# Patient Record
Sex: MALE | Race: WHITE | NOT HISPANIC OR LATINO | Employment: OTHER | URBAN - METROPOLITAN AREA
[De-identification: names, ages, dates, MRNs, and addresses within clinical notes are randomized per-mention and may not be internally consistent; named-entity substitution may affect disease eponyms.]

---

## 2017-01-26 ENCOUNTER — HOSPITAL ENCOUNTER (OUTPATIENT)
Dept: RADIOLOGY | Facility: HOSPITAL | Age: 52
Discharge: HOME | End: 2017-01-26
Attending: INTERNAL MEDICINE
Payer: MEDICARE

## 2017-01-26 ENCOUNTER — TELEPHONE (OUTPATIENT)
Dept: HEPATOLOGY | Facility: CLINIC | Age: 52
End: 2017-01-26

## 2017-01-26 DIAGNOSIS — K74.60 CIRRHOSIS OF LIVER WITHOUT ASCITES, UNSPECIFIED HEPATIC CIRRHOSIS TYPE: ICD-10-CM

## 2017-01-26 DIAGNOSIS — K76.0 HEPATIC STEATOSIS: ICD-10-CM

## 2017-01-26 DIAGNOSIS — B18.2 CHRONIC HEPATITIS C WITHOUT HEPATIC COMA: Primary | ICD-10-CM

## 2017-01-26 PROCEDURE — 76700 US EXAM ABDOM COMPLETE: CPT | Mod: TC

## 2017-01-26 PROCEDURE — 76700 US EXAM ABDOM COMPLETE: CPT | Mod: 26,,, | Performed by: RADIOLOGY

## 2017-01-26 NOTE — TELEPHONE ENCOUNTER
MA called pt to relay provider message dated 1/26/17. Call successful. Spoke with pt. Provider message relayed. Pt verbalized understanding. Lab and U/S appt scheduled for 7/25/17. Recall entered for f/u visit with Kendra Wilson.[email or web address]

## 2017-01-26 NOTE — TELEPHONE ENCOUNTER
hcc screening     US= no focal hepatic mass    Labs  hcv --pending    Lab Results   Component Value Date    AST 16 01/26/2017    ALT 12 01/26/2017    CREATININE 1.1 01/26/2017    BILITOT 0.5 01/26/2017    ALBUMIN 4.0 01/26/2017    INR 1.1 01/26/2017    AFP 5.5 07/26/2016     MELD-Na score: 8 at 1/26/2017  9:34 AM  MELD score: 8 at 1/26/2017  9:34 AM  Calculated from:  Serum Creatinine: 1.1 mg/dL at 1/26/2017  9:34 AM  Serum Sodium: 142 mmol/L (Rounded to 137) at 1/26/2017  9:34 AM  Total Bilirubin: 0.5 mg/dL (Rounded to 1) at 1/26/2017  9:34 AM  INR(ratio): 1.1 at 1/26/2017  9:34 AM  Age: 52 years    Please call pt.   All labs and imaging look great.   Hep C test is pending, will be back in touch  i'll need to see him in 6 months w/ cbc,cmp,inr, afp & abd US done here prior to visit.  ( all orders are in)

## 2017-06-27 ENCOUNTER — TELEPHONE (OUTPATIENT)
Dept: HEPATOLOGY | Facility: CLINIC | Age: 52
End: 2017-06-27

## 2017-06-27 NOTE — TELEPHONE ENCOUNTER
----- Message from Shakeel Stephen sent at 6/27/2017 10:26 AM CDT -----  Contact: Patient  Patient called in today June 27, 2017 wanting to speak with Dr. Wilson because he was confused about when to set an appointment with her. Please have Dr. Wilson or Dr. Wilson's Nurse to give Patient a call. Please contact patient at [phone].

## 2017-07-20 ENCOUNTER — CLINICAL SUPPORT (OUTPATIENT)
Dept: SMOKING CESSATION | Facility: CLINIC | Age: 52
End: 2017-07-20
Payer: COMMERCIAL

## 2017-07-20 DIAGNOSIS — F17.200 TOBACCO USE DISORDER: Primary | ICD-10-CM

## 2017-07-20 PROCEDURE — 99999 PR PBB SHADOW E&M-EST. PATIENT-LVL I: CPT | Mod: PBBFAC,,,

## 2017-07-20 PROCEDURE — 99404 PREV MED CNSL INDIV APPRX 60: CPT | Mod: S$GLB,,,

## 2017-07-20 RX ORDER — IBUPROFEN 200 MG
1 TABLET ORAL DAILY
Qty: 14 PATCH | Refills: 0 | Status: SHIPPED | OUTPATIENT
Start: 2017-07-20 | End: 2017-07-27

## 2017-07-25 ENCOUNTER — HOSPITAL ENCOUNTER (OUTPATIENT)
Dept: RADIOLOGY | Facility: HOSPITAL | Age: 52
Discharge: HOME | End: 2017-07-25
Attending: INTERNAL MEDICINE
Payer: MEDICARE

## 2017-07-25 DIAGNOSIS — K74.60 CIRRHOSIS OF LIVER WITHOUT ASCITES, UNSPECIFIED HEPATIC CIRRHOSIS TYPE: ICD-10-CM

## 2017-07-25 DIAGNOSIS — K76.0 HEPATIC STEATOSIS: ICD-10-CM

## 2017-07-25 PROCEDURE — 76700 US EXAM ABDOM COMPLETE: CPT | Mod: TC

## 2017-07-25 PROCEDURE — 76700 US EXAM ABDOM COMPLETE: CPT | Mod: 26,,, | Performed by: RADIOLOGY

## 2017-07-27 ENCOUNTER — TELEPHONE (OUTPATIENT)
Dept: HEPATOLOGY | Facility: CLINIC | Age: 52
End: 2017-07-27

## 2017-07-27 ENCOUNTER — OFFICE VISIT (OUTPATIENT)
Dept: HEPATOLOGY | Facility: CLINIC | Age: 52
End: 2017-07-27
Payer: MEDICARE

## 2017-07-27 ENCOUNTER — CLINICAL SUPPORT (OUTPATIENT)
Dept: SMOKING CESSATION | Facility: CLINIC | Age: 52
End: 2017-07-27
Payer: COMMERCIAL

## 2017-07-27 VITALS
HEIGHT: 64 IN | TEMPERATURE: 98 F | RESPIRATION RATE: 18 BRPM | BODY MASS INDEX: 27.33 KG/M2 | HEART RATE: 79 BPM | OXYGEN SATURATION: 97 % | WEIGHT: 160.06 LBS | SYSTOLIC BLOOD PRESSURE: 156 MMHG | DIASTOLIC BLOOD PRESSURE: 87 MMHG

## 2017-07-27 DIAGNOSIS — K74.69 OTHER CIRRHOSIS OF LIVER: Primary | ICD-10-CM

## 2017-07-27 DIAGNOSIS — F17.200 TOBACCO USE DISORDER: Primary | ICD-10-CM

## 2017-07-27 DIAGNOSIS — Z86.19 HISTORY OF HEPATITIS C: ICD-10-CM

## 2017-07-27 PROCEDURE — 99999 PR PBB SHADOW E&M-EST. PATIENT-LVL V: CPT | Mod: PBBFAC,,, | Performed by: PHYSICIAN ASSISTANT

## 2017-07-27 PROCEDURE — 99407 BEHAV CHNG SMOKING > 10 MIN: CPT | Mod: S$GLB,,,

## 2017-07-27 PROCEDURE — 99214 OFFICE O/P EST MOD 30 MIN: CPT | Mod: S$GLB,,, | Performed by: PHYSICIAN ASSISTANT

## 2017-07-27 NOTE — PATIENT INSTRUCTIONS
Try to stay below 2grams/ 2000mg of sodium per day.   Discharge Instructions: Eating a Low-Salt Diet  Your health care provider has prescribed a low-salt diet for you. Most people with heart problems need to eat less salt, which is full of sodium. Too much sodium is linked to high blood pressure, which is linked to a greater risk of heart disease, stroke, blindness, and kidney problems.  Home care    Learn ways to cut back on salt (sodium):  · Eat less frozen, canned, dried, packaged, and fast foods. These often contain high amounts of sodium.  · Season foods with herbs instead of salt when you cook.  · Season with flavorings such as pepper, lemon, garlic, and onion.  · Dont add salt to your food at the table.  · Sprinkle salt-free herbal blends on meats and vegetables.  Learn to read food labels carefully:  · Look for the total amount of sodium per serving.  · Look for foods labeled low sodium, reduced sodium, or no added salt.  · Beware. Salt goes by many names. Cut down on foods with these words (all forms of salt) listed as ingredients:  ¨ Salt  ¨ Sodium  ¨ Soy sauce  ¨ Baking soda  ¨ Baking powder  ¨ MSG  ¨ Monosodium  ¨ Na (the chemical symbol for sodium)  Other ideas:  · Use more fresh food. Buy more fruits and vegetables.  · Select lean meats, fish, and poultry.  · Find a cookbook with low-salt recipes. Youll find ideas for tasty meals that are healthy for your heart.  ·   When eating out, ask questions about the menu. Tell the  you're on a low-salt diet.  ¨ If you order fish, chicken, beef, or pork, ask the  to have it broiled, baked, poached, or grilled without salt, butter, or breading.  ¨ Choose plain steamed rice, boiled noodles, and baked or boiled potatoes. Top potatoes with chives and a little sour cream instead of butter.  · Avoid antacids that are high in salt. Check the label before you buy.  Follow-up  Make a follow-up appointment with a nutritionist as directed by our  staff.  Date Last Reviewed: 6/20/2015 © 2000-2016 "[employer]. [address] [place] [address], [place] [address]. All rights reserved. This information is not intended as a substitute for professional medical care. Always follow your healthcare professional's instructions.        Low-Salt Diet  This diet removes foods that are high in salt. It also limits the amount of salt you use when cooking. It is most often used for people with high blood pressure, edema (fluid retention), and kidney, liver, or heart disease.  Table salt contains the mineral sodium. Your body needs sodium to work normally. But too much sodium can make your health problems worse. Your healthcare provider is recommending a low-salt (also called low-sodium) diet for you. Your total daily allowance of salt is 1,500 to 2,300 milligrams (mg). It is less than 1 teaspoon of table salt. This means you can have only about 500 to 700 mg of sodium at each meal. People with certain health problems should limit salt intake to the lower end of the recommended range.    When you cook, dont add much salt. If you can cook without using salt, even better. Dont add salt to your food at the table.  When shopping, read food labels. Salt is often called sodium on the label. Choose foods that are salt-free, low salt, or very low salt. Note that foods with reduced salt may not lower your salt intake enough.    Beans, potatoes, and pasta  Ok: Dry beans, split peas, lentils, potatoes, rice, macaroni, pasta, spaghetti without added salt  Avoid: Potato chips, tortilla chips, and similar products  Breads and cereals  Ok: Low-sodium breads, rolls, cereals, and cakes; low-salt crackers, matzo crackers  Avoid: Salted crackers, pretzels, popcorn, Serbian toast, pancakes, muffins  Dairy  Ok: Milk, chocolate milk, hot chocolate mix, low-salt cheeses, and yogurt  Avoid: Processed cheese and cheese spreads; Roquefort, Camembert, and cottage cheese; buttermilk, instant  breakfast drink  Desserts  Ok: Ice cream, frozen yogurt, juice bars, gelatin, cookies and pies, sugar, honey, jelly, hard candy  Avoid: Most pies, cakes and cookies prepared or processed with salt; instant pudding  Drinks  Ok: Tea, coffee, fizzy (carbonated) drinks, juices  Avoid: Flavored coffees, electrolyte replacement drinks, sports drinks  Meats  Ok: All fresh meat, fish, poultry, low-salt tuna, eggs, egg substitute  Avoid: Smoked, pickled, brine-cured, or salted meats and fish. This includes barros, chipped beef, corned beef, hot dogs, deli meats, ham, kosher meats, salt pork, sausage, canned tuna, salted codfish, smoked salmon, herring, sardines, or anchovies.  Seasonings and spices  Ok: Most seasonings are okay. Good substitutes for salt include: fresh herb blends, hot sauce, lemon, garlic, zarate, vinegar, dry mustard, parsley, cilantro, horseradish, tomato paste, regular margarine, mayonnaise, unsalted butter, cream cheese, vegetable oil, cream, low-salt salad dressing and gravy.  Avoid: Regular ketchup, relishes, pickles, soy sauce, teriyaki sauce, Worcestershire sauce, BBQ sauce, tartar sauce, meat tenderizer, chili sauce, regular gravy, regular salad dressing, salted butter  Soups  Ok: Low-salt soups and broths made with allowed foods  Avoid: Bouillon cubes, soups with smoked or salted meats, regular soup and broth  Vegetables  Ok: Most vegetables are okay; also low-salt tomato and vegetable juices  Avoid: Sauerkraut and other brine-soaked vegetables; pickles and other pickled vegetables; tomato juice, olives  Date Last Reviewed: 8/1/2016  © 1627-0932 [employer]. [address] [place] [address], [place] [address]. All rights reserved. This information is not intended as a substitute for professional medical care. Always follow your healthcare professional's instructions.

## 2017-07-27 NOTE — TELEPHONE ENCOUNTER
----- Message from Kolton Heredia MD sent at 7/27/2017  2:40 PM CDT -----  6 months US is enough    ----- Message -----  From: Kendra Wilson PA-C  Sent: 7/27/2017   9:27 AM  To: Kolton Heredia MD    He is under hcc surveillance w/ normal liver enzymes.   Regarding CBD anything further otherwise will be repeating  US in 6 months?

## 2017-07-27 NOTE — TELEPHONE ENCOUNTER
Attempted to return patient's call regarding his medication, not specified in message. Unsuccessful, voice message left with call back number.

## 2017-07-27 NOTE — PROGRESS NOTES
"HEPATOLOGY CLINIC NOTE     CHIEF COMPLAINT: HEP C followup    HPI: Pt is a 53yo  male here for followup accompanied by his sister. He has well compensated cirrhosis, h/o chronic hepatitis C GT 1a.  & steatohepatitis.   His Liver biopsy 4/2014 revealed stage 3-4 Fibrosis, "emerging cirrhosis" indicating progression of his fibrosis level. His prior liver biopsy in 2009 revealed grade II inflammation with stage II fibrosis. His AFP was elevated in the past at  23 and tpCT scan was done 11/2014 to r/o hepatic mass.   As he started Harvoni his AFP began to trend down, and has remained normal.  hcc screening is up to date as o 7- w/  US suggesting  improvement in steatosis & hepatomegaly. No hepatic mass. labs show normal transaminases, nl albumin, Tbili 0.3, Creatinine 1.3, & INR of 1.1.  Cbc shows normal platelets. EGD in 2015 revealed no varices.    HCV treatment history:   He completed 24 weeks of Harvoni  Re- treatment on 5/18/15 (retreatment in setting of cirrhosis).   Tolerated well, no notable side effects.    SVR(12) as of 8-2015 and SVR(24) as of .   Risk factors for hepatitis C date back to the 1980s and 1990s.   --Treatment with pegasys and ribavirin was attempted in 1990 with Dr. Thang Grider, but was discontinued due to side effects. Details and virologic response unknown.     Pt doing well, no changes in medical history since last here, reports some LE edema , R > L, mostly occurs in the evening after long days of standing.  Otherwise he's doing well, and denies dark urine, abdominal fluid accumulation, yellowing/jaundice of skin or eyes, vomiting of blood, black stool,  or  tremors.     Past Medical History:   Diagnosis Date    Anxiety     Asthma     Depression     Diabetes mellitus     Head injury     Hepatitis C, chronic     genotype 1a; 2014 liver biopsy: stage 3-4    History of hepatitis C, s/p successful hcv treatment w/ SVR 8-2015  10/8/2012    SVR(24) as of 11/2015.  " SVR(12) as of 8/2015.  completed harvoni 24 week regimen for genotype 1a 5/18/15     Hypertension     Shoulder pain     left, s/p 4 surgeries.     Stroke      Past Surgical History:   Procedure Laterality Date    COLONOSCOPY N/A 10/20/2015    Procedure: COLONOSCOPY;  Surgeon: Jagdish Patricia MD;  Location: [facility];  Service: Endoscopy;  Laterality: N/A;    rotator cuff      left side     Allergies and medications: reviewed in epic.     PE:  WDWN, NAD  Alert, oriented and pleasant.   Vitals:    07/27/17 0825   BP: (!) 156/87   Pulse: 79   Resp: 18   Temp: 98 °F (36.7 °C)   HEENT: ncat, eomi, no scleral icterus; normal rom of neck,  Lungs:  chest symmetric with respirations. CTA w/ EBBS  CV: r/r/r  Neuro/psych: no asterixis, oriented x3, mood and affect appropriate.   Skin: slight LE edema bilat, otherwise warm and dry, no c/c/e.     LABS:   Lab Results   Component Value Date    WBC 7.41 07/25/2017     Lab Results   Component Value Date    HGB 14.4 07/25/2017     07/25/2017    AST 20 07/25/2017    ALT 22 07/25/2017    ALBUMIN 3.9 07/25/2017    BILITOT 0.3 07/25/2017    INR 1.1 07/25/2017    AFP 5.9 07/25/2017     MELD-Na score: 10 at 7/25/2017 11:39 AM  MELD score: 10 at 7/25/2017 11:39 AM  Calculated from:  Serum Creatinine: 1.3 mg/dL at 7/25/2017 11:39 AM  Serum Sodium: 145 mmol/L (Rounded to 137) at 7/25/2017 11:39 AM  Total Bilirubin: 0.3 mg/dL (Rounded to 1) at 7/25/2017 11:39 AM  INR(ratio): 1.1 at 7/25/2017 11:39 AM  Age: 52 years    Impression       No liver mass.  Slightly prominent common bile duct without definite obstructive process seen, to correlate with liver enzyme as a result.  If clinically indicated an MRCP could be done.      Electronically signed by: CATRINA MCCONNELL MD  Date: 07/25/17  Time: 11:29     Encounter     View Encounter              EGD:   Impression:            - Normal esophagus.  - Erythematous mucosa in the antrum. Biopsied.  - Normal examined  "duodenum.  Recommendation:        - Await pathology results.                       - No aspirin, ibuprofen, naproxen, or other                        non-steroidal anti-inflammatory drugs.                       - Use Prilosec OTC 20 mg PO daily.                       - Proceed with surveillance colonoscopy.  Jagdish Patricia MD  10/20/2015 10:45:44 AM    COLONOSCOPY   Impression:          - Non-bleeding internal hemorrhoids.                       - Mild diverticulosis in the sigmoid colon. There                        was no evidence of diverticular bleeding. An                        inverted diverticulum was seen in the sigmoid colon.                       - One 3 mm polyp in the transverse colon. Resected                        and retrieved.                       - One 2 mm polyp in the sigmoid colon. Resected and                        retrieved.  Recommendation:      - Await pathology results.                       - Repeat colonoscopy in 5 years for surveillance.                       - Return to referring physician as previously                        scheduled.  Jagdish Patricia MD  10/20/2015 11:09:47 AM    ASSESSMENT: 52yo white male with:   1. Advanced Fibrosis/ Early Cirrhotic changes. Well compensated.   4/2014 Liver biopsy: "emerging cirrhosis", stage 3-4 Fibrosis, 2009 liver biopsy stage 2 fibrosis  hcc screen up to date as o f7/24/2017  w/  Nl AFP & US: no hepatic lesions.     EGD: no varices.   Previously elevated AFP normalized w/ HCV treatment   MELD = 10, based on recent labs.      2. H/o CHRONIC HEPATITIS C, GENOTYPE 1A   S/p successful retreatment w/ SVR(24) as of 11/2015  completed  Harvoni 24 week regimen on 5/18/15  s/p attempted pegasys and ribavirin in past, therapy d/c early secondary to side effects.   completed vaccines for A and B.    3. Mildly prominent common bile duct.   7mm on imaging today,   Normal enzymes     4. Steatohepatitis, on 2014 Liver biopsy  --also noted " on past imaging (tpCT) & US.   Improvement in steatosis seen on imaging since 7-2016  Nl transaminases     5. LE edema, mild      DISCUSSION    Reiterated the point that although Hep C is no longer present,   he still has cirrhosis.    We discussed that now that Hep C is gone, it is thought that this will decrease the incidence of complications from his liver disease.   HOWEVER  long term followup for cirrhosis should be continued lifelong    Signs and symptoms of hepatic decompensation were reviewed, including jaundice, ascites, and slowed mentation due to hepatic encephalopathy. The patient should seek medical attention if any of these things occur. We discussed the potential for bleeding from esophageal varices with symptoms of hematemesis and melena. The patient should report to the Emergency Department for these symptoms.     Continued screening every six months with ultrasound and AFP is recommended.   Discussed recommendation for varices screening w/ EGD.   Discussed the general recommendations for liver health-- continue to avoid raw seafood, limit tylenol to 2000mg daily and avoid alcohol     PLAN:   1. EGD due: /2018  2. COLONOSCOPY due:   3. Discussed Low salt diet, given printed material. Elevate LE few times during the day.  Hydrate well.   4. Will review imaging regarding CBD w/ hepatology for any further recommendations.   5. Next hcc screen due: in 6 months w/ US, cbc,cmp,inr , hcvrna quant & AFP will arrange to be done on  campus, will review by phone.    6.  F/u appt: 1 yr.  w/ hcc screening (US, cbc,cmp,inr & AFP) on day of visit

## 2017-07-27 NOTE — TELEPHONE ENCOUNTER
----- Message from Lizeth Anthony sent at 7/27/2017 10:39 AM CDT -----  Contact: patient   Calling to speak with Kendra about his medication. Please call

## 2017-08-07 ENCOUNTER — HOSPITAL ENCOUNTER (EMERGENCY)
Facility: HOSPITAL | Age: 52
Discharge: HOME | End: 2017-08-07
Attending: EMERGENCY MEDICINE
Payer: MEDICARE

## 2017-08-07 VITALS
DIASTOLIC BLOOD PRESSURE: 70 MMHG | WEIGHT: 163 LBS | HEART RATE: 98 BPM | TEMPERATURE: 99 F | HEIGHT: 64 IN | BODY MASS INDEX: 27.83 KG/M2 | SYSTOLIC BLOOD PRESSURE: 128 MMHG | RESPIRATION RATE: 18 BRPM | OXYGEN SATURATION: 96 %

## 2017-08-07 DIAGNOSIS — M54.2 NECK PAIN ON LEFT SIDE: Primary | ICD-10-CM

## 2017-08-07 DIAGNOSIS — M54.5 CHRONIC BILATERAL LOW BACK PAIN, WITH SCIATICA PRESENCE UNSPECIFIED: ICD-10-CM

## 2017-08-07 DIAGNOSIS — G89.29 CHRONIC BILATERAL LOW BACK PAIN, WITH SCIATICA PRESENCE UNSPECIFIED: ICD-10-CM
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
## 2017-08-07 PROCEDURE — 99284 EMERGENCY DEPT VISIT MOD MDM: CPT

## 2017-08-07 PROCEDURE — 25000003 PHARM REV CODE 250: Performed by: PHYSICIAN ASSISTANT

## 2017-08-07 RX ORDER — METHOCARBAMOL 500 MG/1
500 TABLET, FILM COATED ORAL
Status: COMPLETED | OUTPATIENT
Start: 2017-08-07 | End: 2017-08-07

## 2017-08-07 RX ORDER — METHOCARBAMOL 500 MG/1
500 TABLET, FILM COATED ORAL 3 TIMES DAILY
Qty: 6 TABLET | Refills: 0 | Status: SHIPPED | OUTPATIENT
Start: 2017-08-07 | End: 2017-08-09

## 2017-08-07 RX ADMIN — METHOCARBAMOL 500 MG: 500 TABLET ORAL at 10:08
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
## 2017-09-19 ENCOUNTER — CLINICAL SUPPORT (OUTPATIENT)
Dept: SMOKING CESSATION | Facility: CLINIC | Age: 52
End: 2017-09-19
Payer: COMMERCIAL

## 2017-09-19 DIAGNOSIS — F17.200 TOBACCO USE DISORDER: Primary | ICD-10-CM

## 2017-09-19 PROCEDURE — 99407 BEHAV CHNG SMOKING > 10 MIN: CPT | Mod: S$GLB,,,

## 2017-09-19 RX ORDER — IBUPROFEN 200 MG
1 TABLET ORAL DAILY
Qty: 14 PATCH | Refills: 0 | Status: SHIPPED | OUTPATIENT
Start: 2017-09-19 | End: 2021-09-28

## 2018-01-22 ENCOUNTER — TELEPHONE (OUTPATIENT)
Dept: HEPATOLOGY | Facility: CLINIC | Age: 53
End: 2018-01-22

## 2018-01-22 NOTE — TELEPHONE ENCOUNTER
I spoke with patient and informed him that RHEA Wilson was no longer here.  I also stressed that he's only due to have HCC testing on 1/26 and no f/u is needed at this time.

## 2018-01-22 NOTE — TELEPHONE ENCOUNTER
----- Message from Pratik Nixon sent at 1/22/2018  1:15 PM CST -----  Contact: Pt  Pt would like to know when he has to schedule follow up appt with Katie?     Call

## 2018-01-26 ENCOUNTER — HOSPITAL ENCOUNTER (OUTPATIENT)
Dept: RADIOLOGY | Facility: HOSPITAL | Age: 53
Discharge: HOME | End: 2018-01-26
Attending: INTERNAL MEDICINE
Payer: MEDICARE

## 2018-01-26 DIAGNOSIS — K74.69 OTHER CIRRHOSIS OF LIVER: ICD-10-CM

## 2018-01-26 DIAGNOSIS — Z86.19 HISTORY OF HEPATITIS C: ICD-10-CM

## 2018-01-26 PROCEDURE — 76700 US EXAM ABDOM COMPLETE: CPT | Mod: 26,,, | Performed by: RADIOLOGY

## 2018-01-26 PROCEDURE — 76700 US EXAM ABDOM COMPLETE: CPT | Mod: TC

## 2018-01-29 ENCOUNTER — TELEPHONE (OUTPATIENT)
Dept: HEPATOLOGY | Facility: CLINIC | Age: 53
End: 2018-01-29

## 2018-01-29 NOTE — TELEPHONE ENCOUNTER
----- Message from Kolton Heredia MD sent at 1/29/2018  8:12 AM CST -----  Please let him know that him US is stable.  Can you also make sure he has a clinic appointment in next 4-8 weeks?

## 2018-01-29 NOTE — TELEPHONE ENCOUNTER
MA attempted to call patient he is unable to reached left him VM to please give us a callback. ANTONIO

## 2018-01-30 ENCOUNTER — TELEPHONE (OUTPATIENT)
Dept: HEPATOLOGY | Facility: CLINIC | Age: 53
End: 2018-01-30

## 2018-01-30 NOTE — TELEPHONE ENCOUNTER
MA called patient inform him of his US results. Patient understood he accepted the appt to see JAMESON 2/1/18. ANTONIO

## 2018-02-15 ENCOUNTER — LAB VISIT (OUTPATIENT)
Dept: LAB | Facility: HOSPITAL | Age: 53
End: 2018-02-15
Payer: MEDICARE

## 2018-02-15 ENCOUNTER — OFFICE VISIT (OUTPATIENT)
Dept: HEPATOLOGY | Facility: CLINIC | Age: 53
End: 2018-02-15
Payer: MEDICARE

## 2018-02-15 VITALS
RESPIRATION RATE: 18 BRPM | SYSTOLIC BLOOD PRESSURE: 162 MMHG | OXYGEN SATURATION: 99 % | HEIGHT: 64 IN | HEART RATE: 98 BPM | BODY MASS INDEX: 27.1 KG/M2 | WEIGHT: 158.75 LBS | DIASTOLIC BLOOD PRESSURE: 91 MMHG

## 2018-02-15 DIAGNOSIS — K74.60 CIRRHOSIS OF LIVER WITHOUT ASCITES, UNSPECIFIED HEPATIC CIRRHOSIS TYPE: ICD-10-CM

## 2018-02-15 DIAGNOSIS — K74.60 CIRRHOSIS OF LIVER WITHOUT ASCITES, UNSPECIFIED HEPATIC CIRRHOSIS TYPE: Primary | ICD-10-CM

## 2018-02-15 LAB
AFP SERPL-MCNC: 5.4 NG/ML
ALBUMIN SERPL BCP-MCNC: 4 G/DL
ALP SERPL-CCNC: 71 U/L
ALT SERPL W/O P-5'-P-CCNC: 28 U/L
ANION GAP SERPL CALC-SCNC: 9 MMOL/L
AST SERPL-CCNC: 18 U/L
BASOPHILS # BLD AUTO: 0.09 K/UL
BASOPHILS NFR BLD: 1.2 %
BILIRUB SERPL-MCNC: 0.4 MG/DL
BUN SERPL-MCNC: 8 MG/DL
CALCIUM SERPL-MCNC: 9.8 MG/DL
CHLORIDE SERPL-SCNC: 106 MMOL/L
CO2 SERPL-SCNC: 25 MMOL/L
CREAT SERPL-MCNC: 1.1 MG/DL
DIFFERENTIAL METHOD: ABNORMAL
EOSINOPHIL # BLD AUTO: 0.4 K/UL
EOSINOPHIL NFR BLD: 4.8 %
ERYTHROCYTE [DISTWIDTH] IN BLOOD BY AUTOMATED COUNT: 12.9 %
EST. GFR  (AFRICAN AMERICAN): >60 ML/MIN/1.73 M^2
EST. GFR  (NON AFRICAN AMERICAN): >60 ML/MIN/1.73 M^2
GLUCOSE SERPL-MCNC: 141 MG/DL
HCT VFR BLD AUTO: 41.7 %
HGB BLD-MCNC: 13.9 G/DL
IMM GRANULOCYTES # BLD AUTO: 0.02 K/UL
IMM GRANULOCYTES NFR BLD AUTO: 0.3 %
INR PPP: 1.1
LYMPHOCYTES # BLD AUTO: 3.1 K/UL
LYMPHOCYTES NFR BLD: 40.2 %
MCH RBC QN AUTO: 33.3 PG
MCHC RBC AUTO-ENTMCNC: 33.3 G/DL
MCV RBC AUTO: 100 FL
MONOCYTES # BLD AUTO: 0.7 K/UL
MONOCYTES NFR BLD: 8.4 %
NEUTROPHILS # BLD AUTO: 3.5 K/UL
NEUTROPHILS NFR BLD: 45.1 %
NRBC BLD-RTO: 0 /100 WBC
PLATELET # BLD AUTO: 221 K/UL
PMV BLD AUTO: 10.2 FL
POTASSIUM SERPL-SCNC: 4.2 MMOL/L
PROT SERPL-MCNC: 7.9 G/DL
PROTHROMBIN TIME: 11.3 SEC
RBC # BLD AUTO: 4.18 M/UL
SODIUM SERPL-SCNC: 140 MMOL/L
WBC # BLD AUTO: 7.71 K/UL

## 2018-02-15 PROCEDURE — 80053 COMPREHEN METABOLIC PANEL: CPT

## 2018-02-15 PROCEDURE — 85025 COMPLETE CBC W/AUTO DIFF WBC: CPT

## 2018-02-15 PROCEDURE — 85610 PROTHROMBIN TIME: CPT

## 2018-02-15 PROCEDURE — 99999 PR PBB SHADOW E&M-EST. PATIENT-LVL V: CPT | Mod: PBBFAC,,, | Performed by: PHYSICIAN ASSISTANT

## 2018-02-15 PROCEDURE — 82105 ALPHA-FETOPROTEIN SERUM: CPT

## 2018-02-15 PROCEDURE — 36415 COLL VENOUS BLD VENIPUNCTURE: CPT

## 2018-02-15 PROCEDURE — 99214 OFFICE O/P EST MOD 30 MIN: CPT | Mod: S$GLB,,, | Performed by: PHYSICIAN ASSISTANT

## 2018-02-15 PROCEDURE — 3008F BODY MASS INDEX DOCD: CPT | Mod: S$GLB,,, | Performed by: PHYSICIAN ASSISTANT

## 2018-02-15 RX ORDER — AMITRIPTYLINE HYDROCHLORIDE 50 MG/1
50 TABLET, FILM COATED ORAL NIGHTLY
COMMUNITY
Start: 2018-02-09 | End: 2023-05-18

## 2018-02-15 RX ORDER — PROPRANOLOL HYDROCHLORIDE 10 MG/1
10 TABLET ORAL 2 TIMES DAILY
Status: ON HOLD | COMMUNITY
Start: 2018-02-09 | End: 2018-09-21 | Stop reason: HOSPADM

## 2018-02-15 RX ORDER — DOXEPIN HYDROCHLORIDE 100 MG/1
10 CAPSULE ORAL NIGHTLY
COMMUNITY
Start: 2018-02-09 | End: 2023-08-25

## 2018-02-15 RX ORDER — GABAPENTIN 300 MG/1
300 CAPSULE ORAL 3 TIMES DAILY
Status: ON HOLD | COMMUNITY
Start: 2018-01-12 | End: 2018-09-21

## 2018-02-15 NOTE — Clinical Note
Please schedule CBC, CMP, PT/INR, AFP and U/S in 6 months. He does not need office visit at that time Thanks

## 2018-02-15 NOTE — PROGRESS NOTES
"HEPATOLOGY CLINIC VISIT NOTE     REASON FOR VISIT: HCC screening     HISTORY: This is a 53 y.o. White male here for cirrhosis follow up. Pt has cirrhosis due to HCV. He underwent HCC screening U/S 01/26/18 which revealed no concerning masses or lesions. He is overdue for labs. His last EGD was 10/2015 and there were no EV. His liver staging was done by liver biopsy.  His Liver biopsy 4/2014 revealed stage 3-4 Fibrosis, "emerging cirrhosis" indicating progression of his fibrosis level. His prior liver biopsy in 2009 revealed grade II inflammation with stage II fibrosis. He has a h/o elevated AFP, trended down with HCV treatment.     His major complaint today is anxiety, "his nerves." Pt reports rumination over health and the fact that he had to turn in license following stroke. He feels he should be able to drive. He has not discussed this with his PCP. He also has severe back and shoulder pain. He is undergoing PT for shoulder pain.     Cirrhosis history:  - Well compensated  - MELD score     MELD-Na score: 10 at 7/25/2017 11:39 AM  MELD score: 10 at 7/25/2017 11:39 AM  Calculated from:  Serum Creatinine: 1.3 mg/dL at 7/25/2017 11:39 AM  Serum Sodium: 145 mmol/L (Rounded to 137) at 7/25/2017 11:39 AM  Total Bilirubin: 0.3 mg/dL (Rounded to 1) at 7/25/2017 11:39 AM  INR(ratio): 1.1 at 7/25/2017 11:39 AM  Age: 52 years    - HCC screening:   - U/S: due 07/2018   - AFP: ordered today     (-)Portal HTN:   - EGD: last done 10/2015     Denies jaundice, dark urine, abdominal distention, hematemesis, melena, slowed mentation.   No abnormal skin rashes. No generalized joint pain.                   Past Medical History:   Diagnosis Date    Anxiety     Asthma     Depression     Diabetes mellitus     Head injury     Hepatitis C, chronic     genotype 1a; 2014 liver biopsy: stage 3-4    History of hepatitis C, s/p successful hcv treatment w/ SVR 8-2015  10/8/2012    SVR(24) as of 11/2015.  SVR(12) as of 8/2015.  completed " harvoni 24 week regimen for genotype 1a 5/18/15     Hypertension     Shoulder pain     left, s/p 4 surgeries.     Stroke        Past Surgical History:   Procedure Laterality Date    COLONOSCOPY N/A 10/20/2015    Procedure: COLONOSCOPY;  Surgeon: Jagdish Patricia MD;  Location: [facility];  Service: Endoscopy;  Laterality: N/A;    rotator cuff      left side     FAMILY HISTORY: Negative for liver disease    SOCIAL HISTORY:   History   Smoking Status    Current Every Day Smoker    Packs/day: 1.00    Years: 35.00    Types: Cigarettes   Smokeless Tobacco    Never Used       History   Alcohol Use No       History   Drug Use    Types: Marijuana     Comment: daily     ROS:   No fever, chills, (+)fatigue  No chest pain, dyspnea, cough  No abdominal pain, nausea, vomiting  No headaches, visual changes  No lower extremity edema  No depression, (+) anxiety      PHYSICAL EXAM:  Friendly White male, in no acute distress; alert and oriented to person, place and time  VITALS: reviewed  HEENT: Sclerae anicteric.   NECK: Supple  CVS: Regular rate and rhythm. No murmurs  LUNGS: Normal respiratory effort. Clear bilaterally  ABDOMEN: Flat, soft, nontender. No organomegaly or masses. No ascites or hernias.     SKIN: Warm and dry. No jaundice, No obvious rashes.   EXTREMITIES: No lower extremity edema  NEURO/PSYCH: Normal gate. Memory intact. Thought and speech pattern appropriate. Behavior normal. No depression or anxiety noted.    RECENT LABS:  Lab Results   Component Value Date    WBC 7.41 07/25/2017    HGB 14.4 07/25/2017     07/25/2017     Lab Results   Component Value Date    INR 1.1 07/25/2017     Lab Results   Component Value Date    AST 20 07/25/2017    ALT 22 07/25/2017    BILITOT 0.3 07/25/2017    ALBUMIN 3.9 07/25/2017    ALKPHOS 61 07/25/2017    CREATININE 1.3 07/25/2017    BUN 12 07/25/2017     07/25/2017    K 4.9 07/25/2017    AFP 5.9 07/25/2017     RECENT IMAGING:  U/S abdomen 01/2018  Narrative      Abdominal ultrasound    History: Chronic hepatitis C    Comparison: July 25, 2017    Technique: Sonographic evaluation of abdomen was performed.    Findings:       The hepatic parenchyma is a diffusely increased echotexture..  There is no evidence of intrahepatic biliary ductal dilatation.    The gallbladder has a normal sonographic appearance.  There is no evidence cholelithiasis or pericholecystic fluid.  The sonographer reports a negative Self sign.  The gallbladder wall is within normal limits at 2.6 mm.  The common duct is within normal limits at 6 mm.    The right kidney measures 10.5 cm in length and the left kidney measures 10.6 cm in length.  There is no hydronephrosis.    The spleen measures 10.3 cm in length with a normal echotexture.   Impression       Again there is minimal prominence of the patient's common bile duct similar to prior exam. There is no evidence focal hepatic lesion.   ASSESSMENT  53 y.o. White male with:  1. WELL COMPENSATED CIRRHOSIS  -- due to HCV, post treatment, SVR 24 achieved   -- asymptomatic   - HCC screening:   - U/S: due 07/2018   - AFP: ordered today     (-)Portal HTN:   - EGD: last done 10/2015     PLAN:  1. Labs today  2. EGD  3. HCC screening in 6 months, will review by phone      Thank you for allowing me to participate in the care of Nino Adair PA-C

## 2018-02-16 ENCOUNTER — TELEPHONE (OUTPATIENT)
Dept: HEPATOLOGY | Facility: CLINIC | Age: 53
End: 2018-02-16

## 2018-02-16 NOTE — TELEPHONE ENCOUNTER
----- Message from Gama Adair PA-C sent at 2/16/2018  9:44 AM CST -----  Please let him know that these labs are stable.

## 2018-02-21 ENCOUNTER — TELEPHONE (OUTPATIENT)
Dept: GASTROENTEROLOGY | Facility: CLINIC | Age: 53
End: 2018-02-21

## 2018-02-21 DIAGNOSIS — K74.60 CIRRHOSIS OF LIVER WITHOUT ASCITES, UNSPECIFIED HEPATIC CIRRHOSIS TYPE: Primary | ICD-10-CM

## 2018-02-21 NOTE — TELEPHONE ENCOUNTER
Referral was sent from Dr. Adair to schedule patient for an EGD, patient declines at [facility]. Patient states that he will contact the [facility] [place] to get scheduled.

## 2018-02-27 ENCOUNTER — ANESTHESIA EVENT (OUTPATIENT)
Dept: ENDOSCOPY | Facility: HOSPITAL | Age: 53
End: 2018-02-27
Payer: MEDICARE

## 2018-02-27 ENCOUNTER — ANESTHESIA (OUTPATIENT)
Dept: ENDOSCOPY | Facility: HOSPITAL | Age: 53
End: 2018-02-27
Payer: MEDICARE

## 2018-02-27 ENCOUNTER — SURGERY (OUTPATIENT)
Age: 53
End: 2018-02-27

## 2018-02-27 ENCOUNTER — HOSPITAL ENCOUNTER (OUTPATIENT)
Facility: HOSPITAL | Age: 53
Discharge: HOME | End: 2018-02-27
Attending: INTERNAL MEDICINE | Admitting: INTERNAL MEDICINE
Payer: MEDICARE

## 2018-02-27 VITALS
RESPIRATION RATE: 18 BRPM | TEMPERATURE: 98 F | BODY MASS INDEX: 25.61 KG/M2 | HEART RATE: 78 BPM | WEIGHT: 150 LBS | OXYGEN SATURATION: 98 % | SYSTOLIC BLOOD PRESSURE: 144 MMHG | DIASTOLIC BLOOD PRESSURE: 90 MMHG | HEIGHT: 64 IN

## 2018-02-27 DIAGNOSIS — K74.60 CIRRHOSIS OF LIVER WITHOUT ASCITES, UNSPECIFIED HEPATIC CIRRHOSIS TYPE: Primary | Chronic | ICD-10-CM

## 2018-02-27 DIAGNOSIS — Z86.19 HISTORY OF HEPATITIS C: ICD-10-CM

## 2018-02-27 DIAGNOSIS — K74.60 CIRRHOSIS: ICD-10-CM

## 2018-02-27 LAB — POCT GLUCOSE: 106 MG/DL (ref 70–110)

## 2018-02-27 PROCEDURE — 25000003 PHARM REV CODE 250: Performed by: INTERNAL MEDICINE

## 2018-02-27 PROCEDURE — 43235 EGD DIAGNOSTIC BRUSH WASH: CPT | Performed by: INTERNAL MEDICINE

## 2018-02-27 PROCEDURE — 82962 GLUCOSE BLOOD TEST: CPT | Performed by: INTERNAL MEDICINE

## 2018-02-27 PROCEDURE — 43235 EGD DIAGNOSTIC BRUSH WASH: CPT | Mod: ,,, | Performed by: INTERNAL MEDICINE

## 2018-02-27 PROCEDURE — D9220A PRA ANESTHESIA: Mod: ANES,,, | Performed by: ANESTHESIOLOGY

## 2018-02-27 PROCEDURE — 37000009 HC ANESTHESIA EA ADD 15 MINS: Performed by: INTERNAL MEDICINE

## 2018-02-27 PROCEDURE — 63600175 PHARM REV CODE 636 W HCPCS: Performed by: NURSE ANESTHETIST, CERTIFIED REGISTERED

## 2018-02-27 PROCEDURE — D9220A PRA ANESTHESIA: Mod: CRNA,,, | Performed by: NURSE ANESTHETIST, CERTIFIED REGISTERED

## 2018-02-27 PROCEDURE — 37000008 HC ANESTHESIA 1ST 15 MINUTES: Performed by: INTERNAL MEDICINE

## 2018-02-27 RX ORDER — SODIUM CHLORIDE 9 MG/ML
INJECTION, SOLUTION INTRAVENOUS CONTINUOUS
Status: DISCONTINUED | OUTPATIENT
Start: 2018-02-27 | End: 2018-02-27 | Stop reason: HOSPADM

## 2018-02-27 RX ORDER — LIDOCAINE HCL/PF 100 MG/5ML
SYRINGE (ML) INTRAVENOUS
Status: DISCONTINUED | OUTPATIENT
Start: 2018-02-27 | End: 2018-02-27

## 2018-02-27 RX ORDER — PROPOFOL 10 MG/ML
VIAL (ML) INTRAVENOUS
Status: DISCONTINUED | OUTPATIENT
Start: 2018-02-27 | End: 2018-02-27

## 2018-02-27 RX ADMIN — PROPOFOL 60 MG: 10 INJECTION, EMULSION INTRAVENOUS at 11:02

## 2018-02-27 RX ADMIN — PROPOFOL 40 MG: 10 INJECTION, EMULSION INTRAVENOUS at 11:02

## 2018-02-27 RX ADMIN — SODIUM CHLORIDE: 9 INJECTION, SOLUTION INTRAVENOUS at 10:02

## 2018-02-27 RX ADMIN — LIDOCAINE HYDROCHLORIDE 100 MG: 20 INJECTION, SOLUTION INTRAVENOUS at 11:02

## 2018-02-27 NOTE — TRANSFER OF CARE
"Anesthesia Transfer of Care Note    Patient: Nino Price    Procedure(s) Performed: Procedure(s) (LRB):  ESOPHAGOGASTRODUODENOSCOPY (EGD) (N/A)    Patient location: PACU    Anesthesia Type: general    Transport from OR: Transported from OR on room air with adequate spontaneous ventilation    Post pain: adequate analgesia    Post assessment: no apparent anesthetic complications    Post vital signs: stable    Level of consciousness: awake and alert    Nausea/Vomiting: no nausea/vomiting    Complications: none    Transfer of care protocol was followed      Last vitals:   Visit Vitals  BP (!) 163/95 (BP Location: Left arm, Patient Position: Lying)   Pulse 97   Temp 37.1 °C (98.8 °F) (Temporal)   Resp 16   Ht 5' 4" (1.626 m)   Wt 68 kg (150 lb)   SpO2 98%   BMI 25.75 kg/m²     "
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
## 2018-02-27 NOTE — H&P (VIEW-ONLY)
"HEPATOLOGY CLINIC VISIT NOTE     REASON FOR VISIT: HCC screening     HISTORY: This is a 53 y.o. White male here for cirrhosis follow up. Pt has cirrhosis due to HCV. He underwent HCC screening U/S 01/26/18 which revealed no concerning masses or lesions. He is overdue for labs. His last EGD was 10/2015 and there were no EV. His liver staging was done by liver biopsy.  His Liver biopsy 4/2014 revealed stage 3-4 Fibrosis, "emerging cirrhosis" indicating progression of his fibrosis level. His prior liver biopsy in 2009 revealed grade II inflammation with stage II fibrosis. He has a h/o elevated AFP, trended down with HCV treatment.     His major complaint today is anxiety, "his nerves." Pt reports rumination over health and the fact that he had to turn in license following stroke. He feels he should be able to drive. He has not discussed this with his PCP. He also has severe back and shoulder pain. He is undergoing PT for shoulder pain.     Cirrhosis history:  - Well compensated  - MELD score     MELD-Na score: 10 at 7/25/2017 11:39 AM  MELD score: 10 at 7/25/2017 11:39 AM  Calculated from:  Serum Creatinine: 1.3 mg/dL at 7/25/2017 11:39 AM  Serum Sodium: 145 mmol/L (Rounded to 137) at 7/25/2017 11:39 AM  Total Bilirubin: 0.3 mg/dL (Rounded to 1) at 7/25/2017 11:39 AM  INR(ratio): 1.1 at 7/25/2017 11:39 AM  Age: 52 years    - HCC screening:   - U/S: due 07/2018   - AFP: ordered today     (-)Portal HTN:   - EGD: last done 10/2015     Denies jaundice, dark urine, abdominal distention, hematemesis, melena, slowed mentation.   No abnormal skin rashes. No generalized joint pain.                   Past Medical History:   Diagnosis Date    Anxiety     Asthma     Depression     Diabetes mellitus     Head injury     Hepatitis C, chronic     genotype 1a; 2014 liver biopsy: stage 3-4    History of hepatitis C, s/p successful hcv treatment w/ SVR 8-2015  10/8/2012    SVR(24) as of 11/2015.  SVR(12) as of 8/2015.  completed " harvoni 24 week regimen for genotype 1a 5/18/15     Hypertension     Shoulder pain     left, s/p 4 surgeries.     Stroke        Past Surgical History:   Procedure Laterality Date    COLONOSCOPY N/A 10/20/2015    Procedure: COLONOSCOPY;  Surgeon: Jagdish Patricia MD;  Location: [facility];  Service: Endoscopy;  Laterality: N/A;    rotator cuff      left side     FAMILY HISTORY: Negative for liver disease    SOCIAL HISTORY:   History   Smoking Status    Current Every Day Smoker    Packs/day: 1.00    Years: 35.00    Types: Cigarettes   Smokeless Tobacco    Never Used       History   Alcohol Use No       History   Drug Use    Types: Marijuana     Comment: daily     ROS:   No fever, chills, (+)fatigue  No chest pain, dyspnea, cough  No abdominal pain, nausea, vomiting  No headaches, visual changes  No lower extremity edema  No depression, (+) anxiety      PHYSICAL EXAM:  Friendly White male, in no acute distress; alert and oriented to person, place and time  VITALS: reviewed  HEENT: Sclerae anicteric.   NECK: Supple  CVS: Regular rate and rhythm. No murmurs  LUNGS: Normal respiratory effort. Clear bilaterally  ABDOMEN: Flat, soft, nontender. No organomegaly or masses. No ascites or hernias.     SKIN: Warm and dry. No jaundice, No obvious rashes.   EXTREMITIES: No lower extremity edema  NEURO/PSYCH: Normal gate. Memory intact. Thought and speech pattern appropriate. Behavior normal. No depression or anxiety noted.    RECENT LABS:  Lab Results   Component Value Date    WBC 7.41 07/25/2017    HGB 14.4 07/25/2017     07/25/2017     Lab Results   Component Value Date    INR 1.1 07/25/2017     Lab Results   Component Value Date    AST 20 07/25/2017    ALT 22 07/25/2017    BILITOT 0.3 07/25/2017    ALBUMIN 3.9 07/25/2017    ALKPHOS 61 07/25/2017    CREATININE 1.3 07/25/2017    BUN 12 07/25/2017     07/25/2017    K 4.9 07/25/2017    AFP 5.9 07/25/2017     RECENT IMAGING:  U/S abdomen 01/2018  Narrative      Abdominal ultrasound    History: Chronic hepatitis C    Comparison: July 25, 2017    Technique: Sonographic evaluation of abdomen was performed.    Findings:       The hepatic parenchyma is a diffusely increased echotexture..  There is no evidence of intrahepatic biliary ductal dilatation.    The gallbladder has a normal sonographic appearance.  There is no evidence cholelithiasis or pericholecystic fluid.  The sonographer reports a negative Self sign.  The gallbladder wall is within normal limits at 2.6 mm.  The common duct is within normal limits at 6 mm.    The right kidney measures 10.5 cm in length and the left kidney measures 10.6 cm in length.  There is no hydronephrosis.    The spleen measures 10.3 cm in length with a normal echotexture.   Impression       Again there is minimal prominence of the patient's common bile duct similar to prior exam. There is no evidence focal hepatic lesion.   ASSESSMENT  53 y.o. White male with:  1. WELL COMPENSATED CIRRHOSIS  -- due to HCV, post treatment, SVR 24 achieved   -- asymptomatic   - HCC screening:   - U/S: due 07/2018   - AFP: ordered today     (-)Portal HTN:   - EGD: last done 10/2015     PLAN:  1. Labs today  2. EGD  3. HCC screening in 6 months, will review by phone      Thank you for allowing me to participate in the care of Nino Adair PA-C

## 2018-02-27 NOTE — ANESTHESIA POSTPROCEDURE EVALUATION
"Anesthesia Post Evaluation    Patient: Nino Price    Procedure(s) Performed: Procedure(s) (LRB):  ESOPHAGOGASTRODUODENOSCOPY (EGD) (N/A)    Final Anesthesia Type: general  Patient location during evaluation: PACU  Patient participation: Yes- Able to Participate  Level of consciousness: awake and alert  Post-procedure vital signs: reviewed and stable  Pain management: adequate  Airway patency: patent  PONV status at discharge: No PONV  Anesthetic complications: no      Cardiovascular status: hemodynamically stable  Respiratory status: room air, spontaneous ventilation and unassisted  Hydration status: euvolemic  Follow-up not needed.        Visit Vitals  /72 (BP Location: Left arm, Patient Position: Lying)   Pulse 98   Temp 36.7 °C (98.1 °F) (Temporal)   Resp 20   Ht 5' 4" (1.626 m)   Wt 68 kg (150 lb)   SpO2 96%   BMI 25.75 kg/m²       Pain/Gilmer Score: Pain Assessment Performed: Yes (2/27/2018 10:23 AM)  Presence of Pain: complains of pain/discomfort (2/27/2018 10:23 AM)  Gilmer Score: 7 (2/27/2018 11:25 AM)      "

## 2018-02-27 NOTE — PROVATION PATIENT INSTRUCTIONS
Discharge Summary/Instructions after an Endoscopic Procedure  Patient Name: Nino Price  Patient MRN: [id number]  Patient YOB: 1965 Tuesday, February 27, 2018  Deja Burnham MD  RESTRICTIONS:  During your procedure today, you received medications for sedation.  These   medications may affect your judgment, balance and coordination.  Therefore,   for 24 hours, you have the following restrictions:   - DO NOT drive a car, operate machinery, make legal/financial decisions,   sign important papers or drink alcohol.    ACTIVITY:  The following day: return to full activity including work, except no heavy   lifting, straining or running for 3 days if polyps were removed.  DIET:  Eat and drink normally unless instructed otherwise.     TREATMENT FOR COMMON SIDE EFFECTS:  - Mild abdominal pain, nausea, belching, bloating or excessive gas:  rest,   eat lightly and use a heating pad.  - Sore Throat: treat with throat lozenges and/or gargle with warm salt   water.  - Because air was used during the procedure, expelling large amounts of air   from your rectum or belching is normal.  - If a bowel prep was taken, you may not have a bowel movement for 1-3 days.    This is normal.  SYMPTOMS TO WATCH FOR AND REPORT TO YOUR PHYSICIAN:  1. Abdominal pain or bloating, other than gas cramps.  2. Chest pain.  3. Back pain.  4. Signs of infection such as: chills or fever occurring within 24 hours   after the procedure.  5. Rectal bleeding, which would show as bright red, maroon, or black stools.   (A tablespoon of blood from the rectum is not serious, especially if   hemorrhoids are present.)  6. Vomiting.  7. Weakness or dizziness.  GO DIRECTLY TO THE NEAREST EMERGENCY ROOM IF YOU HAVE ANY OF THE FOLLOWING:      Difficulty breathing  Chills and/or fever over 101 F   Persistent vomiting and/or vomiting blood   Severe abdominal pain   Severe chest pain   Black, tarry stools   Bleeding- more than one tablespoon   Any other  symptom or condition that you feel may need urgent attention  Your doctor recommends these additional instructions:  If any biopsies were taken, your doctors clinic will contact you in 1 to 2   weeks with any results.  You are being discharged to home.   You have a contact number available for emergencies.  The signs and symptoms   of potential delayed complications were discussed with you.  You may return   to normal activities tomorrow.  Written discharge instructions were   provided to you.   Resume your previous diet.   Continue your present medications.   Return to your liver clinic.   Your physician has recommended a repeat upper endoscopy in three years for   surveillance.  For questions, problems or results please call your physician - Deja Burhnam MD at Work:  [phone].  [facility], EMERGENCY ROOM PHONE NUMBER: [phone]  IF A COMPLICATION OR EMERGENCY SITUATION ARISES AND YOU ARE UNABLE TO REACH   YOUR PHYSICIAN - GO DIRECTLY TO THE EMERGENCY ROOM.  Deja Burnham MD  2/27/2018 11:24:11 AM  This report has been verified and signed electronically.

## 2018-02-27 NOTE — INTERVAL H&P NOTE
The patient has been examined and the H&P has been reviewed:    I concur with the findings and no changes have occurred since H&P was written.    Anesthesia/Surgery risks, benefits and alternative options discussed and understood by patient/family.          Active Hospital Problems    Diagnosis  POA    Cirrhosis [K74.60]  Yes      Resolved Hospital Problems    Diagnosis Date Resolved POA   No resolved problems to display.

## 2018-03-06 ENCOUNTER — TELEPHONE (OUTPATIENT)
Dept: ENDOSCOPY | Facility: HOSPITAL | Age: 53
End: 2018-03-06

## 2018-05-21 ENCOUNTER — CLINICAL SUPPORT (OUTPATIENT)
Dept: SMOKING CESSATION | Facility: CLINIC | Age: 53
End: 2018-05-21
Payer: COMMERCIAL

## 2018-05-21 DIAGNOSIS — F17.200 NICOTINE DEPENDENCE: Primary | ICD-10-CM

## 2018-05-21 PROCEDURE — 99407 BEHAV CHNG SMOKING > 10 MIN: CPT | Mod: S$GLB,,,

## 2018-05-21 NOTE — PROGRESS NOTES
Spoke to patient regarding 3-6 month phone follow up on his quit 1 episode. Patient not tobacco free at this time but did reschedule an appointment to rejoin smoking cessation program for quit 2 attempt.

## 2018-07-17 ENCOUNTER — CLINICAL SUPPORT (OUTPATIENT)
Dept: SMOKING CESSATION | Facility: CLINIC | Age: 53
End: 2018-07-17
Payer: COMMERCIAL

## 2018-07-17 DIAGNOSIS — F17.200 NICOTINE DEPENDENCE: Primary | ICD-10-CM

## 2018-07-17 PROCEDURE — 99407 BEHAV CHNG SMOKING > 10 MIN: CPT | Mod: S$GLB,,,

## 2018-07-17 NOTE — PROGRESS NOTES
Spoke with patient today in regards to smoking cessation progress for 12 month follow up,  states not tobacco free at this time. Patient not ready to rejoin program at this time. Informed patient of benefit period, future follow ups, and contact information. Will resolve episode and complete smart form for Quit attempt #1.

## 2018-08-10 ENCOUNTER — HOSPITAL ENCOUNTER (OUTPATIENT)
Dept: RADIOLOGY | Facility: HOSPITAL | Age: 53
Discharge: HOME | End: 2018-08-10
Attending: PHYSICIAN ASSISTANT
Payer: MEDICARE

## 2018-08-10 DIAGNOSIS — K74.60 CIRRHOSIS OF LIVER WITHOUT ASCITES, UNSPECIFIED HEPATIC CIRRHOSIS TYPE: ICD-10-CM

## 2018-08-10 PROCEDURE — 76705 ECHO EXAM OF ABDOMEN: CPT | Mod: TC

## 2018-08-10 PROCEDURE — 76705 ECHO EXAM OF ABDOMEN: CPT | Mod: 26,,, | Performed by: RADIOLOGY

## 2018-08-14 ENCOUNTER — TELEPHONE (OUTPATIENT)
Dept: HEPATOLOGY | Facility: CLINIC | Age: 53
End: 2018-08-14

## 2018-08-14 DIAGNOSIS — K74.60 CIRRHOSIS OF LIVER WITHOUT ASCITES, UNSPECIFIED HEPATIC CIRRHOSIS TYPE: ICD-10-CM

## 2018-08-14 DIAGNOSIS — Z86.19 HISTORY OF HEPATITIS C: Primary | ICD-10-CM

## 2018-08-14 NOTE — TELEPHONE ENCOUNTER
----- Message from Gama Adair PA-C sent at 8/14/2018 12:31 PM CDT -----  Please let him know that labs and U/S are stable. He will be due for repeat in 6 months.     They saw some very mild dilation of the tubes leading off of the liver, but it was stable from his last U/S so we will check it again in 6 months

## 2018-09-20 ENCOUNTER — HOSPITAL ENCOUNTER (OUTPATIENT)
Facility: HOSPITAL | Age: 53
Discharge: HOME | End: 2018-09-21
Attending: EMERGENCY MEDICINE | Admitting: EMERGENCY MEDICINE
Payer: MEDICARE

## 2018-09-20 DIAGNOSIS — K55.9: ICD-10-CM

## 2018-09-20 DIAGNOSIS — K52.9 COLITIS: ICD-10-CM

## 2018-09-20 DIAGNOSIS — R11.2 INTRACTABLE VOMITING WITH NAUSEA, UNSPECIFIED VOMITING TYPE: Primary | ICD-10-CM

## 2018-09-20 LAB
ALBUMIN SERPL BCP-MCNC: 4.7 G/DL
ALLENS TEST: ABNORMAL
ALP SERPL-CCNC: 61 U/L
ALT SERPL W/O P-5'-P-CCNC: 17 U/L
ANION GAP SERPL CALC-SCNC: 20 MMOL/L
AST SERPL-CCNC: 15 U/L
B-OH-BUTYR BLD STRIP-SCNC: 1.7 MMOL/L
BACTERIA #/AREA URNS HPF: NORMAL /HPF
BASOPHILS # BLD AUTO: 0.03 K/UL
BASOPHILS NFR BLD: 0.4 %
BILIRUB SERPL-MCNC: 0.6 MG/DL
BILIRUB UR QL STRIP: NEGATIVE
BUN SERPL-MCNC: 8 MG/DL
CALCIUM SERPL-MCNC: 10.6 MG/DL
CHLORIDE SERPL-SCNC: 104 MMOL/L
CLARITY UR: CLEAR
CO2 SERPL-SCNC: 17 MMOL/L
COLOR UR: YELLOW
CREAT SERPL-MCNC: 0.9 MG/DL
DELSYS: ABNORMAL
DIFFERENTIAL METHOD: ABNORMAL
EOSINOPHIL # BLD AUTO: 0 K/UL
EOSINOPHIL NFR BLD: 0 %
ERYTHROCYTE [DISTWIDTH] IN BLOOD BY AUTOMATED COUNT: 13.1 %
EST. GFR  (AFRICAN AMERICAN): >60 ML/MIN/1.73 M^2
EST. GFR  (NON AFRICAN AMERICAN): >60 ML/MIN/1.73 M^2
GLUCOSE SERPL-MCNC: 147 MG/DL
GLUCOSE UR QL STRIP: NEGATIVE
HCO3 UR-SCNC: 25 MMOL/L (ref 24–28)
HCT VFR BLD AUTO: 45.4 %
HGB BLD-MCNC: 15.9 G/DL
HGB UR QL STRIP: ABNORMAL
HYALINE CASTS #/AREA URNS LPF: 0 /LPF
KETONES UR QL STRIP: ABNORMAL
LACTATE SERPL-SCNC: 1.2 MMOL/L
LEUKOCYTE ESTERASE UR QL STRIP: NEGATIVE
LIPASE SERPL-CCNC: 8 U/L
LYMPHOCYTES # BLD AUTO: 1.4 K/UL
LYMPHOCYTES NFR BLD: 15.8 %
MCH RBC QN AUTO: 32.7 PG
MCHC RBC AUTO-ENTMCNC: 35 G/DL
MCV RBC AUTO: 93 FL
MICROSCOPIC COMMENT: NORMAL
MODE: ABNORMAL
MONOCYTES # BLD AUTO: 0.5 K/UL
MONOCYTES NFR BLD: 5.7 %
NEUTROPHILS # BLD AUTO: 6.7 K/UL
NEUTROPHILS NFR BLD: 78.1 %
NITRITE UR QL STRIP: NEGATIVE
PCO2 BLDA: 39.5 MMHG (ref 35–45)
PH SMN: 7.41 [PH] (ref 7.35–7.45)
PH UR STRIP: 5 [PH] (ref 5–8)
PLATELET # BLD AUTO: 198 K/UL
PMV BLD AUTO: 10.2 FL
PO2 BLDA: 30 MMHG (ref 40–60)
POC BE: 0 MMOL/L
POC SATURATED O2: 58 % (ref 95–100)
POC TCO2: 26 MMOL/L (ref 24–29)
POTASSIUM SERPL-SCNC: 4.1 MMOL/L
PROT SERPL-MCNC: 8.8 G/DL
PROT UR QL STRIP: ABNORMAL
RBC # BLD AUTO: 4.86 M/UL
RBC #/AREA URNS HPF: 3 /HPF (ref 0–4)
SAMPLE: ABNORMAL
SITE: ABNORMAL
SODIUM SERPL-SCNC: 141 MMOL/L
SP GR UR STRIP: >1.03 (ref 1–1.03)
URN SPEC COLLECT METH UR: ABNORMAL
UROBILINOGEN UR STRIP-ACNC: NEGATIVE EU/DL
WBC # BLD AUTO: 8.55 K/UL
WBC #/AREA URNS HPF: 4 /HPF (ref 0–5)

## 2018-09-20 PROCEDURE — 96368 THER/DIAG CONCURRENT INF: CPT

## 2018-09-20 PROCEDURE — 96376 TX/PRO/DX INJ SAME DRUG ADON: CPT

## 2018-09-20 PROCEDURE — 96374 THER/PROPH/DIAG INJ IV PUSH: CPT

## 2018-09-20 PROCEDURE — 83605 ASSAY OF LACTIC ACID: CPT

## 2018-09-20 PROCEDURE — 96375 TX/PRO/DX INJ NEW DRUG ADDON: CPT

## 2018-09-20 PROCEDURE — 96365 THER/PROPH/DIAG IV INF INIT: CPT

## 2018-09-20 PROCEDURE — 81000 URINALYSIS NONAUTO W/SCOPE: CPT

## 2018-09-20 PROCEDURE — 63600175 PHARM REV CODE 636 W HCPCS: Performed by: EMERGENCY MEDICINE

## 2018-09-20 PROCEDURE — 25000003 PHARM REV CODE 250: Performed by: EMERGENCY MEDICINE

## 2018-09-20 PROCEDURE — 80053 COMPREHEN METABOLIC PANEL: CPT

## 2018-09-20 PROCEDURE — 82010 KETONE BODYS QUAN: CPT

## 2018-09-20 PROCEDURE — 25500020 PHARM REV CODE 255: Performed by: EMERGENCY MEDICINE

## 2018-09-20 PROCEDURE — 96366 THER/PROPH/DIAG IV INF ADDON: CPT

## 2018-09-20 PROCEDURE — 83690 ASSAY OF LIPASE: CPT

## 2018-09-20 PROCEDURE — 85025 COMPLETE CBC W/AUTO DIFF WBC: CPT

## 2018-09-20 PROCEDURE — 99900035 HC TECH TIME PER 15 MIN (STAT)

## 2018-09-20 PROCEDURE — 96372 THER/PROPH/DIAG INJ SC/IM: CPT

## 2018-09-20 PROCEDURE — 82803 BLOOD GASES ANY COMBINATION: CPT

## 2018-09-20 PROCEDURE — G0378 HOSPITAL OBSERVATION PER HR: HCPCS

## 2018-09-20 PROCEDURE — 96361 HYDRATE IV INFUSION ADD-ON: CPT

## 2018-09-20 PROCEDURE — 99285 EMERGENCY DEPT VISIT HI MDM: CPT | Mod: 25

## 2018-09-20 RX ORDER — MORPHINE SULFATE 10 MG/ML
4 INJECTION INTRAMUSCULAR; INTRAVENOUS; SUBCUTANEOUS
Status: COMPLETED | OUTPATIENT
Start: 2018-09-20 | End: 2018-09-20

## 2018-09-20 RX ORDER — ONDANSETRON 2 MG/ML
4 INJECTION INTRAMUSCULAR; INTRAVENOUS
Status: COMPLETED | OUTPATIENT
Start: 2018-09-20 | End: 2018-09-20

## 2018-09-20 RX ADMIN — IOHEXOL 80 ML: 350 INJECTION, SOLUTION INTRAVENOUS at 09:09

## 2018-09-20 RX ADMIN — SODIUM CHLORIDE 1000 ML: 0.9 INJECTION, SOLUTION INTRAVENOUS at 08:09

## 2018-09-20 RX ADMIN — ONDANSETRON 4 MG: 2 INJECTION INTRAMUSCULAR; INTRAVENOUS at 08:09

## 2018-09-20 RX ADMIN — MORPHINE SULFATE 4 MG: 10 INJECTION, SOLUTION INTRAMUSCULAR; INTRAVENOUS at 10:09

## 2018-09-20 RX ADMIN — MORPHINE SULFATE 4 MG: 10 INJECTION, SOLUTION INTRAMUSCULAR; INTRAVENOUS at 08:09

## 2018-09-21 VITALS
TEMPERATURE: 98 F | RESPIRATION RATE: 18 BRPM | HEIGHT: 64 IN | WEIGHT: 151.88 LBS | BODY MASS INDEX: 25.93 KG/M2 | HEART RATE: 79 BPM | OXYGEN SATURATION: 95 % | DIASTOLIC BLOOD PRESSURE: 82 MMHG | SYSTOLIC BLOOD PRESSURE: 142 MMHG

## 2018-09-21 PROBLEM — F12.10 CANNABIS ABUSE: Chronic | Status: ACTIVE | Noted: 2018-09-21

## 2018-09-21 PROBLEM — K55.9: Status: ACTIVE | Noted: 2018-09-20

## 2018-09-21 LAB
ALBUMIN SERPL BCP-MCNC: 4 G/DL
ALP SERPL-CCNC: 49 U/L
ALT SERPL W/O P-5'-P-CCNC: 12 U/L
ANION GAP SERPL CALC-SCNC: 11 MMOL/L
AST SERPL-CCNC: 14 U/L
BASOPHILS # BLD AUTO: 0.03 K/UL
BASOPHILS NFR BLD: 0.3 %
BILIRUB SERPL-MCNC: 0.6 MG/DL
BUN SERPL-MCNC: 10 MG/DL
CALCIUM SERPL-MCNC: 9.4 MG/DL
CHLORIDE SERPL-SCNC: 105 MMOL/L
CO2 SERPL-SCNC: 22 MMOL/L
CREAT SERPL-MCNC: 1.1 MG/DL
DIFFERENTIAL METHOD: ABNORMAL
EOSINOPHIL # BLD AUTO: 0.1 K/UL
EOSINOPHIL NFR BLD: 0.6 %
ERYTHROCYTE [DISTWIDTH] IN BLOOD BY AUTOMATED COUNT: 13.1 %
EST. GFR  (AFRICAN AMERICAN): >60 ML/MIN/1.73 M^2
EST. GFR  (NON AFRICAN AMERICAN): >60 ML/MIN/1.73 M^2
ESTIMATED AVG GLUCOSE: 117 MG/DL
GLUCOSE SERPL-MCNC: 107 MG/DL
HBA1C MFR BLD HPLC: 5.7 %
HCT VFR BLD AUTO: 42.4 %
HGB BLD-MCNC: 14.4 G/DL
LYMPHOCYTES # BLD AUTO: 2.8 K/UL
LYMPHOCYTES NFR BLD: 29.9 %
MAGNESIUM SERPL-MCNC: 1.8 MG/DL
MCH RBC QN AUTO: 32.1 PG
MCHC RBC AUTO-ENTMCNC: 34 G/DL
MCV RBC AUTO: 95 FL
MONOCYTES # BLD AUTO: 1 K/UL
MONOCYTES NFR BLD: 10.6 %
NEUTROPHILS # BLD AUTO: 5.6 K/UL
NEUTROPHILS NFR BLD: 58.6 %
PHOSPHATE SERPL-MCNC: 3.1 MG/DL
PLATELET # BLD AUTO: 193 K/UL
PMV BLD AUTO: 10.4 FL
POCT GLUCOSE: 104 MG/DL (ref 70–110)
POCT GLUCOSE: 147 MG/DL (ref 70–110)
POCT GLUCOSE: 152 MG/DL (ref 70–110)
POTASSIUM SERPL-SCNC: 4.2 MMOL/L
PROT SERPL-MCNC: 7.6 G/DL
RBC # BLD AUTO: 4.48 M/UL
SODIUM SERPL-SCNC: 138 MMOL/L
WBC # BLD AUTO: 9.49 K/UL

## 2018-09-21 PROCEDURE — G0378 HOSPITAL OBSERVATION PER HR: HCPCS

## 2018-09-21 PROCEDURE — 25000003 PHARM REV CODE 250: Performed by: INTERNAL MEDICINE

## 2018-09-21 PROCEDURE — 96366 THER/PROPH/DIAG IV INF ADDON: CPT

## 2018-09-21 PROCEDURE — 85025 COMPLETE CBC W/AUTO DIFF WBC: CPT

## 2018-09-21 PROCEDURE — 80053 COMPREHEN METABOLIC PANEL: CPT

## 2018-09-21 PROCEDURE — 82962 GLUCOSE BLOOD TEST: CPT

## 2018-09-21 PROCEDURE — 63600175 PHARM REV CODE 636 W HCPCS: Performed by: EMERGENCY MEDICINE

## 2018-09-21 PROCEDURE — 83735 ASSAY OF MAGNESIUM: CPT

## 2018-09-21 PROCEDURE — 83036 HEMOGLOBIN GLYCOSYLATED A1C: CPT

## 2018-09-21 PROCEDURE — 36415 COLL VENOUS BLD VENIPUNCTURE: CPT

## 2018-09-21 PROCEDURE — 63600175 PHARM REV CODE 636 W HCPCS: Performed by: NURSE PRACTITIONER

## 2018-09-21 PROCEDURE — 84100 ASSAY OF PHOSPHORUS: CPT

## 2018-09-21 PROCEDURE — 96372 THER/PROPH/DIAG INJ SC/IM: CPT

## 2018-09-21 PROCEDURE — 25000003 PHARM REV CODE 250: Performed by: EMERGENCY MEDICINE

## 2018-09-21 PROCEDURE — 94761 N-INVAS EAR/PLS OXIMETRY MLT: CPT

## 2018-09-21 PROCEDURE — 96365 THER/PROPH/DIAG IV INF INIT: CPT

## 2018-09-21 PROCEDURE — 63600175 PHARM REV CODE 636 W HCPCS: Performed by: INTERNAL MEDICINE

## 2018-09-21 PROCEDURE — S4991 NICOTINE PATCH NONLEGEND: HCPCS | Performed by: INTERNAL MEDICINE

## 2018-09-21 PROCEDURE — 25000003 PHARM REV CODE 250: Performed by: HOSPITALIST

## 2018-09-21 PROCEDURE — 96376 TX/PRO/DX INJ SAME DRUG ADON: CPT

## 2018-09-21 RX ORDER — IBUPROFEN 200 MG
1 TABLET ORAL DAILY
Status: DISCONTINUED | OUTPATIENT
Start: 2018-09-21 | End: 2018-09-21 | Stop reason: HOSPADM

## 2018-09-21 RX ORDER — PANTOPRAZOLE SODIUM 40 MG/1
40 TABLET, DELAYED RELEASE ORAL 2 TIMES DAILY
Status: DISCONTINUED | OUTPATIENT
Start: 2018-09-21 | End: 2018-09-21 | Stop reason: HOSPADM

## 2018-09-21 RX ORDER — MORPHINE SULFATE 10 MG/ML
4 INJECTION INTRAMUSCULAR; INTRAVENOUS; SUBCUTANEOUS EVERY 4 HOURS PRN
Status: DISCONTINUED | OUTPATIENT
Start: 2018-09-21 | End: 2018-09-21

## 2018-09-21 RX ORDER — ACETAMINOPHEN 500 MG
500 TABLET ORAL EVERY 6 HOURS PRN
Status: DISCONTINUED | OUTPATIENT
Start: 2018-09-21 | End: 2018-09-21 | Stop reason: HOSPADM

## 2018-09-21 RX ORDER — GABAPENTIN 300 MG/1
300 CAPSULE ORAL 3 TIMES DAILY
COMMUNITY

## 2018-09-21 RX ORDER — HYDROCHLOROTHIAZIDE 12.5 MG/1
12.5 TABLET ORAL DAILY
Status: DISCONTINUED | OUTPATIENT
Start: 2018-09-21 | End: 2018-09-21

## 2018-09-21 RX ORDER — AMOXICILLIN 250 MG
1 CAPSULE ORAL 2 TIMES DAILY PRN
Status: DISCONTINUED | OUTPATIENT
Start: 2018-09-21 | End: 2018-09-21 | Stop reason: HOSPADM

## 2018-09-21 RX ORDER — DEXTROSE MONOHYDRATE AND SODIUM CHLORIDE 5; .9 G/100ML; G/100ML
INJECTION, SOLUTION INTRAVENOUS CONTINUOUS
Status: DISCONTINUED | OUTPATIENT
Start: 2018-09-21 | End: 2018-09-21 | Stop reason: HOSPADM

## 2018-09-21 RX ORDER — ONDANSETRON 2 MG/ML
4 INJECTION INTRAMUSCULAR; INTRAVENOUS EVERY 8 HOURS PRN
Status: DISCONTINUED | OUTPATIENT
Start: 2018-09-21 | End: 2018-09-21

## 2018-09-21 RX ORDER — PROPRANOLOL HYDROCHLORIDE 10 MG/1
10 TABLET ORAL 2 TIMES DAILY
Status: DISCONTINUED | OUTPATIENT
Start: 2018-09-21 | End: 2018-09-21 | Stop reason: HOSPADM

## 2018-09-21 RX ORDER — LISINOPRIL AND HYDROCHLOROTHIAZIDE 10; 12.5 MG/1; MG/1
1 TABLET ORAL DAILY
Status: DISCONTINUED | OUTPATIENT
Start: 2018-09-21 | End: 2018-09-21 | Stop reason: CLARIF

## 2018-09-21 RX ORDER — TRAMADOL HYDROCHLORIDE 50 MG/1
50 TABLET ORAL EVERY 6 HOURS PRN
Status: DISCONTINUED | OUTPATIENT
Start: 2018-09-21 | End: 2018-09-21 | Stop reason: HOSPADM

## 2018-09-21 RX ORDER — FAMOTIDINE 10 MG/ML
20 INJECTION INTRAVENOUS EVERY 12 HOURS
Status: DISCONTINUED | OUTPATIENT
Start: 2018-09-21 | End: 2018-09-21

## 2018-09-21 RX ORDER — IBUPROFEN 200 MG
16 TABLET ORAL
Status: DISCONTINUED | OUTPATIENT
Start: 2018-09-21 | End: 2018-09-21 | Stop reason: HOSPADM

## 2018-09-21 RX ORDER — LISINOPRIL 5 MG/1
10 TABLET ORAL DAILY
Status: DISCONTINUED | OUTPATIENT
Start: 2018-09-21 | End: 2018-09-21 | Stop reason: HOSPADM

## 2018-09-21 RX ORDER — CIPROFLOXACIN 500 MG/1
500 TABLET ORAL EVERY 12 HOURS
Qty: 14 TABLET | Refills: 0 | Status: SHIPPED | OUTPATIENT
Start: 2018-09-21 | End: 2018-09-28

## 2018-09-21 RX ORDER — GABAPENTIN 300 MG/1
300 CAPSULE ORAL 3 TIMES DAILY
Status: DISCONTINUED | OUTPATIENT
Start: 2018-09-21 | End: 2018-09-21 | Stop reason: HOSPADM

## 2018-09-21 RX ORDER — CLONIDINE HYDROCHLORIDE 0.1 MG/1
0.1 TABLET ORAL 3 TIMES DAILY PRN
Status: DISCONTINUED | OUTPATIENT
Start: 2018-09-21 | End: 2018-09-21 | Stop reason: HOSPADM

## 2018-09-21 RX ORDER — INSULIN ASPART 100 [IU]/ML
3 INJECTION, SOLUTION INTRAVENOUS; SUBCUTANEOUS
Status: DISCONTINUED | OUTPATIENT
Start: 2018-09-21 | End: 2018-09-21 | Stop reason: HOSPADM

## 2018-09-21 RX ORDER — GLUCAGON 1 MG
1 KIT INJECTION
Status: DISCONTINUED | OUTPATIENT
Start: 2018-09-21 | End: 2018-09-21 | Stop reason: HOSPADM

## 2018-09-21 RX ORDER — IBUPROFEN 600 MG/1
600 TABLET ORAL EVERY 6 HOURS PRN
Status: DISCONTINUED | OUTPATIENT
Start: 2018-09-21 | End: 2018-09-21

## 2018-09-21 RX ORDER — DEXTROSE MONOHYDRATE AND SODIUM CHLORIDE 5; .9 G/100ML; G/100ML
INJECTION, SOLUTION INTRAVENOUS CONTINUOUS
Status: DISCONTINUED | OUTPATIENT
Start: 2018-09-21 | End: 2018-09-21

## 2018-09-21 RX ORDER — DICYCLOMINE HYDROCHLORIDE 10 MG/ML
20 INJECTION INTRAMUSCULAR 4 TIMES DAILY
Status: DISCONTINUED | OUTPATIENT
Start: 2018-09-21 | End: 2018-09-21 | Stop reason: HOSPADM

## 2018-09-21 RX ORDER — MORPHINE SULFATE 10 MG/ML
2 INJECTION INTRAMUSCULAR; INTRAVENOUS; SUBCUTANEOUS EVERY 4 HOURS PRN
Status: DISCONTINUED | OUTPATIENT
Start: 2018-09-21 | End: 2018-09-21

## 2018-09-21 RX ORDER — INSULIN ASPART 100 [IU]/ML
0-5 INJECTION, SOLUTION INTRAVENOUS; SUBCUTANEOUS
Status: DISCONTINUED | OUTPATIENT
Start: 2018-09-21 | End: 2018-09-21 | Stop reason: HOSPADM

## 2018-09-21 RX ORDER — MEMANTINE HYDROCHLORIDE 10 MG/1
10 TABLET ORAL 2 TIMES DAILY
Status: DISCONTINUED | OUTPATIENT
Start: 2018-09-21 | End: 2018-09-21 | Stop reason: HOSPADM

## 2018-09-21 RX ORDER — ZOLPIDEM TARTRATE 5 MG/1
5 TABLET ORAL NIGHTLY PRN
Status: DISCONTINUED | OUTPATIENT
Start: 2018-09-21 | End: 2018-09-21

## 2018-09-21 RX ORDER — ATORVASTATIN CALCIUM 40 MG/1
40 TABLET, FILM COATED ORAL DAILY
Status: DISCONTINUED | OUTPATIENT
Start: 2018-09-21 | End: 2018-09-21 | Stop reason: HOSPADM

## 2018-09-21 RX ORDER — ONDANSETRON 2 MG/ML
8 INJECTION INTRAMUSCULAR; INTRAVENOUS EVERY 8 HOURS PRN
Status: DISCONTINUED | OUTPATIENT
Start: 2018-09-21 | End: 2018-09-21 | Stop reason: HOSPADM

## 2018-09-21 RX ORDER — RAMELTEON 8 MG/1
8 TABLET ORAL NIGHTLY PRN
Status: DISCONTINUED | OUTPATIENT
Start: 2018-09-21 | End: 2018-09-21 | Stop reason: HOSPADM

## 2018-09-21 RX ORDER — AMITRIPTYLINE HYDROCHLORIDE 25 MG/1
50 TABLET, FILM COATED ORAL NIGHTLY
Status: DISCONTINUED | OUTPATIENT
Start: 2018-09-21 | End: 2018-09-21 | Stop reason: HOSPADM

## 2018-09-21 RX ORDER — METRONIDAZOLE 500 MG/1
500 TABLET ORAL EVERY 8 HOURS
Qty: 21 TABLET | Refills: 0 | Status: SHIPPED | OUTPATIENT
Start: 2018-09-21 | End: 2018-09-28

## 2018-09-21 RX ORDER — BUSPIRONE HYDROCHLORIDE 5 MG/1
15 TABLET ORAL DAILY
Status: DISCONTINUED | OUTPATIENT
Start: 2018-09-21 | End: 2018-09-21 | Stop reason: HOSPADM

## 2018-09-21 RX ORDER — IBUPROFEN 200 MG
24 TABLET ORAL
Status: DISCONTINUED | OUTPATIENT
Start: 2018-09-21 | End: 2018-09-21 | Stop reason: HOSPADM

## 2018-09-21 RX ADMIN — MORPHINE SULFATE 4 MG: 10 INJECTION INTRAVENOUS at 02:09

## 2018-09-21 RX ADMIN — PROPRANOLOL HYDROCHLORIDE 10 MG: 10 TABLET ORAL at 08:09

## 2018-09-21 RX ADMIN — LISINOPRIL 10 MG: 5 TABLET ORAL at 08:09

## 2018-09-21 RX ADMIN — ATORVASTATIN CALCIUM 40 MG: 40 TABLET, FILM COATED ORAL at 08:09

## 2018-09-21 RX ADMIN — DICYCLOMINE HYDROCHLORIDE 20 MG: 10 INJECTION INTRAMUSCULAR at 12:09

## 2018-09-21 RX ADMIN — TRAMADOL HYDROCHLORIDE 50 MG: 50 TABLET, FILM COATED ORAL at 12:09

## 2018-09-21 RX ADMIN — BUSPIRONE HYDROCHLORIDE 15 MG: 5 TABLET ORAL at 08:09

## 2018-09-21 RX ADMIN — ZOLPIDEM TARTRATE 5 MG: 5 TABLET ORAL at 02:09

## 2018-09-21 RX ADMIN — PIPERACILLIN SODIUM, TAZOBACTAM SODIUM 4.5 G: 4; .5 INJECTION, POWDER, LYOPHILIZED, FOR SOLUTION INTRAVENOUS at 02:09

## 2018-09-21 RX ADMIN — DEXTROSE AND SODIUM CHLORIDE: 5; .9 INJECTION, SOLUTION INTRAVENOUS at 01:09

## 2018-09-21 RX ADMIN — DICYCLOMINE HYDROCHLORIDE 20 MG: 10 INJECTION INTRAMUSCULAR at 08:09

## 2018-09-21 RX ADMIN — NICOTINE 1 PATCH: 21 PATCH, EXTENDED RELEASE TRANSDERMAL at 08:09

## 2018-09-21 RX ADMIN — TRAMADOL HYDROCHLORIDE 50 MG: 50 TABLET, FILM COATED ORAL at 04:09

## 2018-09-21 RX ADMIN — PIPERACILLIN SODIUM, TAZOBACTAM SODIUM 4.5 G: 4; .5 INJECTION, POWDER, LYOPHILIZED, FOR SOLUTION INTRAVENOUS at 08:09

## 2018-09-21 RX ADMIN — ONDANSETRON 8 MG: 2 INJECTION INTRAMUSCULAR; INTRAVENOUS at 06:09

## 2018-09-21 RX ADMIN — DEXTROSE AND SODIUM CHLORIDE: 5; .9 INJECTION, SOLUTION INTRAVENOUS at 04:09

## 2018-09-21 RX ADMIN — GABAPENTIN 300 MG: 300 CAPSULE ORAL at 08:09

## 2018-09-21 RX ADMIN — PANTOPRAZOLE SODIUM 40 MG: 40 TABLET, DELAYED RELEASE ORAL at 08:09

## 2018-09-21 RX ADMIN — GABAPENTIN 300 MG: 300 CAPSULE ORAL at 03:09

## 2018-09-21 RX ADMIN — MEMANTINE 10 MG: 10 TABLET ORAL at 08:09

## 2018-09-21 NOTE — SUBJECTIVE & OBJECTIVE
"Past Medical History:   Diagnosis Date    Anxiety     Asthma     Depression     Diabetes mellitus     Head injury     History of hepatitis C, s/p successful hcv treatment w/ SVR 8-2015  10/8/2012    SVR(24) as of 11/2015.  SVR(12) as of 8/2015.  completed harvoni 24 week regimen for genotype 1a 5/18/15     Hypertension     Shoulder pain     left, s/p 4 surgeries.     Stroke        Past Surgical History:   Procedure Laterality Date    BIOPSY, LIVER, WITH US GUIDANCE N/A 4/30/2014    Performed by [facility] at [facility] OR 2ND FLR    COLONOSCOPY N/A 10/20/2015    Procedure: COLONOSCOPY;  Surgeon: Jagdish Patricia MD;  Location: [facility] ENDO;  Service: Endoscopy;  Laterality: N/A;    COLONOSCOPY N/A 10/20/2015    Performed by Jagdish Patricia MD at [facility] ENDO    ESOPHAGOGASTRODUODENOSCOPY (EGD) N/A 2/27/2018    Performed by Deja Burnham MD at [facility] ENDO (4TH FLR)    ESOPHAGOGASTRODUODENOSCOPY (EGD) N/A 10/20/2015    Performed by Jagdish Patricia MD at [facility] ENDO    rotator cuff      left side       Review of patient's allergies indicates:   Allergen Reactions    Codeine Nausea Only       No current facility-administered medications on file prior to encounter.      Current Outpatient Medications on File Prior to Encounter   Medication Sig    amitriptyline (ELAVIL) 50 MG tablet Take 50 mg by mouth every evening.    atorvastatin (LIPITOR) 40 MG tablet Take 40 mg by mouth once daily.    BD INSULIN PEN NEEDLE UF MINI 31 x 3/16 " Ndle     BD ULTRA-FINE KETAN PEN NEEDLES 32 gauge x 5/32" Ndle     busPIRone (BUSPAR) 5 MG Tab Take 15 mg by mouth once daily.     fluticasone (FLONASE) 50 mcg/actuation nasal spray 2 sprays by Each Nare route once daily.    gabapentin (NEURONTIN) 300 MG capsule Take 300 mg by mouth 3 (three) times daily.    LANTUS SOLOSTAR 100 unit/mL (3 mL) InPn pen as needed.     lisinopril-hydrochlorothiazide (PRINZIDE,ZESTORETIC) 10-12.5 mg per tablet Take 1 tablet by mouth once " daily.    metformin (GLUCOPHAGE) 1000 MG tablet Take 0.5 tablets (500 mg total) by mouth every evening. (Patient taking differently: Take 1,000 mg by mouth 2 (two) times daily with meals. )    NAMENDA 10 mg Tab Take 10 mg by mouth 2 (two) times daily.     nicotine (NICODERM CQ) 21 mg/24 hr Place 1 patch onto the skin once daily.    propranolol (INDERAL) 10 MG tablet Take 10 mg by mouth 2 (two) times daily.    zolpidem (AMBIEN) 10 mg Tab Take 5 mg by mouth nightly as needed.    [DISCONTINUED] gabapentin (NEURONTIN) 300 MG capsule Take 300 mg by mouth 3 (three) times daily.    albuterol-ipratropium 2.5mg-0.5mg/3mL (DUO-NEB) 0.5 mg-3 mg(2.5 mg base)/3 mL nebulizer solution Take 3 mLs by nebulization every 6 (six) hours as needed for Wheezing.    cetirizine (ZYRTEC) 5 MG tablet Take 1 tablet (5 mg total) by mouth once daily.    doxepin (SINEQUAN) 100 MG capsule Take 10 mg by mouth every evening.    pantoprazole (PROTONIX) 40 MG tablet Take 1 tablet (40 mg total) by mouth 2 (two) times daily.     Family History     Problem Relation (Age of Onset)    Cirrhosis Brother    Heart disease Father    Hypertension Mother        Tobacco Use    Smoking status: Current Every Day Smoker     Packs/day: 1.00     Years: 35.00     Pack years: 35.00     Types: Cigarettes    Smokeless tobacco: Never Used   Substance and Sexual Activity    Alcohol use: No    Drug use: Yes     Types: Marijuana     Comment: daily    Sexual activity: Yes     Partners: Female     Review of Systems   Constitutional: Positive for appetite change. Negative for activity change, chills, diaphoresis, fatigue, fever and unexpected weight change.   HENT: Negative.    Eyes: Negative.    Respiratory: Negative for cough, chest tightness, shortness of breath and wheezing.    Cardiovascular: Negative for chest pain, palpitations and leg swelling.   Gastrointestinal: Positive for abdominal pain, nausea and vomiting. Negative for abdominal distention, blood in  stool, constipation and diarrhea.   Genitourinary: Negative for dysuria and hematuria.   Musculoskeletal: Negative.    Skin: Negative.    Neurological: Negative for dizziness, seizures, syncope, weakness and light-headedness.   Psychiatric/Behavioral: Negative.      Objective:     Vital Signs (Most Recent):  Temp: 97.8 °F (36.6 °C) (09/21/18 0349)  Pulse: 71 (09/21/18 0349)  Resp: 20 (09/21/18 0349)  BP: 126/75 (09/21/18 0349)  SpO2: 97 % (09/21/18 0136) Vital Signs (24h Range):  Temp:  [97.4 °F (36.3 °C)-98.6 °F (37 °C)] 97.8 °F (36.6 °C)  Pulse:  [71-94] 71  Resp:  [18-20] 20  SpO2:  [97 %-100 %] 97 %  BP: (126-213)/(74-97) 126/75     Weight: 68.9 kg (151 lb 14.4 oz)  Body mass index is 26.07 kg/m².    Physical Exam   Constitutional: He is oriented to person, place, and time. He appears well-developed and well-nourished. No distress.   HENT:   Head: Normocephalic and atraumatic.   Right Ear: External ear normal.   Left Ear: External ear normal.   Nose: Nose normal.   Eyes: Right eye exhibits no discharge. Left eye exhibits no discharge.   Neck: Normal range of motion.   Cardiovascular: Normal rate, regular rhythm, normal heart sounds and intact distal pulses. Exam reveals no friction rub.   No murmur heard.  Pulmonary/Chest: Effort normal and breath sounds normal. No stridor. No respiratory distress. He has no wheezes. He has no rales. He exhibits no tenderness.   Abdominal:   Soft, non-distended, exquisitely tender to palpation to the lower abdomen, decreased bowel sounds   Musculoskeletal: Normal range of motion. He exhibits no edema.   Neurological: He is alert and oriented to person, place, and time.   Skin: Skin is warm and dry. He is not diaphoretic. No erythema.   Psychiatric: He has a normal mood and affect. His behavior is normal. Judgment and thought content normal.   Nursing note and vitals reviewed.          Significant Labs: All pertinent labs within the past 24 hours have been  reviewed.    Significant Imaging: I have reviewed and interpreted all pertinent imaging results/findings within the past 24 hours.

## 2018-09-21 NOTE — PLAN OF CARE
09/21/18 1632   Final Note   Assessment Type Final Discharge Note   Discharge Disposition Home   Hospital Follow Up  Appt(s) scheduled? Yes   Discharge plans and expectations educations in teach back method with documentation complete? Yes   Right Care Referral Info   Post Acute Recommendation No Care

## 2018-09-21 NOTE — ASSESSMENT & PLAN NOTE
Patient's blood pressure is better-controlled; will continue home regimen of hydrochlorothiazide, lisinopril, and propranolol, and provide as-needed clonidine.

## 2018-09-21 NOTE — HPI
Mr. Nino Price is a 53 y.o. male known to me with essential hypertension, hyperlipidemia (.4 May 2015), type 2 diabetes mellitus (HbA1c 6.5% May 2015), cirrhosis of the liver, chronic hepatitis C, GERD, cannabis abuse, tobacco abuse, depression, and history of CVA who presents to [facility] ED with complaints of abdominal pain today.  He had extraction of six teeth this morning and on the way home he started to feel some pain in his lower abdomen.  He describes the pain as a sharp, twisting pain that is 10/10 in severity at its worst.  The pain does not radiate and is worst on palpation and better with the pain medications.  He had what he estimates as 40 episodes of nausea and vomiting of non-bilious, non-bloody fluid.  He denies any diarrhea, hematochezia, melena, dysuria, nor any hematuria.  He has not had any fevers nor chills and has never had similar episodes in the past.  He has not had any sick contacts or recent travel.

## 2018-09-21 NOTE — H&P
[facility] Medicine  History & Physical    Patient Name: Nino Price  MRN: [id number]  Admission Date: 9/20/2018  Attending Physician: Norma Dunn MD   Primary Care Provider: Pritesh Marinelli MD         Patient information was obtained from patient.     Subjective:     Principal Problem:Acute enterocolitis    Chief Complaint: Abdominal pain for one day.    HPI: Mr. Nino Price is a 53 y.o. male known to me with essential hypertension, hyperlipidemia (.4 May 2015), type 2 diabetes mellitus (HbA1c 6.5% May 2015), cirrhosis of the liver, chronic hepatitis C, GERD, cannabis abuse, tobacco abuse, depression, and history of CVA who presents to [facility] ED with complaints of abdominal pain today.  He had extraction of six teeth this morning and on the way home he started to feel some pain in his lower abdomen.  He describes the pain as a sharp, twisting pain that is 10/10 in severity at its worst.  The pain does not radiate and is worst on palpation and better with the pain medications.  He had what he estimates as 40 episodes of nausea and vomiting of non-bilious, non-bloody fluid.  He denies any diarrhea, hematochezia, melena, dysuria, nor any hematuria.  He has not had any fevers nor chills and has never had similar episodes in the past.  He has not had any sick contacts or recent travel.    Chart Review:  Previous Hospitalizations  Date Hospital Diagnosis   [place] [facility]    [place] [facility]   [place] [facility] Altered mental status, hypoglycemia   May 2015 [facility] Right-sided weakness, ruled out for CVA   Jun 2011 [facility]     Outpatient Follow-Up  Date of Visit Physician Service   Feb 2018 Kayli Marrufo MD Hepatology      Past Medical History:   Diagnosis Date    Anxiety     Asthma     Depression     Diabetes mellitus     Head injury     History of hepatitis C, s/p successful hcv treatment w/ SVR 8-2015  10/8/2012  "   SVR(24) as of 11/2015.  SVR(12) as of 8/2015.  completed harvoni 24 week regimen for genotype 1a 5/18/15     Hypertension     Shoulder pain     left, s/p 4 surgeries.     Stroke        Past Surgical History:   Procedure Laterality Date    BIOPSY, LIVER, WITH US GUIDANCE N/A 4/30/2014    Performed by [facility] at [facility] OR 2ND FLR    COLONOSCOPY N/A 10/20/2015    Procedure: COLONOSCOPY;  Surgeon: Jagdish Patricia MD;  Location: [facility] ENDO;  Service: Endoscopy;  Laterality: N/A;    COLONOSCOPY N/A 10/20/2015    Performed by Jagdish Patricia MD at [facility] END[facility]    ESOPHAGOGASTRODUODENOSCOPY (EGD) N/A 2/27/2018    Performed by Deja Burnham MD at [facility] ENDO (4TH FLR)    ESOPHAGOGASTRODUODENOSCOPY (EGD) N/A 10/20/2015    Performed by Jagdish Patricia MD at [facility] ENDO    rotator cuff      left side       Review of patient's allergies indicates:   Allergen Reactions    Codeine Nausea Only       No current facility-administered medications on file prior to encounter.      Current Outpatient Medications on File Prior to Encounter   Medication Sig    amitriptyline (ELAVIL) 50 MG tablet Take 50 mg by mouth every evening.    atorvastatin (LIPITOR) 40 MG tablet Take 40 mg by mouth once daily.    BD INSULIN PEN NEEDLE UF MINI 31 x 3/16 " Ndle     BD ULTRA-FINE KETAN PEN NEEDLES 32 gauge x 5/32" Ndle     busPIRone (BUSPAR) 5 MG Tab Take 15 mg by mouth once daily.     fluticasone (FLONASE) 50 mcg/actuation nasal spray 2 sprays by Each Nare route once daily.    gabapentin (NEURONTIN) 300 MG capsule Take 300 mg by mouth 3 (three) times daily.    LANTUS SOLOSTAR 100 unit/mL (3 mL) InPn pen as needed.     lisinopril-hydrochlorothiazide (PRINZIDE,ZESTORETIC) 10-12.5 mg per tablet Take 1 tablet by mouth once daily.    metformin (GLUCOPHAGE) 1000 MG tablet Take 0.5 tablets (500 mg total) by mouth every evening. (Patient taking differently: Take 1,000 mg by mouth 2 (two) times daily with meals. )    NAMENDA " 10 mg Tab Take 10 mg by mouth 2 (two) times daily.     nicotine (NICODERM CQ) 21 mg/24 hr Place 1 patch onto the skin once daily.    propranolol (INDERAL) 10 MG tablet Take 10 mg by mouth 2 (two) times daily.    zolpidem (AMBIEN) 10 mg Tab Take 5 mg by mouth nightly as needed.    [DISCONTINUED] gabapentin (NEURONTIN) 300 MG capsule Take 300 mg by mouth 3 (three) times daily.    albuterol-ipratropium 2.5mg-0.5mg/3mL (DUO-NEB) 0.5 mg-3 mg(2.5 mg base)/3 mL nebulizer solution Take 3 mLs by nebulization every 6 (six) hours as needed for Wheezing.    cetirizine (ZYRTEC) 5 MG tablet Take 1 tablet (5 mg total) by mouth once daily.    doxepin (SINEQUAN) 100 MG capsule Take 10 mg by mouth every evening.    pantoprazole (PROTONIX) 40 MG tablet Take 1 tablet (40 mg total) by mouth 2 (two) times daily.     Family History     Problem Relation (Age of Onset)    Cirrhosis Brother    Heart disease Father    Hypertension Mother        Tobacco Use    Smoking status: Current Every Day Smoker     Packs/day: 1.00     Years: 35.00     Pack years: 35.00     Types: Cigarettes    Smokeless tobacco: Never Used   Substance and Sexual Activity    Alcohol use: No    Drug use: Yes     Types: Marijuana     Comment: daily    Sexual activity: Yes     Partners: Female     Review of Systems   Constitutional: Positive for appetite change. Negative for activity change, chills, diaphoresis, fatigue, fever and unexpected weight change.   HENT: Negative.    Eyes: Negative.    Respiratory: Negative for cough, chest tightness, shortness of breath and wheezing.    Cardiovascular: Negative for chest pain, palpitations and leg swelling.   Gastrointestinal: Positive for abdominal pain, nausea and vomiting. Negative for abdominal distention, blood in stool, constipation and diarrhea.   Genitourinary: Negative for dysuria and hematuria.   Musculoskeletal: Negative.    Skin: Negative.    Neurological: Negative for dizziness, seizures, syncope,  weakness and light-headedness.   Psychiatric/Behavioral: Negative.      Objective:     Vital Signs (Most Recent):  Temp: 97.8 °F (36.6 °C) (09/21/18 0349)  Pulse: 71 (09/21/18 0349)  Resp: 20 (09/21/18 0349)  BP: 126/75 (09/21/18 0349)  SpO2: 97 % (09/21/18 0136) Vital Signs (24h Range):  Temp:  [97.4 °F (36.3 °C)-98.6 °F (37 °C)] 97.8 °F (36.6 °C)  Pulse:  [71-94] 71  Resp:  [18-20] 20  SpO2:  [97 %-100 %] 97 %  BP: (126-213)/(74-97) 126/75     Weight: 68.9 kg (151 lb 14.4 oz)  Body mass index is 26.07 kg/m².    Physical Exam   Constitutional: He is oriented to person, place, and time. He appears well-developed and well-nourished. No distress.   HENT:   Head: Normocephalic and atraumatic.   Right Ear: External ear normal.   Left Ear: External ear normal.   Nose: Nose normal.   Eyes: Right eye exhibits no discharge. Left eye exhibits no discharge.   Neck: Normal range of motion.   Cardiovascular: Normal rate, regular rhythm, normal heart sounds and intact distal pulses. Exam reveals no friction rub.   No murmur heard.  Pulmonary/Chest: Effort normal and breath sounds normal. No stridor. No respiratory distress. He has no wheezes. He has no rales. He exhibits no tenderness.   Abdominal:   Soft, non-distended, exquisitely tender to palpation to the lower abdomen, decreased bowel sounds   Musculoskeletal: Normal range of motion. He exhibits no edema.   Neurological: He is alert and oriented to person, place, and time.   Skin: Skin is warm and dry. He is not diaphoretic. No erythema.   Psychiatric: He has a normal mood and affect. His behavior is normal. Judgment and thought content normal.   Nursing note and vitals reviewed.          Significant Labs: All pertinent labs within the past 24 hours have been reviewed.    Significant Imaging: I have reviewed and interpreted all pertinent imaging results/findings within the past 24 hours.    Assessment/Plan:     * Acute enterocolitis    Patient underwent CT-abd/pelvis which  "was significant for "[f]luid distention and wall thickening involving the small and large bowel loops.  The findings are most suggestive of enterocolitis."  He has been started on empiric antibiotics.  He does not meet criteria for sepsis.  He will also be given IV fluids and analgesic support.        Essential hypertension    Patient's blood pressure is better-controlled; will continue home regimen of hydrochlorothiazide, lisinopril, and propranolol, and provide as-needed clonidine.        Hyperlipidemia    Poorly-controlled; will continue patient's home regimen of atorvastatin.        Type 2 diabetes mellitus, controlled    Well-controlled on a home regimen of metformin; will provide basal-prandial insulin along with insulin sliding scale.        Cirrhosis of liver without ascites    Stable; there are no acute issues.        Chronic hepatitis C    Stable s/p treatment; there are no acute issues.        GERD (gastroesophageal reflux disease)    Will continue his home regimen of pantoprazole.        Cannabis abuse    He has been counseled on cessation.        Tobacco abuse    Patient was counseled on smoking cessation and he will be provided a nicotine transdermal patch applied while inpatient.  Will provide additional smoking cessation counseling prior to discharge.        Depression    Stable; will continue his home regimen of amitriptyline and buspirone.        History of CVA (cerebrovascular accident)    Stable; will continue his home regimen of atorvastatin.          VTE Risk Mitigation (From admission, onward)        Ordered     IP VTE LOW RISK PATIENT  Once      09/21/18 0126           Eduin Beauchamp M.D.  Staff Nocturnist  Department of Hospital Medicine  [facility]  Pager: [phone]    "

## 2018-09-21 NOTE — PROGRESS NOTES
Discharge instructions given to patient and family at bedside. Patient and family verbalized understanding and states willingness to comply. Saline lock removed by patient prior to me going in room. Site is clean with no bleeding.  Educated patient that in the future to allow nurse to remove IV.

## 2018-09-21 NOTE — PLAN OF CARE
Problem: Infection, Risk/Actual (Adult)  Intervention: Manage Suspected/Actual Infection   09/21/18 0534   Prevent/Manage Colorectal Surgical Infection   Fever Reduction/Comfort Measures fluid intake increased;medication administered   Safety Interventions   Isolation Precautions environmental surveillance     Intervention: Prevent Infection/Maximize Resistance   09/21/18 0534   Respiratory Interventions   Airway/Ventilation Management airway patency maintained;calming measures promoted   Nutrition Interventions   Glycemic Management blood glucose monitoring   Oral Nutrition Promotion rest periods promoted   Pain/Comfort/Sleep Interventions   Sleep/Rest Enhancement awakenings minimized;regular sleep/rest pattern promoted;relaxation techniques promoted       Goal: Identify Related Risk Factors and Signs and Symptoms  Related risk factors and signs and symptoms are identified upon initiation of Human Response Clinical Practice Guideline (CPG)  Outcome: Ongoing (interventions implemented as appropriate)   09/21/18 0534   Infection, Risk/Actual   Related Risk Factors (Infection, Risk/Actual) treatment plan   Signs and Symptoms (Infection, Risk/Actual) blood glucose changes;vomiting     Goal: Infection Prevention/Resolution  Patient will demonstrate the desired outcomes by discharge/transition of care.  Outcome: Ongoing (interventions implemented as appropriate)   09/21/18 0534   Infection, Risk/Actual (Adult)   Infection Prevention/Resolution making progress toward outcome

## 2018-09-21 NOTE — ASSESSMENT & PLAN NOTE
"Patient underwent CT-abd/pelvis which was significant for "[f]luid distention and wall thickening involving the small and large bowel loops.  The findings are most suggestive of enterocolitis."  He has been started on empiric antibiotics.  He does not meet criteria for sepsis.  He will also be given IV fluids and analgesic support.  "

## 2018-09-21 NOTE — CONSULTS
Reason for Consult:  Abd. Pain, Abnormal CT    HPI:  Pt is a 53 y.o. male who presents to hospital with complaints of abd. Pain x 1 day.  Sx's began acutely, became severe, prompting ER evaluation.  Located across his lower abdomen, sharp, non-radiating.  + associated N/V, non-bloody.  No F/C, wt. Loss.  Now being alleviated with conservative therapy + Abx.  No dysphagia, GERD sx's, yellowing of eyes/skin.  No diarrhea/constipation.  No blood in his stool nor black/tarry stools.      Pt. Had EGD - 02/2018  C-scope - 10/2015    Past Medical History:   Diagnosis Date    Anxiety     Asthma     Depression     Diabetes mellitus     Head injury     History of hepatitis C, s/p successful hcv treatment w/ SVR 8-2015  10/8/2012    SVR(24) as of 11/2015.  SVR(12) as of 8/2015.  completed harvoni 24 week regimen for genotype 1a 5/18/15     Hypertension     Shoulder pain     left, s/p 4 surgeries.     Stroke        Past Surgical History:   Procedure Laterality Date    BIOPSY, LIVER, WITH US GUIDANCE N/A 4/30/2014    Performed by [facility] Diagnostic Provider at [facility] OR 2ND FLR    COLONOSCOPY N/A 10/20/2015    Procedure: COLONOSCOPY;  Surgeon: Jagdish Patricia MD;  Location: [facility];  Service: Endoscopy;  Laterality: N/A;    COLONOSCOPY N/A 10/20/2015    Performed by Jagdish Patricia MD at [facility] ENDO    ESOPHAGOGASTRODUODENOSCOPY (EGD) N/A 2/27/2018    Performed by Deja Burnham MD at [facility] ENDO (4TH FLR)    ESOPHAGOGASTRODUODENOSCOPY (EGD) N/A 10/20/2015    Performed by Jagdish Patricia MD at [facility] ENDO    rotator cuff      left side       Family History   Problem Relation Age of Onset    Hypertension Mother     Heart disease Father     Cirrhosis Brother        Social History     Socioeconomic History    Marital status:      Spouse name: Not on file    Number of children: Not on file    Years of education: Not on file    Highest education level: Not on file   Social Needs    Financial resource strain:  "Not on file    Food insecurity - worry: Not on file    Food insecurity - inability: Not on file    Transportation needs - medical: Not on file    Transportation needs - non-medical: Not on file   Occupational History    Not on file   Tobacco Use    Smoking status: Current Every Day Smoker     Packs/day: 1.00     Years: 35.00     Pack years: 35.00     Types: Cigarettes    Smokeless tobacco: Never Used   Substance and Sexual Activity    Alcohol use: No    Drug use: Yes     Types: Marijuana     Comment: daily    Sexual activity: Yes     Partners: Female   Other Topics Concern    Not on file   Social History Narrative    [address] on [address]     Lives with wife and young daughter (22yrs)    Not working, on disability since accident    Prior job-[employer]    Hobbies: carving, fishing    Not driving.        Endoscopic History:  C-scope - 10/2015 - Sigmoid diverticulosis, + polyps, due back in 10/2020  EGD - 02/2018 - Normal    Review of patient's allergies indicates:   Allergen Reactions    Codeine Nausea Only       No current facility-administered medications on file prior to encounter.      Current Outpatient Medications on File Prior to Encounter   Medication Sig Dispense Refill    amitriptyline (ELAVIL) 50 MG tablet Take 50 mg by mouth every evening.      atorvastatin (LIPITOR) 40 MG tablet Take 40 mg by mouth once daily.      BD INSULIN PEN NEEDLE UF MINI 31 x 3/16 " Ndle       BD ULTRA-FINE KETAN PEN NEEDLES 32 gauge x 5/32" Ndle       busPIRone (BUSPAR) 5 MG Tab Take 15 mg by mouth once daily.       fluticasone (FLONASE) 50 mcg/actuation nasal spray 2 sprays by Each Nare route once daily. 1 Bottle 0    gabapentin (NEURONTIN) 300 MG capsule Take 300 mg by mouth 3 (three) times daily.      LANTUS SOLOSTAR 100 unit/mL (3 mL) InPn pen as needed.       lisinopril-hydrochlorothiazide (PRINZIDE,ZESTORETIC) 10-12.5 mg per tablet Take 1 tablet by mouth once daily.      metformin (GLUCOPHAGE) 1000 MG tablet " Take 0.5 tablets (500 mg total) by mouth every evening. (Patient taking differently: Take 1,000 mg by mouth 2 (two) times daily with meals. ) 30 tablet 3    NAMENDA 10 mg Tab Take 10 mg by mouth 2 (two) times daily.       nicotine (NICODERM CQ) 21 mg/24 hr Place 1 patch onto the skin once daily. 14 patch 0    propranolol (INDERAL) 10 MG tablet Take 10 mg by mouth 2 (two) times daily.      zolpidem (AMBIEN) 10 mg Tab Take 5 mg by mouth nightly as needed.      [DISCONTINUED] gabapentin (NEURONTIN) 300 MG capsule Take 300 mg by mouth 3 (three) times daily.      albuterol-ipratropium 2.5mg-0.5mg/3mL (DUO-NEB) 0.5 mg-3 mg(2.5 mg base)/3 mL nebulizer solution Take 3 mLs by nebulization every 6 (six) hours as needed for Wheezing. 100 vial 0    cetirizine (ZYRTEC) 5 MG tablet Take 1 tablet (5 mg total) by mouth once daily.  0    doxepin (SINEQUAN) 100 MG capsule Take 10 mg by mouth every evening.      pantoprazole (PROTONIX) 40 MG tablet Take 1 tablet (40 mg total) by mouth 2 (two) times daily. 60 tablet 6     Scheduled Meds:   amitriptyline  50 mg Oral QHS    atorvastatin  40 mg Oral Daily    busPIRone  15 mg Oral Daily    dicyclomine  20 mg Intramuscular QID    gabapentin  300 mg Oral TID    insulin aspart U-100  3 Units Subcutaneous TIDWM    insulin detemir U-100  10 Units Subcutaneous QHS    lisinopril  10 mg Oral Daily    memantine  10 mg Oral BID    nicotine  1 patch Transdermal Daily    pantoprazole  40 mg Oral BID    piperacillin-tazobactam (ZOSYN) IVPB  4.5 g Intravenous Q8H    propranolol  10 mg Oral BID     Continuous Infusions:   dextrose 5 % and 0.9 % NaCl Stopped (09/21/18 1532)     PRN Meds:.acetaminophen, cloNIDine, dextrose 50%, dextrose 50%, glucagon (human recombinant), glucose, glucose, insulin aspart U-100, ondansetron, pneumoc 13-suleman conj-dip cr(PF), promethazine (PHENERGAN) IVPB, ramelteon, senna-docusate 8.6-50 mg, traMADol    Review of Systems:  CONSTITUTIONAL: Negative for  "weakness, fever, weight loss, weight gain.  HEENT: Eyes: Negative for double/blurred vision. Ears: Negative pain or loss of hearing. Nose:Negative for nasal congestion. Negative for rhinorrhea Mouth: Negative for dry mouth/pain Throat: Negative for masses or hoarseness.  CARDIOVASCULAR: Negative for chest pain or palpitations.  RESPIRATORY: Negative for SOB, wheezes  GASTROINTESTINAL: See HPI  GENITOURINARY: Negative for dysuria/hematuria.  MUSCULOSKELETAL: Negative for osteoarthritis, muscle pain.  SKIN: Negative for rashes/lesions.  NEUROLOGIC: Negative for headaches, numbness/tingling, + Hx of CVA  ENDOCRINE: + for diabetes, no thyroid abnormalities.  HEMATOLOGIC: Negative for anemia or blood dyscrasias.    Patient Vitals for the past 24 hrs:   BP Temp Temp src Pulse Resp SpO2 Height Weight   09/21/18 1117 (!) 147/83 97.2 °F (36.2 °C) Oral 82 18 97 % -- --   09/21/18 1006 -- -- -- 85 18 97 % -- --   09/21/18 0742 133/79 98.1 °F (36.7 °C) Oral 89 18 98 % -- --   09/21/18 0349 126/75 97.8 °F (36.6 °C) -- 71 20 -- -- --   09/21/18 0214 -- -- -- -- -- -- 5' 4" (1.626 m) --   09/21/18 0136 126/74 97.4 °F (36.3 °C) Oral 94 18 97 % -- 68.9 kg (151 lb 14.4 oz)   09/21/18 0001 (!) 144/74 98.2 °F (36.8 °C) -- 74 18 100 % -- --   09/20/18 2100 (!) 213/97 -- -- 74 18 97 % -- --   09/20/18 1926 132/78 98.6 °F (37 °C) Oral 80 18 98 % 5' 4" (1.626 m) 59.9 kg (132 lb)       Gen: Well developed, well nourished, no apparent distress, cooperative  HEENT: Anicteric, PERRLA, normocephalic, neck symmetrical  CV: S1, S2, no murmers/rubs, non-displaced PMI  Lungs: CTA-B, normal excursion  Abd: Soft, mild TTP across lower abdomen, no rebound/guarding, ND, normal BS's, no HSM  Ext: No c/c/e, 1+ DP pulses to BLE's  Neuro: CN II-XII grossly intact, no asterixis.  Skin: No rashes/lesions, normal texture  Psych: AA&O x 4, normal affect    Labs:  Recent Labs   Lab  09/21/18   0602   CALCIUM  9.4   PROT  7.6   NA  138   K  4.2   CO2  22*   CL  " 105   BUN  10   CREATININE  1.1   ALKPHOS  49*   ALT  12   AST  14   BILITOT  0.6     Recent Results (from the past 336 hour(s))   CBC with Automated Differential    Collection Time: 09/21/18  6:02 AM   Result Value Ref Range    WBC 9.49 3.90 - 12.70 K/uL    Hemoglobin 14.4 14.0 - 18.0 g/dL    Hematocrit 42.4 40.0 - 54.0 %    Platelets 193 150 - 350 K/uL   CBC W/ AUTO DIFFERENTIAL    Collection Time: 09/20/18  7:58 PM   Result Value Ref Range    WBC 8.55 3.90 - 12.70 K/uL    Hemoglobin 15.9 14.0 - 18.0 g/dL    Hematocrit 45.4 40.0 - 54.0 %    Platelets 198 150 - 350 K/uL       Imaging:  CT:  Fluid distention and wall thickening involving the small and large bowel loops.  The findings are most suggestive of enterocolitis.    Diverticular disease with question of focal area narrowing involving the mid sigmoid colon.  No CT findings suggest acute diverticulitis.  Endoscopic correlation is recommended after the patient's acute symptoms resolve.    Small hiatal hernia with nonspecific thickening of the distal esophagus.  The findings represent refluxed gastroesophageal thus.  Suggest clinical correlation and follow-up.    Status post appendectomy.  No inflammatory changes in the pericecal region.    Advanced atherosclerotic changes involving the infrarenal abdominal aorta.    Assessment:  Pt. Is a 53 y.o. male with:  1. Abnormal Imaging, CT suggestive of Enterocolitis - Possibly of infectious origin.  Clinically improving, with Abx.  Last C-scope in 2015 (due back in 2020).  2. Abd. Pain, N/V - clinically improving, secondary to #1.  3. Hep of Hep C - s/p treatment, subjective Hx of Cirrhosis.    4. HTN, DM, Hyperlipidemia, DM, Depression, CVA.....    Recommendations:  1. Monitor abd. Exam.  2. 7-10 day course of Abx.  3. Mellette diet, as tolerated.  4. Further evaluation, per GI, as outpatient, consider repeat C-scope with TI evaluation.    5. OK for D/C to home, per GI.        I would like to take this opportunity to  thank you for this consult.  If you have any questions or concerns, please do not hesitate to contact me.

## 2018-09-21 NOTE — PLAN OF CARE
09/21/18 1632   Discharge Assessment   Assessment Type Discharge Planning Assessment   Patient with active discharge order prior to completion of Dc needs assessment

## 2018-09-21 NOTE — SUBJECTIVE & OBJECTIVE
Interval History: No further nausea or vomiting.Persistent lower abdominal cramping pain,     Review of Systems   Constitutional: Positive for appetite change. Negative for activity change, chills, diaphoresis, fatigue, fever and unexpected weight change.   HENT: Negative.    Eyes: Negative.    Respiratory: Negative for cough, chest tightness, shortness of breath and wheezing.    Cardiovascular: Negative for chest pain, palpitations and leg swelling.   Gastrointestinal: Positive for abdominal pain, nausea and vomiting. Negative for abdominal distention, blood in stool, constipation and diarrhea.   Genitourinary: Negative for dysuria and hematuria.   Musculoskeletal: Negative.    Skin: Negative.    Neurological: Negative for dizziness, seizures, syncope, weakness and light-headedness.   Psychiatric/Behavioral: Negative.      Objective:     Vital Signs (Most Recent):  Temp: 98.1 °F (36.7 °C) (09/21/18 0742)  Pulse: 89 (09/21/18 0742)  Resp: 18 (09/21/18 0742)  BP: 133/79 (09/21/18 0742)  SpO2: 98 % (09/21/18 0742) Vital Signs (24h Range):  Temp:  [97.4 °F (36.3 °C)-98.6 °F (37 °C)] 98.1 °F (36.7 °C)  Pulse:  [71-94] 89  Resp:  [18-20] 18  SpO2:  [97 %-100 %] 98 %  BP: (126-213)/(74-97) 133/79     Weight: 68.9 kg (151 lb 14.4 oz)  Body mass index is 26.07 kg/m².    Intake/Output Summary (Last 24 hours) at 9/21/2018 0920  Last data filed at 9/21/2018 0710  Gross per 24 hour   Intake 0 ml   Output 225 ml   Net -225 ml      Physical Exam   Constitutional: He is oriented to person, place, and time. He appears well-developed and well-nourished. No distress.   HENT:   Head: Normocephalic and atraumatic.   Right Ear: External ear normal.   Left Ear: External ear normal.   Nose: Nose normal.   Eyes: Right eye exhibits no discharge. Left eye exhibits no discharge.   Neck: Normal range of motion.   Cardiovascular: Normal rate, regular rhythm, normal heart sounds and intact distal pulses. Exam reveals no friction rub.   No murmur  heard.  Pulmonary/Chest: Effort normal and breath sounds normal. No stridor. No respiratory distress. He has no wheezes. He has no rales. He exhibits no tenderness.   Abdominal: Bowel sounds are normal.   Soft, non-distended, exquisitely tender to palpation to the lower abdomen   Musculoskeletal: Normal range of motion. He exhibits no edema.   Neurological: He is alert and oriented to person, place, and time.   Skin: Skin is warm and dry. He is not diaphoretic. No erythema.   Psychiatric: He has a normal mood and affect. His behavior is normal. Judgment and thought content normal.   Nursing note and vitals reviewed.      Significant Labs: All pertinent labs within the past 24 hours have been reviewed.    Significant Imaging: I have reviewed all pertinent imaging results/findings within the past 24 hours.

## 2018-09-21 NOTE — HOSPITAL COURSE
"Patient admitted for lower abdominal pain with nausea and vomiting felt due to entercolitis. Normal lactate and afebrile without leukocytosis. CT AP with contrast showed "Fluid distention and wall thickening involving the small and large bowel loops.  The findings are most suggestive of enterocolitis. Diverticular disease with question of focal area narrowing involving the mid sigmoid colon.  No CT findings suggest acute diverticulitis.  Endoscopic correlation is recommended after the patient's acute symptoms resolve."  GI consulted. Symptoms improved with IVF, analgesics, antibiotics and bowel rest. Patient tolerated regular diet. Patient stable for discharge with cipro/flagy x10 days. Follow up with GI Dr. Oates 9/25 and possible outpatient colonoscopy.   "

## 2018-09-21 NOTE — PLAN OF CARE
Problem: Fall Risk (Adult)  Goal: Absence of Falls  Patient will demonstrate the desired outcomes by discharge/transition of care.  Outcome: Outcome(s) achieved Date Met: 09/21/18 09/21/18 6594   Fall Risk (Adult)   Absence of Falls achieves outcome

## 2018-09-21 NOTE — ED TRIAGE NOTES
Pt c/o abdominal pain after taking medication for 6 teeth that were pulled yesterday at the dentist. Pt reports nv. Pt denies fever or chills. Pt denies difficulty urinating. Will continue to monitor.

## 2018-09-21 NOTE — ED PROVIDER NOTES
Encounter Date: 9/20/2018    SCRIBE #1 NOTE: I, Ryley Godfrey, am scribing for, and in the presence of,  Jose Rojas MD. I have scribed the following portions of the note - Other sections scribed: HPI, ROS, and PE.       History     Chief Complaint   Patient presents with    Abdominal Pain     lower abd pain that has been ongoing all day, states he had six teeth pulled yesterday and is taking abx and ibuprofen, pt has not been eating or drinking. States he has n/v      CC: Abdominal Pain    HPI: This is a 53 y.o. male PMHx stroke, DM, HTN, hepatitis C who presents with lower abdominal pain that began today. There is associated emesis and constipation. Reports vomiting blood initially. Patient had 6 teeth removed at the dentist yesterday. No testicular pain. No history hernias.       The history is provided by the patient. No  was used.     Review of patient's allergies indicates:   Allergen Reactions    Codeine Nausea Only     Past Medical History:   Diagnosis Date    Anxiety     Asthma     Depression     Diabetes mellitus     Head injury     History of hepatitis C, s/p successful hcv treatment w/ SVR 8-2015  10/8/2012    SVR(24) as of 11/2015.  SVR(12) as of 8/2015.  completed harvoni 24 week regimen for genotype 1a 5/18/15     Hypertension     Shoulder pain     left, s/p 4 surgeries.     Stroke      Past Surgical History:   Procedure Laterality Date    BIOPSY, LIVER, WITH US GUIDANCE N/A 4/30/2014    Performed by [facility] at [facility] OR 2ND FLR    COLONOSCOPY N/A 10/20/2015    Procedure: COLONOSCOPY;  Surgeon: Jagdish Patricia MD;  Location: [facility];  Service: Endoscopy;  Laterality: N/A;    COLONOSCOPY N/A 10/20/2015    Performed by [facility]ish [facility] at [facility] ENDO    ESOPHAGOGASTRODUODENOSCOPY (EGD) N/A 2/27/2018    Performed by Deja Burnham MD at [facility] ENDO (4TH FLR)    ESOPHAGOGASTRODUODENOSCOPY (EGD) N/A 10/20/2015    Per[facility]dish [facility]  at [facility]O    rotator cuff      left side     Family History   Problem Relation Age of Onset    Hypertension Mother     Heart disease Father     Cirrhosis Brother      Social History     Tobacco Use    Smoking status: Current Every Day Smoker     Packs/day: 1.00     Years: 35.00     Pack years: 35.00     Types: Cigarettes    Smokeless tobacco: Never Used   Substance Use Topics    Alcohol use: No    Drug use: Yes     Types: Marijuana     Comment: daily     Review of Systems   Constitutional: Negative for chills and fever.   HENT: Negative for rhinorrhea and sore throat.    Respiratory: Negative for cough and shortness of breath.    Cardiovascular: Negative for chest pain.   Gastrointestinal: Positive for abdominal pain (lower), constipation and vomiting. Negative for diarrhea and nausea.        (+) hematemesis   Genitourinary: Negative for dysuria and testicular pain.   All other systems reviewed and are negative.      Physical Exam     Initial Vitals [09/20/18 1926]   BP Pulse Resp Temp SpO2   132/78 80 18 98.6 °F (37 °C) 98 %      MAP       --         Physical Exam    Nursing note and vitals reviewed.  Constitutional: He is not diaphoretic. No distress.   Appears uncomfortable.   HENT:   Head: Normocephalic and atraumatic.   Mouth/Throat: Oropharynx is clear and moist.   Gums with recent extractions; clotted no active bleeding.   Eyes: Conjunctivae and EOM are normal. Pupils are equal, round, and reactive to light. No scleral icterus.   Neck: Normal range of motion. Neck supple. No JVD present.   Cardiovascular: Normal rate, regular rhythm and intact distal pulses.   Pulmonary/Chest: Breath sounds normal. No stridor. No respiratory distress.   Abdominal: Soft. He exhibits no distension. Bowel sounds are decreased.   Periumbilical/lower abdominal tenderness.   Musculoskeletal: Normal range of motion. He exhibits no edema or tenderness.   Neurological: He is alert and oriented to person, place, and time. He  has normal strength. No cranial nerve deficit.   Skin: Skin is warm and dry. No rash noted.   Psychiatric: He has a normal mood and affect.         ED Course   Procedures  Labs Reviewed   CBC W/ AUTO DIFFERENTIAL - Abnormal; Notable for the following components:       Result Value    MCH 32.7 (*)     Gran% 78.1 (*)     Lymph% 15.8 (*)     All other components within normal limits   COMPREHENSIVE METABOLIC PANEL - Abnormal; Notable for the following components:    CO2 17 (*)     Glucose 147 (*)     Calcium 10.6 (*)     Total Protein 8.8 (*)     Anion Gap 20 (*)     All other components within normal limits   URINALYSIS, REFLEX TO URINE CULTURE - Abnormal; Notable for the following components:    Specific Gravity, UA >1.030 (*)     Protein, UA 3+ (*)     Ketones, UA 2+ (*)     Occult Blood UA 1+ (*)     All other components within normal limits    Narrative:     Preferred Collection Type->Urine, Clean Catch   BETA - HYDROXYBUTYRATE, SERUM - Abnormal; Notable for the following components:    Beta-Hydroxybutyrate 1.7 (*)     All other components within normal limits   ISTAT PROCEDURE - Abnormal; Notable for the following components:    POC PO2 30 (*)     POC SATURATED O2 58 (*)     All other components within normal limits   LIPASE   LACTIC ACID, PLASMA   URINALYSIS MICROSCOPIC    Narrative:     Preferred Collection Type->Urine, Clean Catch          Imaging Results          CT Abdomen Pelvis With Contrast (Final result)  Result time 09/20/18 22:12:29    Final result by Jeramy Boswell MD (09/20/18 22:12:29)                 Impression:      Fluid distention and wall thickening involving the small and large bowel loops.  The findings are most suggestive of enterocolitis.    Diverticular disease with question of focal area narrowing involving the mid sigmoid colon.  No CT findings suggest acute diverticulitis.  Endoscopic correlation is recommended after the patient's acute symptoms resolve.    Small hiatal hernia with  nonspecific thickening of the distal esophagus.  The findings represent refluxed gastroesophageal thus.  Suggest clinical correlation and follow-up.    Status post appendectomy.  No inflammatory changes in the pericecal region.    Advanced atherosclerotic changes involving the infrarenal abdominal aorta.    Additional findings as above.      Electronically signed by: Jeramy Boswell MD  Date:    09/20/2018  Time:    22:12             Narrative:    EXAMINATION:  CT ABDOMEN PELVIS WITH CONTRAST    CLINICAL HISTORY:  periumbilical pain, nausea, vomiting;    TECHNIQUE:  Low dose axial images, sagittal and coronal reformations were obtained from the lung bases to the pubic symphysis following the IV administration of 80 mL of Omnipaque 350 .  Oral contrast was not given. Delayed images were obtained.    COMPARISON:  06/03/2016.    FINDINGS:  There are no pleural effusions.  There is no evidence of a pneumothorax.  There is paraseptal emphysema in the visualized lung bases.  No discrete pulmonary nodule is identified.    The heart is unremarkable.  There is normal tapering of the abdominal aorta.  There are extensive calcifications involving the infrarenal abdominal aorta and iliac vessels.  Calcifications are also identified at the ostia of the aortic branch vessels.  The portal vein is unremarkable.  The mesenteric vessels remain patent.  The IVC and the remainder of the venous structures are unremarkable.  There is no evidence of lymphadenopathy in the abdomen or pelvis.    There is a small hiatal hernia.  There is wall thickening involving the distal esophagus.  The stomach is distended.  The duodenum is within normal limits.  There is fluid distention of the small bowel loops.  There are postoperative changes in the expected location of the appendix.  No inflammatory changes are identified within the pericecal region.  There is diffuse wall thickening involving the large bowel loops.  There is focal area of narrowing  involving the mid aspect of the sigmoid colon.  Foci of air is noted within this region presumably representing diverticular disease.    The liver is unremarkable.  The gallbladder is within normal limits.  The biliary tree is within normal limits.  The spleen is unremarkable.  The pancreas is within normal limits.    The adrenal glands are unremarkable.  The kidneys are unremarkable.  The ureters and urinary bladder are within normal limits.  The prostate gland is enlarged.    There is no evidence of free fluid in the abdomen or pelvis.  There is no evidence of free air.  There is no evidence of pneumatosis.    The psoas margins are unremarkable.  There are bilateral fat containing inguinal hernias.  There remainder of the abdominal wall is unremarkable.  There are degenerative changes in the osseous structures.  Multiple Schmorl's nodes are present.  There are changes of avascular necrosis involving the bilateral femoral heads.  There is no evidence of subchondral collapse.                                 Medical Decision Making:   Initial Assessment:   53-year-old male with a history of hep C status post treatment presenting with abdominal pain.  Patient had tooth extraction yesterday.  Today he noted nausea, vomiting, with some blood in it.  Notes pain is in his lower abdomen below his umbilicus.  On exam, patient had moderate distress, actively nauseous.  Exam does not reveal a definite rebound or guarding though he is somewhat tender.  No definite peritoneal signs though uncomfortable.  Patient denies prior history of similar.  Differential includes appendicitis, diverticulitis, less likely DKA.  Patient has a slightly reduced bicarb and anion gap, normal lactic acid.  Glucose somewhat elevated.  Patient given Zofran and morphine x2.  Pain still not relieved.  We will get a CT of his abdomen pelvis and reassess.  ED Management:  Pain in abdomen persists, though improved.  Patient still with tenderness, nausea  and vomiting. CT scan shows likely enteritis.  2+ ketones and urine noted. Will admit for observation due to pain, and persistent nausea and vomiting in the setting of enteritis/colitis.  Patient started on zosyn.            Scribe Attestation:   Scribe #1: I performed the above scribed service and the documentation accurately describes the services I performed. I attest to the accuracy of the note.    Attending Attestation:           Physician Attestation for Scribe:  Physician Attestation Statement for Scribe #1: I, Jose Rojas MD, reviewed documentation, as scribed by Ryley Godfrey in my presence, and it is both accurate and complete.                    Clinical Impression:   The primary encounter diagnosis was Intractable vomiting with nausea, unspecified vomiting type. Diagnoses of Colitis and Acute ischemic enterocolitis were also pertinent to this visit.      Disposition:   Disposition: Placed in Observation  Condition: Stable                        Jose Rojas MD  09/21/18 6497

## 2018-09-22 NOTE — DISCHARGE SUMMARY
"[facility] Hospital Medicine  Discharge Summary      Patient Name: Nino Price  MRN: [id number]  Admission Date: 9/20/2018  Hospital Length of Stay: 0 days  Discharge Date and Time: 9/21/2018  Attending Physician: No att. providers found   Discharging Provider: Melita Clarke NP  Primary Care Provider: Pritesh Marinelli MD      HPI:   Mr. Nino Price is a 53 y.o. male known to me with essential hypertension, hyperlipidemia (.4 May 2015), type 2 diabetes mellitus (HbA1c 6.5% May 2015), cirrhosis of the liver, chronic hepatitis C, GERD, cannabis abuse, tobacco abuse, depression, and history of CVA who presents to [facility] ED with complaints of abdominal pain today.  He had extraction of six teeth this morning and on the way home he started to feel some pain in his lower abdomen.  He describes the pain as a sharp, twisting pain that is 10/10 in severity at its worst.  The pain does not radiate and is worst on palpation and better with the pain medications.  He had what he estimates as 40 episodes of nausea and vomiting of non-bilious, non-bloody fluid.  He denies any diarrhea, hematochezia, melena, dysuria, nor any hematuria.  He has not had any fevers nor chills and has never had similar episodes in the past.  He has not had any sick contacts or recent travel.    * No surgery found *      Hospital Course:   Patient admitted for lower abdominal pain with nausea and vomiting felt due to entercolitis. Normal lactate and afebrile without leukocytosis. CT AP with contrast showed "Fluid distention and wall thickening involving the small and large bowel loops.  The findings are most suggestive of enterocolitis. Diverticular disease with question of focal area narrowing involving the mid sigmoid colon.  No CT findings suggest acute diverticulitis.  Endoscopic correlation is recommended after the patient's acute symptoms resolve."  GI consulted. Symptoms improved with IVF, " analgesics, antibiotics and bowel rest. Patient tolerated regular diet. Patient stable for discharge with cipro/flagy x10 days. Follow up with GI Dr. Oates 9/25 and possible outpatient colonoscopy.      Consults:   Consults (From admission, onward)        Status Ordering Provider     Inpatient consult to Gastroenterology  Once     Provider:  Xavier Oates MD    Completed AJIT SHARIF          No new Assessment & Plan notes have been filed under this hospital service since the last note was generated.  Service: Hospital Medicine    Final Active Diagnoses:    Diagnosis Date Noted POA    PRINCIPAL PROBLEM:  Acute enterocolitis [K55.9] 09/20/2018 Yes    Cannabis abuse [F12.10] 09/21/2018 Yes     Chronic    Hyperlipidemia [E78.5] 10/22/2016 Yes     Chronic    GERD (gastroesophageal reflux disease) [K21.9] 10/22/2016 Yes     Chronic    Depression [F32.9] 06/03/2016 Yes     Chronic    History of CVA (cerebrovascular accident) [Z86.73] 06/03/2016 Not Applicable     Chronic    Cirrhosis of liver without ascites [K74.60] 08/20/2015 Yes     Chronic    Essential hypertension [I10] 05/29/2015 Yes     Chronic    Type 2 diabetes mellitus, controlled [E11.9] 05/29/2015 Yes     Chronic    Tobacco abuse [Z72.0] 05/29/2015 Yes     Chronic    Chronic hepatitis C [B18.2] 04/30/2014 Yes     Chronic      Problems Resolved During this Admission:       Discharged Condition: good    Disposition: Home or Self Care    Follow Up:  Follow-up Information     Xavier Oates MD On 9/25/2018.    Specialty:  Gastroenterology  Why:  @9:15am, for Gastorenterology follow up  Contact information:  [address] [place] [address]  [id number]  [facility]  [place] [id number] [phone]                 Patient Instructions:      Diet Cardiac     Activity as tolerated     Activity as tolerated         Pending Diagnostic Studies:     None         Medications:  Reconciled Home Medications:      Medication List  "     START taking these medications    ciprofloxacin HCl 500 MG tablet  Commonly known as:  CIPRO  Take 1 tablet (500 mg total) by mouth every 12 (twelve) hours. for 7 days     metroNIDAZOLE 500 MG tablet  Commonly known as:  FLAGYL  Take 1 tablet (500 mg total) by mouth every 8 (eight) hours. for 7 days        CONTINUE taking these medications    albuterol-ipratropium 2.5 mg-0.5 mg/3 mL nebulizer solution  Commonly known as:  DUO-NEB  Take 3 mLs by nebulization every 6 (six) hours as needed for Wheezing.     amitriptyline 50 MG tablet  Commonly known as:  ELAVIL  Take 50 mg by mouth every evening.     atorvastatin 40 MG tablet  Commonly known as:  LIPITOR  Take 40 mg by mouth once daily.     BD ULTRA-FINE MINI PEN NEEDLE 31 gauge x 3/16" Ndle  Generic drug:  pen needle, diabetic     BD ULTRA-FINE KETAN PEN NEEDLE 32 gauge x 5/32" Ndle  Generic drug:  pen needle, diabetic     busPIRone 5 MG Tab  Commonly known as:  BUSPAR  Take 15 mg by mouth once daily.     cetirizine 5 MG tablet  Commonly known as:  ZYRTEC  Take 1 tablet (5 mg total) by mouth once daily.     doxepin 100 MG capsule  Commonly known as:  SINEQUAN  Take 10 mg by mouth every evening.     fluticasone 50 mcg/actuation nasal spray  Commonly known as:  FLONASE  2 sprays by Each Nare route once daily.     gabapentin 300 MG capsule  Commonly known as:  NEURONTIN  Take 300 mg by mouth 3 (three) times daily.     lisinopril-hydrochlorothiazide 10-12.5 mg per tablet  Commonly known as:  PRINZIDE,ZESTORETIC  Take 1 tablet by mouth once daily.     NAMENDA 10 MG Tab  Generic drug:  memantine  Take 10 mg by mouth 2 (two) times daily.     nicotine 21 mg/24 hr  Commonly known as:  NICODERM CQ  Place 1 patch onto the skin once daily.     pantoprazole 40 MG tablet  Commonly known as:  PROTONIX  Take 1 tablet (40 mg total) by mouth 2 (two) times daily.     zolpidem 10 mg Tab  Commonly known as:  AMBIEN  Take 5 mg by mouth nightly as needed.        STOP taking these " medications    LANTUS SOLOSTAR U-100 INSULIN 100 unit/mL (3 mL) Inpn pen  Generic drug:  insulin glargine     metFORMIN 1000 MG tablet  Commonly known as:  GLUCOPHAGE     propranolol 10 MG tablet  Commonly known as:  INDERAL            Indwelling Lines/Drains at time of discharge:   Lines/Drains/Airways          None          Time spent on the discharge of patient: 35 minutes  Patient was seen and examined on the date of discharge and determined to be suitable for discharge.         Melita Clarke NP  Department of Hospital Medicine  [facility]

## 2018-11-11 ENCOUNTER — HOSPITAL ENCOUNTER (EMERGENCY)
Facility: HOSPITAL | Age: 53
Discharge: HOME | End: 2018-11-11
Attending: EMERGENCY MEDICINE
Payer: MEDICARE

## 2018-11-11 VITALS
BODY MASS INDEX: 28.17 KG/M2 | DIASTOLIC BLOOD PRESSURE: 100 MMHG | RESPIRATION RATE: 20 BRPM | TEMPERATURE: 98 F | HEART RATE: 76 BPM | WEIGHT: 165 LBS | SYSTOLIC BLOOD PRESSURE: 198 MMHG | OXYGEN SATURATION: 100 % | HEIGHT: 64 IN

## 2018-11-11 DIAGNOSIS — R10.9 ABDOMINAL PAIN: ICD-10-CM

## 2018-11-11 LAB
ALBUMIN SERPL BCP-MCNC: 4.7 G/DL
ALLENS TEST: ABNORMAL
ALP SERPL-CCNC: 53 U/L
ALT SERPL W/O P-5'-P-CCNC: 45 U/L
ANION GAP SERPL CALC-SCNC: 19 MMOL/L
AST SERPL-CCNC: 38 U/L
BASOPHILS # BLD AUTO: 0.03 K/UL
BASOPHILS NFR BLD: 0.3 %
BILIRUB SERPL-MCNC: 0.5 MG/DL
BUN SERPL-MCNC: 6 MG/DL
CALCIUM SERPL-MCNC: 10.4 MG/DL
CHLORIDE SERPL-SCNC: 102 MMOL/L
CO2 SERPL-SCNC: 18 MMOL/L
CREAT SERPL-MCNC: 0.9 MG/DL
DELSYS: ABNORMAL
DIFFERENTIAL METHOD: ABNORMAL
EOSINOPHIL # BLD AUTO: 0 K/UL
EOSINOPHIL NFR BLD: 0.1 %
ERYTHROCYTE [DISTWIDTH] IN BLOOD BY AUTOMATED COUNT: 13.2 %
EST. GFR  (AFRICAN AMERICAN): >60 ML/MIN/1.73 M^2
EST. GFR  (NON AFRICAN AMERICAN): >60 ML/MIN/1.73 M^2
ETHANOL SERPL-MCNC: <10 MG/DL
GLUCOSE SERPL-MCNC: 137 MG/DL
HCO3 UR-SCNC: 17.7 MMOL/L (ref 24–28)
HCT VFR BLD AUTO: 43.5 %
HGB BLD-MCNC: 15.5 G/DL
LACTATE SERPL-SCNC: 1.9 MMOL/L
LIPASE SERPL-CCNC: 15 U/L
LYMPHOCYTES # BLD AUTO: 1.9 K/UL
LYMPHOCYTES NFR BLD: 18.1 %
MCH RBC QN AUTO: 33.4 PG
MCHC RBC AUTO-ENTMCNC: 35.6 G/DL
MCV RBC AUTO: 94 FL
MODE: ABNORMAL
MONOCYTES # BLD AUTO: 0.6 K/UL
MONOCYTES NFR BLD: 5.6 %
NEUTROPHILS # BLD AUTO: 7.8 K/UL
NEUTROPHILS NFR BLD: 75.9 %
PCO2 BLDA: 21.7 MMHG (ref 35–45)
PH SMN: 7.52 [PH] (ref 7.35–7.45)
PLATELET # BLD AUTO: 215 K/UL
PMV BLD AUTO: 9.9 FL
PO2 BLDA: 93 MMHG (ref 80–100)
POC BE: -3 MMOL/L
POC SATURATED O2: 98 % (ref 95–100)
POC TCO2: 18 MMOL/L (ref 23–27)
POTASSIUM SERPL-SCNC: 4 MMOL/L
PROT SERPL-MCNC: 8.5 G/DL
RBC # BLD AUTO: 4.64 M/UL
SAMPLE: ABNORMAL
SITE: ABNORMAL
SODIUM SERPL-SCNC: 139 MMOL/L
SP02: 100
TROPONIN I SERPL DL<=0.01 NG/ML-MCNC: 0.02 NG/ML
WBC # BLD AUTO: 10.21 K/UL

## 2018-11-11 PROCEDURE — 63600175 PHARM REV CODE 636 W HCPCS: Performed by: EMERGENCY MEDICINE

## 2018-11-11 PROCEDURE — 83690 ASSAY OF LIPASE: CPT

## 2018-11-11 PROCEDURE — 84484 ASSAY OF TROPONIN QUANT: CPT

## 2018-11-11 PROCEDURE — 96376 TX/PRO/DX INJ SAME DRUG ADON: CPT

## 2018-11-11 PROCEDURE — 80053 COMPREHEN METABOLIC PANEL: CPT

## 2018-11-11 PROCEDURE — 25000003 PHARM REV CODE 250: Performed by: EMERGENCY MEDICINE

## 2018-11-11 PROCEDURE — 83605 ASSAY OF LACTIC ACID: CPT

## 2018-11-11 PROCEDURE — 99900035 HC TECH TIME PER 15 MIN (STAT)

## 2018-11-11 PROCEDURE — 96374 THER/PROPH/DIAG INJ IV PUSH: CPT | Mod: 59

## 2018-11-11 PROCEDURE — 96372 THER/PROPH/DIAG INJ SC/IM: CPT | Mod: 59

## 2018-11-11 PROCEDURE — 96361 HYDRATE IV INFUSION ADD-ON: CPT

## 2018-11-11 PROCEDURE — 25500020 PHARM REV CODE 255: Performed by: EMERGENCY MEDICINE

## 2018-11-11 PROCEDURE — 85025 COMPLETE CBC W/AUTO DIFF WBC: CPT

## 2018-11-11 PROCEDURE — 80320 DRUG SCREEN QUANTALCOHOLS: CPT

## 2018-11-11 PROCEDURE — 36600 WITHDRAWAL OF ARTERIAL BLOOD: CPT

## 2018-11-11 PROCEDURE — 82803 BLOOD GASES ANY COMBINATION: CPT

## 2018-11-11 PROCEDURE — 99285 EMERGENCY DEPT VISIT HI MDM: CPT | Mod: 25

## 2018-11-11 RX ORDER — DICYCLOMINE HYDROCHLORIDE 10 MG/ML
20 INJECTION INTRAMUSCULAR
Status: COMPLETED | OUTPATIENT
Start: 2018-11-11 | End: 2018-11-11

## 2018-11-11 RX ORDER — FENTANYL CITRATE 50 UG/ML
50 INJECTION, SOLUTION INTRAMUSCULAR; INTRAVENOUS
Status: COMPLETED | OUTPATIENT
Start: 2018-11-11 | End: 2018-11-11

## 2018-11-11 RX ORDER — ONDANSETRON 4 MG/1
4 TABLET, ORALLY DISINTEGRATING ORAL
Status: COMPLETED | OUTPATIENT
Start: 2018-11-11 | End: 2018-11-11

## 2018-11-11 RX ORDER — DICYCLOMINE HYDROCHLORIDE 20 MG/1
20 TABLET ORAL 2 TIMES DAILY
Qty: 30 TABLET | Refills: 0 | Status: SHIPPED | OUTPATIENT
Start: 2018-11-11 | End: 2018-12-11

## 2018-11-11 RX ORDER — ONDANSETRON 4 MG/1
4 TABLET, FILM COATED ORAL EVERY 6 HOURS
Qty: 12 TABLET | Refills: 0 | Status: SHIPPED | OUTPATIENT
Start: 2018-11-11 | End: 2021-09-28

## 2018-11-11 RX ORDER — SIMETHICONE 125 MG
125 CAPSULE ORAL 4 TIMES DAILY PRN
Qty: 30 CAPSULE | Refills: 0 | Status: SHIPPED | OUTPATIENT
Start: 2018-11-11 | End: 2021-03-29 | Stop reason: CLARIF

## 2018-11-11 RX ORDER — FAMOTIDINE 20 MG/1
20 TABLET, FILM COATED ORAL 2 TIMES DAILY
Qty: 30 TABLET | Refills: 0 | Status: SHIPPED | OUTPATIENT
Start: 2018-11-11 | End: 2019-03-04 | Stop reason: ALTCHOICE

## 2018-11-11 RX ORDER — SUCRALFATE 1 G/1
1 TABLET ORAL 4 TIMES DAILY
Qty: 60 TABLET | Refills: 0 | OUTPATIENT
Start: 2018-11-11 | End: 2019-02-01

## 2018-11-11 RX ORDER — OXYCODONE AND ACETAMINOPHEN 10; 325 MG/1; MG/1
1 TABLET ORAL EVERY 4 HOURS PRN
COMMUNITY
End: 2019-05-28

## 2018-11-11 RX ORDER — DOCUSATE SODIUM 100 MG/1
100 CAPSULE, LIQUID FILLED ORAL 2 TIMES DAILY
Qty: 60 CAPSULE | Refills: 0 | Status: SHIPPED | OUTPATIENT
Start: 2018-11-11 | End: 2021-03-29 | Stop reason: CLARIF

## 2018-11-11 RX ADMIN — FENTANYL CITRATE 50 MCG: 50 INJECTION INTRAMUSCULAR; INTRAVENOUS at 08:11

## 2018-11-11 RX ADMIN — FENTANYL CITRATE 50 MCG: 50 INJECTION INTRAMUSCULAR; INTRAVENOUS at 09:11

## 2018-11-11 RX ADMIN — SODIUM CHLORIDE 2000 ML: 0.9 INJECTION, SOLUTION INTRAVENOUS at 08:11

## 2018-11-11 RX ADMIN — ONDANSETRON 4 MG: 4 TABLET, ORALLY DISINTEGRATING ORAL at 08:11

## 2018-11-11 RX ADMIN — DICYCLOMINE HYDROCHLORIDE 20 MG: 20 INJECTION, SOLUTION INTRAMUSCULAR at 08:11

## 2018-11-11 RX ADMIN — IOHEXOL 75 ML: 350 INJECTION, SOLUTION INTRAVENOUS at 08:11

## 2018-11-11 RX ADMIN — LIDOCAINE HYDROCHLORIDE: 20 SOLUTION ORAL; TOPICAL at 09:11

## 2018-11-12 NOTE — ED TRIAGE NOTES
Pt arrived to the ED due to upper-mid sharp abdominal pain that started around 2:30 pm this afternoon.  Pt reports that he takes oxycodone everyday for chronic back and shoulder pain.

## 2018-11-12 NOTE — ED PROVIDER NOTES
Encounter Date: 11/11/2018       History     Chief Complaint   Patient presents with    Abdominal Pain     lower abd pain that started around 3p, pt is groaning on EMS stretcher. Denies n/v     53 y.o. male pmh  hypertension, hyperlipidemia (.4 May 2015), type 2 diabetes mellitus (HbA1c 6.5% May 2015), cirrhosis of the liver, chronic hepatitis C, GERD, cannabis abuse, tobacco abuse, depression, and history of CVA who presents c/o abdominal pain today. Describes pain as sharp epigastric has had nausea but no vomiting. Notes that he has not had a bm in approx 3-4 days. Has hx colitis. Pt states he had 1 beer. Pt denies other ingestions          Review of patient's allergies indicates:   Allergen Reactions    Codeine Nausea Only     Past Medical History:   Diagnosis Date    Anxiety     Asthma     Depression     Diabetes mellitus     Head injury     History of hepatitis C, s/p successful hcv treatment w/ SVR 8-2015  10/8/2012    SVR(24) as of 11/2015.  SVR(12) as of 8/2015.  completed harvoni 24 week regimen for genotype 1a 5/18/15     Hypertension     Shoulder pain     left, s/p 4 surgeries.     Stroke      Past Surgical History:   Procedure Laterality Date    BIOPSY, LIVER, WITH US GUIDANCE N/A 4/30/2014    Performed by [facility] Diagnostic Provider at [facility] OR 2ND FLR    COLONOSCOPY N/A 10/20/2015    Procedure: COLONOSCOPY;  Surgeon: Jagdish Patricia MD;  Location: [facility];  Service: Endoscopy;  Laterality: N/A;    COLONOSCOPY N/A 10/20/2015    Performed by Jagdish Patricia MD at [facility] ENDO    ESOPHAGOGASTRODUODENOSCOPY (EGD) N/A 2/27/2018    Performed by Deja Burnham MD at [facility] ENDO (4TH FLR)    ESOPHAGOGASTRODUODENOSCOPY (EGD) N/A 10/20/2015    Performed by Jagdish Patricia MD at [facility] ENDO    rotator cuff      left side     Family History   Problem Relation Age of Onset    Hypertension Mother     Heart disease Father     Cirrhosis Brother      Social History     Tobacco Use    Smoking  status: Current Every Day Smoker     Packs/day: 1.00     Years: 35.00     Pack years: 35.00     Types: Cigarettes    Smokeless tobacco: Never Used   Substance Use Topics    Alcohol use: No    Drug use: Yes     Types: Marijuana     Comment: daily     Review of Systems   Constitutional: Negative for fever.   HENT: Negative for sore throat.    Respiratory: Negative for shortness of breath.    Cardiovascular: Negative for chest pain.   Gastrointestinal: Positive for abdominal pain and nausea. Negative for diarrhea and vomiting.   Genitourinary: Negative for dysuria.   Musculoskeletal: Negative for back pain.   Skin: Negative for rash.   Neurological: Negative for weakness.   Hematological: Does not bruise/bleed easily.   All other systems reviewed and are negative.      Physical Exam     Initial Vitals   BP Pulse Resp Temp SpO2   -- -- -- -- --      MAP       --         Physical Exam    Nursing note and vitals reviewed.  Constitutional: He appears well-developed and well-nourished.   HENT:   Head: Normocephalic and atraumatic.   Eyes: EOM are normal. Pupils are equal, round, and reactive to light.   Cardiovascular: Normal rate and regular rhythm.   Pulmonary/Chest: Effort normal.   Abdominal: He exhibits no distension.   Musculoskeletal: He exhibits no edema or tenderness.   Neurological: He is alert and oriented to person, place, and time. No cranial nerve deficit.   Skin: Skin is warm and dry.   Psychiatric: He has a normal mood and affect.       +midepigastric ttp  ED Course   Procedures  Labs Reviewed - No data to display  EKG Readings: (Independently Interpreted)   Hr 70, sinus, no taj/twi.non acute       Imaging Results    None          Medical Decision Making:   Initial Assessment:   53 y.o. male here with abd pain  Differential Diagnosis:   Pancreatitis, colitis, gastritis, obstruction and other etiologies.  ED Management:  Workup has been unrevealing, suspect gastritis.                    Labs Reviewed   CBC  W/ AUTO DIFFERENTIAL - Abnormal; Notable for the following components:       Result Value    MCH 33.4 (*)     Gran # (ANC) 7.8 (*)     Gran% 75.9 (*)     All other components within normal limits   COMPREHENSIVE METABOLIC PANEL - Abnormal; Notable for the following components:    CO2 18 (*)     Glucose 137 (*)     Total Protein 8.5 (*)     Alkaline Phosphatase 53 (*)     ALT 45 (*)     Anion Gap 19 (*)     All other components within normal limits   LIPASE   LACTIC ACID, PLASMA   TROPONIN I   ALCOHOL,MEDICAL (ETHANOL)        no cause for pts abd pain has been found. Will discharge on general meds for abd pain. Anion gap noted. Pt deneis ingestions, has normal etoh/lactate, may be due to ketoacidosis from decreased consumption. Clinically pt has gastritis.      Metabolic alkalosis caused gap due to vomiting. Will discharge pt as planned.  Clinical Impression:   The encounter diagnosis was Abdominal pain.                             Prabhakar Mccord MD  11/11/18 0305

## 2018-11-12 NOTE — ED NOTES
MD reports that he is okay with pt's BP being elevated and that pt can be discharged. Pt reports that he will take his BP medication when he gets home

## 2019-02-01 ENCOUNTER — HOSPITAL ENCOUNTER (EMERGENCY)
Facility: HOSPITAL | Age: 54
Discharge: HOME | End: 2019-02-01
Attending: EMERGENCY MEDICINE
Payer: MEDICARE

## 2019-02-01 VITALS
TEMPERATURE: 98 F | WEIGHT: 154 LBS | SYSTOLIC BLOOD PRESSURE: 136 MMHG | RESPIRATION RATE: 20 BRPM | DIASTOLIC BLOOD PRESSURE: 75 MMHG | HEIGHT: 64 IN | BODY MASS INDEX: 26.29 KG/M2 | OXYGEN SATURATION: 97 % | HEART RATE: 107 BPM

## 2019-02-01 DIAGNOSIS — R10.13 EPIGASTRIC PAIN: ICD-10-CM

## 2019-02-01 DIAGNOSIS — K29.00 ACUTE GASTRITIS, PRESENCE OF BLEEDING UNSPECIFIED, UNSPECIFIED GASTRITIS TYPE: Primary | ICD-10-CM

## 2019-02-01 LAB
ALBUMIN SERPL BCP-MCNC: 4.8 G/DL
ALP SERPL-CCNC: 57 U/L
ALT SERPL W/O P-5'-P-CCNC: 22 U/L
ANION GAP SERPL CALC-SCNC: 13 MMOL/L
APTT BLDCRRT: 26 SEC
AST SERPL-CCNC: 18 U/L
BACTERIA #/AREA URNS HPF: NORMAL /HPF
BASOPHILS # BLD AUTO: 0.04 K/UL
BASOPHILS NFR BLD: 0.4 %
BILIRUB SERPL-MCNC: 0.7 MG/DL
BILIRUB UR QL STRIP: NEGATIVE
BNP SERPL-MCNC: 183 PG/ML
BUN SERPL-MCNC: 10 MG/DL
CALCIUM SERPL-MCNC: 11 MG/DL
CHLORIDE SERPL-SCNC: 100 MMOL/L
CLARITY UR: CLEAR
CO2 SERPL-SCNC: 26 MMOL/L
COLOR UR: YELLOW
CREAT SERPL-MCNC: 1.1 MG/DL
DIFFERENTIAL METHOD: ABNORMAL
EOSINOPHIL # BLD AUTO: 0 K/UL
EOSINOPHIL NFR BLD: 0 %
ERYTHROCYTE [DISTWIDTH] IN BLOOD BY AUTOMATED COUNT: 13.4 %
EST. GFR  (AFRICAN AMERICAN): >60 ML/MIN/1.73 M^2
EST. GFR  (NON AFRICAN AMERICAN): >60 ML/MIN/1.73 M^2
GLUCOSE SERPL-MCNC: 155 MG/DL
GLUCOSE UR QL STRIP: NEGATIVE
HCT VFR BLD AUTO: 43.6 %
HGB BLD-MCNC: 15.3 G/DL
HGB UR QL STRIP: NEGATIVE
HYALINE CASTS #/AREA URNS LPF: 0 /LPF
INR PPP: 1.1
KETONES UR QL STRIP: ABNORMAL
LACTATE SERPL-SCNC: 1.9 MMOL/L
LEUKOCYTE ESTERASE UR QL STRIP: NEGATIVE
LIPASE SERPL-CCNC: 15 U/L
LYMPHOCYTES # BLD AUTO: 1.7 K/UL
LYMPHOCYTES NFR BLD: 17.2 %
MCH RBC QN AUTO: 33.6 PG
MCHC RBC AUTO-ENTMCNC: 35.1 G/DL
MCV RBC AUTO: 96 FL
MICROSCOPIC COMMENT: NORMAL
MONOCYTES # BLD AUTO: 0.6 K/UL
MONOCYTES NFR BLD: 6.4 %
NEUTROPHILS # BLD AUTO: 7.5 K/UL
NEUTROPHILS NFR BLD: 76 %
NITRITE UR QL STRIP: NEGATIVE
PH UR STRIP: 8 [PH] (ref 5–8)
PLATELET # BLD AUTO: 233 K/UL
PMV BLD AUTO: 9.7 FL
POTASSIUM SERPL-SCNC: 3.7 MMOL/L
PROT SERPL-MCNC: 8.5 G/DL
PROT UR QL STRIP: ABNORMAL
PROTHROMBIN TIME: 11.4 SEC
RBC # BLD AUTO: 4.55 M/UL
RBC #/AREA URNS HPF: 2 /HPF (ref 0–4)
SODIUM SERPL-SCNC: 139 MMOL/L
SP GR UR STRIP: >1.03 (ref 1–1.03)
TROPONIN I SERPL DL<=0.01 NG/ML-MCNC: 0.01 NG/ML
URN SPEC COLLECT METH UR: ABNORMAL
UROBILINOGEN UR STRIP-ACNC: NEGATIVE EU/DL
WBC # BLD AUTO: 9.89 K/UL
WBC #/AREA URNS HPF: 2 /HPF (ref 0–5)

## 2019-02-01 PROCEDURE — 93010 EKG 12-LEAD: ICD-10-PCS | Mod: ,,, | Performed by: INTERNAL MEDICINE

## 2019-02-01 PROCEDURE — 96374 THER/PROPH/DIAG INJ IV PUSH: CPT | Mod: 59

## 2019-02-01 PROCEDURE — 93005 ELECTROCARDIOGRAM TRACING: CPT

## 2019-02-01 PROCEDURE — 93010 ELECTROCARDIOGRAM REPORT: CPT | Mod: ,,, | Performed by: INTERNAL MEDICINE

## 2019-02-01 PROCEDURE — 85025 COMPLETE CBC W/AUTO DIFF WBC: CPT

## 2019-02-01 PROCEDURE — 85730 THROMBOPLASTIN TIME PARTIAL: CPT

## 2019-02-01 PROCEDURE — 80053 COMPREHEN METABOLIC PANEL: CPT

## 2019-02-01 PROCEDURE — 83690 ASSAY OF LIPASE: CPT

## 2019-02-01 PROCEDURE — 83605 ASSAY OF LACTIC ACID: CPT

## 2019-02-01 PROCEDURE — 84484 ASSAY OF TROPONIN QUANT: CPT

## 2019-02-01 PROCEDURE — 85610 PROTHROMBIN TIME: CPT

## 2019-02-01 PROCEDURE — 63600175 PHARM REV CODE 636 W HCPCS: Performed by: EMERGENCY MEDICINE

## 2019-02-01 PROCEDURE — 96376 TX/PRO/DX INJ SAME DRUG ADON: CPT

## 2019-02-01 PROCEDURE — 81000 URINALYSIS NONAUTO W/SCOPE: CPT

## 2019-02-01 PROCEDURE — 99285 EMERGENCY DEPT VISIT HI MDM: CPT | Mod: 25

## 2019-02-01 PROCEDURE — 25000003 PHARM REV CODE 250: Performed by: EMERGENCY MEDICINE

## 2019-02-01 PROCEDURE — 25500020 PHARM REV CODE 255: Performed by: EMERGENCY MEDICINE

## 2019-02-01 PROCEDURE — 96375 TX/PRO/DX INJ NEW DRUG ADDON: CPT

## 2019-02-01 PROCEDURE — 83880 ASSAY OF NATRIURETIC PEPTIDE: CPT

## 2019-02-01 RX ORDER — ONDANSETRON 4 MG/1
4 TABLET, ORALLY DISINTEGRATING ORAL EVERY 12 HOURS PRN
Qty: 12 TABLET | Refills: 0 | Status: SHIPPED | OUTPATIENT
Start: 2019-02-01 | End: 2019-02-04

## 2019-02-01 RX ORDER — MORPHINE SULFATE 10 MG/ML
10 INJECTION INTRAMUSCULAR; INTRAVENOUS; SUBCUTANEOUS
Status: COMPLETED | OUTPATIENT
Start: 2019-02-01 | End: 2019-02-01

## 2019-02-01 RX ORDER — MORPHINE SULFATE 10 MG/ML
5 INJECTION INTRAMUSCULAR; INTRAVENOUS; SUBCUTANEOUS ONCE
Status: DISCONTINUED | OUTPATIENT
Start: 2019-02-01 | End: 2019-02-01

## 2019-02-01 RX ORDER — MORPHINE SULFATE 10 MG/ML
2 INJECTION INTRAMUSCULAR; INTRAVENOUS; SUBCUTANEOUS ONCE
Status: COMPLETED | OUTPATIENT
Start: 2019-02-01 | End: 2019-02-01

## 2019-02-01 RX ORDER — ONDANSETRON 2 MG/ML
8 INJECTION INTRAMUSCULAR; INTRAVENOUS
Status: COMPLETED | OUTPATIENT
Start: 2019-02-01 | End: 2019-02-01

## 2019-02-01 RX ORDER — SUCRALFATE 1 G/1
1 TABLET ORAL
Qty: 60 TABLET | Refills: 1 | Status: SHIPPED | OUTPATIENT
Start: 2019-02-01 | End: 2019-03-04 | Stop reason: SDUPTHER

## 2019-02-01 RX ADMIN — MORPHINE SULFATE 10 MG: 10 INJECTION INTRAVENOUS at 10:02

## 2019-02-01 RX ADMIN — LIDOCAINE HYDROCHLORIDE: 20 SOLUTION ORAL; TOPICAL at 09:02

## 2019-02-01 RX ADMIN — MORPHINE SULFATE 2 MG: 10 INJECTION INTRAVENOUS at 06:02

## 2019-02-01 RX ADMIN — IOHEXOL 75 ML: 350 INJECTION, SOLUTION INTRAVENOUS at 08:02

## 2019-02-01 RX ADMIN — ONDANSETRON 8 MG: 2 INJECTION INTRAMUSCULAR; INTRAVENOUS at 06:02

## 2019-02-01 NOTE — DISCHARGE INSTRUCTIONS
"Return to the Emergency Department of any acute worsening of your symptoms or for any other concern.     You should return to the ED for fever/chills, shortness of breath, chest pain, weakness or "passing out".     Pt should take all medications as prescribed.    Pt should follow up with PCP as soon as possible.    The risks associated with not taking your medications as prescribed and not following up with your Primary Care doctor or sub specialist includes worsening of your condition, pain, disability, loss of function or livelihood, and death      NATALIO Roldan M.D. 10:48 AM 2/1/2019      Our goal in the emergency department is to always give you outstanding care and exceptional service. You may receive a survey by mail or e-mail in the next week regarding your experience in our ED. We would greatly appreciate your completing and returning the survey. Your feedback provides us with a way to recognize our staff who give very good care and it helps us learn how to improve when your experience was below our aspiration of excellence.     "

## 2019-02-01 NOTE — ED TRIAGE NOTES
Pt presents to ER via personal transportation from home with c/o constant sharp epigastric pain that started around 1500 yesterday accompanied by N/V. Hx colitis.

## 2019-02-01 NOTE — ED PROVIDER NOTES
Encounter Date: 2/1/2019  This is a SORT/MSE of a 54 y.o. male presenting to the ED with c/o severe epigastric pain and nausea. Care will be transferred to an alternate provider when patient is roomed for a full evaluation and final disposition. Patient is aware that he/she is awaiting a room in the emergency department, where another provider will review results, evaluate and treat as needed. SEBASTIAN Santoro DNP  SCRIBE #1 NOTE: IKieran, felix scribing for, and in the presence of,  NATALIO Roldan MD. I have scribed the following portions of the note - Other sections scribed: HPI and ROS.       History     Chief Complaint   Patient presents with    Abdominal Pain     pt complains of upper abd pain with nausea and vomiting. states he became ill yesterday about 1500 hrs and continues to have pain and vomiting     CC: Abdominal Pain     HPI: This 54 y.o M kar (1 ppd) with Depression, DM type 2, HTN, cirrhosis of the liver and PMHx of Colitis, Stroke, hepatitis C and CVA presents to the ED c/o acute onset of upper abdominal pain with associated fever, chills, nausea and emesis since 1500h yesterday. His fever and chills have resolved. Currently, he reports nausea and abdominal pain. He was last seen in this ED 11/11/18 for similar symptoms. He denies back pain, dysuria, difficulty urinating, hematuria, cough, rhinorrhea, chest pain, SOB and rash. No prior tx.  Was drinking beer earlier.       The history is provided by the patient. No  was used.     Review of patient's allergies indicates:   Allergen Reactions    Codeine Nausea Only     Past Medical History:   Diagnosis Date    Anxiety     Asthma     Colitis     Depression     Diabetes mellitus     Head injury     History of hepatitis C, s/p successful hcv treatment w/ SVR 8-2015  10/8/2012    SVR(24) as of 11/2015.  SVR(12) as of 8/2015.  completed harvoni 24 week regimen for genotype 1a 5/18/15     Hypertension     Shoulder  pain     left, s/p 4 surgeries.     Stroke      Past Surgical History:   Procedure Laterality Date    BIOPSY, LIVER, WITH US GUIDANCE N/A 4/30/2014    Performed by [facility] Diagnostic Provider at [facility] OR 2ND FLR    COLONOSCOPY N/A 10/20/2015    Performed by Jagdish Patricia MD at [facility] ENDO    ESOPHAGOGASTRODUODENOSCOPY (EGD) N/A 2/27/2018    Performed by Deja Burnham MD at [facility] ENDO (4TH FLR)    ESOPHAGOGASTRODUODENOSCOPY (EGD) N/A 10/20/2015    Performed by Jagdish Patricia MD at [facility] ENDO    rotator cuff      left side     Family History   Problem Relation Age of Onset    Hypertension Mother     Heart disease Father     Cirrhosis Brother      Social History     Tobacco Use    Smoking status: Current Every Day Smoker     Packs/day: 1.00     Years: 35.00     Pack years: 35.00     Types: Cigarettes    Smokeless tobacco: Never Used   Substance Use Topics    Alcohol use: No    Drug use: Yes     Types: Marijuana     Comment: daily     Review of Systems   Constitutional: Positive for chills (resolved) and fever (resolved).   Gastrointestinal: Positive for abdominal pain and vomiting (resolved).       Physical Exam     Initial Vitals [02/01/19 0437]   BP Pulse Resp Temp SpO2   (!) 206/97 64 (!) 22 97.9 °F (36.6 °C) 100 %      MAP       --         Physical Exam    Vitals reviewed.  Constitutional: He appears well-developed and well-nourished.   HENT:   Head: Normocephalic and atraumatic.   Eyes: EOM are normal. Pupils are equal, round, and reactive to light.   Neck: Normal range of motion. Neck supple.   Cardiovascular: Normal rate, regular rhythm, normal heart sounds and intact distal pulses.   Pulmonary/Chest: Breath sounds normal. No respiratory distress. He has no wheezes. He has no rhonchi. He has no rales.   Abdominal: Soft. Bowel sounds are normal. He exhibits no distension, no fluid wave and no mass. There is no hepatosplenomegaly. There is tenderness in the epigastric area. There is no rigidity, no  rebound, no guarding, no CVA tenderness, no tenderness at McBurney's point and negative Self's sign.   Musculoskeletal: Normal range of motion.   Neurological: He is alert and oriented to person, place, and time.   Skin: Skin is warm and dry.   Psychiatric: He has a normal mood and affect.         ED Course   Procedures  Labs Reviewed   COMPREHENSIVE METABOLIC PANEL - Abnormal; Notable for the following components:       Result Value    Glucose 155 (*)     Calcium 11.0 (*)     Total Protein 8.5 (*)     All other components within normal limits   CBC W/ AUTO DIFFERENTIAL - Abnormal; Notable for the following components:    RBC 4.55 (*)     MCH 33.6 (*)     Gran% 76.0 (*)     Lymph% 17.2 (*)     All other components within normal limits   URINALYSIS, REFLEX TO URINE CULTURE - Abnormal; Notable for the following components:    Specific Gravity, UA >1.030 (*)     Protein, UA 1+ (*)     Ketones, UA 1+ (*)     All other components within normal limits    Narrative:     Preferred Collection Type->Urine, Clean Catch   B-TYPE NATRIURETIC PEPTIDE - Abnormal; Notable for the following components:     (*)     All other components within normal limits   APTT   LACTIC ACID, PLASMA   LIPASE   PROTIME-INR   TROPONIN I   URINALYSIS MICROSCOPIC    Narrative:     Preferred Collection Type->Urine, Clean Catch        ECG Results          EKG 12-lead (In process)  Result time 02/01/19 06:43:16    In process by Interface, Lab In [facility] (02/01/19 06:43:16)                 Narrative:    Test Reason : EPIGASTRIC PAIN    Vent. Rate : 062 BPM     Atrial Rate : 062 BPM     P-R Int : 122 ms          QRS Dur : 080 ms      QT Int : 414 ms       P-R-T Axes : 054 014 046 degrees     QTc Int : 420 ms    Normal sinus rhythm with sinus arrhythmia  Nonspecific ST abnormality  Abnormal ECG  When compared with ECG of 03-JUN-2016 11:24,  Significant changes have occurred    Referred By: [email or web address]   SELF           Confirmed By:                    In process by Interface, Lab In [facility] (02/01/19 06:36:57)                 Narrative:    Test Reason : EPIGASTRIC PAIN    Vent. Rate : 062 BPM     Atrial Rate : 062 BPM     P-R Int : 122 ms          QRS Dur : 080 ms      QT Int : 414 ms       P-R-T Axes : 054 014 046 degrees     QTc Int : 420 ms    Normal sinus rhythm with sinus arrhythmia  Nonspecific ST abnormality  Abnormal ECG  When compared with ECG of 03-JUN-2016 11:24,  Significant changes have occurred    Referred By: [email or web address]   SELF           Confirmed By:                             Imaging Results          CT Abdomen Pelvis With Contrast (Final result)  Result time 02/01/19 08:50:37    Final result by Cole Concepcion MD (02/01/19 08:50:37)                 Impression:      Possible gastritis, noting hypervascular gastric mucosa.  The distal esophagus appears thickened, as well.    Prominent fatty change in the liver.    Moderate amount of scattered calcified atherosclerotic disease with stenosis at the origin of the left common iliac artery.    Bilateral AVN of the femoral heads.    Prior appendectomy.      Electronically signed by: Cole Concepcion MD  Date:    02/01/2019  Time:    08:50             Narrative:    EXAMINATION:  CT ABDOMEN PELVIS WITH CONTRAST    CLINICAL HISTORY:  epigastric pain;    TECHNIQUE:  Low dose axial images, sagittal and coronal reformations were obtained from the lung bases to the pubic symphysis following the IV administration of 75 mL of Omnipaque 350 and the oral administration of 30 mL of Omnipaque 350.    COMPARISON:  11/11/2018    FINDINGS:  Diffuse low-attenuation in the liver suggesting fatty change.  There is mild prominence of the common bile duct, measuring 6 mm.  The mild prominence of the main pancreatic duct.  The gallbladder is unremarkable.  Main portal veins are patent.  The gastric mucosa is hypervascular.  Probable thickening of the distal esophagus noted.    There is a moderate amount of scattered calcified  atherosclerotic disease with moderate narrowing the origin the left common iliac artery.    Bladder is grossly unremarkable.  Mild prostate enlargement.  No fluid collections.  There is mild scattered sigmoid colon diverticulosis.  No pericolonic fat stranding/inflammatory change.  The terminal ileum is unremarkable.  Prior appendectomy noted.  No dilated loops of bowel.  No mesenteric, periaortic, or periportal lymphadenopathy.    Splenic size is within normal limits.  No pancreatic masses.  Adrenal glands are unremarkable.  No renal masses or hydronephrosis.    Trace amount interstitial thickening in the lung bases.  The abdominal aorta tapers normally.  Prominent fat in the right spermatic cord.  L5-S1 degenerative disc disease.  Bilateral AVN of the femoral heads.                                 Medical Decision Making:   History:   Old Medical Records: I decided to obtain old medical records.  Initial Assessment:   Medical decision-making:    The patient received a medical screening exam. If performed, the EKG was independently evaluated by me and is pending final cardiology evaluation.  If performed, all radiographic studies were independently evaluated by me and are pending final radiology evaluation. If labs were ordered, they were reviewed. Vital signs are independently assessed by me.  If performed, the pulse oximetry was independently evaluated by me.  I decided to obtain the patient's past medical record.  If available, I reviewed the patient's past medical record, including most recent labs and radiology reports.    ED Management:  This is an emergent evaluation of the patient complaining of abdominal pain.  My differential diagnosis includes mesenteric ischemia, obstruction, appendicitis, pancreatitis, cholecystitis, peptic ulcer disease,diverticulitis, colitis, volvulus, hernia, UTI, gastritis and gastroenteritis.    I decided to obtain and review the old medical record.    The patient does clinically  have gastritis and has CT evidence of same. .  A CT scan was performed and was negative for mesenteric ischemia, obstruction, appendicitis, cholecystitis, diverticulitis, colitis, and hernia.  The patient's urine is not infected.  The patient's labs are otherwise normal.    The patient reports that symptomatically, symptoms have improved.    The patient will be discharged home with the following medications:  Patient will follow up with primary care physician.    The results and physical exam findings were reviewed with the patient. Pt agrees with assessment, disposition and treatment plan and has no further questions or complaints at this time.      NATALIO Roldan M.D. 11:32 AM 2/1/2019                Scribe Attestation:   Scribe #1: I performed the above scribed service and the documentation accurately describes the services I performed. I attest to the accuracy of the note.    Attending Attestation:           Physician Attestation for Scribe:  Physician Attestation Statement for Scribe #1: I, NATALIO Roldan MD, reviewed documentation, as scribed by Kieran Lao in my presence, and it is both accurate and complete.                    Clinical Impression:   The primary encounter diagnosis was Acute gastritis, presence of bleeding unspecified, unspecified gastritis type. A diagnosis of Epigastric pain was also pertinent to this visit.                             Levon Roldan MD  02/01/19 0152

## 2019-03-04 ENCOUNTER — HOSPITAL ENCOUNTER (EMERGENCY)
Facility: HOSPITAL | Age: 54
Discharge: HOME | End: 2019-03-04
Attending: EMERGENCY MEDICINE
Payer: MEDICARE

## 2019-03-04 VITALS
WEIGHT: 145 LBS | DIASTOLIC BLOOD PRESSURE: 90 MMHG | OXYGEN SATURATION: 98 % | SYSTOLIC BLOOD PRESSURE: 139 MMHG | BODY MASS INDEX: 24.75 KG/M2 | HEART RATE: 105 BPM | RESPIRATION RATE: 20 BRPM | TEMPERATURE: 99 F | HEIGHT: 64 IN

## 2019-03-04 DIAGNOSIS — R10.13 RECURRENT EPIGASTRIC ABDOMINAL PAIN: Primary | ICD-10-CM

## 2019-03-04 DIAGNOSIS — R10.13 ABDOMINAL PAIN, ACUTE, EPIGASTRIC: ICD-10-CM

## 2019-03-04 LAB
ALBUMIN SERPL BCP-MCNC: 5 G/DL
ALP SERPL-CCNC: 56 U/L
ALT SERPL W/O P-5'-P-CCNC: 42 U/L
ANION GAP SERPL CALC-SCNC: 16 MMOL/L
AST SERPL-CCNC: 27 U/L
BACTERIA #/AREA URNS HPF: NORMAL /HPF
BASOPHILS # BLD AUTO: 0.02 K/UL
BASOPHILS NFR BLD: 0.2 %
BILIRUB SERPL-MCNC: 0.6 MG/DL
BILIRUB UR QL STRIP: NEGATIVE
BUN SERPL-MCNC: 18 MG/DL
CALCIUM SERPL-MCNC: 11.4 MG/DL
CHLORIDE SERPL-SCNC: 99 MMOL/L
CLARITY UR: CLEAR
CO2 SERPL-SCNC: 25 MMOL/L
COLOR UR: YELLOW
CREAT SERPL-MCNC: 1.5 MG/DL
DIFFERENTIAL METHOD: ABNORMAL
EOSINOPHIL # BLD AUTO: 0 K/UL
EOSINOPHIL NFR BLD: 0.1 %
ERYTHROCYTE [DISTWIDTH] IN BLOOD BY AUTOMATED COUNT: 13.2 %
EST. GFR  (AFRICAN AMERICAN): 60 ML/MIN/1.73 M^2
EST. GFR  (NON AFRICAN AMERICAN): 52 ML/MIN/1.73 M^2
GLUCOSE SERPL-MCNC: 133 MG/DL
GLUCOSE UR QL STRIP: NEGATIVE
HCT VFR BLD AUTO: 46.1 %
HGB BLD-MCNC: 16.1 G/DL
HGB UR QL STRIP: NEGATIVE
HYALINE CASTS #/AREA URNS LPF: 0 /LPF
KETONES UR QL STRIP: ABNORMAL
LEUKOCYTE ESTERASE UR QL STRIP: NEGATIVE
LIPASE SERPL-CCNC: 24 U/L
LYMPHOCYTES # BLD AUTO: 1.6 K/UL
LYMPHOCYTES NFR BLD: 18.9 %
MCH RBC QN AUTO: 34 PG
MCHC RBC AUTO-ENTMCNC: 34.9 G/DL
MCV RBC AUTO: 98 FL
MICROSCOPIC COMMENT: NORMAL
MONOCYTES # BLD AUTO: 0.5 K/UL
MONOCYTES NFR BLD: 6 %
NEUTROPHILS # BLD AUTO: 6.5 K/UL
NEUTROPHILS NFR BLD: 74.8 %
NITRITE UR QL STRIP: NEGATIVE
PH UR STRIP: 8 [PH] (ref 5–8)
PLATELET # BLD AUTO: 259 K/UL
PMV BLD AUTO: 10.1 FL
POTASSIUM SERPL-SCNC: 3.7 MMOL/L
PROT SERPL-MCNC: 8.9 G/DL
PROT UR QL STRIP: ABNORMAL
RBC # BLD AUTO: 4.73 M/UL
RBC #/AREA URNS HPF: 1 /HPF (ref 0–4)
SODIUM SERPL-SCNC: 140 MMOL/L
SP GR UR STRIP: 1.02 (ref 1–1.03)
SQUAMOUS #/AREA URNS HPF: 3 /HPF
URN SPEC COLLECT METH UR: ABNORMAL
UROBILINOGEN UR STRIP-ACNC: NEGATIVE EU/DL
WBC # BLD AUTO: 8.64 K/UL
WBC #/AREA URNS HPF: 1 /HPF (ref 0–5)

## 2019-03-04 PROCEDURE — 83690 ASSAY OF LIPASE: CPT

## 2019-03-04 PROCEDURE — 99284 EMERGENCY DEPT VISIT MOD MDM: CPT | Mod: 25

## 2019-03-04 PROCEDURE — 81000 URINALYSIS NONAUTO W/SCOPE: CPT

## 2019-03-04 PROCEDURE — 25000003 PHARM REV CODE 250: Performed by: EMERGENCY MEDICINE

## 2019-03-04 PROCEDURE — 96361 HYDRATE IV INFUSION ADD-ON: CPT

## 2019-03-04 PROCEDURE — 96375 TX/PRO/DX INJ NEW DRUG ADDON: CPT

## 2019-03-04 PROCEDURE — 96365 THER/PROPH/DIAG IV INF INIT: CPT

## 2019-03-04 PROCEDURE — 96372 THER/PROPH/DIAG INJ SC/IM: CPT | Mod: 59

## 2019-03-04 PROCEDURE — 63600175 PHARM REV CODE 636 W HCPCS: Performed by: EMERGENCY MEDICINE

## 2019-03-04 PROCEDURE — 85025 COMPLETE CBC W/AUTO DIFF WBC: CPT

## 2019-03-04 PROCEDURE — 80053 COMPREHEN METABOLIC PANEL: CPT

## 2019-03-04 RX ORDER — LORAZEPAM 2 MG/ML
1 INJECTION INTRAMUSCULAR
Status: COMPLETED | OUTPATIENT
Start: 2019-03-04 | End: 2019-03-04

## 2019-03-04 RX ORDER — KETOROLAC TROMETHAMINE 30 MG/ML
15 INJECTION, SOLUTION INTRAMUSCULAR; INTRAVENOUS
Status: COMPLETED | OUTPATIENT
Start: 2019-03-04 | End: 2019-03-04

## 2019-03-04 RX ORDER — PANTOPRAZOLE SODIUM 40 MG/1
80 TABLET, DELAYED RELEASE ORAL
Status: COMPLETED | OUTPATIENT
Start: 2019-03-04 | End: 2019-03-04

## 2019-03-04 RX ORDER — SUCRALFATE 1 G/1
1 TABLET ORAL
Qty: 60 TABLET | Refills: 1 | Status: SHIPPED | OUTPATIENT
Start: 2019-03-04 | End: 2021-09-28

## 2019-03-04 RX ORDER — DIPHENHYDRAMINE HYDROCHLORIDE 50 MG/ML
50 INJECTION INTRAMUSCULAR; INTRAVENOUS
Status: COMPLETED | OUTPATIENT
Start: 2019-03-04 | End: 2019-03-04

## 2019-03-04 RX ORDER — PROPRANOLOL HYDROCHLORIDE 20 MG/1
10 TABLET ORAL 3 TIMES DAILY
COMMUNITY
End: 2023-08-25

## 2019-03-04 RX ORDER — DICYCLOMINE HYDROCHLORIDE 20 MG/1
20 TABLET ORAL EVERY 6 HOURS PRN
Qty: 20 TABLET | Refills: 0 | Status: SHIPPED | OUTPATIENT
Start: 2019-03-04 | End: 2021-09-28

## 2019-03-04 RX ORDER — MAG HYDROX/ALUMINUM HYD/SIMETH 200-200-20
60 SUSPENSION, ORAL (FINAL DOSE FORM) ORAL
Status: COMPLETED | OUTPATIENT
Start: 2019-03-04 | End: 2019-03-04

## 2019-03-04 RX ORDER — METFORMIN HYDROCHLORIDE 1000 MG/1
1000 TABLET ORAL 2 TIMES DAILY WITH MEALS
COMMUNITY

## 2019-03-04 RX ORDER — DICYCLOMINE HYDROCHLORIDE 10 MG/ML
20 INJECTION INTRAMUSCULAR
Status: COMPLETED | OUTPATIENT
Start: 2019-03-04 | End: 2019-03-04

## 2019-03-04 RX ORDER — VALSARTAN AND HYDROCHLOROTHIAZIDE 160; 25 MG/1; MG/1
1 TABLET ORAL DAILY
COMMUNITY
End: 2023-08-25 | Stop reason: SDUPTHER

## 2019-03-04 RX ORDER — PROMETHAZINE HYDROCHLORIDE 25 MG/1
25 SUPPOSITORY RECTAL EVERY 6 HOURS PRN
Qty: 12 SUPPOSITORY | Refills: 0 | OUTPATIENT
Start: 2019-03-04 | End: 2019-05-28

## 2019-03-04 RX ORDER — DULOXETIN HYDROCHLORIDE 60 MG/1
60 CAPSULE, DELAYED RELEASE ORAL DAILY
COMMUNITY

## 2019-03-04 RX ORDER — PANTOPRAZOLE SODIUM 40 MG/1
40 TABLET, DELAYED RELEASE ORAL 2 TIMES DAILY
Qty: 60 TABLET | Refills: 6 | Status: SHIPPED | OUTPATIENT
Start: 2019-03-04 | End: 2023-05-19

## 2019-03-04 RX ADMIN — LORAZEPAM 1 MG: 2 INJECTION INTRAMUSCULAR; INTRAVENOUS at 05:03

## 2019-03-04 RX ADMIN — PROMETHAZINE HYDROCHLORIDE 12.5 MG: 25 INJECTION INTRAMUSCULAR; INTRAVENOUS at 06:03

## 2019-03-04 RX ADMIN — KETOROLAC TROMETHAMINE 15 MG: 30 INJECTION, SOLUTION INTRAMUSCULAR at 07:03

## 2019-03-04 RX ADMIN — ALUMINUM HYDROXIDE, MAGNESIUM HYDROXIDE, AND SIMETHICONE 60 ML: 200; 200; 20 SUSPENSION ORAL at 05:03

## 2019-03-04 RX ADMIN — PANTOPRAZOLE SODIUM 80 MG: 40 TABLET, DELAYED RELEASE ORAL at 05:03

## 2019-03-04 RX ADMIN — SODIUM CHLORIDE 1000 ML: 0.9 INJECTION, SOLUTION INTRAVENOUS at 06:03

## 2019-03-04 RX ADMIN — SODIUM CHLORIDE 1000 ML: 0.9 INJECTION, SOLUTION INTRAVENOUS at 07:03

## 2019-03-04 RX ADMIN — DICYCLOMINE HYDROCHLORIDE 20 MG: 20 INJECTION, SOLUTION INTRAMUSCULAR at 06:03

## 2019-03-04 RX ADMIN — DIPHENHYDRAMINE HYDROCHLORIDE 50 MG: 50 INJECTION INTRAMUSCULAR; INTRAVENOUS at 05:03

## 2019-03-04 NOTE — ED PROVIDER NOTES
Encounter Date: 3/4/2019  54 y.o. male with abdominal pain since last night.   Patient will be seen by another provider for further evaluation when an exam room is available. René WILKERSON, 2:27 PM    SCRIBE #1 NOTE: I, Kiarra Diaz, am scribing for, and in the presence of,  Beka Gale MD. I have scribed the following portions of the note - Other sections scribed: HPI, ROS.       History     Chief Complaint   Patient presents with    Abdominal Pain     Pt c/o pain in his stomach like someone trying to get out. Pt states pain started at 3:00am. + nausea, vomiting and diarrhea today.      CC: Abdominal Pain    HPI: This is a 54 y.o. M who has Depression, Anxiety, Stroke, DM, HTN, Asthma, and Hx of Hepatitis C with successful treatment who presents to the ED for emergent evaluation of acute and constant epigastric abdominal pain that began at 3:00am this morning. He describes the abdominal pain as a sharp pain. Pt has associated nausea and vomiting. He reports 5 episodes of vomiting since onset at 3:00am today. Pt's abdominal pain occurs every 2 months. He states that he was seen by a Gastroenterologist in the past, in which he had an EGD. He was seen by a Gastroenterologist today, and was referred to the ED. He states that he did not have an EGD during today's GI visit. He reports that he was diagnosed with Gastritis in the past. Pt states that he quit smoking and drinking. Pt denies fever, chills, ear pain, sore throat, eye pain, SOB, cough, CP, diarrhea, dysuria, back pain, arm or leg problems, rash, headaches, or surgical history.      The history is provided by the patient. No  was used.     Review of patient's allergies indicates:   Allergen Reactions    Codeine Nausea Only     Past Medical History:   Diagnosis Date    Anxiety     Asthma     Colitis     Depression     Diabetes mellitus     Head injury     History of hepatitis C, s/p successful hcv treatment w/ SVR 8-2015   10/8/2012    SVR(24) as of 11/2015.  SVR(12) as of 8/2015.  completed harvoni 24 week regimen for genotype 1a 5/18/15     Hypertension     Shoulder pain     left, s/p 4 surgeries.     Stroke      Past Surgical History:   Procedure Laterality Date    BIOPSY, LIVER, WITH US GUIDANCE N/A 4/30/2014    Performed by [facility] OR 2ND FLR    COLONOSCOPY N/A 10/20/2015    Performed by Jagdish Patricia MD at [facility] ENDO    ESOPHAGOGASTRODUODENOSCOPY (EGD) N/A 2/27/2018    Performed by Deja Burnham MD at [facility] ENDO (4TH FLR)    ESOPHAGOGASTRODUODENOSCOPY (EGD) N/A 10/20/2015    Performed by Jagdish Patricia [facility] at [facility] ENDO    rotator cuff      left side     Family History   Problem Relation Age of Onset    Hypertension Mother     Heart disease Father     Cirrhosis Brother      Social History     Tobacco Use    Smoking status: Current Every Day Smoker     Packs/day: 0.50     Years: 35.00     Pack years: 17.50     Types: Cigarettes    Smokeless tobacco: Never Used   Substance Use Topics    Alcohol use: No    Drug use: Yes     Types: Marijuana     Review of Systems   Constitutional: Negative for chills and fever.   HENT: Negative for ear pain and sore throat.    Eyes: Negative for pain.   Respiratory: Negative for cough and shortness of breath.    Cardiovascular: Negative for chest pain.   Gastrointestinal: Positive for abdominal pain, nausea and vomiting. Negative for diarrhea.   Genitourinary: Negative for dysuria.   Musculoskeletal: Negative for back pain.        (-) arm or leg problems   Skin: Negative for rash.   Neurological: Negative for headaches.       Physical Exam     Initial Vitals [03/04/19 1429]   BP Pulse Resp Temp SpO2   (!) 184/98 86 (!) 22 98.3 °F (36.8 °C) 100 %      MAP       --         Physical Exam  The patient was examined specifically for the following:   General:No significant distress, Good color, Warm and dry. Head and neck:Scalp atraumatic, Neck supple.  Neurological:Appropriate conversation, Gross motor deficits. Eyes:Conjugate gaze, Clear corneas. ENT: No epistaxis. Cardiac: Regular rate and rhythm, Grossly normal heart tones. Pulmonary: Wheezing, Rales. Gastrointestinal: Abdominal tenderness, Abdominal distention. Musculoskeletal: Extremity deformity, Apparent pain with range of motion of the joints. Skin: Rash.   The findings on examination were normal except for the following:  The patient has very mild epigastric abdominal tenderness.  There is no guarding rebound mass or distention. The lungs are clear.  The heart tones are normal.  The abdomen is otherwise nontender.  ED Course   Procedures  Labs Reviewed   CBC W/ AUTO DIFFERENTIAL - Abnormal; Notable for the following components:       Result Value    MCH 34.0 (*)     Gran% 74.8 (*)     All other components within normal limits   COMPREHENSIVE METABOLIC PANEL - Abnormal; Notable for the following components:    Glucose 133 (*)     Creatinine 1.5 (*)     Calcium 11.4 (*)     Total Protein 8.9 (*)     eGFR if non  52 (*)     All other components within normal limits   URINALYSIS, REFLEX TO URINE CULTURE - Abnormal; Notable for the following components:    Protein, UA 1+ (*)     Ketones, UA Trace (*)     All other components within normal limits    Narrative:     Preferred Collection Type->Urine, Clean Catch   LIPASE   URINALYSIS MICROSCOPIC    Narrative:     Preferred Collection Type->Urine, Clean Catch          Imaging Results          X-Ray Chest 1 View (Final result)  Result time 03/04/19 19:58:44    Final result by Dalton Lyle MD (03/04/19 19:58:44)                 Impression:      No acute cardiopulmonary process identified.      Electronically signed by: Dalton Lyle MD  Date:    03/04/2019  Time:    19:58             Narrative:    EXAMINATION:  XR CHEST 1 VIEW    CLINICAL HISTORY:  Epigastric pain    TECHNIQUE:  Single frontal view of the chest was  performed.    COMPARISON:  Previous imaging unavailable for review on PACS due to technical issues.    FINDINGS:  Cardiac silhouette is normal in size.  Lungs are symmetrically expanded.  Small-bowel like opacity is seen in the right midlung zone consistent with mild scarring or plate atelectasis.  There is mild nonspecific interstitial prominence.  No evidence of focal consolidative process, pneumothorax, or significant effusion.  No acute osseous abnormality identified.    Two nonspecific small lucent lesions are visualized within the left proximal humerus.  Prior imaging is unavailable for review on PACS to assess for stability.                              Medical decision making:  Given the above, this patient presents to the emergency room with recurrent epigastric abdominal pain.  The patient has had 3 CT scans the trailing 6 months.  He has had negative ultrasounds.  He has presented to the emergency room multiple times.  He has been studied and has been negative. Patient was treated with dicyclomine Ativan and Toradol.  His pain is almost completely relieved at this time.  I will discharge him to outpatient evaluation and treatment.  I will refill his pantoprazole.  I will treat with dicyclomine.  I will have him return if he gets worse or if new problems develop.  I will encourage follow up with Gastroenterology.                Scribe Attestation:   Scribe #1: I performed the above scribed service and the documentation accurately describes the services I performed. I attest to the accuracy of the note.    Attending Attestation:           Physician Attestation for Scribe:  Physician Attestation Statement for Scribe #1: I, Beka Gale MD, reviewed documentation, as scribed by Kiarra Diaz in my presence, and it is both accurate and complete.                    Clinical Impression:       ICD-10-CM ICD-9-CM   1. Recurrent epigastric abdominal pain R10.13 789.06   2. Abdominal pain, acute, epigastric  R10.13 789.06     338.19                                Beka Gale MD  03/04/19 4289

## 2019-03-04 NOTE — ED NOTES
"Patient needed assistance in the room and I went in to help him and he manny up out of his bed to yell at me and tell me that he has been sitting back here for a fucking hour and half and he is in pain; I had to step back from the patient because he got too close to me; I reassured to the patient that the MD would be in shortly to talk with him and he said "psh, I could just fucking go home and sit for an hour if that is the case!"; charge nurse Ivy and Dr. Gale made aware  "

## 2019-03-04 NOTE — ED TRIAGE NOTES
Pt presents to ED c/o upper abd / epigastric pain since 0300 this AM and woke him from his sleep; pt states he had 1 episode of vomiting after trying to sip water; + nausea also; pt states he was just seen here last month for the same symptoms and told him that he was dx with gastritis; pt states he was at his follow up appt with the gastroenterologist today and they told him to come back to the ED; pt is AAOx4; will continue to monitor

## 2019-03-05 NOTE — DISCHARGE INSTRUCTIONS
Please return immediately if you get worse or if new problems develop.  Please use the medicines as above.  Please do not use caffeine carbonation alcohol cigarettes citrus tomato ibuprofen Naprosyn aspirin.  Please follow-up with the gastroenterologist above as directed.  Rest..

## 2019-05-28 ENCOUNTER — HOSPITAL ENCOUNTER (EMERGENCY)
Facility: HOSPITAL | Age: 54
Discharge: HOME | End: 2019-05-28
Attending: EMERGENCY MEDICINE
Payer: MEDICARE

## 2019-05-28 VITALS
SYSTOLIC BLOOD PRESSURE: 170 MMHG | WEIGHT: 145 LBS | BODY MASS INDEX: 24.75 KG/M2 | TEMPERATURE: 98 F | RESPIRATION RATE: 18 BRPM | HEART RATE: 70 BPM | OXYGEN SATURATION: 96 % | HEIGHT: 64 IN | DIASTOLIC BLOOD PRESSURE: 88 MMHG

## 2019-05-28 DIAGNOSIS — R10.9 ABDOMINAL PAIN, UNSPECIFIED ABDOMINAL LOCATION: Primary | ICD-10-CM

## 2019-05-28 DIAGNOSIS — R11.10 VOMITING, INTRACTABILITY OF VOMITING NOT SPECIFIED, PRESENCE OF NAUSEA NOT SPECIFIED, UNSPECIFIED VOMITING TYPE: ICD-10-CM

## 2019-05-28 LAB
ALBUMIN SERPL BCP-MCNC: 4.7 G/DL (ref 3.5–5.2)
ALP SERPL-CCNC: 51 U/L (ref 55–135)
ALT SERPL W/O P-5'-P-CCNC: 33 U/L (ref 10–44)
ANION GAP SERPL CALC-SCNC: 14 MMOL/L (ref 8–16)
AST SERPL-CCNC: 23 U/L (ref 10–40)
BACTERIA #/AREA URNS HPF: NORMAL /HPF
BASOPHILS # BLD AUTO: 0.02 K/UL (ref 0–0.2)
BASOPHILS NFR BLD: 0.2 % (ref 0–1.9)
BILIRUB SERPL-MCNC: 0.5 MG/DL (ref 0.1–1)
BILIRUB UR QL STRIP: NEGATIVE
BUN SERPL-MCNC: 10 MG/DL (ref 6–20)
CALCIUM SERPL-MCNC: 10.6 MG/DL (ref 8.7–10.5)
CHLORIDE SERPL-SCNC: 101 MMOL/L (ref 95–110)
CLARITY UR: CLEAR
CO2 SERPL-SCNC: 27 MMOL/L (ref 23–29)
COLOR UR: YELLOW
CREAT SERPL-MCNC: 1.3 MG/DL (ref 0.5–1.4)
DIFFERENTIAL METHOD: ABNORMAL
EOSINOPHIL # BLD AUTO: 0 K/UL (ref 0–0.5)
EOSINOPHIL NFR BLD: 0.1 % (ref 0–8)
ERYTHROCYTE [DISTWIDTH] IN BLOOD BY AUTOMATED COUNT: 13.2 % (ref 11.5–14.5)
EST. GFR  (AFRICAN AMERICAN): >60 ML/MIN/1.73 M^2
EST. GFR  (NON AFRICAN AMERICAN): >60 ML/MIN/1.73 M^2
GLUCOSE SERPL-MCNC: 150 MG/DL (ref 70–110)
GLUCOSE UR QL STRIP: ABNORMAL
HCT VFR BLD AUTO: 42.1 % (ref 40–54)
HGB BLD-MCNC: 14.4 G/DL (ref 14–18)
HGB UR QL STRIP: NEGATIVE
HYALINE CASTS #/AREA URNS LPF: 0 /LPF
KETONES UR QL STRIP: ABNORMAL
LEUKOCYTE ESTERASE UR QL STRIP: NEGATIVE
LIPASE SERPL-CCNC: 22 U/L (ref 4–60)
LYMPHOCYTES # BLD AUTO: 1.5 K/UL (ref 1–4.8)
LYMPHOCYTES NFR BLD: 18.7 % (ref 18–48)
MCH RBC QN AUTO: 34.4 PG (ref 27–31)
MCHC RBC AUTO-ENTMCNC: 34.2 G/DL (ref 32–36)
MCV RBC AUTO: 101 FL (ref 82–98)
MICROSCOPIC COMMENT: NORMAL
MONOCYTES # BLD AUTO: 0.5 K/UL (ref 0.3–1)
MONOCYTES NFR BLD: 5.9 % (ref 4–15)
NEUTROPHILS # BLD AUTO: 6.2 K/UL (ref 1.8–7.7)
NEUTROPHILS NFR BLD: 75.1 % (ref 38–73)
NITRITE UR QL STRIP: NEGATIVE
PH UR STRIP: 8 [PH] (ref 5–8)
PLATELET # BLD AUTO: 214 K/UL (ref 150–350)
PMV BLD AUTO: 9.5 FL (ref 9.2–12.9)
POCT GLUCOSE: 162 MG/DL (ref 70–110)
POTASSIUM SERPL-SCNC: 3.9 MMOL/L (ref 3.5–5.1)
PROT SERPL-MCNC: 8.5 G/DL (ref 6–8.4)
PROT UR QL STRIP: ABNORMAL
RBC # BLD AUTO: 4.19 M/UL (ref 4.6–6.2)
RBC #/AREA URNS HPF: 4 /HPF (ref 0–4)
SODIUM SERPL-SCNC: 142 MMOL/L (ref 136–145)
SP GR UR STRIP: >1.03 (ref 1–1.03)
URN SPEC COLLECT METH UR: ABNORMAL
UROBILINOGEN UR STRIP-ACNC: NEGATIVE EU/DL
WBC # BLD AUTO: 8.2 K/UL (ref 3.9–12.7)
WBC #/AREA URNS HPF: 2 /HPF (ref 0–5)

## 2019-05-28 PROCEDURE — 99285 EMERGENCY DEPT VISIT HI MDM: CPT | Mod: 25

## 2019-05-28 PROCEDURE — 96361 HYDRATE IV INFUSION ADD-ON: CPT

## 2019-05-28 PROCEDURE — 96376 TX/PRO/DX INJ SAME DRUG ADON: CPT

## 2019-05-28 PROCEDURE — 25000003 PHARM REV CODE 250: Performed by: PHYSICIAN ASSISTANT

## 2019-05-28 PROCEDURE — 96372 THER/PROPH/DIAG INJ SC/IM: CPT | Mod: 59

## 2019-05-28 PROCEDURE — 82962 GLUCOSE BLOOD TEST: CPT

## 2019-05-28 PROCEDURE — 81000 URINALYSIS NONAUTO W/SCOPE: CPT

## 2019-05-28 PROCEDURE — 83690 ASSAY OF LIPASE: CPT

## 2019-05-28 PROCEDURE — 63600175 PHARM REV CODE 636 W HCPCS: Performed by: PHYSICIAN ASSISTANT

## 2019-05-28 PROCEDURE — 80053 COMPREHEN METABOLIC PANEL: CPT

## 2019-05-28 PROCEDURE — 96365 THER/PROPH/DIAG IV INF INIT: CPT | Mod: 59

## 2019-05-28 PROCEDURE — 25500020 PHARM REV CODE 255: Performed by: EMERGENCY MEDICINE

## 2019-05-28 PROCEDURE — 96375 TX/PRO/DX INJ NEW DRUG ADDON: CPT

## 2019-05-28 PROCEDURE — 85025 COMPLETE CBC W/AUTO DIFF WBC: CPT

## 2019-05-28 PROCEDURE — C9113 INJ PANTOPRAZOLE SODIUM, VIA: HCPCS | Performed by: PHYSICIAN ASSISTANT

## 2019-05-28 RX ORDER — HYDROMORPHONE HYDROCHLORIDE 2 MG/ML
1 INJECTION, SOLUTION INTRAMUSCULAR; INTRAVENOUS; SUBCUTANEOUS
Status: COMPLETED | OUTPATIENT
Start: 2019-05-28 | End: 2019-05-28

## 2019-05-28 RX ORDER — ONDANSETRON 2 MG/ML
4 INJECTION INTRAMUSCULAR; INTRAVENOUS
Status: COMPLETED | OUTPATIENT
Start: 2019-05-28 | End: 2019-05-28

## 2019-05-28 RX ORDER — PANTOPRAZOLE SODIUM 40 MG/10ML
40 INJECTION, POWDER, LYOPHILIZED, FOR SOLUTION INTRAVENOUS
Status: COMPLETED | OUTPATIENT
Start: 2019-05-28 | End: 2019-05-28

## 2019-05-28 RX ORDER — HYDROMORPHONE HYDROCHLORIDE 2 MG/ML
0.5 INJECTION, SOLUTION INTRAMUSCULAR; INTRAVENOUS; SUBCUTANEOUS
Status: COMPLETED | OUTPATIENT
Start: 2019-05-28 | End: 2019-05-28

## 2019-05-28 RX ORDER — HYDROCODONE BITARTRATE AND ACETAMINOPHEN 5; 325 MG/1; MG/1
1 TABLET ORAL EVERY 4 HOURS PRN
Qty: 18 TABLET | Refills: 0 | Status: SHIPPED | OUTPATIENT
Start: 2019-05-28 | End: 2021-09-28

## 2019-05-28 RX ORDER — DICYCLOMINE HYDROCHLORIDE 10 MG/ML
20 INJECTION INTRAMUSCULAR
Status: COMPLETED | OUTPATIENT
Start: 2019-05-28 | End: 2019-05-28

## 2019-05-28 RX ORDER — PROMETHAZINE HYDROCHLORIDE 25 MG/1
25 TABLET ORAL EVERY 6 HOURS PRN
Qty: 15 TABLET | Refills: 0 | Status: SHIPPED | OUTPATIENT
Start: 2019-05-28 | End: 2021-09-28

## 2019-05-28 RX ADMIN — LIDOCAINE HYDROCHLORIDE: 20 SOLUTION ORAL; TOPICAL at 09:05

## 2019-05-28 RX ADMIN — ONDANSETRON 4 MG: 2 INJECTION INTRAMUSCULAR; INTRAVENOUS at 08:05

## 2019-05-28 RX ADMIN — HYDROMORPHONE HYDROCHLORIDE 0.5 MG: 2 INJECTION, SOLUTION INTRAMUSCULAR; INTRAVENOUS; SUBCUTANEOUS at 10:05

## 2019-05-28 RX ADMIN — PROMETHAZINE HYDROCHLORIDE 12.5 MG: 25 INJECTION INTRAMUSCULAR; INTRAVENOUS at 10:05

## 2019-05-28 RX ADMIN — DICYCLOMINE HYDROCHLORIDE 20 MG: 20 INJECTION, SOLUTION INTRAMUSCULAR at 08:05

## 2019-05-28 RX ADMIN — SODIUM CHLORIDE 1000 ML: 0.9 INJECTION, SOLUTION INTRAVENOUS at 08:05

## 2019-05-28 RX ADMIN — HYDROMORPHONE HYDROCHLORIDE 1 MG: 2 INJECTION, SOLUTION INTRAMUSCULAR; INTRAVENOUS; SUBCUTANEOUS at 11:05

## 2019-05-28 RX ADMIN — IOHEXOL 75 ML: 350 INJECTION, SOLUTION INTRAVENOUS at 10:05

## 2019-05-28 RX ADMIN — PANTOPRAZOLE SODIUM 40 MG: 40 INJECTION, POWDER, FOR SOLUTION INTRAVENOUS at 09:05

## 2019-05-28 NOTE — ED TRIAGE NOTES
Pt complains of abdominal pain, nausea and vomiting for 2 days.  Pt denies diarrhea.  Pt states he took zofran, bentyl, and pantprazole without relief.

## 2019-05-28 NOTE — DISCHARGE INSTRUCTIONS
You must follow up with your doctor.  Your CT scan of your abdomen was abnormal and you need a follow-up endoscopy.  Take medication as prescribed.  Please use caution when taking this medication as it could make a sleepy.  He may not drive or operate heavy machinery while taking this medicine.  Please return to the emergency department if you have any worsening abdominal pain or inability to urinate or hold down fluids.  Please start a clear liquid diet and advance as you can tolerate.

## 2019-05-28 NOTE — ED PROVIDER NOTES
Encounter Date: 5/28/2019    SCRIBE #1 NOTE: I, So Chacko, am scribing for, and in the presence of,  Floresita Griffiths PA-C. I have scribed the following portions of the note - Other sections scribed: HPI,ROS.       History     Chief Complaint   Patient presents with    Abdominal Pain     x 2 days, + N/V, no diarrhea.      CC: Abdominal pain    HPI:  This is a 54 y.o. male with a PMHx of HTN, DM, and stroke who presents to the Emergency Department with a cc of abdominal pain x 2 days. Patient pain severity is 10/10. Pt reports associated nausea and watery emesis.  Pt denies diarrhea and fever. Patient attempted treatment with Zofran and Bentyl with no relief. Patient reports  prior history of similar symptoms.         The history is provided by the patient. No  was used.     Review of patient's allergies indicates:   Allergen Reactions    Codeine Nausea Only     Past Medical History:   Diagnosis Date    Anxiety     Asthma     Colitis     Depression     Diabetes mellitus     Head injury     History of hepatitis C, s/p successful hcv treatment w/ SVR 8-2015  10/8/2012    SVR(24) as of 11/2015.  SVR(12) as of 8/2015.  completed harvoni 24 week regimen for genotype 1a 5/18/15     Hypertension     Shoulder pain     left, s/p 4 surgeries.     Stroke      Past Surgical History:   Procedure Laterality Date    BIOPSY, LIVER, WITH US GUIDANCE N/A 4/30/2014    Performed by [facility] OR 2ND FLR    COLONOSCOPY N/A 10/20/2015    Performed by [facility] at [facility] ENDO    ESOPHAGOGASTRODUODENOSCOPY (EGD) N/A 2/27/2018    Performed by Deja Burnham MD at [facility] ENDO (4TH FLR)    ESOPHAGOGASTRODUODENOSCOPY (EGD) N/A 10/20/2015    Performed by Jagdish Patricia MD at [facility] ENDO    rotator cuff      left side     Family History   Problem Relation Age of Onset    Hypertension Mother     Heart disease Father     Cirrhosis Brother      Social History     Tobacco Use     Smoking status: Current Every Day Smoker     Packs/day: 0.50     Years: 35.00     Pack years: 17.50     Types: Cigarettes    Smokeless tobacco: Never Used   Substance Use Topics    Alcohol use: No    Drug use: Yes     Types: Marijuana     Review of Systems   Constitutional: Negative for chills and fever.   HENT: Negative for congestion, rhinorrhea and sore throat.    Eyes: Negative for visual disturbance.   Respiratory: Negative for cough and shortness of breath.    Cardiovascular: Negative for chest pain.   Gastrointestinal: Positive for abdominal pain, nausea and vomiting. Negative for diarrhea.   Genitourinary: Negative for dysuria, frequency and hematuria.   Musculoskeletal: Negative for back pain.   Skin: Negative for rash.   Neurological: Negative for dizziness, weakness and headaches.       Physical Exam     Initial Vitals [05/28/19 0748]   BP Pulse Resp Temp SpO2   (!) 183/88 (!) 57 20 98.6 °F (37 °C) 100 %      MAP       --         Physical Exam    ED Course   Procedures  Labs Reviewed - No data to display       Imaging Results    None                     Scribe Attestation:   Scribe #1: I performed the above scribed service and the documentation accurately describes the services I performed. I attest to the accuracy of the note.               Clinical Impression:   No diagnosis found.

## 2019-05-28 NOTE — ED PROVIDER NOTES
Encounter Date: 5/28/2019       History     Chief Complaint   Patient presents with    Abdominal Pain     x 2 days, + N/V, no diarrhea.      CC: Abdominal pain    HPI:  This is a 54 y.o. male with a PMHx of HTN, DM, and stroke who presents to the Emergency Department with a cc of abdominal pain x 2 days. Patient pain severity is 10/10. Pt reports associated nausea and watery emesis.  Pt denies diarrhea and fever. Patient attempted treatment with Zofran and Bentyl with no relief. Patient reports  prior history of similar symptoms.         The history is provided by the patient. No  was used.     Review of patient's allergies indicates:   Allergen Reactions    Codeine Nausea Only     Past Medical History:   Diagnosis Date    Anxiety     Asthma     Colitis     Depression     Diabetes mellitus     Head injury     History of hepatitis C, s/p successful hcv treatment w/ SVR 8-2015  10/8/2012    SVR(24) as of 11/2015.  SVR(12) as of 8/2015.  completed harvoni 24 week regimen for genotype 1a 5/18/15     Hypertension     Shoulder pain     left, s/p 4 surgeries.     Stroke      Past Surgical History:   Procedure Laterality Date    BIOPSY, LIVER, WITH US GUIDANCE N/A 4/30/2014    [facility]    COLONOSCOPY N/A 10/20/2015    Performed by Jagdish Patricia [facility] at [facility] ENDO    ESOPHAGOGASTRODUODENOSCOPY (EGD) N/A 2/27/2018    Performed by Deja Burnham MD at [facility] ENDO (4TH FLR)    ESOPHAGOGASTRODUODENOSCOPY (EGD) N/A 10/20/2015    Performed by Jagdish Patricia [facility] at [facility] END[facility]    rotator cuff      left side     Family History   Problem Relation Age of Onset    Hypertension Mother     Heart disease Father     Cirrhosis Brother      Social History     Tobacco Use    Smoking status: Current Every Day Smoker     Packs/day: 1.00     Years: 35.00     Pack years: 35.00     Types: Cigarettes    Smokeless tobacco: Never Used   Substance Use Topics     Alcohol use: No    Drug use: Yes     Frequency: 7.0 times per week     Types: Marijuana     Review of Systems   Constitutional: Negative for chills and fever.   HENT: Negative for congestion, rhinorrhea and sore throat.    Eyes: Negative for visual disturbance.   Respiratory: Negative for cough and shortness of breath.    Cardiovascular: Negative for chest pain.   Gastrointestinal: Positive for abdominal pain, nausea and vomiting. Negative for diarrhea.   Genitourinary: Negative for dysuria, frequency and hematuria.   Musculoskeletal: Negative for back pain.   Skin: Negative for rash.   Neurological: Negative for dizziness, weakness and headaches.       Physical Exam     Initial Vitals [05/28/19 0748]   BP Pulse Resp Temp SpO2   (!) 183/88 (!) 57 20 98.6 °F (37 °C) 100 %      MAP       --         Physical Exam    Nursing note and vitals reviewed.  Constitutional: He appears well-developed and well-nourished. He is not diaphoretic. No distress.   HENT:   Head: Normocephalic and atraumatic.   Eyes: EOM are normal. Pupils are equal, round, and reactive to light.   Neck: Neck supple.   Cardiovascular: Normal rate and regular rhythm.   Pulmonary/Chest: Breath sounds normal. No respiratory distress.   Abdominal: Soft. Bowel sounds are normal. He exhibits distension. There is tenderness (There is diffuse tenderness to palpation.). There is guarding. There is no rebound.   Musculoskeletal: Normal range of motion. He exhibits no edema.   Neurological: He is alert.   Skin: Skin is warm. Capillary refill takes less than 2 seconds. No rash noted. No erythema.         ED Course   Procedures  Labs Reviewed   CBC W/ AUTO DIFFERENTIAL - Abnormal; Notable for the following components:       Result Value    RBC 4.19 (*)     Mean Corpuscular Volume 101 (*)     Mean Corpuscular Hemoglobin 34.4 (*)     Gran% 75.1 (*)     All other components within normal limits   COMPREHENSIVE METABOLIC PANEL - Abnormal; Notable for the following  components:    Glucose 150 (*)     Calcium 10.6 (*)     Total Protein 8.5 (*)     Alkaline Phosphatase 51 (*)     All other components within normal limits   URINALYSIS, REFLEX TO URINE CULTURE - Abnormal; Notable for the following components:    Specific Gravity, UA >1.030 (*)     Protein, UA 1+ (*)     Glucose, UA 1+ (*)     Ketones, UA Trace (*)     All other components within normal limits    Narrative:     Preferred Collection Type->Urine, Clean Catch   POCT GLUCOSE - Abnormal; Notable for the following components:    POCT Glucose 162 (*)     All other components within normal limits   LIPASE   URINALYSIS MICROSCOPIC    Narrative:     Preferred Collection Type->Urine, Clean Catch          Imaging Results          CT Abdomen Pelvis With Contrast (Final result)  Result time 05/28/19 10:55:51    Final result by Jeff Aguilera MD (05/28/19 10:55:51)                 Impression:      1. Findings suggestive of concentric narrowing of the antrum and the pyloric region of the stomach associated with mucosal thickening.  Inflammatory processes of the antrum and the pylorus or a infiltrative lesion of the distal stomach not totally excluded.  Endoscopy suggested for further evaluations.  2. Postop changes from prior appendectomy.  3. Hepatic steatosis.  4. Findings suspicious for AVN involving the femoral heads bilaterally.  5. Rest of the findings as above.      Electronically signed by: Jeff Aguilera MD  Date:    05/28/2019  Time:    10:55             Narrative:    EXAMINATION:  CT ABDOMEN PELVIS WITH CONTRAST    CLINICAL HISTORY:  Abdominal pain, unspecified;    TECHNIQUE:  Low dose axial images, sagittal and coronal reformations were obtained from the lung bases to the pubic symphysis following the IV administration of 75 mL of Omnipaque 350 and the oral administration of 30 mL of Omnipaque 350.    COMPARISON:  CT scan of the abdomen and pelvis 02/01/2019    FINDINGS:  Lung bases are clear.  Heart size is within normal  limits.  No pleural effusion.  There is a small sliding hiatal hernia.    Diffuse low attenuation to the liver suggesting fibrofatty infiltration.  No focal enhancing hepatic masses.  There are no filling defects in the portal vein or the splenic vein and the SMV.  The spleen, the pancreas, the adrenal glands demonstrate no significant abnormalities.  Normal appearance of the gallbladder without signs for acute cholecystitis.  No significant dilatation of the extrahepatic common bile duct.    There are symmetric nephrograms without signs for hydronephrosis or renal calculi.  Urinary bladder is distended.  No significant thickening of the wall of the urinary bladder.  There is prostatic calcifications.    Suggestion of postsurgical changes from prior appendectomy.  There is moderate amount of retained colonic stool in the right colon.  No small bowel obstruction or perforation or ileus.  Within the mesentery there are no soft tissue masses or adenopathy.    Evaluation of the stomach demonstrates concentric thickening of the antrum and the pyloric region.  This finding was also vaguely visualized on the previous study.  Further evaluation of the stomach with an endoscopy study is suggested especially to evaluate the antrum and the pylorus.    Diffuse atherosclerotic changes of the abdominal aorta without aneurysmal dilatation.  There is no intra-abdominal lymphadenopathy.  Mild spondylotic changes in the lumbar spine.  There is sclerotic changes of the head of the femurs bilaterally, possibly from AVN.  No definite signs for abdominal wall hernia.                               X-Ray Abdomen Flat And Erect (Final result)  Result time 05/28/19 09:08:17    Final result by Jeff Aguilera MD (05/28/19 09:08:17)                 Impression:      No significant radiographic abnormalities within the abdomen.      Electronically signed by: Jeff Aguilera MD  Date:    05/28/2019  Time:    09:08             Narrative:     EXAMINATION:  XR ABDOMEN FLAT AND ERECT    CLINICAL HISTORY:  Abdominal Pain;    TECHNIQUE:  Flat and erect AP views of the abdomen were performed.    COMPARISON:  None    FINDINGS:  Nonspecific bowel gas pattern without signs for bowel obstruction or perforation or ileus.  The tip of the liver extends to the iliac crest with findings questionable for hepatomegaly.  Clinical correlation suggested.  Soft tissues are otherwise unremarkable.  There are no abnormal intra-abdominal calcifications.  Visualized lumbar spine and the pelvis demonstrate no osteoblastic or lytic lesions.    There are phleboliths in the pelvis.  Vascular calcifications from atherosclerotic changes involving the distal abdominal aorta and the pelvic vessels.                              X-Rays:   Independently Interpreted Readings:   Other Readings:  CT of the abdomen and pelvis reveals findings suggestive of an inflammatory process at the antrum and plight pylorus of the distal stomach. However an infiltrative lesion cannot be excluded.          APC / Resident Notes:   This is an emergent evaluation of a 54-year-old male who presents to the emergency department complaining of abdominal pain.  Previous medical records were obtained and reviewed.  He has a history of anxiety, asthma, colitis, diabetes, hep C that has been treated successfully, hypertension and stroke.    The patient is currently afebrile and nontoxic in appearance.  His blood pressure was elevated at 183/88.  This could also be secondary to pain.  He has a history of hypertension.  On physical exam, the patient is clutching abdomen he appears in pain. On palpation of the abdomen there is diffuse tenderness with distention noted.  There is no focal tenderness.  There is no CVA tenderness noted.  There is no rebound appreciated.    An IV was started, fluids were given.  Blood and urine was obtained.  A CBC is without leukocytosis or anemia.  CMP is remarkable for calcium of 10.6, a  glucose of 150 and a total protein of 8.5.  A urinalysis is reveals trace leukocytes and 1+ sugar.  A CT of the abdomen and pelvis reveals findings concerning for inflammatory changes at the antrum of the stomach.  This could be indicative of a cancerous lesion.  Endoscopy is encouraged.    While in the emergency department, the patient received this Zofran, Phenergan and Dilaudid.  With these medications, he reported improvement.  The patient was also given a GI cocktail and Protonix.  I carefully considered but doubt acute appendicitis, acute cholecystitis, pyelonephritis, diverticulitis.  I believe this patient has a gastritis and is in need of an urgent GI appointment to evaluate the CT findings with endoscopy.  This was thoroughly discussed with the patient and his wife.  Return precautions were also reviewed.  He states he will follow up with his primary care physician after discharge. I believe he is currently safe and stable for discharge at this time.  This case was discussed with Dr. Lay and he is in agreement with the assessment and treatment plan.       Scribe Attestation:   Scribe #1: I performed the above scribed service and the documentation accurately describes the services I performed. I attest to the accuracy of the note.               Clinical Impression:     1. Abdominal pain, unspecified abdominal location    2. Vomiting, intractability of vomiting not specified, presence of nausea not specified, unspecified vomiting type          Disposition:   Disposition: Discharged  Condition: Stable                        Floresita Griffiths PA-C  05/28/19 4401

## 2020-12-19 ENCOUNTER — TELEPHONE (OUTPATIENT)
Dept: ENDOSCOPY | Facility: HOSPITAL | Age: 55
End: 2020-12-19

## 2020-12-23 ENCOUNTER — TELEPHONE (OUTPATIENT)
Dept: GASTROENTEROLOGY | Facility: CLINIC | Age: 55
End: 2020-12-23

## 2021-02-19 ENCOUNTER — OFFICE VISIT (OUTPATIENT)
Dept: GASTROENTEROLOGY | Facility: CLINIC | Age: 56
End: 2021-02-19
Payer: MEDICARE

## 2021-02-19 ENCOUNTER — LAB VISIT (OUTPATIENT)
Dept: LAB | Facility: HOSPITAL | Age: 56
End: 2021-02-19
Attending: STUDENT IN AN ORGANIZED HEALTH CARE EDUCATION/TRAINING PROGRAM
Payer: MEDICARE

## 2021-02-19 VITALS
DIASTOLIC BLOOD PRESSURE: 79 MMHG | WEIGHT: 145.5 LBS | BODY MASS INDEX: 24.84 KG/M2 | HEIGHT: 64 IN | HEART RATE: 110 BPM | SYSTOLIC BLOOD PRESSURE: 146 MMHG

## 2021-02-19 DIAGNOSIS — R10.9 ABDOMINAL PAIN, UNSPECIFIED ABDOMINAL LOCATION: Primary | ICD-10-CM

## 2021-02-19 DIAGNOSIS — R10.9 ABDOMINAL PAIN, UNSPECIFIED ABDOMINAL LOCATION: ICD-10-CM

## 2021-02-19 DIAGNOSIS — R11.2 NAUSEA AND VOMITING, INTRACTABILITY OF VOMITING NOT SPECIFIED, UNSPECIFIED VOMITING TYPE: ICD-10-CM

## 2021-02-19 PROCEDURE — 3078F DIAST BP <80 MM HG: CPT | Mod: CPTII,S$GLB,, | Performed by: STUDENT IN AN ORGANIZED HEALTH CARE EDUCATION/TRAINING PROGRAM

## 2021-02-19 PROCEDURE — 3077F SYST BP >= 140 MM HG: CPT | Mod: CPTII,S$GLB,, | Performed by: STUDENT IN AN ORGANIZED HEALTH CARE EDUCATION/TRAINING PROGRAM

## 2021-02-19 PROCEDURE — 36415 COLL VENOUS BLD VENIPUNCTURE: CPT

## 2021-02-19 PROCEDURE — 3078F PR MOST RECENT DIASTOLIC BLOOD PRESSURE < 80 MM HG: ICD-10-PCS | Mod: CPTII,S$GLB,, | Performed by: STUDENT IN AN ORGANIZED HEALTH CARE EDUCATION/TRAINING PROGRAM

## 2021-02-19 PROCEDURE — 99205 OFFICE O/P NEW HI 60 MIN: CPT | Mod: S$GLB,,, | Performed by: STUDENT IN AN ORGANIZED HEALTH CARE EDUCATION/TRAINING PROGRAM

## 2021-02-19 PROCEDURE — 99205 PR OFFICE/OUTPT VISIT, NEW, LEVL V, 60-74 MIN: ICD-10-PCS | Mod: S$GLB,,, | Performed by: STUDENT IN AN ORGANIZED HEALTH CARE EDUCATION/TRAINING PROGRAM

## 2021-02-19 PROCEDURE — 99999 PR PBB SHADOW E&M-EST. PATIENT-LVL IV: ICD-10-PCS | Mod: PBBFAC,,, | Performed by: STUDENT IN AN ORGANIZED HEALTH CARE EDUCATION/TRAINING PROGRAM

## 2021-02-19 PROCEDURE — 1125F AMNT PAIN NOTED PAIN PRSNT: CPT | Mod: S$GLB,,, | Performed by: STUDENT IN AN ORGANIZED HEALTH CARE EDUCATION/TRAINING PROGRAM

## 2021-02-19 PROCEDURE — 3077F PR MOST RECENT SYSTOLIC BLOOD PRESSURE >= 140 MM HG: ICD-10-PCS | Mod: CPTII,S$GLB,, | Performed by: STUDENT IN AN ORGANIZED HEALTH CARE EDUCATION/TRAINING PROGRAM

## 2021-02-19 PROCEDURE — 83516 IMMUNOASSAY NONANTIBODY: CPT | Mod: 59

## 2021-02-19 PROCEDURE — 3008F PR BODY MASS INDEX (BMI) DOCUMENTED: ICD-10-PCS | Mod: CPTII,S$GLB,, | Performed by: STUDENT IN AN ORGANIZED HEALTH CARE EDUCATION/TRAINING PROGRAM

## 2021-02-19 PROCEDURE — 1125F PR PAIN SEVERITY QUANTIFIED, PAIN PRESENT: ICD-10-PCS | Mod: S$GLB,,, | Performed by: STUDENT IN AN ORGANIZED HEALTH CARE EDUCATION/TRAINING PROGRAM

## 2021-02-19 PROCEDURE — 99999 PR PBB SHADOW E&M-EST. PATIENT-LVL IV: CPT | Mod: PBBFAC,,, | Performed by: STUDENT IN AN ORGANIZED HEALTH CARE EDUCATION/TRAINING PROGRAM

## 2021-02-19 PROCEDURE — 3008F BODY MASS INDEX DOCD: CPT | Mod: CPTII,S$GLB,, | Performed by: STUDENT IN AN ORGANIZED HEALTH CARE EDUCATION/TRAINING PROGRAM

## 2021-02-24 LAB
GLIADIN PEPTIDE IGA SER-ACNC: 5 UNITS
GLIADIN PEPTIDE IGG SER-ACNC: 1 UNITS
IGA SERPL-MCNC: 391 MG/DL (ref 70–400)
TTG IGA SER-ACNC: 6 UNITS
TTG IGG SER-ACNC: 2 UNITS

## 2021-02-26 ENCOUNTER — TELEPHONE (OUTPATIENT)
Dept: ENDOSCOPY | Facility: HOSPITAL | Age: 56
End: 2021-02-26

## 2021-03-25 ENCOUNTER — ANESTHESIA EVENT (OUTPATIENT)
Dept: SURGERY | Facility: HOSPITAL | Age: 56
End: 2021-03-25
Payer: MEDICARE

## 2021-03-29 ENCOUNTER — HOSPITAL ENCOUNTER (OUTPATIENT)
Dept: PREADMISSION TESTING | Facility: HOSPITAL | Age: 56
Discharge: HOME | End: 2021-03-29
Attending: SURGERY
Payer: MEDICARE

## 2021-03-29 ENCOUNTER — HOSPITAL ENCOUNTER (OUTPATIENT)
Dept: RADIOLOGY | Facility: HOSPITAL | Age: 56
Discharge: HOME | End: 2021-03-29
Attending: SURGERY
Payer: MEDICARE

## 2021-03-29 VITALS
TEMPERATURE: 98 F | DIASTOLIC BLOOD PRESSURE: 85 MMHG | WEIGHT: 145.5 LBS | OXYGEN SATURATION: 99 % | BODY MASS INDEX: 24.84 KG/M2 | HEIGHT: 64 IN | RESPIRATION RATE: 18 BRPM | SYSTOLIC BLOOD PRESSURE: 131 MMHG | HEART RATE: 91 BPM

## 2021-03-29 DIAGNOSIS — Z01.818 PREOP TESTING: Primary | ICD-10-CM

## 2021-03-29 DIAGNOSIS — Z01.812 ENCOUNTER FOR PREOPERATIVE SCREENING LABORATORY TESTING FOR COVID-19 VIRUS: ICD-10-CM

## 2021-03-29 DIAGNOSIS — Z11.52 ENCOUNTER FOR PREOPERATIVE SCREENING LABORATORY TESTING FOR COVID-19 VIRUS: ICD-10-CM

## 2021-03-29 DIAGNOSIS — K90.9 INTESTINAL MALABSORPTION, UNSPECIFIED TYPE: ICD-10-CM

## 2021-03-29 LAB
ALBUMIN SERPL BCP-MCNC: 4.2 G/DL (ref 3.5–5.2)
ALBUMIN SERPL BCP-MCNC: 4.2 G/DL (ref 3.5–5.2)
ALP SERPL-CCNC: 59 U/L (ref 55–135)
ALP SERPL-CCNC: 59 U/L (ref 55–135)
ALT SERPL W/O P-5'-P-CCNC: 18 U/L (ref 10–44)
ALT SERPL W/O P-5'-P-CCNC: 18 U/L (ref 10–44)
ANION GAP SERPL CALC-SCNC: 9 MMOL/L (ref 8–16)
AST SERPL-CCNC: 17 U/L (ref 10–40)
AST SERPL-CCNC: 17 U/L (ref 10–40)
BASOPHILS # BLD AUTO: 0.07 K/UL (ref 0–0.2)
BASOPHILS NFR BLD: 1 % (ref 0–1.9)
BILIRUB DIRECT SERPL-MCNC: 0.2 MG/DL (ref 0.1–0.3)
BILIRUB SERPL-MCNC: 0.4 MG/DL (ref 0.1–1)
BILIRUB SERPL-MCNC: 0.4 MG/DL (ref 0.1–1)
BUN SERPL-MCNC: 14 MG/DL (ref 6–20)
CALCIUM SERPL-MCNC: 9.9 MG/DL (ref 8.7–10.5)
CHLORIDE SERPL-SCNC: 109 MMOL/L (ref 95–110)
CO2 SERPL-SCNC: 23 MMOL/L (ref 23–29)
CREAT SERPL-MCNC: 1 MG/DL (ref 0.5–1.4)
DIFFERENTIAL METHOD: ABNORMAL
EOSINOPHIL # BLD AUTO: 0.4 K/UL (ref 0–0.5)
EOSINOPHIL NFR BLD: 5.5 % (ref 0–8)
ERYTHROCYTE [DISTWIDTH] IN BLOOD BY AUTOMATED COUNT: 13.2 % (ref 11.5–14.5)
EST. GFR  (AFRICAN AMERICAN): >60 ML/MIN/1.73 M^2
EST. GFR  (NON AFRICAN AMERICAN): >60 ML/MIN/1.73 M^2
GLUCOSE SERPL-MCNC: 113 MG/DL (ref 70–110)
HCT VFR BLD AUTO: 43.7 % (ref 40–54)
HGB BLD-MCNC: 14.9 G/DL (ref 14–18)
IMM GRANULOCYTES # BLD AUTO: 0.02 K/UL (ref 0–0.04)
IMM GRANULOCYTES NFR BLD AUTO: 0.3 % (ref 0–0.5)
INR PPP: 1.1 (ref 0.8–1.2)
LYMPHOCYTES # BLD AUTO: 1.9 K/UL (ref 1–4.8)
LYMPHOCYTES NFR BLD: 26.4 % (ref 18–48)
MCH RBC QN AUTO: 32.5 PG (ref 27–31)
MCHC RBC AUTO-ENTMCNC: 34.1 G/DL (ref 32–36)
MCV RBC AUTO: 95 FL (ref 82–98)
MONOCYTES # BLD AUTO: 0.6 K/UL (ref 0.3–1)
MONOCYTES NFR BLD: 7.6 % (ref 4–15)
NEUTROPHILS # BLD AUTO: 4.3 K/UL (ref 1.8–7.7)
NEUTROPHILS NFR BLD: 59.2 % (ref 38–73)
NRBC BLD-RTO: 0 /100 WBC
PLATELET # BLD AUTO: 217 K/UL (ref 150–450)
PMV BLD AUTO: 10 FL (ref 9.2–12.9)
POTASSIUM SERPL-SCNC: 4.4 MMOL/L (ref 3.5–5.1)
PROT SERPL-MCNC: 7.4 G/DL (ref 6–8.4)
PROT SERPL-MCNC: 7.4 G/DL (ref 6–8.4)
PROTHROMBIN TIME: 11.1 SEC (ref 9–12.5)
RBC # BLD AUTO: 4.58 M/UL (ref 4.6–6.2)
SARS-COV-2 RDRP RESP QL NAA+PROBE: NEGATIVE
SODIUM SERPL-SCNC: 141 MMOL/L (ref 136–145)
WBC # BLD AUTO: 7.27 K/UL (ref 3.9–12.7)

## 2021-03-29 PROCEDURE — 85610 PROTHROMBIN TIME: CPT | Performed by: SURGERY

## 2021-03-29 PROCEDURE — 93010 EKG 12-LEAD: ICD-10-PCS | Mod: ,,, | Performed by: INTERNAL MEDICINE

## 2021-03-29 PROCEDURE — 80053 COMPREHEN METABOLIC PANEL: CPT | Performed by: SURGERY

## 2021-03-29 PROCEDURE — 36415 COLL VENOUS BLD VENIPUNCTURE: CPT | Performed by: SURGERY

## 2021-03-29 PROCEDURE — U0002 COVID-19 LAB TEST NON-CDC: HCPCS | Performed by: SURGERY

## 2021-03-29 PROCEDURE — 71046 XR CHEST PA AND LATERAL PRE-OP: ICD-10-PCS | Mod: 26,,, | Performed by: RADIOLOGY

## 2021-03-29 PROCEDURE — 71046 X-RAY EXAM CHEST 2 VIEWS: CPT | Mod: 26,,, | Performed by: RADIOLOGY

## 2021-03-29 PROCEDURE — 71046 X-RAY EXAM CHEST 2 VIEWS: CPT | Mod: TC,FY

## 2021-03-29 PROCEDURE — 93005 ELECTROCARDIOGRAM TRACING: CPT

## 2021-03-29 PROCEDURE — 93010 ELECTROCARDIOGRAM REPORT: CPT | Mod: ,,, | Performed by: INTERNAL MEDICINE

## 2021-03-29 PROCEDURE — 85025 COMPLETE CBC W/AUTO DIFF WBC: CPT | Performed by: SURGERY

## 2021-04-01 ENCOUNTER — ANESTHESIA (OUTPATIENT)
Dept: SURGERY | Facility: HOSPITAL | Age: 56
End: 2021-04-01
Payer: MEDICARE

## 2021-04-01 ENCOUNTER — HOSPITAL ENCOUNTER (OUTPATIENT)
Facility: HOSPITAL | Age: 56
Discharge: HOME | End: 2021-04-01
Attending: SURGERY | Admitting: SURGERY
Payer: MEDICARE

## 2021-04-01 VITALS
WEIGHT: 145.5 LBS | OXYGEN SATURATION: 100 % | BODY MASS INDEX: 24.98 KG/M2 | SYSTOLIC BLOOD PRESSURE: 140 MMHG | RESPIRATION RATE: 20 BRPM | DIASTOLIC BLOOD PRESSURE: 72 MMHG | TEMPERATURE: 98 F | HEART RATE: 91 BPM

## 2021-04-01 DIAGNOSIS — Z11.52 ENCOUNTER FOR PREOPERATIVE SCREENING LABORATORY TESTING FOR COVID-19 VIRUS: ICD-10-CM

## 2021-04-01 DIAGNOSIS — Z01.812 ENCOUNTER FOR PREOPERATIVE SCREENING LABORATORY TESTING FOR COVID-19 VIRUS: ICD-10-CM

## 2021-04-01 DIAGNOSIS — Z01.818 PREOPERATIVE TESTING: ICD-10-CM

## 2021-04-01 DIAGNOSIS — R10.84 ABDOMINAL PAIN, GENERALIZED: Primary | ICD-10-CM

## 2021-04-01 LAB
POCT GLUCOSE: 101 MG/DL (ref 70–110)
POCT GLUCOSE: 112 MG/DL (ref 70–110)

## 2021-04-01 PROCEDURE — 63600175 PHARM REV CODE 636 W HCPCS: Performed by: ANESTHESIOLOGY

## 2021-04-01 PROCEDURE — D9220A PRA ANESTHESIA: ICD-10-PCS | Mod: ANES,,, | Performed by: ANESTHESIOLOGY

## 2021-04-01 PROCEDURE — 71000015 HC POSTOP RECOV 1ST HR: Performed by: SURGERY

## 2021-04-01 PROCEDURE — D9220A PRA ANESTHESIA: Mod: CRNA,,, | Performed by: STUDENT IN AN ORGANIZED HEALTH CARE EDUCATION/TRAINING PROGRAM

## 2021-04-01 PROCEDURE — 25000003 PHARM REV CODE 250: Performed by: ANESTHESIOLOGY

## 2021-04-01 PROCEDURE — 82962 GLUCOSE BLOOD TEST: CPT | Performed by: SURGERY

## 2021-04-01 PROCEDURE — 25000003 PHARM REV CODE 250: Performed by: SURGERY

## 2021-04-01 PROCEDURE — 71000039 HC RECOVERY, EACH ADD'L HOUR: Performed by: SURGERY

## 2021-04-01 PROCEDURE — D9220A PRA ANESTHESIA: Mod: ANES,,, | Performed by: ANESTHESIOLOGY

## 2021-04-01 PROCEDURE — 63600175 PHARM REV CODE 636 W HCPCS: Performed by: SURGERY

## 2021-04-01 PROCEDURE — 36000708 HC OR TIME LEV III 1ST 15 MIN: Performed by: SURGERY

## 2021-04-01 PROCEDURE — 36000709 HC OR TIME LEV III EA ADD 15 MIN: Performed by: SURGERY

## 2021-04-01 PROCEDURE — 71000016 HC POSTOP RECOV ADDL HR: Performed by: SURGERY

## 2021-04-01 PROCEDURE — 25000242 PHARM REV CODE 250 ALT 637 W/ HCPCS: Performed by: STUDENT IN AN ORGANIZED HEALTH CARE EDUCATION/TRAINING PROGRAM

## 2021-04-01 PROCEDURE — 63600175 PHARM REV CODE 636 W HCPCS: Performed by: STUDENT IN AN ORGANIZED HEALTH CARE EDUCATION/TRAINING PROGRAM

## 2021-04-01 PROCEDURE — 37000009 HC ANESTHESIA EA ADD 15 MINS: Performed by: SURGERY

## 2021-04-01 PROCEDURE — D9220A PRA ANESTHESIA: ICD-10-PCS | Mod: CRNA,,, | Performed by: STUDENT IN AN ORGANIZED HEALTH CARE EDUCATION/TRAINING PROGRAM

## 2021-04-01 PROCEDURE — 71000033 HC RECOVERY, INTIAL HOUR: Performed by: SURGERY

## 2021-04-01 PROCEDURE — 25000003 PHARM REV CODE 250: Performed by: STUDENT IN AN ORGANIZED HEALTH CARE EDUCATION/TRAINING PROGRAM

## 2021-04-01 PROCEDURE — 37000008 HC ANESTHESIA 1ST 15 MINUTES: Performed by: SURGERY

## 2021-04-01 PROCEDURE — 27201423 OPTIME MED/SURG SUP & DEVICES STERILE SUPPLY: Performed by: SURGERY

## 2021-04-01 RX ORDER — FENTANYL CITRATE 50 UG/ML
INJECTION, SOLUTION INTRAMUSCULAR; INTRAVENOUS
Status: DISCONTINUED | OUTPATIENT
Start: 2021-04-01 | End: 2021-04-01

## 2021-04-01 RX ORDER — DEXAMETHASONE SODIUM PHOSPHATE 4 MG/ML
INJECTION, SOLUTION INTRA-ARTICULAR; INTRALESIONAL; INTRAMUSCULAR; INTRAVENOUS; SOFT TISSUE
Status: DISCONTINUED | OUTPATIENT
Start: 2021-04-01 | End: 2021-04-01

## 2021-04-01 RX ORDER — SODIUM CHLORIDE 0.9 % (FLUSH) 0.9 %
10 SYRINGE (ML) INJECTION
Status: DISCONTINUED | OUTPATIENT
Start: 2021-04-01 | End: 2021-04-01 | Stop reason: HOSPADM

## 2021-04-01 RX ORDER — ALBUTEROL SULFATE 90 UG/1
AEROSOL, METERED RESPIRATORY (INHALATION)
Status: DISCONTINUED | OUTPATIENT
Start: 2021-04-01 | End: 2021-04-01

## 2021-04-01 RX ORDER — CEFAZOLIN SODIUM 2 G/50ML
2 SOLUTION INTRAVENOUS
Status: COMPLETED | OUTPATIENT
Start: 2021-04-01 | End: 2021-04-01

## 2021-04-01 RX ORDER — OXYCODONE HYDROCHLORIDE 5 MG/1
5 TABLET ORAL ONCE
Status: DISCONTINUED | OUTPATIENT
Start: 2021-04-01 | End: 2021-04-01

## 2021-04-01 RX ORDER — ROCURONIUM BROMIDE 10 MG/ML
INJECTION, SOLUTION INTRAVENOUS
Status: DISCONTINUED | OUTPATIENT
Start: 2021-04-01 | End: 2021-04-01

## 2021-04-01 RX ORDER — MIDAZOLAM HYDROCHLORIDE 1 MG/ML
INJECTION INTRAMUSCULAR; INTRAVENOUS
Status: DISCONTINUED | OUTPATIENT
Start: 2021-04-01 | End: 2021-04-01

## 2021-04-01 RX ORDER — LABETALOL HYDROCHLORIDE 5 MG/ML
INJECTION, SOLUTION INTRAVENOUS
Status: DISCONTINUED | OUTPATIENT
Start: 2021-04-01 | End: 2021-04-01

## 2021-04-01 RX ORDER — SODIUM CHLORIDE, SODIUM LACTATE, POTASSIUM CHLORIDE, CALCIUM CHLORIDE 600; 310; 30; 20 MG/100ML; MG/100ML; MG/100ML; MG/100ML
INJECTION, SOLUTION INTRAVENOUS CONTINUOUS
Status: DISCONTINUED | OUTPATIENT
Start: 2021-04-01 | End: 2021-04-01 | Stop reason: HOSPADM

## 2021-04-01 RX ORDER — BUPIVACAINE HYDROCHLORIDE AND EPINEPHRINE 2.5; 5 MG/ML; UG/ML
INJECTION, SOLUTION EPIDURAL; INFILTRATION; INTRACAUDAL; PERINEURAL
Status: DISCONTINUED | OUTPATIENT
Start: 2021-04-01 | End: 2021-04-01 | Stop reason: HOSPADM

## 2021-04-01 RX ORDER — LIDOCAINE HYDROCHLORIDE 20 MG/ML
INJECTION INTRAVENOUS
Status: DISCONTINUED | OUTPATIENT
Start: 2021-04-01 | End: 2021-04-01

## 2021-04-01 RX ORDER — KETOROLAC TROMETHAMINE 30 MG/ML
15 INJECTION, SOLUTION INTRAMUSCULAR; INTRAVENOUS ONCE
Status: COMPLETED | OUTPATIENT
Start: 2021-04-01 | End: 2021-04-01

## 2021-04-01 RX ORDER — HYDROMORPHONE HYDROCHLORIDE 2 MG/ML
0.2 INJECTION, SOLUTION INTRAMUSCULAR; INTRAVENOUS; SUBCUTANEOUS EVERY 5 MIN PRN
Status: COMPLETED | OUTPATIENT
Start: 2021-04-01 | End: 2021-04-01

## 2021-04-01 RX ORDER — PROPOFOL 10 MG/ML
VIAL (ML) INTRAVENOUS
Status: DISCONTINUED | OUTPATIENT
Start: 2021-04-01 | End: 2021-04-01

## 2021-04-01 RX ORDER — ONDANSETRON 2 MG/ML
INJECTION INTRAMUSCULAR; INTRAVENOUS
Status: DISCONTINUED | OUTPATIENT
Start: 2021-04-01 | End: 2021-04-01

## 2021-04-01 RX ORDER — LIDOCAINE HYDROCHLORIDE 10 MG/ML
1 INJECTION, SOLUTION EPIDURAL; INFILTRATION; INTRACAUDAL; PERINEURAL ONCE
Status: DISCONTINUED | OUTPATIENT
Start: 2021-04-01 | End: 2021-04-01 | Stop reason: HOSPADM

## 2021-04-01 RX ORDER — ACETAMINOPHEN 500 MG
1000 TABLET ORAL
Status: COMPLETED | OUTPATIENT
Start: 2021-04-01 | End: 2021-04-01

## 2021-04-01 RX ORDER — NEOSTIGMINE METHYLSULFATE 1 MG/ML
INJECTION, SOLUTION INTRAVENOUS
Status: DISCONTINUED | OUTPATIENT
Start: 2021-04-01 | End: 2021-04-01

## 2021-04-01 RX ORDER — OXYCODONE AND ACETAMINOPHEN 5; 325 MG/1; MG/1
1 TABLET ORAL ONCE AS NEEDED
Status: COMPLETED | OUTPATIENT
Start: 2021-04-01 | End: 2021-04-01

## 2021-04-01 RX ADMIN — DEXAMETHASONE SODIUM PHOSPHATE 4 MG: 4 INJECTION, SOLUTION INTRAMUSCULAR; INTRAVENOUS at 01:04

## 2021-04-01 RX ADMIN — FENTANYL CITRATE 50 MCG: 50 INJECTION, SOLUTION INTRAMUSCULAR; INTRAVENOUS at 01:04

## 2021-04-01 RX ADMIN — HYDROMORPHONE HYDROCHLORIDE 0.2 MG: 2 INJECTION INTRAMUSCULAR; INTRAVENOUS; SUBCUTANEOUS at 02:04

## 2021-04-01 RX ADMIN — CEFAZOLIN SODIUM 2 G: 2 SOLUTION INTRAVENOUS at 12:04

## 2021-04-01 RX ADMIN — HYDROMORPHONE HYDROCHLORIDE 0.2 MG: 2 INJECTION INTRAMUSCULAR; INTRAVENOUS; SUBCUTANEOUS at 01:04

## 2021-04-01 RX ADMIN — Medication 100 MG: at 12:04

## 2021-04-01 RX ADMIN — ROCURONIUM BROMIDE 35 MG: 10 INJECTION, SOLUTION INTRAVENOUS at 12:04

## 2021-04-01 RX ADMIN — NEOSTIGMINE METHYLSULFATE 5 MG: 1 INJECTION INTRAVENOUS at 01:04

## 2021-04-01 RX ADMIN — GLYCOPYRROLATE 0.6 MG: 0.2 INJECTION, SOLUTION INTRAMUSCULAR; INTRAVITREAL at 01:04

## 2021-04-01 RX ADMIN — PROPOFOL 170 MG: 10 INJECTION, EMULSION INTRAVENOUS at 12:04

## 2021-04-01 RX ADMIN — FENTANYL CITRATE 100 MCG: 50 INJECTION, SOLUTION INTRAMUSCULAR; INTRAVENOUS at 12:04

## 2021-04-01 RX ADMIN — KETOROLAC TROMETHAMINE 15 MG: 30 INJECTION, SOLUTION INTRAMUSCULAR; INTRAVENOUS at 03:04

## 2021-04-01 RX ADMIN — OXYCODONE HYDROCHLORIDE AND ACETAMINOPHEN 1 TABLET: 5; 325 TABLET ORAL at 03:04

## 2021-04-01 RX ADMIN — ACETAMINOPHEN 1000 MG: 500 TABLET, FILM COATED ORAL at 11:04

## 2021-04-01 RX ADMIN — SODIUM CHLORIDE, SODIUM LACTATE, POTASSIUM CHLORIDE, AND CALCIUM CHLORIDE: .6; .31; .03; .02 INJECTION, SOLUTION INTRAVENOUS at 11:04

## 2021-04-01 RX ADMIN — HYDROMORPHONE HYDROCHLORIDE 0.2 MG: 2 INJECTION INTRAMUSCULAR; INTRAVENOUS; SUBCUTANEOUS at 03:04

## 2021-04-01 RX ADMIN — LABETALOL HYDROCHLORIDE 5 MG: 5 INJECTION, SOLUTION INTRAVENOUS at 01:04

## 2021-04-01 RX ADMIN — MIDAZOLAM HYDROCHLORIDE 2 MG: 1 INJECTION, SOLUTION INTRAMUSCULAR; INTRAVENOUS at 12:04

## 2021-04-01 RX ADMIN — ONDANSETRON 2 MG: 2 INJECTION, SOLUTION INTRAMUSCULAR; INTRAVENOUS at 12:04

## 2021-04-01 RX ADMIN — ALBUTEROL SULFATE 4 PUFF: 90 AEROSOL, METERED RESPIRATORY (INHALATION) at 12:04

## 2021-07-01 ENCOUNTER — TELEPHONE (OUTPATIENT)
Dept: FAMILY MEDICINE | Facility: CLINIC | Age: 56
End: 2021-07-01

## 2021-09-28 ENCOUNTER — HOSPITAL ENCOUNTER (OUTPATIENT)
Dept: RADIOLOGY | Facility: HOSPITAL | Age: 56
Discharge: HOME | End: 2021-09-28
Attending: FAMILY MEDICINE
Payer: MEDICARE

## 2021-09-28 ENCOUNTER — OFFICE VISIT (OUTPATIENT)
Dept: FAMILY MEDICINE | Facility: CLINIC | Age: 56
End: 2021-09-28
Payer: MEDICARE

## 2021-09-28 VITALS
HEIGHT: 64 IN | OXYGEN SATURATION: 98 % | RESPIRATION RATE: 18 BRPM | WEIGHT: 149.25 LBS | HEART RATE: 110 BPM | SYSTOLIC BLOOD PRESSURE: 122 MMHG | TEMPERATURE: 99 F | BODY MASS INDEX: 25.48 KG/M2 | DIASTOLIC BLOOD PRESSURE: 86 MMHG

## 2021-09-28 DIAGNOSIS — R10.32 LEFT LOWER QUADRANT PAIN: ICD-10-CM

## 2021-09-28 DIAGNOSIS — K92.1 BLACK STOOL: ICD-10-CM

## 2021-09-28 DIAGNOSIS — E11.65 TYPE 2 DIABETES MELLITUS WITH HYPERGLYCEMIA, WITHOUT LONG-TERM CURRENT USE OF INSULIN: ICD-10-CM

## 2021-09-28 DIAGNOSIS — R10.84 GENERALIZED ABDOMINAL PAIN: ICD-10-CM

## 2021-09-28 DIAGNOSIS — Z11.4 ENCOUNTER FOR SCREENING FOR HIV: ICD-10-CM

## 2021-09-28 DIAGNOSIS — R14.0 ABDOMINAL DISTENSION: ICD-10-CM

## 2021-09-28 DIAGNOSIS — R10.84 GENERALIZED ABDOMINAL PAIN: Primary | ICD-10-CM

## 2021-09-28 PROCEDURE — 74177 CT ABDOMEN PELVIS WITH CONTRAST: ICD-10-PCS | Mod: 26,HCNC,, | Performed by: RADIOLOGY

## 2021-09-28 PROCEDURE — 25500020 PHARM REV CODE 255: Mod: HCNC | Performed by: FAMILY MEDICINE

## 2021-09-28 PROCEDURE — 99204 PR OFFICE/OUTPT VISIT, NEW, LEVL IV, 45-59 MIN: ICD-10-PCS | Mod: HCNC,S$GLB,, | Performed by: FAMILY MEDICINE

## 2021-09-28 PROCEDURE — 99999 PR PBB SHADOW E&M-EST. PATIENT-LVL V: ICD-10-PCS | Mod: PBBFAC,HCNC,, | Performed by: FAMILY MEDICINE

## 2021-09-28 PROCEDURE — 74177 CT ABD & PELVIS W/CONTRAST: CPT | Mod: 26,HCNC,, | Performed by: RADIOLOGY

## 2021-09-28 PROCEDURE — 99215 OFFICE O/P EST HI 40 MIN: CPT | Mod: PBBFAC,25,HCNC,PN | Performed by: FAMILY MEDICINE

## 2021-09-28 PROCEDURE — 74177 CT ABD & PELVIS W/CONTRAST: CPT | Mod: TC,HCNC

## 2021-09-28 PROCEDURE — 99499 RISK ADDL DX/OHS AUDIT: ICD-10-PCS | Mod: HCNC,S$GLB,, | Performed by: FAMILY MEDICINE

## 2021-09-28 PROCEDURE — S0119 ONDANSETRON 4 MG: HCPCS | Mod: PBBFAC,HCNC,PN | Performed by: FAMILY MEDICINE

## 2021-09-28 PROCEDURE — 99999 PR PBB SHADOW E&M-EST. PATIENT-LVL V: CPT | Mod: PBBFAC,HCNC,, | Performed by: FAMILY MEDICINE

## 2021-09-28 PROCEDURE — 99499 UNLISTED E&M SERVICE: CPT | Mod: HCNC,S$GLB,, | Performed by: FAMILY MEDICINE

## 2021-09-28 PROCEDURE — 99204 OFFICE O/P NEW MOD 45 MIN: CPT | Mod: HCNC,S$GLB,, | Performed by: FAMILY MEDICINE

## 2021-09-28 RX ORDER — AMOXICILLIN AND CLAVULANATE POTASSIUM 875; 125 MG/1; MG/1
1 TABLET, FILM COATED ORAL EVERY 12 HOURS
Qty: 14 TABLET | Refills: 0 | Status: SHIPPED | OUTPATIENT
Start: 2021-09-28 | End: 2021-10-05

## 2021-09-28 RX ORDER — ONDANSETRON 4 MG/1
4 TABLET, ORALLY DISINTEGRATING ORAL
Status: COMPLETED | OUTPATIENT
Start: 2021-09-28 | End: 2021-09-28

## 2021-09-28 RX ORDER — MAG HYDROX/ALUMINUM HYD/SIMETH 200-200-20
30 SUSPENSION, ORAL (FINAL DOSE FORM) ORAL
Status: DISCONTINUED | OUTPATIENT
Start: 2021-09-28 | End: 2023-05-19 | Stop reason: HOSPADM

## 2021-09-28 RX ORDER — DICYCLOMINE HYDROCHLORIDE 10 MG/5ML
20 SOLUTION ORAL
Status: DISCONTINUED | OUTPATIENT
Start: 2021-09-28 | End: 2023-05-19 | Stop reason: HOSPADM

## 2021-09-28 RX ORDER — ARIPIPRAZOLE 10 MG/1
10 TABLET ORAL DAILY
COMMUNITY
Start: 2021-07-09 | End: 2023-08-25

## 2021-09-28 RX ORDER — LIDOCAINE HYDROCHLORIDE 20 MG/ML
10 SOLUTION OROPHARYNGEAL
Status: DISCONTINUED | OUTPATIENT
Start: 2021-09-28 | End: 2023-05-19 | Stop reason: HOSPADM

## 2021-09-28 RX ORDER — CYCLOBENZAPRINE HCL 5 MG
5 TABLET ORAL NIGHTLY
COMMUNITY
Start: 2021-09-06 | End: 2023-04-26 | Stop reason: HOSPADM

## 2021-09-28 RX ADMIN — IOHEXOL 15 ML: 300 INJECTION, SOLUTION INTRAVENOUS at 04:09

## 2021-09-28 RX ADMIN — IOHEXOL 75 ML: 350 INJECTION, SOLUTION INTRAVENOUS at 04:09

## 2021-09-28 RX ADMIN — ONDANSETRON 4 MG: 4 TABLET, ORALLY DISINTEGRATING ORAL at 02:09

## 2021-10-02 DIAGNOSIS — K22.9 ESOPHAGEAL ABNORMALITY: Primary | ICD-10-CM

## 2021-10-05 ENCOUNTER — TELEPHONE (OUTPATIENT)
Dept: FAMILY MEDICINE | Facility: CLINIC | Age: 56
End: 2021-10-05

## 2021-10-20 ENCOUNTER — TELEPHONE (OUTPATIENT)
Dept: GASTROENTEROLOGY | Facility: CLINIC | Age: 56
End: 2021-10-20

## 2021-10-20 ENCOUNTER — HOSPITAL ENCOUNTER (OUTPATIENT)
Dept: RADIOLOGY | Facility: HOSPITAL | Age: 56
Discharge: HOME | End: 2021-10-20
Attending: INTERNAL MEDICINE
Payer: MEDICARE

## 2021-10-20 ENCOUNTER — OFFICE VISIT (OUTPATIENT)
Dept: GASTROENTEROLOGY | Facility: CLINIC | Age: 56
End: 2021-10-20
Payer: MEDICARE

## 2021-10-20 VITALS
SYSTOLIC BLOOD PRESSURE: 126 MMHG | HEIGHT: 64 IN | DIASTOLIC BLOOD PRESSURE: 80 MMHG | BODY MASS INDEX: 25.88 KG/M2 | WEIGHT: 151.56 LBS | HEART RATE: 88 BPM

## 2021-10-20 DIAGNOSIS — K92.1 BLACK STOOL: ICD-10-CM

## 2021-10-20 DIAGNOSIS — G89.29 CHRONIC ABDOMINAL PAIN: ICD-10-CM

## 2021-10-20 DIAGNOSIS — G89.29 CHRONIC ABDOMINAL PAIN: Primary | ICD-10-CM

## 2021-10-20 DIAGNOSIS — K74.60 CIRRHOSIS OF LIVER WITHOUT ASCITES, UNSPECIFIED HEPATIC CIRRHOSIS TYPE: Chronic | ICD-10-CM

## 2021-10-20 DIAGNOSIS — R10.9 CHRONIC ABDOMINAL PAIN: ICD-10-CM

## 2021-10-20 DIAGNOSIS — R10.9 CHRONIC ABDOMINAL PAIN: Primary | ICD-10-CM

## 2021-10-20 PROCEDURE — 3008F PR BODY MASS INDEX (BMI) DOCUMENTED: ICD-10-PCS | Mod: HCNC,CPTII,S$GLB, | Performed by: INTERNAL MEDICINE

## 2021-10-20 PROCEDURE — 3008F BODY MASS INDEX DOCD: CPT | Mod: HCNC,CPTII,S$GLB, | Performed by: INTERNAL MEDICINE

## 2021-10-20 PROCEDURE — 1160F RVW MEDS BY RX/DR IN RCRD: CPT | Mod: HCNC,CPTII,S$GLB, | Performed by: INTERNAL MEDICINE

## 2021-10-20 PROCEDURE — 3074F PR MOST RECENT SYSTOLIC BLOOD PRESSURE < 130 MM HG: ICD-10-PCS | Mod: HCNC,CPTII,S$GLB, | Performed by: INTERNAL MEDICINE

## 2021-10-20 PROCEDURE — 99999 PR PBB SHADOW E&M-EST. PATIENT-LVL V: CPT | Mod: PBBFAC,HCNC,, | Performed by: INTERNAL MEDICINE

## 2021-10-20 PROCEDURE — 74019 XR ABDOMEN FLAT AND ERECT: ICD-10-PCS | Mod: 26,,, | Performed by: RADIOLOGY

## 2021-10-20 PROCEDURE — 3074F SYST BP LT 130 MM HG: CPT | Mod: HCNC,CPTII,S$GLB, | Performed by: INTERNAL MEDICINE

## 2021-10-20 PROCEDURE — 3079F DIAST BP 80-89 MM HG: CPT | Mod: HCNC,CPTII,S$GLB, | Performed by: INTERNAL MEDICINE

## 2021-10-20 PROCEDURE — 1159F PR MEDICATION LIST DOCUMENTED IN MEDICAL RECORD: ICD-10-PCS | Mod: HCNC,CPTII,S$GLB, | Performed by: INTERNAL MEDICINE

## 2021-10-20 PROCEDURE — 3079F PR MOST RECENT DIASTOLIC BLOOD PRESSURE 80-89 MM HG: ICD-10-PCS | Mod: HCNC,CPTII,S$GLB, | Performed by: INTERNAL MEDICINE

## 2021-10-20 PROCEDURE — 3044F HG A1C LEVEL LT 7.0%: CPT | Mod: HCNC,CPTII,S$GLB, | Performed by: INTERNAL MEDICINE

## 2021-10-20 PROCEDURE — 1160F PR REVIEW ALL MEDS BY PRESCRIBER/CLIN PHARMACIST DOCUMENTED: ICD-10-PCS | Mod: HCNC,CPTII,S$GLB, | Performed by: INTERNAL MEDICINE

## 2021-10-20 PROCEDURE — 74019 RADEX ABDOMEN 2 VIEWS: CPT | Mod: TC,FY

## 2021-10-20 PROCEDURE — 99999 PR PBB SHADOW E&M-EST. PATIENT-LVL V: ICD-10-PCS | Mod: PBBFAC,HCNC,, | Performed by: INTERNAL MEDICINE

## 2021-10-20 PROCEDURE — 1159F MED LIST DOCD IN RCRD: CPT | Mod: HCNC,CPTII,S$GLB, | Performed by: INTERNAL MEDICINE

## 2021-10-20 PROCEDURE — 99214 PR OFFICE/OUTPT VISIT, EST, LEVL IV, 30-39 MIN: ICD-10-PCS | Mod: HCNC,S$GLB,, | Performed by: INTERNAL MEDICINE

## 2021-10-20 PROCEDURE — 99214 OFFICE O/P EST MOD 30 MIN: CPT | Mod: HCNC,S$GLB,, | Performed by: INTERNAL MEDICINE

## 2021-10-20 PROCEDURE — 99499 RISK ADDL DX/OHS AUDIT: ICD-10-PCS | Mod: S$GLB,,, | Performed by: INTERNAL MEDICINE

## 2021-10-20 PROCEDURE — 74019 RADEX ABDOMEN 2 VIEWS: CPT | Mod: 26,,, | Performed by: RADIOLOGY

## 2021-10-20 PROCEDURE — 99499 UNLISTED E&M SERVICE: CPT | Mod: S$GLB,,, | Performed by: INTERNAL MEDICINE

## 2021-10-20 PROCEDURE — 3044F PR MOST RECENT HEMOGLOBIN A1C LEVEL <7.0%: ICD-10-PCS | Mod: HCNC,CPTII,S$GLB, | Performed by: INTERNAL MEDICINE

## 2021-10-20 RX ORDER — CALCIUM CITRATE/VITAMIN D3 200MG-6.25
TABLET ORAL
COMMUNITY
Start: 2021-08-18

## 2021-10-20 RX ORDER — BLOOD-GLUCOSE METER
EACH MISCELLANEOUS
COMMUNITY
Start: 2021-08-18

## 2021-10-20 RX ORDER — OXYCODONE AND ACETAMINOPHEN 10; 325 MG/1; MG/1
TABLET ORAL
COMMUNITY
Start: 2021-09-16

## 2021-10-21 ENCOUNTER — TELEPHONE (OUTPATIENT)
Dept: HEPATOLOGY | Facility: CLINIC | Age: 56
End: 2021-10-21

## 2021-11-08 ENCOUNTER — OFFICE VISIT (OUTPATIENT)
Dept: HEPATOLOGY | Facility: CLINIC | Age: 56
End: 2021-11-08
Payer: MEDICARE

## 2021-11-08 ENCOUNTER — TELEPHONE (OUTPATIENT)
Dept: HEPATOLOGY | Facility: CLINIC | Age: 56
End: 2021-11-08
Payer: MEDICAID

## 2021-11-08 VITALS
RESPIRATION RATE: 17 BRPM | WEIGHT: 154.63 LBS | BODY MASS INDEX: 26.4 KG/M2 | TEMPERATURE: 98 F | OXYGEN SATURATION: 99 % | DIASTOLIC BLOOD PRESSURE: 83 MMHG | HEIGHT: 64 IN | SYSTOLIC BLOOD PRESSURE: 140 MMHG | HEART RATE: 81 BPM

## 2021-11-08 DIAGNOSIS — K74.60 HEPATIC CIRRHOSIS, UNSPECIFIED HEPATIC CIRRHOSIS TYPE, UNSPECIFIED WHETHER ASCITES PRESENT: Primary | ICD-10-CM

## 2021-11-08 DIAGNOSIS — K74.60 CIRRHOSIS OF LIVER WITHOUT ASCITES, UNSPECIFIED HEPATIC CIRRHOSIS TYPE: Chronic | ICD-10-CM

## 2021-11-08 DIAGNOSIS — B18.2 CHRONIC HEPATITIS C WITHOUT HEPATIC COMA: Chronic | ICD-10-CM

## 2021-11-08 PROCEDURE — 99214 PR OFFICE/OUTPT VISIT, EST, LEVL IV, 30-39 MIN: ICD-10-PCS | Mod: HCNC,S$GLB,, | Performed by: INTERNAL MEDICINE

## 2021-11-08 PROCEDURE — 99999 PR PBB SHADOW E&M-EST. PATIENT-LVL IV: CPT | Mod: PBBFAC,HCNC,, | Performed by: INTERNAL MEDICINE

## 2021-11-08 PROCEDURE — 99214 OFFICE O/P EST MOD 30 MIN: CPT | Mod: HCNC,S$GLB,, | Performed by: INTERNAL MEDICINE

## 2021-11-08 PROCEDURE — 99214 OFFICE O/P EST MOD 30 MIN: CPT | Mod: PBBFAC,PN | Performed by: INTERNAL MEDICINE

## 2021-11-08 PROCEDURE — 99999 PR PBB SHADOW E&M-EST. PATIENT-LVL IV: ICD-10-PCS | Mod: PBBFAC,HCNC,, | Performed by: INTERNAL MEDICINE

## 2021-12-09 ENCOUNTER — HOSPITAL ENCOUNTER (OUTPATIENT)
Dept: RADIOLOGY | Facility: HOSPITAL | Age: 56
Discharge: HOME | End: 2021-12-09
Attending: INTERNAL MEDICINE
Payer: MEDICARE

## 2021-12-09 DIAGNOSIS — K74.60 HEPATIC CIRRHOSIS, UNSPECIFIED HEPATIC CIRRHOSIS TYPE, UNSPECIFIED WHETHER ASCITES PRESENT: ICD-10-CM

## 2021-12-09 PROCEDURE — 76705 ECHO EXAM OF ABDOMEN: CPT | Mod: TC,HCNC

## 2021-12-09 PROCEDURE — 76705 US ABDOMEN LIMITED: ICD-10-PCS | Mod: 26,HCNC,, | Performed by: RADIOLOGY

## 2021-12-09 PROCEDURE — 76705 ECHO EXAM OF ABDOMEN: CPT | Mod: 26,HCNC,, | Performed by: RADIOLOGY

## 2021-12-13 ENCOUNTER — TELEPHONE (OUTPATIENT)
Dept: HEPATOLOGY | Facility: CLINIC | Age: 56
End: 2021-12-13
Payer: MEDICARE

## 2022-01-05 LAB
CREATININE RANDOM URINE: 55 MG/DL (ref 20–320)
MICROALBUMIN URINE RANDOM: 1.1
MICROALBUMIN/CREATININE RATIO: 20 UG/MG

## 2022-05-18 ENCOUNTER — TELEPHONE (OUTPATIENT)
Dept: HEPATOLOGY | Facility: CLINIC | Age: 57
End: 2022-05-18
Payer: MEDICARE

## 2022-05-18 NOTE — TELEPHONE ENCOUNTER
Follow up in about 6 months (around 5/8/2022).  1. Labs and US every 6 months (next due in March 2022)  2. EGD  3. Avoid alcohol  4. Lose 5 lbs and eat foods low in fat  Return 6 months           Left a v/m for Teo Ruiz to please call back for scheduling of his u/s, labs and follow up visit

## 2022-05-18 NOTE — TELEPHONE ENCOUNTER
Spoke back with Mr. Ruiz, scheduled labs and u/s on [facility] and follow up on Saturday 6/11/2022

## 2022-05-26 ENCOUNTER — HOSPITAL ENCOUNTER (OUTPATIENT)
Dept: RADIOLOGY | Facility: HOSPITAL | Age: 57
Discharge: HOME | End: 2022-05-26
Attending: INTERNAL MEDICINE
Payer: MEDICARE

## 2022-05-26 DIAGNOSIS — K74.60 HEPATIC CIRRHOSIS, UNSPECIFIED HEPATIC CIRRHOSIS TYPE, UNSPECIFIED WHETHER ASCITES PRESENT: ICD-10-CM

## 2022-05-26 PROCEDURE — 76705 ECHO EXAM OF ABDOMEN: CPT | Mod: 26,,, | Performed by: INTERNAL MEDICINE

## 2022-05-26 PROCEDURE — 76705 ECHO EXAM OF ABDOMEN: CPT | Mod: TC

## 2022-05-26 PROCEDURE — 76705 US ABDOMEN LIMITED: ICD-10-PCS | Mod: 26,,, | Performed by: INTERNAL MEDICINE

## 2022-06-02 ENCOUNTER — TELEPHONE (OUTPATIENT)
Dept: HEPATOLOGY | Facility: CLINIC | Age: 57
End: 2022-06-02
Payer: MEDICARE

## 2022-06-02 NOTE — TELEPHONE ENCOUNTER
----- Message from Leela Chu RN sent at 6/2/2022 12:50 PM CDT -----  ESTUARDO  ----- Message -----  From: Kezia Smith MD  Sent: 6/1/2022  11:48 AM CDT  To: Sarah Mast Staff    No liver cancer- let pt know

## 2022-06-03 ENCOUNTER — TELEPHONE (OUTPATIENT)
Dept: HEPATOLOGY | Facility: CLINIC | Age: 57
End: 2022-06-03
Payer: MEDICARE

## 2022-06-03 NOTE — TELEPHONE ENCOUNTER
Advised pt per Dr. Smith, US showed no liver cancer and labs all ok. Pt verbalized understanding. GUILHERME PLEITEZ

## 2022-06-03 NOTE — TELEPHONE ENCOUNTER
----- Message from Cesilia Hatch sent at 6/3/2022 10:19 AM CDT -----  Nino Price calling regarding a miss call from Paul.   call back [phone]

## 2023-04-26 ENCOUNTER — HOSPITAL ENCOUNTER (EMERGENCY)
Facility: HOSPITAL | Age: 58
Discharge: HOME | End: 2023-04-26
Attending: EMERGENCY MEDICINE
Payer: MEDICARE

## 2023-04-26 VITALS
HEIGHT: 64 IN | DIASTOLIC BLOOD PRESSURE: 83 MMHG | TEMPERATURE: 99 F | RESPIRATION RATE: 18 BRPM | BODY MASS INDEX: 23.9 KG/M2 | WEIGHT: 140 LBS | OXYGEN SATURATION: 97 % | SYSTOLIC BLOOD PRESSURE: 126 MMHG | HEART RATE: 70 BPM

## 2023-04-26 DIAGNOSIS — R00.0 RAPID HEART BEAT: ICD-10-CM

## 2023-04-26 DIAGNOSIS — M87.059 AVASCULAR NECROSIS OF BONE OF HIP, UNSPECIFIED LATERALITY: ICD-10-CM

## 2023-04-26 DIAGNOSIS — K57.92 DIVERTICULITIS: Primary | ICD-10-CM

## 2023-04-26 DIAGNOSIS — R94.31 ABNORMAL EKG: ICD-10-CM

## 2023-04-26 LAB
ALBUMIN SERPL BCP-MCNC: 4.3 G/DL (ref 3.5–5.2)
ALP SERPL-CCNC: 77 U/L (ref 55–135)
ALT SERPL W/O P-5'-P-CCNC: 11 U/L (ref 10–44)
ANION GAP SERPL CALC-SCNC: 17 MMOL/L (ref 8–16)
AST SERPL-CCNC: 25 U/L (ref 10–40)
BASOPHILS # BLD AUTO: 0.05 K/UL (ref 0–0.2)
BASOPHILS NFR BLD: 0.4 % (ref 0–1.9)
BILIRUB SERPL-MCNC: 0.7 MG/DL (ref 0.1–1)
BUN SERPL-MCNC: 11 MG/DL (ref 6–20)
CALCIUM SERPL-MCNC: 10 MG/DL (ref 8.7–10.5)
CHLORIDE SERPL-SCNC: 101 MMOL/L (ref 95–110)
CO2 SERPL-SCNC: 19 MMOL/L (ref 23–29)
CREAT SERPL-MCNC: 1.3 MG/DL (ref 0.5–1.4)
DIFFERENTIAL METHOD: ABNORMAL
EOSINOPHIL # BLD AUTO: 0 K/UL (ref 0–0.5)
EOSINOPHIL NFR BLD: 0.1 % (ref 0–8)
ERYTHROCYTE [DISTWIDTH] IN BLOOD BY AUTOMATED COUNT: 12.6 % (ref 11.5–14.5)
EST. GFR  (NO RACE VARIABLE): >60 ML/MIN/1.73 M^2
GLUCOSE SERPL-MCNC: 217 MG/DL (ref 70–110)
HCT VFR BLD AUTO: 47.7 % (ref 40–54)
HGB BLD-MCNC: 16.9 G/DL (ref 14–18)
IMM GRANULOCYTES # BLD AUTO: 0.05 K/UL (ref 0–0.04)
IMM GRANULOCYTES NFR BLD AUTO: 0.4 % (ref 0–0.5)
LIPASE SERPL-CCNC: 5 U/L (ref 4–60)
LYMPHOCYTES # BLD AUTO: 1.8 K/UL (ref 1–4.8)
LYMPHOCYTES NFR BLD: 14.2 % (ref 18–48)
MAGNESIUM SERPL-MCNC: 1.8 MG/DL (ref 1.6–2.6)
MCH RBC QN AUTO: 31.8 PG (ref 27–31)
MCHC RBC AUTO-ENTMCNC: 35.4 G/DL (ref 32–36)
MCV RBC AUTO: 90 FL (ref 82–98)
MONOCYTES # BLD AUTO: 1.1 K/UL (ref 0.3–1)
MONOCYTES NFR BLD: 8.8 % (ref 4–15)
NEUTROPHILS # BLD AUTO: 9.6 K/UL (ref 1.8–7.7)
NEUTROPHILS NFR BLD: 76.1 % (ref 38–73)
NRBC BLD-RTO: 0 /100 WBC
PLATELET # BLD AUTO: 268 K/UL (ref 150–450)
PMV BLD AUTO: 10.1 FL (ref 9.2–12.9)
POTASSIUM SERPL-SCNC: 4.2 MMOL/L (ref 3.5–5.1)
PROT SERPL-MCNC: 9.1 G/DL (ref 6–8.4)
RBC # BLD AUTO: 5.31 M/UL (ref 4.6–6.2)
SODIUM SERPL-SCNC: 137 MMOL/L (ref 136–145)
WBC # BLD AUTO: 12.57 K/UL (ref 3.9–12.7)

## 2023-04-26 PROCEDURE — 99285 EMERGENCY DEPT VISIT HI MDM: CPT | Mod: 25

## 2023-04-26 PROCEDURE — 63600175 PHARM REV CODE 636 W HCPCS: Performed by: PHYSICIAN ASSISTANT

## 2023-04-26 PROCEDURE — 93005 ELECTROCARDIOGRAM TRACING: CPT

## 2023-04-26 PROCEDURE — 96376 TX/PRO/DX INJ SAME DRUG ADON: CPT

## 2023-04-26 PROCEDURE — 25000003 PHARM REV CODE 250: Performed by: PHYSICIAN ASSISTANT

## 2023-04-26 PROCEDURE — 80053 COMPREHEN METABOLIC PANEL: CPT | Performed by: PHYSICIAN ASSISTANT

## 2023-04-26 PROCEDURE — 93010 ELECTROCARDIOGRAM REPORT: CPT | Mod: ,,, | Performed by: INTERNAL MEDICINE

## 2023-04-26 PROCEDURE — C9113 INJ PANTOPRAZOLE SODIUM, VIA: HCPCS | Performed by: PHYSICIAN ASSISTANT

## 2023-04-26 PROCEDURE — 25500020 PHARM REV CODE 255: Performed by: PHYSICIAN ASSISTANT

## 2023-04-26 PROCEDURE — 96374 THER/PROPH/DIAG INJ IV PUSH: CPT

## 2023-04-26 PROCEDURE — 85025 COMPLETE CBC W/AUTO DIFF WBC: CPT | Performed by: PHYSICIAN ASSISTANT

## 2023-04-26 PROCEDURE — 83690 ASSAY OF LIPASE: CPT | Performed by: PHYSICIAN ASSISTANT

## 2023-04-26 PROCEDURE — 96361 HYDRATE IV INFUSION ADD-ON: CPT

## 2023-04-26 PROCEDURE — 83735 ASSAY OF MAGNESIUM: CPT | Performed by: PHYSICIAN ASSISTANT

## 2023-04-26 PROCEDURE — 93010 EKG 12-LEAD: ICD-10-PCS | Mod: ,,, | Performed by: INTERNAL MEDICINE

## 2023-04-26 PROCEDURE — 96375 TX/PRO/DX INJ NEW DRUG ADDON: CPT

## 2023-04-26 RX ORDER — AMOXICILLIN AND CLAVULANATE POTASSIUM 875; 125 MG/1; MG/1
1 TABLET, FILM COATED ORAL
Status: COMPLETED | OUTPATIENT
Start: 2023-04-26 | End: 2023-04-26

## 2023-04-26 RX ORDER — AMOXICILLIN AND CLAVULANATE POTASSIUM 875; 125 MG/1; MG/1
1 TABLET, FILM COATED ORAL 3 TIMES DAILY
Qty: 15 TABLET | Refills: 0 | Status: SHIPPED | OUTPATIENT
Start: 2023-04-26 | End: 2023-05-01

## 2023-04-26 RX ORDER — MORPHINE SULFATE 4 MG/ML
4 INJECTION, SOLUTION INTRAMUSCULAR; INTRAVENOUS
Status: COMPLETED | OUTPATIENT
Start: 2023-04-26 | End: 2023-04-26

## 2023-04-26 RX ORDER — MORPHINE SULFATE 4 MG/ML
3 INJECTION, SOLUTION INTRAMUSCULAR; INTRAVENOUS
Status: COMPLETED | OUTPATIENT
Start: 2023-04-26 | End: 2023-04-26

## 2023-04-26 RX ORDER — PANTOPRAZOLE SODIUM 40 MG/10ML
40 INJECTION, POWDER, LYOPHILIZED, FOR SOLUTION INTRAVENOUS
Status: COMPLETED | OUTPATIENT
Start: 2023-04-26 | End: 2023-04-26

## 2023-04-26 RX ORDER — ONDANSETRON 2 MG/ML
4 INJECTION INTRAMUSCULAR; INTRAVENOUS
Status: COMPLETED | OUTPATIENT
Start: 2023-04-26 | End: 2023-04-26

## 2023-04-26 RX ADMIN — ONDANSETRON 4 MG: 2 INJECTION INTRAMUSCULAR; INTRAVENOUS at 07:04

## 2023-04-26 RX ADMIN — MORPHINE SULFATE 3 MG: 4 INJECTION, SOLUTION INTRAMUSCULAR; INTRAVENOUS at 08:04

## 2023-04-26 RX ADMIN — MORPHINE SULFATE 4 MG: 4 INJECTION, SOLUTION INTRAMUSCULAR; INTRAVENOUS at 07:04

## 2023-04-26 RX ADMIN — SODIUM CHLORIDE 1000 ML: 9 INJECTION, SOLUTION INTRAVENOUS at 08:04

## 2023-04-26 RX ADMIN — AMOXICILLIN AND CLAVULANATE POTASSIUM 1 TABLET: 875; 125 TABLET, FILM COATED ORAL at 09:04

## 2023-04-26 RX ADMIN — PANTOPRAZOLE SODIUM 40 MG: 40 INJECTION, POWDER, LYOPHILIZED, FOR SOLUTION INTRAVENOUS at 06:04

## 2023-04-26 RX ADMIN — IOHEXOL 75 ML: 350 INJECTION, SOLUTION INTRAVENOUS at 07:04

## 2023-04-26 NOTE — ED PROVIDER NOTES
Encounter Date: 4/26/2023       History     Chief Complaint   Patient presents with    Abdominal Pain     Pt presents to the ED with c/o constant right-sided abdominal pain x2 days with n/v. Denies diarrhea. Denies taking meds pta.     58-year-old male with past medical history of diabetes and slipped disc presents to the ED today for evaluation of abdominal pain onset 2 days ago.  Reports the pain is located in his generalized abdomen however more concentrated in the epigastric area.  He describes the pain as sharp in nature and rated as a 10/10 in severity.  Associated symptoms include nausea and vomiting x5 episodes.  Denies diarrhea, fever, chest pain, shortness of breath, fatigue, cough, recent alcohol use.  Denies any abdominal surgeries in the past.      The history is provided by the patient. No  was used.   Review of patient's allergies indicates:   Allergen Reactions    Codeine Nausea Only     Past Medical History:   Diagnosis Date    Anxiety     Asthma     Colitis     Depression     Diabetes mellitus     Head injury     History of hepatitis C, s/p successful hcv treatment w/ SVR 8-2015  10/8/2012    SVR(24) as of 11/2015.  SVR(12) as of 8/2015.  completed harvoni 24 week regimen for genotype 1a 5/18/15     Hypertension     Shoulder pain     left, s/p 4 surgeries.     Stroke      Past Surgical History:   Procedure Laterality Date    COLONOSCOPY N/A 10/20/2015    Procedure: COLONOSCOPY;  Surgeon: Jagdish Patricia MD;  Location: [facility] ENDO;  Service: Endoscopy;  Laterality: N/A;    DIAGNOSTIC LAPAROSCOPY N/A 4/1/2021    Procedure: LAPAROSCOPY, DIAGNOSTIC;  Surgeon: Umesh Vallecillo MD;  Location: [facility] OR;  Service: General;  Laterality: N/A;  RN PREOP 3/29/21, COVID NEGATIVE ON 3/29  CONSENT AND H/P INCOMPLETE    GALLBLADDER SURGERY      pt not 100% if gall bladder removed    rotator cuff      left side     Family History   Problem Relation Age of Onset    Hypertension Mother     Heart  disease Father     Cirrhosis Brother     Colon cancer Neg Hx     Esophageal cancer Neg Hx      Social History     Tobacco Use    Smoking status: Every Day     Packs/day: 1.00     Years: 35.00     Pack years: 35.00     Types: Cigarettes    Smokeless tobacco: Never   Substance Use Topics    Alcohol use: No    Drug use: Yes     Frequency: 7.0 times per week     Types: Marijuana     Review of Systems   Constitutional:  Negative for chills, fatigue and fever.   HENT:  Negative for congestion, sneezing and sore throat.    Eyes:  Negative for visual disturbance.   Respiratory:  Negative for cough and shortness of breath.    Cardiovascular:  Negative for chest pain.   Gastrointestinal:  Positive for abdominal pain, nausea and vomiting. Negative for constipation and diarrhea.   Endocrine: Negative for polyuria.   Genitourinary:  Negative for difficulty urinating, dysuria and hematuria.   Musculoskeletal:  Negative for back pain.   Skin:  Negative for rash.   Neurological:  Negative for dizziness, seizures, syncope, weakness and headaches.   Hematological:  Does not bruise/bleed easily.     Physical Exam     Initial Vitals [04/26/23 0602]   BP Pulse Resp Temp SpO2   127/77 80 16 98.5 °F (36.9 °C) 96 %      MAP       --         Physical Exam    Nursing note and vitals reviewed.  Constitutional: He appears well-developed and well-nourished. He is not diaphoretic. No distress.   HENT:   Head: Normocephalic and atraumatic.   Right Ear: External ear normal.   Left Ear: External ear normal.   Eyes: Conjunctivae and EOM are normal. Pupils are equal, round, and reactive to light.   Cardiovascular:  Normal rate and intact distal pulses. An irregular rhythm present.           Pulmonary/Chest: Breath sounds normal. No respiratory distress. He has no wheezes.   Abdominal: Abdomen is soft. Bowel sounds are normal. He exhibits no distension. There is abdominal tenderness in the right lower quadrant and left lower quadrant.    Musculoskeletal:         General: No tenderness or edema.     Neurological: He is alert and oriented to person, place, and time. No sensory deficit. GCS score is 15. GCS eye subscore is 4. GCS verbal subscore is 5. GCS motor subscore is 6.   Skin: Skin is warm and dry.   Psychiatric: He has a normal mood and affect. His behavior is normal. Thought content normal.       ED Course   Procedures  Labs Reviewed   CBC W/ AUTO DIFFERENTIAL - Abnormal; Notable for the following components:       Result Value    MCH 31.8 (*)     Gran # (ANC) 9.6 (*)     Immature Grans (Abs) 0.05 (*)     Mono # 1.1 (*)     Gran % 76.1 (*)     Lymph % 14.2 (*)     All other components within normal limits   COMPREHENSIVE METABOLIC PANEL - Abnormal; Notable for the following components:    CO2 19 (*)     Glucose 217 (*)     Total Protein 9.1 (*)     Anion Gap 17 (*)     All other components within normal limits   LIPASE   MAGNESIUM     EKG Readings: (Independently Interpreted)   Initial Reading: No STEMI. Rhythm: Sinus Tachycardia. Heart Rate: 103. ST Segments: Normal ST Segments. Clinical Impression: Sinus Tachycardia with PVCs Other Impression: prolonged QT   ECG Results              EKG 12-lead (Rapid Heart Beat / Palpitations) Age > 50 (Final result)  Result time 04/26/23 11:30:41      Final result by Interface, Lab In [facility] (04/26/23 11:30:41)                   Narrative:    Test Reason : R00.0,    Vent. Rate : 103 BPM     Atrial Rate : 103 BPM     P-R Int : 124 ms          QRS Dur : 080 ms      QT Int : 428 ms       P-R-T Axes : 070 035 071 degrees     QTc Int : 560 ms    Sinus tachycardia with frequent Premature ventricular complexes  Right atrial enlargement  Cannot rule out Inferior infarct ,age undetermined  Prolonged QT  Abnormal ECG  When compared with ECG of 29-MAR-2021 09:22,  Significant changes have occurred  Confirmed by Lavelle Carr MD ([id number]) on 4/26/2023 11:30:34 AM    Referred By: WANDA   SELF           Confirmed  By:Lavelle Carr MD                                  Imaging Results              CT Abdomen Pelvis With Contrast (Final result)  Result time 04/26/23 08:23:40      Final result by Thang Mckinney Jr., MD (04/26/23 08:23:40)                   Impression:      Acute sigmoid diverticulitis without complicating features    Hepatic steatosis and severe atherosclerosis.  Bilateral femoral head AVN    Stable distal esophageal wall thickening may reflect esophagitis.  Correlate clinically and consider nonemergent endoscopy if not already performed.      Electronically signed by: Thang Hutchinson Jr  Date:    04/26/2023  Time:    08:23               Narrative:    EXAMINATION:  CT ABDOMEN PELVIS WITH CONTRAST    CLINICAL HISTORY:  LLQ abdominal pain;Epigastric pain;    TECHNIQUE:  Low dose axial images, sagittal and coronal reformations were obtained from the lung bases to the pubic symphysis following the IV administration of 75 mL of Omnipaque 350    COMPARISON:  09/28/2021    FINDINGS:  Abdomen:    - Lung bases: Clear.  There is circumferential wall thickening of the distal thoracic esophagus extending to the EG junction without discrete mass.  Similar findings were seen on the prior exam and may represent esophagitis.    - Liver: Fatty infiltrated without focal lesion.    - Gallbladder and bile ducts: Unremarkable.    - Spleen: Unremarkable.    - Pancreas: Normal.    - Kidneys: No mass or hydronephrosis.    - Adrenals: Unremarkable.    - Retroperitoneum:  No significant adenopathy.    - Vascular: Severe aortoiliac atherosclerosis.    - Bowel/mesentery: Appendectomy changes are present.  There is sigmoid diverticulosis with circumferential wall thickening which may be related to chronic muscular hypertrophy.  There is a component of subtle focal pericolonic inflammatory stranding in the midline suggesting superimposed acute diverticulitis.  No evidence of perforation.  The remainder of the GI tract is  unremarkable.    Pelvis:    Postinflammatory prostate calcifications are noted.    Bones:  Bilateral femoral head osteonecrosis.                                       Medications   pantoprazole injection 40 mg (40 mg Intravenous Given 4/26/23 0624)   ondansetron injection 4 mg (4 mg Intravenous Given 4/26/23 0710)   morphine injection 4 mg (4 mg Intravenous Given 4/26/23 0716)   iohexoL (OMNIPAQUE 350) injection 75 mL (75 mLs Intravenous Given 4/26/23 0752)   sodium chloride 0.9% bolus 1,000 mL 1,000 mL (0 mLs Intravenous Stopped 4/26/23 0944)   morphine injection 3 mg (3 mg Intravenous Given 4/26/23 0838)   amoxicillin-clavulanate 875-125mg per tablet 1 tablet (1 tablet Oral Given 4/26/23 0919)     Medical Decision Making:   Differential Diagnosis:   Pancreatitis, diverticulitis, diverticulosis  Clinical Tests:   Lab Tests: Ordered and Reviewed  The following lab test(s) were unremarkable: CBC, CMP and Lipase       <> Summary of Lab: Mag normal  Radiological Study: Ordered and Reviewed  Medical Tests: Ordered and Reviewed  ED Management:  58-year-old male with past medical history of diabetes and slipped disc presents to the ED today for evaluation of abdominal pain onset 2 days ago.  Reports the pain is located in his generalized abdomen however more concentrated in the epigastric area.  He describes the pain as sharp in nature and rated as a 10/10 in severity.  Associated symptoms include nausea and vomiting x5 episodes.  Denies diarrhea, fever, chest pain, shortness of breath, fatigue, cough, recent alcohol use.  Denies any abdominal surgeries in the past.  On physical exam he has tenderness in the left lower quadrant of his abdomen which is moderate in severity.  He also has mild tenderness in the right lower quadrant.  His abdomen is otherwise soft.  Irregular heart auscultation noted.  EKG shows sinus tachycardia with frequent PVCs, and long QT.  Unfortunately he was given Zofran prior to EKG being performed.   On past EKGs his QT has been normal.  Remainder physical exam benign.  ED workup included labs with CBC, CMP, lipase and magnesium all which resulted normal.  ED workup also included CT abdomen pelvis with IV contrast which was positive for acute diverticulitis and possible esophagitis.  Incidental findings include bilateral femoral head avascular necrosis and hepatic steatosis.  His pain was treated with IV morphine as he states he can tolerate that medication he is not allergic, despite having codeine listed as an allergy.  He was also initially treated with pantoprazole and Zofran.  We discussed his pain is likely coming from his finding of diverticulitis.  I will treat him with Augmentin, 1st dose given in the ED and he will be discharged with a prescription to continue.  I will provide him with urgent referral to GI, along with referral to cardiology and orthopedics to further evaluate his abnormal EKG and incidental finding of bilateral femoral head avascular necrosis.  Advised patient to stick to a clear liquid diet, followed by low-fiber diet while he takes antibiotics for diverticulitis.  Other:   I have discussed this case with another health care provider.       <> Summary of the Discussion: I reviewed this case with my attending Dr. Bryant, who is in agreement with the treatment plan listed above.                        Clinical Impression:   Final diagnoses:  [R00.0] Rapid heart beat  [K57.92] Diverticulitis (Primary)  [R94.31] Abnormal EKG  [M87.059] Avascular necrosis of bone of hip, unspecified laterality        ED Disposition Condition    Discharge Stable          ED Prescriptions       Medication Sig Dispense Start Date End Date Auth. Provider    amoxicillin-clavulanate 875-125mg (AUGMENTIN) 875-125 mg per tablet Take 1 tablet by mouth 3 (three) times daily. for 5 days 15 tablet 4/26/2023 5/1/2023 Miroslava Simmons PA-C          Follow-up Information       Follow up With Specialties Details Why  Contact Info    Pritesh Marinelli MD Family Medicine Schedule an appointment as soon as possible for a visit in 1 day For follow up [address]  [facility] [address]  [phone]      Joe Pabon MD Gastroenterology Schedule an appointment as soon as possible for a visit in 2 days [facility] [address]  [phone]      Lavelle Carr MD Cardiology, Interventional Cardiology Schedule an appointment as soon as possible for a visit in 3 days For follow up [address]  [address]  [place] [address]  [phone]      Xavier Lyle MD Orthopedic Surgery Schedule an appointment as soon as possible for a visit in 1 week For follow up [address] [facility]  [address]  [place] [address]  [phone]               Miroslava Simmons PA-C  04/26/23 1505

## 2023-04-26 NOTE — Clinical Note
"Nino Lópezkelvin Price was seen and treated in our emergency department on 4/26/2023.  He may return to work on 04/28/2023.       If you have any questions or concerns, please don't hesitate to call.      Miroslava Simmons PA-C"

## 2023-04-26 NOTE — ED TRIAGE NOTES
Abdominal Pain (Pt presents to the ED with c/o constant right-sided abdominal pain x2 days with n/v. Denies diarrhea. Denies taking meds pta.)

## 2023-05-18 ENCOUNTER — HOSPITAL ENCOUNTER (OUTPATIENT)
Facility: HOSPITAL | Age: 58
Discharge: HOME | End: 2023-05-19
Attending: STUDENT IN AN ORGANIZED HEALTH CARE EDUCATION/TRAINING PROGRAM | Admitting: HOSPITALIST
Payer: MEDICARE

## 2023-05-18 DIAGNOSIS — E11.9 CONTROLLED TYPE 2 DIABETES MELLITUS WITHOUT COMPLICATION, WITHOUT LONG-TERM CURRENT USE OF INSULIN: Chronic | ICD-10-CM

## 2023-05-18 DIAGNOSIS — Z72.0 TOBACCO ABUSE: Chronic | ICD-10-CM

## 2023-05-18 DIAGNOSIS — R10.84 ABDOMINAL PAIN, GENERALIZED: Primary | ICD-10-CM

## 2023-05-18 DIAGNOSIS — I10 ESSENTIAL HYPERTENSION: Chronic | ICD-10-CM

## 2023-05-18 DIAGNOSIS — R10.9 ABDOMINAL PAIN: ICD-10-CM

## 2023-05-18 DIAGNOSIS — R07.9 CHEST PAIN: ICD-10-CM

## 2023-05-18 PROCEDURE — 99285 EMERGENCY DEPT VISIT HI MDM: CPT

## 2023-05-18 PROCEDURE — 80053 COMPREHEN METABOLIC PANEL: CPT | Performed by: STUDENT IN AN ORGANIZED HEALTH CARE EDUCATION/TRAINING PROGRAM

## 2023-05-18 PROCEDURE — 83605 ASSAY OF LACTIC ACID: CPT | Performed by: STUDENT IN AN ORGANIZED HEALTH CARE EDUCATION/TRAINING PROGRAM

## 2023-05-18 PROCEDURE — 83690 ASSAY OF LIPASE: CPT | Performed by: STUDENT IN AN ORGANIZED HEALTH CARE EDUCATION/TRAINING PROGRAM

## 2023-05-18 PROCEDURE — 96374 THER/PROPH/DIAG INJ IV PUSH: CPT

## 2023-05-18 PROCEDURE — 25000003 PHARM REV CODE 250: Performed by: STUDENT IN AN ORGANIZED HEALTH CARE EDUCATION/TRAINING PROGRAM

## 2023-05-18 PROCEDURE — 96375 TX/PRO/DX INJ NEW DRUG ADDON: CPT

## 2023-05-18 PROCEDURE — 83735 ASSAY OF MAGNESIUM: CPT | Performed by: STUDENT IN AN ORGANIZED HEALTH CARE EDUCATION/TRAINING PROGRAM

## 2023-05-18 PROCEDURE — 63600175 PHARM REV CODE 636 W HCPCS: Performed by: STUDENT IN AN ORGANIZED HEALTH CARE EDUCATION/TRAINING PROGRAM

## 2023-05-18 PROCEDURE — 85025 COMPLETE CBC W/AUTO DIFF WBC: CPT | Performed by: STUDENT IN AN ORGANIZED HEALTH CARE EDUCATION/TRAINING PROGRAM

## 2023-05-18 RX ORDER — HYDROMORPHONE HYDROCHLORIDE 1 MG/ML
1 INJECTION, SOLUTION INTRAMUSCULAR; INTRAVENOUS; SUBCUTANEOUS
Status: COMPLETED | OUTPATIENT
Start: 2023-05-18 | End: 2023-05-18

## 2023-05-18 RX ORDER — ONDANSETRON 2 MG/ML
4 INJECTION INTRAMUSCULAR; INTRAVENOUS
Status: COMPLETED | OUTPATIENT
Start: 2023-05-18 | End: 2023-05-18

## 2023-05-18 RX ADMIN — ONDANSETRON 4 MG: 2 INJECTION INTRAMUSCULAR; INTRAVENOUS at 11:05

## 2023-05-18 RX ADMIN — SODIUM CHLORIDE 1000 ML: 9 INJECTION, SOLUTION INTRAVENOUS at 11:05

## 2023-05-18 RX ADMIN — HYDROMORPHONE HYDROCHLORIDE 1 MG: 1 INJECTION, SOLUTION INTRAMUSCULAR; INTRAVENOUS; SUBCUTANEOUS at 11:05

## 2023-05-19 ENCOUNTER — PATIENT OUTREACH (OUTPATIENT)
Dept: ADMINISTRATIVE | Facility: OTHER | Age: 58
End: 2023-05-19
Payer: MEDICARE

## 2023-05-19 VITALS
HEIGHT: 64 IN | TEMPERATURE: 98 F | OXYGEN SATURATION: 99 % | WEIGHT: 140 LBS | RESPIRATION RATE: 18 BRPM | BODY MASS INDEX: 23.9 KG/M2 | HEART RATE: 78 BPM | DIASTOLIC BLOOD PRESSURE: 57 MMHG | SYSTOLIC BLOOD PRESSURE: 125 MMHG

## 2023-05-19 LAB
ALBUMIN SERPL BCP-MCNC: 3.5 G/DL (ref 3.5–5.2)
ALBUMIN SERPL BCP-MCNC: 4.6 G/DL (ref 3.5–5.2)
ALP SERPL-CCNC: 64 U/L (ref 55–135)
ALP SERPL-CCNC: 81 U/L (ref 55–135)
ALT SERPL W/O P-5'-P-CCNC: 14 U/L (ref 10–44)
ALT SERPL W/O P-5'-P-CCNC: 9 U/L (ref 10–44)
ANION GAP SERPL CALC-SCNC: 12 MMOL/L (ref 8–16)
ANION GAP SERPL CALC-SCNC: 17 MMOL/L (ref 8–16)
AST SERPL-CCNC: 14 U/L (ref 10–40)
AST SERPL-CCNC: 17 U/L (ref 10–40)
B-OH-BUTYR BLD STRIP-SCNC: 0.3 MMOL/L (ref 0–0.5)
BACTERIA #/AREA URNS HPF: ABNORMAL /HPF
BASOPHILS # BLD AUTO: 0.07 K/UL (ref 0–0.2)
BASOPHILS NFR BLD: 0.6 % (ref 0–1.9)
BILIRUB SERPL-MCNC: 0.4 MG/DL (ref 0.1–1)
BILIRUB SERPL-MCNC: 0.8 MG/DL (ref 0.1–1)
BILIRUB UR QL STRIP: NEGATIVE
BUN SERPL-MCNC: 10 MG/DL (ref 6–20)
BUN SERPL-MCNC: 13 MG/DL (ref 6–20)
CALCIUM SERPL-MCNC: 10.3 MG/DL (ref 8.7–10.5)
CALCIUM SERPL-MCNC: 8.7 MG/DL (ref 8.7–10.5)
CHLORIDE SERPL-SCNC: 101 MMOL/L (ref 95–110)
CHLORIDE SERPL-SCNC: 107 MMOL/L (ref 95–110)
CHOLEST SERPL-MCNC: 174 MG/DL (ref 120–199)
CHOLEST/HDLC SERPL: 6 {RATIO} (ref 2–5)
CLARITY UR: CLEAR
CO2 SERPL-SCNC: 20 MMOL/L (ref 23–29)
CO2 SERPL-SCNC: 21 MMOL/L (ref 23–29)
COLOR UR: YELLOW
CREAT SERPL-MCNC: 1.1 MG/DL (ref 0.5–1.4)
CREAT SERPL-MCNC: 1.2 MG/DL (ref 0.5–1.4)
DIFFERENTIAL METHOD: ABNORMAL
EOSINOPHIL # BLD AUTO: 0 K/UL (ref 0–0.5)
EOSINOPHIL NFR BLD: 0 % (ref 0–8)
ERYTHROCYTE [DISTWIDTH] IN BLOOD BY AUTOMATED COUNT: 12.6 % (ref 11.5–14.5)
EST. GFR  (NO RACE VARIABLE): >60 ML/MIN/1.73 M^2
EST. GFR  (NO RACE VARIABLE): >60 ML/MIN/1.73 M^2
GLUCOSE SERPL-MCNC: 124 MG/DL (ref 70–110)
GLUCOSE SERPL-MCNC: 184 MG/DL (ref 70–110)
GLUCOSE UR QL STRIP: ABNORMAL
HCT VFR BLD AUTO: 53.3 % (ref 40–54)
HDLC SERPL-MCNC: 29 MG/DL (ref 40–75)
HDLC SERPL: 16.7 % (ref 20–50)
HGB BLD-MCNC: 18.9 G/DL (ref 14–18)
HGB UR QL STRIP: ABNORMAL
HYALINE CASTS #/AREA URNS LPF: 0 /LPF
IMM GRANULOCYTES # BLD AUTO: 0.03 K/UL (ref 0–0.04)
IMM GRANULOCYTES NFR BLD AUTO: 0.2 % (ref 0–0.5)
KETONES UR QL STRIP: ABNORMAL
LACTATE SERPL-SCNC: 1.4 MMOL/L (ref 0.5–2.2)
LDLC SERPL CALC-MCNC: 124.2 MG/DL (ref 63–159)
LEUKOCYTE ESTERASE UR QL STRIP: NEGATIVE
LIPASE SERPL-CCNC: 5 U/L (ref 4–60)
LYMPHOCYTES # BLD AUTO: 1.8 K/UL (ref 1–4.8)
LYMPHOCYTES NFR BLD: 14.3 % (ref 18–48)
MAGNESIUM SERPL-MCNC: 1.6 MG/DL (ref 1.6–2.6)
MCH RBC QN AUTO: 31.8 PG (ref 27–31)
MCHC RBC AUTO-ENTMCNC: 35.5 G/DL (ref 32–36)
MCV RBC AUTO: 90 FL (ref 82–98)
MICROSCOPIC COMMENT: ABNORMAL
MONOCYTES # BLD AUTO: 0.7 K/UL (ref 0.3–1)
MONOCYTES NFR BLD: 5.5 % (ref 4–15)
NEUTROPHILS # BLD AUTO: 9.8 K/UL (ref 1.8–7.7)
NEUTROPHILS NFR BLD: 79.4 % (ref 38–73)
NITRITE UR QL STRIP: NEGATIVE
NONHDLC SERPL-MCNC: 145 MG/DL
NRBC BLD-RTO: 0 /100 WBC
PH UR STRIP: 7 [PH] (ref 5–8)
PLATELET # BLD AUTO: 265 K/UL (ref 150–450)
PMV BLD AUTO: 10.1 FL (ref 9.2–12.9)
POCT GLUCOSE: 115 MG/DL (ref 70–110)
POCT GLUCOSE: 39 MG/DL (ref 70–110)
POCT GLUCOSE: 88 MG/DL (ref 70–110)
POTASSIUM SERPL-SCNC: 4 MMOL/L (ref 3.5–5.1)
POTASSIUM SERPL-SCNC: 4.1 MMOL/L (ref 3.5–5.1)
PROT SERPL-MCNC: 6.9 G/DL (ref 6–8.4)
PROT SERPL-MCNC: 8.9 G/DL (ref 6–8.4)
PROT UR QL STRIP: ABNORMAL
RBC # BLD AUTO: 5.95 M/UL (ref 4.6–6.2)
RBC #/AREA URNS HPF: 6 /HPF (ref 0–4)
SODIUM SERPL-SCNC: 139 MMOL/L (ref 136–145)
SODIUM SERPL-SCNC: 139 MMOL/L (ref 136–145)
SP GR UR STRIP: >1.03 (ref 1–1.03)
TRIGL SERPL-MCNC: 104 MG/DL (ref 30–150)
URN SPEC COLLECT METH UR: ABNORMAL
UROBILINOGEN UR STRIP-ACNC: NEGATIVE EU/DL
WBC # BLD AUTO: 12.34 K/UL (ref 3.9–12.7)
WBC #/AREA URNS HPF: 0 /HPF (ref 0–5)

## 2023-05-19 PROCEDURE — 25000003 PHARM REV CODE 250

## 2023-05-19 PROCEDURE — 96375 TX/PRO/DX INJ NEW DRUG ADDON: CPT

## 2023-05-19 PROCEDURE — C9113 INJ PANTOPRAZOLE SODIUM, VIA: HCPCS | Performed by: STUDENT IN AN ORGANIZED HEALTH CARE EDUCATION/TRAINING PROGRAM

## 2023-05-19 PROCEDURE — 25000003 PHARM REV CODE 250: Performed by: STUDENT IN AN ORGANIZED HEALTH CARE EDUCATION/TRAINING PROGRAM

## 2023-05-19 PROCEDURE — G0378 HOSPITAL OBSERVATION PER HR: HCPCS

## 2023-05-19 PROCEDURE — 82962 GLUCOSE BLOOD TEST: CPT

## 2023-05-19 PROCEDURE — 25500020 PHARM REV CODE 255: Performed by: STUDENT IN AN ORGANIZED HEALTH CARE EDUCATION/TRAINING PROGRAM

## 2023-05-19 PROCEDURE — 82010 KETONE BODYS QUAN: CPT

## 2023-05-19 PROCEDURE — 63600175 PHARM REV CODE 636 W HCPCS: Performed by: STUDENT IN AN ORGANIZED HEALTH CARE EDUCATION/TRAINING PROGRAM

## 2023-05-19 PROCEDURE — 99214 OFFICE O/P EST MOD 30 MIN: CPT | Mod: ,,, | Performed by: NURSE PRACTITIONER

## 2023-05-19 PROCEDURE — 99214 PR OFFICE/OUTPT VISIT, EST, LEVL IV, 30-39 MIN: ICD-10-PCS | Mod: ,,, | Performed by: NURSE PRACTITIONER

## 2023-05-19 PROCEDURE — 81000 URINALYSIS NONAUTO W/SCOPE: CPT | Performed by: STUDENT IN AN ORGANIZED HEALTH CARE EDUCATION/TRAINING PROGRAM

## 2023-05-19 PROCEDURE — 25000003 PHARM REV CODE 250: Performed by: FAMILY MEDICINE

## 2023-05-19 PROCEDURE — 96361 HYDRATE IV INFUSION ADD-ON: CPT

## 2023-05-19 PROCEDURE — 63600175 PHARM REV CODE 636 W HCPCS

## 2023-05-19 PROCEDURE — S4991 NICOTINE PATCH NONLEGEND: HCPCS

## 2023-05-19 PROCEDURE — 80053 COMPREHEN METABOLIC PANEL: CPT

## 2023-05-19 PROCEDURE — 80061 LIPID PANEL: CPT

## 2023-05-19 PROCEDURE — 96376 TX/PRO/DX INJ SAME DRUG ADON: CPT

## 2023-05-19 RX ORDER — ACETAMINOPHEN 325 MG/1
650 TABLET ORAL EVERY 4 HOURS PRN
Status: DISCONTINUED | OUTPATIENT
Start: 2023-05-19 | End: 2023-05-19 | Stop reason: HOSPADM

## 2023-05-19 RX ORDER — PANTOPRAZOLE SODIUM 40 MG/10ML
40 INJECTION, POWDER, LYOPHILIZED, FOR SOLUTION INTRAVENOUS DAILY
Status: DISCONTINUED | OUTPATIENT
Start: 2023-05-19 | End: 2023-05-19 | Stop reason: HOSPADM

## 2023-05-19 RX ORDER — GLUCAGON 1 MG
1 KIT INJECTION
Status: DISCONTINUED | OUTPATIENT
Start: 2023-05-19 | End: 2023-05-19 | Stop reason: HOSPADM

## 2023-05-19 RX ORDER — FENTANYL CITRATE 50 UG/ML
25 INJECTION, SOLUTION INTRAMUSCULAR; INTRAVENOUS
Status: COMPLETED | OUTPATIENT
Start: 2023-05-19 | End: 2023-05-19

## 2023-05-19 RX ORDER — HYDROCHLOROTHIAZIDE 12.5 MG/1
12.5 TABLET ORAL DAILY
Status: DISCONTINUED | OUTPATIENT
Start: 2023-05-19 | End: 2023-05-19 | Stop reason: HOSPADM

## 2023-05-19 RX ORDER — NALOXONE HCL 0.4 MG/ML
0.02 VIAL (ML) INJECTION
Status: DISCONTINUED | OUTPATIENT
Start: 2023-05-19 | End: 2023-05-19 | Stop reason: HOSPADM

## 2023-05-19 RX ORDER — ARIPIPRAZOLE 10 MG/1
10 TABLET ORAL DAILY
Status: DISCONTINUED | OUTPATIENT
Start: 2023-05-19 | End: 2023-05-19 | Stop reason: HOSPADM

## 2023-05-19 RX ORDER — PANTOPRAZOLE SODIUM 40 MG/10ML
80 INJECTION, POWDER, LYOPHILIZED, FOR SOLUTION INTRAVENOUS
Status: COMPLETED | OUTPATIENT
Start: 2023-05-19 | End: 2023-05-19

## 2023-05-19 RX ORDER — DICYCLOMINE HYDROCHLORIDE 10 MG/1
10 CAPSULE ORAL
Qty: 40 CAPSULE | Refills: 0 | Status: SHIPPED | OUTPATIENT
Start: 2023-05-19 | End: 2023-05-29

## 2023-05-19 RX ORDER — FAMOTIDINE 20 MG/1
40 TABLET, FILM COATED ORAL
Status: COMPLETED | OUTPATIENT
Start: 2023-05-19 | End: 2023-05-19

## 2023-05-19 RX ORDER — PANTOPRAZOLE SODIUM 40 MG/1
40 TABLET, DELAYED RELEASE ORAL 2 TIMES DAILY
Qty: 60 TABLET | Refills: 0 | Status: SHIPPED | OUTPATIENT
Start: 2023-05-19 | End: 2024-01-26

## 2023-05-19 RX ORDER — TRAZODONE HYDROCHLORIDE 50 MG/1
100 TABLET ORAL NIGHTLY
COMMUNITY
Start: 2023-05-08

## 2023-05-19 RX ORDER — POLYETHYLENE GLYCOL 3350 17 G/17G
17 POWDER, FOR SOLUTION ORAL DAILY
Status: DISCONTINUED | OUTPATIENT
Start: 2023-05-19 | End: 2023-05-19 | Stop reason: HOSPADM

## 2023-05-19 RX ORDER — DOXEPIN HYDROCHLORIDE 10 MG/1
10 CAPSULE ORAL NIGHTLY
Status: DISCONTINUED | OUTPATIENT
Start: 2023-05-19 | End: 2023-05-19 | Stop reason: HOSPADM

## 2023-05-19 RX ORDER — ONDANSETRON 8 MG/1
8 TABLET, ORALLY DISINTEGRATING ORAL EVERY 8 HOURS PRN
Status: DISCONTINUED | OUTPATIENT
Start: 2023-05-19 | End: 2023-05-19 | Stop reason: HOSPADM

## 2023-05-19 RX ORDER — DEXTROSE 40 %
15 GEL (GRAM) ORAL
Status: DISCONTINUED | OUTPATIENT
Start: 2023-05-19 | End: 2023-05-19 | Stop reason: HOSPADM

## 2023-05-19 RX ORDER — SODIUM CHLORIDE 9 MG/ML
INJECTION, SOLUTION INTRAVENOUS CONTINUOUS
Status: DISCONTINUED | OUTPATIENT
Start: 2023-05-19 | End: 2023-05-19 | Stop reason: HOSPADM

## 2023-05-19 RX ORDER — GABAPENTIN 300 MG/1
300 CAPSULE ORAL 3 TIMES DAILY
Status: DISCONTINUED | OUTPATIENT
Start: 2023-05-19 | End: 2023-05-19 | Stop reason: HOSPADM

## 2023-05-19 RX ORDER — METOCLOPRAMIDE HYDROCHLORIDE 5 MG/ML
10 INJECTION INTRAMUSCULAR; INTRAVENOUS EVERY 6 HOURS
Status: DISCONTINUED | OUTPATIENT
Start: 2023-05-19 | End: 2023-05-19 | Stop reason: HOSPADM

## 2023-05-19 RX ORDER — TALC
6 POWDER (GRAM) TOPICAL NIGHTLY PRN
Status: DISCONTINUED | OUTPATIENT
Start: 2023-05-19 | End: 2023-05-19 | Stop reason: HOSPADM

## 2023-05-19 RX ORDER — MAG HYDROX/ALUMINUM HYD/SIMETH 200-200-20
30 SUSPENSION, ORAL (FINAL DOSE FORM) ORAL 4 TIMES DAILY PRN
Status: DISCONTINUED | OUTPATIENT
Start: 2023-05-19 | End: 2023-05-19 | Stop reason: HOSPADM

## 2023-05-19 RX ORDER — IBUPROFEN 200 MG
1 TABLET ORAL DAILY
Status: DISCONTINUED | OUTPATIENT
Start: 2023-05-19 | End: 2023-05-19 | Stop reason: HOSPADM

## 2023-05-19 RX ORDER — KETOROLAC TROMETHAMINE 30 MG/ML
15 INJECTION, SOLUTION INTRAMUSCULAR; INTRAVENOUS
Status: COMPLETED | OUTPATIENT
Start: 2023-05-19 | End: 2023-05-19

## 2023-05-19 RX ORDER — INSULIN ASPART 100 [IU]/ML
0-5 INJECTION, SOLUTION INTRAVENOUS; SUBCUTANEOUS
Status: DISCONTINUED | OUTPATIENT
Start: 2023-05-19 | End: 2023-05-19 | Stop reason: HOSPADM

## 2023-05-19 RX ORDER — PROPRANOLOL HYDROCHLORIDE 10 MG/1
10 TABLET ORAL 3 TIMES DAILY
Status: DISCONTINUED | OUTPATIENT
Start: 2023-05-19 | End: 2023-05-19 | Stop reason: HOSPADM

## 2023-05-19 RX ORDER — DEXTROSE 40 %
30 GEL (GRAM) ORAL
Status: DISCONTINUED | OUTPATIENT
Start: 2023-05-19 | End: 2023-05-19 | Stop reason: HOSPADM

## 2023-05-19 RX ORDER — MAG HYDROX/ALUMINUM HYD/SIMETH 200-200-20
30 SUSPENSION, ORAL (FINAL DOSE FORM) ORAL ONCE
Status: COMPLETED | OUTPATIENT
Start: 2023-05-19 | End: 2023-05-19

## 2023-05-19 RX ORDER — PROCHLORPERAZINE EDISYLATE 5 MG/ML
5 INJECTION INTRAMUSCULAR; INTRAVENOUS EVERY 6 HOURS PRN
Status: DISCONTINUED | OUTPATIENT
Start: 2023-05-19 | End: 2023-05-19 | Stop reason: HOSPADM

## 2023-05-19 RX ORDER — LIDOCAINE HYDROCHLORIDE 20 MG/ML
15 SOLUTION OROPHARYNGEAL ONCE
Status: COMPLETED | OUTPATIENT
Start: 2023-05-19 | End: 2023-05-19

## 2023-05-19 RX ORDER — HYDROMORPHONE HYDROCHLORIDE 1 MG/ML
0.5 INJECTION, SOLUTION INTRAMUSCULAR; INTRAVENOUS; SUBCUTANEOUS EVERY 6 HOURS PRN
Status: DISCONTINUED | OUTPATIENT
Start: 2023-05-19 | End: 2023-05-19

## 2023-05-19 RX ORDER — LISINOPRIL 5 MG/1
10 TABLET ORAL DAILY
Status: DISCONTINUED | OUTPATIENT
Start: 2023-05-19 | End: 2023-05-19 | Stop reason: HOSPADM

## 2023-05-19 RX ORDER — HYDROCODONE BITARTRATE AND ACETAMINOPHEN 5; 325 MG/1; MG/1
1 TABLET ORAL EVERY 4 HOURS PRN
Status: DISCONTINUED | OUTPATIENT
Start: 2023-05-19 | End: 2023-05-19 | Stop reason: HOSPADM

## 2023-05-19 RX ORDER — PANTOPRAZOLE SODIUM 40 MG/1
40 TABLET, DELAYED RELEASE ORAL 2 TIMES DAILY
Qty: 60 TABLET | Refills: 0 | Status: SHIPPED | OUTPATIENT
Start: 2023-05-19 | End: 2023-05-19 | Stop reason: SDUPTHER

## 2023-05-19 RX ADMIN — LIDOCAINE HYDROCHLORIDE 15 ML: 20 SOLUTION ORAL at 01:05

## 2023-05-19 RX ADMIN — PANTOPRAZOLE SODIUM 80 MG: 40 INJECTION, POWDER, FOR SOLUTION INTRAVENOUS at 02:05

## 2023-05-19 RX ADMIN — KETOROLAC TROMETHAMINE 15 MG: 30 INJECTION, SOLUTION INTRAMUSCULAR; INTRAVENOUS at 02:05

## 2023-05-19 RX ADMIN — IOHEXOL 70 ML: 350 INJECTION, SOLUTION INTRAVENOUS at 12:05

## 2023-05-19 RX ADMIN — SODIUM CHLORIDE: 9 INJECTION, SOLUTION INTRAVENOUS at 04:05

## 2023-05-19 RX ADMIN — ONDANSETRON 8 MG: 8 TABLET, ORALLY DISINTEGRATING ORAL at 04:05

## 2023-05-19 RX ADMIN — FAMOTIDINE 40 MG: 20 TABLET, FILM COATED ORAL at 01:05

## 2023-05-19 RX ADMIN — FENTANYL CITRATE 25 MCG: 50 INJECTION, SOLUTION INTRAMUSCULAR; INTRAVENOUS at 02:05

## 2023-05-19 RX ADMIN — GABAPENTIN 300 MG: 300 CAPSULE ORAL at 08:05

## 2023-05-19 RX ADMIN — PROPRANOLOL HYDROCHLORIDE 10 MG: 10 TABLET ORAL at 08:05

## 2023-05-19 RX ADMIN — METOCLOPRAMIDE 10 MG: 5 INJECTION, SOLUTION INTRAMUSCULAR; INTRAVENOUS at 06:05

## 2023-05-19 RX ADMIN — HYDROCODONE BITARTRATE AND ACETAMINOPHEN 1 TABLET: 5; 325 TABLET ORAL at 08:05

## 2023-05-19 RX ADMIN — HYDROCHLOROTHIAZIDE 12.5 MG: 12.5 TABLET ORAL at 08:05

## 2023-05-19 RX ADMIN — BUSPIRONE HYDROCHLORIDE 15 MG: 5 TABLET ORAL at 08:05

## 2023-05-19 RX ADMIN — ARIPIPRAZOLE 10 MG: 10 TABLET ORAL at 08:05

## 2023-05-19 RX ADMIN — LISINOPRIL 10 MG: 5 TABLET ORAL at 08:05

## 2023-05-19 RX ADMIN — Medication 1 PATCH: at 08:05

## 2023-05-19 RX ADMIN — ALUMINUM HYDROXIDE, MAGNESIUM HYDROXIDE, AND DIMETHICONE 30 ML: 200; 20; 200 SUSPENSION ORAL at 01:05

## 2023-05-19 RX ADMIN — HYDROMORPHONE HYDROCHLORIDE 0.5 MG: 1 INJECTION, SOLUTION INTRAMUSCULAR; INTRAVENOUS; SUBCUTANEOUS at 04:05

## 2023-05-19 NOTE — DISCHARGE SUMMARY
"[facility]  [facility] Medicine  Discharge Summary      Patient Name: Nino Price  MRN: [id number]  GURDEEP: [id number]  Patient Class: OP- Observation  Admission Date: 5/18/2023  Hospital Length of Stay: 0 days  Discharge Date and Time: 5/19/2023 10:55 AM  Attending Physician: No att. providers found   Discharging Provider: Paz Rodrigues MD  Primary Care Provider: Pritesh Marinelli MD    Primary Care Team: Networked reference to record PCT     HPI:   Nino Price 58 y.o. male with diabetes, hypertension, diverticulosis, and appendectomy presents to the hospital with a chief complaint of abdominal pain.  Associated symptoms include nausea and vomiting, patient describes the pain as sharp/stabbing constant and periumbilical with radiation to all quadrants, he rates it currently 7/10.  Patient attempted self-treatment with home oxycodone without any significant relief. He reports having 3 episodes of nonbloody nonbilious emesis.  He attempted to drink chicken broth 3 hours prior to ED arrival and had another episode of emesis.  Denies any dysuria, hematuria, recent changes in his appetite, recent antibiotic use, fevers, chills, cough, SOB, CP, melena or other associated symptoms.  Patient denies any recent use of marijuana however he states he does use occasionally.  No other alleviating or exacerbating factors noted    In the ED patient was afebrile without leukocytosis, CBC mostly unremarkable, CO2 21, anion gap 17, glucose 184, protein total 8.9, AST/ALT and lipase all WNL, lactate WNL, UA showed high specific gravity, +3 protein, +1 glucose, +2 ketones, +1 occult blood.  CT abdomen with contrast showed "Sigmoid diverticulosis with no evidence of acute diverticulitis.  Previously visualized inflammatory changes involving the sigmoid colon have resolved" patient was placed in observation for further workup and management of abdominal pain      Hospital Course:   He was admitted under " obs and given analgesics. GI was consulted. Rec daily ppi and bentyl. F/u with GI clinic.       Consults:   Consults (From admission, onward)        Status Ordering Provider     Inpatient consult to Gastroenterology  Once        Provider:  Dony Mittal MD    Completed LALO FERNANDEZ          Abdominal pain  Presented to the hospital due to onset of sharp stabbing abdominal pain, CT without acute abdominal findings.  Lipase, lactate and LFTs WNL, patient received GI cocktail, baclofen, IV PPI in ED without relief.  UA without signs of infection  Outpt fu with GI next week     GERD (gastroesophageal reflux disease)  Continue PPI     Depression  Stable      Tobacco abuse  3 minutes spent counseling the patient on smoking cessation and he is not currently ready to stop smoking.     Essential hypertension  Stable      Type 2 diabetes mellitus, controlled  Stable      Chronic hepatitis C  Stable        Final Active Diagnoses:    Diagnosis Date Noted POA    PRINCIPAL PROBLEM:  Abdominal pain [R10.9] 06/03/2016 Yes    Hyperlipidemia [E78.5] 10/22/2016 Yes     Chronic    GERD (gastroesophageal reflux disease) [K21.9] 10/22/2016 Yes     Chronic    Depression [F32.A] 06/03/2016 Yes     Chronic    Type 2 diabetes mellitus, controlled [E11.9] 05/29/2015 Yes     Chronic    Essential hypertension [I10] 05/29/2015 Yes     Chronic    Tobacco abuse [Z72.0] 05/29/2015 Yes     Chronic    Chronic hepatitis C [B18.2] 04/30/2014 Yes     Chronic      Problems Resolved During this Admission:       Discharged Condition: stable    Disposition: Home or Self Care    Follow Up:   Follow-up Information     Pritesh Marinelli MD Follow up.    Specialty: Family Medicine  Why: As needed  Contact information:  [place] [address] [phone]                       Patient Instructions:      Diet Cardiac     Diet diabetic     Activity as tolerated       Significant Diagnostic Studies: Radiology: CT scan: CT ABDOMEN  PELVIS WITH CONTRAST:   Results for orders placed or performed during the hospital encounter of 05/18/23   CT Abdomen Pelvis With Contrast    Narrative    EXAMINATION:  CT ABDOMEN PELVIS WITH CONTRAST    CLINICAL HISTORY:  LLQ abdominal pain;Periumbilical abdominal pain history of diverticulitis;    TECHNIQUE:  Low dose axial images, sagittal and coronal reformations were obtained from the lung bases to the pubic symphysis following the IV administration of 70 mL of Omnipaque 350 .  Oral contrast was not given.    COMPARISON:  CT abdomen and pelvis from 04/26/2023.    FINDINGS:  The visualized portion of the heart is unremarkable.  The lung bases are clear.  Circumferential wall thickening is again seen involving the distal visualized esophagus which can be seen in the setting of esophagitis.    No significant hepatic abnormalities are identified.  There is no intra-or extrahepatic biliary ductal dilatation.  The gallbladder is unremarkable.  Stomach is distended with fluid.  Pancreas, spleen, and adrenal glands show no significant abnormalities.    Kidneys enhance normally with no evidence of hydronephrosis.  No abnormalities are seen along the ureteral courses.  Urinary bladder is unremarkable.  Prostate is mildly enlarged.    Postsurgical changes are seen at the base of the cecum consistent with prior appendectomy.  There is sigmoid diverticulosis with no evidence of acute diverticulitis.  Previously visualized inflammatory changes involving the sigmoid colon have resolved.  No evidence of bowel obstruction.  No free air or free fluid.    Aorta tapers normally.  Prominent calcified and noncalcified aortic plaque are seen which extends into the bilateral iliacs.    No acute osseous abnormality identified.  AVN changes are again seen involving the bilateral femoral heads.  Intervertebral disc space narrowing and degenerative changes are seen at the L5-S1 level.  Small fat containing right inguinal hernia is seen.       Impression    1. No acute intra-abdominal abnormalities identified.  2. Sigmoid diverticulosis with no evidence of acute diverticulitis.  Previously visualized inflammatory changes involving the sigmoid colon have resolved.  3. Postsurgical changes and additional stable findings as detailed above.      Electronically signed by: Dalton Lyle MD  Date:    05/19/2023  Time:    01:25       Pending Diagnostic Studies:     None         Medications:  Reconciled Home Medications:      Medication List      START taking these medications    dicyclomine 10 MG capsule  Commonly known as: BENTYL  Take 1 capsule (10 mg total) by mouth 4 (four) times daily before meals and nightly. for 10 days        CONTINUE taking these medications    ARIPiprazole 10 MG Tab  Commonly known as: ABILIFY  Take 10 mg by mouth once daily.     busPIRone 5 MG Tab  Commonly known as: BUSPAR  Take 15 mg by mouth once daily. Takes in pm     doxepin 100 MG capsule  Commonly known as: SINEQUAN  Take 10 mg by mouth every evening.     DULoxetine 60 MG capsule  Commonly known as: CYMBALTA  Take 60 mg by mouth once daily. Takes in pm     gabapentin 300 MG capsule  Commonly known as: NEURONTIN  Take 300 mg by mouth 3 (three) times daily.     metFORMIN 1000 MG tablet  Commonly known as: GLUCOPHAGE  Take 1,000 mg by mouth 2 (two) times daily with meals.     NAMENDA 10 MG Tab  Generic drug: memantine  Take 10 mg by mouth once daily.     oxyCODONE-acetaminophen  mg per tablet  Commonly known as: PERCOCET  TAKE 1 TABLET AS NEEDED ORALLY EVERY 4 TO 6 HOURS     pantoprazole 40 MG tablet  Commonly known as: PROTONIX  Take 1 tablet (40 mg total) by mouth 2 (two) times daily.     propranoloL 20 MG tablet  Commonly known as: INDERAL  Take 10 mg by mouth 3 (three) times daily.     traZODone 50 MG tablet  Commonly known as: DESYREL  Take 100 mg by mouth every evening.     TRUE METRIX GLUCOSE METER Misc  Generic drug: blood-glucose meter     TRUE METRIX GLUCOSE  TEST STRIP Strp  Generic drug: blood sugar diagnostic     valsartan-hydrochlorothiazide 160-25 mg per tablet  Commonly known as: DIOVAN-HCT  Take 1 tablet by mouth once daily.        STOP taking these medications    lisinopriL-hydrochlorothiazide 10-12.5 mg per tablet  Commonly known as: PRINZIDE,ZESTORETIC            Indwelling Lines/Drains at time of discharge:   Lines/Drains/Airways     None                 Time spent on the discharge of patient: 31 minutes   Patient examined at bedside on day of discharge. Exam findings stable. he was deemed safe for discharge.        Paz Rodrigues MD  Department of Hospital Medicine  [facility] - Emergency Dept

## 2023-05-19 NOTE — HPI
"Nino Price 58 y.o. male with diabetes, hypertension, diverticulosis, and appendectomy presents to the hospital with a chief complaint of abdominal pain.  Associated symptoms include nausea and vomiting, patient describes the pain as sharp/stabbing constant and periumbilical with radiation to all quadrants, he rates it currently 7/10.  Patient attempted self-treatment with home oxycodone without any significant relief. He reports having 3 episodes of nonbloody nonbilious emesis.  He attempted to drink chicken broth 3 hours prior to ED arrival and had another episode of emesis.  Denies any dysuria, hematuria, recent changes in his appetite, recent antibiotic use, fevers, chills, cough, SOB, CP, melena or other associated symptoms.  Patient denies any recent use of marijuana however he states he does use occasionally.  No other alleviating or exacerbating factors noted    In the ED patient was afebrile without leukocytosis, CBC mostly unremarkable, CO2 21, anion gap 17, glucose 184, protein total 8.9, AST/ALT and lipase all WNL, lactate WNL, UA showed high specific gravity, +3 protein, +1 glucose, +2 ketones, +1 occult blood.  CT abdomen with contrast showed "Sigmoid diverticulosis with no evidence of acute diverticulitis.  Previously visualized inflammatory changes involving the sigmoid colon have resolved" patient was placed in observation for further workup and management of abdominal pain  "

## 2023-05-19 NOTE — CONSULTS
[facility] Consultation Note    Patient Complaint: periumbilical pain    PCP:   Pritesh Marinelli       LOS: 0        Initial History of Present Illness (HPI):  This is a 58 y.o. male consulted to GI service for abdominal pain. PMH diabetes, hypertension, diverticulitis, and appendectomy. Patient complaint of acute onset of severe periumbilical pain with associated symptoms of nausea and vomiting that began 2 days ago. Denies dizziness, lightheadedness, hematemesis, BRBPR, diarrhea or constipation. Reports smoking 1 pk a day. Denies drinking or illicit drug use. Reports the use of BC powder for HA periodically. Reports use of oxycodone 10mg for chronic back pain.   Info from chart: Pain has been intermittent since April where CT suspicious for diverticulitis; on that ED visit he was given pain meds and antibiotics and scheduled to f/u with Dr Pabon in GI in 2 days. Patient lost to f/u. Patient had normal EGD in 2018. Colonoscopy in 2015 noting diverticulosis and polyp removal.        Medical History:  has a past medical history of Anxiety, Asthma, Colitis, Depression, Diabetes mellitus, Head injury, History of hepatitis C, s/p successful hcv treatment w/ SVR 8-2015  (10/8/2012), Hypertension, Shoulder pain, and Stroke.    Surgical History:  has a past surgical history that includes rotator cuff; Colonoscopy (N/A, 10/20/2015); Gallbladder surgery; and Diagnostic laparoscopy (N/A, 4/1/2021).      Objective Findings:    Vital Signs:  Temp:  [98.1 °F (36.7 °C)-98.6 °F (37 °C)]   Pulse:  [51-81]   Resp:  [16-20]   BP: ()/()   SpO2:  [92 %-100 %]   Body mass index is 24.03 kg/m².      Physical Exam  Vitals and nursing note reviewed.   Constitutional:       Appearance: Normal appearance.   HENT:      Head: Normocephalic.   Pulmonary:      Effort: Pulmonary effort is normal.   Abdominal:      General: Bowel sounds are normal.      Palpations: Abdomen is soft.   Skin:     General: Skin is  warm and dry.   Neurological:      Mental Status: He is alert and oriented to person, place, and time.   Psychiatric:         Mood and Affect: Mood normal.         Behavior: Behavior normal.         Thought Content: Thought content normal.         Judgment: Judgment normal.             Labs:  Lab Results   Component Value Date    WBC 12.34 2023    HGB 18.9 (H) 2023    HCT 53.3 2023     2023    CHOL 174 2023    TRIG 104 2023    HDL 29 (L) 2023    ALT 9 (L) 2023    AST 14 2023     2023    K 4.0 2023     2023    CREATININE 1.1 2023    BUN 13 2023    CO2 20 (L) 2023    TSH 3.820 2015    INR 1.0 2022    HGBA1C 6.8 (H) 2022             Imagin/18 CT abdomen pelvis-No acute intra-abdominal abnormalities identified.  Sigmoid diverticulosis with no evidence of acute diverticulitis.      Endoscopy:  EGD-  Normal esophagus.                         - Normal stomach.                         - Normal examined duodenum.                         - No specimens collected.   10/2015 Colonoscopy-- Non-bleeding internal hemorrhoids.                        - Mild diverticulosis in the sigmoid colon. There                        was no evidence of diverticular bleeding. An  inverted diverticulum was seen in the sigmoid colon.                        - One 3 mm polyp in the transverse colon. Resected  and retrieved.                        - One 2 mm polyp in the sigmoid colon. Resected and retrieved.     I have independently reviewed and interpreted the imaging above           Periumbilical abdominal pain. Nausea and vomiting. GERD. Current smoker. Colon cancer screening. Hx of cannabis abuse. Hx of diverticulitis  Plan/ Recommendations:  1.  N/v subsided. Pain has been intermittent since April, CT this visit showing resolution of diverticulosis. Rec daily ppi and bentyl. F/u with GI clinic.    2. Due for  f/u colonoscopy per last colonoscopy recs and recent bout of diverticulitis. Will eval at clinic appt.    Thank you so much for allowing us to participate in the care of Nino Price . Please contact us if you have any additional questions.    Melany Tan NP  Gastroenterology  [facility]

## 2023-05-19 NOTE — ED NOTES
Patient introductions. NAD and breathing regular in bed. Patient POC updated. Call light within reach.

## 2023-05-19 NOTE — ASSESSMENT & PLAN NOTE
Patient has persistent depression which is unknown and is currently controlled. Will Continue anti-depressant medications. We will not consult psychiatry at this time. Patient does not display psychosis at this time. Continue to monitor closely and adjust plan of care as needed.

## 2023-05-19 NOTE — ASSESSMENT & PLAN NOTE
Patient's FSGs are controlled on current medication regimen.  Last A1c reviewed-   Lab Results   Component Value Date    HGBA1C 6.8 (H) 01/05/2022     Most recent fingerstick glucose reviewed- No results for input(s): POCTGLUCOSE in the last 24 hours.  Current correctional scale  Low  Maintain anti-hyperglycemic dose as follows-   Antihyperglycemics (From admission, onward)    Start     Stop Route Frequency Ordered    05/19/23 0437  insulin aspart U-100 pen 0-5 Units         -- SubQ Before meals & nightly PRN 05/19/23 0345        Hold Oral hypoglycemics while patient is in the hospital.

## 2023-05-19 NOTE — ASSESSMENT & PLAN NOTE
Initially hypertensive in the ED, post ED intervention BP is well controlled continue home antihypertensives

## 2023-05-19 NOTE — HOSPITAL COURSE
He was admitted under obs and given analgesics. GI was consulted. Rec daily ppi and bentyl. F/u with GI clinic.

## 2023-05-19 NOTE — SUBJECTIVE & OBJECTIVE
Past Medical History:   Diagnosis Date    Anxiety     Asthma     Colitis     Depression     Diabetes mellitus     Head injury     History of hepatitis C, s/p successful hcv treatment w/ SVR 8-2015  10/8/2012    SVR(24) as of 11/2015.  SVR(12) as of 8/2015.  completed harvoni 24 week regimen for genotype 1a 5/18/15     Hypertension     Shoulder pain     left, s/p 4 surgeries.     Stroke        Past Surgical History:   Procedure Laterality Date    COLONOSCOPY N/A 10/20/2015    Procedure: COLONOSCOPY;  Surgeon: Jagdish Patricia MD;  Location: [facility] ENDO;  Service: Endoscopy;  Laterality: N/A;    DIAGNOSTIC LAPAROSCOPY N/A 4/1/2021    Procedure: LAPAROSCOPY, DIAGNOSTIC;  Surgeon: Umesh Vallecillo MD;  Location: [facility] OR;  Service: General;  Laterality: N/A;  RN PREOP 3/29/21, COVID NEGATIVE ON 3/29  CONSENT AND H/P INCOMPLETE    GALLBLADDER SURGERY      pt not 100% if gall bladder removed    rotator cuff      left side       Review of patient's allergies indicates:   Allergen Reactions    Codeine Nausea Only       Current Facility-Administered Medications on File Prior to Encounter   Medication    aluminum-magnesium hydroxide-simethicone 200-200-20 mg/5 mL suspension 30 mL    dicyclomine 10 mg/5 mL syrup 20 mg    LIDOcaine HCl 2% oral solution 10 mL     Current Outpatient Medications on File Prior to Encounter   Medication Sig    metFORMIN (GLUCOPHAGE) 1000 MG tablet Take 1,000 mg by mouth 2 (two) times daily with meals.    NAMENDA 10 mg Tab Take 10 mg by mouth once daily.     oxyCODONE-acetaminophen (PERCOCET)  mg per tablet TAKE 1 TABLET AS NEEDED ORALLY EVERY 4 TO 6 HOURS    ARIPiprazole (ABILIFY) 10 MG Tab Take 10 mg by mouth once daily.    busPIRone (BUSPAR) 5 MG Tab Take 15 mg by mouth once daily. Takes in pm    doxepin (SINEQUAN) 100 MG capsule Take 10 mg by mouth every evening.    DULoxetine (CYMBALTA) 60 MG capsule Take 60 mg by mouth once daily. Takes in pm    gabapentin (NEURONTIN) 300 MG capsule Take  300 mg by mouth 3 (three) times daily.    lisinopril-hydrochlorothiazide (PRINZIDE,ZESTORETIC) 10-12.5 mg per tablet Take 1 tablet by mouth once daily. morning    pantoprazole (PROTONIX) 40 MG tablet Take 1 tablet (40 mg total) by mouth 2 (two) times daily.    propranoloL (INDERAL) 20 MG tablet Take 10 mg by mouth 3 (three) times daily.     TRUE METRIX GLUCOSE METER Misc     TRUE METRIX GLUCOSE TEST STRIP Strp     valsartan-hydrochlorothiazide (DIOVAN-HCT) 160-25 mg per tablet Take 1 tablet by mouth once daily.     Family History       Problem Relation (Age of Onset)    Cirrhosis Brother    Heart disease Father    Hypertension Mother          Tobacco Use    Smoking status: Every Day     Packs/day: 1.00     Years: 35.00     Pack years: 35.00     Types: Cigarettes    Smokeless tobacco: Never   Substance and Sexual Activity    Alcohol use: No    Drug use: Yes     Frequency: 7.0 times per week     Types: Marijuana    Sexual activity: Yes     Partners: Female     Review of Systems   Constitutional:  Negative for chills and fever.   HENT:  Negative for congestion and rhinorrhea.    Eyes:  Negative for photophobia and visual disturbance.   Respiratory:  Negative for cough and shortness of breath.    Cardiovascular:  Negative for chest pain, palpitations and leg swelling.   Gastrointestinal:  Positive for abdominal pain, nausea and vomiting. Negative for diarrhea.   Genitourinary:  Negative for frequency, hematuria and urgency.   Skin:  Negative for pallor, rash and wound.   Neurological:  Negative for light-headedness and headaches.   Psychiatric/Behavioral:  Negative for confusion and decreased concentration.    Objective:     Vital Signs (Most Recent):  Temp: 98.1 °F (36.7 °C) (05/19/23 0308)  Pulse: 70 (05/19/23 0308)  Resp: 18 (05/19/23 0308)  BP: 120/60 (05/19/23 0308)  SpO2: 98 % (05/19/23 0303) Vital Signs (24h Range):  Temp:  [98.1 °F (36.7 °C)-98.6 °F (37 °C)] 98.1 °F (36.7 °C)  Pulse:  [51-81] 70  Resp:  [16-20]  18  SpO2:  [92 %-98 %] 98 %  BP: ()/() 120/60     Weight: 63.5 kg (140 lb)  Body mass index is 24.03 kg/m².     Physical Exam  Vitals and nursing note reviewed.   Constitutional:       General: He is not in acute distress.     Appearance: He is well-developed.   HENT:      Head: Normocephalic and atraumatic.      Right Ear: External ear normal.      Left Ear: External ear normal.      Nose: Nose normal.   Eyes:      Conjunctiva/sclera: Conjunctivae normal.      Pupils: Pupils are equal, round, and reactive to light.   Cardiovascular:      Rate and Rhythm: Normal rate and regular rhythm.   Pulmonary:      Effort: Pulmonary effort is normal. No respiratory distress.      Breath sounds: Normal breath sounds. No wheezing or rales.   Abdominal:      General: Bowel sounds are normal. There is no distension.      Palpations: Abdomen is soft.      Tenderness: There is generalized abdominal tenderness and tenderness in the periumbilical area. There is guarding (Voluntary). There is no right CVA tenderness or left CVA tenderness.      Comments: No palpable hepatomegaly or splenomegaly   Musculoskeletal:         General: No tenderness. Normal range of motion.      Cervical back: Normal range of motion and neck supple.   Skin:     General: Skin is warm and dry.   Neurological:      Mental Status: He is alert and oriented to person, place, and time.   Psychiatric:         Thought Content: Thought content normal.            CRANIAL NERVES     CN III, IV, VI   Pupils are equal, round, and reactive to light.     Significant Labs: All pertinent labs within the past 24 hours have been reviewed.  Recent Lab Results         05/19/23  0130   05/18/23  2356        Albumin   4.6       Alkaline Phosphatase   81       ALT   14       Anion Gap   17       Appearance, UA Clear         AST   17       Bacteria, UA None         Baso #   0.07       Basophil %   0.6       Bilirubin (UA) Negative         BILIRUBIN TOTAL   0.8  Comment:  For infants and newborns, interpretation of results should be based  on gestational age, weight and in agreement with clinical  observations.    Premature Infant recommended reference ranges:  Up to 24 hours.............<8.0 mg/dL  Up to 48 hours............<12.0 mg/dL  3-5 days..................<15.0 mg/dL  6-29 days.................<15.0 mg/dL         BUN   10       Calcium   10.3       Chloride   101       CO2   21       Color, UA Yellow         Creatinine   1.2       Differential Method   Automated       eGFR   >60       Eos #   0.0       Eosinophil %   0.0       Glucose   184       Glucose, UA 1+         Gran # (ANC)   9.8       Gran %   79.4       Hematocrit   53.3       Hemoglobin   18.9       Hyaline Casts, UA 0         Immature Grans (Abs)   0.03  Comment: Mild elevation in immature granulocytes is non specific and   can be seen in a variety of conditions including stress response,   acute inflammation, trauma and pregnancy. Correlation with other   laboratory and clinical findings is essential.         Immature Granulocytes   0.2       Ketones, UA 2+         Lactate, Raimundo   1.4  Comment: Falsely low lactic acid results can be found in samples   containing >=13.0 mg/dL total bilirubin and/or >=3.5 mg/dL   direct bilirubin.         Leukocytes, UA Negative         Lipase   5       Lymph #   1.8       Lymph %   14.3       Magnesium   1.6       MCH   31.8       MCHC   35.5       MCV   90       Microscopic Comment SEE COMMENT  Comment: Other formed elements not mentioned in the report are not   present in the microscopic examination.            Mono #   0.7       Mono %   5.5       MPV   10.1       NITRITE UA Negative         nRBC   0       Occult Blood UA 1+         pH, UA 7.0         Platelets   265       Potassium   4.1       PROTEIN TOTAL   8.9       Protein, UA 3+  Comment: Recommend a 24 hour urine protein or a urine   protein/creatinine ratio if globulin induced proteinuria is  clinically suspected.            RBC   5.95       RBC, UA 6         RDW   12.6       Sodium   139       Specific Gravity, UA >1.030         Specimen UA Urine, Clean Catch         UROBILINOGEN UA Negative         WBC, UA 0         WBC   12.34               Significant Imaging: I have reviewed all pertinent imaging results/findings within the past 24 hours.  Imaging Results              CT Abdomen Pelvis With Contrast (Final result)  Result time 05/19/23 01:25:47      Final result by Dalton Lyle MD (05/19/23 01:25:47)                   Impression:      1. No acute intra-abdominal abnormalities identified.  2. Sigmoid diverticulosis with no evidence of acute diverticulitis.  Previously visualized inflammatory changes involving the sigmoid colon have resolved.  3. Postsurgical changes and additional stable findings as detailed above.      Electronically signed by: Dalton Lyle MD  Date:    05/19/2023  Time:    01:25               Narrative:    EXAMINATION:  CT ABDOMEN PELVIS WITH CONTRAST    CLINICAL HISTORY:  LLQ abdominal pain;Periumbilical abdominal pain history of diverticulitis;    TECHNIQUE:  Low dose axial images, sagittal and coronal reformations were obtained from the lung bases to the pubic symphysis following the IV administration of 70 mL of Omnipaque 350 .  Oral contrast was not given.    COMPARISON:  CT abdomen and pelvis from 04/26/2023.    FINDINGS:  The visualized portion of the heart is unremarkable.  The lung bases are clear.  Circumferential wall thickening is again seen involving the distal visualized esophagus which can be seen in the setting of esophagitis.    No significant hepatic abnormalities are identified.  There is no intra-or extrahepatic biliary ductal dilatation.  The gallbladder is unremarkable.  Stomach is distended with fluid.  Pancreas, spleen, and adrenal glands show no significant abnormalities.    Kidneys enhance normally with no evidence of hydronephrosis.  No abnormalities are seen along the ureteral  courses.  Urinary bladder is unremarkable.  Prostate is mildly enlarged.    Postsurgical changes are seen at the base of the cecum consistent with prior appendectomy.  There is sigmoid diverticulosis with no evidence of acute diverticulitis.  Previously visualized inflammatory changes involving the sigmoid colon have resolved.  No evidence of bowel obstruction.  No free air or free fluid.    Aorta tapers normally.  Prominent calcified and noncalcified aortic plaque are seen which extends into the bilateral iliacs.    No acute osseous abnormality identified.  AVN changes are again seen involving the bilateral femoral heads.  Intervertebral disc space narrowing and degenerative changes are seen at the L5-S1 level.  Small fat containing right inguinal hernia is seen.

## 2023-05-19 NOTE — ED NOTES
Pt presents to ED c/o umbilical abd pain that started this am. Pt states he's had multiple episodes of vomiting. Denies chest pain or sob. Denies urinary or bowel complaints. Pt states he took one pain pill early but has had no relief. Reports non compliant with medications. Bp 199/100. Denies blood pressure related complaints.. See assessments for further.

## 2023-05-19 NOTE — ED NOTES
"Informed Dr Rod of BP change. Pt denies weakness or dizziness. Pt states " I feel better, pain is better".  "

## 2023-05-19 NOTE — PROGRESS NOTES
CHW - Initial Contact    This Community Health Worker completed the Social Determinant of Health questionnaire with patient  at bedside  today.    Pt identified barriers of most importance are: utility bill payments.   Referrals to community agencies completed with patient/caregiver consent outside of [facility] include: none at this time.  Referrals were put through [facility] - yes: [facility]  Support and Services: has no support at this time.  Other information discussed the patient needs / wants help with: Completed SDOH with patient at bedside today, patient needs are utility bill payments, will follow up in one week.   Follow up required: yes.  Follow-up Outreach - Due: 5/25/2023

## 2023-05-19 NOTE — H&P
[facility] Medicine  History & Physical    Patient Name: Nino Price  MRN: [id number]  Patient Class: OP- Observation  Admission Date: 5/18/2023  Attending Physician: Dusty Gomez MD   Primary Care Provider: Pritesh Marinelli MD         Patient information was obtained from patient, past medical records and ER records.     Subjective:     Principal Problem:Abdominal pain    Chief Complaint:   Chief Complaint   Patient presents with    Abdominal Pain     Pt c/o sharp periumbilical abdominal pain with nausea/vomiting (10xs) starting this morning and ongoing constant all day; pt reports hx of similar pain due to diverticulitis; pt took Oxycodone this morning with short term relief; pt moaning and holding stomach        HPI: Nino Price 58 y.o. male with diabetes, hypertension, diverticulosis, and appendectomy presents to the hospital with a chief complaint of abdominal pain.  Associated symptoms include nausea and vomiting, patient describes the pain as sharp/stabbing constant and periumbilical with radiation to all quadrants, he rates it currently 7/10.  Patient attempted self-treatment with home oxycodone without any significant relief. He reports having 3 episodes of nonbloody nonbilious emesis.  He attempted to drink chicken broth 3 hours prior to ED arrival and had another episode of emesis.  Denies any dysuria, hematuria, recent changes in his appetite, recent antibiotic use, fevers, chills, cough, SOB, CP, melena or other associated symptoms.  Patient denies any recent use of marijuana however he states he does use occasionally.  No other alleviating or exacerbating factors noted    In the ED patient was afebrile without leukocytosis, CBC mostly unremarkable, CO2 21, anion gap 17, glucose 184, protein total 8.9, AST/ALT and lipase all WNL, lactate WNL, UA showed high specific gravity, +3 protein, +1 glucose, +2 ketones, +1 occult blood.  CT abdomen with  "contrast showed "Sigmoid diverticulosis with no evidence of acute diverticulitis.  Previously visualized inflammatory changes involving the sigmoid colon have resolved" patient was placed in observation for further workup and management of abdominal pain      Past Medical History:   Diagnosis Date    Anxiety     Asthma     Colitis     Depression     Diabetes mellitus     Head injury     History of hepatitis C, s/p successful hcv treatment w/ SVR 8-2015  10/8/2012    SVR(24) as of 11/2015.  SVR(12) as of 8/2015.  completed harvoni 24 week regimen for genotype 1a 5/18/15     Hypertension     Shoulder pain     left, s/p 4 surgeries.     Stroke        Past Surgical History:   Procedure Laterality Date    COLONOSCOPY N/A 10/20/2015    Procedure: COLONOSCOPY;  Surgeon: Jagdish Patricia MD;  Location: [facility] ENDO;  Service: Endoscopy;  Laterality: N/A;    DIAGNOSTIC LAPAROSCOPY N/A 4/1/2021    Procedure: LAPAROSCOPY, DIAGNOSTIC;  Surgeon: Umesh Vallecillo MD;  Location: [facility] OR;  Service: General;  Laterality: N/A;  RN PREOP 3/29/21, COVID NEGATIVE ON 3/29  CONSENT AND H/P INCOMPLETE    GALLBLADDER SURGERY      pt not 100% if gall bladder removed    rotator cuff      left side       Review of patient's allergies indicates:   Allergen Reactions    Codeine Nausea Only       Current Facility-Administered Medications on File Prior to Encounter   Medication    aluminum-magnesium hydroxide-simethicone 200-200-20 mg/5 mL suspension 30 mL    dicyclomine 10 mg/5 mL syrup 20 mg    LIDOcaine HCl 2% oral solution 10 mL     Current Outpatient Medications on File Prior to Encounter   Medication Sig    metFORMIN (GLUCOPHAGE) 1000 MG tablet Take 1,000 mg by mouth 2 (two) times daily with meals.    NAMENDA 10 mg Tab Take 10 mg by mouth once daily.     oxyCODONE-acetaminophen (PERCOCET)  mg per tablet TAKE 1 TABLET AS NEEDED ORALLY EVERY 4 TO 6 HOURS    ARIPiprazole (ABILIFY) 10 MG Tab Take 10 mg by mouth once " daily.    busPIRone (BUSPAR) 5 MG Tab Take 15 mg by mouth once daily. Takes in pm    doxepin (SINEQUAN) 100 MG capsule Take 10 mg by mouth every evening.    DULoxetine (CYMBALTA) 60 MG capsule Take 60 mg by mouth once daily. Takes in pm    gabapentin (NEURONTIN) 300 MG capsule Take 300 mg by mouth 3 (three) times daily.    lisinopril-hydrochlorothiazide (PRINZIDE,ZESTORETIC) 10-12.5 mg per tablet Take 1 tablet by mouth once daily. morning    pantoprazole (PROTONIX) 40 MG tablet Take 1 tablet (40 mg total) by mouth 2 (two) times daily.    propranoloL (INDERAL) 20 MG tablet Take 10 mg by mouth 3 (three) times daily.     TRUE METRIX GLUCOSE METER Misc     TRUE METRIX GLUCOSE TEST STRIP Strp     valsartan-hydrochlorothiazide (DIOVAN-HCT) 160-25 mg per tablet Take 1 tablet by mouth once daily.     Family History       Problem Relation (Age of Onset)    Cirrhosis Brother    Heart disease Father    Hypertension Mother          Tobacco Use    Smoking status: Every Day     Packs/day: 1.00     Years: 35.00     Pack years: 35.00     Types: Cigarettes    Smokeless tobacco: Never   Substance and Sexual Activity    Alcohol use: No    Drug use: Yes     Frequency: 7.0 times per week     Types: Marijuana    Sexual activity: Yes     Partners: Female     Review of Systems   Constitutional:  Negative for chills and fever.   HENT:  Negative for congestion and rhinorrhea.    Eyes:  Negative for photophobia and visual disturbance.   Respiratory:  Negative for cough and shortness of breath.    Cardiovascular:  Negative for chest pain, palpitations and leg swelling.   Gastrointestinal:  Positive for abdominal pain, nausea and vomiting. Negative for diarrhea.   Genitourinary:  Negative for frequency, hematuria and urgency.   Skin:  Negative for pallor, rash and wound.   Neurological:  Negative for light-headedness and headaches.   Psychiatric/Behavioral:  Negative for confusion and decreased concentration.    Objective:      Vital Signs (Most Recent):  Temp: 98.1 °F (36.7 °C) (05/19/23 0308)  Pulse: 70 (05/19/23 0308)  Resp: 18 (05/19/23 0308)  BP: 120/60 (05/19/23 0308)  SpO2: 98 % (05/19/23 0303) Vital Signs (24h Range):  Temp:  [98.1 °F (36.7 °C)-98.6 °F (37 °C)] 98.1 °F (36.7 °C)  Pulse:  [51-81] 70  Resp:  [16-20] 18  SpO2:  [92 %-98 %] 98 %  BP: ()/() 120/60     Weight: 63.5 kg (140 lb)  Body mass index is 24.03 kg/m².     Physical Exam  Vitals and nursing note reviewed.   Constitutional:       General: He is not in acute distress.     Appearance: He is well-developed.   HENT:      Head: Normocephalic and atraumatic.      Right Ear: External ear normal.      Left Ear: External ear normal.      Nose: Nose normal.   Eyes:      Conjunctiva/sclera: Conjunctivae normal.      Pupils: Pupils are equal, round, and reactive to light.   Cardiovascular:      Rate and Rhythm: Normal rate and regular rhythm.   Pulmonary:      Effort: Pulmonary effort is normal. No respiratory distress.      Breath sounds: Normal breath sounds. No wheezing or rales.   Abdominal:      General: Bowel sounds are normal. There is no distension.      Palpations: Abdomen is soft.      Tenderness: There is generalized abdominal tenderness and tenderness in the periumbilical area. There is guarding (Voluntary). There is no right CVA tenderness or left CVA tenderness.      Comments: No palpable hepatomegaly or splenomegaly   Musculoskeletal:         General: No tenderness. Normal range of motion.      Cervical back: Normal range of motion and neck supple.   Skin:     General: Skin is warm and dry.   Neurological:      Mental Status: He is alert and oriented to person, place, and time.   Psychiatric:         Thought Content: Thought content normal.            CRANIAL NERVES     CN III, IV, VI   Pupils are equal, round, and reactive to light.     Significant Labs: All pertinent labs within the past 24 hours have been reviewed.  Recent Lab Results          05/19/23  0130   05/18/23  2356        Albumin   4.6       Alkaline Phosphatase   81       ALT   14       Anion Gap   17       Appearance, UA Clear         AST   17       Bacteria, UA None         Baso #   0.07       Basophil %   0.6       Bilirubin (UA) Negative         BILIRUBIN TOTAL   0.8  Comment: For infants and newborns, interpretation of results should be based  on gestational age, weight and in agreement with clinical  observations.    Premature Infant recommended reference ranges:  Up to 24 hours.............<8.0 mg/dL  Up to 48 hours............<12.0 mg/dL  3-5 days..................<15.0 mg/dL  6-29 days.................<15.0 mg/dL         BUN   10       Calcium   10.3       Chloride   101       CO2   21       Color, UA Yellow         Creatinine   1.2       Differential Method   Automated       eGFR   >60       Eos #   0.0       Eosinophil %   0.0       Glucose   184       Glucose, UA 1+         Gran # (ANC)   9.8       Gran %   79.4       Hematocrit   53.3       Hemoglobin   18.9       Hyaline Casts, UA 0         Immature Grans (Abs)   0.03  Comment: Mild elevation in immature granulocytes is non specific and   can be seen in a variety of conditions including stress response,   acute inflammation, trauma and pregnancy. Correlation with other   laboratory and clinical findings is essential.         Immature Granulocytes   0.2       Ketones, UA 2+         Lactate, Raimundo   1.4  Comment: Falsely low lactic acid results can be found in samples   containing >=13.0 mg/dL total bilirubin and/or >=3.5 mg/dL   direct bilirubin.         Leukocytes, UA Negative         Lipase   5       Lymph #   1.8       Lymph %   14.3       Magnesium   1.6       MCH   31.8       MCHC   35.5       MCV   90       Microscopic Comment SEE COMMENT  Comment: Other formed elements not mentioned in the report are not   present in the microscopic examination.            Mono #   0.7       Mono %   5.5       MPV   10.1       NITRITE UA  Negative         nRBC   0       Occult Blood UA 1+         pH, UA 7.0         Platelets   265       Potassium   4.1       PROTEIN TOTAL   8.9       Protein, UA 3+  Comment: Recommend a 24 hour urine protein or a urine   protein/creatinine ratio if globulin induced proteinuria is  clinically suspected.           RBC   5.95       RBC, UA 6         RDW   12.6       Sodium   139       Specific Gravity, UA >1.030         Specimen UA Urine, Clean Catch         UROBILINOGEN UA Negative         WBC, UA 0         WBC   12.34               Significant Imaging: I have reviewed all pertinent imaging results/findings within the past 24 hours.  Imaging Results              CT Abdomen Pelvis With Contrast (Final result)  Result time 05/19/23 01:25:47      Final result by Dalton Lyle MD (05/19/23 01:25:47)                   Impression:      1. No acute intra-abdominal abnormalities identified.  2. Sigmoid diverticulosis with no evidence of acute diverticulitis.  Previously visualized inflammatory changes involving the sigmoid colon have resolved.  3. Postsurgical changes and additional stable findings as detailed above.      Electronically signed by: Dalton Lyle MD  Date:    05/19/2023  Time:    01:25               Narrative:    EXAMINATION:  CT ABDOMEN PELVIS WITH CONTRAST    CLINICAL HISTORY:  LLQ abdominal pain;Periumbilical abdominal pain history of diverticulitis;    TECHNIQUE:  Low dose axial images, sagittal and coronal reformations were obtained from the lung bases to the pubic symphysis following the IV administration of 70 mL of Omnipaque 350 .  Oral contrast was not given.    COMPARISON:  CT abdomen and pelvis from 04/26/2023.    FINDINGS:  The visualized portion of the heart is unremarkable.  The lung bases are clear.  Circumferential wall thickening is again seen involving the distal visualized esophagus which can be seen in the setting of esophagitis.    No significant hepatic abnormalities are identified.   There is no intra-or extrahepatic biliary ductal dilatation.  The gallbladder is unremarkable.  Stomach is distended with fluid.  Pancreas, spleen, and adrenal glands show no significant abnormalities.    Kidneys enhance normally with no evidence of hydronephrosis.  No abnormalities are seen along the ureteral courses.  Urinary bladder is unremarkable.  Prostate is mildly enlarged.    Postsurgical changes are seen at the base of the cecum consistent with prior appendectomy.  There is sigmoid diverticulosis with no evidence of acute diverticulitis.  Previously visualized inflammatory changes involving the sigmoid colon have resolved.  No evidence of bowel obstruction.  No free air or free fluid.    Aorta tapers normally.  Prominent calcified and noncalcified aortic plaque are seen which extends into the bilateral iliacs.    No acute osseous abnormality identified.  AVN changes are again seen involving the bilateral femoral heads.  Intervertebral disc space narrowing and degenerative changes are seen at the L5-S1 level.  Small fat containing right inguinal hernia is seen.                                        Assessment/Plan:     * Abdominal pain  Presented to the hospital due to onset of sharp stabbing abdominal pain, CT without acute abdominal findings.  Lipase, lactate and LFTs WNL, patient received GI cocktail, baclofen, IV PPI in ED without relief.  UA without signs of infection    Am CBC/CMP  Prn antiemetics  Prn analgesics   PPI  reglan  Check beta hydroxybutyrate  Consult to GI    GERD (gastroesophageal reflux disease)  Treatments as above    Hyperlipidemia  Patient denies use of any statin medication  Check lipid panel    Depression  Patient has persistent depression which is unknown and is currently controlled. Will Continue anti-depressant medications. We will not consult psychiatry at this time. Patient does not display psychosis at this time. Continue to monitor closely and adjust plan of care as  needed.        Tobacco abuse  5 minutes spent counseling the patient on smoking cessation and he is not currently ready to stop smoking. He will be offereded a nicotine transdermal patch while hospitalized and monitored for withdrawal.  Will provide additional smoking cessation counseling prior to discharge.     Essential hypertension  Initially hypertensive in the ED, post ED intervention BP is well controlled continue home antihypertensives    Type 2 diabetes mellitus, controlled  Patient's FSGs are controlled on current medication regimen.  Last A1c reviewed-   Lab Results   Component Value Date    HGBA1C 6.8 (H) 01/05/2022     Most recent fingerstick glucose reviewed- No results for input(s): POCTGLUCOSE in the last 24 hours.  Current correctional scale  Low  Maintain anti-hyperglycemic dose as follows-   Antihyperglycemics (From admission, onward)    Start     Stop Route Frequency Ordered    05/19/23 0437  insulin aspart U-100 pen 0-5 Units         -- SubQ Before meals & nightly PRN 05/19/23 0345        Hold Oral hypoglycemics while patient is in the hospital.    Chronic hepatitis C  S/p treatment, no acute issues        VTE Risk Mitigation (From admission, onward)         Ordered     IP VTE LOW RISK PATIENT  Once         05/19/23 0345     Place sequential compression device  Until discontinued         05/19/23 0345                     On 05/19/2023, patient should be placed in hospital observation services under my care in collaboration with Dusty Gomez MD.      Augustus Alejo NP  Department of Hospital Medicine  [facility] - Emergency Dept

## 2023-05-19 NOTE — ASSESSMENT & PLAN NOTE
Presented to the hospital due to onset of sharp stabbing abdominal pain, CT without acute abdominal findings.  Lipase, lactate and LFTs WNL, patient received GI cocktail, baclofen, IV PPI in ED without relief.  UA without signs of infection    Am CBC/CMP  Prn antiemetics  Prn analgesics   PPI  reglan  Check beta hydroxybutyrate  Consult to GI

## 2023-05-19 NOTE — ED PROVIDER NOTES
Encounter Date: 5/18/2023       History     Chief Complaint   Patient presents with    Abdominal Pain     Pt c/o sharp periumbilical abdominal pain with nausea/vomiting (10xs) starting this morning and ongoing constant all day; pt reports hx of similar pain due to diverticulitis; pt took Oxycodone this morning with short term relief; pt moaning and holding stomach     58-year-old male with past medical history of diabetes, hypertension, diverticulosis, and appendectomy presents with abdominal pain, nausea vomiting since this morning.  Abdominal pain described as periumbilical with radiation to all quadrants.  Pain described as cramping in nature and rated 10/10.  Patient took a dose of oxycodone without any significant relief.  He reports having 3 episodes of nonbloody nonbilious emesis.  He attempted to drink chicken broth 3 hours prior to ED arrival and had another episode of emesis.  Denies any dysuria hematuria.  Denies any recent changes in his appetite.  Denies any recent antibiotic use.  Last bowel movement was yesterday and described as normal.  Denies any fevers or chills, no cough or shortness of breath.,    The history is provided by the patient and the spouse.   Review of patient's allergies indicates:   Allergen Reactions    Codeine Nausea Only     Past Medical History:   Diagnosis Date    Anxiety     Asthma     Colitis     Depression     Diabetes mellitus     Head injury     History of hepatitis C, s/p successful hcv treatment w/ SVR 8-2015  10/8/2012    SVR(24) as of 11/2015.  SVR(12) as of 8/2015.  completed harvoni 24 week regimen for genotype 1a 5/18/15     Hypertension     Shoulder pain     left, s/p 4 surgeries.     Stroke      Past Surgical History:   Procedure Laterality Date    COLONOSCOPY N/A 10/20/2015    Procedure: COLONOSCOPY;  Surgeon: Jagdish Patricia MD;  Location: [facility];  Service: Endoscopy;  Laterality: N/A;    DIAGNOSTIC LAPAROSCOPY N/A 4/1/2021    Procedure: LAPAROSCOPY,  DIAGNOSTIC;  Surgeon: Umesh Vallecillo MD;  Location: [facility] OR;  Service: General;  Laterality: N/A;  RN PREOP 3/29/21, COVID NEGATIVE ON 3/29  CONSENT AND H/P INCOMPLETE    GALLBLADDER SURGERY      pt not 100% if gall bladder removed    rotator cuff      left side     Family History   Problem Relation Age of Onset    Hypertension Mother     Heart disease Father     Cirrhosis Brother     Colon cancer Neg Hx     Esophageal cancer Neg Hx      Social History     Tobacco Use    Smoking status: Every Day     Packs/day: 1.00     Years: 35.00     Pack years: 35.00     Types: Cigarettes    Smokeless tobacco: Never   Substance Use Topics    Alcohol use: No    Drug use: Yes     Frequency: 7.0 times per week     Types: Marijuana     Review of Systems   Constitutional:  Negative for chills and fever.   HENT:  Negative for congestion and rhinorrhea.    Eyes:  Negative for pain.   Respiratory:  Negative for cough and shortness of breath.    Cardiovascular:  Negative for chest pain and leg swelling.   Gastrointestinal:  Positive for abdominal pain. Negative for nausea and vomiting.   Endocrine: Negative for polyuria.   Genitourinary:  Negative for dysuria and hematuria.   Musculoskeletal:  Negative for gait problem and neck pain.   Skin:  Negative for rash.   Allergic/Immunologic: Negative for immunocompromised state.   Neurological:  Negative for weakness and headaches.     Physical Exam     Initial Vitals [05/18/23 2333]   BP Pulse Resp Temp SpO2   (!) 174/100 80 20 98.6 °F (37 °C) 97 %      MAP       --         Physical Exam    Constitutional: He is not diaphoretic. He appears distressed.   HENT:   Head: Normocephalic and atraumatic.   Eyes: Conjunctivae and EOM are normal. Pupils are equal, round, and reactive to light.   Neck:   Normal range of motion.  Cardiovascular:  Regular rhythm.           Pulses:       Radial pulses are 2+ on the right side and 2+ on the left side.        Posterior tibial pulses are 2+ on the right  side and 2+ on the left side.   Pulmonary/Chest: Breath sounds normal. No respiratory distress.   Abdominal: Abdomen is soft. Bowel sounds are normal. He exhibits no distension. There is generalized abdominal tenderness and tenderness in the periumbilical area. There is guarding (Patient with voluntary guarding). There is no rebound.   Musculoskeletal:         General: No tenderness. Normal range of motion.      Cervical back: Normal range of motion.     Lymphadenopathy:     He has no cervical adenopathy.   Neurological: He is alert and oriented to person, place, and time.   Skin: Skin is warm. Capillary refill takes less than 2 seconds.   Psychiatric: His behavior is normal.       ED Course   Procedures  Labs Reviewed   CBC W/ AUTO DIFFERENTIAL - Abnormal; Notable for the following components:       Result Value    Hemoglobin 18.9 (*)     MCH 31.8 (*)     Gran # (ANC) 9.8 (*)     Gran % 79.4 (*)     Lymph % 14.3 (*)     All other components within normal limits   COMPREHENSIVE METABOLIC PANEL - Abnormal; Notable for the following components:    CO2 21 (*)     Glucose 184 (*)     Total Protein 8.9 (*)     Anion Gap 17 (*)     All other components within normal limits   URINALYSIS, REFLEX TO URINE CULTURE - Abnormal; Notable for the following components:    Specific Gravity, UA >1.030 (*)     Protein, UA 3+ (*)     Glucose, UA 1+ (*)     Ketones, UA 2+ (*)     Occult Blood UA 1+ (*)     All other components within normal limits    Narrative:     Specimen Source->Urine   URINALYSIS MICROSCOPIC - Abnormal; Notable for the following components:    RBC, UA 6 (*)     All other components within normal limits    Narrative:     Specimen Source->Urine   LIPASE   LACTIC ACID, PLASMA   MAGNESIUM          Imaging Results              CT Abdomen Pelvis With Contrast (Final result)  Result time 05/19/23 01:25:47      Final result by Dalton Lyle MD (05/19/23 01:25:47)                   Impression:      1. No acute  intra-abdominal abnormalities identified.  2. Sigmoid diverticulosis with no evidence of acute diverticulitis.  Previously visualized inflammatory changes involving the sigmoid colon have resolved.  3. Postsurgical changes and additional stable findings as detailed above.      Electronically signed by: Dalton Lyle MD  Date:    05/19/2023  Time:    01:25               Narrative:    EXAMINATION:  CT ABDOMEN PELVIS WITH CONTRAST    CLINICAL HISTORY:  LLQ abdominal pain;Periumbilical abdominal pain history of diverticulitis;    TECHNIQUE:  Low dose axial images, sagittal and coronal reformations were obtained from the lung bases to the pubic symphysis following the IV administration of 70 mL of Omnipaque 350 .  Oral contrast was not given.    COMPARISON:  CT abdomen and pelvis from 04/26/2023.    FINDINGS:  The visualized portion of the heart is unremarkable.  The lung bases are clear.  Circumferential wall thickening is again seen involving the distal visualized esophagus which can be seen in the setting of esophagitis.    No significant hepatic abnormalities are identified.  There is no intra-or extrahepatic biliary ductal dilatation.  The gallbladder is unremarkable.  Stomach is distended with fluid.  Pancreas, spleen, and adrenal glands show no significant abnormalities.    Kidneys enhance normally with no evidence of hydronephrosis.  No abnormalities are seen along the ureteral courses.  Urinary bladder is unremarkable.  Prostate is mildly enlarged.    Postsurgical changes are seen at the base of the cecum consistent with prior appendectomy.  There is sigmoid diverticulosis with no evidence of acute diverticulitis.  Previously visualized inflammatory changes involving the sigmoid colon have resolved.  No evidence of bowel obstruction.  No free air or free fluid.    Aorta tapers normally.  Prominent calcified and noncalcified aortic plaque are seen which extends into the bilateral iliacs.    No acute osseous  abnormality identified.  AVN changes are again seen involving the bilateral femoral heads.  Intervertebral disc space narrowing and degenerative changes are seen at the L5-S1 level.  Small fat containing right inguinal hernia is seen.                                       Medications   ondansetron injection 4 mg (4 mg Intravenous Given 5/18/23 2357)   HYDROmorphone injection 1 mg (1 mg Intravenous Given 5/18/23 2357)   sodium chloride 0.9% bolus 1,000 mL 1,000 mL (0 mLs Intravenous Stopped 5/19/23 0056)   aluminum-magnesium hydroxide-simethicone 200-200-20 mg/5 mL suspension 30 mL (30 mLs Oral Given 5/19/23 0133)     And   LIDOcaine HCl 2% oral solution 15 mL (15 mLs Oral Given 5/19/23 0133)   famotidine tablet 40 mg (40 mg Oral Given 5/19/23 0133)   iohexoL (OMNIPAQUE 350) injection 70 mL (70 mLs Intravenous Given 5/19/23 0053)   ketorolac injection 15 mg (15 mg Intravenous Given 5/19/23 0228)   fentaNYL 50 mcg/mL injection 25 mcg (25 mcg Intravenous Given 5/19/23 0227)   pantoprazole injection 80 mg (80 mg Intravenous Given 5/19/23 0228)     Medical Decision Making:   History:   Old Medical Records: I decided to obtain old medical records.  Initial Assessment:   58-year-old male with past medical history of diabetes, hypertension, diverticulosis, and appendectomy presents with abdominal pain, nausea vomiting since this morning. Abdominal exam without peritoneal signs. No evidence of acute abdomen at this time. Well appearing. Pts abdominal pain likely secondary to gastroenteritis versus diverticulitis given his history. Differential includes acute hepatobiliary disease (including acute cholecystitis or cholangitis), acute pancreatitis , PUD (including gastric perforation), acute infectious processes (pneumonia, hepatitis, pyelonephritis), vascular catastrophe, bowel obstruction, viscus perforation, or diverticulitis. Will w/u with labs and imaging pt was given Dilaudid, Zofran for pain control. Will reassess after  labs and rest of the w/u.     Clinical Tests:   Lab Tests: Ordered and Reviewed  Radiological Study: Ordered and Reviewed           ED Course as of 05/19/23 0318   Fri May 19, 2023   0042 CBC without significant leukocytosis, anemia, or platelet abnormalities.  Chem 14 negative for hypo-or hyper natremia, kalemia, chloridemia, or other electrolyte abnormalities; BUN and creatinine were within normal limits indicating normal kidney function, ALT and AST were within normal limits indicating normal liver function.   [AS]   0131 CT abdomen pelvis without any acute intra-abdominal pathology.  Patient continues to have circumferential thickening of the distal esophagus likely causing his pain.  Patient has yet to follow-up with gastroenterology despite previous findings and recommendations to.  Patient reports not being aware he had to see the GI doctors.  Will give GI cocktail and Pepcid and reassess [AS]   0222 Patient continues to endorse 10/10 pain in the abdomen despite Dilaudid, GI cocktail.  CT abdomen and pelvis without any acute findings besides the circumferential distal esophageal thickening.  Lactic acid within normal limits and white count normal.  Given persistence his pain will admit for pain control [AS]   0312 After review of the patient's physical exam, ED testing, and history/symptoms, the patient requires additional care in the hospital. Discussed patients case with inpatient provider (MD/BECKY/PA)  who will accept the patient and any pending labs/imaging/interventions. The diagnosis, treatment and plan were discussed with the patient. All questions or concerns have been addressed.   [AS]      ED Course User Index  [AS] Spencer Rod MD            Please put in 35 minutes of critical care   Separate from teaching and exclusive of procedure and ekg time  Includes:  Time at bedside  Time reviewing test results  Time discussing case with staff  Time documenting the medical record  Time spent with family  members  Time spent with consults  Management      DISCLAIMER: This note was prepared with [employer] voice recognition transcription software. Garbled syntax, mangled pronouns, and other bizarre constructions may be attributed to that software system.        Clinical Impression:   Final diagnoses:  [R10.9] Abdominal pain        ED Disposition Condition    Observation Stable                Spencer Rod MD  05/19/23 3430

## 2023-06-19 ENCOUNTER — PATIENT OUTREACH (OUTPATIENT)
Dept: ADMINISTRATIVE | Facility: OTHER | Age: 58
End: 2023-06-19

## 2023-06-19 NOTE — PROGRESS NOTES
CH[facility] - Outreach Attempt    Community Health Worker left a voicemail message for 1st attempt to contact patient regardinst follow up visit attempt call.

## 2023-06-23 ENCOUNTER — TELEPHONE (OUTPATIENT)
Dept: CARDIOLOGY | Facility: CLINIC | Age: 58
End: 2023-06-23
Payer: MEDICARE

## 2023-06-23 NOTE — TELEPHONE ENCOUNTER
MA returned patient's call, but he did not answer. A voicemail was left for patient to return a call to our office.

## 2023-06-23 NOTE — TELEPHONE ENCOUNTER
----- Message from Gilda Hwang sent at 6/23/2023 11:09 AM CDT -----  Type: Patient Call Back    Who called:Self     What is the request in detail: Asking for a call     Can the clinic reply by [facility]? No     Would the patient rather a call back or a response via [facility]? Call     Best call back number: [phone] (home)

## 2023-07-07 NOTE — PROGRESS NOTES
CHW - Outreach Attempt    Community Health Worker left a voicemail message for 2nd attempt to contact patient regardinnd follow up visit attempt call.

## 2023-08-09 ENCOUNTER — HOSPITAL ENCOUNTER (OUTPATIENT)
Facility: HOSPITAL | Age: 58
Discharge: HOME | End: 2023-08-11
Attending: EMERGENCY MEDICINE | Admitting: EMERGENCY MEDICINE
Payer: MEDICARE

## 2023-08-09 DIAGNOSIS — R79.89 ELEVATED BRAIN NATRIURETIC PEPTIDE (BNP) LEVEL: ICD-10-CM

## 2023-08-09 DIAGNOSIS — R00.1 BRADYCARDIA: ICD-10-CM

## 2023-08-09 DIAGNOSIS — I10 HYPERTENSION: ICD-10-CM

## 2023-08-09 DIAGNOSIS — K57.32 SIGMOID DIVERTICULITIS: ICD-10-CM

## 2023-08-09 DIAGNOSIS — K57.92 DIVERTICULITIS: ICD-10-CM

## 2023-08-09 DIAGNOSIS — F12.10 CANNABIS ABUSE: Primary | Chronic | ICD-10-CM

## 2023-08-09 DIAGNOSIS — R07.9 CHEST PAIN: ICD-10-CM

## 2023-08-09 DIAGNOSIS — R79.89 ELEVATED TROPONIN I MEASUREMENT: ICD-10-CM

## 2023-08-09 DIAGNOSIS — R79.89 TROPONIN LEVEL ELEVATED: ICD-10-CM

## 2023-08-09 DIAGNOSIS — R79.89 ELEVATED TROPONIN: ICD-10-CM

## 2023-08-09 LAB
ALBUMIN SERPL BCP-MCNC: 4.6 G/DL (ref 3.5–5.2)
ALP SERPL-CCNC: 82 U/L (ref 55–135)
ALT SERPL W/O P-5'-P-CCNC: 14 U/L (ref 10–44)
ANION GAP SERPL CALC-SCNC: 17 MMOL/L (ref 8–16)
ANION GAP SERPL CALC-SCNC: 17 MMOL/L (ref 8–16)
AST SERPL-CCNC: 17 U/L (ref 10–40)
BACTERIA #/AREA URNS HPF: ABNORMAL /HPF
BASOPHILS # BLD AUTO: 0.04 K/UL (ref 0–0.2)
BASOPHILS NFR BLD: 0.3 % (ref 0–1.9)
BILIRUB SERPL-MCNC: 0.8 MG/DL (ref 0.1–1)
BILIRUB UR QL STRIP: NEGATIVE
BNP SERPL-MCNC: 427 PG/ML (ref 0–99)
BUN SERPL-MCNC: 11 MG/DL (ref 6–30)
BUN SERPL-MCNC: 9 MG/DL (ref 6–20)
CALCIUM SERPL-MCNC: 10.5 MG/DL (ref 8.7–10.5)
CHLORIDE SERPL-SCNC: 104 MMOL/L (ref 95–110)
CHLORIDE SERPL-SCNC: 105 MMOL/L (ref 95–110)
CLARITY UR: CLEAR
CO2 SERPL-SCNC: 20 MMOL/L (ref 23–29)
COLOR UR: YELLOW
CREAT SERPL-MCNC: 0.9 MG/DL (ref 0.5–1.4)
CREAT SERPL-MCNC: 1.1 MG/DL (ref 0.5–1.4)
DIFFERENTIAL METHOD: ABNORMAL
EOSINOPHIL # BLD AUTO: 0 K/UL (ref 0–0.5)
EOSINOPHIL NFR BLD: 0 % (ref 0–8)
ERYTHROCYTE [DISTWIDTH] IN BLOOD BY AUTOMATED COUNT: 12.8 % (ref 11.5–14.5)
EST. GFR  (NO RACE VARIABLE): >60 ML/MIN/1.73 M^2
GLUCOSE SERPL-MCNC: 161 MG/DL (ref 70–110)
GLUCOSE SERPL-MCNC: 167 MG/DL (ref 70–110)
GLUCOSE UR QL STRIP: ABNORMAL
HCT VFR BLD AUTO: 50 % (ref 40–54)
HCT VFR BLD CALC: 52 %PCV (ref 36–54)
HGB BLD-MCNC: 17.2 G/DL (ref 14–18)
HGB UR QL STRIP: ABNORMAL
HYALINE CASTS #/AREA URNS LPF: 0 /LPF
IMM GRANULOCYTES # BLD AUTO: 0.05 K/UL (ref 0–0.04)
IMM GRANULOCYTES NFR BLD AUTO: 0.4 % (ref 0–0.5)
KETONES UR QL STRIP: ABNORMAL
LACTATE SERPL-SCNC: 2 MMOL/L (ref 0.5–2.2)
LEUKOCYTE ESTERASE UR QL STRIP: NEGATIVE
LIPASE SERPL-CCNC: 16 U/L (ref 4–60)
LYMPHOCYTES # BLD AUTO: 1.5 K/UL (ref 1–4.8)
LYMPHOCYTES NFR BLD: 12.7 % (ref 18–48)
MCH RBC QN AUTO: 31.4 PG (ref 27–31)
MCHC RBC AUTO-ENTMCNC: 34.4 G/DL (ref 32–36)
MCV RBC AUTO: 91 FL (ref 82–98)
MICROSCOPIC COMMENT: ABNORMAL
MONOCYTES # BLD AUTO: 0.7 K/UL (ref 0.3–1)
MONOCYTES NFR BLD: 5.8 % (ref 4–15)
NEUTROPHILS # BLD AUTO: 9.7 K/UL (ref 1.8–7.7)
NEUTROPHILS NFR BLD: 80.8 % (ref 38–73)
NITRITE UR QL STRIP: NEGATIVE
NRBC BLD-RTO: 0 /100 WBC
PH UR STRIP: 7 [PH] (ref 5–8)
PLATELET # BLD AUTO: 216 K/UL (ref 150–450)
PMV BLD AUTO: 10 FL (ref 9.2–12.9)
POC IONIZED CALCIUM: 1.2 MMOL/L (ref 1.06–1.42)
POC TCO2 (MEASURED): 25 MMOL/L (ref 23–29)
POTASSIUM BLD-SCNC: 4.3 MMOL/L (ref 3.5–5.1)
POTASSIUM SERPL-SCNC: 4 MMOL/L (ref 3.5–5.1)
PROT SERPL-MCNC: 8.7 G/DL (ref 6–8.4)
PROT UR QL STRIP: ABNORMAL
RBC # BLD AUTO: 5.48 M/UL (ref 4.6–6.2)
RBC #/AREA URNS HPF: 3 /HPF (ref 0–4)
SAMPLE: ABNORMAL
SODIUM BLD-SCNC: 141 MMOL/L (ref 136–145)
SODIUM SERPL-SCNC: 142 MMOL/L (ref 136–145)
SP GR UR STRIP: 1.02 (ref 1–1.03)
TROPONIN I SERPL DL<=0.01 NG/ML-MCNC: 0.04 NG/ML (ref 0–0.03)
TROPONIN I SERPL DL<=0.01 NG/ML-MCNC: 0.06 NG/ML (ref 0–0.03)
URN SPEC COLLECT METH UR: ABNORMAL
UROBILINOGEN UR STRIP-ACNC: NEGATIVE EU/DL
WBC # BLD AUTO: 11.98 K/UL (ref 3.9–12.7)
WBC #/AREA URNS HPF: 8 /HPF (ref 0–5)

## 2023-08-09 PROCEDURE — 82565 ASSAY OF CREATININE: CPT

## 2023-08-09 PROCEDURE — 80307 DRUG TEST PRSMV CHEM ANLYZR: CPT | Performed by: EMERGENCY MEDICINE

## 2023-08-09 PROCEDURE — 84295 ASSAY OF SERUM SODIUM: CPT | Mod: 91

## 2023-08-09 PROCEDURE — 63600175 PHARM REV CODE 636 W HCPCS: Performed by: EMERGENCY MEDICINE

## 2023-08-09 PROCEDURE — 81000 URINALYSIS NONAUTO W/SCOPE: CPT | Mod: 59 | Performed by: EMERGENCY MEDICINE

## 2023-08-09 PROCEDURE — 93005 ELECTROCARDIOGRAM TRACING: CPT

## 2023-08-09 PROCEDURE — 25500020 PHARM REV CODE 255: Performed by: EMERGENCY MEDICINE

## 2023-08-09 PROCEDURE — 85025 COMPLETE CBC W/AUTO DIFF WBC: CPT | Performed by: EMERGENCY MEDICINE

## 2023-08-09 PROCEDURE — 83880 ASSAY OF NATRIURETIC PEPTIDE: CPT | Performed by: EMERGENCY MEDICINE

## 2023-08-09 PROCEDURE — 99900035 HC TECH TIME PER 15 MIN (STAT)

## 2023-08-09 PROCEDURE — 80053 COMPREHEN METABOLIC PANEL: CPT | Performed by: EMERGENCY MEDICINE

## 2023-08-09 PROCEDURE — 85014 HEMATOCRIT: CPT | Mod: 91

## 2023-08-09 PROCEDURE — 82330 ASSAY OF CALCIUM: CPT | Mod: 91

## 2023-08-09 PROCEDURE — 84132 ASSAY OF SERUM POTASSIUM: CPT

## 2023-08-09 PROCEDURE — 84484 ASSAY OF TROPONIN QUANT: CPT | Performed by: EMERGENCY MEDICINE

## 2023-08-09 PROCEDURE — 82330 ASSAY OF CALCIUM: CPT

## 2023-08-09 PROCEDURE — 83690 ASSAY OF LIPASE: CPT | Performed by: EMERGENCY MEDICINE

## 2023-08-09 PROCEDURE — 93010 ELECTROCARDIOGRAM REPORT: CPT | Mod: ,,, | Performed by: INTERNAL MEDICINE

## 2023-08-09 PROCEDURE — 96375 TX/PRO/DX INJ NEW DRUG ADDON: CPT | Mod: 59

## 2023-08-09 PROCEDURE — 83605 ASSAY OF LACTIC ACID: CPT | Performed by: EMERGENCY MEDICINE

## 2023-08-09 PROCEDURE — 99285 EMERGENCY DEPT VISIT HI MDM: CPT | Mod: 25

## 2023-08-09 PROCEDURE — 93010 EKG 12-LEAD: ICD-10-PCS | Mod: ,,, | Performed by: INTERNAL MEDICINE

## 2023-08-09 PROCEDURE — 84484 ASSAY OF TROPONIN QUANT: CPT | Mod: 91 | Performed by: EMERGENCY MEDICINE

## 2023-08-09 PROCEDURE — 80047 BASIC METABLC PNL IONIZED CA: CPT

## 2023-08-09 RX ORDER — HYDROMORPHONE HYDROCHLORIDE 1 MG/ML
0.5 INJECTION, SOLUTION INTRAMUSCULAR; INTRAVENOUS; SUBCUTANEOUS
Status: COMPLETED | OUTPATIENT
Start: 2023-08-09 | End: 2023-08-09

## 2023-08-09 RX ORDER — ONDANSETRON 2 MG/ML
4 INJECTION INTRAMUSCULAR; INTRAVENOUS
Status: COMPLETED | OUTPATIENT
Start: 2023-08-09 | End: 2023-08-09

## 2023-08-09 RX ORDER — HYDRALAZINE HYDROCHLORIDE 20 MG/ML
10 INJECTION INTRAMUSCULAR; INTRAVENOUS
Status: DISCONTINUED | OUTPATIENT
Start: 2023-08-09 | End: 2023-08-09

## 2023-08-09 RX ADMIN — IOHEXOL 85 ML: 350 INJECTION, SOLUTION INTRAVENOUS at 10:08

## 2023-08-09 RX ADMIN — HYDROMORPHONE HYDROCHLORIDE 0.5 MG: 1 INJECTION, SOLUTION INTRAMUSCULAR; INTRAVENOUS; SUBCUTANEOUS at 08:08

## 2023-08-09 RX ADMIN — ONDANSETRON 4 MG: 2 INJECTION INTRAMUSCULAR; INTRAVENOUS at 08:08

## 2023-08-09 NOTE — Clinical Note
Diagnosis: Elevated troponin [507793]   Future Attending Provider: JAMESON PAEZ [[id number]]   Admitting Provider:: ABE TOSCANO [[id number]]   Special Needs:: No Special Needs [1]

## 2023-08-10 PROBLEM — R79.89 ELEVATED BRAIN NATRIURETIC PEPTIDE (BNP) LEVEL: Status: ACTIVE | Noted: 2023-08-10

## 2023-08-10 PROBLEM — K57.32 SIGMOID DIVERTICULITIS: Status: ACTIVE | Noted: 2023-08-10

## 2023-08-10 PROBLEM — R79.89 ELEVATED TROPONIN: Status: ACTIVE | Noted: 2023-08-10

## 2023-08-10 LAB
AMPHET+METHAMPHET UR QL: NEGATIVE
ANION GAP SERPL CALC-SCNC: 12 MMOL/L (ref 8–16)
AV INDEX (PROSTH): 0.66
AV MEAN GRADIENT: 4 MMHG
AV PEAK GRADIENT: 7 MMHG
AV VALVE AREA BY VELOCITY RATIO: 1.29 CM²
AV VALVE AREA: 1.3 CM²
AV VELOCITY RATIO: 0.66
BARBITURATES UR QL SCN>200 NG/ML: NEGATIVE
BENZODIAZ UR QL SCN>200 NG/ML: NEGATIVE
BSA FOR ECHO PROCEDURE: 1.72 M2
BUN SERPL-MCNC: 17 MG/DL (ref 6–20)
BZE UR QL SCN: NEGATIVE
CALCIUM SERPL-MCNC: 9.3 MG/DL (ref 8.7–10.5)
CANNABINOIDS UR QL SCN: ABNORMAL
CHLORIDE SERPL-SCNC: 105 MMOL/L (ref 95–110)
CHOLEST SERPL-MCNC: 218 MG/DL (ref 120–199)
CHOLEST/HDLC SERPL: 8.4 {RATIO} (ref 2–5)
CO2 SERPL-SCNC: 22 MMOL/L (ref 23–29)
CREAT SERPL-MCNC: 1.1 MG/DL (ref 0.5–1.4)
CREAT UR-MCNC: 102.4 MG/DL (ref 23–375)
CV ECHO LV RWT: 0.32 CM
CV STRESS BASE HR: 52 BPM
DIASTOLIC BLOOD PRESSURE: 88 MMHG
DOP CALC AO PEAK VEL: 1.32 M/S
DOP CALC AO VTI: 30.6 CM
DOP CALC LVOT AREA: 2 CM2
DOP CALC LVOT DIAMETER: 1.58 CM
DOP CALC LVOT PEAK VEL: 0.87 M/S
DOP CALC LVOT STROKE VOLUME: 39.78 CM3
DOP CALC MV VTI: 37.5 CM
DOP CALCLVOT PEAK VEL VTI: 20.3 CM
E WAVE DECELERATION TIME: 183.81 MSEC
E/A RATIO: 1.38
E/E' RATIO: 14.57 M/S
ECHO LV POSTERIOR WALL: 0.84 CM (ref 0.6–1.1)
EST. GFR  (NO RACE VARIABLE): >60 ML/MIN/1.73 M^2
ETHANOL SERPL-MCNC: <10 MG/DL
FRACTIONAL SHORTENING: 16 % (ref 28–44)
GLUCOSE SERPL-MCNC: 121 MG/DL (ref 70–110)
HDLC SERPL-MCNC: 26 MG/DL (ref 40–75)
HDLC SERPL: 11.9 % (ref 20–50)
INTERVENTRICULAR SEPTUM: 0.78 CM (ref 0.6–1.1)
IVC DIAMETER: 1.8 CM
LA MAJOR: 5.2 CM
LA MINOR: 4.86 CM
LA WIDTH: 3.4 CM
LDLC SERPL CALC-MCNC: 160.8 MG/DL (ref 63–159)
LEFT ATRIUM SIZE: 2.87 CM
LEFT ATRIUM VOLUME INDEX: 24.4 ML/M2
LEFT ATRIUM VOLUME: 41.67 CM3
LEFT INTERNAL DIMENSION IN SYSTOLE: 4.47 CM (ref 2.1–4)
LEFT VENTRICLE DIASTOLIC VOLUME INDEX: 79.07 ML/M2
LEFT VENTRICLE DIASTOLIC VOLUME: 135.21 ML
LEFT VENTRICLE MASS INDEX: 89 G/M2
LEFT VENTRICLE SYSTOLIC VOLUME INDEX: 53.2 ML/M2
LEFT VENTRICLE SYSTOLIC VOLUME: 90.96 ML
LEFT VENTRICULAR INTERNAL DIMENSION IN DIASTOLE: 5.3 CM (ref 3.5–6)
LEFT VENTRICULAR MASS: 152.44 G
LV LATERAL E/E' RATIO: 10.2 M/S
LV SEPTAL E/E' RATIO: 25.5 M/S
LVOT MG: 1.42 MMHG
LVOT MV: 0.56 CM/S
METHADONE UR QL SCN>300 NG/ML: NEGATIVE
MV MEAN GRADIENT: 1 MMHG
MV PEAK A VEL: 0.74 M/S
MV PEAK E VEL: 1.02 M/S
MV PEAK GRADIENT: 5 MMHG
MV STENOSIS PRESSURE HALF TIME: 53.3 MS
MV VALVE AREA BY CONTINUITY EQUATION: 1.06 CM2
MV VALVE AREA P 1/2 METHOD: 4.13 CM2
NONHDLC SERPL-MCNC: 192 MG/DL
OHS CV CPX 85 PERCENT MAX PREDICTED HEART RATE MALE: 137
OHS CV CPX MAX PREDICTED HEART RATE: 161
OHS CV CPX PATIENT IS FEMALE: 0
OHS CV CPX PATIENT IS MALE: 1
OHS CV CPX PEAK DIASTOLIC BLOOD PRESSURE: 73 MMHG
OHS CV CPX PEAK HEAR RATE: 88 BPM
OHS CV CPX PEAK RATE PRESSURE PRODUCT: NORMAL
OHS CV CPX PEAK SYSTOLIC BLOOD PRESSURE: 125 MMHG
OHS CV CPX PERCENT MAX PREDICTED HEART RATE ACHIEVED: 55
OHS CV CPX RATE PRESSURE PRODUCT PRESENTING: 7748
OHS CV RV/LV RATIO: 0.43 CM
OPIATES UR QL SCN: NEGATIVE
PCP UR QL SCN>25 NG/ML: NEGATIVE
PISA MRMAX VEL: 5.17 M/S
POCT GLUCOSE: 114 MG/DL (ref 70–110)
POCT GLUCOSE: 116 MG/DL (ref 70–110)
POCT GLUCOSE: 120 MG/DL (ref 70–110)
POCT GLUCOSE: 153 MG/DL (ref 70–110)
POTASSIUM SERPL-SCNC: 3.4 MMOL/L (ref 3.5–5.1)
PV PEAK GRADIENT: 1 MMHG
PV PEAK VELOCITY: 0.61 M/S
RA MAJOR: 3.76 CM
RA PRESSURE ESTIMATED: 8 MMHG
RA WIDTH: 3.4 CM
RIGHT VENTRICLE ID DIMENSION: 2.3 CM
RIGHT VENTRICULAR END-DIASTOLIC DIMENSION: 2.33 CM
RV TISSUE DOPPLER FREE WALL SYSTOLIC VELOCITY 1 (APICAL 4 CHAMBER VIEW): 12.06 CM/S
SINUS: 2.47 CM
SODIUM SERPL-SCNC: 139 MMOL/L (ref 136–145)
STJ: 2.07 CM
SYSTOLIC BLOOD PRESSURE: 149 MMHG
TDI LATERAL: 0.1 M/S
TDI SEPTAL: 0.04 M/S
TDI: 0.07 M/S
TOXICOLOGY INFORMATION: ABNORMAL
TRIGL SERPL-MCNC: 156 MG/DL (ref 30–150)
TROPONIN I SERPL DL<=0.01 NG/ML-MCNC: 0.04 NG/ML (ref 0–0.03)
TROPONIN I SERPL DL<=0.01 NG/ML-MCNC: 0.05 NG/ML (ref 0–0.03)
TSH SERPL DL<=0.005 MIU/L-ACNC: 1.12 UIU/ML (ref 0.4–4)
Z-SCORE OF LEFT VENTRICULAR DIMENSION IN END DIASTOLE: 1.1
Z-SCORE OF LEFT VENTRICULAR DIMENSION IN END SYSTOLE: 3.31

## 2023-08-10 PROCEDURE — 96361 HYDRATE IV INFUSION ADD-ON: CPT

## 2023-08-10 PROCEDURE — 63600175 PHARM REV CODE 636 W HCPCS: Performed by: NURSE PRACTITIONER

## 2023-08-10 PROCEDURE — 99214 OFFICE O/P EST MOD 30 MIN: CPT | Mod: 25,,, | Performed by: INTERNAL MEDICINE

## 2023-08-10 PROCEDURE — 99214 PR OFFICE/OUTPT VISIT, EST, LEVL IV, 30-39 MIN: ICD-10-PCS | Mod: 25,,, | Performed by: INTERNAL MEDICINE

## 2023-08-10 PROCEDURE — 63600175 PHARM REV CODE 636 W HCPCS: Performed by: INTERNAL MEDICINE

## 2023-08-10 PROCEDURE — 82077 ASSAY SPEC XCP UR&BREATH IA: CPT | Performed by: EMERGENCY MEDICINE

## 2023-08-10 PROCEDURE — 63600175 PHARM REV CODE 636 W HCPCS

## 2023-08-10 PROCEDURE — C9113 INJ PANTOPRAZOLE SODIUM, VIA: HCPCS

## 2023-08-10 PROCEDURE — 80048 BASIC METABOLIC PNL TOTAL CA: CPT

## 2023-08-10 PROCEDURE — 80061 LIPID PANEL: CPT

## 2023-08-10 PROCEDURE — 96376 TX/PRO/DX INJ SAME DRUG ADON: CPT

## 2023-08-10 PROCEDURE — 25000003 PHARM REV CODE 250

## 2023-08-10 PROCEDURE — 63600175 PHARM REV CODE 636 W HCPCS: Performed by: EMERGENCY MEDICINE

## 2023-08-10 PROCEDURE — 96367 TX/PROPH/DG ADDL SEQ IV INF: CPT

## 2023-08-10 PROCEDURE — 82962 GLUCOSE BLOOD TEST: CPT | Mod: 91

## 2023-08-10 PROCEDURE — 96365 THER/PROPH/DIAG IV INF INIT: CPT | Mod: 59

## 2023-08-10 PROCEDURE — 25000003 PHARM REV CODE 250: Performed by: NURSE PRACTITIONER

## 2023-08-10 PROCEDURE — 84484 ASSAY OF TROPONIN QUANT: CPT | Mod: 91

## 2023-08-10 PROCEDURE — 96372 THER/PROPH/DIAG INJ SC/IM: CPT

## 2023-08-10 PROCEDURE — 96375 TX/PRO/DX INJ NEW DRUG ADDON: CPT | Mod: 59

## 2023-08-10 PROCEDURE — 84443 ASSAY THYROID STIM HORMONE: CPT

## 2023-08-10 PROCEDURE — 25000003 PHARM REV CODE 250: Performed by: EMERGENCY MEDICINE

## 2023-08-10 PROCEDURE — G0378 HOSPITAL OBSERVATION PER HR: HCPCS

## 2023-08-10 RX ORDER — ASPIRIN 325 MG
325 TABLET ORAL
Status: COMPLETED | OUTPATIENT
Start: 2023-08-10 | End: 2023-08-10

## 2023-08-10 RX ORDER — ATORVASTATIN CALCIUM 40 MG/1
40 TABLET, FILM COATED ORAL DAILY
Status: DISCONTINUED | OUTPATIENT
Start: 2023-08-10 | End: 2023-08-11 | Stop reason: HOSPADM

## 2023-08-10 RX ORDER — HYDRALAZINE HYDROCHLORIDE 20 MG/ML
10 INJECTION INTRAMUSCULAR; INTRAVENOUS ONCE
Status: DISCONTINUED | OUTPATIENT
Start: 2023-08-10 | End: 2023-08-10

## 2023-08-10 RX ORDER — ARIPIPRAZOLE 5 MG/1
10 TABLET ORAL DAILY
Status: DISCONTINUED | OUTPATIENT
Start: 2023-08-10 | End: 2023-08-11 | Stop reason: HOSPADM

## 2023-08-10 RX ORDER — DICYCLOMINE HYDROCHLORIDE 10 MG/1
10 CAPSULE ORAL
COMMUNITY
Start: 2023-05-26 | End: 2024-05-25

## 2023-08-10 RX ORDER — HYDROMORPHONE HYDROCHLORIDE 1 MG/ML
0.5 INJECTION, SOLUTION INTRAMUSCULAR; INTRAVENOUS; SUBCUTANEOUS EVERY 6 HOURS PRN
Status: DISCONTINUED | OUTPATIENT
Start: 2023-08-10 | End: 2023-08-10

## 2023-08-10 RX ORDER — PROCHLORPERAZINE EDISYLATE 5 MG/ML
5 INJECTION INTRAMUSCULAR; INTRAVENOUS EVERY 6 HOURS PRN
Status: DISCONTINUED | OUTPATIENT
Start: 2023-08-10 | End: 2023-08-11 | Stop reason: HOSPADM

## 2023-08-10 RX ORDER — CIPROFLOXACIN 500 MG/1
500 TABLET ORAL EVERY 12 HOURS
Qty: 14 TABLET | Refills: 0 | Status: SHIPPED | OUTPATIENT
Start: 2023-08-10 | End: 2023-08-17

## 2023-08-10 RX ORDER — CIPROFLOXACIN 500 MG/1
500 TABLET ORAL EVERY 12 HOURS
Status: DISCONTINUED | OUTPATIENT
Start: 2023-08-10 | End: 2023-08-10

## 2023-08-10 RX ORDER — IBUPROFEN 200 MG
24 TABLET ORAL
Status: DISCONTINUED | OUTPATIENT
Start: 2023-08-10 | End: 2023-08-11 | Stop reason: HOSPADM

## 2023-08-10 RX ORDER — HYDRALAZINE HYDROCHLORIDE 20 MG/ML
10 INJECTION INTRAMUSCULAR; INTRAVENOUS EVERY 6 HOURS PRN
Status: DISCONTINUED | OUTPATIENT
Start: 2023-08-10 | End: 2023-08-11 | Stop reason: HOSPADM

## 2023-08-10 RX ORDER — NALOXONE HCL 0.4 MG/ML
0.02 VIAL (ML) INJECTION
Status: DISCONTINUED | OUTPATIENT
Start: 2023-08-10 | End: 2023-08-11 | Stop reason: HOSPADM

## 2023-08-10 RX ORDER — METRONIDAZOLE 500 MG/100ML
500 INJECTION, SOLUTION INTRAVENOUS
Status: DISCONTINUED | OUTPATIENT
Start: 2023-08-10 | End: 2023-08-11 | Stop reason: HOSPADM

## 2023-08-10 RX ORDER — ONDANSETRON 2 MG/ML
4 INJECTION INTRAMUSCULAR; INTRAVENOUS
Status: COMPLETED | OUTPATIENT
Start: 2023-08-10 | End: 2023-08-10

## 2023-08-10 RX ORDER — HYDROCODONE BITARTRATE AND ACETAMINOPHEN 5; 325 MG/1; MG/1
1 TABLET ORAL EVERY 6 HOURS PRN
Status: DISCONTINUED | OUTPATIENT
Start: 2023-08-10 | End: 2023-08-10

## 2023-08-10 RX ORDER — MAG HYDROX/ALUMINUM HYD/SIMETH 200-200-20
30 SUSPENSION, ORAL (FINAL DOSE FORM) ORAL 4 TIMES DAILY PRN
Status: DISCONTINUED | OUTPATIENT
Start: 2023-08-10 | End: 2023-08-11 | Stop reason: HOSPADM

## 2023-08-10 RX ORDER — BUSPIRONE HYDROCHLORIDE 10 MG/1
5 TABLET ORAL NIGHTLY
COMMUNITY
Start: 2023-07-09

## 2023-08-10 RX ORDER — GLUCAGON 1 MG
1 KIT INJECTION
Status: DISCONTINUED | OUTPATIENT
Start: 2023-08-10 | End: 2023-08-11 | Stop reason: HOSPADM

## 2023-08-10 RX ORDER — CIPROFLOXACIN 2 MG/ML
400 INJECTION, SOLUTION INTRAVENOUS
Status: DISCONTINUED | OUTPATIENT
Start: 2023-08-10 | End: 2023-08-11 | Stop reason: HOSPADM

## 2023-08-10 RX ORDER — HYOSCYAMINE SULFATE 0.12 MG/1
0.12 TABLET SUBLINGUAL EVERY 4 HOURS PRN
Status: DISCONTINUED | OUTPATIENT
Start: 2023-08-10 | End: 2023-08-11 | Stop reason: HOSPADM

## 2023-08-10 RX ORDER — ATORVASTATIN CALCIUM 40 MG/1
40 TABLET, FILM COATED ORAL
COMMUNITY
Start: 2023-06-04

## 2023-08-10 RX ORDER — ASPIRIN 81 MG/1
81 TABLET ORAL DAILY
Status: DISCONTINUED | OUTPATIENT
Start: 2023-08-10 | End: 2023-08-11 | Stop reason: HOSPADM

## 2023-08-10 RX ORDER — REGADENOSON 0.08 MG/ML
0.4 INJECTION, SOLUTION INTRAVENOUS ONCE
Status: COMPLETED | OUTPATIENT
Start: 2023-08-10 | End: 2023-08-10

## 2023-08-10 RX ORDER — TALC
6 POWDER (GRAM) TOPICAL NIGHTLY PRN
Status: DISCONTINUED | OUTPATIENT
Start: 2023-08-10 | End: 2023-08-11 | Stop reason: HOSPADM

## 2023-08-10 RX ORDER — INSULIN ASPART 100 [IU]/ML
1-10 INJECTION, SOLUTION INTRAVENOUS; SUBCUTANEOUS
Status: DISCONTINUED | OUTPATIENT
Start: 2023-08-10 | End: 2023-08-11 | Stop reason: HOSPADM

## 2023-08-10 RX ORDER — PROPRANOLOL HYDROCHLORIDE 10 MG/1
10 TABLET ORAL 3 TIMES DAILY
Status: DISCONTINUED | OUTPATIENT
Start: 2023-08-10 | End: 2023-08-11 | Stop reason: HOSPADM

## 2023-08-10 RX ORDER — HYDRALAZINE HYDROCHLORIDE 20 MG/ML
10 INJECTION INTRAMUSCULAR; INTRAVENOUS ONCE
Status: COMPLETED | OUTPATIENT
Start: 2023-08-10 | End: 2023-08-10

## 2023-08-10 RX ORDER — CYCLOBENZAPRINE HCL 5 MG
5 TABLET ORAL NIGHTLY PRN
COMMUNITY
Start: 2023-07-31

## 2023-08-10 RX ORDER — KETOROLAC TROMETHAMINE 30 MG/ML
30 INJECTION, SOLUTION INTRAMUSCULAR; INTRAVENOUS
Status: COMPLETED | OUTPATIENT
Start: 2023-08-10 | End: 2023-08-10

## 2023-08-10 RX ORDER — SODIUM CHLORIDE, SODIUM LACTATE, POTASSIUM CHLORIDE, CALCIUM CHLORIDE 600; 310; 30; 20 MG/100ML; MG/100ML; MG/100ML; MG/100ML
INJECTION, SOLUTION INTRAVENOUS CONTINUOUS
Status: DISCONTINUED | OUTPATIENT
Start: 2023-08-10 | End: 2023-08-11 | Stop reason: HOSPADM

## 2023-08-10 RX ORDER — POLYETHYLENE GLYCOL 3350 17 G/17G
17 POWDER, FOR SOLUTION ORAL DAILY
Status: DISCONTINUED | OUTPATIENT
Start: 2023-08-10 | End: 2023-08-11 | Stop reason: HOSPADM

## 2023-08-10 RX ORDER — LOSARTAN POTASSIUM AND HYDROCHLOROTHIAZIDE 12.5; 5 MG/1; MG/1
2 TABLET ORAL DAILY
Status: DISCONTINUED | OUTPATIENT
Start: 2023-08-10 | End: 2023-08-11 | Stop reason: HOSPADM

## 2023-08-10 RX ORDER — OXYCODONE AND ACETAMINOPHEN 7.5; 325 MG/1; MG/1
1 TABLET ORAL
COMMUNITY
Start: 2023-07-19 | End: 2023-08-10 | Stop reason: HOSPADM

## 2023-08-10 RX ORDER — GABAPENTIN 300 MG/1
300 CAPSULE ORAL 3 TIMES DAILY
Status: DISCONTINUED | OUTPATIENT
Start: 2023-08-10 | End: 2023-08-11 | Stop reason: HOSPADM

## 2023-08-10 RX ORDER — ACETAMINOPHEN 325 MG/1
650 TABLET ORAL EVERY 4 HOURS PRN
Status: DISCONTINUED | OUTPATIENT
Start: 2023-08-10 | End: 2023-08-11 | Stop reason: HOSPADM

## 2023-08-10 RX ORDER — ONDANSETRON 8 MG/1
8 TABLET, ORALLY DISINTEGRATING ORAL EVERY 8 HOURS PRN
Status: DISCONTINUED | OUTPATIENT
Start: 2023-08-10 | End: 2023-08-11 | Stop reason: HOSPADM

## 2023-08-10 RX ORDER — IBUPROFEN 200 MG
16 TABLET ORAL
Status: DISCONTINUED | OUTPATIENT
Start: 2023-08-10 | End: 2023-08-11 | Stop reason: HOSPADM

## 2023-08-10 RX ORDER — METRONIDAZOLE 500 MG/1
500 TABLET ORAL EVERY 8 HOURS
Qty: 21 TABLET | Refills: 0 | Status: SHIPPED | OUTPATIENT
Start: 2023-08-10 | End: 2023-08-17

## 2023-08-10 RX ORDER — PANTOPRAZOLE SODIUM 40 MG/10ML
40 INJECTION, POWDER, LYOPHILIZED, FOR SOLUTION INTRAVENOUS DAILY
Status: DISCONTINUED | OUTPATIENT
Start: 2023-08-10 | End: 2023-08-11 | Stop reason: HOSPADM

## 2023-08-10 RX ORDER — ASPIRIN 81 MG/1
81 TABLET ORAL DAILY
Qty: 30 TABLET | Refills: 3 | Status: SHIPPED | OUTPATIENT
Start: 2023-08-10 | End: 2024-08-09

## 2023-08-10 RX ORDER — METRONIDAZOLE 500 MG/1
500 TABLET ORAL EVERY 8 HOURS
Status: DISCONTINUED | OUTPATIENT
Start: 2023-08-10 | End: 2023-08-10

## 2023-08-10 RX ADMIN — HYDROMORPHONE HYDROCHLORIDE 0.5 MG: 1 INJECTION, SOLUTION INTRAMUSCULAR; INTRAVENOUS; SUBCUTANEOUS at 04:08

## 2023-08-10 RX ADMIN — PANTOPRAZOLE SODIUM 40 MG: 40 INJECTION, POWDER, FOR SOLUTION INTRAVENOUS at 08:08

## 2023-08-10 RX ADMIN — GABAPENTIN 300 MG: 300 CAPSULE ORAL at 06:08

## 2023-08-10 RX ADMIN — HYOSCYAMINE SULFATE 0.12 MG: 0.12 TABLET ORAL; SUBLINGUAL at 05:08

## 2023-08-10 RX ADMIN — PROCHLORPERAZINE EDISYLATE 5 MG: 5 INJECTION INTRAMUSCULAR; INTRAVENOUS at 08:08

## 2023-08-10 RX ADMIN — ATORVASTATIN CALCIUM 40 MG: 40 TABLET, FILM COATED ORAL at 06:08

## 2023-08-10 RX ADMIN — PROPRANOLOL HYDROCHLORIDE 10 MG: 10 TABLET ORAL at 08:08

## 2023-08-10 RX ADMIN — LOSARTAN POTASSIUM AND HYDROCHLOROTHIAZIDE 2 TABLET: 12.5; 5 TABLET ORAL at 08:08

## 2023-08-10 RX ADMIN — SODIUM CHLORIDE, POTASSIUM CHLORIDE, SODIUM LACTATE AND CALCIUM CHLORIDE: 600; 310; 30; 20 INJECTION, SOLUTION INTRAVENOUS at 06:08

## 2023-08-10 RX ADMIN — ASPIRIN 81 MG: 81 TABLET, COATED ORAL at 06:08

## 2023-08-10 RX ADMIN — ASPIRIN 325 MG ORAL TABLET 325 MG: 325 PILL ORAL at 12:08

## 2023-08-10 RX ADMIN — ARIPIPRAZOLE 10 MG: 5 TABLET ORAL at 08:08

## 2023-08-10 RX ADMIN — ACETAMINOPHEN 650 MG: 325 TABLET ORAL at 08:08

## 2023-08-10 RX ADMIN — ONDANSETRON 8 MG: 8 TABLET, ORALLY DISINTEGRATING ORAL at 12:08

## 2023-08-10 RX ADMIN — CIPROFLOXACIN 400 MG: 2 INJECTION, SOLUTION INTRAVENOUS at 08:08

## 2023-08-10 RX ADMIN — KETOROLAC TROMETHAMINE 30 MG: 30 INJECTION, SOLUTION INTRAMUSCULAR; INTRAVENOUS at 12:08

## 2023-08-10 RX ADMIN — GABAPENTIN 300 MG: 300 CAPSULE ORAL at 08:08

## 2023-08-10 RX ADMIN — SODIUM CHLORIDE, POTASSIUM CHLORIDE, SODIUM LACTATE AND CALCIUM CHLORIDE: 600; 310; 30; 20 INJECTION, SOLUTION INTRAVENOUS at 08:08

## 2023-08-10 RX ADMIN — INSULIN ASPART 2 UNITS: 100 INJECTION, SOLUTION INTRAVENOUS; SUBCUTANEOUS at 06:08

## 2023-08-10 RX ADMIN — CIPROFLOXACIN 500 MG: 500 TABLET, FILM COATED ORAL at 09:08

## 2023-08-10 RX ADMIN — METRONIDAZOLE 500 MG: 500 INJECTION, SOLUTION INTRAVENOUS at 06:08

## 2023-08-10 RX ADMIN — HYDRALAZINE HYDROCHLORIDE 10 MG: 20 INJECTION INTRAMUSCULAR; INTRAVENOUS at 06:08

## 2023-08-10 RX ADMIN — REGADENOSON 0.4 MG: 0.08 INJECTION, SOLUTION INTRAVENOUS at 11:08

## 2023-08-10 RX ADMIN — ONDANSETRON 4 MG: 2 INJECTION INTRAMUSCULAR; INTRAVENOUS at 12:08

## 2023-08-10 RX ADMIN — Medication 6 MG: at 08:08

## 2023-08-10 NOTE — HPI
Patient is a 58-year-old man.  History of anxiety, asthma, diverticulitis, depression, diabetes, hepatitis-C, hypertension, stroke came with chief complaint of periumbilical abdominal pain with nausea vomiting.  Blood pressure was found to be elevated around 233 mm systolic as listed in the chart.  Now abdominal pain is better as per the patient.  Cardiology was consulted because BNP elevated at 427 and troponin elevated, mildly, flat trend.  Denies any heart attacks or stents or open-heart surgeries in the past          HPI: Nino Price 58 y.o. male with anxiety, asthma, colitis, diverticulitis, depression, DM, hepatitis C, HTN, stroke presents to the hospital with a chief complaint of periumbilical abdominal pain with associated vomiting, nausea, diarrhea.  Acute onset this morning patient denies attempting any self-treatment due to inability to tolerate p.o. intake.  Patient notes with Hx of similar pain, pt has been evaluated on three separate occassions. he states first there was no known diagnosis, second diverticulitis, and third pt was prescribed antibiotics and discharged. he reports onset of episodes frequently, with last onset around 3-4 months ago. he also notes his BP usually becomes high while experiencing these symptoms. Pt also c/o chronic lower back pain, which pt reports endorsing oxycodone for. No other exacerbating or alleviating factors. Pt denies pain in groin or testicles. Denies hematochezia, hematemesis, fever, hematuria, dysuria, urinary frequency, chest pain, SOB, other associated symptoms. Pt denies endorsing daily meds the past few days d/t inability to tolerate PO intake. Patient denies EtOH use. Pt reports tobacco and occasional marijuana use.  Patient denied any attempted self-treatment, no other alleviating or exacerbating factors noted     In the ED patient was afebrile without leukocytosis, CBC mostly unremarkable, CO2 20, anion gap 17, glucose 161, protein total 8.7, ,  "1st troponin 0.045, 2nd troponin 0.061, lactate and alcohol WNL, UDS only positive for marijuana metabolite, UA only showed +3 protein, +2 ketones, +1 glucose and occult blood.  Urine creatinine WNL, CT abdomen pelvis showed  Mild acute uncomplicated sigmoid diverticulitis.  No evidence of abscess or perforation.  CTA chest and abdomen pelvis showed "Circumferential wall thickening seen involving the mid to distal esophagus which can be seen with esophagitis. Gastric antral wall thickening.  This may in part relate to nondistention, however gastritis could present with similar appearance" CXR was negative, EKG showed sinus rhythm with frequent PVCs.  Patient was placed in observation  "

## 2023-08-10 NOTE — SUBJECTIVE & OBJECTIVE
Past Medical History:   Diagnosis Date    Anxiety     Asthma     Colitis     Depression     Diabetes mellitus     Head injury     History of hepatitis C, s/p successful hcv treatment w/ SVR 8-2015  10/8/2012    SVR(24) as of 11/2015.  SVR(12) as of 8/2015.  completed harvoni 24 week regimen for genotype 1a 5/18/15     Hypertension     Shoulder pain     left, s/p 4 surgeries.     Stroke        Past Surgical History:   Procedure Laterality Date    COLONOSCOPY N/A 10/20/2015    Procedure: COLONOSCOPY;  Surgeon: Jagdish Patricia MD;  Location: [facility] ENDO;  Service: Endoscopy;  Laterality: N/A;    DIAGNOSTIC LAPAROSCOPY N/A 4/1/2021    Procedure: LAPAROSCOPY, DIAGNOSTIC;  Surgeon: Umesh Vallecillo MD;  Location: [facility] OR;  Service: General;  Laterality: N/A;  RN PREOP 3/29/21, COVID NEGATIVE ON 3/29  CONSENT AND H/P INCOMPLETE    GALLBLADDER SURGERY      pt not 100% if gall bladder removed    rotator cuff      left side       Review of patient's allergies indicates:   Allergen Reactions    Codeine Nausea Only       No current facility-administered medications on file prior to encounter.     Current Outpatient Medications on File Prior to Encounter   Medication Sig    ARIPiprazole (ABILIFY) 10 MG Tab Take 10 mg by mouth once daily.    busPIRone (BUSPAR) 5 MG Tab Take 15 mg by mouth once daily. Takes in pm    doxepin (SINEQUAN) 100 MG capsule Take 10 mg by mouth every evening.    DULoxetine (CYMBALTA) 60 MG capsule Take 60 mg by mouth once daily. Takes in pm    gabapentin (NEURONTIN) 300 MG capsule Take 300 mg by mouth 3 (three) times daily.    metFORMIN (GLUCOPHAGE) 1000 MG tablet Take 1,000 mg by mouth 2 (two) times daily with meals.    NAMENDA 10 mg Tab Take 10 mg by mouth once daily.     oxyCODONE-acetaminophen (PERCOCET)  mg per tablet TAKE 1 TABLET AS NEEDED ORALLY EVERY 4 TO 6 HOURS    pantoprazole (PROTONIX) 40 MG tablet Take 1 tablet (40 mg total) by mouth 2 (two) times daily.    propranoloL (INDERAL) 20 MG  tablet Take 10 mg by mouth 3 (three) times daily.     traZODone (DESYREL) 50 MG tablet Take 100 mg by mouth every evening.    TRUE METRIX GLUCOSE METER Misc     TRUE METRIX GLUCOSE TEST STRIP Strp     valsartan-hydrochlorothiazide (DIOVAN-HCT) 160-25 mg per tablet Take 1 tablet by mouth once daily.     Family History       Problem Relation (Age of Onset)    Cirrhosis Brother    Heart disease Father    Hypertension Mother          Tobacco Use    Smoking status: Every Day     Current packs/day: 1.00     Average packs/day: 1 pack/day for 35.0 years (35.0 ttl pk-yrs)     Types: Cigarettes    Smokeless tobacco: Never   Substance and Sexual Activity    Alcohol use: No    Drug use: Yes     Frequency: 7.0 times per week     Types: Marijuana    Sexual activity: Yes     Partners: Female     Review of Systems   Constitutional:  Negative for chills and fever.   HENT:  Negative for congestion and rhinorrhea.    Eyes:  Negative for photophobia and visual disturbance.   Respiratory:  Negative for cough and shortness of breath.    Cardiovascular:  Negative for chest pain, palpitations and leg swelling.   Gastrointestinal:  Positive for abdominal pain, diarrhea, nausea and vomiting.   Genitourinary:  Negative for frequency, hematuria and urgency.   Skin:  Negative for pallor, rash and wound.   Neurological:  Negative for light-headedness and headaches.   Psychiatric/Behavioral:  Negative for confusion and decreased concentration.      Objective:     Vital Signs (Most Recent):  Temp: 98.8 °F (37.1 °C) (08/09/23 1850)  Pulse: 83 (08/10/23 0200)  Resp: 18 (08/10/23 0200)  BP: 99/66 (08/10/23 0200)  SpO2: 96 % (08/10/23 0200) Vital Signs (24h Range):  Temp:  [98.8 °F (37.1 °C)] 98.8 °F (37.1 °C)  Pulse:  [42-83] 83  Resp:  [18-22] 18  SpO2:  [94 %-99 %] 96 %  BP: ()/() 99/66     Weight: 65.8 kg (145 lb)  Body mass index is 24.89 kg/m².     Physical Exam  Vitals and nursing note reviewed.   Constitutional:       General: He  is not in acute distress.     Appearance: He is well-developed.   HENT:      Head: Normocephalic and atraumatic.      Right Ear: External ear normal.      Left Ear: External ear normal.      Nose: Nose normal.   Eyes:      Conjunctiva/sclera: Conjunctivae normal.      Pupils: Pupils are equal, round, and reactive to light.   Cardiovascular:      Rate and Rhythm: Normal rate and regular rhythm.   Pulmonary:      Effort: Pulmonary effort is normal. No respiratory distress.      Breath sounds: Normal breath sounds. No wheezing or rales.   Abdominal:      General: Bowel sounds are normal. There is no distension.      Palpations: Abdomen is soft.      Tenderness: There is abdominal tenderness in the periumbilical area and left lower quadrant. There is guarding (voluntary). There is no right CVA tenderness or left CVA tenderness. Negative signs include Self's sign, Rovsing's sign and McBurney's sign.      Comments: No palpable hepatomegaly or splenomegaly   Musculoskeletal:         General: No tenderness. Normal range of motion.      Cervical back: Normal range of motion and neck supple.      Comments: Band like tenderness to lower back, patient states chronic. No midline tenderness. Straight leg test negative bilaterally   Skin:     General: Skin is warm and dry.   Neurological:      Mental Status: He is alert and oriented to person, place, and time.   Psychiatric:         Thought Content: Thought content normal.              CRANIAL NERVES     CN III, IV, VI   Pupils are equal, round, and reactive to light.       Significant Labs: All pertinent labs within the past 24 hours have been reviewed.  Recent Lab Results  (Last 5 results in the past 24 hours)        08/10/23  0044   08/09/23  2300   08/09/23  2124   08/09/23  2046   08/09/23 2001        Benzodiazepines     Negative           Methadone metabolites     Negative           Phencyclidine     Negative           Albumin         4.6       Alcohol, Serum <10                Alkaline Phosphatase         82       ALT         14       Amphetamine Screen, Ur     Negative           Anion Gap         17       Appearance, UA     Clear           AST         17       Bacteria, UA     Rare           Barbiturate Screen, Ur     Negative           Baso #         0.04       Basophil %         0.3       Bilirubin (UA)     Negative           BILIRUBIN TOTAL         0.8  Comment: For infants and newborns, interpretation of results should be based  on gestational age, weight and in agreement with clinical  observations.    Premature Infant recommended reference ranges:  Up to 24 hours.............<8.0 mg/dL  Up to 48 hours............<12.0 mg/dL  3-5 days..................<15.0 mg/dL  6-29 days.................<15.0 mg/dL         BNP       427  Comment: Values of less than 100 pg/ml are consistent with non-CHF populations.         BUN         9       Calcium         10.5       Chloride         105       CO2         20       Cocaine (Metab.)     Negative           Color, UA     Yellow           Creatinine         1.1       Creatinine, Urine     102.4           Differential Method         Automated       eGFR         >60       Eos #         0.0       Eosinophil %         0.0       Glucose         161       Glucose, UA     1+           Gran # (ANC)         9.7       Gran %         80.8       Hematocrit         50.0       Hemoglobin         17.2       Hyaline Casts, UA     0           Immature Grans (Abs)         0.05  Comment: Mild elevation in immature granulocytes is non specific and   can be seen in a variety of conditions including stress response,   acute inflammation, trauma and pregnancy. Correlation with other   laboratory and clinical findings is essential.         Immature Granulocytes         0.4       Ketones, UA     2+           Lactate, Raimundo         2.0  Comment: Falsely low lactic acid results can be found in samples   containing >=13.0 mg/dL total bilirubin and/or >=3.5 mg/dL   direct  bilirubin.         Leukocytes, UA     Negative           Lipase         16       Lymph #         1.5       Lymph %         12.7       MCH         31.4       MCHC         34.4       MCV         91       Microscopic Comment     SEE COMMENT  Comment: Other formed elements not mentioned in the report are not   present in the microscopic examination.              Mono #         0.7       Mono %         5.8       MPV         10.0       NITRITE UA     Negative           nRBC         0       Occult Blood UA     1+           Opiate Scrn, Ur     Negative           pH, UA     7.0           Platelets         216       Potassium         4.0       PROTEIN TOTAL         8.7       Protein, UA     3+  Comment: Recommend a 24 hour urine protein or a urine   protein/creatinine ratio if globulin induced proteinuria is  clinically suspected.             RBC         5.48       RBC, UA     3           RDW         12.8       Sodium         142       Specific [place], UA     1.025           Specimen UA     Urine, Clean Catch           Marijuana (THC) Metabolite     Presumptive Positive           Toxicology Information     SEE COMMENT  Comment: This screen includes the following classes of drugs at the listed   cut-off:    Benzodiazepines 200 ng/ml  Methadone 300 ng/ml  Cocaine metabolite 300 ng/ml  Opiates 300 ng/ml  Barbiturates 200 ng/ml  Amphetamines 1000 ng/ml  Marijuana metabs (THC) 50 ng/ml  Phencyclidine (PCP) 25 ng/ml    This is a screening test. If results do not correlate with clinical   presentation, then a confirmatory send out test is advised.     This report is intended for use in clinical monitoring and management   of   patients. It is not intended for use in employment related drug   testing.             Troponin I   0.061  Comment: The reference interval for Troponin I represents the 99th percentile   cutoff   for our facility and is consistent with 3rd generation assay   performance.         0.045  Comment: The reference  interval for Troponin I represents the 99th percentile   cutoff   for our facility and is consistent with 3rd generation assay   performance.         UROBILINOGEN UA     Negative           WBC, UA     8           WBC         11.98                              Significant Imaging: I have reviewed all pertinent imaging results/findings within the past 24 hours.  Imaging Results              CTA Chest Abdomen Pelvis (Final result)  Result time 08/09/23 22:33:14      Final result by Dalton Lyle MD (08/09/23 22:33:14)                   Impression:      1. No evidence of aortic aneurysm or dissection.  2. Redemonstration of mild acute uncomplicated sigmoid diverticulitis.  3. Circumferential wall thickening seen involving the mid to distal esophagus which can be seen with esophagitis.  4. Gastric antral wall thickening.  This may in part relate to nondistention, however gastritis could present with similar appearance.  This is more pronounced compared to earlier CT from the same date.  5. Postsurgical changes and additional findings as detailed above.      Electronically signed by: Dalton Lyle MD  Date:    08/09/2023  Time:    22:33               Narrative:    EXAMINATION:  CTA CHEST ABDOMEN PELVIS    CLINICAL HISTORY:  Aortic dissection suspected;    TECHNIQUE:  CTA chest abdomen and pelvis was obtained before and after administration of 85 cc Omnipaque 350 IV contrast.  Sagittal and coronal reformats were obtained.    COMPARISON:  Noncontrast CT abdomen and pelvis from the same date.    FINDINGS:  No evidence of aortic aneurysm or dissection.  Branch vessels of the aortic arch are patent.  Calcified and noncalcified plaque resulting in mild to moderate stenosis are seen at the right brachiocephalic, right vertebral artery, and left subclavian artery origins.  Abdominal aortic branch vessels are widely patent.  Moderate calcified and noncalcified plaque are seen involving the infrarenal abdominal aorta.   Bilateral iliacs are patent.    Heart is normal in size with no pericardial effusion.  Coronary artery calcification is seen.  No evidence of PE to the level of the proximal segmental branches.    Major airways are patent.  Lungs are symmetrically expanded.  No evidence of consolidation, pneumothorax, or pleural effusion.  Mild paraseptal emphysematous changes are seen throughout both lungs.    Circumferential wall thickening is again seen involving the distal visualized esophagus which can be seen in the setting of esophagitis.  There is wall thickening seen involving the gastric antral region.    No significant focal hepatic abnormalities are identified.  There is no intra-or extrahepatic biliary ductal dilatation.  The gallbladder is unremarkable.  Pancreas, spleen, and adrenal glands show no significant abnormalities.    Kidneys enhance normally with no evidence of hydronephrosis.  No abnormalities are seen along the ureteral courses.  Urinary bladder is nondistended.  Prostate is mildly enlarged.    Postsurgical changes are seen at the base of the cecum consistent with prior appendectomy.  There is sigmoid diverticulosis with subtle inflammatory changes seen surrounding small diverticulum in the mid sigmoid colon.  No evidence of abscess or perforation.  No evidence of bowel obstruction.  No free air or free fluid.    No acute osseous abnormality identified.  AVN changes are again seen involving the bilateral femoral heads.  Intervertebral disc space narrowing and degenerative endplate changes are seen at the L5-S1 level.  Small fat containing right inguinal hernia is seen.                                       X-Ray Chest AP Portable (Final result)  Result time 08/09/23 21:44:24      Final result by Dalton Lyle MD (08/09/23 21:44:24)                   Impression:      No acute cardiopulmonary process identified.      Electronically signed by: Dalton Lyle MD  Date:    08/09/2023  Time:    21:44                Narrative:    EXAMINATION:  XR CHEST AP PORTABLE    CLINICAL HISTORY:  Essential (primary) hypertension    TECHNIQUE:  Single frontal view of the chest was performed.    COMPARISON:  March 2021.    FINDINGS:  Cardiac silhouette is normal in size.  Lungs are symmetrically expanded.  No evidence of focal consolidative process, pneumothorax, or significant pleural effusion.  No acute osseous abnormality identified.                                       CT Abdomen Pelvis  Without Contrast (Final result)  Result time 08/09/23 20:01:13      Final result by Dalton Lyle MD (08/09/23 20:01:13)                   Impression:      1. Mild acute uncomplicated sigmoid diverticulitis.  No evidence of abscess or perforation.  2. Postsurgical changes and additional findings as detailed above.      Electronically signed by: Dalton Lyle MD  Date:    08/09/2023  Time:    20:01               Narrative:    EXAMINATION:  CT ABDOMEN PELVIS WITHOUT CONTRAST    CLINICAL HISTORY:  Abdominal abscess/infection suspected;    TECHNIQUE:  Low dose axial images, sagittal and coronal reformations were obtained from the lung bases to the pubic symphysis.  Oral contrast was not administered.    COMPARISON:  CT abdomen and pelvis from 05/19/2023.    FINDINGS:  Heart is normal in size.  Coronary artery calcification is seen.  The lung bases are clear.  Circumferential wall thickening is again seen involving the distal visualized esophagus which can be seen in the setting of esophagitis.    No significant hepatic abnormalities are identified.  There is no intra-or extrahepatic biliary ductal dilatation.  The gallbladder is unremarkable.  Stomach, pancreas, spleen, and adrenal glands show no significant abnormalities.    Kidneys show no evidence of stones or hydronephrosis.  No abnormalities are seen along the ureteral courses.  Urinary bladder is unremarkable.  Prostate is mildly enlarged.    Postsurgical changes are seen at the base of  the cecum consistent with prior appendectomy.  There is sigmoid diverticulosis with subtle inflammatory changes seen surrounding small diverticulum in the mid sigmoid colon.  No evidence of abscess or perforation.  No evidence of bowel obstruction.  No free air or free fluid.    Aorta tapers normally.  Prominent atherosclerosis is seen in the distal abdominal aorta extending into the iliacs.    No acute osseous abnormality identified.  AVN changes are again seen involving the bilateral femoral heads.  Intervertebral disc space narrowing and degenerative endplate changes are seen at the L5-S1 level.  Small fat containing right inguinal hernia is seen.

## 2023-08-10 NOTE — ASSESSMENT & PLAN NOTE
Counseled on cessation of marijuana, educated on the connection between abdominal pain and marijuana use.  UDS positive for THC metabolites

## 2023-08-10 NOTE — PHARMACY MED REC
"Admission Medication History     The home medication history was taken by Lisa Keys.    You may go to "Admission" then "Reconcile Home Medications" tabs to review and/or act upon these items.     The home medication list has been updated by the Pharmacy department.   Please read ALL comments highlighted in yellow.   Please address this information as you see fit.    Feel free to contact us if you have any questions or require assistance.      Patient states he does not know the names of his medications nor what he is taking medications for.    Patient gave me permission to call his wife to help with medication history.  1st call no answer.  2nd call left message        No return call from wife as of now.        Lisa Keys [facility].  [phone]                  .        "

## 2023-08-10 NOTE — CARE UPDATE
"Admission to observation for elevated troponin.  Patient initially presents to the hospital for left lower quadrant pain found to have mild diverticulitis on CT.  Elevated troponin trend flat pattern.  Patient denies chest pain or shortness of breath.  2D echo normal EF normal diastolic function and normal wall motion.  Patient refuse 2nd part of stress test "enough is enough" despite explaining elevated troponin and risk factors for CAD. Discharge canceled as nurse notified patient reports nausea and abdominal pain.  Switch to bowel rest NPO, IVF and IV cipro/flagyl and levsin prn.   Continue ASA, statin.     Addendum   Stress test revealed "There is a moderate to severe intensity, moderate sized, fixed perfusion abnormality in the lateral wall(s). Only resting images obtained as patient refused to complete test. Incomplete study."  Will order repeat NST for am.     Melita Clarke NP  Staff: Dr. Joe Michaud  "

## 2023-08-10 NOTE — ED NOTES
"Upon discharge discussion, pt reports abdominal pain and nausea. Pt also states that he "does not want to be discharged but the stress test made him feel sick again and he just didn't want to proceed". Provider notified. Care ongoing.   "

## 2023-08-10 NOTE — DISCHARGE SUMMARY
[facility]  [facility] Medicine  Discharge Summary      Patient Name: Nino Price  MRN: [id number]  [facility]: [id number]  Patient Class: OP- Observation  Admission Date: 8/9/2023  Hospital Length of Stay: 0 days  Discharge Date and Time:  08/10/2023 3:54 PM  Attending Physician: Gama Michaud MD   Discharging Provider: Melita Sharif NP  Primary Care Provider: Pritesh Marinelli MD    Primary Care Team: MELITA SHARIF    HPI:   Nino Price 58 y.o. male with anxiety, asthma, colitis, diverticulitis, depression, DM, hepatitis C, HTN, stroke presents to the hospital with a chief complaint of periumbilical abdominal pain with associated vomiting, nausea, diarrhea.  Acute onset this morning patient denies attempting any self-treatment due to inability to tolerate p.o. intake.  Patient notes with Hx of similar pain, pt has been evaluated on three separate occassions. he states first there was no known diagnosis, second diverticulitis, and third pt was prescribed antibiotics and discharged. he reports onset of episodes frequently, with last onset around 3-4 months ago. he also notes his BP usually becomes high while experiencing these symptoms. Pt also c/o chronic lower back pain, which pt reports endorsing oxycodone for. No other exacerbating or alleviating factors. Pt denies pain in groin or testicles. Denies hematochezia, hematemesis, fever, hematuria, dysuria, urinary frequency, chest pain, SOB, other associated symptoms. Pt denies endorsing daily meds the past few days d/t inability to tolerate PO intake. Patient denies EtOH use. Pt reports tobacco and occasional marijuana use.  Patient denied any attempted self-treatment, no other alleviating or exacerbating factors noted    In the ED patient was afebrile without leukocytosis, CBC mostly unremarkable, CO2 20, anion gap 17, glucose 161, protein total 8.7, , 1st troponin 0.045, 2nd troponin 0.061, lactate and alcohol WNL, UDS only  "positive for marijuana metabolite, UA only showed +3 protein, +2 ketones, +1 glucose and occult blood.  Urine creatinine WNL, CT abdomen pelvis showed  Mild acute uncomplicated sigmoid diverticulitis.  No evidence of abscess or perforation.  CTA chest and abdomen pelvis showed "Circumferential wall thickening seen involving the mid to distal esophagus which can be seen with esophagitis. Gastric antral wall thickening.  This may in part relate to nondistention, however gastritis could present with similar appearance" CXR was negative, EKG showed sinus rhythm with frequent PVCs.  Patient was placed in observation      * No surgery found *      Hospital Course:   Admission to observation for elevated troponin.  Patient is to at presents to the hospital for left lower quadrant pain found to have mild diverticulitis on CT.  Elevated troponin trend flat pattern.  Patient denies chest pain or shortness of breath.  2D echo normal EF normal diastolic function and normal wall motion.  Patient refuse 2nd part of stress test "enough is enough" despite explaining elevated troponin and risk factors for CAD.  Okay for regular diet.  Cipro Flagyl x7 days.  Patient stable for discharge home with close follow-up PCP and Cardiology.  Encourage stop smoking marijuana.  Started on aspirin and statin.       Goals of Care Treatment Preferences:  Code Status: Full Code      Consults:   Consults (From admission, onward)        Status Ordering Provider     Inpatient consult to Cardiology  Once        Provider:  Gabriela Rider MD    Completed LALO FERNANDEZ          No new Assessment & Plan notes have been filed under this hospital service since the last note was generated.  Service: Hospital Medicine    Final Active Diagnoses:    Diagnosis Date Noted POA    PRINCIPAL PROBLEM:  Elevated troponin [R77.8] 08/10/2023 Yes    Sigmoid diverticulitis [K57.32] 08/10/2023 Yes    Elevated brain natriuretic peptide (BNP) level [R79.89] " 08/10/2023 Unknown    Cannabis abuse [F12.10] 09/21/2018 Yes     Chronic    Hyperlipidemia [E78.5] 10/22/2016 Yes     Chronic    Type 2 diabetes mellitus, controlled [E11.9] 05/29/2015 Yes     Chronic    Essential hypertension [I10] 05/29/2015 Yes     Chronic    Tobacco abuse [Z72.0] 05/29/2015 Yes     Chronic    Chronic hepatitis C [B18.2] 04/30/2014 Yes     Chronic      Problems Resolved During this Admission:       Discharged Condition: good    Disposition: Home or Self Care    Follow Up:   Follow-up Information     Pritesh Marinelli MD. Schedule an appointment as soon as possible for a visit in 2 days.    Specialty: Family Medicine  Why: to establish primary doctor, to discuss recent ED visit  Contact information:  [address]  [place] [address]  [phone]             [facility] - Emergency Dept .    Specialty: Emergency Medicine  Why: As needed, If symptoms worsen  Contact information:  [address]  [facility] [address] [phone]           [facility] - Cardiology. Schedule an appointment as soon as possible for a visit on 11/2/2023.    Specialty: Cardiology  Why: at 2:20pm to discuss recent ED visit  Please call for earlier appointment  Contact information:  [address]  [facility] [address] [phone]  Additional information:  Please park in garage or Medical Office Bldg. surface lot and use Medical Ofc Bldg elevator. Check in at [facility] [address].                     Patient Instructions:      Ambulatory referral/consult to Cardiology   Standing Status: Future   Referral Priority: Urgent Referral Type: Consultation   Referral Reason: Specialty Services Required   Requested Specialty: Cardiology   Number of Visits Requested: 1     Diet diabetic     Diet Cardiac     Notify your health care provider if you experience any of the following:  temperature >100.4     Notify your health care provider if you experience any of the following:  difficulty breathing  or increased cough     Notify your health care provider if you experience any of the following:  persistent dizziness, light-headedness, or visual disturbances     Activity as tolerated       Significant Diagnostic Studies: N/A    Pending Diagnostic Studies:     None         Medications:  Reconciled Home Medications:      Medication List      START taking these medications    aspirin 81 MG EC tablet  Commonly known as: ECOTRIN  Take 1 tablet (81 mg total) by mouth once daily.     ciprofloxacin HCl 500 MG tablet  Commonly known as: CIPRO  Take 1 tablet (500 mg total) by mouth every 12 (twelve) hours. for 7 days     metroNIDAZOLE 500 MG tablet  Commonly known as: FLAGYL  Take 1 tablet (500 mg total) by mouth every 8 (eight) hours. for 7 days        CHANGE how you take these medications    busPIRone 10 MG tablet  Commonly known as: BUSPAR  Take 5 mg by mouth every evening.  What changed: Another medication with the same name was removed. Continue taking this medication, and follow the directions you see here.     oxyCODONE-acetaminophen  mg per tablet  Commonly known as: PERCOCET  TAKE 1 TABLET AS NEEDED ORALLY EVERY 4 TO 6 HOURS  What changed: Another medication with the same name was removed. Continue taking this medication, and follow the directions you see here.        CONTINUE taking these medications    ARIPiprazole 10 MG Tab  Commonly known as: ABILIFY  Take 10 mg by mouth once daily.     atorvastatin 40 MG tablet  Commonly known as: LIPITOR  Take 40 mg by mouth.     cyclobenzaprine 5 MG tablet  Commonly known as: FLEXERIL  Take 5 mg by mouth nightly as needed.     dicyclomine 10 MG capsule  Commonly known as: BENTYL  Take 10 mg by mouth.     doxepin 100 MG capsule  Commonly known as: SINEQUAN  Take 10 mg by mouth every evening.     DULoxetine 60 MG capsule  Commonly known as: CYMBALTA  Take 60 mg by mouth once daily. Takes in pm     gabapentin 300 MG capsule  Commonly known as: NEURONTIN  Take 300 mg by  mouth 3 (three) times daily.     metFORMIN 1000 MG tablet  Commonly known as: GLUCOPHAGE  Take 1,000 mg by mouth 2 (two) times daily with meals.     NAMENDA 10 MG Tab  Generic drug: memantine  Take 10 mg by mouth once daily.     pantoprazole 40 MG tablet  Commonly known as: PROTONIX  Take 1 tablet (40 mg total) by mouth 2 (two) times daily.     propranoloL 20 MG tablet  Commonly known as: INDERAL  Take 10 mg by mouth 3 (three) times daily.     traZODone 50 MG tablet  Commonly known as: DESYREL  Take 100 mg by mouth every evening.     TRUE METRIX GLUCOSE METER Misc  Generic drug: blood-glucose meter     TRUE METRIX GLUCOSE TEST STRIP Strp  Generic drug: blood sugar diagnostic     valsartan-hydrochlorothiazide 160-25 mg per tablet  Commonly known as: DIOVAN-HCT  Take 1 tablet by mouth once daily.            Indwelling Lines/Drains at time of discharge:   Lines/Drains/Airways     None                 Time spent on the discharge of patient: 35 minutes         Melita Clarke NP  Department of Hospital Medicine  [facility] [place] - Emergency Dept

## 2023-08-10 NOTE — H&P
[facility] Medicine  History & Physical    Patient Name: Nino Price  MRN: [id number]  Patient Class: OP- Observation  Admission Date: 8/9/2023  Attending Physician: Gama Michaud MD   Primary Care Provider: Pritesh Marinelli MD         Patient information was obtained from patient, past medical records and ER records.     Subjective:     Principal Problem:Elevated troponin    Chief Complaint:   Chief Complaint   Patient presents with    Abdominal Pain     Pt reports lower abdominal pain accompanied by N/V/D since 0500am this morning. Also reports right temporal headache. Pt denies urinary symptoms, chest pain, shortness of breath. Pt's BP in triage 233/108. Pt noncompliant with BP medications.         HPI: Nino Price 58 y.o. male with anxiety, asthma, colitis, diverticulitis, depression, DM, hepatitis C, HTN, stroke presents to the hospital with a chief complaint of periumbilical abdominal pain with associated vomiting, nausea, diarrhea.  Acute onset this morning patient denies attempting any self-treatment due to inability to tolerate p.o. intake.  Patient notes with Hx of similar pain, pt has been evaluated on three separate occassions. he states first there was no known diagnosis, second diverticulitis, and third pt was prescribed antibiotics and discharged. he reports onset of episodes frequently, with last onset around 3-4 months ago. he also notes his BP usually becomes high while experiencing these symptoms. Pt also c/o chronic lower back pain, which pt reports endorsing oxycodone for. No other exacerbating or alleviating factors. Pt denies pain in groin or testicles. Denies hematochezia, hematemesis, fever, hematuria, dysuria, urinary frequency, chest pain, SOB, other associated symptoms. Pt denies endorsing daily meds the past few days d/t inability to tolerate PO intake. Patient denies EtOH use. Pt reports tobacco and occasional marijuana use.  Patient denied  "any attempted self-treatment, no other alleviating or exacerbating factors noted    In the ED patient was afebrile without leukocytosis, CBC mostly unremarkable, CO2 20, anion gap 17, glucose 161, protein total 8.7, , 1st troponin 0.045, 2nd troponin 0.061, lactate and alcohol WNL, UDS only positive for marijuana metabolite, UA only showed +3 protein, +2 ketones, +1 glucose and occult blood.  Urine creatinine WNL, CT abdomen pelvis showed  Mild acute uncomplicated sigmoid diverticulitis.  No evidence of abscess or perforation.  CTA chest and abdomen pelvis showed "Circumferential wall thickening seen involving the mid to distal esophagus which can be seen with esophagitis. Gastric antral wall thickening.  This may in part relate to nondistention, however gastritis could present with similar appearance" CXR was negative, EKG showed sinus rhythm with frequent PVCs.  Patient was placed in observation      Past Medical History:   Diagnosis Date    Anxiety     Asthma     Colitis     Depression     Diabetes mellitus     Head injury     History of hepatitis C, s/p successful hcv treatment w/ SVR 8-2015  10/8/2012    SVR(24) as of 11/2015.  SVR(12) as of 8/2015.  completed harvoni 24 week regimen for genotype 1a 5/18/15     Hypertension     Shoulder pain     left, s/p 4 surgeries.     Stroke        Past Surgical History:   Procedure Laterality Date    COLONOSCOPY N/A 10/20/2015    Procedure: COLONOSCOPY;  Surgeon: Jagdish Patricia MD;  Location: [facility] ENDO;  Service: Endoscopy;  Laterality: N/A;    DIAGNOSTIC LAPAROSCOPY N/A 4/1/2021    Procedure: LAPAROSCOPY, DIAGNOSTIC;  Surgeon: Umesh Vallecillo MD;  Location: [facility] OR;  Service: General;  Laterality: N/A;  RN PREOP 3/29/21, COVID NEGATIVE ON 3/29  CONSENT AND H/P INCOMPLETE    GALLBLADDER SURGERY      pt not 100% if gall bladder removed    rotator cuff      left side       Review of patient's allergies indicates:   Allergen Reactions    Codeine " Nausea Only       No current facility-administered medications on file prior to encounter.     Current Outpatient Medications on File Prior to Encounter   Medication Sig    ARIPiprazole (ABILIFY) 10 MG Tab Take 10 mg by mouth once daily.    busPIRone (BUSPAR) 5 MG Tab Take 15 mg by mouth once daily. Takes in pm    doxepin (SINEQUAN) 100 MG capsule Take 10 mg by mouth every evening.    DULoxetine (CYMBALTA) 60 MG capsule Take 60 mg by mouth once daily. Takes in pm    gabapentin (NEURONTIN) 300 MG capsule Take 300 mg by mouth 3 (three) times daily.    metFORMIN (GLUCOPHAGE) 1000 MG tablet Take 1,000 mg by mouth 2 (two) times daily with meals.    NAMENDA 10 mg Tab Take 10 mg by mouth once daily.     oxyCODONE-acetaminophen (PERCOCET)  mg per tablet TAKE 1 TABLET AS NEEDED ORALLY EVERY 4 TO 6 HOURS    pantoprazole (PROTONIX) 40 MG tablet Take 1 tablet (40 mg total) by mouth 2 (two) times daily.    propranoloL (INDERAL) 20 MG tablet Take 10 mg by mouth 3 (three) times daily.     traZODone (DESYREL) 50 MG tablet Take 100 mg by mouth every evening.    TRUE METRIX GLUCOSE METER Misc     TRUE METRIX GLUCOSE TEST STRIP Strp     valsartan-hydrochlorothiazide (DIOVAN-HCT) 160-25 mg per tablet Take 1 tablet by mouth once daily.     Family History       Problem Relation (Age of Onset)    Cirrhosis Brother    Heart disease Father    Hypertension Mother          Tobacco Use    Smoking status: Every Day     Current packs/day: 1.00     Average packs/day: 1 pack/day for 35.0 years (35.0 ttl pk-yrs)     Types: Cigarettes    Smokeless tobacco: Never   Substance and Sexual Activity    Alcohol use: No    Drug use: Yes     Frequency: 7.0 times per week     Types: Marijuana    Sexual activity: Yes     Partners: Female     Review of Systems   Constitutional:  Negative for chills and fever.   HENT:  Negative for congestion and rhinorrhea.    Eyes:  Negative for photophobia and visual disturbance.   Respiratory:   Negative for cough and shortness of breath.    Cardiovascular:  Negative for chest pain, palpitations and leg swelling.   Gastrointestinal:  Positive for abdominal pain, diarrhea, nausea and vomiting.   Genitourinary:  Negative for frequency, hematuria and urgency.   Skin:  Negative for pallor, rash and wound.   Neurological:  Negative for light-headedness and headaches.   Psychiatric/Behavioral:  Negative for confusion and decreased concentration.      Objective:     Vital Signs (Most Recent):  Temp: 98.8 °F (37.1 °C) (08/09/23 1850)  Pulse: 83 (08/10/23 0200)  Resp: 18 (08/10/23 0200)  BP: 99/66 (08/10/23 0200)  SpO2: 96 % (08/10/23 0200) Vital Signs (24h Range):  Temp:  [98.8 °F (37.1 °C)] 98.8 °F (37.1 °C)  Pulse:  [42-83] 83  Resp:  [18-22] 18  SpO2:  [94 %-99 %] 96 %  BP: ()/() 99/66     Weight: 65.8 kg (145 lb)  Body mass index is 24.89 kg/m².     Physical Exam  Vitals and nursing note reviewed.   Constitutional:       General: He is not in acute distress.     Appearance: He is well-developed.   HENT:      Head: Normocephalic and atraumatic.      Right Ear: External ear normal.      Left Ear: External ear normal.      Nose: Nose normal.   Eyes:      Conjunctiva/sclera: Conjunctivae normal.      Pupils: Pupils are equal, round, and reactive to light.   Cardiovascular:      Rate and Rhythm: Normal rate and regular rhythm.   Pulmonary:      Effort: Pulmonary effort is normal. No respiratory distress.      Breath sounds: Normal breath sounds. No wheezing or rales.   Abdominal:      General: Bowel sounds are normal. There is no distension.      Palpations: Abdomen is soft.      Tenderness: There is abdominal tenderness in the periumbilical area and left lower quadrant. There is guarding (voluntary). There is no right CVA tenderness or left CVA tenderness. Negative signs include Self's sign, Rovsing's sign and McBurney's sign.      Comments: No palpable hepatomegaly or splenomegaly   Musculoskeletal:          General: No tenderness. Normal range of motion.      Cervical back: Normal range of motion and neck supple.      Comments: Band like tenderness to lower back, patient states chronic. No midline tenderness. Straight leg test negative bilaterally   Skin:     General: Skin is warm and dry.   Neurological:      Mental Status: He is alert and oriented to person, place, and time.   Psychiatric:         Thought Content: Thought content normal.              CRANIAL NERVES     CN III, IV, VI   Pupils are equal, round, and reactive to light.       Significant Labs: All pertinent labs within the past 24 hours have been reviewed.  Recent Lab Results  (Last 5 results in the past 24 hours)        08/10/23  0044   08/09/23  2300   08/09/23  2124   08/09/23  2046   08/09/23 2001        Benzodiazepines     Negative           Methadone metabolites     Negative           Phencyclidine     Negative           Albumin         4.6       Alcohol, Serum <10               Alkaline Phosphatase         82       ALT         14       Amphetamine Screen, Ur     Negative           Anion Gap         17       Appearance, UA     Clear           AST         17       Bacteria, UA     Rare           Barbiturate Screen, Ur     Negative           Baso #         0.04       Basophil %         0.3       Bilirubin (UA)     Negative           BILIRUBIN TOTAL         0.8  Comment: For infants and newborns, interpretation of results should be based  on gestational age, weight and in agreement with clinical  observations.    Premature Infant recommended reference ranges:  Up to 24 hours.............<8.0 mg/dL  Up to 48 hours............<12.0 mg/dL  3-5 days..................<15.0 mg/dL  6-29 days.................<15.0 mg/dL         BNP       427  Comment: Values of less than 100 pg/ml are consistent with non-CHF populations.         BUN         9       Calcium         10.5       Chloride         105       CO2         20       Cocaine (Metab.)      Negative           Color, UA     Yellow           Creatinine         1.1       Creatinine, Urine     102.4           Differential Method         Automated       eGFR         >60       Eos #         0.0       Eosinophil %         0.0       Glucose         161       Glucose, UA     1+           Gran # (ANC)         9.7       Gran %         80.8       Hematocrit         50.0       Hemoglobin         17.2       Hyaline Casts, UA     0           Immature Grans (Abs)         0.05  Comment: Mild elevation in immature granulocytes is non specific and   can be seen in a variety of conditions including stress response,   acute inflammation, trauma and pregnancy. Correlation with other   laboratory and clinical findings is essential.         Immature Granulocytes         0.4       Ketones, UA     2+           Lactate, Raimundo         2.0  Comment: Falsely low lactic acid results can be found in samples   containing >=13.0 mg/dL total bilirubin and/or >=3.5 mg/dL   direct bilirubin.         Leukocytes, UA     Negative           Lipase         16       Lymph #         1.5       Lymph %         12.7       MCH         31.4       MCHC         34.4       MCV         91       Microscopic Comment     SEE COMMENT  Comment: Other formed elements not mentioned in the report are not   present in the microscopic examination.              Mono #         0.7       Mono %         5.8       MPV         10.0       NITRITE UA     Negative           nRBC         0       Occult Blood UA     1+           Opiate Scrn, Ur     Negative           pH, UA     7.0           Platelets         216       Potassium         4.0       PROTEIN TOTAL         8.7       Protein, UA     3+  Comment: Recommend a 24 hour urine protein or a urine   protein/creatinine ratio if globulin induced proteinuria is  clinically suspected.             RBC         5.48       RBC, UA     3           RDW         12.8       Sodium         142       Specific [place], UA     1.025            Specimen UA     Urine, Clean Catch           Marijuana (THC) Metabolite     Presumptive Positive           Toxicology Information     SEE COMMENT  Comment: This screen includes the following classes of drugs at the listed   cut-off:    Benzodiazepines 200 ng/ml  Methadone 300 ng/ml  Cocaine metabolite 300 ng/ml  Opiates 300 ng/ml  Barbiturates 200 ng/ml  Amphetamines 1000 ng/ml  Marijuana metabs (THC) 50 ng/ml  Phencyclidine (PCP) 25 ng/ml    This is a screening test. If results do not correlate with clinical   presentation, then a confirmatory send out test is advised.     This report is intended for use in clinical monitoring and management   of   patients. It is not intended for use in employment related drug   testing.             Troponin I   0.061  Comment: The reference interval for Troponin I represents the 99th percentile   cutoff   for our facility and is consistent with 3rd generation assay   performance.         0.045  Comment: The reference interval for Troponin I represents the 99th percentile   cutoff   for our facility and is consistent with 3rd generation assay   performance.         UROBILINOGEN UA     Negative           WBC, UA     8           WBC         11.98                              Significant Imaging: I have reviewed all pertinent imaging results/findings within the past 24 hours.  Imaging Results              CTA Chest Abdomen Pelvis (Final result)  Result time 08/09/23 22:33:14      Final result by Dalton Lyle MD (08/09/23 22:33:14)                   Impression:      1. No evidence of aortic aneurysm or dissection.  2. Redemonstration of mild acute uncomplicated sigmoid diverticulitis.  3. Circumferential wall thickening seen involving the mid to distal esophagus which can be seen with esophagitis.  4. Gastric antral wall thickening.  This may in part relate to nondistention, however gastritis could present with similar appearance.  This is more pronounced compared to earlier  CT from the same date.  5. Postsurgical changes and additional findings as detailed above.      Electronically signed by: Dalton Lyle MD  Date:    08/09/2023  Time:    22:33               Narrative:    EXAMINATION:  CTA CHEST ABDOMEN PELVIS    CLINICAL HISTORY:  Aortic dissection suspected;    TECHNIQUE:  CTA chest abdomen and pelvis was obtained before and after administration of 85 cc Omnipaque 350 IV contrast.  Sagittal and coronal reformats were obtained.    COMPARISON:  Noncontrast CT abdomen and pelvis from the same date.    FINDINGS:  No evidence of aortic aneurysm or dissection.  Branch vessels of the aortic arch are patent.  Calcified and noncalcified plaque resulting in mild to moderate stenosis are seen at the right brachiocephalic, right vertebral artery, and left subclavian artery origins.  Abdominal aortic branch vessels are widely patent.  Moderate calcified and noncalcified plaque are seen involving the infrarenal abdominal aorta.  Bilateral iliacs are patent.    Heart is normal in size with no pericardial effusion.  Coronary artery calcification is seen.  No evidence of PE to the level of the proximal segmental branches.    Major airways are patent.  Lungs are symmetrically expanded.  No evidence of consolidation, pneumothorax, or pleural effusion.  Mild paraseptal emphysematous changes are seen throughout both lungs.    Circumferential wall thickening is again seen involving the distal visualized esophagus which can be seen in the setting of esophagitis.  There is wall thickening seen involving the gastric antral region.    No significant focal hepatic abnormalities are identified.  There is no intra-or extrahepatic biliary ductal dilatation.  The gallbladder is unremarkable.  Pancreas, spleen, and adrenal glands show no significant abnormalities.    Kidneys enhance normally with no evidence of hydronephrosis.  No abnormalities are seen along the ureteral courses.  Urinary bladder is  nondistended.  Prostate is mildly enlarged.    Postsurgical changes are seen at the base of the cecum consistent with prior appendectomy.  There is sigmoid diverticulosis with subtle inflammatory changes seen surrounding small diverticulum in the mid sigmoid colon.  No evidence of abscess or perforation.  No evidence of bowel obstruction.  No free air or free fluid.    No acute osseous abnormality identified.  AVN changes are again seen involving the bilateral femoral heads.  Intervertebral disc space narrowing and degenerative endplate changes are seen at the L5-S1 level.  Small fat containing right inguinal hernia is seen.                                       X-Ray Chest AP Portable (Final result)  Result time 08/09/23 21:44:24      Final result by Dalton Lyle MD (08/09/23 21:44:24)                   Impression:      No acute cardiopulmonary process identified.      Electronically signed by: Dalton Lyle MD  Date:    08/09/2023  Time:    21:44               Narrative:    EXAMINATION:  XR CHEST AP PORTABLE    CLINICAL HISTORY:  Essential (primary) hypertension    TECHNIQUE:  Single frontal view of the chest was performed.    COMPARISON:  March 2021.    FINDINGS:  Cardiac silhouette is normal in size.  Lungs are symmetrically expanded.  No evidence of focal consolidative process, pneumothorax, or significant pleural effusion.  No acute osseous abnormality identified.                                       CT Abdomen Pelvis  Without Contrast (Final result)  Result time 08/09/23 20:01:13      Final result by Dalton Lyle MD (08/09/23 20:01:13)                   Impression:      1. Mild acute uncomplicated sigmoid diverticulitis.  No evidence of abscess or perforation.  2. Postsurgical changes and additional findings as detailed above.      Electronically signed by: Dalton Lyle MD  Date:    08/09/2023  Time:    20:01               Narrative:    EXAMINATION:  CT ABDOMEN PELVIS WITHOUT  CONTRAST    CLINICAL HISTORY:  Abdominal abscess/infection suspected;    TECHNIQUE:  Low dose axial images, sagittal and coronal reformations were obtained from the lung bases to the pubic symphysis.  Oral contrast was not administered.    COMPARISON:  CT abdomen and pelvis from 05/19/2023.    FINDINGS:  Heart is normal in size.  Coronary artery calcification is seen.  The lung bases are clear.  Circumferential wall thickening is again seen involving the distal visualized esophagus which can be seen in the setting of esophagitis.    No significant hepatic abnormalities are identified.  There is no intra-or extrahepatic biliary ductal dilatation.  The gallbladder is unremarkable.  Stomach, pancreas, spleen, and adrenal glands show no significant abnormalities.    Kidneys show no evidence of stones or hydronephrosis.  No abnormalities are seen along the ureteral courses.  Urinary bladder is unremarkable.  Prostate is mildly enlarged.    Postsurgical changes are seen at the base of the cecum consistent with prior appendectomy.  There is sigmoid diverticulosis with subtle inflammatory changes seen surrounding small diverticulum in the mid sigmoid colon.  No evidence of abscess or perforation.  No evidence of bowel obstruction.  No free air or free fluid.    Aorta tapers normally.  Prominent atherosclerosis is seen in the distal abdominal aorta extending into the iliacs.    No acute osseous abnormality identified.  AVN changes are again seen involving the bilateral femoral heads.  Intervertebral disc space narrowing and degenerative endplate changes are seen at the L5-S1 level.  Small fat containing right inguinal hernia is seen.                                       Assessment/Plan:     * Elevated troponin  No complaints of chest pain, EKG with sinus arrhythmia and frequent PVCs, initial troponin 0.045, 2nd troponin 0.061  Consult cardiology  -cardiac monitoring  -serial troponins  -ASA and PRN NTG  -2D echo  -TSH and  "lipid panel    Sigmoid diverticulitis  Presented to the hospital due to onset of sharp stabbing abdominal pain, CT showed Mild acute uncomplicated sigmoid diverticulitis.  No evidence of abscess or perforation.  Lipase, lactate and LFTs WNL, patient received GI cocktail, baclofen, IV PPI in ED without relief.  UA without signs of infection     Am CBC/CMP  Prn antiemetics  Prn analgesics   PPI  Consult to GI, patient request permanent treatment options for diverticulitis    Cannabis abuse  Counseled on cessation of marijuana, educated on the connection between abdominal pain and marijuana use.  UDS positive for THC metabolites    Hyperlipidemia  Chronic, stable, continue home regimen      Tobacco abuse  5 minutes spent counseling the patient on smoking cessation and he is not currently ready to stop smoking. He will be offereded a nicotine transdermal patch while hospitalized and monitored for withdrawal.  Will provide additional smoking cessation counseling prior to discharge.       Essential hypertension  Initially hypertensive in the ED, post ED intervention BP is well controlled continue home antihypertensives    Type 2 diabetes mellitus, controlled  Patient's FSGs are uncontrolled due to hyperglycemia on current medication regimen.  Last A1c reviewed-   Lab Results   Component Value Date    HGBA1C 6.8 (H) 01/05/2022     Most recent fingerstick glucose reviewed- No results for input(s): "POCTGLUCOSE" in the last 24 hours.  Current correctional scale  Medium  Maintain anti-hyperglycemic dose as follows-   Antihyperglycemics (From admission, onward)    Start     Stop Route Frequency Ordered    08/10/23 0222  insulin aspart U-100 pen 1-10 Units         -- SubQ Before meals & nightly PRN 08/10/23 0124        Hold Oral hypoglycemics while patient is in the hospital.    Chronic hepatitis C  S/p treatment, no acute issues, LFTs WNL continue to monitor      VTE Risk Mitigation (From admission, onward)         Ordered     " IP VTE LOW RISK PATIENT  Once         08/10/23 0124     Place sequential compression device  Until discontinued         08/10/23 0124                     On 08/09/2023, patient should be placed in hospital observation services under my care in collaboration with Gama Michaud MD.      Augustus Alejo NP  Department of Hospital Medicine  [facility] - Emergency Dept

## 2023-08-10 NOTE — PROVIDER PROGRESS NOTES - EMERGENCY DEPT.
Patient received as sign-out from Dr. Pride pending CTA chest/abdom/pelvis and repeat troponin.    59 yo male with DM2, HTN, prior CVA, hep C, presents with abdominal pain, nausea, vomiting.  BP very elevated initially at 233/108 but improved with pain control.    18:56: NSR, HR 69. Normal axis. Frequent PVCs. No STEMI. C/w 4/26/23.     Labs: Reassuring CBC, CMP, lipase. UA 1+ occult blood without infection.    Imaging revealed diverticulitis.  No evidence of dissection.    Troponin elevated at 0.045, then 0.061.      Patient denies chest pain or shortness of breath.      I suspect severe hypertension from pain associated with diverticulitis resulted in elevated troponin.  However, as troponin is up-trending, patient requires OBS for further monitoring and evaluation.  He has never had an echocardiogram or an angiogram.      I discussed patient for OBS with MARYAN Alejo of [facility] medicine.    Kristie Mercedes

## 2023-08-10 NOTE — ASSESSMENT & PLAN NOTE
Presented to the hospital due to onset of sharp stabbing abdominal pain, CT showed Mild acute uncomplicated sigmoid diverticulitis.  No evidence of abscess or perforation.  Lipase, lactate and LFTs WNL, patient received GI cocktail, baclofen, IV PPI in ED without relief.  UA without signs of infection     Am CBC/CMP  Prn antiemetics  Prn analgesics   PPI  Consult to GI, patient request permanent treatment options for diverticulitis

## 2023-08-10 NOTE — ED PROVIDER NOTES
Encounter Date: 8/9/2023    SCRIBE #1 NOTE: I, Chantel Cosby, am scribing for, and in the presence of,  Liza Pride MD. I have scribed the following portions of the note - Other sections scribed: HPI, ROS, PE.       History     Chief Complaint   Patient presents with    Abdominal Pain     Pt reports lower abdominal pain accompanied by N/V/D since 0500am this morning. Also reports right temporal headache. Pt denies urinary symptoms, chest pain, shortness of breath. Pt's BP in triage 233/108. Pt noncompliant with BP medications.      Nino Price is a 58 y.o. male, with a PMHx of anxiety, asthma, colitis, diverticulitis, depression, DM, hepatitis C, HTN, stroke, who presents to the ED with periumbilical abdominal pain with associated vomiting, nausea, and diarrhea. Patient reports onset of symptoms began this morning. Pt denies attempting treatment d/t inability to tolerate any PO intake. Pt denies any sick contact. Information provided by an independent historian, pt's spouse, who notes with Hx of similar pain, pt has been evaluated on three separate occassions. She states first there was no known diagnosis, second diverticulitis, and third pt was prescribed antibiotics and discharged. She reports onset of episodes frequently, with last onset around 3-4 months ago. She also notes pt's BP usually becomes high while experiencing these symptoms. Pt also c/o chronic lower back pain, which pt reports endorsing oxycodone for. No other exacerbating or alleviating factors. Pt denies pain in groin or testicles. Denies hematochezia, hematemesis, fever, hematuria, dysuria, urinary frequency, chest pain, SOB, other associated symptoms. Abilify, Buspar, Cymbalta, gabapentin, metformin, percocet, protonix, propranolol, desyrel are daily medications. Pt denies endorsing daily meds the past few days d/t inability to tolerate PO intake. Patient denies EtOH use. Pt reports tobacco and occasional marijuana use. Patient reports a  PSHx of colonoscopy, laparoscopy, partial cholecystectomy. Drug allergies include codeine.      Patient history limited as patient reporting he is in too much pain     The history is provided by the patient and the spouse. No  was used.     Review of patient's allergies indicates:   Allergen Reactions    Codeine Nausea Only     Past Medical History:   Diagnosis Date    Anxiety     Asthma     Colitis     Depression     Diabetes mellitus     Head injury     History of hepatitis C, s/p successful hcv treatment w/ SVR 8-2015  10/8/2012    SVR(24) as of 11/2015.  SVR(12) as of 8/2015.  completed harvoni 24 week regimen for genotype 1a 5/18/15     Hypertension     Shoulder pain     left, s/p 4 surgeries.     Stroke      Past Surgical History:   Procedure Laterality Date    COLONOSCOPY N/A 10/20/2015    Procedure: COLONOSCOPY;  Surgeon: Jagdish Patricia MD;  Location: [facility] ENDO;  Service: Endoscopy;  Laterality: N/A;    DIAGNOSTIC LAPAROSCOPY N/A 4/1/2021    Procedure: LAPAROSCOPY, DIAGNOSTIC;  Surgeon: Umesh Vallecillo MD;  Location: [facility] OR;  Service: General;  Laterality: N/A;  RN PREOP 3/29/21, COVID NEGATIVE ON 3/29  CONSENT AND H/P INCOMPLETE    GALLBLADDER SURGERY      pt not 100% if gall bladder removed    rotator cuff      left side     Family History   Problem Relation Age of Onset    Hypertension Mother     Heart disease Father     Cirrhosis Brother     Colon cancer Neg Hx     Esophageal cancer Neg Hx      Social History     Tobacco Use    Smoking status: Every Day     Current packs/day: 1.00     Average packs/day: 1 pack/day for 35.0 years (35.0 ttl pk-yrs)     Types: Cigarettes    Smokeless tobacco: Never   Substance Use Topics    Alcohol use: No    Drug use: Yes     Frequency: 7.0 times per week     Types: Marijuana     Review of Systems   Constitutional:  Negative for chills, diaphoresis and fever.   HENT:  Negative for drooling, sore throat and voice change.    Eyes:  Negative for  photophobia and visual disturbance.   Respiratory:  Negative for cough, shortness of breath and wheezing.    Cardiovascular:  Negative for chest pain and leg swelling.   Gastrointestinal:  Positive for abdominal pain, diarrhea, nausea and vomiting. Negative for blood in stool and constipation.        (-) hematemesis   Genitourinary:  Negative for dysuria, frequency, hematuria, testicular pain and urgency.        (-) groin pain   Musculoskeletal:  Negative for myalgias, neck pain and neck stiffness.   Skin:  Negative for rash and wound.   Neurological:  Negative for syncope, weakness, light-headedness, numbness and headaches.   Hematological:  Does not bruise/bleed easily.   Psychiatric/Behavioral:  Negative for agitation, confusion and suicidal ideas.    All other systems reviewed and are negative.      Physical Exam     Initial Vitals [08/09/23 1850]   BP Pulse Resp Temp SpO2   (!) 233/108 (!) 42 (!) 22 98.8 °F (37.1 °C) 99 %      MAP       --         Physical Exam    Nursing note and vitals reviewed.  Constitutional: He appears well-developed and well-nourished. He is not diaphoretic. He appears distressed.   Pt appears uncomfortable, yelling on exam 2/2 discomfort   HENT:   Head: Normocephalic and atraumatic.   Right Ear: External ear normal.   Left Ear: External ear normal.   Mouth/Throat: Oropharynx is clear and moist. No oropharyngeal exudate.   Eyes: Conjunctivae and EOM are normal. Pupils are equal, round, and reactive to light. Right eye exhibits no discharge. Left eye exhibits no discharge.   Neck: Neck supple. No JVD present.   Normal range of motion.  Cardiovascular:  Regular rhythm, normal heart sounds and intact distal pulses.   Bradycardia present.   Exam reveals no gallop and no friction rub.       No murmur heard.  BP significantly elevated on exam. Equal pulses bilaterally.    Pulmonary/Chest: Breath sounds normal. No respiratory distress. He has no wheezes. He has no rhonchi. He has no rales.    Abdominal: Abdomen is soft. Bowel sounds are normal. He exhibits no distension. There is abdominal tenderness in the periumbilical area.   No right CVA tenderness.  No left CVA tenderness. There is guarding (voluntary). There is no rebound and negative Self's sign.   Musculoskeletal:         General: No tenderness or edema. Normal range of motion.      Cervical back: Normal range of motion and neck supple.      Comments: Band like tenderness to lower back, patient states chronic. No midline tenderness. Straight leg test negative bilaterally.      Lymphadenopathy:     He has no cervical adenopathy.   Neurological: He is alert and oriented to person, place, and time. He has normal strength. No cranial nerve deficit or sensory deficit. GCS score is 15. GCS eye subscore is 4. GCS verbal subscore is 5. GCS motor subscore is 6.   Moves all extremities and carries on conversation. CN- II: PERRL; III/IV/VI: EOMI w/out evidence of nystagmus; V: no deficits appreciated to light touch bilateral face; VII: no facial weakness, no facial asymmetry. Eyebrow raise symmetric. Smile symmetric; IX/X: palate midline, and raises symmetrically; XI: shoulder shrug 5/5 bilaterally; XII: tongue is midline w/out asymmetry. Strength 5/5 to bilateral upper and lower extremities, sensation intact to light touch,    Skin: Skin is warm and dry. Capillary refill takes less than 2 seconds.   Psychiatric: He has a normal mood and affect. Thought content normal.         ED Course   Procedures  Labs Reviewed   CBC W/ AUTO DIFFERENTIAL - Abnormal; Notable for the following components:       Result Value    MCH 31.4 (*)     Gran # (ANC) 9.7 (*)     Immature Grans (Abs) 0.05 (*)     Gran % 80.8 (*)     Lymph % 12.7 (*)     All other components within normal limits   COMPREHENSIVE METABOLIC PANEL - Abnormal; Notable for the following components:    CO2 20 (*)     Glucose 161 (*)     Total Protein 8.7 (*)     Anion Gap 17 (*)     All other components  within normal limits   URINALYSIS, REFLEX TO URINE CULTURE - Abnormal; Notable for the following components:    Protein, UA 3+ (*)     Glucose, UA 1+ (*)     Ketones, UA 2+ (*)     Occult Blood UA 1+ (*)     All other components within normal limits    Narrative:     Specimen Source->Urine   B-TYPE NATRIURETIC PEPTIDE - Abnormal; Notable for the following components:     (*)     All other components within normal limits   TROPONIN I - Abnormal; Notable for the following components:    Troponin I 0.045 (*)     All other components within normal limits   URINALYSIS MICROSCOPIC - Abnormal; Notable for the following components:    WBC, UA 8 (*)     All other components within normal limits    Narrative:     Specimen Source->Urine   ISTAT PROCEDURE - Abnormal; Notable for the following components:    POC Glucose 167 (*)     POC Anion Gap 17 (*)     All other components within normal limits   LIPASE   LACTIC ACID, PLASMA   TROPONIN I   B-TYPE NATRIURETIC PEPTIDE   TROPONIN I          Imaging Results              CTA Chest Abdomen Pelvis (Final result)  Result time 08/09/23 22:33:14      Final result by Dalton Lyle MD (08/09/23 22:33:14)                   Impression:      1. No evidence of aortic aneurysm or dissection.  2. Redemonstration of mild acute uncomplicated sigmoid diverticulitis.  3. Circumferential wall thickening seen involving the mid to distal esophagus which can be seen with esophagitis.  4. Gastric antral wall thickening.  This may in part relate to nondistention, however gastritis could present with similar appearance.  This is more pronounced compared to earlier CT from the same date.  5. Postsurgical changes and additional findings as detailed above.      Electronically signed by: Dalton Lyle MD  Date:    08/09/2023  Time:    22:33               Narrative:    EXAMINATION:  CTA CHEST ABDOMEN PELVIS    CLINICAL HISTORY:  Aortic dissection suspected;    TECHNIQUE:  CTA chest abdomen and  pelvis was obtained before and after administration of 85 cc Omnipaque 350 IV contrast.  Sagittal and coronal reformats were obtained.    COMPARISON:  Noncontrast CT abdomen and pelvis from the same date.    FINDINGS:  No evidence of aortic aneurysm or dissection.  Branch vessels of the aortic arch are patent.  Calcified and noncalcified plaque resulting in mild to moderate stenosis are seen at the right brachiocephalic, right vertebral artery, and left subclavian artery origins.  Abdominal aortic branch vessels are widely patent.  Moderate calcified and noncalcified plaque are seen involving the infrarenal abdominal aorta.  Bilateral iliacs are patent.    Heart is normal in size with no pericardial effusion.  Coronary artery calcification is seen.  No evidence of PE to the level of the proximal segmental branches.    Major airways are patent.  Lungs are symmetrically expanded.  No evidence of consolidation, pneumothorax, or pleural effusion.  Mild paraseptal emphysematous changes are seen throughout both lungs.    Circumferential wall thickening is again seen involving the distal visualized esophagus which can be seen in the setting of esophagitis.  There is wall thickening seen involving the gastric antral region.    No significant focal hepatic abnormalities are identified.  There is no intra-or extrahepatic biliary ductal dilatation.  The gallbladder is unremarkable.  Pancreas, spleen, and adrenal glands show no significant abnormalities.    Kidneys enhance normally with no evidence of hydronephrosis.  No abnormalities are seen along the ureteral courses.  Urinary bladder is nondistended.  Prostate is mildly enlarged.    Postsurgical changes are seen at the base of the cecum consistent with prior appendectomy.  There is sigmoid diverticulosis with subtle inflammatory changes seen surrounding small diverticulum in the mid sigmoid colon.  No evidence of abscess or perforation.  No evidence of bowel obstruction.   No free air or free fluid.    No acute osseous abnormality identified.  AVN changes are again seen involving the bilateral femoral heads.  Intervertebral disc space narrowing and degenerative endplate changes are seen at the L5-S1 level.  Small fat containing right inguinal hernia is seen.                                       X-Ray Chest AP Portable (Final result)  Result time 08/09/23 21:44:24      Final result by Dalton Lyle MD (08/09/23 21:44:24)                   Impression:      No acute cardiopulmonary process identified.      Electronically signed by: Dalton Lyle MD  Date:    08/09/2023  Time:    21:44               Narrative:    EXAMINATION:  XR CHEST AP PORTABLE    CLINICAL HISTORY:  Essential (primary) hypertension    TECHNIQUE:  Single frontal view of the chest was performed.    COMPARISON:  March 2021.    FINDINGS:  Cardiac silhouette is normal in size.  Lungs are symmetrically expanded.  No evidence of focal consolidative process, pneumothorax, or significant pleural effusion.  No acute osseous abnormality identified.                                       CT Abdomen Pelvis  Without Contrast (Final result)  Result time 08/09/23 20:01:13      Final result by Dalton Lyle MD (08/09/23 20:01:13)                   Impression:      1. Mild acute uncomplicated sigmoid diverticulitis.  No evidence of abscess or perforation.  2. Postsurgical changes and additional findings as detailed above.      Electronically signed by: Dalton Lyle MD  Date:    08/09/2023  Time:    20:01               Narrative:    EXAMINATION:  CT ABDOMEN PELVIS WITHOUT CONTRAST    CLINICAL HISTORY:  Abdominal abscess/infection suspected;    TECHNIQUE:  Low dose axial images, sagittal and coronal reformations were obtained from the lung bases to the pubic symphysis.  Oral contrast was not administered.    COMPARISON:  CT abdomen and pelvis from 05/19/2023.    FINDINGS:  Heart is normal in size.  Coronary artery  calcification is seen.  The lung bases are clear.  Circumferential wall thickening is again seen involving the distal visualized esophagus which can be seen in the setting of esophagitis.    No significant hepatic abnormalities are identified.  There is no intra-or extrahepatic biliary ductal dilatation.  The gallbladder is unremarkable.  Stomach, pancreas, spleen, and adrenal glands show no significant abnormalities.    Kidneys show no evidence of stones or hydronephrosis.  No abnormalities are seen along the ureteral courses.  Urinary bladder is unremarkable.  Prostate is mildly enlarged.    Postsurgical changes are seen at the base of the cecum consistent with prior appendectomy.  There is sigmoid diverticulosis with subtle inflammatory changes seen surrounding small diverticulum in the mid sigmoid colon.  No evidence of abscess or perforation.  No evidence of bowel obstruction.  No free air or free fluid.    Aorta tapers normally.  Prominent atherosclerosis is seen in the distal abdominal aorta extending into the iliacs.    No acute osseous abnormality identified.  AVN changes are again seen involving the bilateral femoral heads.  Intervertebral disc space narrowing and degenerative endplate changes are seen at the L5-S1 level.  Small fat containing right inguinal hernia is seen.                                       Medications   ondansetron injection 4 mg (4 mg Intravenous Given 8/9/23 2013)   HYDROmorphone injection 0.5 mg (0.5 mg Intravenous Given 8/9/23 2020)   iohexoL (OMNIPAQUE 350) injection 85 mL (85 mLs Intravenous Given 8/9/23 2209)     Medical Decision Making:   History:   Old Medical Records: I decided to obtain old medical records.  Old Records Summarized: other records.       <> Summary of Records: External documents reviewed: Pt last evaluated at this ED facility on 5/18/23 for periumbilical abdominal pain with associated nausea and vomiting. CT abdomen and pelvis without any acute findings  besides the circumferential distal esophageal thickening. Lactic acid within normal limits and white count normal. Patient continues to endorse 10/10 pain in the abdomen despite Dilaudid, GI cocktail; pt admitted for pain control.     Clinical Tests:   Lab Tests: Ordered and Reviewed  Radiological Study: Ordered and Reviewed  Medical Tests: Ordered and Reviewed  MDM  58  year old patient presenting 2/2 abdominal pain, noted to be distressed, voluntary rebound, /108, appears uncomfortable.    Considered but less likely:     AAA: Considered, CTA normal. BP resolved with pain medication.   Cholecystitis: location inconsistent, no relation with meals, negative landeross  SBO: normal BM and flatus.   Appendicitis: location inconsistent, no fever, no rebound/guarding  Mesenteric ischemia: HPI inconsistent, does not coincide with meals, other dx more likely, lactic WNL  Kidney stone: no r cva tenderness, no dysuria, no hematuria  Pyelonephritis: no cva tenderness, no dysuria, no fever  Pancreatitis: no history of alcohol abuse, unlikely gallstone obstructing, location inconsistent  Epididymitis: no testicular swelling or redness  UTI: UA negative, no dysuria or increased frequency of urination  Testicular torsion: no testicular pain/swelling.    Labs with Urine with 2+ ketones, 1+ sugar and protein--no signs of infection.    BNP slightly elevated to 427, patient has no peripheral edema, no JVD, no rales. CXR with no acute process. Echo 2015 with EF 55%.  Will need follow up with PCP, possible 2/2 transitory HTN    CBC with no leukocytosis, Hgb 17 (likely 2/2 dehydration from vomiting), normal PLTs.    CMP with CO2 20, and mild elevated total protein, will need PCP follow up.     Lipase WNL.     CT with mild diverticulitis.     Patient pending CTA to ensure no aortic dissection.     Trop mildly elevated at 0.045, likely secondary to strain from transitory HTN. No CP or SOB. Does have frequent PVCS on EKG but these were  present in 4/2023. Will get 3 hour recheck and if down trending and tolerating PO anticipate discharge with PCP and cardiology follow up, antibiotics and strict return precautions. Care signed out to Dr. Mercedes at 10 pm pending CTA and repeat trop.                   Scribe Attestation:   Scribe #1: I performed the above scribed service and the documentation accurately describes the services I performed. I attest to the accuracy of the note.        ED Course as of 08/09/23 2332   Wed Aug 09, 2023   2101 Repeat EKG 12-lead  Sinus rhythm with sinus arrhythmia frequent PVCs at 69.  No clear ST elevation or significant depression given this.  Normal axis.  T-wave inversions in aVL.  Right atrial enlargement.  QTC is 430.  [JT]      ED Course User Index  [JT] Liza Pride MD        Scribe attestation: I, Liza Pride, personally performed the services described in this documentation. All medical record entries made by the scribe were at my direction and in my presence.  I have reviewed the chart and agree that the record reflects my personal performance and is accurate and complete.          Clinical Impression:   Final diagnoses:  [R00.1] Bradycardia  [I10] Hypertension  [R77.8] Elevated troponin (Primary)  [R79.89] Elevated brain natriuretic peptide (BNP) level  [K57.92] Diverticulitis               Liza Pride MD  08/09/23 2332

## 2023-08-10 NOTE — HOSPITAL COURSE
"Nino Price was placed under observation for elevated troponin.      Mr. Price initially presented to the hospital for left lower quadrant pain found to have mild diverticulitis on CT.  Admission labs noted elevated troponin which trended flat. He denied any chest pain or shortness of breath.  2D echo was ordered which noted normal EF normal diastolic function and normal wall motion. Cardiology was consulted and ordered pharmacologic stress test. On 08/10, patient refused 2nd part of stress test "enough is enough" despite explaining elevated troponin and risk factors for CAD. Cardiology conducted stress echocardiogram 08/11 which was noted to show normal wall motion and normal systolic function with EF: 55-60%. Patient is to continue Cipro and Flagyl x 7 days. Mr. Price is medically and hemodynamically stable for discharge. Vitals prior to discharge are as follows: Afebrile at 97.2 °F, HR: 57 bpm, BP: 140/64 with oxygen saturation of 98% on RA.    All findings and plan were explained to the patient. All questions and concerns were answered. Patient verbalized understanding. Patient is in stable condition to d/c home and has been informed to follow up with his PCP within the next 7-10 days to discuss his observation stay and to follow-up with Cardiology. SW has been consulted to assist with follow-up appointment planning. Patient has been educated to return to the ED if he experiences any further chest pain, shortness of breath, lightheadness, weakness, or discomfort.  "

## 2023-08-10 NOTE — ASSESSMENT & PLAN NOTE
Patient with multiple risk factors for coronary artery disease.  Check stress test to rule out large areas of ischemia

## 2023-08-10 NOTE — PLAN OF CARE
[facility] - Emergency Dept  Discharge Assessment    Primary Care Provider: Pritesh Marinelli MD   Case Management Assessment     PCP: Pritesh Marinelli MD  Pharmacy: [facility] [address]    Patient Arrived From: Home  Existing Help at Home: Spouse    Barriers to Discharge: None    Discharge Plan:    A. Home   B. Home    Discharge Assessment (most recent)       BRIEF DISCHARGE ASSESSMENT - 08/10/23 0997          Discharge Planning    Assessment Type Discharge Planning Brief Assessment     Resource/Environmental Concerns none     Support Systems Spouse/significant other     Equipment Currently Used at Home rollator;cane, straight     Current Living Arrangements home     Patient/Family Anticipates Transition to home     Patient/Family Anticipated Services at Transition none     DME Needed Upon Discharge  none     Discharge Plan A Home with family                   SW completed brief assessment at bedside with patient. Preferred pharmacy is [facility] on [address]. Patient utilizes a cane and rollator and also a breathing machine. Patient is not on dialysis or blood thinners at this time. Patient to go home with spouse at discharge.

## 2023-08-10 NOTE — ED NOTES
Patient blood pressure assessed in both arms. Pt hypertensive and bradycardic. Provider notified. Cardiac monitoring in place. IV hydralazine administered. Care on going.

## 2023-08-10 NOTE — ASSESSMENT & PLAN NOTE
"Patient's FSGs are uncontrolled due to hyperglycemia on current medication regimen.  Last A1c reviewed-   Lab Results   Component Value Date    HGBA1C 6.8 (H) 01/05/2022     Most recent fingerstick glucose reviewed- No results for input(s): "POCTGLUCOSE" in the last 24 hours.  Current correctional scale  Medium  Maintain anti-hyperglycemic dose as follows-   Antihyperglycemics (From admission, onward)    Start     Stop Route Frequency Ordered    08/10/23 0222  insulin aspart U-100 pen 1-10 Units         -- SubQ Before meals & nightly PRN 08/10/23 0124        Hold Oral hypoglycemics while patient is in the hospital.  "

## 2023-08-10 NOTE — SUBJECTIVE & OBJECTIVE
Past Medical History:   Diagnosis Date    Anxiety     Asthma     Colitis     Depression     Diabetes mellitus     Head injury     History of hepatitis C, s/p successful hcv treatment w/ SVR 8-2015  10/8/2012    SVR(24) as of 11/2015.  SVR(12) as of 8/2015.  completed harvoni 24 week regimen for genotype 1a 5/18/15     Hypertension     Shoulder pain     left, s/p 4 surgeries.     Stroke        Past Surgical History:   Procedure Laterality Date    COLONOSCOPY N/A 10/20/2015    Procedure: COLONOSCOPY;  Surgeon: Jagdish Patricia MD;  Location: [facility] ENDO;  Service: Endoscopy;  Laterality: N/A;    DIAGNOSTIC LAPAROSCOPY N/A 4/1/2021    Procedure: LAPAROSCOPY, DIAGNOSTIC;  Surgeon: Umesh Vallecillo MD;  Location: [facility] OR;  Service: General;  Laterality: N/A;  RN PREOP 3/29/21, COVID NEGATIVE ON 3/29  CONSENT AND H/P INCOMPLETE    GALLBLADDER SURGERY      pt not 100% if gall bladder removed    rotator cuff      left side       Review of patient's allergies indicates:   Allergen Reactions    Codeine Nausea Only       No current facility-administered medications on file prior to encounter.     Current Outpatient Medications on File Prior to Encounter   Medication Sig    ARIPiprazole (ABILIFY) 10 MG Tab Take 10 mg by mouth once daily.    atorvastatin (LIPITOR) 40 MG tablet Take 40 mg by mouth.    busPIRone (BUSPAR) 10 MG tablet Take 5 mg by mouth every evening.    busPIRone (BUSPAR) 5 MG Tab Take 15 mg by mouth once daily. Takes in pm    cyclobenzaprine (FLEXERIL) 5 MG tablet Take 5 mg by mouth nightly as needed.    dicyclomine (BENTYL) 10 MG capsule Take 10 mg by mouth.    doxepin (SINEQUAN) 100 MG capsule Take 10 mg by mouth every evening.    DULoxetine (CYMBALTA) 60 MG capsule Take 60 mg by mouth once daily. Takes in pm    gabapentin (NEURONTIN) 300 MG capsule Take 300 mg by mouth 3 (three) times daily.    metFORMIN (GLUCOPHAGE) 1000 MG tablet Take 1,000 mg by mouth 2 (two) times daily with meals.    NAMENDA 10 mg Tab  Take 10 mg by mouth once daily.     oxyCODONE-acetaminophen (PERCOCET)  mg per tablet TAKE 1 TABLET AS NEEDED ORALLY EVERY 4 TO 6 HOURS    oxyCODONE-acetaminophen (PERCOCET) 7.5-325 mg per tablet Take 1 tablet by mouth.    pantoprazole (PROTONIX) 40 MG tablet Take 1 tablet (40 mg total) by mouth 2 (two) times daily.    propranoloL (INDERAL) 20 MG tablet Take 10 mg by mouth 3 (three) times daily.     traZODone (DESYREL) 50 MG tablet Take 100 mg by mouth every evening.    TRUE METRIX GLUCOSE METER Misc     TRUE METRIX GLUCOSE TEST STRIP Strp     valsartan-hydrochlorothiazide (DIOVAN-HCT) 160-25 mg per tablet Take 1 tablet by mouth once daily.     Family History       Problem Relation (Age of Onset)    Cirrhosis Brother    Heart disease Father    Hypertension Mother          Tobacco Use    Smoking status: Every Day     Current packs/day: 1.00     Average packs/day: 1 pack/day for 35.0 years (35.0 ttl pk-yrs)     Types: Cigarettes    Smokeless tobacco: Never   Substance and Sexual Activity    Alcohol use: No    Drug use: Yes     Frequency: 7.0 times per week     Types: Marijuana    Sexual activity: Yes     Partners: Female     Review of Systems   Constitutional: Negative.   HENT: Negative.     Eyes: Negative.    Respiratory: Negative.     Endocrine: Negative.    Hematologic/Lymphatic: Negative.    Skin: Negative.    Musculoskeletal: Negative.    Gastrointestinal:  Positive for abdominal pain.   Genitourinary: Negative.    Neurological: Negative.    Psychiatric/Behavioral: Negative.     Allergic/Immunologic: Negative.      Objective:     Vital Signs (Most Recent):  Temp: 98 °F (36.7 °C) (08/10/23 0607)  Pulse: 69 (08/10/23 0802)  Resp: 17 (08/10/23 0802)  BP: 128/74 (08/10/23 0802)  SpO2: 95 % (08/10/23 0802) Vital Signs (24h Range):  Temp:  [98 °F (36.7 °C)-98.8 °F (37.1 °C)] 98 °F (36.7 °C)  Pulse:  [42-83] 69  Resp:  [17-22] 17  SpO2:  [94 %-99 %] 95 %  BP: ()/() 128/74     Weight: 65.8 kg (145  "lb)  Body mass index is 24.89 kg/m².    SpO2: 95 %         Intake/Output Summary (Last 24 hours) at 8/10/2023 1240  Last data filed at 8/10/2023 0045  Gross per 24 hour   Intake --   Output 350 ml   Net -350 ml       Lines/Drains/Airways       Peripheral Intravenous Line  Duration                  Peripheral IV - Single Lumen 08/09/23 2006 20 G Left Antecubital <1 day                     Physical Exam  Vitals reviewed.   Constitutional:       Appearance: He is well-developed.   HENT:      Head: Normocephalic.   Eyes:      Conjunctiva/sclera: Conjunctivae normal.      Pupils: Pupils are equal, round, and reactive to light.   Cardiovascular:      Rate and Rhythm: Normal rate and regular rhythm.      Heart sounds: Normal heart sounds.   Pulmonary:      Effort: Pulmonary effort is normal.      Breath sounds: Normal breath sounds.   Abdominal:      General: Bowel sounds are normal.      Palpations: Abdomen is soft.   Musculoskeletal:      Cervical back: Normal range of motion and neck supple.   Skin:     General: Skin is warm.   Neurological:      Mental Status: He is alert and oriented to person, place, and time.          Significant Labs: BMP:   Recent Labs   Lab 08/09/23  2001 08/10/23  0738   * 121*    139   K 4.0 3.4*    105   CO2 20* 22*   BUN 9 17   CREATININE 1.1 1.1   CALCIUM 10.5 9.3   , CMP   Recent Labs   Lab 08/09/23  2001 08/10/23  0738    139   K 4.0 3.4*    105   CO2 20* 22*   * 121*   BUN 9 17   CREATININE 1.1 1.1   CALCIUM 10.5 9.3   PROT 8.7*  --    ALBUMIN 4.6  --    BILITOT 0.8  --    ALKPHOS 82  --    AST 17  --    ALT 14  --    ANIONGAP 17* 12   , CBC   Recent Labs   Lab 08/09/23 2001   WBC 11.98   HGB 17.2   HCT 50.0      , INR No results for input(s): "INR", "PROTIME" in the last 48 hours., Lipid Panel   Recent Labs   Lab 08/10/23  0738   CHOL 218*   HDL 26*   LDLCALC 160.8*   TRIG 156*   CHOLHDL 11.9*   , Troponin   Recent Labs   Lab 08/09/23  2300 " 08/10/23  0332 08/10/23  0738   TROPONINI 0.061* 0.053* 0.036*   , and All pertinent lab results from the last 24 hours have been reviewed.    Significant Imaging: Echocardiogram: Transthoracic echo (TTE) complete (Cupid Only): No results found for this or any previous visit.

## 2023-08-10 NOTE — ASSESSMENT & PLAN NOTE
5 minutes spent counseling the patient on smoking cessation and he is not currently ready to stop smoking. He will be offereded a nicotine transdermal patch while hospitalized and monitored for withdrawal.  Will provide additional smoking cessation counseling prior to discharge.

## 2023-08-10 NOTE — HPI
"Nino Price 58 y.o. male with anxiety, asthma, colitis, diverticulitis, depression, DM, hepatitis C, HTN, stroke presents to the hospital with a chief complaint of periumbilical abdominal pain with associated vomiting, nausea, diarrhea.  Acute onset this morning patient denies attempting any self-treatment due to inability to tolerate p.o. intake.  Patient notes with Hx of similar pain, pt has been evaluated on three separate occassions. he states first there was no known diagnosis, second diverticulitis, and third pt was prescribed antibiotics and discharged. he reports onset of episodes frequently, with last onset around 3-4 months ago. he also notes his BP usually becomes high while experiencing these symptoms. Pt also c/o chronic lower back pain, which pt reports endorsing oxycodone for. No other exacerbating or alleviating factors. Pt denies pain in groin or testicles. Denies hematochezia, hematemesis, fever, hematuria, dysuria, urinary frequency, chest pain, SOB, other associated symptoms. Pt denies endorsing daily meds the past few days d/t inability to tolerate PO intake. Patient denies EtOH use. Pt reports tobacco and occasional marijuana use.  Patient denied any attempted self-treatment, no other alleviating or exacerbating factors noted    In the ED patient was afebrile without leukocytosis, CBC mostly unremarkable, CO2 20, anion gap 17, glucose 161, protein total 8.7, , 1st troponin 0.045, 2nd troponin 0.061, lactate and alcohol WNL, UDS only positive for marijuana metabolite, UA only showed +3 protein, +2 ketones, +1 glucose and occult blood.  Urine creatinine WNL, CT abdomen pelvis showed  Mild acute uncomplicated sigmoid diverticulitis.  No evidence of abscess or perforation.  CTA chest and abdomen pelvis showed "Circumferential wall thickening seen involving the mid to distal esophagus which can be seen with esophagitis. Gastric antral wall thickening.  This may in part relate to " "nondistention, however gastritis could present with similar appearance" CXR was negative, EKG showed sinus rhythm with frequent PVCs.  Patient was placed in observation  "

## 2023-08-10 NOTE — ED TRIAGE NOTES
Pt arrived to ED with a c/o abdominal pain with nausea and vomiting since today morning. Pt reports pain since 2-3 days back but increased severity since today. Also complains of chronic back pain. Pt reports pain being 10/10. Denies chest pain, SOB, any other urinary symptoms. Pt AAO x4. Vitals are stable except for BP. Pt's spouse reports increase in BP when he's in pain.

## 2023-08-10 NOTE — DISCHARGE INSTRUCTIONS
Take your blood pressure at home twice a day for 3 days and write these numbers down, bring with you to your primary care doctor in 2-3 days for blood pressure recheck as you may need your medications changed. Return to the ED for chest pain, shortness of breath, numbness, weakness, loss of vision or any other concerns.  Please return for fever, vomiting, worsening abdominal pain or any other concerns.     Thank you for coming to our Emergency Department today. As we discussed, it is important to remember that some problems are difficult to diagnose and may not be found during your Emergency Department visit. Be sure to follow up with your primary care doctor and review all labs/imaging/tests that were performed during this visit with them. Some labs/tests may be outside of the normal range and require non-emergent follow-up and further investigation to help diagnose/exclude/prevent complications or other medical conditions.    If you do not have a primary care doctor, you may contact the one listed on your discharge paperwork or you may also call the [facility] Appointment Desk at [phone] to schedule an appointment and establish care with one. It is important to your health that you have a primary care doctor.    Please take all medications as directed. All medications may potentially have side-effects and it is impossible to predict which medications may give you side-effects or what side-effects (if any) they will give you.. If you feel that you are having a negative effect or side-effect of any medication you should immediately stop taking them and seek medical attention. If you feel that you are having a life-threatening reaction call 911.    Return to the ER with any questions/concerns, new/concerning symptoms, worsening or failure to improve.     Do not drive, swim, climb to height, take a bath or make any important decisions for 24 hours if you have received any pain medications, sedatives or mood  altering drugs during your ER visit.

## 2023-08-10 NOTE — PLAN OF CARE
08/10/23 1258   Final Note   Assessment Type Final Discharge Note   Anticipated Discharge Disposition Home   Hospital Resources/Appts/Education Provided Appointments scheduled and added to AVS   Post-Acute Status   Post-Acute Authorization Other   Other Status No Post-Acute Service Needs     Nursing notified that the pt can d/c from CM standpoint

## 2023-08-10 NOTE — ASSESSMENT & PLAN NOTE
No complaints of chest pain, EKG with sinus arrhythmia and frequent PVCs, initial troponin 0.045, 2nd troponin 0.061  Consult cardiology  -cardiac monitoring  -serial troponins  -ASA and PRN NTG  -2D echo  -TSH and lipid panel

## 2023-08-10 NOTE — CONSULTS
[facility] Emergency Dept  Cardiology  Consult Note    Patient Name: Nino Price  MRN: [id number]  Admission Date: 8/9/2023  Hospital Length of Stay: 0 days  Code Status: Full Code   Attending Provider: Gama Michaud MD   Consulting Provider: Gabriela Rider MD  Primary Care Physician: Pritesh Marinelli MD  Principal Problem:Elevated troponin    Patient information was obtained from patient and ER records.     Inpatient consult to Cardiology  Consult performed by: Gabriela Rider MD  Consult ordered by: Augustus Alejo NP        Subjective:     Chief Complaint:  Trop elevation. bnp elevation     HPI:   Patient is a 58-year-old man.  History of anxiety, asthma, diverticulitis, depression, diabetes, hepatitis-C, hypertension, stroke came with chief complaint of periumbilical abdominal pain with nausea vomiting.  Blood pressure was found to be elevated around 233 mm systolic as listed in the chart.  Now abdominal pain is better as per the patient.  Cardiology was consulted because BNP elevated at 427 and troponin elevated, mildly, flat trend.  Denies any heart attacks or stents or open-heart surgeries in the past          HPI: Nino Price 58 y.o. male with anxiety, asthma, colitis, diverticulitis, depression, DM, hepatitis C, HTN, stroke presents to the hospital with a chief complaint of periumbilical abdominal pain with associated vomiting, nausea, diarrhea.  Acute onset this morning patient denies attempting any self-treatment due to inability to tolerate p.o. intake.  Patient notes with Hx of similar pain, pt has been evaluated on three separate occassions. he states first there was no known diagnosis, second diverticulitis, and third pt was prescribed antibiotics and discharged. he reports onset of episodes frequently, with last onset around 3-4 months ago. he also notes his BP usually becomes high while experiencing these symptoms. Pt also c/o chronic lower back pain, which pt reports  "endorsing oxycodone for. No other exacerbating or alleviating factors. Pt denies pain in groin or testicles. Denies hematochezia, hematemesis, fever, hematuria, dysuria, urinary frequency, chest pain, SOB, other associated symptoms. Pt denies endorsing daily meds the past few days d/t inability to tolerate PO intake. Patient denies EtOH use. Pt reports tobacco and occasional marijuana use.  Patient denied any attempted self-treatment, no other alleviating or exacerbating factors noted     In the ED patient was afebrile without leukocytosis, CBC mostly unremarkable, CO2 20, anion gap 17, glucose 161, protein total 8.7, , 1st troponin 0.045, 2nd troponin 0.061, lactate and alcohol WNL, UDS only positive for marijuana metabolite, UA only showed +3 protein, +2 ketones, +1 glucose and occult blood.  Urine creatinine WNL, CT abdomen pelvis showed  Mild acute uncomplicated sigmoid diverticulitis.  No evidence of abscess or perforation.  CTA chest and abdomen pelvis showed "Circumferential wall thickening seen involving the mid to distal esophagus which can be seen with esophagitis. Gastric antral wall thickening.  This may in part relate to nondistention, however gastritis could present with similar appearance" CXR was negative, EKG showed sinus rhythm with frequent PVCs.  Patient was placed in observation      Past Medical History:   Diagnosis Date    Anxiety     Asthma     Colitis     Depression     Diabetes mellitus     Head injury     History of hepatitis C, s/p successful hcv treatment w/ SVR 8-2015  10/8/2012    SVR(24) as of 11/2015.  SVR(12) as of 8/2015.  completed harvoni 24 week regimen for genotype 1a 5/18/15     Hypertension     Shoulder pain     left, s/p 4 surgeries.     Stroke        Past Surgical History:   Procedure Laterality Date    COLONOSCOPY N/A 10/20/2015    Procedure: COLONOSCOPY;  Surgeon: Jagdish Patricia MD;  Location: [facility];  Service: Endoscopy;  Laterality: N/A;    " DIAGNOSTIC LAPAROSCOPY N/A 4/1/2021    Procedure: LAPAROSCOPY, DIAGNOSTIC;  Surgeon: Umesh Vallecillo MD;  Location: [facility];  Service: General;  Laterality: N/A;  RN PREOP 3/29/21, COVID NEGATIVE ON 3/29  CONSENT AND H/P INCOMPLETE    GALLBLADDER SURGERY      pt not 100% if gall bladder removed    rotator cuff      left side       Review of patient's allergies indicates:   Allergen Reactions    Codeine Nausea Only       No current facility-administered medications on file prior to encounter.     Current Outpatient Medications on File Prior to Encounter   Medication Sig    ARIPiprazole (ABILIFY) 10 MG Tab Take 10 mg by mouth once daily.    atorvastatin (LIPITOR) 40 MG tablet Take 40 mg by mouth.    busPIRone (BUSPAR) 10 MG tablet Take 5 mg by mouth every evening.    busPIRone (BUSPAR) 5 MG Tab Take 15 mg by mouth once daily. Takes in pm    cyclobenzaprine (FLEXERIL) 5 MG tablet Take 5 mg by mouth nightly as needed.    dicyclomine (BENTYL) 10 MG capsule Take 10 mg by mouth.    doxepin (SINEQUAN) 100 MG capsule Take 10 mg by mouth every evening.    DULoxetine (CYMBALTA) 60 MG capsule Take 60 mg by mouth once daily. Takes in pm    gabapentin (NEURONTIN) 300 MG capsule Take 300 mg by mouth 3 (three) times daily.    metFORMIN (GLUCOPHAGE) 1000 MG tablet Take 1,000 mg by mouth 2 (two) times daily with meals.    NAMENDA 10 mg Tab Take 10 mg by mouth once daily.     oxyCODONE-acetaminophen (PERCOCET)  mg per tablet TAKE 1 TABLET AS NEEDED ORALLY EVERY 4 TO 6 HOURS    oxyCODONE-acetaminophen (PERCOCET) 7.5-325 mg per tablet Take 1 tablet by mouth.    pantoprazole (PROTONIX) 40 MG tablet Take 1 tablet (40 mg total) by mouth 2 (two) times daily.    propranoloL (INDERAL) 20 MG tablet Take 10 mg by mouth 3 (three) times daily.     traZODone (DESYREL) 50 MG tablet Take 100 mg by mouth every evening.    TRUE METRIX GLUCOSE METER Misc     TRUE METRIX GLUCOSE TEST STRIP Strp      valsartan-hydrochlorothiazide (DIOVAN-HCT) 160-25 mg per tablet Take 1 tablet by mouth once daily.     Family History       Problem Relation (Age of Onset)    Cirrhosis Brother    Heart disease Father    Hypertension Mother          Tobacco Use    Smoking status: Every Day     Current packs/day: 1.00     Average packs/day: 1 pack/day for 35.0 years (35.0 ttl pk-yrs)     Types: Cigarettes    Smokeless tobacco: Never   Substance and Sexual Activity    Alcohol use: No    Drug use: Yes     Frequency: 7.0 times per week     Types: Marijuana    Sexual activity: Yes     Partners: Female     Review of Systems   Constitutional: Negative.   HENT: Negative.     Eyes: Negative.    Respiratory: Negative.     Endocrine: Negative.    Hematologic/Lymphatic: Negative.    Skin: Negative.    Musculoskeletal: Negative.    Gastrointestinal:  Positive for abdominal pain.   Genitourinary: Negative.    Neurological: Negative.    Psychiatric/Behavioral: Negative.     Allergic/Immunologic: Negative.      Objective:     Vital Signs (Most Recent):  Temp: 98 °F (36.7 °C) (08/10/23 0607)  Pulse: 69 (08/10/23 0802)  Resp: 17 (08/10/23 0802)  BP: 128/74 (08/10/23 0802)  SpO2: 95 % (08/10/23 0802) Vital Signs (24h Range):  Temp:  [98 °F (36.7 °C)-98.8 °F (37.1 °C)] 98 °F (36.7 °C)  Pulse:  [42-83] 69  Resp:  [17-22] 17  SpO2:  [94 %-99 %] 95 %  BP: ()/() 128/74     Weight: 65.8 kg (145 lb)  Body mass index is 24.89 kg/m².    SpO2: 95 %         Intake/Output Summary (Last 24 hours) at 8/10/2023 1240  Last data filed at 8/10/2023 0045  Gross per 24 hour   Intake --   Output 350 ml   Net -350 ml       Lines/Drains/Airways       Peripheral Intravenous Line  Duration                  Peripheral IV - Single Lumen 08/09/23 2006 20 G Left Antecubital <1 day                     Physical Exam  Vitals reviewed.   Constitutional:       Appearance: He is well-developed.   HENT:      Head: Normocephalic.   Eyes:      Conjunctiva/sclera:  "Conjunctivae normal.      Pupils: Pupils are equal, round, and reactive to light.   Cardiovascular:      Rate and Rhythm: Normal rate and regular rhythm.      Heart sounds: Normal heart sounds.   Pulmonary:      Effort: Pulmonary effort is normal.      Breath sounds: Normal breath sounds.   Abdominal:      General: Bowel sounds are normal.      Palpations: Abdomen is soft.   Musculoskeletal:      Cervical back: Normal range of motion and neck supple.   Skin:     General: Skin is warm.   Neurological:      Mental Status: He is alert and oriented to person, place, and time.          Significant Labs: BMP:   Recent Labs   Lab 08/09/23  2001 08/10/23  0738   * 121*    139   K 4.0 3.4*    105   CO2 20* 22*   BUN 9 17   CREATININE 1.1 1.1   CALCIUM 10.5 9.3   , CMP   Recent Labs   Lab 08/09/23  2001 08/10/23  0738    139   K 4.0 3.4*    105   CO2 20* 22*   * 121*   BUN 9 17   CREATININE 1.1 1.1   CALCIUM 10.5 9.3   PROT 8.7*  --    ALBUMIN 4.6  --    BILITOT 0.8  --    ALKPHOS 82  --    AST 17  --    ALT 14  --    ANIONGAP 17* 12   , CBC   Recent Labs   Lab 08/09/23 2001   WBC 11.98   HGB 17.2   HCT 50.0      , INR No results for input(s): "INR", "PROTIME" in the last 48 hours., Lipid Panel   Recent Labs   Lab 08/10/23  0738   CHOL 218*   HDL 26*   LDLCALC 160.8*   TRIG 156*   CHOLHDL 11.9*   , Troponin   Recent Labs   Lab 08/09/23  2300 08/10/23  0332 08/10/23  0738   TROPONINI 0.061* 0.053* 0.036*   , and All pertinent lab results from the last 24 hours have been reviewed.    Significant Imaging: Echocardiogram: Transthoracic echo (TTE) complete (Cupid Only): No results found for this or any previous visit.    Assessment and Plan:     * Elevated troponin  Patient with multiple risk factors for coronary artery disease.  Check stress test to rule out large areas of ischemia    Elevated brain natriuretic peptide (BNP) level  Check 2D echocardiogram    Hyperlipidemia    "     Tobacco abuse        Essential hypertension        Type 2 diabetes mellitus, controlled        Chronic hepatitis C            VTE Risk Mitigation (From admission, onward)         Ordered     IP VTE LOW RISK PATIENT  Once         08/10/23 0124     Place sequential compression device  Until discontinued         08/10/23 0124                Thank you for your consult. I will follow-up with patient. Please contact us if you have any additional questions.    Gabriela Rider MD  Cardiology   [facility] - Emergency Dept

## 2023-08-11 VITALS
OXYGEN SATURATION: 98 % | DIASTOLIC BLOOD PRESSURE: 64 MMHG | SYSTOLIC BLOOD PRESSURE: 140 MMHG | HEIGHT: 64 IN | TEMPERATURE: 97 F | HEART RATE: 57 BPM | BODY MASS INDEX: 21.68 KG/M2 | WEIGHT: 127 LBS | RESPIRATION RATE: 18 BRPM

## 2023-08-11 LAB
ALBUMIN SERPL BCP-MCNC: 3.8 G/DL (ref 3.5–5.2)
ALP SERPL-CCNC: 61 U/L (ref 55–135)
ALT SERPL W/O P-5'-P-CCNC: 14 U/L (ref 10–44)
ANION GAP SERPL CALC-SCNC: 15 MMOL/L (ref 8–16)
AST SERPL-CCNC: 19 U/L (ref 10–40)
BASOPHILS # BLD AUTO: 0.06 K/UL (ref 0–0.2)
BASOPHILS NFR BLD: 0.6 % (ref 0–1.9)
BILIRUB SERPL-MCNC: 0.8 MG/DL (ref 0.1–1)
BSA FOR ECHO PROCEDURE: 1.61 M2
BUN SERPL-MCNC: 21 MG/DL (ref 6–20)
CALCIUM SERPL-MCNC: 9.3 MG/DL (ref 8.7–10.5)
CHLORIDE SERPL-SCNC: 104 MMOL/L (ref 95–110)
CO2 SERPL-SCNC: 19 MMOL/L (ref 23–29)
CREAT SERPL-MCNC: 1.4 MG/DL (ref 0.5–1.4)
CV STRESS BASE HR: 68 BPM
DIASTOLIC BLOOD PRESSURE: 59 MMHG
DIFFERENTIAL METHOD: ABNORMAL
EOSINOPHIL # BLD AUTO: 0.1 K/UL (ref 0–0.5)
EOSINOPHIL NFR BLD: 0.7 % (ref 0–8)
ERYTHROCYTE [DISTWIDTH] IN BLOOD BY AUTOMATED COUNT: 12.9 % (ref 11.5–14.5)
EST. GFR  (NO RACE VARIABLE): 58 ML/MIN/1.73 M^2
GLUCOSE SERPL-MCNC: 89 MG/DL (ref 70–110)
HCT VFR BLD AUTO: 47.8 % (ref 40–54)
HGB BLD-MCNC: 16.5 G/DL (ref 14–18)
IMM GRANULOCYTES # BLD AUTO: 0.02 K/UL (ref 0–0.04)
IMM GRANULOCYTES NFR BLD AUTO: 0.2 % (ref 0–0.5)
LYMPHOCYTES # BLD AUTO: 3.8 K/UL (ref 1–4.8)
LYMPHOCYTES NFR BLD: 35.4 % (ref 18–48)
MAGNESIUM SERPL-MCNC: 1.6 MG/DL (ref 1.6–2.6)
MCH RBC QN AUTO: 31.9 PG (ref 27–31)
MCHC RBC AUTO-ENTMCNC: 34.5 G/DL (ref 32–36)
MCV RBC AUTO: 93 FL (ref 82–98)
MONOCYTES # BLD AUTO: 1.1 K/UL (ref 0.3–1)
MONOCYTES NFR BLD: 10.6 % (ref 4–15)
NEUTROPHILS # BLD AUTO: 5.6 K/UL (ref 1.8–7.7)
NEUTROPHILS NFR BLD: 52.5 % (ref 38–73)
NRBC BLD-RTO: 0 /100 WBC
OHS CV CPX 85 PERCENT MAX PREDICTED HEART RATE MALE: 137
OHS CV CPX MAX PREDICTED HEART RATE: 161
OHS CV CPX PATIENT IS FEMALE: 0
OHS CV CPX PATIENT IS MALE: 1
OHS CV CPX PEAK DIASTOLIC BLOOD PRESSURE: 72 MMHG
OHS CV CPX PEAK HEAR RATE: 141 BPM
OHS CV CPX PEAK RATE PRESSURE PRODUCT: NORMAL
OHS CV CPX PEAK SYSTOLIC BLOOD PRESSURE: 143 MMHG
OHS CV CPX PERCENT MAX PREDICTED HEART RATE ACHIEVED: 88
OHS CV CPX RATE PRESSURE PRODUCT PRESENTING: 8568
PLATELET # BLD AUTO: 193 K/UL (ref 150–450)
PMV BLD AUTO: 10.4 FL (ref 9.2–12.9)
POCT GLUCOSE: 121 MG/DL (ref 70–110)
POCT GLUCOSE: 147 MG/DL (ref 70–110)
POTASSIUM SERPL-SCNC: 4 MMOL/L (ref 3.5–5.1)
PROT SERPL-MCNC: 7 G/DL (ref 6–8.4)
RBC # BLD AUTO: 5.17 M/UL (ref 4.6–6.2)
SODIUM SERPL-SCNC: 138 MMOL/L (ref 136–145)
SYSTOLIC BLOOD PRESSURE: 126 MMHG
WBC # BLD AUTO: 10.64 K/UL (ref 3.9–12.7)

## 2023-08-11 PROCEDURE — C9113 INJ PANTOPRAZOLE SODIUM, VIA: HCPCS

## 2023-08-11 PROCEDURE — 63600175 PHARM REV CODE 636 W HCPCS

## 2023-08-11 PROCEDURE — G0378 HOSPITAL OBSERVATION PER HR: HCPCS

## 2023-08-11 PROCEDURE — 83735 ASSAY OF MAGNESIUM: CPT | Performed by: NURSE PRACTITIONER

## 2023-08-11 PROCEDURE — 96366 THER/PROPH/DIAG IV INF ADDON: CPT

## 2023-08-11 PROCEDURE — 25000003 PHARM REV CODE 250

## 2023-08-11 PROCEDURE — 99214 OFFICE O/P EST MOD 30 MIN: CPT | Mod: ,,, | Performed by: INTERNAL MEDICINE

## 2023-08-11 PROCEDURE — 63600175 PHARM REV CODE 636 W HCPCS: Performed by: INTERNAL MEDICINE

## 2023-08-11 PROCEDURE — 96365 THER/PROPH/DIAG IV INF INIT: CPT | Mod: 59

## 2023-08-11 PROCEDURE — 99214 PR OFFICE/OUTPT VISIT, EST, LEVL IV, 30-39 MIN: ICD-10-PCS | Mod: ,,, | Performed by: INTERNAL MEDICINE

## 2023-08-11 PROCEDURE — 36415 COLL VENOUS BLD VENIPUNCTURE: CPT | Performed by: NURSE PRACTITIONER

## 2023-08-11 PROCEDURE — 80053 COMPREHEN METABOLIC PANEL: CPT | Performed by: NURSE PRACTITIONER

## 2023-08-11 PROCEDURE — 96367 TX/PROPH/DG ADDL SEQ IV INF: CPT

## 2023-08-11 PROCEDURE — 96375 TX/PRO/DX INJ NEW DRUG ADDON: CPT | Mod: 59

## 2023-08-11 PROCEDURE — 63600175 PHARM REV CODE 636 W HCPCS: Performed by: NURSE PRACTITIONER

## 2023-08-11 PROCEDURE — 25000003 PHARM REV CODE 250: Performed by: NURSE PRACTITIONER

## 2023-08-11 PROCEDURE — 96376 TX/PRO/DX INJ SAME DRUG ADON: CPT | Mod: 59

## 2023-08-11 PROCEDURE — 85025 COMPLETE CBC W/AUTO DIFF WBC: CPT | Performed by: NURSE PRACTITIONER

## 2023-08-11 PROCEDURE — 96361 HYDRATE IV INFUSION ADD-ON: CPT

## 2023-08-11 RX ORDER — OXYCODONE AND ACETAMINOPHEN 7.5; 325 MG/1; MG/1
1 TABLET ORAL ONCE
Status: COMPLETED | OUTPATIENT
Start: 2023-08-11 | End: 2023-08-11

## 2023-08-11 RX ORDER — HYOSCYAMINE SULFATE 0.12 MG/1
0.12 TABLET SUBLINGUAL EVERY 4 HOURS PRN
Qty: 60 TABLET | Refills: 1 | Status: SHIPPED | OUTPATIENT
Start: 2023-08-11 | End: 2024-01-26

## 2023-08-11 RX ORDER — DOBUTAMINE HYDROCHLORIDE 400 MG/100ML
0-20 INJECTION INTRAVENOUS ONCE
Status: COMPLETED | OUTPATIENT
Start: 2023-08-11 | End: 2023-08-11

## 2023-08-11 RX ADMIN — OXYCODONE AND ACETAMINOPHEN 1 TABLET: 7.5; 325 TABLET ORAL at 09:08

## 2023-08-11 RX ADMIN — ATORVASTATIN CALCIUM 40 MG: 40 TABLET, FILM COATED ORAL at 08:08

## 2023-08-11 RX ADMIN — ASPIRIN 81 MG: 81 TABLET, COATED ORAL at 08:08

## 2023-08-11 RX ADMIN — METRONIDAZOLE 500 MG: 500 INJECTION, SOLUTION INTRAVENOUS at 01:08

## 2023-08-11 RX ADMIN — DOBUTAMINE HYDROCHLORIDE 30 MCG/KG/MIN: 400 INJECTION INTRAVENOUS at 10:08

## 2023-08-11 RX ADMIN — GABAPENTIN 300 MG: 300 CAPSULE ORAL at 08:08

## 2023-08-11 RX ADMIN — METRONIDAZOLE 500 MG: 500 INJECTION, SOLUTION INTRAVENOUS at 11:08

## 2023-08-11 RX ADMIN — PANTOPRAZOLE SODIUM 40 MG: 40 INJECTION, POWDER, FOR SOLUTION INTRAVENOUS at 08:08

## 2023-08-11 RX ADMIN — CIPROFLOXACIN 400 MG: 2 INJECTION, SOLUTION INTRAVENOUS at 08:08

## 2023-08-11 RX ADMIN — ARIPIPRAZOLE 10 MG: 5 TABLET ORAL at 08:08

## 2023-08-11 NOTE — HOSPITAL COURSE
Interval Hx: no cp/sob, still with lower abd pain.  MPI yesterday (rest only, pt refused stress images) with inferolat defect.  Review of EKG reveals normal St segments, EF normal on echo.  Unable to do stress MPI today d/t MIBI in system from yesterday's test.  Will proceed with Ex stress echo.    Tele: SR 70s (personally reviewed and interpreted)

## 2023-08-11 NOTE — NURSING
[facility], [facility] - [place]  Nurses Note -- 4 Eyes      8/10/2023       Skin assessed on: Q Shift      [x] No Pressure Injuries Present    []Prevention Measures Documented    [] Yes LDA  for Pressure Injury Previously documented     [] Yes New Pressure Injury Discovered   [] LDA for New Pressure Injury Added      Attending RN:  Suad Augustin, RN     Second RN:  Radha Matamoros, PCA

## 2023-08-11 NOTE — ASSESSMENT & PLAN NOTE
Cont statin  Check lipids/LFT 6 months     Xolair Counseling:  Patient informed of potential adverse effects including but not limited to fever, muscle aches, rash and allergic reactions.  The patient verbalized understanding of the proper use and possible adverse effects of Xolair.  All of the patient's questions and concerns were addressed.

## 2023-08-11 NOTE — NURSING
[facility], [facility] - [place]  Nurses Note -- 4 Eyes        Date: 08/11/2023        Skin assessed on: Q Shift        [x] No Pressure Injuries Present                 []Prevention Measures Documented     [] Yes LDA Previously documented      [] Yes New Pressure Injury Discovered              [] LDA Added        Attending RN: GRICELDA Ortega     Second RN:  QING Borjas

## 2023-08-11 NOTE — PROGRESS NOTES
[facility]  Cardiology  Progress Note    Patient Name: Nino Price  MRN: [id number]  Admission Date: 8/9/2023  Hospital Length of Stay: 0 days  Code Status: Full Code   Attending Physician: Gama Michaud MD   Primary Care Physician: Pritesh Marinelli MD  Expected Discharge Date: 8/10/2023  Principal Problem:Elevated troponin    Subjective:     Interval Hx: no cp/sob, still with lower abd pain.  MPI yesterday (rest only, pt refused stress images) with inferolat defect.  Review of EKG reveals normal St segments, EF normal on echo.  Unable to do stress MPI today d/t MIBI in system from yesterday's test.  Will proceed with Ex stress echo.    Tele: SR 70s (personally reviewed and interpreted)          Review of Systems   Musculoskeletal:  Positive for back pain.   Gastrointestinal:  Positive for abdominal pain. Negative for melena.   Genitourinary:  Negative for hematuria.     Objective:     Vital Signs (Most Recent):  Temp: 98.5 °F (36.9 °C) (08/11/23 0725)  Pulse: 82 (08/11/23 0725)  Resp: 18 (08/11/23 0725)  BP: 101/74 (08/11/23 0725)  SpO2: (!) 94 % (08/11/23 0725) Vital Signs (24h Range):  Temp:  [98.5 °F (36.9 °C)-99.9 °F (37.7 °C)] 98.5 °F (36.9 °C)  Pulse:  [48-84] 82  Resp:  [16-20] 18  SpO2:  [94 %-99 %] 94 %  BP: ()/() 101/74     Weight: 57.8 kg (127 lb 6.8 oz)  Body mass index is 21.87 kg/m².     SpO2: (!) 94 %         Intake/Output Summary (Last 24 hours) at 8/11/2023 0903  Last data filed at 8/11/2023 0415  Gross per 24 hour   Intake 1146.12 ml   Output 400 ml   Net 746.12 ml       Lines/Drains/Airways       Peripheral Intravenous Line  Duration                  Peripheral IV - Single Lumen 08/11/23 0731 20 G Right Forearm <1 day                       Physical Exam  Constitutional:       General: He is not in acute distress.     Appearance: Normal appearance. He is well-developed and normal weight. He is not ill-appearing, toxic-appearing or diaphoretic.   HENT:      Head:  Normocephalic and atraumatic.   Eyes:      General: No scleral icterus.     Extraocular Movements: Extraocular movements intact.      Conjunctiva/sclera: Conjunctivae normal.      Pupils: Pupils are equal, round, and reactive to light.   Neck:      Thyroid: No thyromegaly.      Vascular: No JVD.      Trachea: No tracheal deviation.   Cardiovascular:      Rate and Rhythm: Normal rate and regular rhythm.      Heart sounds: S1 normal and S2 normal. No murmur heard.     No friction rub. No gallop.   Pulmonary:      Effort: Pulmonary effort is normal. No respiratory distress.      Breath sounds: Normal breath sounds. No stridor. No wheezing, rhonchi or rales.   Chest:      Chest wall: No tenderness.   Abdominal:      General: There is no distension.      Palpations: Abdomen is soft.   Musculoskeletal:         General: No swelling or tenderness. Normal range of motion.      Cervical back: Normal range of motion and neck supple. No rigidity.      Right lower leg: No edema.      Left lower leg: No edema.   Skin:     General: Skin is warm and dry.      Coloration: Skin is not jaundiced.   Neurological:      General: No focal deficit present.      Mental Status: He is alert and oriented to person, place, and time.      Cranial Nerves: No cranial nerve deficit.   Psychiatric:         Mood and Affect: Mood normal.         Behavior: Behavior normal.            Current Medications:   ARIPiprazole  10 mg Oral Daily    aspirin  81 mg Oral Daily    atorvastatin  40 mg Oral Daily    ciprofloxacin  400 mg Intravenous Q12H    gabapentin  300 mg Oral TID    losartan-hydrochlorothiazide 50-12.5 mg  2 tablet Oral Daily    metronidazole  500 mg Intravenous Q8H    oxyCODONE-acetaminophen  1 tablet Oral Once    pantoprazole  40 mg Intravenous Daily    polyethylene glycol  17 g Oral Daily    propranoloL  10 mg Oral TID      lactated ringers 100 mL/hr at 08/11/23 0414     acetaminophen, aluminum-magnesium hydroxide-simethicone, dextrose 10%,  dextrose 10%, glucagon (human recombinant), glucose, glucose, hydrALAZINE, hyoscyamine, insulin aspart U-100, melatonin, naloxone, ondansetron, pneumoc 20-usleman conj-dip cr(PF), prochlorperazine    Laboratory (all labs reviewed):  CBC:  Recent Labs   Lab 05/26/22  0950 04/26/23  0615 05/18/23 2356 08/09/23 2000 08/09/23 2001 08/11/23  0514   WBC 5.92 12.57 12.34  --  11.98 10.64   Hemoglobin 13.9 L 16.9 18.9 H  --  17.2 16.5   POC Hematocrit  --   --   --  52  --   --    Hematocrit 41.4 47.7 53.3  --  50.0 47.8   Platelets 191 268 265  --  216 193       CHEMISTRIES:  Recent Labs   Lab 03/29/21  0920 09/28/21  1537 05/26/22  0950 05/26/22  0950 04/26/23  0615 05/18/23  2356 05/19/23  0448 08/09/23  2001 08/10/23  0738 08/11/23  0514   Glucose 113 H 84 135 H  --  217 H 184 H 124 H 161 H 121 H 89   Sodium 141 141 141  --  137 139 139 142 139 138   Potassium 4.4 4.6 4.0  --  4.2 4.1 4.0 4.0 3.4 L 4.0   BUN 14 9 7  --  11 10 13 9 17 21 H   Creatinine 1.0 1.0 1.0  --  1.3 1.2 1.1 1.1 1.1 1.4   eGFR if non African American >60 >60 >60  --   --   --   --   --   --   --    eGFR  --   --   --    < > >60 >60 >60 >60 >60 58 A   Calcium 9.9 10.5 9.2  --  10.0 10.3 8.7 10.5 9.3 9.3   Magnesium  --   --   --   --  1.8 1.6  --   --   --  1.6    < > = values in this interval not displayed.       CARDIAC BIOMARKERS:  Recent Labs   Lab 08/09/23  2001 08/09/23  2300 08/10/23  0332 08/10/23  0738   Troponin I 0.045 H 0.061 H 0.053 H 0.036 H       COAGS:  Recent Labs   Lab 03/29/21  0920 05/26/22  0950   INR 1.1 1.0       LIPIDS/LFTS:  Recent Labs   Lab 09/28/21  1537 05/26/22  0950 04/26/23  0615 05/18/23  2356 05/19/23  0448 08/09/23  2001 08/10/23  0738 08/11/23  0514   Cholesterol 230 H  --   --   --  174  --  218 H  --    Triglycerides 257 H  --   --   --  104  --  156 H  --    HDL 33 L  --   --   --  29 L  --  26 L  --    LDL Cholesterol 145.6  --   --   --  124.2  --  160.8 H  --    Non-HDL Cholesterol 197  --   --   --  145  --   192  --    AST 17   < > 25 17 14 17  --  19   ALT 15   < > 11 14 9 L 14  --  14    < > = values in this interval not displayed.       BNP:  Recent Labs   Lab 08/09/23  2046    H       TSH:  Recent Labs   Lab 08/10/23  0738   TSH 1.125       Free T4:        Diagnostic Results:  ECG (personally reviewed and interpreted tracing(s)):  8/10/23 SR 52    Ex stress echo 8/11/23: ordered    Echo: 8/10/23    Left Ventricle: Normal wall motion. There is normal systolic function with a visually estimated ejection fraction of 55 - 60%.    Left Atrium: Left atrium is severely dilated.    Right Ventricle: Normal right ventricular cavity size. Systolic function is normal.    Mitral Valve: There is moderate regurgitation with a posterolateral eccentriccally directed jet.    IVC/SVC: Intermediate venous pressure at 8 mmHg.    Stress Test: L MPI (rest only) (images personally reviewed and interpreted) 8/10/23    Abnormal myocardial perfusion scan.    There is a moderate to severe intensity, moderate sized, fixed perfusion abnormality in the lateral wall(s). Only resting images obtained as patient refused to complete test. Incomplete study.    There are no other significant perfusion abnormalities.    The ECG portion of the study is negative for ischemia.    The patient reported no chest pain during the stress test.    There were no arrhythmias during stress.        Assessment and Plan:     * Elevated troponin  Patient with multiple risk factors for coronary artery disease.   No anginal sxs.  Had MPI yesterday, but refused stress imaging.  Rest defect noted.  EF/wall motion normal on echo.  Ex stress echo today ( as contingency).    Hyperlipidemia  Cont statin  Check lipids/LFT 6 months      Elevated brain natriuretic peptide (BNP) level  Appears euvolemic  EF normal on echo    Essential hypertension  Cont med rx    Type 2 diabetes mellitus, controlled  Per IM      Tobacco abuse        Chronic hepatitis C            VTE Risk  Mitigation (From admission, onward)           Ordered     IP VTE LOW RISK PATIENT  Once         08/10/23 0124     Place sequential compression device  Until discontinued         08/10/23 0124                    Lavelle Carr MD  Cardiology  [facility] [place] [facility]    Addendum 11am     stress echo 8/11/23 (images personally reviewed and interpreted)    Left Ventricle: Normal wall motion. There is normal systolic function with a visually estimated ejection fraction of 55 - 60%.    ECG Conclusion: The ECG portion of the study is negative for ischemia.    Post-stress Echo: The left ventricle systolic function is hyperdynamic.    Post-stress Impression: The study is normal.      In cardiac testing planned.    Cardiology will sign off, please call with questions.    Patient to follow up with Dr. Rider after discharge.

## 2023-08-11 NOTE — NURSING
Patient returned to unit in  via transport, denies pain/discomforted at this time, call light within reach

## 2023-08-11 NOTE — SUBJECTIVE & OBJECTIVE
Review of Systems   Musculoskeletal:  Positive for back pain.   Gastrointestinal:  Positive for abdominal pain. Negative for melena.   Genitourinary:  Negative for hematuria.     Objective:     Vital Signs (Most Recent):  Temp: 98.5 °F (36.9 °C) (08/11/23 0725)  Pulse: 82 (08/11/23 0725)  Resp: 18 (08/11/23 0725)  BP: 101/74 (08/11/23 0725)  SpO2: (!) 94 % (08/11/23 0725) Vital Signs (24h Range):  Temp:  [98.5 °F (36.9 °C)-99.9 °F (37.7 °C)] 98.5 °F (36.9 °C)  Pulse:  [48-84] 82  Resp:  [16-20] 18  SpO2:  [94 %-99 %] 94 %  BP: ()/() 101/74     Weight: 57.8 kg (127 lb 6.8 oz)  Body mass index is 21.87 kg/m².     SpO2: (!) 94 %         Intake/Output Summary (Last 24 hours) at 8/11/2023 0903  Last data filed at 8/11/2023 0415  Gross per 24 hour   Intake 1146.12 ml   Output 400 ml   Net 746.12 ml       Lines/Drains/Airways       Peripheral Intravenous Line  Duration                  Peripheral IV - Single Lumen 08/11/23 0731 20 G Right Forearm <1 day                       Physical Exam  Constitutional:       General: He is not in acute distress.     Appearance: Normal appearance. He is well-developed and normal weight. He is not ill-appearing, toxic-appearing or diaphoretic.   HENT:      Head: Normocephalic and atraumatic.   Eyes:      General: No scleral icterus.     Extraocular Movements: Extraocular movements intact.      Conjunctiva/sclera: Conjunctivae normal.      Pupils: Pupils are equal, round, and reactive to light.   Neck:      Thyroid: No thyromegaly.      Vascular: No JVD.      Trachea: No tracheal deviation.   Cardiovascular:      Rate and Rhythm: Normal rate and regular rhythm.      Heart sounds: S1 normal and S2 normal. No murmur heard.     No friction rub. No gallop.   Pulmonary:      Effort: Pulmonary effort is normal. No respiratory distress.      Breath sounds: Normal breath sounds. No stridor. No wheezing, rhonchi or rales.   Chest:      Chest wall: No tenderness.   Abdominal:       General: There is no distension.      Palpations: Abdomen is soft.   Musculoskeletal:         General: No swelling or tenderness. Normal range of motion.      Cervical back: Normal range of motion and neck supple. No rigidity.      Right lower leg: No edema.      Left lower leg: No edema.   Skin:     General: Skin is warm and dry.      Coloration: Skin is not jaundiced.   Neurological:      General: No focal deficit present.      Mental Status: He is alert and oriented to person, place, and time.      Cranial Nerves: No cranial nerve deficit.   Psychiatric:         Mood and Affect: Mood normal.         Behavior: Behavior normal.            Current Medications:   ARIPiprazole  10 mg Oral Daily    aspirin  81 mg Oral Daily    atorvastatin  40 mg Oral Daily    ciprofloxacin  400 mg Intravenous Q12H    gabapentin  300 mg Oral TID    losartan-hydrochlorothiazide 50-12.5 mg  2 tablet Oral Daily    metronidazole  500 mg Intravenous Q8H    oxyCODONE-acetaminophen  1 tablet Oral Once    pantoprazole  40 mg Intravenous Daily    polyethylene glycol  17 g Oral Daily    propranoloL  10 mg Oral TID      lactated ringers 100 mL/hr at 08/11/23 0414     acetaminophen, aluminum-magnesium hydroxide-simethicone, dextrose 10%, dextrose 10%, glucagon (human recombinant), glucose, glucose, hydrALAZINE, hyoscyamine, insulin aspart U-100, melatonin, naloxone, ondansetron, pneumoc 20-suleman conj-dip cr(PF), prochlorperazine    Laboratory (all labs reviewed):  CBC:  Recent Labs   Lab 05/26/22  0950 04/26/23  0615 05/18/23  2356 08/09/23 2000 08/09/23 2001 08/11/23  0514   WBC 5.92 12.57 12.34  --  11.98 10.64   Hemoglobin 13.9 L 16.9 18.9 H  --  17.2 16.5   POC Hematocrit  --   --   --  52  --   --    Hematocrit 41.4 47.7 53.3  --  50.0 47.8   Platelets 191 268 265  --  216 193       CHEMISTRIES:  Recent Labs   Lab 03/29/21  0920 09/28/21  1537 05/26/22  0950 05/26/22  0950 04/26/23  0615 05/18/23 2356 05/19/23 0448 08/09/23 2001  08/10/23  0738 08/11/23  0514   Glucose 113 H 84 135 H  --  217 H 184 H 124 H 161 H 121 H 89   Sodium 141 141 141  --  137 139 139 142 139 138   Potassium 4.4 4.6 4.0  --  4.2 4.1 4.0 4.0 3.4 L 4.0   BUN 14 9 7  --  11 10 13 9 17 21 H   Creatinine 1.0 1.0 1.0  --  1.3 1.2 1.1 1.1 1.1 1.4   eGFR if non African American >60 >60 >60  --   --   --   --   --   --   --    eGFR  --   --   --    < > >60 >60 >60 >60 >60 58 A   Calcium 9.9 10.5 9.2  --  10.0 10.3 8.7 10.5 9.3 9.3   Magnesium  --   --   --   --  1.8 1.6  --   --   --  1.6    < > = values in this interval not displayed.       CARDIAC BIOMARKERS:  Recent Labs   Lab 08/09/23  2001 08/09/23  2300 08/10/23  0332 08/10/23  0738   Troponin I 0.045 H 0.061 H 0.053 H 0.036 H       COAGS:  Recent Labs   Lab 03/29/21  0920 05/26/22  0950   INR 1.1 1.0       LIPIDS/LFTS:  Recent Labs   Lab 09/28/21  1537 05/26/22  0950 04/26/23  0615 05/18/23  2356 05/19/23  0448 08/09/23  2001 08/10/23  0738 08/11/23  0514   Cholesterol 230 H  --   --   --  174  --  218 H  --    Triglycerides 257 H  --   --   --  104  --  156 H  --    HDL 33 L  --   --   --  29 L  --  26 L  --    LDL Cholesterol 145.6  --   --   --  124.2  --  160.8 H  --    Non-HDL Cholesterol 197  --   --   --  145  --  192  --    AST 17   < > 25 17 14 17  --  19   ALT 15   < > 11 14 9 L 14  --  14    < > = values in this interval not displayed.       BNP:  Recent Labs   Lab 08/09/23  2046    H       TSH:  Recent Labs   Lab 08/10/23  0738   TSH 1.125       Free T4:        Diagnostic Results:  ECG (personally reviewed and interpreted tracing(s)):  8/10/23 SR 52    Ex stress echo 8/11/23: ordered    Echo: 8/10/23    Left Ventricle: Normal wall motion. There is normal systolic function with a visually estimated ejection fraction of 55 - 60%.    Left Atrium: Left atrium is severely dilated.    Right Ventricle: Normal right ventricular cavity size. Systolic function is normal.    Mitral Valve: There is moderate  regurgitation with a posterolateral eccentriccally directed jet.    IVC/SVC: Intermediate venous pressure at 8 mmHg.    Stress Test: L MPI (rest only) (images personally reviewed and interpreted) 8/10/23    Abnormal myocardial perfusion scan.    There is a moderate to severe intensity, moderate sized, fixed perfusion abnormality in the lateral wall(s). Only resting images obtained as patient refused to complete test. Incomplete study.    There are no other significant perfusion abnormalities.    The ECG portion of the study is negative for ischemia.    The patient reported no chest pain during the stress test.    There were no arrhythmias during stress.

## 2023-08-11 NOTE — PLAN OF CARE
08/11/23 1239   Final Note   Assessment Type Final Discharge Note   Anticipated Discharge Disposition Home   Hospital Resources/Appts/Education Provided Appointments scheduled and added to AVS   Post-Acute Status   Post-Acute Authorization Other   Other Status No Post-Acute Service Needs     Nursing notified that the pt can d/c from CM standpoint     3

## 2023-08-11 NOTE — PLAN OF CARE
NPO.  Medicated once for nausea on admit; otherwise no N/V. States he feels much better.   Problem: Adult Inpatient Plan of Care  Goal: Plan of Care Review  Outcome: Ongoing, Progressing  Goal: Patient-Specific Goal (Individualized)  Outcome: Ongoing, Progressing  Goal: Optimal Comfort and Wellbeing  Outcome: Ongoing, Progressing  Goal: Readiness for Transition of Care  Outcome: Ongoing, Progressing     Problem: Infection  Goal: Absence of Infection Signs and Symptoms  Outcome: Ongoing, Progressing     Problem: Diabetes Comorbidity  Goal: Blood Glucose Level Within Targeted Range  Outcome: Ongoing, Progressing

## 2023-08-11 NOTE — NURSING
Discharge instructions explained and handed to pt. Pt verbalizes understanding. Denies any questions. IV discontinued and catheter intact. Vital signs stable, NADN, and afebrile. Cardiac monitoring dc. Discharged home NAD.

## 2023-08-11 NOTE — PROGRESS NOTES
[facility]                    Pharmacy       Discharge Medication Education    Patient ACCEPTED medication education. Pharmacy has provided education on the name, indication, and possible side effects of the medication(s) prescribed, using teach-back method.     The following medications have also been discussed, during this admission.        Medication List        START taking these medications      aspirin 81 MG EC tablet  Commonly known as: ECOTRIN  Take 1 tablet (81 mg total) by mouth once daily.     ciprofloxacin HCl 500 MG tablet  Commonly known as: CIPRO  Take 1 tablet (500 mg total) by mouth every 12 (twelve) hours. for 7 days     hyoscyamine 0.125 mg Tbdl  Commonly known as: LEVSIN  Place 1 tablet (0.125 mg total) under the tongue every 4 (four) hours as needed.     metroNIDAZOLE 500 MG tablet  Commonly known as: FLAGYL  Take 1 tablet (500 mg total) by mouth every 8 (eight) hours. for 7 days            CHANGE how you take these medications      busPIRone 10 MG tablet  Commonly known as: BUSPAR  What changed: Another medication with the same name was removed. Continue taking this medication, and follow the directions you see here.     oxyCODONE-acetaminophen  mg per tablet  Commonly known as: PERCOCET  What changed: Another medication with the same name was removed. Continue taking this medication, and follow the directions you see here.            CONTINUE taking these medications      ARIPiprazole 10 MG Tab  Commonly known as: ABILIFY     atorvastatin 40 MG tablet  Commonly known as: LIPITOR     cyclobenzaprine 5 MG tablet  Commonly known as: FLEXERIL     dicyclomine 10 MG capsule  Commonly known as: BENTYL     doxepin 100 MG capsule  Commonly known as: SINEQUAN     DULoxetine 60 MG capsule  Commonly known as: CYMBALTA     gabapentin 300 MG capsule  Commonly known as: NEURONTIN     metFORMIN 1000 MG tablet  Commonly known as: GLUCOPHAGE     NAMENDA 10 MG Tab  Generic drug:  memantine     pantoprazole 40 MG tablet  Commonly known as: PROTONIX  Take 1 tablet (40 mg total) by mouth 2 (two) times daily.     propranoloL 20 MG tablet  Commonly known as: INDERAL     traZODone 50 MG tablet  Commonly known as: DESYREL     TRUE METRIX GLUCOSE METER Misc  Generic drug: blood-glucose meter     TRUE METRIX GLUCOSE TEST STRIP Strp  Generic drug: blood sugar diagnostic     valsartan-hydrochlorothiazide 160-25 mg per tablet  Commonly known as: DIOVAN-HCT               Where to Get Your Medications        These medications were sent to [facility]/pharmacy #[id number] - [place] [facility] [address] [facility] [facility] [address]      Phone: [phone]   aspirin 81 MG EC tablet  ciprofloxacin HCl 500 MG tablet  metroNIDAZOLE 500 MG tablet       These medications were sent to [facility] [address] [place] [address]      Hours: Mon-Fri, 8a-5:30p Phone: [phone]   hyoscyamine 0.125 mg Tbdl          Thank you  Vazquez Walker, PharmD  [phone]

## 2023-08-11 NOTE — DISCHARGE SUMMARY
[facility] Medicine  Discharge Summary      Patient Name: Nino Price  MRN: [id number]  [facility]: [id number]  Patient Class: OP- Observation  Admission Date: 8/9/2023  Hospital Length of Stay: 0 days  Discharge Date and Time:  08/11/2023 1:18 PM  Attending Physician: Gama Michaud MD   Discharging Provider: Lee Dennis PA-C  Primary Care Provider: Pritesh Marinelli MD    Primary Care Team: LEE DENNIS    HPI:   Nino Price 58 y.o. male with anxiety, asthma, colitis, diverticulitis, depression, DM, hepatitis C, HTN, stroke presents to the hospital with a chief complaint of periumbilical abdominal pain with associated vomiting, nausea, diarrhea.  Acute onset this morning patient denies attempting any self-treatment due to inability to tolerate p.o. intake.  Patient notes with Hx of similar pain, pt has been evaluated on three separate occassions. he states first there was no known diagnosis, second diverticulitis, and third pt was prescribed antibiotics and discharged. he reports onset of episodes frequently, with last onset around 3-4 months ago. he also notes his BP usually becomes high while experiencing these symptoms. Pt also c/o chronic lower back pain, which pt reports endorsing oxycodone for. No other exacerbating or alleviating factors. Pt denies pain in groin or testicles. Denies hematochezia, hematemesis, fever, hematuria, dysuria, urinary frequency, chest pain, SOB, other associated symptoms. Pt denies endorsing daily meds the past few days d/t inability to tolerate PO intake. Patient denies EtOH use. Pt reports tobacco and occasional marijuana use.  Patient denied any attempted self-treatment, no other alleviating or exacerbating factors noted    In the ED patient was afebrile without leukocytosis, CBC mostly unremarkable, CO2 20, anion gap 17, glucose 161, protein total 8.7, , 1st troponin 0.045, 2nd troponin 0.061, lactate and alcohol WNL, UDS only  "positive for marijuana metabolite, UA only showed +3 protein, +2 ketones, +1 glucose and occult blood.  Urine creatinine WNL, CT abdomen pelvis showed  Mild acute uncomplicated sigmoid diverticulitis.  No evidence of abscess or perforation.  CTA chest and abdomen pelvis showed "Circumferential wall thickening seen involving the mid to distal esophagus which can be seen with esophagitis. Gastric antral wall thickening.  This may in part relate to nondistention, however gastritis could present with similar appearance" CXR was negative, EKG showed sinus rhythm with frequent PVCs.  Patient was placed in observation      * No surgery found *      Hospital Course:   Nino Price was placed under observation for elevated troponin.      Mr. Price initially presented to the hospital for left lower quadrant pain found to have mild diverticulitis on CT.  Admission labs noted elevated troponin which trended flat. He denied any chest pain or shortness of breath.  2D echo was ordered which noted normal EF normal diastolic function and normal wall motion. Cardiology was consulted and ordered pharmacologic stress test. On 08/10, patient refused 2nd part of stress test "enough is enough" despite explaining elevated troponin and risk factors for CAD. Cardiology conducted stress echocardiogram 08/11 which was noted to show normal wall motion and normal systolic function with EF: 55-60%. Patient is to continue Cipro and Flagyl x 7 days. Mr. Price is medically and hemodynamically stable for discharge. Vitals prior to discharge are as follows: Afebrile at 97.2 °F, HR: 57 bpm, BP: 140/64 with oxygen saturation of 98% on RA.    All findings and plan were explained to the patient. All questions and concerns were answered. Patient verbalized understanding. Patient is in stable condition to d/c home and has been informed to follow up with his PCP within the next 7-10 days to discuss his observation stay and to follow-up with " Cardiology. SW has been consulted to assist with follow-up appointment planning. Patient has been educated to return to the ED if he experiences any further chest pain, shortness of breath, lightheadness, weakness, or discomfort.       Goals of Care Treatment Preferences:  Code Status: Full Code      Consults:   Consults (From admission, onward)        Status Ordering Provider     Inpatient consult to Social Work  Once        Provider:  (Not yet assigned)    TYREE Garcia     Inpatient consult to Cardiology  Once        Provider:  Gabriela Rider MD    Completed LALO FERNANDEZ          No new Assessment & Plan notes have been filed under this hospital service since the last note was generated.  Service: Hospital Medicine    Final Active Diagnoses:    Diagnosis Date Noted POA    PRINCIPAL PROBLEM:  Elevated troponin [R77.8] 08/10/2023 Yes    Sigmoid diverticulitis [K57.32] 08/10/2023 Yes    Elevated brain natriuretic peptide (BNP) level [R79.89] 08/10/2023 Yes    Cannabis abuse [F12.10] 09/21/2018 Yes     Chronic    Hyperlipidemia [E78.5] 10/22/2016 Yes     Chronic    Type 2 diabetes mellitus, controlled [E11.9] 05/29/2015 Yes     Chronic    Essential hypertension [I10] 05/29/2015 Yes     Chronic    Tobacco abuse [Z72.0] 05/29/2015 Yes     Chronic    Chronic hepatitis C [B18.2] 04/30/2014 Yes     Chronic      Problems Resolved During this Admission:       Discharged Condition: stable    Disposition: Home or Self Care    Follow Up:   Follow-up Information     Pritesh Marinelli MD. Schedule an appointment as soon as possible for a visit in 2 days.    Specialty: Family Medicine  Why: to establish primary doctor, to discuss recent ED visit  Contact information:  [address] [address]  [phone]             [facility] - Emergency Dept .    Specialty: Emergency Medicine  Why: As needed, If symptoms worsen  Contact information:  [address] [place]  [id number] [phone]           [facility]. Schedule an appointment as soon as possible for a visit on 11/2/2023.    Specialty: Cardiology  Why: at 2:20pm to discuss recent ED visit  Please call for earlier appointment  Contact information:  [address]  [place] [address] [phone]  Additional information:  Please park in garage or Medical Office Bldg. surface lot and use Medical Ofc Bldg elevator. Check in at [address].                     Patient Instructions:      Ambulatory referral/consult to Cardiology   Standing Status: Future   Referral Priority: Urgent Referral Type: Consultation   Referral Reason: Specialty Services Required   Requested Specialty: Cardiology   Number of Visits Requested: 1     Diet diabetic     Diet Cardiac     Notify your health care provider if you experience any of the following:  temperature >100.4     Notify your health care provider if you experience any of the following:  difficulty breathing or increased cough     Notify your health care provider if you experience any of the following:  persistent dizziness, light-headedness, or visual disturbances     Activity as tolerated       Significant Diagnostic Studies: Labs:   CMP   Recent Labs   Lab 08/09/23  2001 08/10/23  0738 08/11/23  0514    139 138   K 4.0 3.4* 4.0    105 104   CO2 20* 22* 19*   * 121* 89   BUN 9 17 21*   CREATININE 1.1 1.1 1.4   CALCIUM 10.5 9.3 9.3   PROT 8.7*  --  7.0   ALBUMIN 4.6  --  3.8   BILITOT 0.8  --  0.8   ALKPHOS 82  --  61   AST 17  --  19   ALT 14  --  14   ANIONGAP 17* 12 15     CBC   Recent Labs   Lab 08/09/23 2001 08/11/23  0514   WBC 11.98 10.64   HGB 17.2 16.5   HCT 50.0 47.8    193     Troponin   Recent Labs   Lab 08/09/23  2300 08/10/23  0332 08/10/23  0738   TROPONINI 0.061* 0.053* 0.036*     Cardiac Graphics: Echocardiogram:   2D echo with color flow doppler:   Transthoracic echo (TTE) complete (Cupid Only):   Results  for orders placed or performed during the hospital encounter of 08/09/23   Echo   Result Value Ref Range    BSA 1.72 m2    LVOT stroke volume 39.78 cm3    LVIDd 5.30 3.5 - 6.0 cm    LV Systolic Volume 90.96 mL    LV Systolic Volume Index 53.2 mL/m2    LVIDs 4.47 (A) 2.1 - 4.0 cm    LV Diastolic Volume 135.21 mL    LV Diastolic Volume Index 79.07 mL/m2    IVS 0.78 0.6 - 1.1 cm    LVOT diameter 1.58 cm    LVOT area 2.0 cm2    FS 16 (A) 28 - 44 %    Left Ventricle Relative Wall Thickness 0.32 cm    Posterior Wall 0.84 0.6 - 1.1 cm    LV mass 152.44 g    LV Mass Index 89 g/m2    MV Peak E Timi 1.02 m/s    TDI LATERAL 0.10 m/s    TDI SEPTAL 0.04 m/s    E/E' ratio 14.57 m/s    MV Peak A Timi 0.74 m/s    E/A ratio 1.38     E wave deceleration time 183.81 msec    LV SEPTAL E/E' RATIO 25.50 m/s    LV LATERAL E/E' RATIO 10.20 m/s    LVOT peak timi 0.87 m/s    Left Ventricular Outflow Tract Mean Velocity 0.56 cm/s    Left Ventricular Outflow Tract Mean Gradient 1.42 mmHg    LA size 2.87 cm    Left Atrium Major Axis 5.20 cm    Left Atrium Minor Axis 4.86 cm    RV S' 12.06 cm/s    RA Major Axis 3.76 cm    AV mean gradient 4 mmHg    AV peak gradient 7 mmHg    Ao peak timi 1.32 m/s    Ao VTI 30.60 cm    LVOT peak VTI 20.30 cm    AV valve area 1.30 cm²    AV Velocity Ratio 0.66     AV index (prosthetic) 0.66     TRAE by Velocity Ratio 1.29 cm²    Mr max timi 5.17 m/s    MV mean gradient 1 mmHg    MV peak gradient 5 mmHg    MV stenosis pressure 1/2 time 53.30 ms    MV valve area p 1/2 method 4.13 cm2    MV valve area by continuity eq 1.06 cm2    MV VTI 37.5 cm    PV PEAK VELOCITY 0.61 m/s    PV peak gradient 1 mmHg    Sinus 2.47 cm    STJ 2.07 cm    IVC diameter 1.80 cm    Mean e' 0.07 m/s    ZLVIDS 3.31     ZLVIDD 1.10     RVDD 2.33 cm    LA Volume Index 24.4 mL/m2    LA volume 41.67 cm3    LA WIDTH 3.4 cm    RV base diameter 2.3 cm    RA Width 3.4 cm    RV/LV Ratio 0.43 cm    Est. RA pres 8 mmHg    Narrative      Left Ventricle: Normal  wall motion. There is normal systolic function   with a visually estimated ejection fraction of 55 - 60%.    Left Atrium: Left atrium is severely dilated.    Right Ventricle: Normal right ventricular cavity size. Systolic   function is normal.    Mitral Valve: There is moderate regurgitation with a posterolateral   eccentriccally directed jet.    IVC/SVC: Intermediate venous pressure at 8 mmHg.       Results for orders placed during the hospital encounter of 08/09/23    Nuclear Stress - Cardiology Interpreted    Interpretation Summary    Abnormal myocardial perfusion scan.    There is a moderate to severe intensity, moderate sized, fixed perfusion abnormality in the lateral wall(s). Only resting images obtained as patient refused to complete test. Incomplete study.    There are no other significant perfusion abnormalities.    The ECG portion of the study is negative for ischemia.    The patient reported no chest pain during the stress test.    There were no arrhythmias during stress.      Pending Diagnostic Studies:     None         Medications:  Reconciled Home Medications:      Medication List      START taking these medications    aspirin 81 MG EC tablet  Commonly known as: ECOTRIN  Take 1 tablet (81 mg total) by mouth once daily.     ciprofloxacin HCl 500 MG tablet  Commonly known as: CIPRO  Take 1 tablet (500 mg total) by mouth every 12 (twelve) hours. for 7 days     hyoscyamine 0.125 mg Subl  Place 1 tablet (0.125 mg total) under the tongue every 4 (four) hours as needed.     metroNIDAZOLE 500 MG tablet  Commonly known as: FLAGYL  Take 1 tablet (500 mg total) by mouth every 8 (eight) hours. for 7 days        CHANGE how you take these medications    busPIRone 10 MG tablet  Commonly known as: BUSPAR  Take 5 mg by mouth every evening.  What changed: Another medication with the same name was removed. Continue taking this medication, and follow the directions you see here.     oxyCODONE-acetaminophen   mg per tablet  Commonly known as: PERCOCET  TAKE 1 TABLET AS NEEDED ORALLY EVERY 4 TO 6 HOURS  What changed: Another medication with the same name was removed. Continue taking this medication, and follow the directions you see here.        CONTINUE taking these medications    ARIPiprazole 10 MG Tab  Commonly known as: ABILIFY  Take 10 mg by mouth once daily.     atorvastatin 40 MG tablet  Commonly known as: LIPITOR  Take 40 mg by mouth.     cyclobenzaprine 5 MG tablet  Commonly known as: FLEXERIL  Take 5 mg by mouth nightly as needed.     dicyclomine 10 MG capsule  Commonly known as: BENTYL  Take 10 mg by mouth.     doxepin 100 MG capsule  Commonly known as: SINEQUAN  Take 10 mg by mouth every evening.     DULoxetine 60 MG capsule  Commonly known as: CYMBALTA  Take 60 mg by mouth once daily. Takes in pm     gabapentin 300 MG capsule  Commonly known as: NEURONTIN  Take 300 mg by mouth 3 (three) times daily.     metFORMIN 1000 MG tablet  Commonly known as: GLUCOPHAGE  Take 1,000 mg by mouth 2 (two) times daily with meals.     NAMENDA 10 MG Tab  Generic drug: memantine  Take 10 mg by mouth once daily.     pantoprazole 40 MG tablet  Commonly known as: PROTONIX  Take 1 tablet (40 mg total) by mouth 2 (two) times daily.     propranoloL 20 MG tablet  Commonly known as: INDERAL  Take 10 mg by mouth 3 (three) times daily.     traZODone 50 MG tablet  Commonly known as: DESYREL  Take 100 mg by mouth every evening.     TRUE METRIX GLUCOSE METER Misc  Generic drug: blood-glucose meter     TRUE METRIX GLUCOSE TEST STRIP Strp  Generic drug: blood sugar diagnostic     valsartan-hydrochlorothiazide 160-25 mg per tablet  Commonly known as: DIOVAN-HCT  Take 1 tablet by mouth once daily.            Indwelling Lines/Drains at time of discharge:   Lines/Drains/Airways     None                 Time spent on the discharge of patient: 55 minutes      Lee Dennis PA-C  Department of Hospital Medicine  [facility] [place]  08/11/2023

## 2023-08-11 NOTE — ASSESSMENT & PLAN NOTE
Patient with multiple risk factors for coronary artery disease.   No anginal sxs.  Had MPI yesterday, but refused stress imaging.  Rest defect noted.  EF/wall motion normal on echo.  Ex stress echo today ( as contingency).

## 2023-08-22 ENCOUNTER — TELEPHONE (OUTPATIENT)
Dept: FAMILY MEDICINE | Facility: CLINIC | Age: 58
End: 2023-08-22
Payer: MEDICARE

## 2023-08-22 NOTE — TELEPHONE ENCOUNTER
Tried to reach pt to informed him that his appt has been change from 08/24/23 @ 8:40 am to 08/25/23 @ 8:40 am Left message about this matter vs

## 2023-08-25 ENCOUNTER — OFFICE VISIT (OUTPATIENT)
Dept: FAMILY MEDICINE | Facility: CLINIC | Age: 58
End: 2023-08-25
Payer: MEDICARE

## 2023-08-25 ENCOUNTER — LAB VISIT (OUTPATIENT)
Dept: LAB | Facility: HOSPITAL | Age: 58
End: 2023-08-25
Attending: FAMILY MEDICINE
Payer: MEDICARE

## 2023-08-25 DIAGNOSIS — Z86.73 HISTORY OF CVA (CEREBROVASCULAR ACCIDENT): Chronic | ICD-10-CM

## 2023-08-25 DIAGNOSIS — E11.9 CONTROLLED TYPE 2 DIABETES MELLITUS WITHOUT COMPLICATION, UNSPECIFIED WHETHER LONG TERM INSULIN USE: Chronic | ICD-10-CM

## 2023-08-25 DIAGNOSIS — K57.32 DIVERTICULITIS OF LARGE INTESTINE WITHOUT PERFORATION OR ABSCESS WITHOUT BLEEDING: Primary | ICD-10-CM

## 2023-08-25 DIAGNOSIS — E78.6 LOW HDL (UNDER 40): ICD-10-CM

## 2023-08-25 DIAGNOSIS — K74.60 CIRRHOSIS OF LIVER WITHOUT ASCITES, UNSPECIFIED HEPATIC CIRRHOSIS TYPE: ICD-10-CM

## 2023-08-25 DIAGNOSIS — K21.9 GASTROESOPHAGEAL REFLUX DISEASE, UNSPECIFIED WHETHER ESOPHAGITIS PRESENT: Chronic | ICD-10-CM

## 2023-08-25 DIAGNOSIS — K57.32 SIGMOID DIVERTICULITIS: ICD-10-CM

## 2023-08-25 DIAGNOSIS — I10 BENIGN ESSENTIAL HTN: ICD-10-CM

## 2023-08-25 DIAGNOSIS — I69.351 HEMIPLEGIA AND HEMIPARESIS FOLLOWING CEREBRAL INFARCTION AFFECTING RIGHT DOMINANT SIDE: ICD-10-CM

## 2023-08-25 DIAGNOSIS — Z12.11 ENCOUNTER FOR SCREENING COLONOSCOPY: ICD-10-CM

## 2023-08-25 DIAGNOSIS — E78.5 DYSLIPIDEMIA: ICD-10-CM

## 2023-08-25 DIAGNOSIS — E11.59 TYPE 2 DIABETES MELLITUS WITH OTHER CIRCULATORY COMPLICATIONS: ICD-10-CM

## 2023-08-25 DIAGNOSIS — I10 ESSENTIAL HYPERTENSION: Chronic | ICD-10-CM

## 2023-08-25 DIAGNOSIS — B18.2 CHRONIC HEPATITIS C WITHOUT HEPATIC COMA: Chronic | ICD-10-CM

## 2023-08-25 DIAGNOSIS — E78.2 MIXED HYPERLIPIDEMIA: Chronic | ICD-10-CM

## 2023-08-25 DIAGNOSIS — K57.32 DIVERTICULITIS OF LARGE INTESTINE WITHOUT PERFORATION OR ABSCESS WITHOUT BLEEDING: ICD-10-CM

## 2023-08-25 DIAGNOSIS — Z72.0 TOBACCO ABUSE: Chronic | ICD-10-CM

## 2023-08-25 LAB
ALBUMIN SERPL BCP-MCNC: 3.9 G/DL (ref 3.5–5.2)
ALP SERPL-CCNC: 58 U/L (ref 55–135)
ALT SERPL W/O P-5'-P-CCNC: 14 U/L (ref 10–44)
ANION GAP SERPL CALC-SCNC: 13 MMOL/L (ref 8–16)
AST SERPL-CCNC: 23 U/L (ref 10–40)
BASOPHILS # BLD AUTO: 0.06 K/UL (ref 0–0.2)
BASOPHILS NFR BLD: 0.7 % (ref 0–1.9)
BILIRUB SERPL-MCNC: 0.5 MG/DL (ref 0.1–1)
BUN SERPL-MCNC: 9 MG/DL (ref 6–20)
CALCIUM SERPL-MCNC: 9.9 MG/DL (ref 8.7–10.5)
CHLORIDE SERPL-SCNC: 107 MMOL/L (ref 95–110)
CO2 SERPL-SCNC: 21 MMOL/L (ref 23–29)
CREAT SERPL-MCNC: 0.9 MG/DL (ref 0.5–1.4)
CRP SERPL-MCNC: 3.3 MG/L (ref 0–8.2)
DIFFERENTIAL METHOD: ABNORMAL
EOSINOPHIL # BLD AUTO: 0.1 K/UL (ref 0–0.5)
EOSINOPHIL NFR BLD: 1.5 % (ref 0–8)
ERYTHROCYTE [DISTWIDTH] IN BLOOD BY AUTOMATED COUNT: 13.2 % (ref 11.5–14.5)
EST. GFR  (NO RACE VARIABLE): >60 ML/MIN/1.73 M^2
ESTIMATED AVG GLUCOSE: 126 MG/DL (ref 68–131)
GLUCOSE SERPL-MCNC: 108 MG/DL (ref 70–110)
HBA1C MFR BLD: 6 % (ref 4–5.6)
HCT VFR BLD AUTO: 46.3 % (ref 40–54)
HGB BLD-MCNC: 15.3 G/DL (ref 14–18)
IMM GRANULOCYTES # BLD AUTO: 0.04 K/UL (ref 0–0.04)
IMM GRANULOCYTES NFR BLD AUTO: 0.5 % (ref 0–0.5)
LYMPHOCYTES # BLD AUTO: 1.9 K/UL (ref 1–4.8)
LYMPHOCYTES NFR BLD: 23.2 % (ref 18–48)
MCH RBC QN AUTO: 31.5 PG (ref 27–31)
MCHC RBC AUTO-ENTMCNC: 33 G/DL (ref 32–36)
MCV RBC AUTO: 95 FL (ref 82–98)
MONOCYTES # BLD AUTO: 0.5 K/UL (ref 0.3–1)
MONOCYTES NFR BLD: 6.7 % (ref 4–15)
NEUTROPHILS # BLD AUTO: 5.5 K/UL (ref 1.8–7.7)
NEUTROPHILS NFR BLD: 67.4 % (ref 38–73)
NRBC BLD-RTO: 0 /100 WBC
PLATELET # BLD AUTO: 259 K/UL (ref 150–450)
PLATELET BLD QL SMEAR: ABNORMAL
PMV BLD AUTO: 10.6 FL (ref 9.2–12.9)
POTASSIUM SERPL-SCNC: 4.6 MMOL/L (ref 3.5–5.1)
PROT SERPL-MCNC: 7.8 G/DL (ref 6–8.4)
RBC # BLD AUTO: 4.86 M/UL (ref 4.6–6.2)
SODIUM SERPL-SCNC: 141 MMOL/L (ref 136–145)
WBC # BLD AUTO: 8.11 K/UL (ref 3.9–12.7)

## 2023-08-25 PROCEDURE — 3044F HG A1C LEVEL LT 7.0%: CPT | Mod: CPTII,S$GLB,, | Performed by: FAMILY MEDICINE

## 2023-08-25 PROCEDURE — 1160F RVW MEDS BY RX/DR IN RCRD: CPT | Mod: CPTII,S$GLB,, | Performed by: FAMILY MEDICINE

## 2023-08-25 PROCEDURE — 3044F PR MOST RECENT HEMOGLOBIN A1C LEVEL <7.0%: ICD-10-PCS | Mod: CPTII,S$GLB,, | Performed by: FAMILY MEDICINE

## 2023-08-25 PROCEDURE — 1160F PR REVIEW ALL MEDS BY PRESCRIBER/CLIN PHARMACIST DOCUMENTED: ICD-10-PCS | Mod: CPTII,S$GLB,, | Performed by: FAMILY MEDICINE

## 2023-08-25 PROCEDURE — 3008F PR BODY MASS INDEX (BMI) DOCUMENTED: ICD-10-PCS | Mod: CPTII,S$GLB,, | Performed by: FAMILY MEDICINE

## 2023-08-25 PROCEDURE — 83036 HEMOGLOBIN GLYCOSYLATED A1C: CPT | Performed by: FAMILY MEDICINE

## 2023-08-25 PROCEDURE — 1159F MED LIST DOCD IN RCRD: CPT | Mod: CPTII,S$GLB,, | Performed by: FAMILY MEDICINE

## 2023-08-25 PROCEDURE — 99215 OFFICE O/P EST HI 40 MIN: CPT | Mod: S$GLB,,, | Performed by: FAMILY MEDICINE

## 2023-08-25 PROCEDURE — 1159F PR MEDICATION LIST DOCUMENTED IN MEDICAL RECORD: ICD-10-PCS | Mod: CPTII,S$GLB,, | Performed by: FAMILY MEDICINE

## 2023-08-25 PROCEDURE — 85025 COMPLETE CBC W/AUTO DIFF WBC: CPT | Performed by: FAMILY MEDICINE

## 2023-08-25 PROCEDURE — 99215 PR OFFICE/OUTPT VISIT, EST, LEVL V, 40-54 MIN: ICD-10-PCS | Mod: S$GLB,,, | Performed by: FAMILY MEDICINE

## 2023-08-25 PROCEDURE — 99999 PR PBB SHADOW E&M-EST. PATIENT-LVL V: CPT | Mod: PBBFAC,,, | Performed by: FAMILY MEDICINE

## 2023-08-25 PROCEDURE — 3078F PR MOST RECENT DIASTOLIC BLOOD PRESSURE < 80 MM HG: ICD-10-PCS | Mod: CPTII,S$GLB,, | Performed by: FAMILY MEDICINE

## 2023-08-25 PROCEDURE — 3075F SYST BP GE 130 - 139MM HG: CPT | Mod: CPTII,S$GLB,, | Performed by: FAMILY MEDICINE

## 2023-08-25 PROCEDURE — 36415 COLL VENOUS BLD VENIPUNCTURE: CPT | Mod: PO | Performed by: FAMILY MEDICINE

## 2023-08-25 PROCEDURE — 3078F DIAST BP <80 MM HG: CPT | Mod: CPTII,S$GLB,, | Performed by: FAMILY MEDICINE

## 2023-08-25 PROCEDURE — 3075F PR MOST RECENT SYSTOLIC BLOOD PRESS GE 130-139MM HG: ICD-10-PCS | Mod: CPTII,S$GLB,, | Performed by: FAMILY MEDICINE

## 2023-08-25 PROCEDURE — 86140 C-REACTIVE PROTEIN: CPT | Performed by: FAMILY MEDICINE

## 2023-08-25 PROCEDURE — 99999 PR PBB SHADOW E&M-EST. PATIENT-LVL V: ICD-10-PCS | Mod: PBBFAC,,, | Performed by: FAMILY MEDICINE

## 2023-08-25 PROCEDURE — 80053 COMPREHEN METABOLIC PANEL: CPT | Performed by: FAMILY MEDICINE

## 2023-08-25 PROCEDURE — 3008F BODY MASS INDEX DOCD: CPT | Mod: CPTII,S$GLB,, | Performed by: FAMILY MEDICINE

## 2023-08-25 RX ORDER — MIRTAZAPINE 15 MG/1
15 TABLET, FILM COATED ORAL NIGHTLY
COMMUNITY
Start: 2023-08-23

## 2023-08-25 RX ORDER — HYDROCODONE BITARTRATE AND ACETAMINOPHEN 10; 325 MG/1; MG/1
1 TABLET ORAL EVERY 6 HOURS PRN
COMMUNITY
Start: 2023-08-17

## 2023-08-25 RX ORDER — VALSARTAN AND HYDROCHLOROTHIAZIDE 160; 25 MG/1; MG/1
1 TABLET ORAL DAILY
Qty: 90 TABLET | Refills: 1 | Status: SHIPPED | OUTPATIENT
Start: 2023-08-25 | End: 2024-02-15

## 2023-08-25 RX ORDER — ICOSAPENT ETHYL 1000 MG/1
2 CAPSULE ORAL 2 TIMES DAILY
Qty: 360 CAPSULE | Refills: 1 | Status: SHIPPED | OUTPATIENT
Start: 2023-08-25 | End: 2024-01-26

## 2023-08-25 NOTE — PROGRESS NOTES
Routine Office Visit    Patient Name: Nino Price    : 1965  MRN: [id number]    Subjective:  Nino is a 58 y.o. male who presents today for:    1. Hospital follow up  Patient presenting today for follow up after being admitted for diverticulitis and elevated troponin.  He states that he does smoke and does have diabetes, but since his last admission he through out all medications as he was tired of taking them.  He states that there has been no chest pain or shortness of breath, but he has had abdominal pain.  He states the pain is in the upper and lower abdomen.  He was scheduled to see GI in November.  He was diagnosed with uncomplicated diverticulitis and did finish his antibiotics.     Past Medical History  Past Medical History:   Diagnosis Date    Anxiety     Asthma     Colitis     Depression     Diabetes mellitus     Head injury     History of hepatitis C, s/p successful hcv treatment w/ SVR -2015  10/8/2012    SVR(24) as of 2015.  SVR(12) as of 2015.  completed harvoni 24 week regimen for genotype 1a 5/18/15     Hypertension     Shoulder pain     left, s/p 4 surgeries.     Stroke        Past Surgical History  Past Surgical History:   Procedure Laterality Date    COLONOSCOPY N/A 10/20/2015    Procedure: COLONOSCOPY;  Surgeon: Jagdish Patricia MD;  Location: [facility] ENDO;  Service: Endoscopy;  Laterality: N/A;    DIAGNOSTIC LAPAROSCOPY N/A 2021    Procedure: LAPAROSCOPY, DIAGNOSTIC;  Surgeon: Umesh Vallecillo MD;  Location: [facility] OR;  Service: General;  Laterality: N/A;  RN PREOP 3/29/21, COVID NEGATIVE ON 3/29  CONSENT AND H/P INCOMPLETE    GALLBLADDER SURGERY      pt not 100% if gall bladder removed    rotator cuff      left side       Family History  Family History   Problem Relation Age of Onset    Hypertension Mother     Heart disease Father     Cirrhosis Brother     Colon cancer Neg Hx     Esophageal cancer Neg Hx        Social History  Social History     Socioeconomic History     Marital status:     Number of children: 3   Tobacco Use    Smoking status: Every Day     Current packs/day: 1.00     Average packs/day: 1 pack/day for 35.0 years (35.0 ttl pk-yrs)     Types: Cigarettes     Passive exposure: Current    Smokeless tobacco: Never    Tobacco comments:     Will try to stop smoking by himself vs    Substance and Sexual Activity    Alcohol use: No    Drug use: Yes     Frequency: 7.0 times per week     Types: Marijuana    Sexual activity: Yes     Partners: Female   Social History Narrative    Reside on .     Lives with wife and young daughter (22yrs)    Not working, on disability since accident    Prior job-[employer]    Hobbies: carving, fishing    Not driving.      Social Determinants of Health     Financial Resource Strain: Medium Risk (5/19/2023)    Overall Financial Resource Strain (CARDIA)     Difficulty of Paying Living Expenses: Somewhat hard   Food Insecurity: No Food Insecurity (5/19/2023)    Hunger Vital Sign     Worried About Running Out of Food in the Last Year: Never true     Ran Out of Food in the Last Year: Never true   Transportation Needs: No Transportation Needs (5/19/2023)    PRAPARE - Transportation     Lack of Transportation (Medical): No     Lack of Transportation (Non-Medical): No   Physical Activity: Inactive (5/19/2023)    Exercise Vital Sign     Days of Exercise per Week: 0 days     Minutes of Exercise per Session: 0 min   Stress: No Stress Concern Present (5/19/2023)    [employer] [employer] [employer] - Occupational Stress Questionnaire     Feeling of Stress : Only a little   Social Connections: Moderately Isolated (5/19/2023)    Social Connection and Isolation Panel [NHANES]     Frequency of Communication with Friends and Family: Three times a week     Frequency of Social Gatherings with Friends and Family: Three times a week     Attends Scientologist Services: Never     Active Member of Clubs or Organizations: No     Attends Club or Organization  Meetings: Never     Marital Status:    Housing Stability: Low Risk  (5/19/2023)    Housing Stability Vital Sign     Unable to Pay for Housing in the Last Year: No     Number of Places Lived in the Last Year: 1     Unstable Housing in the Last Year: No       Current Medications  Current Outpatient Medications on File Prior to Visit   Medication Sig Dispense Refill    aspirin (ECOTRIN) 81 MG EC tablet Take 1 tablet (81 mg total) by mouth once daily. 30 tablet 3    atorvastatin (LIPITOR) 40 MG tablet Take 40 mg by mouth.      busPIRone (BUSPAR) 10 MG tablet Take 5 mg by mouth every evening.      cyclobenzaprine (FLEXERIL) 5 MG tablet Take 5 mg by mouth nightly as needed.      dicyclomine (BENTYL) 10 MG capsule Take 10 mg by mouth.      DULoxetine (CYMBALTA) 60 MG capsule Take 60 mg by mouth once daily. Takes in pm      gabapentin (NEURONTIN) 300 MG capsule Take 300 mg by mouth 3 (three) times daily.      hyoscyamine 0.125 mg Subl Place 1 tablet (0.125 mg total) under the tongue every 4 (four) hours as needed. 60 tablet 1    metFORMIN (GLUCOPHAGE) 1000 MG tablet Take 1,000 mg by mouth 2 (two) times daily with meals.      NAMENDA 10 mg Tab Take 10 mg by mouth once daily.       oxyCODONE-acetaminophen (PERCOCET)  mg per tablet TAKE 1 TABLET AS NEEDED ORALLY EVERY 4 TO 6 HOURS      pantoprazole (PROTONIX) 40 MG tablet Take 1 tablet (40 mg total) by mouth 2 (two) times daily. 60 tablet 0    traZODone (DESYREL) 50 MG tablet Take 100 mg by mouth every evening.      TRUE METRIX GLUCOSE TEST STRIP Strp       HYDROcodone-acetaminophen (NORCO)  mg per tablet Take 1 tablet by mouth every 6 (six) hours as needed.      mirtazapine (REMERON) 15 MG tablet Take 15 mg by mouth every evening.      TRUE METRIX GLUCOSE METER Misc        No current facility-administered medications on file prior to visit.       Allergies   Review of patient's allergies indicates:   Allergen Reactions    Codeine Nausea Only       Review of  "Systems (Pertinent positives)  Review of Systems   Constitutional: Negative.    HENT: Negative.     Eyes: Negative.    Respiratory: Negative.     Cardiovascular: Negative.    Gastrointestinal:  Positive for abdominal pain and heartburn. Negative for blood in stool, constipation, diarrhea, melena, nausea and vomiting.   Genitourinary: Negative.    Skin: Negative.          /68   Pulse 60   Temp 97.8 °F (36.6 °C) (Oral)   Resp 18   Ht 5' 4" (1.626 m)   Wt 60 kg (132 lb 4.4 oz)   SpO2 97%   BMI 22.71 kg/m²     GENERAL APPEARANCE: in no apparent distress and well developed and well nourished  HEENT: PERRL, EOMI, Sclera clear, anicteric, Oropharynx clear, no lesions, Neck supple with midline trachea  NECK: normal, supple, no adenopathy, thyroid normal in size  RESPIRATORY: appears well, vitals normal, no respiratory distress, acyanotic, normal RR, chest clear, no wheezing, crepitations, rhonchi, normal symmetric air entry  HEART: regular rate and rhythm, S1, S2 normal, no murmur, click, rub or gallop.    ABDOMEN: abdomen is soft without tenderness, no masses, no hernias, no organomegaly, no rebound, no guarding. S  SKIN: no rashes, no wounds, no other lesions  PSYCH: Alert, oriented x 3, thought content appropriate, speech normal, pleasant and cooperative, good eye contact, well groomed,    Assessment/Plan:  Nino Price is a 58 y.o. male who presents today for :    Nino was seen today for establish care, hypertension and abdominal pain.    Diagnoses and all orders for this visit:    Diverticulitis of large intestine without perforation or abscess without bleeding  -     CBC Auto Differential; Future  -     Comprehensive Metabolic Panel; Future  -     C-Reactive Protein; Future  - Will check to make sure inflammation is improving      Benign essential HTN  -     valsartan-hydrochlorothiazide (DIOVAN-HCT) 160-25 mg per tablet; Take 1 tablet by mouth once daily.  - stable    Controlled type 2 diabetes " mellitus without complication, unspecified whether long term insulin use  -     valsartan-hydrochlorothiazide (DIOVAN-HCT) 160-25 mg per tablet; Take 1 tablet by mouth once daily.  -     HEMOGLOBIN A1C; Future  - Will check a1c    Dyslipidemia  -     icosapent ethyL (VASCEPA) 1 gram Cap; Take 2 capsules (2 g total) by mouth 2 (two) times daily.  - Continue Vascepa as prescribed    Low HDL (under 40)  -     icosapent ethyL (VASCEPA) 1 gram Cap; Take 2 capsules (2 g total) by mouth 2 (two) times daily.    Sigmoid diverticulitis  - Improving  - He is to go back to the hospital should pain recur    Essential hypertension    History of CVA (cerebrovascular accident)  - Right sided hemiplegia present  - No noted cognitive deficits    Chronic hepatitis C without hepatic coma  - No jaundice, abdominal distension     Gastroesophageal reflux disease, unspecified whether esophagitis present  - Continue PPI    Hemiplegia and hemiparesis following cerebral infarction affecting right dominant side  - Chronic, no changes    Cirrhosis of liver without ascites, unspecified hepatic cirrhosis type  - Will follow up with hepatology    Type 2 diabetes mellitus with other circulatory complications  - A1c has been controlled    Mixed hyperlipidemia    Tobacco abuse  - Encouraged smoking cessation      Stevo Pryor MD

## 2023-08-29 ENCOUNTER — TELEPHONE (OUTPATIENT)
Dept: FAMILY MEDICINE | Facility: CLINIC | Age: 58
End: 2023-08-29
Payer: MEDICARE

## 2023-08-29 NOTE — TELEPHONE ENCOUNTER
----- Message from Farshad Goyo Wilsone sent at 8/29/2023  1:58 PM CDT -----  Regarding: Rnadabreezy  Type:  Patient Returning Call     Who Called: Jonathan     Who Left Message for Patient: Rebecca Chappell     Does the patient know what this is regarding?: No no message was left     Would the patient rather a call back or a response via [facility]? Callback     Best Call Back Number:.[phone]         Additional Information:

## 2023-08-29 NOTE — TELEPHONE ENCOUNTER
----- Message from Rebecca Francisco sent at 8/29/2023  9:46 AM CDT -----  Regarding: return call  Type:  Patient Returning Call    Who Called: ARTEMIO JOHNSTON [[id number]]    Who Left Message for Patient: Kelsey    Does the patient know what this is regarding? Returning call to office    Best Call Back Number: Telephone Information:  Mobile          [phone]        Additional Information:

## 2023-08-29 NOTE — TELEPHONE ENCOUNTER
----- Message from Stevo Pryor MD sent at 8/29/2023  8:46 AM CDT -----  Please let patient know that labs look good overall.  I want to repeat his a1c and kidney function in 6 months when he follows up    Thanks  Dr. Pryor

## 2023-08-29 NOTE — PROGRESS NOTES
Please let patient know that labs look good overall.  I want to repeat his a1c and kidney function in 6 months when he follows up    Thanks  Dr. Pryor

## 2023-08-30 DIAGNOSIS — E11.9 TYPE 2 DIABETES MELLITUS WITHOUT COMPLICATION: ICD-10-CM

## 2023-09-29 VITALS
HEIGHT: 64 IN | SYSTOLIC BLOOD PRESSURE: 136 MMHG | TEMPERATURE: 98 F | OXYGEN SATURATION: 97 % | BODY MASS INDEX: 22.58 KG/M2 | DIASTOLIC BLOOD PRESSURE: 68 MMHG | RESPIRATION RATE: 18 BRPM | HEART RATE: 60 BPM | WEIGHT: 132.25 LBS

## 2023-09-29 PROBLEM — I69.351 HEMIPLEGIA AND HEMIPARESIS FOLLOWING CEREBRAL INFARCTION AFFECTING RIGHT DOMINANT SIDE: Status: ACTIVE | Noted: 2023-09-29

## 2023-09-29 PROBLEM — R10.84 ABDOMINAL PAIN, GENERALIZED: Status: RESOLVED | Noted: 2021-04-01 | Resolved: 2023-09-29

## 2023-09-29 PROBLEM — K55.9: Status: RESOLVED | Noted: 2018-09-20 | Resolved: 2023-09-29

## 2023-11-08 DIAGNOSIS — E11.9 TYPE 2 DIABETES MELLITUS WITHOUT COMPLICATION, UNSPECIFIED WHETHER LONG TERM INSULIN USE: ICD-10-CM

## 2023-11-16 ENCOUNTER — OFFICE VISIT (OUTPATIENT)
Dept: CARDIOLOGY | Facility: CLINIC | Age: 58
End: 2023-11-16
Payer: MEDICARE

## 2023-11-16 VITALS
RESPIRATION RATE: 18 BRPM | SYSTOLIC BLOOD PRESSURE: 130 MMHG | OXYGEN SATURATION: 98 % | HEART RATE: 87 BPM | HEIGHT: 64 IN | DIASTOLIC BLOOD PRESSURE: 82 MMHG | BODY MASS INDEX: 24.5 KG/M2 | WEIGHT: 143.5 LBS

## 2023-11-16 DIAGNOSIS — Z86.73 HISTORY OF CVA (CEREBROVASCULAR ACCIDENT): Chronic | ICD-10-CM

## 2023-11-16 DIAGNOSIS — I10 ESSENTIAL HYPERTENSION: Primary | Chronic | ICD-10-CM

## 2023-11-16 DIAGNOSIS — R79.89 ELEVATED TROPONIN: ICD-10-CM

## 2023-11-16 DIAGNOSIS — E78.2 MIXED HYPERLIPIDEMIA: Chronic | ICD-10-CM

## 2023-11-16 DIAGNOSIS — R79.89 ELEVATED BRAIN NATRIURETIC PEPTIDE (BNP) LEVEL: ICD-10-CM

## 2023-11-16 DIAGNOSIS — E11.9 CONTROLLED TYPE 2 DIABETES MELLITUS WITHOUT COMPLICATION, UNSPECIFIED WHETHER LONG TERM INSULIN USE: Chronic | ICD-10-CM

## 2023-11-16 PROCEDURE — 93000 EKG 12-LEAD: ICD-10-PCS | Mod: S$GLB,,, | Performed by: INTERNAL MEDICINE

## 2023-11-16 PROCEDURE — 99214 OFFICE O/P EST MOD 30 MIN: CPT | Mod: S$GLB,,, | Performed by: INTERNAL MEDICINE

## 2023-11-16 PROCEDURE — 3044F PR MOST RECENT HEMOGLOBIN A1C LEVEL <7.0%: ICD-10-PCS | Mod: CPTII,S$GLB,, | Performed by: INTERNAL MEDICINE

## 2023-11-16 PROCEDURE — 3079F PR MOST RECENT DIASTOLIC BLOOD PRESSURE 80-89 MM HG: ICD-10-PCS | Mod: CPTII,S$GLB,, | Performed by: INTERNAL MEDICINE

## 2023-11-16 PROCEDURE — 3075F PR MOST RECENT SYSTOLIC BLOOD PRESS GE 130-139MM HG: ICD-10-PCS | Mod: CPTII,S$GLB,, | Performed by: INTERNAL MEDICINE

## 2023-11-16 PROCEDURE — 93000 ELECTROCARDIOGRAM COMPLETE: CPT | Mod: S$GLB,,, | Performed by: INTERNAL MEDICINE

## 2023-11-16 PROCEDURE — 99214 PR OFFICE/OUTPT VISIT, EST, LEVL IV, 30-39 MIN: ICD-10-PCS | Mod: S$GLB,,, | Performed by: INTERNAL MEDICINE

## 2023-11-16 PROCEDURE — 3008F PR BODY MASS INDEX (BMI) DOCUMENTED: ICD-10-PCS | Mod: CPTII,S$GLB,, | Performed by: INTERNAL MEDICINE

## 2023-11-16 PROCEDURE — 3079F DIAST BP 80-89 MM HG: CPT | Mod: CPTII,S$GLB,, | Performed by: INTERNAL MEDICINE

## 2023-11-16 PROCEDURE — 1159F PR MEDICATION LIST DOCUMENTED IN MEDICAL RECORD: ICD-10-PCS | Mod: CPTII,S$GLB,, | Performed by: INTERNAL MEDICINE

## 2023-11-16 PROCEDURE — 99999 PR PBB SHADOW E&M-EST. PATIENT-LVL V: ICD-10-PCS | Mod: PBBFAC,,, | Performed by: INTERNAL MEDICINE

## 2023-11-16 PROCEDURE — 3075F SYST BP GE 130 - 139MM HG: CPT | Mod: CPTII,S$GLB,, | Performed by: INTERNAL MEDICINE

## 2023-11-16 PROCEDURE — 1159F MED LIST DOCD IN RCRD: CPT | Mod: CPTII,S$GLB,, | Performed by: INTERNAL MEDICINE

## 2023-11-16 PROCEDURE — 3044F HG A1C LEVEL LT 7.0%: CPT | Mod: CPTII,S$GLB,, | Performed by: INTERNAL MEDICINE

## 2023-11-16 PROCEDURE — 3008F BODY MASS INDEX DOCD: CPT | Mod: CPTII,S$GLB,, | Performed by: INTERNAL MEDICINE

## 2023-11-16 PROCEDURE — 99999 PR PBB SHADOW E&M-EST. PATIENT-LVL V: CPT | Mod: PBBFAC,,, | Performed by: INTERNAL MEDICINE

## 2023-11-16 NOTE — PROGRESS NOTES
CARDIOVASCULAR CONSULTATION    REASON FOR CONSULT:   Nino Price is a 58 y.o. male who presents for   Chief Complaint   Patient presents with    Consult          HISTORY OF PRESENT ILLNESS:     Patient here for follow-up after recent hospital stay for chest pain.  Underwent stress echocardiogram which did not show any significant ischemia.  Has been chest pain-free.  Denies orthopnea, PND, swelling of feet.      PAST MEDICAL HISTORY:     Past Medical History:   Diagnosis Date    Anxiety     Asthma     Colitis     Depression     Diabetes mellitus     Head injury     History of hepatitis C, s/p successful hcv treatment w/ SVR 8-2015  10/8/2012    SVR(24) as of 11/2015.  SVR(12) as of 8/2015.  completed harvoni 24 week regimen for genotype 1a 5/18/15     Hypertension     Shoulder pain     left, s/p 4 surgeries.     Stroke        PAST SURGICAL HISTORY:     Past Surgical History:   Procedure Laterality Date    COLONOSCOPY N/A 10/20/2015    Procedure: COLONOSCOPY;  Surgeon: Jagdish Patricia MD;  Location: [facility] ENDO;  Service: Endoscopy;  Laterality: N/A;    DIAGNOSTIC LAPAROSCOPY N/A 4/1/2021    Procedure: LAPAROSCOPY, DIAGNOSTIC;  Surgeon: Umesh Vallecillo MD;  Location: [facility] OR;  Service: General;  Laterality: N/A;  RN PREOP 3/29/21, COVID NEGATIVE ON 3/29  CONSENT AND H/P INCOMPLETE    GALLBLADDER SURGERY      pt not 100% if gall bladder removed    rotator cuff      left side       ALLERGIES AND MEDICATION:     Review of patient's allergies indicates:   Allergen Reactions    Codeine Nausea Only        Medication List            Accurate as of November 16, 2023  9:55 AM. If you have any questions, ask your nurse or doctor.                CONTINUE taking these medications      aspirin 81 MG EC tablet  Commonly known as: ECOTRIN  Take 1 tablet (81 mg total) by mouth once daily.     atorvastatin 40 MG tablet  Commonly known as: LIPITOR     busPIRone 10 MG tablet  Commonly known as: BUSPAR     cyclobenzaprine 5 MG  tablet  Commonly known as: FLEXERIL     dicyclomine 10 MG capsule  Commonly known as: BENTYL     DULoxetine 60 MG capsule  Commonly known as: CYMBALTA     gabapentin 300 MG capsule  Commonly known as: NEURONTIN     HYDROcodone-acetaminophen  mg per tablet  Commonly known as: NORCO     hyoscyamine 0.125 mg Tbdl  Commonly known as: LEVSIN  Place 1 tablet (0.125 mg total) under the tongue every 4 (four) hours as needed.     icosapent ethyL 1 gram Cap  Commonly known as: VASCEPA  Take 2 capsules (2 g total) by mouth 2 (two) times daily.     metFORMIN 1000 MG tablet  Commonly known as: GLUCOPHAGE     mirtazapine 15 MG tablet  Commonly known as: REMERON     NAMENDA 10 MG Tab  Generic drug: memantine     oxyCODONE-acetaminophen  mg per tablet  Commonly known as: PERCOCET     pantoprazole 40 MG tablet  Commonly known as: PROTONIX  Take 1 tablet (40 mg total) by mouth 2 (two) times daily.     traZODone 50 MG tablet  Commonly known as: DESYREL     TRUE METRIX GLUCOSE METER Misc  Generic drug: blood-glucose meter     TRUE METRIX GLUCOSE TEST STRIP Strp  Generic drug: blood sugar diagnostic     valsartan-hydrochlorothiazide 160-25 mg per tablet  Commonly known as: DIOVAN-HCT  Take 1 tablet by mouth once daily.              SOCIAL HISTORY:     Social History     Socioeconomic History    Marital status:     Number of children: 3   Tobacco Use    Smoking status: Every Day     Current packs/day: 1.00     Average packs/day: 1 pack/day for 35.0 years (35.0 ttl pk-yrs)     Types: Cigarettes     Passive exposure: Current    Smokeless tobacco: Never    Tobacco comments:     Will try to stop smoking by himself vs    Substance and Sexual Activity    Alcohol use: No    Drug use: Yes     Frequency: 7.0 times per week     Types: Marijuana    Sexual activity: Yes     Partners: Female   Social History Narrative    Reside on .     Lives with wife and young daughter (22yrs)    Not working, on disability since accident     Prior job-[employer]    Hobbies: carving, fishing    Not driving.      Social Determinants of Health     Financial Resource Strain: Medium Risk (5/19/2023)    Overall Financial Resource Strain (CARDIA)     Difficulty of Paying Living Expenses: Somewhat hard   Food Insecurity: No Food Insecurity (5/19/2023)    Hunger Vital Sign     Worried About Running Out of Food in the Last Year: Never true     Ran Out of Food in the Last Year: Never true   Transportation Needs: No Transportation Needs (5/19/2023)    PRAPARE - Transportation     Lack of Transportation (Medical): No     Lack of Transportation (Non-Medical): No   Physical Activity: Inactive (5/19/2023)    Exercise Vital Sign     Days of Exercise per Week: 0 days     Minutes of Exercise per Session: 0 min   Stress: No Stress Concern Present (5/19/2023)    [employer] - Occupational Stress Questionnaire     Feeling of Stress : Only a little   Social Connections: Moderately Isolated (5/19/2023)    Social Connection and Isolation Panel [NHANES]     Frequency of Communication with Friends and Family: Three times a week     Frequency of Social Gatherings with Friends and Family: Three times a week     Attends Bahai Services: Never     Active Member of Clubs or Organizations: No     Attends Club or Organization Meetings: Never     Marital Status:    Housing Stability: Low Risk  (5/19/2023)    Housing Stability Vital Sign     Unable to Pay for Housing in the Last Year: No     Number of Places Lived in the Last Year: 1     Unstable Housing in the Last Year: No       FAMILY HISTORY:     Family History   Problem Relation Age of Onset    Hypertension Mother     Heart disease Father     Cirrhosis Brother     Colon cancer Neg Hx     Esophageal cancer Neg Hx        REVIEW OF SYSTEMS:   Review of Systems   Constitutional: Negative.   HENT: Negative.     Eyes: Negative.    Cardiovascular: Negative.    Respiratory: Negative.     Endocrine:  "Negative.    Hematologic/Lymphatic: Negative.    Skin: Negative.    Musculoskeletal: Negative.    Gastrointestinal: Negative.    Genitourinary: Negative.    Neurological: Negative.    Psychiatric/Behavioral: Negative.     Allergic/Immunologic: Negative.        A 10 point review of systems was performed and all the pertinent positives have been mentioned. Rest of review of systems was negative.        PHYSICAL EXAM:     Vitals:    11/16/23 0939   BP: 130/82   Pulse: 87   Resp: 18    Body mass index is 24.64 kg/m².  Weight: 65.1 kg (143 lb 8.3 oz)   Height: 5' 4" (162.6 cm)     Physical Exam  Vitals reviewed.   Constitutional:       Appearance: He is well-developed.   HENT:      Head: Normocephalic.   Eyes:      Conjunctiva/sclera: Conjunctivae normal.      Pupils: Pupils are equal, round, and reactive to light.   Cardiovascular:      Rate and Rhythm: Normal rate and regular rhythm.      Heart sounds: Normal heart sounds.   Pulmonary:      Effort: Pulmonary effort is normal.      Breath sounds: Normal breath sounds.   Abdominal:      General: Bowel sounds are normal.      Palpations: Abdomen is soft.   Musculoskeletal:      Cervical back: Normal range of motion and neck supple.   Skin:     General: Skin is warm.   Neurological:      Mental Status: He is alert and oriented to person, place, and time.           DATA:     Laboratory:  CBC:  Recent Labs   Lab 08/09/23 2001 08/11/23  0514 08/25/23  0947   WBC 11.98 10.64 8.11   Hemoglobin 17.2 16.5 15.3   Hematocrit 50.0 47.8 46.3   Platelets 216 193 259       CHEMISTRIES:  Recent Labs   Lab 03/29/21  0920 09/28/21  1537 05/26/22  0950 04/26/23  0615 05/18/23  2356 05/19/23  0448 08/10/23  0738 08/11/23  0514 08/25/23  0947   Glucose 113 H 84 135 H 217 H 184 H   < > 121 H 89 108   Sodium 141 141 141 137 139   < > 139 138 141   Potassium 4.4 4.6 4.0 4.2 4.1   < > 3.4 L 4.0 4.6   BUN 14 9 7 11 10   < > 17 21 H 9   Creatinine 1.0 1.0 1.0 1.3 1.2   < > 1.1 1.4 0.9   eGFR if "  >60 >60 >60  --   --   --   --   --   --    eGFR if non African American >60 >60 >60  --   --   --   --   --   --    Calcium 9.9 10.5 9.2 10.0 10.3   < > 9.3 9.3 9.9   Magnesium  --   --   --  1.8 1.6  --   --  1.6  --     < > = values in this interval not displayed.       CARDIAC BIOMARKERS:  Recent Labs   Lab 08/09/23  2300 08/10/23  0332 08/10/23  0738   Troponin I 0.061 H 0.053 H 0.036 H       COAGS:  Recent Labs   Lab 03/29/21  0920 05/26/22  0950   INR 1.1 1.0       LIPIDS/LFTS:  Recent Labs   Lab 09/28/21  1537 05/26/22  0950 05/19/23  0448 08/09/23  2001 08/10/23  0738 08/11/23  0514 08/25/23  0947   Cholesterol 230 H  --  174  --  218 H  --   --    Triglycerides 257 H  --  104  --  156 H  --   --    HDL 33 L  --  29 L  --  26 L  --   --    LDL Cholesterol 145.6  --  124.2  --  160.8 H  --   --    Non-HDL Cholesterol 197  --  145  --  192  --   --    AST 17   < > 14 17  --  19 23   ALT 15   < > 9 L 14  --  14 14    < > = values in this interval not displayed.       Hemoglobin A1C   Date Value Ref Range Status   08/25/2023 6.0 (H) 4.0 - 5.6 % Final     Comment:     ADA Screening Guidelines:  5.7-6.4%  Consistent with prediabetes  >or=6.5%  Consistent with diabetes    High levels of fetal hemoglobin interfere with the HbA1C  assay. Heterozygous hemoglobin variants (HbS, HgC, etc)do  not significantly interfere with this assay.   However, presence of multiple variants may affect accuracy.     09/28/2021 6.1 (H) 4.0 - 5.6 % Final     Comment:     ADA Screening Guidelines:  5.7-6.4%  Consistent with prediabetes  >or=6.5%  Consistent with diabetes    High levels of fetal hemoglobin interfere with the HbA1C  assay. Heterozygous hemoglobin variants (HbS, HgC, etc)do  not significantly interfere with this assay.   However, presence of multiple variants may affect accuracy.     09/21/2018 5.7 (H) 4.0 - 5.6 % Final     Comment:     ADA Screening Guidelines:  5.7-6.4%  Consistent with  prediabetes  >or=6.5%  Consistent with diabetes  High levels of fetal hemoglobin interfere with the HbA1C  assay. Heterozygous hemoglobin variants (HbS, HgC, etc)do  not significantly interfere with this assay.   However, presence of multiple variants may affect accuracy.       Hemoglobin A1c   Date Value Ref Range Status   01/05/2022 6.8 (H) <5.7 % of total Hgb Final     Comment:     For someone without known diabetes, a hemoglobin A1c  value of 6.5% or greater indicates that they may have   diabetes and this should be confirmed with a follow-up   test.    For someone with known diabetes, a value <7% indicates   that their diabetes is well controlled and a value   greater than or equal to 7% indicates suboptimal   control. A1c targets should be individualized based on   duration of diabetes, age, comorbid conditions, and   other considerations.    Currently, no consensus exists regarding use of  hemoglobin A1c for diagnosis of diabetes for children.       02/20/2020 6.6 (H) <5.7 % of total Hgb Final     Comment:     For someone without known diabetes, a hemoglobin A1c  value of 6.5% or greater indicates that they may have   diabetes and this should be confirmed with a follow-up   test.    For someone with known diabetes, a value <7% indicates   that their diabetes is well controlled and a value   greater than or equal to 7% indicates suboptimal   control. A1c targets should be individualized based on   duration of diabetes, age, comorbid conditions, and   other considerations.    Currently, no consensus exists regarding use of  hemoglobin A1c for diagnosis of diabetes for children.           TSH  Recent Labs   Lab 08/10/23  0738   TSH 1.125       The 10-year ASCVD risk score (Ana GARCIA, et al., 2019) is: 44.3%    Values used to calculate the score:      Age: 58 years      Sex: Male      Is Non- : No      Diabetic: Yes      Tobacco smoker: Yes      Systolic Blood Pressure: 130 mmHg      Is BP  treated: Yes      HDL Cholesterol: 26 mg/dL      Total Cholesterol: 218 mg/dL       BNP    Lab Results   Component Value Date/Time     (H) 08/09/2023 08:46 PM     (H) 02/01/2019 06:22 AM    BNP 70 10/21/2016 10:55 PM            ECHO    Results for orders placed during the hospital encounter of 08/09/23    Echo    Interpretation Summary    Left Ventricle: Normal wall motion. There is normal systolic function with a visually estimated ejection fraction of 55 - 60%.    Left Atrium: Left atrium is severely dilated.    Right Ventricle: Normal right ventricular cavity size. Systolic function is normal.    Mitral Valve: There is moderate regurgitation with a posterolateral eccentriccally directed jet.    IVC/SVC: Intermediate venous pressure at 8 mmHg.      STRESS TEST    Results for orders placed during the hospital encounter of 08/09/23    Nuclear Stress - Cardiology Interpreted    Interpretation Summary    Abnormal myocardial perfusion scan.    There is a moderate to severe intensity, moderate sized, fixed perfusion abnormality in the lateral wall(s). Only resting images obtained as patient refused to complete test. Incomplete study.    There are no other significant perfusion abnormalities.    The ECG portion of the study is negative for ischemia.    The patient reported no chest pain during the stress test.    There were no arrhythmias during stress.        CATH    No results found for this or any previous visit.    STRESS ECHO 2023:        Left Ventricle: Normal wall motion. There is normal systolic function with a visually estimated ejection fraction of 55 - 60%.    ECG Conclusion: The ECG portion of the study is negative for ischemia.    Post-stress Echo: The left ventricle systolic function is hyperdynamic.    Post-stress Impression: The study is normal.    ASSESSMENT AND PLAN     Patient Active Problem List   Diagnosis    Chronic hepatitis C    Lumbago    Type 2 diabetes mellitus, controlled     Essential hypertension    Tobacco abuse    Cirrhosis of liver without ascites    Depression    History of CVA (cerebrovascular accident)    Esophagitis    Hyperlipidemia    GERD (gastroesophageal reflux disease)    Cirrhosis    Cannabis abuse    Elevated troponin    Sigmoid diverticulitis    Elevated brain natriuretic peptide (BNP) level    Hemiplegia and hemiparesis following cerebral infarction affecting right dominant side       Stress echo in 2023 did not show any significant ischemia.  Patient chest pain-free     Continue aggressive risk factor modification.  Continue aspirin and statins.    Euvolemic on exam     Follow-up in 6 months.          Thank you very much for involving me in the care of your patient.  Please do not hesitate to contact me if there are any questions.      Gabriela Rider MD, FACC, [facility]  Interventional Cardiologist, [facility].           This note was dictated with the help of speech recognition software.  There might be un-intended errors and/or substitutions.

## 2023-11-27 ENCOUNTER — OFFICE VISIT (OUTPATIENT)
Dept: FAMILY MEDICINE | Facility: CLINIC | Age: 58
End: 2023-11-27
Payer: MEDICARE

## 2023-11-27 VITALS
DIASTOLIC BLOOD PRESSURE: 80 MMHG | RESPIRATION RATE: 18 BRPM | HEART RATE: 90 BPM | WEIGHT: 145.5 LBS | HEIGHT: 64 IN | BODY MASS INDEX: 24.84 KG/M2 | TEMPERATURE: 98 F | SYSTOLIC BLOOD PRESSURE: 132 MMHG | OXYGEN SATURATION: 97 %

## 2023-11-27 DIAGNOSIS — Z12.11 COLON CANCER SCREENING: ICD-10-CM

## 2023-11-27 DIAGNOSIS — F32.A DEPRESSION, UNSPECIFIED DEPRESSION TYPE: Chronic | ICD-10-CM

## 2023-11-27 DIAGNOSIS — E11.9 CONTROLLED TYPE 2 DIABETES MELLITUS WITHOUT COMPLICATION, UNSPECIFIED WHETHER LONG TERM INSULIN USE: Chronic | ICD-10-CM

## 2023-11-27 DIAGNOSIS — K21.9 GASTROESOPHAGEAL REFLUX DISEASE, UNSPECIFIED WHETHER ESOPHAGITIS PRESENT: Chronic | ICD-10-CM

## 2023-11-27 DIAGNOSIS — I10 ESSENTIAL HYPERTENSION: Chronic | ICD-10-CM

## 2023-11-27 DIAGNOSIS — Z12.5 PROSTATE CANCER SCREENING: ICD-10-CM

## 2023-11-27 DIAGNOSIS — I69.351 HEMIPLEGIA AND HEMIPARESIS FOLLOWING CEREBRAL INFARCTION AFFECTING RIGHT DOMINANT SIDE: ICD-10-CM

## 2023-11-27 DIAGNOSIS — R53.83 FATIGUE, UNSPECIFIED TYPE: ICD-10-CM

## 2023-11-27 DIAGNOSIS — F12.10 CANNABIS ABUSE: Chronic | ICD-10-CM

## 2023-11-27 DIAGNOSIS — Z87.891 PERSONAL HISTORY OF NICOTINE DEPENDENCE: ICD-10-CM

## 2023-11-27 DIAGNOSIS — B18.2 CHRONIC HEPATITIS C WITHOUT HEPATIC COMA: Chronic | ICD-10-CM

## 2023-11-27 DIAGNOSIS — Z86.73 HISTORY OF CVA (CEREBROVASCULAR ACCIDENT): Chronic | ICD-10-CM

## 2023-11-27 DIAGNOSIS — Z23 INFLUENZA VACCINE NEEDED: ICD-10-CM

## 2023-11-27 DIAGNOSIS — Z12.11 ENCOUNTER FOR SCREENING COLONOSCOPY: ICD-10-CM

## 2023-11-27 DIAGNOSIS — Z72.0 TOBACCO ABUSE: Chronic | ICD-10-CM

## 2023-11-27 DIAGNOSIS — K74.60 HEPATIC CIRRHOSIS, UNSPECIFIED HEPATIC CIRRHOSIS TYPE, UNSPECIFIED WHETHER ASCITES PRESENT: ICD-10-CM

## 2023-11-27 DIAGNOSIS — Z00.00 VISIT FOR WELL MAN HEALTH CHECK: Primary | ICD-10-CM

## 2023-11-27 DIAGNOSIS — E78.2 MIXED HYPERLIPIDEMIA: Chronic | ICD-10-CM

## 2023-11-27 PROBLEM — R79.89 ELEVATED TROPONIN: Status: RESOLVED | Noted: 2023-08-10 | Resolved: 2023-11-27

## 2023-11-27 PROBLEM — K57.32 SIGMOID DIVERTICULITIS: Status: RESOLVED | Noted: 2023-08-10 | Resolved: 2023-11-27

## 2023-11-27 PROCEDURE — 99396 PREV VISIT EST AGE 40-64: CPT | Mod: 25,S$GLB,, | Performed by: FAMILY MEDICINE

## 2023-11-27 PROCEDURE — 3075F PR MOST RECENT SYSTOLIC BLOOD PRESS GE 130-139MM HG: ICD-10-PCS | Mod: CPTII,S$GLB,, | Performed by: FAMILY MEDICINE

## 2023-11-27 PROCEDURE — 90686 IIV4 VACC NO PRSV 0.5 ML IM: CPT | Mod: S$GLB,,, | Performed by: FAMILY MEDICINE

## 2023-11-27 PROCEDURE — 1160F PR REVIEW ALL MEDS BY PRESCRIBER/CLIN PHARMACIST DOCUMENTED: ICD-10-PCS | Mod: CPTII,S$GLB,, | Performed by: FAMILY MEDICINE

## 2023-11-27 PROCEDURE — 1159F MED LIST DOCD IN RCRD: CPT | Mod: CPTII,S$GLB,, | Performed by: FAMILY MEDICINE

## 2023-11-27 PROCEDURE — 99396 PR PREVENTIVE VISIT,EST,40-64: ICD-10-PCS | Mod: 25,S$GLB,, | Performed by: FAMILY MEDICINE

## 2023-11-27 PROCEDURE — 3075F SYST BP GE 130 - 139MM HG: CPT | Mod: CPTII,S$GLB,, | Performed by: FAMILY MEDICINE

## 2023-11-27 PROCEDURE — 3008F BODY MASS INDEX DOCD: CPT | Mod: CPTII,S$GLB,, | Performed by: FAMILY MEDICINE

## 2023-11-27 PROCEDURE — 3044F HG A1C LEVEL LT 7.0%: CPT | Mod: CPTII,S$GLB,, | Performed by: FAMILY MEDICINE

## 2023-11-27 PROCEDURE — G0008 ADMIN INFLUENZA VIRUS VAC: HCPCS | Mod: S$GLB,,, | Performed by: FAMILY MEDICINE

## 2023-11-27 PROCEDURE — 3044F PR MOST RECENT HEMOGLOBIN A1C LEVEL <7.0%: ICD-10-PCS | Mod: CPTII,S$GLB,, | Performed by: FAMILY MEDICINE

## 2023-11-27 PROCEDURE — 90686 FLU VACCINE (QUAD) GREATER THAN OR EQUAL TO 3YO PRESERVATIVE FREE IM: ICD-10-PCS | Mod: S$GLB,,, | Performed by: FAMILY MEDICINE

## 2023-11-27 PROCEDURE — G0008 FLU VACCINE (QUAD) GREATER THAN OR EQUAL TO 3YO PRESERVATIVE FREE IM: ICD-10-PCS | Mod: S$GLB,,, | Performed by: FAMILY MEDICINE

## 2023-11-27 PROCEDURE — 3008F PR BODY MASS INDEX (BMI) DOCUMENTED: ICD-10-PCS | Mod: CPTII,S$GLB,, | Performed by: FAMILY MEDICINE

## 2023-11-27 PROCEDURE — 99999 PR PBB SHADOW E&M-EST. PATIENT-LVL V: CPT | Mod: PBBFAC,,, | Performed by: FAMILY MEDICINE

## 2023-11-27 PROCEDURE — 99999 PR PBB SHADOW E&M-EST. PATIENT-LVL V: ICD-10-PCS | Mod: PBBFAC,,, | Performed by: FAMILY MEDICINE

## 2023-11-27 PROCEDURE — 3079F PR MOST RECENT DIASTOLIC BLOOD PRESSURE 80-89 MM HG: ICD-10-PCS | Mod: CPTII,S$GLB,, | Performed by: FAMILY MEDICINE

## 2023-11-27 PROCEDURE — 1159F PR MEDICATION LIST DOCUMENTED IN MEDICAL RECORD: ICD-10-PCS | Mod: CPTII,S$GLB,, | Performed by: FAMILY MEDICINE

## 2023-11-27 PROCEDURE — 3079F DIAST BP 80-89 MM HG: CPT | Mod: CPTII,S$GLB,, | Performed by: FAMILY MEDICINE

## 2023-11-27 PROCEDURE — 1160F RVW MEDS BY RX/DR IN RCRD: CPT | Mod: CPTII,S$GLB,, | Performed by: FAMILY MEDICINE

## 2023-11-27 RX ORDER — PROPRANOLOL HYDROCHLORIDE 20 MG/1
10 TABLET ORAL 2 TIMES DAILY
COMMUNITY
Start: 2023-09-14

## 2023-11-27 RX ORDER — BUPROPION HYDROCHLORIDE 150 MG/1
1 TABLET ORAL DAILY
COMMUNITY
Start: 2023-11-13

## 2023-11-27 NOTE — PROGRESS NOTES
"Well Man VISIT      CHIEF COMPLAINT  Chief Complaint   Patient presents with    Diabetes    Hypertension    Follow-up    Back Pain     Lower pain rate x 8        HPI  Nino Price is a 58 y.o. male who presents for wellness.     Social Factors  Tobacco use: Yes   Ready to Quit: No  Alcohol: Yes   Intimate partner violence screening  "Do you feel safe in your current relationship?" No  "Have you ever been in a relationship in which your partner frightened you or hurt you?" No  Living Will/POA: No  Regular Exercise: No    Depression  Over the past two weeks, have you felt down, depressed, or hopeless? No  Over the past two weeks, have you felt little interest or pleasure in doing things? No    Reproductive Health  STD screening in last year: deferred  HIV screening: deferred    Screen for Chronic Disease  CHD Risk Factors: advanced age (older than 55 for men, 65 for women), diabetes mellitus, dyslipidemia, male gender, and smoking/ tobacco exposure  Estimated body mass index is 24.98 kg/m² as calculated from the following:    Height as of this encounter: 5' 4" (1.626 m).    Weight as of this encounter: 66 kg (145 lb 8.1 oz).  Dyslipidemia screening needed: ordered  T2DM screening needed: ordered  Colonoscopy needed: ordered  PSA needed:   AAA screening needed:not at this time  Screen men 35 years and older, and men 20 to 34 years of age who have cardiovascular risk factors for dyslipidemia  Begin screening colonoscopies at 50 years of age in men of average risk, and continue until 75 years of age; offer fecal occult blood testing every year, flexible sigmoidoscopy every five years combined with fecal occult blood testing every three years, or colonoscopy every 10 years   The American Urological Association recommends offering PSA testing and digital rectal examination to well-informed men beginning at 40 years of age and continuing until life expectancy is less than 10 years  Screen once with ultrasonography " "in men 65 to 75 years of age if they have a family history or have smoked at [id number] cigarettes in their lifetime  Screen men with a sustained blood pressure greater than 135/80 mm Hg for T2DM      Immunizations  delayed    ALLERGIES and MEDS were verified.   PMHx, PSHx, FHx, SOCIALHx were updated as pertinent.    REVIEW OF SYSTEMS  Review of Systems   Constitutional:  Positive for malaise/fatigue.   HENT: Negative.     Eyes: Negative.    Respiratory: Negative.     Cardiovascular: Negative.    Gastrointestinal:  Positive for abdominal pain. Negative for blood in stool, constipation, diarrhea, heartburn, nausea and vomiting.   Genitourinary: Negative.    Musculoskeletal:  Positive for joint pain and myalgias.   Skin: Negative.            PHYSICAL EXAM  VITAL SIGNS: /80   Pulse 90   Temp 98 °F (36.7 °C) (Oral)   Resp 18   Ht 5' 4" (1.626 m)   Wt 66 kg (145 lb 8.1 oz)   SpO2 97%   BMI 24.98 kg/m²   GEN: Well developed, Well nourished, No acute distress.  HENT: Normocephalic, Atraumatic, Bilateral external ears normal, Nose normal, Oropharynx moist, No oral exudates.   Eyes: PERRL, EOMI, Conjunctiva normal, No discharge.   Neck: Supple, No tenderness.  Lymphatic: No cervical or supraclavicular lymphadenopathy noted.   Cardiovascular: Normal heart rate, Normal rhythm, No murmurs, No rubs, No gallops.   Thorax & Lungs: Normal breath sounds, No respiratory distress, No wheezing.  Abdomen: Soft, No tenderness, Bowel sounds normal.  Genital:  deferred  Skin: Warm, Dry, No erythema, No rash.   Extremities: No edema, No tenderness.       ASSESSMENT/PLAN    Nino was seen today for diabetes, hypertension, follow-up and back pain.    Diagnoses and all orders for this visit:    Visit for [facility] check  -     CBC Auto Differential; Future  -     Comprehensive Metabolic Panel; Future  -     Lipid Panel; Future  - Labs to be done when fasting    Influenza vaccine needed  -     Influenza - Quadrivalent " *Preferred* (6 months+) (PF)  - Flu shot administered    Encounter for screening colonoscopy  -     Ambulatory referral/consult to Endo Procedure ; Future  - Patient instructed on how to get colonoscopy scheduled via portal and how to call     Colon cancer screening  -     Ambulatory referral/consult to Endo Procedure ; Future    Fatigue, unspecified type  -     TESTOSTERONE; Future  - Patient has chronic fatigue and lack of motivation  - Will check testosterone as thyroid has been checked recently and was normal    History of CVA (cerebrovascular accident)  - Mild right sided deficits  - Able to ambulate without assistive device    Hemiplegia and hemiparesis following cerebral infarction affecting right dominant side  -     Lipid Panel; Future  - No acute changes    Chronic hepatitis C without hepatic coma  -     HEPATITIS C RNA, QUANTITATIVE, PCR; Future  - Need to confirm he has active virua  - Will refer to hepatology for treatment if needed    Gastroesophageal reflux disease, unspecified whether esophagitis present  -     CBC Auto Differential; Future  - Will check for anemia secondary to chronic GERD    Mixed hyperlipidemia  -     Lipid Panel; Future  - History of CVA, so will check lipid panel and adjust medications accordingly    Essential hypertension  -     Comprehensive Metabolic Panel; Future  - Stable on current medication regimen    Tobacco abuse  -     CT Chest Lung Screening Low Dose; Future  - Will do routine lung cancer screening given his long history of smoking    Controlled type 2 diabetes mellitus without complication, unspecified whether long term insulin use  -     Hemoglobin A1C; Future  -     Microalbumin/Creatinine Ratio, Urine; Future  - Last A1c showed blood sugars well controlled    Personal history of nicotine dependence  -     CT Chest Lung Screening Low Dose; Future    Cannabis abuse  - Encouraged patient to avoid using THC on a regular basis    Depression,  unspecified depression type  - Patient likely suffers with chronic depression, but feels if he had more energy he wouldn't feel down periodically  - He denies feeling down at this time    Hepatic cirrhosis, unspecified hepatic cirrhosis type, unspecified whether ascites present    Prostate cancer screening  -     PSA, Screening; Future  - Patient due for psa check  - Will refer to urology if needed         FOLLOW UP: 6 months or sooner if needed    Stevo Pryor MD

## 2023-11-27 NOTE — PROGRESS NOTES
Patient given flu vaccine via injection. 0 complaints of, tolerated well. Advised to wait in lobby 15 mins for adverse reaction. Verbalized understanding.

## 2023-11-27 NOTE — PATIENT INSTRUCTIONS
Patient given flu vaccine via injection. 0 complaints of, tolerated well. Advised to wait in lobby 15 mins for adverse reaction. Verbalized understanding.    Patient/Caregiver provided printed discharge information.

## 2023-12-04 ENCOUNTER — TELEPHONE (OUTPATIENT)
Dept: ENDOSCOPY | Facility: HOSPITAL | Age: 58
End: 2023-12-04

## 2023-12-04 ENCOUNTER — CLINICAL SUPPORT (OUTPATIENT)
Dept: ENDOSCOPY | Facility: HOSPITAL | Age: 58
End: 2023-12-04
Attending: FAMILY MEDICINE
Payer: MEDICARE

## 2023-12-04 DIAGNOSIS — Z12.11 ENCOUNTER FOR SCREENING COLONOSCOPY: ICD-10-CM

## 2023-12-04 DIAGNOSIS — Z12.11 COLON CANCER SCREENING: ICD-10-CM

## 2023-12-04 NOTE — PLAN OF CARE
Contacted patient to schedule a Colonoscopy and unable to schedule due to no availability at the [facility] at this time. Patient requested a call back. New PAT appt made and Dept number provided to pt [phone].

## 2023-12-04 NOTE — TELEPHONE ENCOUNTER
Contacted patient to schedule a Colonoscopy and unable to schedule due to no availability at the [facility] at this time. Patient requested a call back. New PAT appt made and Dept number provided to pt [phone].

## 2023-12-08 ENCOUNTER — HOSPITAL ENCOUNTER (EMERGENCY)
Facility: HOSPITAL | Age: 58
Discharge: HOME | End: 2023-12-08
Attending: EMERGENCY MEDICINE
Payer: MEDICARE

## 2023-12-08 VITALS
TEMPERATURE: 98 F | DIASTOLIC BLOOD PRESSURE: 74 MMHG | HEART RATE: 86 BPM | SYSTOLIC BLOOD PRESSURE: 131 MMHG | HEIGHT: 64 IN | OXYGEN SATURATION: 99 % | BODY MASS INDEX: 24.75 KG/M2 | WEIGHT: 145 LBS | RESPIRATION RATE: 18 BRPM

## 2023-12-08 DIAGNOSIS — S20.211A RIB CONTUSION, RIGHT, INITIAL ENCOUNTER: Primary | ICD-10-CM

## 2023-12-08 DIAGNOSIS — R07.81 RIB PAIN: ICD-10-CM

## 2023-12-08 LAB
BILIRUB UR QL STRIP: NEGATIVE
CLARITY UR: CLEAR
COLOR UR: YELLOW
GLUCOSE UR QL STRIP: ABNORMAL
HGB UR QL STRIP: NEGATIVE
KETONES UR QL STRIP: NEGATIVE
LEUKOCYTE ESTERASE UR QL STRIP: NEGATIVE
NITRITE UR QL STRIP: NEGATIVE
PH UR STRIP: 5 [PH] (ref 5–8)
PROT UR QL STRIP: NEGATIVE
SP GR UR STRIP: 1.01 (ref 1–1.03)
URN SPEC COLLECT METH UR: ABNORMAL
UROBILINOGEN UR STRIP-ACNC: NEGATIVE EU/DL

## 2023-12-08 PROCEDURE — 99284 EMERGENCY DEPT VISIT MOD MDM: CPT | Mod: HCNC

## 2023-12-08 PROCEDURE — 96372 THER/PROPH/DIAG INJ SC/IM: CPT | Performed by: EMERGENCY MEDICINE

## 2023-12-08 PROCEDURE — 63600175 PHARM REV CODE 636 W HCPCS: Mod: HCNC | Performed by: EMERGENCY MEDICINE

## 2023-12-08 PROCEDURE — 81003 URINALYSIS AUTO W/O SCOPE: CPT | Mod: HCNC | Performed by: EMERGENCY MEDICINE

## 2023-12-08 PROCEDURE — 94799 UNLISTED PULMONARY SVC/PX: CPT | Mod: HCNC,XB

## 2023-12-08 PROCEDURE — 25000003 PHARM REV CODE 250: Mod: HCNC | Performed by: EMERGENCY MEDICINE

## 2023-12-08 RX ORDER — MELOXICAM 15 MG/1
15 TABLET ORAL DAILY
Qty: 7 TABLET | Refills: 0 | Status: SHIPPED | OUTPATIENT
Start: 2023-12-08 | End: 2023-12-15

## 2023-12-08 RX ORDER — LIDOCAINE 50 MG/G
1 PATCH TOPICAL
Status: DISCONTINUED | OUTPATIENT
Start: 2023-12-08 | End: 2023-12-08 | Stop reason: HOSPADM

## 2023-12-08 RX ORDER — LIDOCAINE 50 MG/G
1 PATCH TOPICAL DAILY
Qty: 5 PATCH | Refills: 1 | Status: SHIPPED | OUTPATIENT
Start: 2023-12-08 | End: 2024-01-26

## 2023-12-08 RX ORDER — HYDROMORPHONE HYDROCHLORIDE 1 MG/ML
1 INJECTION, SOLUTION INTRAMUSCULAR; INTRAVENOUS; SUBCUTANEOUS
Status: COMPLETED | OUTPATIENT
Start: 2023-12-08 | End: 2023-12-08

## 2023-12-08 RX ORDER — OXYCODONE HCL 10 MG/1
10 TABLET, FILM COATED, EXTENDED RELEASE ORAL EVERY 12 HOURS
Qty: 6 TABLET | Refills: 0 | Status: SHIPPED | OUTPATIENT
Start: 2023-12-08 | End: 2023-12-11

## 2023-12-08 RX ADMIN — HYDROMORPHONE HYDROCHLORIDE 1 MG: 1 INJECTION, SOLUTION INTRAMUSCULAR; INTRAVENOUS; SUBCUTANEOUS at 11:12

## 2023-12-08 RX ADMIN — LIDOCAINE 5% 1 PATCH: 700 PATCH TOPICAL at 02:12

## 2023-12-08 NOTE — DISCHARGE INSTRUCTIONS
USe the incentive spirometer as instructed 10 times a day until pain improves to prevent pneumonia.

## 2023-12-08 NOTE — ED PROVIDER NOTES
Encounter Date: 12/8/2023    SCRIBE #1 NOTE: I, Jaye Longoria, am scribing for, and in the presence of,  Rylan Garcia MD. I have scribed the following portions of the note - Other sections scribed: HPI, ROS, PE, MDM.       History     Chief Complaint   Patient presents with    Back Pain     Pt to ER with reports of trip and fall yesterday while working in the garden . Pt reports lower back pain. Pt denies hitting head      Nino Price is a 58 y.o. male, with a PMHx of Hepatitis C, HTN, CVA, tobacco abuse, HLD, who presents to the ED with right-sided back pain s/p fall 2 days ago. Patient reports he was working in his garden and accidentally tripped, hitting his back on bricks in his garden. States the pain did not begin immediately after the fall but began 1 day ago, and currently rates ut a 9.5/10. States he is SOB and that it is painful to breathe. No other exacerbating or alleviating factors. Denies fever, hematuria, or other associated symptoms.       The history is provided by the patient. No  was used.     Review of patient's allergies indicates:   Allergen Reactions    Codeine Nausea Only     Past Medical History:   Diagnosis Date    Anxiety     Asthma     Colitis     Depression     Diabetes mellitus     Head injury     History of hepatitis C, s/p successful hcv treatment w/ SVR 8-2015  10/8/2012    SVR(24) as of 11/2015.  SVR(12) as of 8/2015.  completed harvoni 24 week regimen for genotype 1a 5/18/15     Hypertension     Shoulder pain     left, s/p 4 surgeries.     Stroke      Past Surgical History:   Procedure Laterality Date    COLONOSCOPY N/A 10/20/2015    Procedure: COLONOSCOPY;  Surgeon: Jagdish Patricia MD;  Location: [facility] ENDO;  Service: Endoscopy;  Laterality: N/A;    DIAGNOSTIC LAPAROSCOPY N/A 4/1/2021    Procedure: LAPAROSCOPY, DIAGNOSTIC;  Surgeon: Umesh Vallecillo MD;  Location: [facility] OR;  Service: General;  Laterality: N/A;  RN PREOP 3/29/21, COVID  NEGATIVE ON 3/29  CONSENT AND H/P INCOMPLETE    GALLBLADDER SURGERY      pt not 100% if gall bladder removed    rotator cuff      left side     Family History   Problem Relation Age of Onset    Hypertension Mother     Heart disease Father     Cirrhosis Brother     Colon cancer Neg Hx     Esophageal cancer Neg Hx      Social History     Tobacco Use    Smoking status: Every Day     Current packs/day: 1.00     Average packs/day: 1 pack/day for 35.0 years (35.0 ttl pk-yrs)     Types: Cigarettes     Passive exposure: Current    Smokeless tobacco: Never    Tobacco comments:     Will try to stop smoking by himself vs    Substance Use Topics    Alcohol use: No    Drug use: Yes     Frequency: 7.0 times per week     Types: Marijuana     Review of Systems   Constitutional:  Negative for fever.   HENT:  Negative for sore throat.    Eyes:  Negative for pain.   Respiratory:  Negative for shortness of breath.    Cardiovascular:  Negative for chest pain.   Gastrointestinal:  Negative for abdominal pain.   Endocrine: Negative for polyuria.   Genitourinary:  Negative for dysuria.   Musculoskeletal:  Negative for back pain.   Skin:  Negative for rash.   Neurological:  Negative for headaches.   Psychiatric/Behavioral:  Negative for behavioral problems.        Physical Exam     Initial Vitals [12/08/23 1015]   BP Pulse Resp Temp SpO2   139/74 100 19 98 °F (36.7 °C) 98 %      MAP       --         Physical Exam    Nursing note and vitals reviewed.  Constitutional: He appears well-developed and well-nourished.   HENT:   Head: Atraumatic.   Eyes: EOM are normal. Pupils are equal, round, and reactive to light.   Neck: Neck supple. No JVD present.   Normal range of motion.  Cardiovascular:  Normal rate, regular rhythm, normal heart sounds and intact distal pulses.     Exam reveals no gallop and no friction rub.       No murmur heard.  Abdominal: Abdomen is soft. Bowel sounds are normal.   Musculoskeletal:      Cervical back: Normal range of  motion and neck supple.      Comments: T9-T10 right-sided thoracic back tenderness. No crepitus, swelling, bruising, ecchymosis. No midline tenderness.      Lymphadenopathy:     He has no cervical adenopathy.   Neurological: He is alert and oriented to person, place, and time. He has normal strength.   Skin: Skin is warm and dry.   Psychiatric: He has a normal mood and affect. Thought content normal.         ED Course   Procedures  Labs Reviewed   URINALYSIS, REFLEX TO URINE CULTURE - Abnormal; Notable for the following components:       Result Value    Glucose, UA 1+ (*)     All other components within normal limits    Narrative:     Specimen Source->Urine          Imaging Results              X-Ray Ribs 2 View Right (Final result)  Result time 12/08/23 12:35:16      Final result by Gucci Roberts III, MD (12/08/23 12:35:16)                   Impression:      No acute process seen.      Electronically signed by: Gucci Roberts MD  Date:    12/08/2023  Time:    12:35               Narrative:    EXAMINATION:  XR RIBS 2 VIEW RIGHT    CLINICAL HISTORY:  Pleurodynia    FINDINGS:  Two views right ribs: No pneumothorax, pleural fluid, or lung contusion seen.  No rib fracture or rib lesion seen.                                       Medications   HYDROmorphone injection 1 mg (1 mg Intramuscular Given 12/8/23 1150)     Medical Decision Making  This is an emergent evaluation of a 58 y.o. male who presents with right-sided back pain. The patient was seen and examined. The history and physical exam was obtained. The nursing notes and vital signs were reviewed. Secondary to symptoms and examination findings, I ordered X-ray ribs and UA.    Amount and/or Complexity of Data Reviewed  Labs: ordered.  Radiology: ordered.    Risk  Prescription drug management.            Scribe Attestation:   Scribe #1: I performed the above scribed service and the documentation accurately describes the services I performed. I attest to the  accuracy of the note.                               Clinical Impression:  Final diagnoses:  [R07.81] Rib pain  [S20.211A] Rib contusion, right, initial encounter (Primary)          ED Disposition Condition    Discharge Stable          ED Prescriptions       Medication Sig Dispense Start Date End Date Auth. Provider    LIDOcaine (LIDODERM) 5 % Place 1 patch onto the skin once daily. Remove & Discard patch within 12 hours or as directed by MD 5 patch 2023 -- Rylan Garcia MD    oxyCODONE (OXYCONTIN) 10 mg 12 hr tablet () Take 1 tablet (10 mg total) by mouth every 12 (twelve) hours. for 3 days 6 tablet 2023 Rylan Garcia MD    meloxicam (MOBIC) 15 MG tablet Take 1 tablet (15 mg total) by mouth once daily. for 7 days 7 tablet 2023 12/15/2023 Rylan Garcia MD          Follow-up Information       Follow up With Specialties Details Why Contact Info    Stevo Pryor MD Family Medicine, Wound Care   [address] [facility] [address] [phone]          call you doctor at [facility] today          I, Rylan Garcia, personally performed the services described in this documentation. All medical record entries made by the scribe were at my direction and in my presence.  I have reviewed the chart and agree that the record reflects my personal performance and is accurate and complete.     Rylan Garcia MD  [phone]

## 2023-12-09 ENCOUNTER — TELEPHONE (OUTPATIENT)
Dept: EMERGENCY MEDICINE | Facility: HOSPITAL | Age: 58
End: 2023-12-09
Payer: MEDICARE

## 2023-12-09 NOTE — TELEPHONE ENCOUNTER
Patient called stating [facility] is out of the OxyContin.  Patient requesting medicines be sent to the [facility]'s on [address].  E-prescribing is currently down.  Tried to call patient to inform him of this.  He did not answer.  Left voicemail.

## 2023-12-16 ENCOUNTER — HOSPITAL ENCOUNTER (EMERGENCY)
Facility: HOSPITAL | Age: 58
Discharge: HOME | End: 2023-12-16
Attending: EMERGENCY MEDICINE
Payer: MEDICARE

## 2023-12-16 VITALS
BODY MASS INDEX: 23.9 KG/M2 | WEIGHT: 140 LBS | HEIGHT: 64 IN | RESPIRATION RATE: 18 BRPM | TEMPERATURE: 98 F | DIASTOLIC BLOOD PRESSURE: 77 MMHG | OXYGEN SATURATION: 97 % | SYSTOLIC BLOOD PRESSURE: 115 MMHG | HEART RATE: 82 BPM

## 2023-12-16 DIAGNOSIS — N17.9 AKI (ACUTE KIDNEY INJURY): Primary | ICD-10-CM

## 2023-12-16 DIAGNOSIS — R10.30 LOWER ABDOMINAL PAIN: ICD-10-CM

## 2023-12-16 DIAGNOSIS — S22.31XD CLOSED FRACTURE OF ONE RIB OF RIGHT SIDE WITH ROUTINE HEALING, SUBSEQUENT ENCOUNTER: ICD-10-CM

## 2023-12-16 DIAGNOSIS — R11.2 NAUSEA AND VOMITING, UNSPECIFIED VOMITING TYPE: ICD-10-CM

## 2023-12-16 LAB
ALBUMIN SERPL BCP-MCNC: 4.4 G/DL (ref 3.5–5.2)
ALP SERPL-CCNC: 64 U/L (ref 55–135)
ALT SERPL W/O P-5'-P-CCNC: 23 U/L (ref 10–44)
ANION GAP SERPL CALC-SCNC: 15 MMOL/L (ref 8–16)
AST SERPL-CCNC: 23 U/L (ref 10–40)
BACTERIA #/AREA URNS HPF: NORMAL /HPF
BASOPHILS # BLD AUTO: 0.04 K/UL (ref 0–0.2)
BASOPHILS NFR BLD: 0.5 % (ref 0–1.9)
BILIRUB SERPL-MCNC: 0.7 MG/DL (ref 0.1–1)
BILIRUB UR QL STRIP: NEGATIVE
BUN SERPL-MCNC: 26 MG/DL (ref 6–20)
CALCIUM SERPL-MCNC: 9.5 MG/DL (ref 8.7–10.5)
CHLORIDE SERPL-SCNC: 98 MMOL/L (ref 95–110)
CLARITY UR: CLEAR
CO2 SERPL-SCNC: 21 MMOL/L (ref 23–29)
COLOR UR: YELLOW
CREAT SERPL-MCNC: 1.5 MG/DL (ref 0.5–1.4)
DIFFERENTIAL METHOD: ABNORMAL
EOSINOPHIL # BLD AUTO: 0 K/UL (ref 0–0.5)
EOSINOPHIL NFR BLD: 0.1 % (ref 0–8)
ERYTHROCYTE [DISTWIDTH] IN BLOOD BY AUTOMATED COUNT: 12 % (ref 11.5–14.5)
EST. GFR  (NO RACE VARIABLE): 53 ML/MIN/1.73 M^2
GLUCOSE SERPL-MCNC: 187 MG/DL (ref 70–110)
GLUCOSE UR QL STRIP: NEGATIVE
HCT VFR BLD AUTO: 47.5 % (ref 40–54)
HGB BLD-MCNC: 17 G/DL (ref 14–18)
HGB UR QL STRIP: ABNORMAL
HYALINE CASTS #/AREA URNS LPF: 0 /LPF
IMM GRANULOCYTES # BLD AUTO: 0.02 K/UL (ref 0–0.04)
IMM GRANULOCYTES NFR BLD AUTO: 0.2 % (ref 0–0.5)
KETONES UR QL STRIP: NEGATIVE
LEUKOCYTE ESTERASE UR QL STRIP: NEGATIVE
LIPASE SERPL-CCNC: 19 U/L (ref 4–60)
LYMPHOCYTES # BLD AUTO: 1.9 K/UL (ref 1–4.8)
LYMPHOCYTES NFR BLD: 20.9 % (ref 18–48)
MCH RBC QN AUTO: 32.3 PG (ref 27–31)
MCHC RBC AUTO-ENTMCNC: 35.8 G/DL (ref 32–36)
MCV RBC AUTO: 90 FL (ref 82–98)
MICROSCOPIC COMMENT: NORMAL
MONOCYTES # BLD AUTO: 0.7 K/UL (ref 0.3–1)
MONOCYTES NFR BLD: 8.1 % (ref 4–15)
NEUTROPHILS # BLD AUTO: 6.2 K/UL (ref 1.8–7.7)
NEUTROPHILS NFR BLD: 70.2 % (ref 38–73)
NITRITE UR QL STRIP: NEGATIVE
NRBC BLD-RTO: 0 /100 WBC
PH UR STRIP: 6 [PH] (ref 5–8)
PLATELET # BLD AUTO: 229 K/UL (ref 150–450)
PMV BLD AUTO: 9.4 FL (ref 9.2–12.9)
POCT GLUCOSE: 194 MG/DL (ref 70–110)
POTASSIUM SERPL-SCNC: 4.3 MMOL/L (ref 3.5–5.1)
PROT SERPL-MCNC: 8.4 G/DL (ref 6–8.4)
PROT UR QL STRIP: ABNORMAL
RBC # BLD AUTO: 5.26 M/UL (ref 4.6–6.2)
RBC #/AREA URNS HPF: 1 /HPF (ref 0–4)
SODIUM SERPL-SCNC: 134 MMOL/L (ref 136–145)
SP GR UR STRIP: >1.03 (ref 1–1.03)
SQUAMOUS #/AREA URNS HPF: 1 /HPF
URN SPEC COLLECT METH UR: ABNORMAL
UROBILINOGEN UR STRIP-ACNC: NEGATIVE EU/DL
WBC # BLD AUTO: 8.88 K/UL (ref 3.9–12.7)
WBC #/AREA URNS HPF: 0 /HPF (ref 0–5)

## 2023-12-16 PROCEDURE — 96374 THER/PROPH/DIAG INJ IV PUSH: CPT | Mod: 59,HCNC

## 2023-12-16 PROCEDURE — 96375 TX/PRO/DX INJ NEW DRUG ADDON: CPT | Mod: HCNC

## 2023-12-16 PROCEDURE — 63600175 PHARM REV CODE 636 W HCPCS: Mod: HCNC

## 2023-12-16 PROCEDURE — 25500020 PHARM REV CODE 255: Mod: HCNC

## 2023-12-16 PROCEDURE — 96361 HYDRATE IV INFUSION ADD-ON: CPT | Mod: HCNC

## 2023-12-16 PROCEDURE — 25000003 PHARM REV CODE 250: Mod: HCNC

## 2023-12-16 PROCEDURE — 83690 ASSAY OF LIPASE: CPT | Mod: HCNC

## 2023-12-16 PROCEDURE — 96376 TX/PRO/DX INJ SAME DRUG ADON: CPT | Mod: HCNC

## 2023-12-16 PROCEDURE — 99285 EMERGENCY DEPT VISIT HI MDM: CPT | Mod: 25,HCNC

## 2023-12-16 PROCEDURE — 80053 COMPREHEN METABOLIC PANEL: CPT | Mod: HCNC

## 2023-12-16 PROCEDURE — 85025 COMPLETE CBC W/AUTO DIFF WBC: CPT | Mod: HCNC

## 2023-12-16 PROCEDURE — 81000 URINALYSIS NONAUTO W/SCOPE: CPT | Mod: HCNC

## 2023-12-16 PROCEDURE — 82962 GLUCOSE BLOOD TEST: CPT | Mod: HCNC

## 2023-12-16 RX ORDER — MORPHINE SULFATE 4 MG/ML
4 INJECTION, SOLUTION INTRAMUSCULAR; INTRAVENOUS
Status: COMPLETED | OUTPATIENT
Start: 2023-12-16 | End: 2023-12-16

## 2023-12-16 RX ORDER — LIDOCAINE 50 MG/G
1 PATCH TOPICAL
Status: DISCONTINUED | OUTPATIENT
Start: 2023-12-16 | End: 2023-12-17 | Stop reason: HOSPADM

## 2023-12-16 RX ORDER — MAG HYDROX/ALUMINUM HYD/SIMETH 200-200-20
30 SUSPENSION, ORAL (FINAL DOSE FORM) ORAL ONCE
Status: COMPLETED | OUTPATIENT
Start: 2023-12-16 | End: 2023-12-16

## 2023-12-16 RX ORDER — ONDANSETRON 4 MG/1
4 TABLET, ORALLY DISINTEGRATING ORAL EVERY 6 HOURS PRN
Qty: 15 TABLET | Refills: 0 | Status: SHIPPED | OUTPATIENT
Start: 2023-12-16

## 2023-12-16 RX ORDER — ONDANSETRON 2 MG/ML
4 INJECTION INTRAMUSCULAR; INTRAVENOUS
Status: COMPLETED | OUTPATIENT
Start: 2023-12-16 | End: 2023-12-16

## 2023-12-16 RX ORDER — SODIUM CHLORIDE 9 MG/ML
1000 INJECTION, SOLUTION INTRAVENOUS
Status: COMPLETED | OUTPATIENT
Start: 2023-12-16 | End: 2023-12-16

## 2023-12-16 RX ORDER — ONDANSETRON 4 MG/1
4 TABLET, ORALLY DISINTEGRATING ORAL ONCE
Status: DISCONTINUED | OUTPATIENT
Start: 2023-12-16 | End: 2023-12-16

## 2023-12-16 RX ORDER — METHOCARBAMOL 500 MG/1
1000 TABLET, FILM COATED ORAL 3 TIMES DAILY PRN
Qty: 30 TABLET | Refills: 0 | Status: SHIPPED | OUTPATIENT
Start: 2023-12-16 | End: 2024-01-26

## 2023-12-16 RX ORDER — DICYCLOMINE HYDROCHLORIDE 20 MG/1
20 TABLET ORAL 2 TIMES DAILY PRN
Qty: 30 TABLET | Refills: 0 | Status: SHIPPED | OUTPATIENT
Start: 2023-12-16

## 2023-12-16 RX ORDER — LIDOCAINE HYDROCHLORIDE 20 MG/ML
15 SOLUTION OROPHARYNGEAL ONCE
Status: COMPLETED | OUTPATIENT
Start: 2023-12-16 | End: 2023-12-16

## 2023-12-16 RX ADMIN — MORPHINE SULFATE 4 MG: 4 INJECTION, SOLUTION INTRAMUSCULAR; INTRAVENOUS at 10:12

## 2023-12-16 RX ADMIN — LIDOCAINE 5% 1 PATCH: 700 PATCH TOPICAL at 10:12

## 2023-12-16 RX ADMIN — LIDOCAINE HYDROCHLORIDE 15 ML: 20 SOLUTION ORAL at 07:12

## 2023-12-16 RX ADMIN — IOHEXOL 80 ML: 350 INJECTION, SOLUTION INTRAVENOUS at 09:12

## 2023-12-16 RX ADMIN — SODIUM CHLORIDE 1000 ML: 9 INJECTION, SOLUTION INTRAVENOUS at 08:12

## 2023-12-16 RX ADMIN — ONDANSETRON 4 MG: 2 INJECTION INTRAMUSCULAR; INTRAVENOUS at 08:12

## 2023-12-16 RX ADMIN — MORPHINE SULFATE 4 MG: 4 INJECTION, SOLUTION INTRAMUSCULAR; INTRAVENOUS at 08:12

## 2023-12-16 RX ADMIN — ALUMINUM HYDROXIDE, MAGNESIUM HYDROXIDE, AND DIMETHICONE 30 ML: 200; 20; 200 SUSPENSION ORAL at 07:12

## 2023-12-17 NOTE — DISCHARGE INSTRUCTIONS

## 2023-12-17 NOTE — ED PROVIDER NOTES
Encounter Date: 12/16/2023    SCRIBE #1 NOTE: I, JR HERNANDEZ, am scribing for, and in the presence of,  Holdsworth, Alayna, PA-C. I have scribed the following portions of the note - Other sections scribed: HPI, ROS.       History     Chief Complaint   Patient presents with    Abdominal Pain     Pt presents to the ED with c/o sharp, constant umbilical abdominal pain with n/v/d since Wednesday. Denies any otc meds.      58-year-old male, with a PMHx of DM, HTN, and cholecystectomy, presents to the ED with a chief complaint of abdominal pain onset three days ago. Patient states that he is having lower, 10/10 abdominal pain that is sharp, stabbing, and intermittent. He further states that he had emesis PTA every time he drinks something, decreased urine, and headache. Denies any attempted treatment PTA or recent sick contact. No other exacerbating or alleviating factors. Denies any fever, diarrhea, constipation, chest pain, shortness of breath, dysuria, frequency, dizziness, lightheadedness, or other associated symptoms.      The history is provided by the patient. No  was used.     Review of patient's allergies indicates:   Allergen Reactions    Codeine Nausea Only     Past Medical History:   Diagnosis Date    Anxiety     Asthma     Colitis     Depression     Diabetes mellitus     Head injury     History of hepatitis C, s/p successful hcv treatment w/ SVR 8-2015  10/8/2012    SVR(24) as of 11/2015.  SVR(12) as of 8/2015.  completed harvoni 24 week regimen for genotype 1a 5/18/15     Hypertension     Shoulder pain     left, s/p 4 surgeries.     Stroke      Past Surgical History:   Procedure Laterality Date    COLONOSCOPY N/A 10/20/2015    Procedure: COLONOSCOPY;  Surgeon: Jagdish Patricia MD;  Location: [facility] [place] ENDO;  Service: Endoscopy;  Laterality: N/A;    DIAGNOSTIC LAPAROSCOPY N/A 4/1/2021    Procedure: LAPAROSCOPY, DIAGNOSTIC;  Surgeon: Umesh Vallecillo MD;  Location: [facility] [place] OR;  Service: General;   Laterality: N/A;  RN PREOP 3/29/21, COVID NEGATIVE ON 3/29  CONSENT AND H/P INCOMPLETE    GALLBLADDER SURGERY      pt not 100% if gall bladder removed    rotator cuff      left side     Family History   Problem Relation Age of Onset    Hypertension Mother     Heart disease Father     Cirrhosis Brother     Colon cancer Neg Hx     Esophageal cancer Neg Hx      Social History     Tobacco Use    Smoking status: Every Day     Current packs/day: 1.00     Average packs/day: 1 pack/day for 35.0 years (35.0 ttl pk-yrs)     Types: Cigarettes     Passive exposure: Current    Smokeless tobacco: Never    Tobacco comments:     Will try to stop smoking by himself vs    Substance Use Topics    Alcohol use: No    Drug use: Yes     Frequency: 7.0 times per week     Types: Marijuana     Review of Systems   Constitutional:  Negative for chills, diaphoresis and fever.   Eyes:  Negative for visual disturbance.   Respiratory:  Negative for shortness of breath.    Cardiovascular:  Negative for chest pain.   Gastrointestinal:  Positive for abdominal pain, nausea and vomiting. Negative for blood in stool, constipation and diarrhea.   Genitourinary:  Positive for decreased urine volume. Negative for difficulty urinating, dysuria, flank pain, frequency, hematuria and urgency.   Musculoskeletal:  Negative for arthralgias and myalgias.   Skin:  Negative for rash.   Neurological:  Positive for headaches. Negative for dizziness, weakness and light-headedness.       Physical Exam     Initial Vitals [12/16/23 1903]   BP Pulse Resp Temp SpO2   (!) 170/114 61 16 98.3 °F (36.8 °C) 97 %      MAP       --         Physical Exam    Nursing note and vitals reviewed.  Constitutional: He appears well-developed and well-nourished. He is not diaphoretic. He is active. He does not appear ill. No distress.   HENT:   Head: Normocephalic and atraumatic.   Right Ear: External ear normal.   Left Ear: External ear normal.   Nose: Nose normal.   Eyes: Conjunctivae, EOM  and lids are normal. Pupils are equal, round, and reactive to light. Right eye exhibits no discharge. Left eye exhibits no discharge. No scleral icterus.   Neck: Phonation normal. Neck supple.   Normal range of motion.   Full passive range of motion without pain.     Cardiovascular:  Normal rate and regular rhythm.           Pulmonary/Chest: Effort normal and breath sounds normal. No respiratory distress.   Abdominal: Abdomen is soft. He exhibits no distension. There is abdominal tenderness in the right lower quadrant and periumbilical area. There is no rebound and no guarding.   Musculoskeletal:         General: Normal range of motion.      Cervical back: Full passive range of motion without pain, normal range of motion and neck supple.     Neurological: He is alert and oriented to person, place, and time. He has normal strength. No sensory deficit. GCS eye subscore is 4. GCS verbal subscore is 5. GCS motor subscore is 6.   Skin: Skin is dry. Capillary refill takes less than 2 seconds.         ED Course   Procedures  Labs Reviewed   CBC W/ AUTO DIFFERENTIAL - Abnormal; Notable for the following components:       Result Value    MCH 32.3 (*)     All other components within normal limits    Narrative:     For upper or mid abdominal pain.   COMPREHENSIVE METABOLIC PANEL - Abnormal; Notable for the following components:    Sodium 134 (*)     CO2 21 (*)     Glucose 187 (*)     BUN 26 (*)     Creatinine 1.5 (*)     eGFR 53 (*)     All other components within normal limits    Narrative:     For upper or mid abdominal pain.   URINALYSIS, REFLEX TO URINE CULTURE - Abnormal; Notable for the following components:    Specific Gravity, UA >1.030 (*)     Protein, UA 1+ (*)     Occult Blood UA Trace (*)     All other components within normal limits    Narrative:     In and Out Cath as needed it patient unable to void  Specimen Source->Urine   POCT GLUCOSE - Abnormal; Notable for the following components:    POCT Glucose 194 (*)      All other components within normal limits   LIPASE    Narrative:     For upper or mid abdominal pain.   URINALYSIS MICROSCOPIC    Narrative:     In and Out Cath as needed it patient unable to void  Specimen Source->Urine   POCT GLUCOSE MONITORING CONTINUOUS          Imaging Results              CT Abdomen Pelvis With IV Contrast NO Oral Contrast (Final result)  Result time 12/16/23 21:29:56      Final result by Dalton Lyle MD (12/16/23 21:29:56)                   Impression:      1. No acute intra-abdominal abnormalities identified.  2. Acute mild displaced right posterior 11th rib fracture.  3. Sigmoid diverticulosis with no convincing CT evidence of acute diverticulitis.  4. Circumferential wall thickening of the distal visualized esophagus which may be seen with esophagitis.  This is unchanged.  5. Additional findings as detailed above.      Electronically signed by: Dalton Lyle MD  Date:    12/16/2023  Time:    21:29               Narrative:    EXAMINATION:  CT ABDOMEN PELVIS WITH IV CONTRAST    CLINICAL HISTORY:  Nausea/vomiting;RLQ abdominal pain (Age >= 14y);    TECHNIQUE:  Low dose axial images, sagittal and coronal reformations were obtained from the lung bases to the pubic symphysis following the IV administration of 80 mL of Omnipaque 350 .  Oral contrast was not given.    COMPARISON:  CT abdomen and pelvis from 08/09/2023.    FINDINGS:  The visualized portion of the heart is unremarkable.  The lung bases are clear.  Circumferential wall thickening is seen of the distal visualized esophagus, unchanged.    No significant hepatic abnormalities are identified.  There is no intra-or extrahepatic biliary ductal dilatation.  The gallbladder is unremarkable.  The stomach, pancreas, spleen, and adrenal glands are unremarkable.    Kidneys enhance normally with no evidence of hydronephrosis.  No abnormalities are seen along the ureteral courses.  Urinary bladder is nondistended.  Prostate is mildly  enlarged.    Appendix has been removed.  There is sigmoid diverticulosis.  The visualized loops of small and large bowel show no evidence of obstruction or inflammation.  No free air or free fluid.    Aorta tapers normally.  Moderate calcified and noncalcified plaque are seen throughout the infrarenal abdominal aorta.    Acute mild displaced fracture is seen of the right posterior 11th rib.  Degenerative changes and intervertebral disc space narrowing are seen most pronounced at the L5-S1 level.  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  No evidence of subchondral collapse.  Subcutaneous soft tissues show no significant abnormalities.                                       Medications   LIDOcaine 5 % patch 1 patch (1 patch Transdermal Patch Applied 12/16/23 2248)   ondansetron injection 4 mg (4 mg Intravenous Given 12/16/23 2000)   aluminum-magnesium hydroxide-simethicone 200-200-20 mg/5 mL suspension 30 mL (30 mLs Oral Given 12/16/23 1948)     And   LIDOcaine viscous HCl 2% oral solution 15 mL (15 mLs Oral Given 12/16/23 1948)   morphine injection 4 mg (4 mg Intravenous Given 12/16/23 2000)   0.9%  NaCl infusion (0 mLs Intravenous Stopped 12/16/23 2157)   iohexoL (OMNIPAQUE 350) injection 80 mL (80 mLs Intravenous Given 12/16/23 2109)   morphine injection 4 mg (4 mg Intravenous Given 12/16/23 2248)     Medical Decision Making  58-year-old male, with a PMHx of DM, HTN, and cholecystectomy, presents to the ED with a chief complaint of abdominal pain onset three days ago  Patient's chart and medical history reviewed.    Ddx:  Pancreatitis  Gastritis  GERD  SBO  Aortic dissection  Mesenteric ischemia  Colitis  Appendicitis  UTI    Patient's vitals reviewed.  Afebrile, no respiratory distress, and nontoxic-appearing in the ED. patient had periumbilical and right quadrant tenderness to palpation.  Patient started fluids, given morphine pain, GI cocktail, and Zofran  for nausea.  Lipase in normal range, pancreatitis unlikely.  CBC unremarkable.  Glucose is 194.  CMP showed elevated glucose 187, BUN 26, and creatinine of 1.5 with a GFR 53.  Patient already started on fluids. CT showed  No acute intra-abdominal abnormalities identified. Acute mild displaced right posterior 11th rib fracture. Sigmoid diverticulosis with no convincing CT evidence of acute diverticulitis. Circumferential wall thickening of the distal visualized esophagus which may be seen with esophagitis.  This is unchanged.  Per chart review patient presents to the ED on December 8th after a fall and complaining of right rib pain.  X-ray was negative for fracture at that time.  No new falls.  Patient states the pain is starting to come back.  Patient given a lidocaine patch for his rib and another dose of morphine.  Patient states he is feeling better.  Patient will follow-up with his PCP.  Patient be sent home with Zofran, Robaxin, and Bentyl for symptomatic control. Patient agrees with this plan. Discussed with him strict return precautions, he verbalized understanding. Patient is stable for discharge.       Amount and/or Complexity of Data Reviewed  External Data Reviewed: radiology and notes.  Labs: ordered.  Radiology: ordered.    Risk  OTC drugs.  Prescription drug management.            Scribe Attestation:   Scribe #1: I performed the above scribed service and the documentation accurately describes the services I performed. I attest to the accuracy of the note.                             Scribe attestation: I, Alayna Holdsworth,PA-C, personally performed the services described in this documentation. All medical record entries made by the scribe were at my direction and in my presence.  I have reviewed the chart and agree that the record reflects my personal performance and is accurate and complete.   Clinical Impression:  Final diagnoses:  [R10.30] Lower abdominal pain  [N17.9] KEYA (acute kidney injury)  (Primary)  [S22.31XD] Closed fracture of one rib of right side with routine healing, subsequent encounter  [R11.2] Nausea and vomiting, unspecified vomiting type          ED Disposition Condition    Discharge Stable          ED Prescriptions       Medication Sig Dispense Start Date End Date Auth. Provider    ondansetron (ZOFRAN-ODT) 4 MG TbDL Take 1 tablet (4 mg total) by mouth every 6 (six) hours as needed (nausea). 15 tablet 12/16/2023 -- Holdsworth, Alayna, PA-C    methocarbamoL (ROBAXIN) 500 MG Tab Take 2 tablets (1,000 mg total) by mouth 3 (three) times daily as needed (Pain). 30 tablet 12/16/2023 -- Holdsworth, Alayna, PA-C    dicyclomine (BENTYL) 20 mg tablet Take 1 tablet (20 mg total) by mouth 2 (two) times daily as needed (stomach pain). 30 tablet 12/16/2023 -- Holdsworth, Alayna, PA-C          Follow-up Information       Follow up With Specialties Details Why Contact Info    Stevo Pryor MD Family Medicine, Wound Care Schedule an appointment as soon as possible for a visit   [address] [facility]  [place] [address]  [phone]               Holdsworth, Alayna, PA-C  12/16/23 6449

## 2023-12-17 NOTE — ED NOTES
Cbg 194    Patient provided specimen collection cup and made aware of need for urine sample. Understanding verbalized. Patient will alert nursing staff when sample able to be provided.

## 2023-12-19 ENCOUNTER — CLINICAL SUPPORT (OUTPATIENT)
Dept: ENDOSCOPY | Facility: HOSPITAL | Age: 58
End: 2023-12-19
Attending: FAMILY MEDICINE
Payer: MEDICARE

## 2023-12-19 DIAGNOSIS — Z12.11 SPECIAL SCREENING FOR MALIGNANT NEOPLASMS, COLON: Primary | ICD-10-CM

## 2023-12-19 DIAGNOSIS — Z12.11 ENCOUNTER FOR SCREENING COLONOSCOPY: ICD-10-CM

## 2023-12-19 DIAGNOSIS — Z12.11 COLON CANCER SCREENING: ICD-10-CM

## 2023-12-19 NOTE — PLAN OF CARE
WE ATTEMPTED TO REACH YOU TO SCHEDULE PROCEDURE. Please Call Endoscopy Scheduling Dept at  [phone]. New refendo created.

## 2023-12-22 ENCOUNTER — TELEPHONE (OUTPATIENT)
Dept: ENDOSCOPY | Facility: HOSPITAL | Age: 58
End: 2023-12-22
Payer: MEDICARE

## 2023-12-22 ENCOUNTER — TELEPHONE (OUTPATIENT)
Dept: GASTROENTEROLOGY | Facility: CLINIC | Age: 58
End: 2023-12-22
Payer: MEDICARE

## 2023-12-22 NOTE — TELEPHONE ENCOUNTER
----- Message from Susie Fahad sent at 12/22/2023  2:17 PM CST -----  Regarding: [phone]  Type: Patient Call Back    Who called:self     What is the request in detail: Pt needs for Padmini to reach out to him, he needs to know who is it that she told him to schedule with before he sees doctor Rimma.     Can the clinic reply by [facility]?no     Would the patient rather a call back or a response via [facility]? Call back     Best call back number:[phone]       Additional Information:

## 2023-12-22 NOTE — TELEPHONE ENCOUNTER
----- Message from Susie Fahad sent at 12/22/2023  2:17 PM CST -----  Regarding: [id number]  Type: Patient Call Back    Who called:self     What is the request in detail: Pt needs for Padmini to reach out to him, he needs to know who is it that she told him to schedule with before he sees doctor Rimma.     Can the clinic reply by [facility]?no     Would the patient rather a call back or a response via [facility]? Call back     Best call back number:[phone]       Additional Information:

## 2023-12-22 NOTE — TELEPHONE ENCOUNTER
Pt telephoned inquiring if he can move up his clinic appt with Dr. Shanks to a sooner date (currently scheduled on 1/17/24).  Pt states his stomach is bothering him and he wants to get in sooner.  Clinic number provided to pt.  Also shared with pt he has an order for a screening colonoscopy he needs to schedule. Pt states he is going to see Dr. Shanks first in case there is anything else she would like done, then will call back to schedule.  Endoscopy Scheduling number provided to pt.

## 2024-01-17 ENCOUNTER — OFFICE VISIT (OUTPATIENT)
Dept: GASTROENTEROLOGY | Facility: CLINIC | Age: 59
End: 2024-01-17
Payer: MEDICARE

## 2024-01-17 ENCOUNTER — TELEPHONE (OUTPATIENT)
Dept: ENDOSCOPY | Facility: HOSPITAL | Age: 59
End: 2024-01-17
Payer: MEDICARE

## 2024-01-17 VITALS
SYSTOLIC BLOOD PRESSURE: 120 MMHG | DIASTOLIC BLOOD PRESSURE: 78 MMHG | HEIGHT: 64 IN | WEIGHT: 145 LBS | BODY MASS INDEX: 24.74 KG/M2 | HEIGHT: 64 IN | HEART RATE: 81 BPM | BODY MASS INDEX: 24.75 KG/M2 | WEIGHT: 144.94 LBS

## 2024-01-17 DIAGNOSIS — R93.3 ABNORMAL FINDING ON GI TRACT IMAGING: Primary | ICD-10-CM

## 2024-01-17 DIAGNOSIS — K20.90 ESOPHAGITIS: Primary | ICD-10-CM

## 2024-01-17 DIAGNOSIS — K57.92 DIVERTICULITIS: ICD-10-CM

## 2024-01-17 DIAGNOSIS — Z12.11 COLON CANCER SCREENING: Primary | ICD-10-CM

## 2024-01-17 DIAGNOSIS — K74.60 HEPATIC CIRRHOSIS, UNSPECIFIED HEPATIC CIRRHOSIS TYPE, UNSPECIFIED WHETHER ASCITES PRESENT: ICD-10-CM

## 2024-01-17 PROCEDURE — 3078F DIAST BP <80 MM HG: CPT | Mod: CPTII,S$GLB,, | Performed by: INTERNAL MEDICINE

## 2024-01-17 PROCEDURE — 99999 PR PBB SHADOW E&M-EST. PATIENT-LVL V: CPT | Mod: PBBFAC,HCNC,, | Performed by: INTERNAL MEDICINE

## 2024-01-17 PROCEDURE — 3008F BODY MASS INDEX DOCD: CPT | Mod: CPTII,S$GLB,, | Performed by: INTERNAL MEDICINE

## 2024-01-17 PROCEDURE — 99214 OFFICE O/P EST MOD 30 MIN: CPT | Mod: S$GLB,,, | Performed by: INTERNAL MEDICINE

## 2024-01-17 PROCEDURE — 3074F SYST BP LT 130 MM HG: CPT | Mod: CPTII,S$GLB,, | Performed by: INTERNAL MEDICINE

## 2024-01-17 PROCEDURE — 1159F MED LIST DOCD IN RCRD: CPT | Mod: CPTII,S$GLB,, | Performed by: INTERNAL MEDICINE

## 2024-01-17 RX ORDER — PANTOPRAZOLE SODIUM 40 MG/1
40 TABLET, DELAYED RELEASE ORAL DAILY
Qty: 30 TABLET | Refills: 11 | Status: SHIPPED | OUTPATIENT
Start: 2024-01-17 | End: 2025-01-16

## 2024-01-17 RX ORDER — SODIUM, POTASSIUM,MAG SULFATES 17.5-3.13G
1 SOLUTION, RECONSTITUTED, ORAL ORAL DAILY
Qty: 1 KIT | Refills: 0 | Status: SHIPPED | OUTPATIENT
Start: 2024-01-17 | End: 2024-01-19

## 2024-01-17 NOTE — TELEPHONE ENCOUNTER
Are you ready for your Colonoscopy?      __ If you take blood thinners,weight loss or diabetic injectable medications, have you stopped taking them according to your doctor's instructions before your procedures?  __ Have you stopped eating solid foods and followed a clear liquid diet a full day before your procedure or followed the diet       guidelines indicated in your instructions?       REMINDER: NO BROTH AFTER MIDNIGHT THE DAY BEFORE PROCEDURE.   __ Have you completed all your prep solution? PLEASE DO NOT FOLLOW the insert/or box instructions from pharmacy)  __ Have you taken your blood pressure, heart, seizure, or other essential medications the morning of your procedure?  __ Have you planned for a ride to and from procedure?  (Medical Transportation, Uber, Lyft, Taxi, etc. may ONLY be used if a responsible adult is present to accompany you home). The responsible adult CANNOT be the  of the service.  person must be available to return and pick you up within 15 minutes of being notified of discharge.           Questions or Concerns?  Please call us!  [phone] (M-F) [phone] (Nights and weekends)    Listed below are some helpful tips:    Please bring protective cases for eyewear and hearing aids-wear comfortable clothing/shoes.  Follow prep instructions closely so you don't have to do it twice.  Bowel prep is prescription used to clean out the colon before a colonoscopy. The prep increases movement of your colon by causing you to have diarrhea (loose stools). Cleaning out stool from the colon helps your doctor to see in your colon clearly during this procedure.   It is important to stay hydrated before, during and after bowel prep to prevent loss of fluid (dehydration). You can have water and your choice of clear liquids. Reminder: these liquids you will drink in addition to the bowel prep.     Preparing the mixture:    First, mix the prep with water.  Make the taste better by adding a sugar  free drink mix (Crystal Light) can improve the taste of your prep.   Use a large bore (opening) straw. Place towards the back of mouth (throat) as tolerated.  Prepare a prep mixture that is lightly chilled, but not ice-cold. Drinking a large amount of ice-cold liquid can make you feel very ill.  Avoid drinking any RED beverages or eating popsicles with this color for the 24 hours leading up to your procedure. This color can look like blood in the colon.    Consuming the prep:    Take your time. If you feel ill, take a 15-minute break from drinking the prep mixture  Combat hunger and dehydration with clear liquids. Options like JELL-O, or popsicles will help.  Settle in with good reading material. The goal is to clean out 6 feet of colon, so you can plan on spending a good deal of time in the bathroom. Have some good reading material on-hand or an iPad ready to keep yourself entertained!  Keep yourself comfortable. We recommend applying personal hygiene wipes, Tucks pads and a soothing ointment, like A&D ointment, Desitin, or Vaseline to your bottom before starting and as needed to protect your skin.           IMPORTANT INFORMATION TO KNOW BEFORE YOUR PROCEDURE    [facility] 2nd Floor--  Enter at the rear of the building through the emergency department screening station or the Outpatient Registration door, then continue to endoscopy department on the 2nd floor.      If your procedure requires the administration of anesthesia, it is necessary for a responsible adult to drive you home. (Medical Transportation, Uber, Lyft, Taxi, etc. may ONLY be used if a responsible adult is present to accompany you home.  The responsible adult CAN'T be the  of the service).      person must be available to return to pick you up within 15 minutes of being notified of discharge.     Due to the limited socially distant seating in our waiting room, please limit your guest (1) who accompany you for this  procedure. If someone accompanies you for this procedure into the facility:    Consider having them proceed to an area that is socially distant other than the lobby until a member of the medical team contacts them to provide an update after the procedure.     Also, please consider being dropped off and picked up from the facility.      Please bring a picture ID, insurance card, & copayment    Take Medications as directed below:        If you begin taking any blood thinning medications or injectable weight loss/diabetes medications (other than insulin) , please contact the endoscopy scheduling department listed below as soon as possible    If you are diabetic see the attached instruction sheet regarding your medication.     If you take HEART, BLOOD PRESSURE, SEIZURE, PAIN, LUNG (including inhalers/nebulizers), ANTI-REJECTION (transplant patients), or PSYCHIATRIC medications, please take at your regular times with a sip of water or as directed by the scheduling nurse.     Important contact information:    Endoscopy [phone] Hours of operation Monday-Friday 8:00-4:30pm.    Questions about insurance or financial obligations call [phone] or [phone].    If you have questions regarding the prep or need to reschedule, please call [phone]. After hours questions requiring immediate assistance, contact [facility] nurse line at [phone] or [phone].   NOTE:     On occasion, unforeseen circumstances may cause a delay in your procedure start time. We respect your time and appreciate your patience during these circumstances.      Comments:                 If you are unsure about any of these instructions, call [facility] at [phone].    Oral Medicine Day of Prep Day of Procedure   Glyburide  Glucotrol (Glipizide)  Amaryl (Glimepiride) Do NOT take. Do NOT take morning of procedure. If you take twice daily, take with dinner.   Glucophage  Glumetza  Fortamet (Metformin)  Take as prescribed. Do NOT take morning of procedure. Take when you start eating again.   Januvia (Sitaglipitin)  Nesina (Aloglipitin)  Onglyza (Saxaglipitin)  Tradjenta (Linaglipitin) Take as prescribed. Do NOT take morning of procedure. Take when you start eating again.   Invokana (Canagliflozin)  Farxiga (Dapagliflozin)  Jardiance(Empagliflozin) Stop 2 days before prep. Do NOT take morning of procedure. Take when you start eating again.   Actos (Pioglitazone) Take as prescribed. Do NOT take morning of procedure. Take when you start eating again.        If you have a combination oral medicine and one medicine is listed as DO NOT TAKE- then do not take the medicine.     If you take these injectables weekly, they must be held for 7 days prior to your procedure.  Injectable Medicine Day of Prep Day of Procedure   Adlyxin (Lixisenatide)  Byetta (Exenatide)   Bydureon (Exenatide XL)  Ozempic (Semaglutide)  Trulicity (Dulaglutide)  Victoza (Liraglutide)  Mounjaro (Tirzepatide) Do NOT take Adlyxin, Byetta or Victoza. May take Ozempic, Trulicity and Bydureon. Do NOT take Adlyxin, Byetta or Victoza the morning of procedure. Take when you start eating meals again. May take Ozempic, Trulicity, and Bydureon after procedure.       Combination Injectable Medicine Day of Prep Day of Procedure   Soliqua  Xultophy Inject 50% of usual dose. Inject 50% of usual dose.     It is important to monitor your blood sugar while doing the bowel preparation. On the day of your bowel prep, when you are on a clear liquid diet, you may drink beverages with sugar as your source of glucose. Be sure to mix the prep with water or sugar free liquid only. Below are instructions on how to adjust your diabetic medications prior to your scheduled procedure. Call the healthcare provider who manages your diabetes if you have questions.     Insulin for Type 1   Diabetes Mellitus Day of Prep Day of Procedure    Basaglar  Lantus  Levemir  Toujeo  Tresiba  Semglee If you use in the morning, take as prescribed. If you use in the evening, inject 70% of dose. If you take in the morning, inject 80% of dose. If you take in the evenings, inject usual dose.    Afrezza  Apidra  Humalog 100  Humalog 200  Novolog Count carbs and adjust dose accordingly. If not carb counting, take 25% of usual meal dose. May use correction dose every 4 hours. Do NOT use once sugar-free clear liquid prep is started. Do NOT take morning of procedure. Take when you start eating again.   Insulin Pump Count carbs and adjust your dose as needed. May use temp basal rate at 70% after sugar-free clear liquid prep is started until midnight. May use temp basal rate at 70% starting at midnight until after procedure.     Insulin for Type 2 Diabetes Mellitus Day of Prep Day of Procedure   Basaglar  Lantus  Levemir  Toujeo  Tresiba If you use in the morning, take as prescribed. If you use in the evening, inject 70% of dose. If take in morning, inject 80% of dose. If take in evenings, resume usual dose.    Afrezza  Apidra  Fiasp  Humalog 100  Humalog 200  Novolog  Inject 50% of dose with clear liquid diet. Do NOT use once sugar-free clear liquid prep is started. If you are using a correction scale, take dose every 4 hours as needed. Do NOT take morning of procedure. Take when you start eating meals again.   NPH Inject 50% of breakfast and dinner doses with clear liquid diet.  Do NOT take morning of procedure. Take usual dose when you eat dinner.   Regular Inject 50% of breakfast dose and do NOT take dinner dose once sugar-free clear liquid prep has started. If using correction scale, may take dose every 6 hours as needed. Do NOT take morning of procedure. Take usual dose when you eat dinner.   Novolog Mix 70/30  Humalog Mix 75/25  Humalog Mix 50/50 Inject 50% of breakfast dose. Inject 25% of dinner dose. Do NOT take breakfast dose. Take usual dose when you eat  dinner.   U500 Take 50% of AM or breakfast unit alexis dose. Take 25% of lunch or dinner or PM unit alexis dose. Do NOT take morning of procedure. Take when you start eating again.   V-Go Continue to wear your V-Go device. DO NOT click once sugar-free clear liquid prep is started. Continue to wear your V-Go device. Resume clicks with meals.              Colonoscopy Procedure Prep Instructions      Date of procedure: 01/26/24 Arrive at: 12:45PM    Location of Department:   [facility] [address] [place] [address]  Take the Elevators to 2nd Floor Endoscopy Procedural Area    As soon as possible:   your prep from pharmacy and over the counter DULCOLAX LAXATIVE TABLETS     On the day before your procedure   What You CAN do:   You may have clear liquids ONLY -see below for list.     Liquids That Are OK to Drink:   Water  Sports drinks (Gatorade, Power-Aid)  Coffee or tea (no cream or nondairy creamer)  Clear juices without pulp (apple, white grape)  Gelatin desserts (no fruit or toppings)  Clear soda (sprite, coke, ginger ale)  Chicken broth (until 12 midnight the night before procedure)      What You CANNOT do:   Do not EAT solid food, drink milk or anything   colored red.  Do not drink alcohol.  Do not take oral medications within 1 hour of starting   each dose of SUPREP.  No gum chewing or candy morning of procedure.      Note:   (Please disregard the insert instructions from pharmacy).  SUPREP Bowel Prep Kit is indicated for cleansing of the colon as a preparation for colonoscopy in adults.   Be sure to tell your doctor about all the medicines you take, including prescription and non-prescription medicines, vitamins, and herbal supplements. SUPREP Bowel Prep Kit may affect how other medicines work.  Medication taken by mouth may not be absorbed properly when taken within 1 hour before the start of each dose of SUPREP Bowel Prep Kit.    It is not uncommon to experience some abdominal  cramping, nausea and/or vomiting when taking the prep. If you have nausea and/or vomiting while taking the prep, stop drinking for 20 to 30 minutes then continue.    How to take prep:    SUPREP Bowel Prep Kit is a (2-day) prep.   Both 6-ounce bottles are required for a complete preparation for colonoscopy. Dilute the solution concentrate as directed prior to use. You must drink water with each dose of SUPREP, and additional water after each dose.    DOSE 1--Day Before Colonoscopy 01/25/24    Drink at least 6 to 8 glasses of clear liquids from time you wake up until you begin your prep and then continue until bedtime to avoid dehydration.     12:00 pm (NOON) Take four (4) Dulcolax (Bisacodyl) tablets with at least 8 ounces or more of clear liquids.      6:00 pm:    You must complete Steps 1 through 4 using one (1) 6-ounce bottle before going to bed as shown below:    Step 1-Pour ONE (1) 6-ounce bottle of SUPREP liquid into the mixing container.  Step 2-Add cool drinking water to the 16-ounce line on the container and mix.  Step 3-Drink ALL the liquid in the container.  Step 4-You must drink two (2) more 16-ounce containers of water over the next 1 hour.  IMPORTANT: If you experience preparation-related symptoms (for example, nausea, bloating, or cramping), stop, or slow the rate of drinking the additional water until your symptoms decrease.    DOSE 2--Day of the Colonoscopy 01/26/24 at 7-8 AM    For this dose, repeat Steps 1 through 4 shown above using the other 6-ounce bottle.   You may continue drinking water/clear liquids until   4 hours before your colonoscopy or as directed by the scheduling nurse  9:45am    For more information about your procedure, please watch this informational video. It is important to watch this animated consent video prior to your arrival. If you haven't watched the video prior to arriving, you are required to watch it during admission which can cause delays.     Options for  viewing:  Using a keyboard:  press and hold the control tab (Ctrl) and left mouse click to follow link          Colonoscopy Instructional Video                                                         OR    Type link address into your web browser's address bar:  [email or web address]    Using a mobile phone: tap on web address/link.     Comments:

## 2024-01-17 NOTE — TELEPHONE ENCOUNTER
"----- Message from Pricila Shanks MD sent at 2024  2:15 PM CST -----  Regarding: EGD and colonoscopy  Procedure: EGD/Colonoscopy    Diagnosis: Abnormal finding on GI tract imaging and Diverticulitis    Procedure Timin-4 weeks    #If within 4 weeks selected, please alexis as high priority#    #If greater than 12 weeks, please select "5-12 weeks" and delay sending until 3 months prior to requested date#     Provider: Myself    Location:  Endo    Additional Scheduling Information: No scheduling concerns    Prep Specifications:Standard prep    Is the patient taking a GLP-1 Agonist:no    Have you attached a patient to this message: yes\      "

## 2024-01-17 NOTE — PROGRESS NOTES
"  [facility]      CC: abdominal pain    HPI 59 y.o. male with past medical history of Hep C s/p treatment, history of cirrhosis previously followed in Hepatology clinic who presents with chronic, intermittent, lower abdominal pain without associated nausea, vomiting, melena or hematochezia.  When he has the pain he feels that only opioid pain medications help.  He reports he has regular daily bowel movements.  Currently he feels well but he has had three episodes of diverticulitis in 2023.  Overall he reports 5-6 episodes of previous diverticulitis.      He has rare GERD.  He does not take the PPI prescribed.  Previous abdominal imaging shows circumferential thickening of the lower esophagus possibly due to esophagitis.    Past Medical History  Past Medical History:   Diagnosis Date    Anxiety     Asthma     Colitis     Depression     Diabetes mellitus     Head injury     History of hepatitis C, s/p successful hcv treatment w/ SVR 8-2015  10/8/2012    SVR(24) as of 11/2015.  SVR(12) as of 8/2015.  completed harvoni 24 week regimen for genotype 1a 5/18/15     Hypertension     Shoulder pain     left, s/p 4 surgeries.     Stroke          Physical Examination  /78   Pulse 81   Ht 5' 4" (1.626 m)   Wt 65.8 kg (144 lb 15.2 oz)   BMI 24.88 kg/m²   General appearance: alert, cooperative, no distress  HENT: Normocephalic, atraumatic, neck symmetrical, no nasal discharge   Abdomen: soft, non-tender; non distended, no rebound or guarding  Neurologic: Alert and oriented X 3, moving all four extremities, intact sensation to light touch    Labs:  Lab Results   Component Value Date    WBC 8.88 12/16/2023    HGB 17.0 12/16/2023    HCT 47.5 12/16/2023    MCV 90 12/16/2023     12/16/2023         CMP  Sodium   Date Value Ref Range Status   12/16/2023 134 (L) 136 - 145 mmol/L Final     Potassium   Date Value Ref Range Status   12/16/2023 4.3 3.5 - 5.1 mmol/L Final     Comment:     Specimen slightly " hemolyzed     Chloride   Date Value Ref Range Status   12/16/2023 98 95 - 110 mmol/L Final     CO2   Date Value Ref Range Status   12/16/2023 21 (L) 23 - 29 mmol/L Final     Glucose   Date Value Ref Range Status   12/16/2023 187 (H) 70 - 110 mg/dL Final     BUN   Date Value Ref Range Status   12/16/2023 26 (H) 6 - 20 mg/dL Final     Creatinine   Date Value Ref Range Status   12/16/2023 1.5 (H) 0.5 - 1.4 mg/dL Final     Calcium   Date Value Ref Range Status   12/16/2023 9.5 8.7 - 10.5 mg/dL Final     Total Protein   Date Value Ref Range Status   12/16/2023 8.4 6.0 - 8.4 g/dL Final     Albumin   Date Value Ref Range Status   12/16/2023 4.4 3.5 - 5.2 g/dL Final     Total Bilirubin   Date Value Ref Range Status   12/16/2023 0.7 0.1 - 1.0 mg/dL Final     Comment:     For infants and newborns, interpretation of results should be based  on gestational age, weight and in agreement with clinical  observations.    Premature Infant recommended reference ranges:  Up to 24 hours.............<8.0 mg/dL  Up to 48 hours............<12.0 mg/dL  3-5 days..................<15.0 mg/dL  6-29 days.................<15.0 mg/dL       Alkaline Phosphatase   Date Value Ref Range Status   12/16/2023 64 55 - 135 U/L Final     AST   Date Value Ref Range Status   12/16/2023 23 10 - 40 U/L Final     ALT   Date Value Ref Range Status   12/16/2023 23 10 - 44 U/L Final     Anion Gap   Date Value Ref Range Status   12/16/2023 15 8 - 16 mmol/L Final     eGFR   Date Value Ref Range Status   12/16/2023 53 (A) >60 mL/min/1.73 m^2 Final           Assessment:   1. Diverticulitis    2. Hepatic cirrhosis, unspecified hepatic cirrhosis type, unspecified whether ascites present    3.     Abnormal imaging of the esophagus    Plan:  -Schedule EGD and colonoscopy to evaluate the abnormal imaging of the patient's esophagus and to follow up his diverticulitis.  -He is interested in a future follow up with Surgery after his colonoscopy due to recurrent  diverticulitis.  -Start Protonix 40mg daily due to likely esophagitis on imaging.  -Follow up with Hepatology for history of cirrhosis.        Pricila Shanks MD

## 2024-01-17 NOTE — TELEPHONE ENCOUNTER
Spoke to Nino to schedule procedure(s) Colonoscopy/EGD       Physician to perform procedure(s) Dr. LANDEN Shanks   Date of Procedure (s) 01/26/24  Arrival Time 12:45 PM  Time of Procedure(s) 1:45 PM   Location of Procedure(s) [facility] 2nd Floor--  Enter at the rear of the building through the emergency department screening station or the Outpatient Registration door, then continue to endoscopy department on the 2nd floor.    Type of Rx Prep sent to patient: Suprep  Instructions provided to patient via [facility]    Patient was informed on the following information and verbalized understanding. Screening questionnaire reviewed with patient and complete. If procedure requires anesthesia, a responsible adult needs to be present to accompany the patient home, patient cannot drive after receiving anesthesia. Appointment details are tentative, especially check-in time. Patient will receive a prep-op call 7 days prior to confirm check-in time for procedure. If applicable the patient should contact their pharmacy to verify Rx for procedure prep is ready for pick-up. Patient was advised to call the scheduling department at [phone] if pharmacy states no Rx is available. Patient was advised to call the endoscopy scheduling department if any questions or concerns arise.      [facility] Scheduling Department

## 2024-01-19 ENCOUNTER — TELEPHONE (OUTPATIENT)
Dept: ENDOSCOPY | Facility: HOSPITAL | Age: 59
End: 2024-01-19
Payer: MEDICARE

## 2024-01-19 NOTE — TELEPHONE ENCOUNTER
Left voicemail for patient to call Endoscopy Scheduling to review instructions and confirm appointment for Colonoscopy and upper endoscopy (EGD) on 1/26/24.

## 2024-01-23 ENCOUNTER — TELEPHONE (OUTPATIENT)
Dept: ENDOSCOPY | Facility: HOSPITAL | Age: 59
End: 2024-01-23
Payer: MEDICARE

## 2024-01-23 NOTE — TELEPHONE ENCOUNTER
Left 2nd voicemail for patient to call Endoscopy Scheduling to review instructions and confirm appointment for Colonoscopy and upper endoscopy (EGD) on 1/26/24.

## 2024-01-24 DIAGNOSIS — K74.60 HEPATIC CIRRHOSIS, UNSPECIFIED HEPATIC CIRRHOSIS TYPE, UNSPECIFIED WHETHER ASCITES PRESENT: Primary | ICD-10-CM

## 2024-01-25 ENCOUNTER — ANESTHESIA EVENT (OUTPATIENT)
Dept: ENDOSCOPY | Facility: HOSPITAL | Age: 59
End: 2024-01-25
Payer: MEDICARE

## 2024-01-25 RX ORDER — LIDOCAINE HYDROCHLORIDE 10 MG/ML
1 INJECTION, SOLUTION EPIDURAL; INFILTRATION; INTRACAUDAL; PERINEURAL ONCE
Status: CANCELLED | OUTPATIENT
Start: 2024-01-25 | End: 2024-01-25

## 2024-01-26 ENCOUNTER — ANESTHESIA (OUTPATIENT)
Dept: ENDOSCOPY | Facility: HOSPITAL | Age: 59
End: 2024-01-26
Payer: MEDICARE

## 2024-01-26 ENCOUNTER — HOSPITAL ENCOUNTER (OUTPATIENT)
Facility: HOSPITAL | Age: 59
Discharge: HOME | End: 2024-01-26
Attending: INTERNAL MEDICINE | Admitting: INTERNAL MEDICINE
Payer: MEDICARE

## 2024-01-26 VITALS
SYSTOLIC BLOOD PRESSURE: 152 MMHG | HEART RATE: 81 BPM | RESPIRATION RATE: 20 BRPM | OXYGEN SATURATION: 100 % | TEMPERATURE: 98 F | DIASTOLIC BLOOD PRESSURE: 87 MMHG

## 2024-01-26 DIAGNOSIS — R10.13 EPIGASTRIC PAIN: ICD-10-CM

## 2024-01-26 DIAGNOSIS — R10.9 ABDOMINAL PAIN, UNSPECIFIED ABDOMINAL LOCATION: Primary | ICD-10-CM

## 2024-01-26 DIAGNOSIS — R93.3 ABNORMAL FINDING ON GI TRACT IMAGING: ICD-10-CM

## 2024-01-26 LAB — POCT GLUCOSE: 155 MG/DL (ref 70–110)

## 2024-01-26 PROCEDURE — 63600175 PHARM REV CODE 636 W HCPCS: Performed by: ANESTHESIOLOGY

## 2024-01-26 PROCEDURE — 45380 COLONOSCOPY AND BIOPSY: CPT | Mod: PT,59,, | Performed by: INTERNAL MEDICINE

## 2024-01-26 PROCEDURE — 27201012 HC FORCEPS, HOT/COLD, DISP: Performed by: INTERNAL MEDICINE

## 2024-01-26 PROCEDURE — D9220A PRA ANESTHESIA: Mod: ANES,,, | Performed by: ANESTHESIOLOGY

## 2024-01-26 PROCEDURE — 43235 EGD DIAGNOSTIC BRUSH WASH: CPT | Performed by: INTERNAL MEDICINE

## 2024-01-26 PROCEDURE — 37000008 HC ANESTHESIA 1ST 15 MINUTES: Performed by: INTERNAL MEDICINE

## 2024-01-26 PROCEDURE — 43235 EGD DIAGNOSTIC BRUSH WASH: CPT | Mod: ,,, | Performed by: INTERNAL MEDICINE

## 2024-01-26 PROCEDURE — 25000003 PHARM REV CODE 250: Performed by: NURSE ANESTHETIST, CERTIFIED REGISTERED

## 2024-01-26 PROCEDURE — 45385 COLONOSCOPY W/LESION REMOVAL: CPT | Mod: PT,,, | Performed by: INTERNAL MEDICINE

## 2024-01-26 PROCEDURE — 27200997: Performed by: INTERNAL MEDICINE

## 2024-01-26 PROCEDURE — 63600175 PHARM REV CODE 636 W HCPCS: Performed by: NURSE ANESTHETIST, CERTIFIED REGISTERED

## 2024-01-26 PROCEDURE — 45385 COLONOSCOPY W/LESION REMOVAL: CPT | Mod: PT | Performed by: INTERNAL MEDICINE

## 2024-01-26 PROCEDURE — 88305 TISSUE EXAM BY PATHOLOGIST: CPT | Mod: 26,,, | Performed by: PATHOLOGY

## 2024-01-26 PROCEDURE — 93005 ELECTROCARDIOGRAM TRACING: CPT | Mod: 59

## 2024-01-26 PROCEDURE — 27201089 HC SNARE, DISP (ANY): Performed by: INTERNAL MEDICINE

## 2024-01-26 PROCEDURE — 93010 ELECTROCARDIOGRAM REPORT: CPT | Mod: 76,,, | Performed by: INTERNAL MEDICINE

## 2024-01-26 PROCEDURE — 88305 TISSUE EXAM BY PATHOLOGIST: CPT | Mod: 59 | Performed by: PATHOLOGY

## 2024-01-26 PROCEDURE — D9220A PRA ANESTHESIA: Mod: CRNA,,, | Performed by: NURSE ANESTHETIST, CERTIFIED REGISTERED

## 2024-01-26 PROCEDURE — 45380 COLONOSCOPY AND BIOPSY: CPT | Mod: PT,59 | Performed by: INTERNAL MEDICINE

## 2024-01-26 PROCEDURE — 37000009 HC ANESTHESIA EA ADD 15 MINS: Performed by: INTERNAL MEDICINE

## 2024-01-26 RX ORDER — LIDOCAINE HYDROCHLORIDE 40 MG/ML
SOLUTION TOPICAL
Status: DISCONTINUED | OUTPATIENT
Start: 2024-01-26 | End: 2024-01-26

## 2024-01-26 RX ORDER — HYDRALAZINE HYDROCHLORIDE 20 MG/ML
INJECTION INTRAMUSCULAR; INTRAVENOUS
Status: DISCONTINUED
Start: 2024-01-26 | End: 2024-01-26 | Stop reason: HOSPADM

## 2024-01-26 RX ORDER — HYDRALAZINE HYDROCHLORIDE 20 MG/ML
10 INJECTION INTRAMUSCULAR; INTRAVENOUS ONCE
Status: COMPLETED | OUTPATIENT
Start: 2024-01-26 | End: 2024-01-26

## 2024-01-26 RX ORDER — PROPOFOL 10 MG/ML
INJECTION, EMULSION INTRAVENOUS
Status: DISCONTINUED
Start: 2024-01-26 | End: 2024-01-26 | Stop reason: HOSPADM

## 2024-01-26 RX ORDER — LIDOCAINE HYDROCHLORIDE 20 MG/ML
INJECTION INTRAVENOUS
Status: DISCONTINUED | OUTPATIENT
Start: 2024-01-26 | End: 2024-01-26

## 2024-01-26 RX ORDER — LIDOCAINE HYDROCHLORIDE 20 MG/ML
INJECTION, SOLUTION EPIDURAL; INFILTRATION; INTRACAUDAL; PERINEURAL
Status: DISCONTINUED
Start: 2024-01-26 | End: 2024-01-26 | Stop reason: HOSPADM

## 2024-01-26 RX ORDER — SODIUM CHLORIDE 9 MG/ML
INJECTION, SOLUTION INTRAVENOUS CONTINUOUS
Status: DISCONTINUED | OUTPATIENT
Start: 2024-01-26 | End: 2024-01-26 | Stop reason: HOSPADM

## 2024-01-26 RX ORDER — PROPOFOL 10 MG/ML
VIAL (ML) INTRAVENOUS
Status: DISCONTINUED | OUTPATIENT
Start: 2024-01-26 | End: 2024-01-26

## 2024-01-26 RX ADMIN — PROPOFOL 50 MG: 10 INJECTION, EMULSION INTRAVENOUS at 02:01

## 2024-01-26 RX ADMIN — PROPOFOL 40 MG: 10 INJECTION, EMULSION INTRAVENOUS at 02:01

## 2024-01-26 RX ADMIN — PROPOFOL 30 MG: 10 INJECTION, EMULSION INTRAVENOUS at 02:01

## 2024-01-26 RX ADMIN — LIDOCAINE HYDROCHLORIDE 100 MG: 20 INJECTION, SOLUTION INTRAVENOUS at 02:01

## 2024-01-26 RX ADMIN — PROPOFOL 110 MG: 10 INJECTION, EMULSION INTRAVENOUS at 02:01

## 2024-01-26 RX ADMIN — HYDRALAZINE HYDROCHLORIDE 10 MG: 20 INJECTION INTRAMUSCULAR; INTRAVENOUS at 03:01

## 2024-01-26 RX ADMIN — LIDOCAINE HYDROCHLORIDE 4 ML: 40 SOLUTION TOPICAL at 01:01

## 2024-01-26 RX ADMIN — SODIUM CHLORIDE: 0.9 INJECTION, SOLUTION INTRAVENOUS at 01:01

## 2024-01-26 NOTE — [employer] PATIENT INSTRUCTIONS
Discharge Summary/Instructions after an Endoscopic Procedure  Patient Name: Nino Price  Patient MRN: [id number]  Patient YOB: 1965 Friday, January 26, 2024  Pricila Shanks MD  Dear patient,  As a result of recent federal legislation (The Federal Cures Act), you may   receive lab or pathology results from your procedure in your [facility]   account before your physician is able to contact you. Your physician or   their representative will relay the results to you with their   recommendations at their soonest availability.  Thank you,  RESTRICTIONS:  During your procedure today, you received medications for sedation.  These   medications may affect your judgment, balance and coordination.  Therefore,   for 24 hours, you have the following restrictions:   - DO NOT drive a car, operate machinery, make legal/financial decisions,   sign important papers or drink alcohol.    ACTIVITY:  Today: no heavy lifting, straining or running due to procedural   sedation/anesthesia.  The following day: return to full activity including work.  DIET:  Eat and drink normally unless instructed otherwise.     TREATMENT FOR COMMON SIDE EFFECTS:  - Mild abdominal pain, nausea, belching, bloating or excessive gas:  rest,   eat lightly and use a heating pad.  - Sore Throat: treat with throat lozenges and/or gargle with warm salt   water.  - Because air was used during the procedure, expelling large amounts of air   from your rectum or belching is normal.  - If a bowel prep was taken, you may not have a bowel movement for 1-3 days.    This is normal.  SYMPTOMS TO WATCH FOR AND REPORT TO YOUR PHYSICIAN:  1. Abdominal pain or bloating, other than gas cramps.  2. Chest pain.  3. Back pain.  4. Signs of infection such as: chills or fever occurring within 24 hours   after the procedure.  5. Rectal bleeding, which would show as bright red, maroon, or black stools.   (A tablespoon of blood from the rectum is not serious, especially  if   hemorrhoids are present.)  6. Vomiting.  7. Weakness or dizziness.  GO DIRECTLY TO THE NEAREST EMERGENCY ROOM IF YOU HAVE ANY OF THE FOLLOWING:      Difficulty breathing              Chills and/or fever over 101 F   Persistent vomiting and/or vomiting blood   Severe abdominal pain   Severe chest pain   Black, tarry stools   Bleeding- more than one tablespoon   Any other symptom or condition that you feel may need urgent attention  Your doctor recommends these additional instructions:  If any biopsies were taken, your doctors clinic will contact you in 1 to 2   weeks with any results.  - Proceed to colonoscopy.  - Resume previous diet.   - Continue present medications.  For questions, problems or results please call your physician - Pricila Shanks MD at Work:  [phone].  [facility] can be reached at [phone]     IF A COMPLICATION OR EMERGENCY SITUATION ARISES AND YOU ARE UNABLE TO REACH   YOUR PHYSICIAN - GO DIRECTLY TO THE EMERGENCY ROOM.  Pricila Shanks MD  1/26/2024 2:53:01 PM  This report has been verified and signed electronically.  Dear patient,  As a result of recent federal legislation (The Federal Cures Act), you may   receive lab or pathology results from your procedure in your [facility]   account before your physician is able to contact you. Your physician or   their representative will relay the results to you with their   recommendations at their soonest availability.  Thank you,  [employer]

## 2024-01-26 NOTE — ANESTHESIA POSTPROCEDURE EVALUATION
Anesthesia Post Evaluation    Patient: Nino Price    Procedure(s) Performed: Procedure(s) (LRB):  EGD (ESOPHAGOGASTRODUODENOSCOPY) (N/A)  COLONOSCOPY (N/A)    Final Anesthesia Type: general      Patient location during evaluation: GI PACU  Patient participation: Yes- Able to Participate  Level of consciousness: awake and alert  Post-procedure vital signs: reviewed and stable  Airway patency: patent    PONV status at discharge: No PONV  Anesthetic complications: no      Cardiovascular status: blood pressure returned to baseline and hemodynamically stable  Respiratory status: unassisted, spontaneous ventilation and room air  Hydration status: euvolemic  Follow-up not needed.              Vitals Value Taken Time   /88 01/26/24 1441   Temp 36.8 °C (98.2 °F) 01/26/24 1441   Pulse 87 01/26/24 1441   Resp 17 01/26/24 1441   SpO2 100 % 01/26/24 1441         No case tracking events are documented in the log.      Pain/Gilmer Score: No data recorded

## 2024-01-26 NOTE — TRANSFER OF CARE
Anesthesia Transfer of Care Note    Patient: Nino Price    Procedure(s) Performed: Procedure(s) (LRB):  EGD (ESOPHAGOGASTRODUODENOSCOPY) (N/A)  COLONOSCOPY (N/A)    Patient location: GI    Anesthesia Type: general    Transport from OR: Transported from OR on room air with adequate spontaneous ventilation    Post pain: adequate analgesia    Post assessment: no apparent anesthetic complications and tolerated procedure well    Post vital signs: stable    Level of consciousness: lethargic and responds to stimulation    Nausea/Vomiting: no nausea/vomiting    Complications: none    Transfer of care protocol was followed      Last vitals: Visit Vitals  BP (!) 165/88 (BP Location: Left arm, Patient Position: Lying)   Pulse 87   Temp 36.8 °C (98.2 °F) (Oral)   Resp 17   SpO2 100%

## 2024-01-26 NOTE — PLAN OF CARE
[facility] Note    The patient had an outpatient EGD and colonoscopy today.  He woke up with significant abdominal pain.  KUB flat and upright did not show a cause for his pain.  He is also having tachyarrhythmias on cardiac monitoring.  I will send him to the emergency room for further evaluation.  Case discussed with Dr. Navarro in the ED.    Pricila Shanks MD

## 2024-01-26 NOTE — [employer] PATIENT INSTRUCTIONS
Discharge Summary/Instructions after an Endoscopic Procedure  Patient Name: Nino Price  Patient MRN: [id number]  Patient YOB: 1965 Friday, January 26, 2024  Pricila Shanks MD  Dear patient,  As a result of recent federal legislation (The Federal Cures Act), you may   receive lab or pathology results from your procedure in your [facility]   account before your physician is able to contact you. Your physician or   their representative will relay the results to you with their   recommendations at their soonest availability.  Thank you,  RESTRICTIONS:  During your procedure today, you received medications for sedation.  These   medications may affect your judgment, balance and coordination.  Therefore,   for 24 hours, you have the following restrictions:   - DO NOT drive a car, operate machinery, make legal/financial decisions,   sign important papers or drink alcohol.    ACTIVITY:  Today: no heavy lifting, straining or running due to procedural   sedation/anesthesia.  The following day: return to full activity including work.  DIET:  Eat and drink normally unless instructed otherwise.     TREATMENT FOR COMMON SIDE EFFECTS:  - Mild abdominal pain, nausea, belching, bloating or excessive gas:  rest,   eat lightly and use a heating pad.  - Sore Throat: treat with throat lozenges and/or gargle with warm salt   water.  - Because air was used during the procedure, expelling large amounts of air   from your rectum or belching is normal.  - If a bowel prep was taken, you may not have a bowel movement for 1-3 days.    This is normal.  SYMPTOMS TO WATCH FOR AND REPORT TO YOUR PHYSICIAN:  1. Abdominal pain or bloating, other than gas cramps.  2. Chest pain.  3. Back pain.  4. Signs of infection such as: chills or fever occurring within 24 hours   after the procedure.  5. Rectal bleeding, which would show as bright red, maroon, or black stools.   (A tablespoon of blood from the rectum is not serious, especially  if   hemorrhoids are present.)  6. Vomiting.  7. Weakness or dizziness.  GO DIRECTLY TO THE NEAREST EMERGENCY ROOM IF YOU HAVE ANY OF THE FOLLOWING:      Difficulty breathing              Chills and/or fever over 101 F   Persistent vomiting and/or vomiting blood   Severe abdominal pain   Severe chest pain   Black, tarry stools   Bleeding- more than one tablespoon   Any other symptom or condition that you feel may need urgent attention  Your doctor recommends these additional instructions:  If any biopsies were taken, your doctors clinic will contact you in 1 to 2   weeks with any results.  - Discharge patient to home.   - Resume previous diet.   - Continue present medications.   - Await pathology results.   - Repeat colonoscopy in 5 years for surveillance.  For questions, problems or results please call your physician - Pricila Shanks MD at Work:  [phone].  [facility] can be reached at [phone]     IF A COMPLICATION OR EMERGENCY SITUATION ARISES AND YOU ARE UNABLE TO REACH   YOUR PHYSICIAN - GO DIRECTLY TO THE EMERGENCY ROOM.  Pricila Shanks MD  1/26/2024 3:00:14 PM  This report has been verified and signed electronically.  Dear patient,  As a result of recent federal legislation (The Federal Cures Act), you may   receive lab or pathology results from your procedure in your [facility]   account before your physician is able to contact you. Your physician or   their representative will relay the results to you with their   recommendations at their soonest availability.  Thank you,  PROVATION

## 2024-01-26 NOTE — ANESTHESIA PREPROCEDURE EVALUATION
01/26/2024     Nino Price is a 59 y.o., male scheduled for LAPAROSCOPY, DIAGNOSTIC (N/A Abdomen) on 4/1/2021.    Past Medical History:   Diagnosis Date    Anxiety     Asthma     Colitis     Depression     Diabetes mellitus     Head injury     History of hepatitis C, s/p successful hcv treatment w/ SVR 8-2015  10/8/2012    SVR(24) as of 11/2015.  SVR(12) as of 8/2015.  completed harvoni 24 week regimen for genotype 1a 5/18/15     Hypertension     Shoulder pain     left, s/p 4 surgeries.     Stroke        Past Surgical History:   Procedure Laterality Date    COLONOSCOPY N/A 10/20/2015    Procedure: COLONOSCOPY;  Surgeon: Jagdish Patricia MD;  Location: [facility] ENDO;  Service: Endoscopy;  Laterality: N/A;    DIAGNOSTIC LAPAROSCOPY N/A 4/1/2021    Procedure: LAPAROSCOPY, DIAGNOSTIC;  Surgeon: Umesh Vallecillo MD;  Location: [facility] OR;  Service: General;  Laterality: N/A;  RN PREOP 3/29/21, COVID NEGATIVE ON 3/29  CONSENT AND H/P INCOMPLETE    GALLBLADDER SURGERY      pt not 100% if gall bladder removed    rotator cuff      left side     VITALS  There were no vitals filed for this visit.      LABS    Lab Results   Component Value Date    WBC 8.88 12/16/2023    HGB 17.0 12/16/2023    HCT 47.5 12/16/2023    MCV 90 12/16/2023     12/16/2023       BMP  Lab Results   Component Value Date     (L) 12/16/2023    K 4.3 12/16/2023    CL 98 12/16/2023    CO2 21 (L) 12/16/2023    BUN 26 (H) 12/16/2023    CREATININE 1.5 (H) 12/16/2023    CALCIUM 9.5 12/16/2023    ANIONGAP 15 12/16/2023    ESTGFRAFRICA >60 05/26/2022    EGFRNONAA >60 05/26/2022         Anesthesia Evaluation    I have reviewed the Patient Summary Reports.     I have reviewed the Nursing Notes. I have reviewed the NPO Status.   I have reviewed the Medications.     Review of Systems  Anesthesia Hx:  No problems with previous Anesthesia             Denies Family Hx of Anesthesia complications.    Denies Personal Hx of Anesthesia complications.                     Social:  Smoker, Recreational Drugs       Hematology/Oncology:  Hematology Normal   Oncology Normal                                   EENT/Dental:  EENT/Dental Normal           Cardiovascular:     Hypertension           hyperlipidemia     Functional Capacity good / => 4 METS                         Pulmonary:    Asthma  Shortness of breath                  Renal/:  Renal/ Normal                 Hepatic/GI:     GERD Liver Disease, Hepatitis, C Cirrhosis          Musculoskeletal:  Musculoskeletal Normal                Neurological:   CVA (R side), residual symptoms                                    Endocrine:  Diabetes, type 2           Dermatological:  Skin Normal    Psych:    depression                Physical Exam  General:  Well nourished       Airway/Jaw/Neck:  Airway Findings: Mouth Opening: Normal     Tongue: Normal      General Airway Assessment: Adult      Mallampati: II  Improves to I with phonation.  TM Distance: Normal, at least 6 cm                Chest/Lungs:  Chest/Lungs Findings:  Normal Respiratory Rate       Heart/Vascular:  Heart Findings: Rate: Normal                         Mental Status:  Mental Status Findings:  Cooperative, Alert and Oriented         Anesthesia Plan  Type of Anesthesia, risks & benefits discussed:  Anesthesia Type:  general    Patient's Preference:   Plan Factors:          Intra-op Monitoring Plan: standard ASA monitors  Intra-op Monitoring Plan Comments:   Post Op Pain Control Plan:   Post Op Pain Control Plan Comments:     Induction:   IV  Beta Blocker:  Patient is not currently on a Beta-Blocker (No further documentation required).       Informed Consent: Informed consent signed with the Patient and all parties understand the risks and agree with anesthesia plan.  All questions answered.  Anesthesia consent signed with patient.  ASA Score: 3     Day of Surgery Review of History & Physical:              Ready For Surgery From Anesthesia Perspective.              Physical Exam  General: Well nourished    Airway:  Mallampati: II / I  Mouth Opening: Normal  TM Distance: Normal, at least 6 cm  Tongue: Normal    Chest/Lungs:  Normal Respiratory Rate    Heart:  Rate: Normal        Anesthesia Plan  Type of Anesthesia, risks & benefits discussed:    Anesthesia Type: general  Intra-op Monitoring Plan: standard ASA monitors  Induction:  IV  Informed Consent: Informed consent signed with the Patient and all parties understand the risks and agree with anesthesia plan.  All questions answered.   ASA Score: 3    Ready For Surgery From Anesthesia Perspective.     .

## 2024-01-26 NOTE — PLAN OF CARE
Procedure complete. Pt c/o severe lower abdominal pains unrelieved w/ positioning, sitting, or standing, GI MD notified and assessed and abdominal x-ray ordered/resulted. Pt was unable to pass gas. Pt also was presenting w/ elevated BP and frequent bigeminy PVCs, 10mg of hydralazine given (233/101 decreased to 152/87) and 12 lead EKG completed per anesthesia MD order. Pt transported to ED.

## 2024-01-26 NOTE — H&P
Short Stay Endoscopy History and Physical    PCP - Stevo Pryor MD    Procedure - EGD/Colonoscopy  ASA - per anesthesia  Mallampati - per anesthesia  History of Anesthesia problems - no  Family history Anesthesia problems -  no   Plan of anesthesia - MAC    HPI:  This is a 59 y.o. male here for evaluation of :     EGD - Abnormal imaging of the esophagus  Colon - follow up of diverticulitis    ROS:  Constitutional: No fevers, chills, No weight loss  CV: No chest pain  Pulm: No cough, No shortness of breath  Ophtho: No vision changes  GI: see HPI  Derm: No rash    Medical History:  has a past medical history of Anxiety, Asthma, Colitis, Depression, Diabetes mellitus, Head injury, History of hepatitis C, s/p successful hcv treatment w/ SVR 8-2015  (10/8/2012), Hypertension, Shoulder pain, and Stroke.    Surgical History:  has a past surgical history that includes rotator cuff; Colonoscopy (N/A, 10/20/2015); Gallbladder surgery; and Diagnostic laparoscopy (N/A, 4/1/2021).    Family History: family history includes Cirrhosis in his brother; Heart disease in his father; Hypertension in his mother.. Otherwise no colon cancer, inflammatory bowel disease, or GI malignancies.    Social History:  reports that he has been smoking cigarettes. He has a 35.0 pack-year smoking history. He has been exposed to tobacco smoke. He has never used smokeless tobacco. He reports current drug use. Frequency: 7.00 times per week. Drug: Marijuana. He reports that he does not drink alcohol.    Review of patient's allergies indicates:   Allergen Reactions    Codeine Nausea Only       Medications:   Medications Prior to Admission   Medication Sig Dispense Refill Last Dose    atorvastatin (LIPITOR) 40 MG tablet Take 40 mg by mouth.   Past Week    metFORMIN (GLUCOPHAGE) 1000 MG tablet Take 1,000 mg by mouth 2 (two) times daily with meals.   Past Week    valsartan-hydrochlorothiazide (DIOVAN-HCT) 160-25 mg per tablet Take 1 tablet by mouth once  daily. 90 tablet 1 Past Week    aspirin (ECOTRIN) 81 MG EC tablet Take 1 tablet (81 mg total) by mouth once daily. 30 tablet 3     buPROPion (WELLBUTRIN XL) 150 MG TB24 tablet Take 1 tablet by mouth once daily.       busPIRone (BUSPAR) 10 MG tablet Take 5 mg by mouth every evening.       cyclobenzaprine (FLEXERIL) 5 MG tablet Take 5 mg by mouth nightly as needed.       dicyclomine (BENTYL) 10 MG capsule Take 10 mg by mouth.       dicyclomine (BENTYL) 20 mg tablet Take 1 tablet (20 mg total) by mouth 2 (two) times daily as needed (stomach pain). 30 tablet 0     DULoxetine (CYMBALTA) 60 MG capsule Take 60 mg by mouth once daily. Takes in pm       gabapentin (NEURONTIN) 300 MG capsule Take 300 mg by mouth 3 (three) times daily.       HYDROcodone-acetaminophen (NORCO)  mg per tablet Take 1 tablet by mouth every 6 (six) hours as needed.       hyoscyamine 0.125 mg Subl Place 1 tablet (0.125 mg total) under the tongue every 4 (four) hours as needed. (Patient not taking: Reported on 1/17/2024) 60 tablet 1     icosapent ethyL (VASCEPA) 1 gram Cap Take 2 capsules (2 g total) by mouth 2 (two) times daily. (Patient not taking: Reported on 1/17/2024) 360 capsule 1     LIDOcaine (LIDODERM) 5 % Place 1 patch onto the skin once daily. Remove & Discard patch within 12 hours or as directed by MD (Patient not taking: Reported on 1/17/2024) 5 patch 1     methocarbamoL (ROBAXIN) 500 MG Tab Take 2 tablets (1,000 mg total) by mouth 3 (three) times daily as needed (Pain). (Patient not taking: Reported on 1/17/2024) 30 tablet 0     mirtazapine (REMERON) 15 MG tablet Take 15 mg by mouth every evening.       NAMENDA 10 mg Tab Take 10 mg by mouth once daily.        ondansetron (ZOFRAN-ODT) 4 MG TbDL Take 1 tablet (4 mg total) by mouth every 6 (six) hours as needed (nausea). 15 tablet 0     oxyCODONE-acetaminophen (PERCOCET)  mg per tablet TAKE 1 TABLET AS NEEDED ORALLY EVERY 4 TO 6 HOURS       pantoprazole (PROTONIX) 40 MG tablet  Take 1 tablet (40 mg total) by mouth 2 (two) times daily. (Patient not taking: Reported on 1/17/2024) 60 tablet 0     pantoprazole (PROTONIX) 40 MG tablet Take 1 tablet (40 mg total) by mouth once daily. 30 tablet 11     propranoloL (INDERAL) 20 MG tablet Take 10 mg by mouth 2 (two) times daily.       traZODone (DESYREL) 50 MG tablet Take 100 mg by mouth every evening.       TRUE METRIX GLUCOSE METER Misc        TRUE METRIX GLUCOSE TEST STRIP Strp           Physical Exam:    Vital Signs:   Vitals:    01/26/24 1308   BP: 136/80   Pulse: 101   Resp: 20   Temp: 98.1 °F (36.7 °C)       Gen: NAD, lying comfortably  HENT: NCAT, oropharynx clear  Eyes: anicteric sclerae, EOMI grossly  Neck: supple, no visible masses/goiter  Cardiac: RRR  Lungs: non-labored breathing  Abd: soft, NT/ND, normoactive BS  Ext: no LE edema, warm, well perfused  Skin: skin intact on exposed body parts, no visible rashes, lesions  Neuro: A&Ox4, neuro exam grossly intact, moves all extremities  Psych: appropriate mood, affect      Labs:  Lab Results   Component Value Date    WBC 8.88 12/16/2023    HGB 17.0 12/16/2023    HCT 47.5 12/16/2023     12/16/2023    CHOL 218 (H) 08/10/2023    TRIG 156 (H) 08/10/2023    HDL 26 (L) 08/10/2023    ALT 23 12/16/2023    AST 23 12/16/2023     (L) 12/16/2023    K 4.3 12/16/2023    CL 98 12/16/2023    CREATININE 1.5 (H) 12/16/2023    BUN 26 (H) 12/16/2023    CO2 21 (L) 12/16/2023    TSH 1.125 08/10/2023    INR 1.0 05/26/2022    HGBA1C 6.0 (H) 08/25/2023       Plan:  EGD for abnormal imaging of the esophagus  Colonoscopy for follow up of diverticulitis.    I have explained the risks and benefits of endoscopy procedures to the patient including but not limited to bleeding, perforation, infection, and death.  The patient was asked if they understand and allowed to ask any further questions to their satisfaction.    Pricila Shanks MD

## 2024-01-30 LAB
FINAL PATHOLOGIC DIAGNOSIS: NORMAL
GROSS: NORMAL
Lab: NORMAL

## 2024-01-31 ENCOUNTER — TELEPHONE (OUTPATIENT)
Dept: GASTROENTEROLOGY | Facility: CLINIC | Age: 59
End: 2024-01-31
Payer: MEDICARE

## 2024-01-31 NOTE — TELEPHONE ENCOUNTER
----- Message from Pricila Shanks MD sent at 1/31/2024 11:23 AM CST -----  Please let the patient know that the polyps removed during his colonoscopy were not cancerous.  He will need a repeat colonoscopy in 5 years.

## 2024-02-09 ENCOUNTER — PATIENT OUTREACH (OUTPATIENT)
Dept: ADMINISTRATIVE | Facility: HOSPITAL | Age: 59
End: 2024-02-09
Payer: MEDICARE

## 2024-02-09 RX ORDER — PANTOPRAZOLE SODIUM 40 MG/1
1 TABLET, DELAYED RELEASE ORAL DAILY
COMMUNITY
Start: 2024-01-02

## 2024-02-09 RX ORDER — TIZANIDINE 4 MG/1
4 TABLET ORAL NIGHTLY
COMMUNITY
Start: 2024-01-04

## 2024-02-09 NOTE — PROGRESS NOTES
[facility] 1 DM Eye 01.23.24 - Left message for patient to call our office. Please schedule EyeCam.    Overdue Diabetes Labs w/ Urine - Left message for patient to call our office. Please scherdule.    Non-Compliant Blood Pressure Reading - the patient has an upcoming appointment scheduled w/ primary care on 02/29/2024.

## 2024-02-14 DIAGNOSIS — I10 BENIGN ESSENTIAL HTN: ICD-10-CM

## 2024-02-14 DIAGNOSIS — E11.9 CONTROLLED TYPE 2 DIABETES MELLITUS WITHOUT COMPLICATION, UNSPECIFIED WHETHER LONG TERM INSULIN USE: Chronic | ICD-10-CM

## 2024-02-14 NOTE — TELEPHONE ENCOUNTER
No care due was identified.  [facility] Embedded Care Due Messages. Reference number: [id number].   2/14/2024 2:16:52 AM CST

## 2024-02-15 RX ORDER — VALSARTAN AND HYDROCHLOROTHIAZIDE 160; 25 MG/1; MG/1
1 TABLET ORAL
Qty: 90 TABLET | Refills: 3 | Status: SHIPPED | OUTPATIENT
Start: 2024-02-15

## 2024-02-15 NOTE — TELEPHONE ENCOUNTER
Refill Routing Note   Medication(s) are not appropriate for processing by [facility] for the following reason(s):        ED/Hospital Visit since last OV with provider  Required vitals abnormal    [facility] action(s):  Defer               Appointments  past 12m or future 3m with PCP    Date Provider   Last Visit   11/27/2023 Stevo Pryor MD   Next Visit   5/27/2024 Stevo Pryor MD   ED visits in past 90 days: 3        Note composed:10:51 AM 02/15/2024

## 2024-04-05 ENCOUNTER — PATIENT OUTREACH (OUTPATIENT)
Dept: ADMINISTRATIVE | Facility: HOSPITAL | Age: 59
End: 2024-04-05
Payer: MEDICARE

## 2024-04-15 ENCOUNTER — OFFICE VISIT (OUTPATIENT)
Dept: HEPATOLOGY | Facility: CLINIC | Age: 59
End: 2024-04-15
Payer: MEDICARE

## 2024-04-15 ENCOUNTER — LAB VISIT (OUTPATIENT)
Dept: LAB | Facility: HOSPITAL | Age: 59
End: 2024-04-15
Attending: INTERNAL MEDICINE
Payer: MEDICARE

## 2024-04-15 VITALS
BODY MASS INDEX: 24.54 KG/M2 | HEART RATE: 89 BPM | WEIGHT: 143.75 LBS | HEIGHT: 64 IN | DIASTOLIC BLOOD PRESSURE: 68 MMHG | SYSTOLIC BLOOD PRESSURE: 102 MMHG

## 2024-04-15 DIAGNOSIS — Z86.19 HISTORY OF HEPATITIS C: ICD-10-CM

## 2024-04-15 DIAGNOSIS — K74.02 HEPATIC FIBROSIS, ADVANCED FIBROSIS: ICD-10-CM

## 2024-04-15 DIAGNOSIS — Z98.890 HISTORY OF LIVER BIOPSY: ICD-10-CM

## 2024-04-15 DIAGNOSIS — Z72.0 TOBACCO ABUSE: Chronic | ICD-10-CM

## 2024-04-15 DIAGNOSIS — F10.21 ALCOHOL USE DISORDER, SEVERE, IN SUSTAINED REMISSION: ICD-10-CM

## 2024-04-15 LAB
ALBUMIN SERPL BCP-MCNC: 3.7 G/DL (ref 3.5–5.2)
ALP SERPL-CCNC: 63 U/L (ref 55–135)
ALT SERPL W/O P-5'-P-CCNC: 15 U/L (ref 10–44)
AST SERPL-CCNC: 14 U/L (ref 10–40)
BILIRUB DIRECT SERPL-MCNC: 0.1 MG/DL (ref 0.1–0.3)
BILIRUB SERPL-MCNC: 0.3 MG/DL (ref 0.1–1)
INR PPP: 1 (ref 0.8–1.2)
PROT SERPL-MCNC: 7.5 G/DL (ref 6–8.4)
PROTHROMBIN TIME: 11.2 SEC (ref 9–12.5)

## 2024-04-15 PROCEDURE — 99203 OFFICE O/P NEW LOW 30 MIN: CPT | Mod: HCNC,S$GLB,, | Performed by: INTERNAL MEDICINE

## 2024-04-15 PROCEDURE — 80321 ALCOHOLS BIOMARKERS 1OR 2: CPT | Mod: HCNC | Performed by: INTERNAL MEDICINE

## 2024-04-15 PROCEDURE — 82105 ALPHA-FETOPROTEIN SERUM: CPT | Mod: HCNC | Performed by: INTERNAL MEDICINE

## 2024-04-15 PROCEDURE — 87340 HEPATITIS B SURFACE AG IA: CPT | Mod: HCNC | Performed by: INTERNAL MEDICINE

## 2024-04-15 PROCEDURE — 1160F RVW MEDS BY RX/DR IN RCRD: CPT | Mod: HCNC,CPTII,S$GLB, | Performed by: INTERNAL MEDICINE

## 2024-04-15 PROCEDURE — 99999 PR PBB SHADOW E&M-EST. PATIENT-LVL III: CPT | Mod: PBBFAC,HCNC,, | Performed by: INTERNAL MEDICINE

## 2024-04-15 PROCEDURE — 36415 COLL VENOUS BLD VENIPUNCTURE: CPT | Mod: HCNC | Performed by: INTERNAL MEDICINE

## 2024-04-15 PROCEDURE — 81596 NFCT DS CHRNC HCV 6 ASSAYS: CPT | Mod: HCNC | Performed by: INTERNAL MEDICINE

## 2024-04-15 PROCEDURE — 86790 VIRUS ANTIBODY NOS: CPT | Mod: HCNC | Performed by: INTERNAL MEDICINE

## 2024-04-15 PROCEDURE — 3074F SYST BP LT 130 MM HG: CPT | Mod: HCNC,CPTII,S$GLB, | Performed by: INTERNAL MEDICINE

## 2024-04-15 PROCEDURE — 86704 HEP B CORE ANTIBODY TOTAL: CPT | Mod: HCNC | Performed by: INTERNAL MEDICINE

## 2024-04-15 PROCEDURE — 80076 HEPATIC FUNCTION PANEL: CPT | Mod: HCNC | Performed by: INTERNAL MEDICINE

## 2024-04-15 PROCEDURE — 3078F DIAST BP <80 MM HG: CPT | Mod: HCNC,CPTII,S$GLB, | Performed by: INTERNAL MEDICINE

## 2024-04-15 PROCEDURE — 3008F BODY MASS INDEX DOCD: CPT | Mod: HCNC,CPTII,S$GLB, | Performed by: INTERNAL MEDICINE

## 2024-04-15 PROCEDURE — 86706 HEP B SURFACE ANTIBODY: CPT | Mod: HCNC | Performed by: INTERNAL MEDICINE

## 2024-04-15 PROCEDURE — 85610 PROTHROMBIN TIME: CPT | Mod: HCNC | Performed by: INTERNAL MEDICINE

## 2024-04-15 PROCEDURE — 1159F MED LIST DOCD IN RCRD: CPT | Mod: HCNC,CPTII,S$GLB, | Performed by: INTERNAL MEDICINE

## 2024-04-15 PROCEDURE — 87389 HIV-1 AG W/HIV-1&-2 AB AG IA: CPT | Mod: HCNC | Performed by: INTERNAL MEDICINE

## 2024-04-15 PROCEDURE — 87522 HEPATITIS C REVRS TRNSCRPJ: CPT | Mod: HCNC | Performed by: INTERNAL MEDICINE

## 2024-04-15 NOTE — PROGRESS NOTES
Hepatology Consult Note    Referring provider: Dr. Pricila Shanks  PCP: Stevo Pryor MD    Chief complaint: cirrhosis     HPI:  Nino Price is a 59 y.o. male with HCV infection treated with cure (2015), biopsy-proven advanced hepatic fibrosis (F3 2014), DM Type 2, HTN, HLD, who was referred to Hepatology Clinic for evaluation. HPI is limited by patient's issues with memory loss, which he attributes to prior CVA. He presents alone to clinic this afternoon.    Previously followed in Hepatology, last seen in 2018 by Dr. Marrufo. Per chart review, the patient was treated with 24 weeks of Harvoni 2015 with cure. Treatment with pegasys and ribavirin was attempted in 1990, but was discontinued due to side effects.    Risk factors for viral hepatitis include illicit drugs in the past. 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 Thinks he has received blood transfusions in the past.    Regarding other risk factors of liver disease, he reports heavy EtOH use in the past (difficult time quantifying amount). Quit 2 years ago because he was told it was not good for him. MASLD risk factors DM, HTN, HLD. Denies prior diagnosis of BILLY. Denies prior obesity. Denies family history of liver disease or liver cancer.     The patient denies signs/symptoms of decompensated liver disease, including no recent abdominal distension, encephalopathy, jaundice, gross GI bleeding. The patient denies prior paracentesis.    Past Medical History:   Diagnosis Date    Anxiety     Asthma     Colitis     Depression     Diabetes mellitus     Head injury     History of hepatitis C, s/p successful hcv treatment w/ SVR 8-2015  10/8/2012    SVR(24) as of 11/2015.  SVR(12) as of 8/2015.  completed harvoni 24 week regimen for genotype 1a 5/18/15     Hypertension     Shoulder pain     left, s/p 4 surgeries.     Stroke        Past Surgical History:   Procedure Laterality Date    COLONOSCOPY N/A 10/20/2015    Procedure: COLONOSCOPY;  Surgeon:  Jagdish Patricia MD;  Location: [facility] ENDO;  Service: Endoscopy;  Laterality: N/A;    COLONOSCOPY N/A 1/26/2024    Procedure: COLONOSCOPY;  Surgeon: Pricila Shanks MD;  Location: [facility] ENDO;  Service: Endoscopy;  Laterality: N/A;    DIAGNOSTIC LAPAROSCOPY N/A 4/1/2021    Procedure: LAPAROSCOPY, DIAGNOSTIC;  Surgeon: Umesh Vallecillo MD;  Location: [facility] OR;  Service: General;  Laterality: N/A;  RN PREOP 3/29/21, COVID NEGATIVE ON 3/29  CONSENT AND H/P INCOMPLETE    ESOPHAGOGASTRODUODENOSCOPY N/A 1/26/2024    Procedure: EGD (ESOPHAGOGASTRODUODENOSCOPY);  Surgeon: Pricila Shanks MD;  Location: [facility] ENDO;  Service: Endoscopy;  Laterality: N/A;  prep instructions mailed to pt / [facility] pt / Urgent/suprep  1/19- lvm for pc. DBM  1/23- left 2nd vm for pc. DBM  1/24 cirrohsis-labs prior, precall complete-st    GALLBLADDER SURGERY      pt not 100% if gall bladder removed    rotator cuff      left side       Family History   Problem Relation Name Age of Onset    Hypertension Mother      Heart disease Father      Cirrhosis Brother      Colon cancer Neg Hx      Esophageal cancer Neg Hx         Social History     Tobacco Use    Smoking status: Every Day     Current packs/day: 1.00     Average packs/day: 1 pack/day for 35.0 years (35.0 ttl pk-yrs)     Types: Cigarettes     Passive exposure: Current    Smokeless tobacco: Never    Tobacco comments:     Will try to stop smoking by himself vs    Substance Use Topics    Alcohol use: No    Drug use: Yes     Frequency: 7.0 times per week     Types: Marijuana       Current Outpatient Medications   Medication Sig Dispense Refill    acetaminophen (TYLENOL) 500 MG tablet Take 1 tablet (500 mg total) by mouth every 6 (six) hours as needed for Pain (and fever). 30 tablet 0    aspirin (ECOTRIN) 81 MG EC tablet Take 1 tablet (81 mg total) by mouth once daily. 30 tablet 3    atorvastatin (LIPITOR) 40 MG tablet Take 40 mg by mouth.      buPROPion (WELLBUTRIN XL) 150 MG TB24 tablet Take 1  tablet by mouth once daily.      busPIRone (BUSPAR) 10 MG tablet Take 5 mg by mouth every evening.      cyclobenzaprine (FLEXERIL) 5 MG tablet Take 5 mg by mouth nightly as needed.      dicyclomine (BENTYL) 10 MG capsule Take 10 mg by mouth.      dicyclomine (BENTYL) 20 mg tablet Take 1 tablet (20 mg total) by mouth 2 (two) times daily as needed (stomach pain). 30 tablet 0    DULoxetine (CYMBALTA) 60 MG capsule Take 60 mg by mouth once daily. Takes in pm      gabapentin (NEURONTIN) 300 MG capsule Take 300 mg by mouth 3 (three) times daily.      HYDROcodone-acetaminophen (NORCO)  mg per tablet Take 1 tablet by mouth every 6 (six) hours as needed.      metFORMIN (GLUCOPHAGE) 1000 MG tablet Take 1,000 mg by mouth 2 (two) times daily with meals.      metoclopramide HCl (REGLAN) 10 MG tablet Take 1 tablet (10 mg total) by mouth every 6 (six) hours. 30 tablet 0    mirtazapine (REMERON) 15 MG tablet Take 15 mg by mouth every evening.      NAMENDA 10 mg Tab Take 10 mg by mouth once daily.       ondansetron (ZOFRAN-ODT) 4 MG TbDL Take 1 tablet (4 mg total) by mouth every 6 (six) hours as needed (nausea). 15 tablet 0    oxyCODONE-acetaminophen (PERCOCET)  mg per tablet TAKE 1 TABLET AS NEEDED ORALLY EVERY 4 TO 6 HOURS      pantoprazole (PROTONIX) 40 MG tablet Take 1 tablet (40 mg total) by mouth once daily. 30 tablet 11    pantoprazole (PROTONIX) 40 MG tablet Take 1 tablet by mouth once daily.      propranoloL (INDERAL) 20 MG tablet Take 10 mg by mouth 2 (two) times daily.      tiZANidine (ZANAFLEX) 4 MG tablet Take 4 mg by mouth every evening.      traZODone (DESYREL) 50 MG tablet Take 100 mg by mouth every evening.      TRUE METRIX GLUCOSE METER Misc       TRUE METRIX GLUCOSE TEST STRIP Strp       valsartan-hydrochlorothiazide (DIOVAN-HCT) 160-25 mg per tablet TAKE 1 TABLET BY MOUTH EVERY DAY 90 tablet 3     No current facility-administered medications for this visit.       Review of patient's allergies  "indicates:   Allergen Reactions    Codeine Nausea Only            Vitals:    04/15/24 1410   BP: 102/68   Pulse: 89   Weight: 65.2 kg (143 lb 11.8 oz)   Height: 5' 4" (1.626 m)   Body mass index is 24.67 kg/m².    Physical Exam  Constitutional:       General: He is not in acute distress.  Eyes:      General: No scleral icterus.  Cardiovascular:      Rate and Rhythm: Normal rate.   Pulmonary:      Effort: Pulmonary effort is normal.   Abdominal:      General: Abdomen is flat. There is no distension.      Palpations: Abdomen is soft. There is no fluid wave, hepatomegaly or splenomegaly.      Tenderness: There is no abdominal tenderness.   Musculoskeletal:      Right lower leg: No edema.      Left lower leg: No edema.   Skin:     General: Skin is warm.      Coloration: Skin is not jaundiced.      Comments: Few spider angiomas on chest. No palmar erythema. No leukonychia.    Neurological:      General: No focal deficit present.      Mental Status: He is alert.      Comments: No asterixis.   Psychiatric:         Behavior: Behavior is cooperative.         Thought Content: Thought content normal.         LABS: I personally reviewed pertinent laboratory findings.    Lab Results   Component Value Date    WBC 7.56 01/26/2024    HGB 14.7 01/26/2024    HCT 42 01/26/2024    MCV 93 01/26/2024     01/26/2024       Lab Results   Component Value Date     01/26/2024    K 3.5 01/26/2024     01/26/2024    CO2 23 01/26/2024    BUN 10 01/26/2024    CREATININE 1.0 01/26/2024    CALCIUM 9.3 01/26/2024    ANIONGAP 8 01/26/2024    ESTGFRAFRICA >60 05/26/2022    EGFRNONAA >60 05/26/2022       Lab Results   Component Value Date    ALT 40 01/26/2024    AST 29 01/26/2024    ALKPHOS 56 01/26/2024    BILITOT 0.4 01/26/2024       No results found for: "HAV", "HEPAIGM", "HEPBIGM", "HEPBCAB", "HBEAG", "HEPCAB"    Lab Results   Component Value Date    CHECO Negative 03/06/2009    CERULOPLSM 23.7 (H) 03/06/2009      Lab Results "   Component Value Date    AFP 5.4 05/26/2022     Computed MELD 3.0 unavailable. One or more values for this score either were not found within the given timeframe or did not fit some other criterion.  Computed MELD-Na unavailable. One or more values for this score either were not found within the given timeframe or did not fit some other criterion.       IMAGING: I personally reviewed imaging studies.    CT A/P with contrast 1/26/2024  FINDINGS:  Lower Chest:     Lung bases are unremarkable.  Heart size is normal.  Coronary artery calcifications.  No pericardial effusion.  No pleural effusion.  Diffuse lower esophageal wall thickening, similar to prior study.     Abdomen:     Liver is stable with suspected hepatic steatosis.  Possible small gallstones versus sludge.  No pericholecystic inflammatory changes.  No intrahepatic biliary ductal dilatation.     Spleen, adrenals, and pancreas are stable and negative for acute finding.     The kidneys are symmetric.  No hydronephrosis. No asymmetric perinephric inflammatory fat stranding.     Wall prominence of the stomach, likely exaggerated by relatively decompressed state.  No small bowel obstruction.  Appendix appears surgically absent.  Few colonic diverticula, most notable in the sigmoid colon.  There is mild wall thickening of the rectosigmoid colon.  No significant pericolonic inflammatory changes.  Probable endoscopy clip in the splenic flexure of the colon as well as in the transverse colon.     No pneumoperitoneum or organized fluid collection.     No bulky retroperitoneal lymphadenopathy.     Abdominal aorta is normal in caliber with moderate calcific atherosclerosis.     Portal vein is patent.  No portal venous gas.     Pelvis:     Urinary bladder is unremarkable.  Prostate is enlarged, measuring up to 5.2 cm in transverse width.  Minimal rectal wall thickening.  No pelvic free fluid.     Bones and soft tissues:     No aggressive osseous lesions.  Healing right  11th rib fracture.  Similar degenerative changes in the spine.  Linear sclerosis in the bilateral femoral heads suggestive of osteonecrosis.  No femoral head collapse.  Extraperitoneal soft tissues are negative for acute finding.  Small fat containing right inguinal hernia.    EGD 1/26/2024  Indications:           Abnormal CT of the GI tract   Impression:     - Bleb found in the esophagus.                          - Web in the lower third of the esophagus.                          - 4 cm hiatal hernia.                          - Normal stomach.                          - Normal examined duodenum.                          - No specimens collected.     Liver Biopsy 4/30/2014  FINAL PATHOLOGIC DIAGNOSIS LIVER; NEEDLE BIOPSY: CHRONIC HEPATITIS C WITH MINIMAL ACTIVITY (GRADE 1 OUT OF 4) MILD STEATOHEPATITIS, MACROSTEATOSIS, 30% MICROSTEATOSIS, 10% BRIDGING FIBROSIS WITH EMERGING CIRRHOSIS (STAGE 3-4 OUT OF 4) NO IRON IRON AND TRICHROME WITH APPROPRIATE CONTROLS.     Assessment:  Nino Price is a 59 y.o. male with HCV infection treated with cure (2015), biopsy-proven advanced hepatic fibrosis (F3, 2014), DM Type 2, HTN, HLD, prior CVA, who was referred to Hepatology Clinic for evaluation. Per chart review, liver enzymes, bilirubin have been normal dating back to 2014. Platelet count are normal. On contrasted CT (Jan 2024), the liver appears steatotic, spleen is unremarkable and no ascites present. On endoscopy (Jan 2024), there is no mention of varices or portal hypertensive gastropathy.     Degree of hepatic fibrosis is unknown at this time. Although progression into cirrhosis is likely given the patient's metabolic risk factors, regression to early hepatic fibrosis is also a possibility after completing hepatitis C treatment. Therefore, will plan for non-invasive measures of fibrosis for further characterization of fibrosis stage.     1. History of hepatitis C    2. Hepatic fibrosis, advanced fibrosis    3. Alcohol  use disorder, severe, in sustained remission    4. History of liver biopsy    5. Tobacco abuse      Recommendations:  - LFTs, INR   - HCV RNA  - HAV IgG, HBsAg, HBsAb, HBc Total Ab, HIV  - PETH  - AFP  - FibroSure   - FibroScan     Return to clinic in 3 months.    I have sent communication to the referring physician and/or primary care provider.    Tara Guaman MD  Staff Physician  Hepatology and Liver Transplant  [facility]  [facility]

## 2024-04-16 LAB
AFP SERPL-MCNC: 4.3 NG/ML (ref 0–8.4)
HAV IGG SER QL IA: REACTIVE
HBV CORE AB SERPL QL IA: NORMAL
HBV SURFACE AB SER-ACNC: <3 MIU/ML
HBV SURFACE AB SER-ACNC: NORMAL M[IU]/ML
HBV SURFACE AG SERPL QL IA: NORMAL
HIV 1+2 AB+HIV1 P24 AG SERPL QL IA: NORMAL

## 2024-04-17 LAB
HCV RNA SERPL QL NAA+PROBE: NOT DETECTED
HCV RNA SPEC NAA+PROBE-ACNC: NOT DETECTED IU/ML

## 2024-04-18 LAB
A2 MACROGLOB SERPL-MCNC: 330 MG/DL (ref 100–280)
ALT SERPL W P-5'-P-CCNC: 15 U/L (ref 7–55)
APO A-I SERPL-MCNC: 103 MG/DL
BILIRUB SERPL-MCNC: 0.2 MG/DL (ref 0–1.2)
BIOPREDICTIVE SERIAL NUMBER: ABNORMAL
FIBROSIS STAGE SERPL QL: ABNORMAL
FIBROTEST INTERPRETATION: ABNORMAL
FIBROTEST-ACTITEST COMMENT: ABNORMAL
GGT SERPL-CCNC: 31 U/L (ref 8–61)
HAPTOGLOB SERPL-MCNC: 294 MG/DL (ref 30–200)
LIVER FIBR SCORE SERPL CALC.FIBROSURE: 0.35
NECROINFLAMMAT INTERP: ABNORMAL
NECROINFLAMMATORY ACT GRADE SERPL QL: ABNORMAL
NECROINFLAMMATORY ACT SCORE SERPL: 0.05

## 2024-04-22 LAB
CLINICAL BIOCHEMIST REVIEW: NORMAL
PLPETH BLD-MCNC: <20 NG/ML
POPETH BLD-MCNC: <20 NG/ML

## 2024-04-23 ENCOUNTER — TELEPHONE (OUTPATIENT)
Dept: HEPATOLOGY | Facility: CLINIC | Age: 59
End: 2024-04-23
Payer: MEDICARE

## 2024-04-23 NOTE — TELEPHONE ENCOUNTER
----- Message from Tara Guaman MD sent at 4/23/2024  8:46 AM CDT -----  Please call the patient with attached results.     Liver tests are normal.    There is no evidence of hepatitis B, hepatitis C or HIV infections    He is not immune to hepatitis B, so recommend getting vaccinated at with primary care doctor or [facility].  .  Thank you!  AMY    Pt was called and voice mail left.  Mary

## 2024-04-23 NOTE — TELEPHONE ENCOUNTER
Pt call was returned but unfortunately unable to reach him. Message left for him to call back again.    Mary     ----- Message from Denise Rowe sent at 4/23/2024  1:27 PM CDT -----  Regarding: returning missed call  Contact: PT  [phone]  The patient called returning missed call. Please reach out to patient at your earliest convenience   No further information provided      Patient can be contacted @# [phone]

## 2024-04-23 NOTE — TELEPHONE ENCOUNTER
"Pt was called and results given per Dr Guaman.    Mary    ----- Message from Emanuel Avitia sent at 4/23/2024  4:09 PM CDT -----  Regarding: miss call  Consult/Advisory:        Name Of Caller: Self        Contact Preference?:[phone]        What is the nature of the call?: Returning call to Mary        Additional Notes:  "Thank you for all that you do for our patients"  "

## 2024-04-25 ENCOUNTER — PATIENT OUTREACH (OUTPATIENT)
Dept: ADMINISTRATIVE | Facility: HOSPITAL | Age: 59
End: 2024-04-25
Payer: MEDICARE

## 2024-04-25 RX ORDER — BUPROPION HYDROCHLORIDE 300 MG/1
1 TABLET ORAL EVERY MORNING
Status: ON HOLD | COMMUNITY
Start: 2024-02-22 | End: 2024-06-19 | Stop reason: HOSPADM

## 2024-04-25 RX ORDER — ONDANSETRON HYDROCHLORIDE 2 MG/ML
INJECTION, SOLUTION INTRAVENOUS
Status: ON HOLD | COMMUNITY
Start: 2023-12-16 | End: 2024-06-19 | Stop reason: HOSPADM

## 2024-04-25 RX ORDER — HYDROMORPHONE HYDROCHLORIDE 1 MG/ML
INJECTION, SOLUTION INTRAMUSCULAR; INTRAVENOUS; SUBCUTANEOUS
Status: ON HOLD | COMMUNITY
Start: 2023-12-08 | End: 2024-06-18 | Stop reason: HOSPADM

## 2024-04-25 RX ORDER — TRAZODONE HYDROCHLORIDE 100 MG/1
150 TABLET ORAL
Status: ON HOLD | COMMUNITY
Start: 2024-02-22 | End: 2024-06-18 | Stop reason: HOSPADM

## 2024-04-25 RX ORDER — MIRTAZAPINE 45 MG/1
1 TABLET, FILM COATED ORAL NIGHTLY
Status: ON HOLD | COMMUNITY
Start: 2024-02-22 | End: 2024-06-19 | Stop reason: HOSPADM

## 2024-04-25 RX ORDER — IOHEXOL 350 MG/ML
INJECTION, SOLUTION INTRAVENOUS
Status: ON HOLD | COMMUNITY
Start: 2023-12-16 | End: 2024-06-18 | Stop reason: HOSPADM

## 2024-04-25 RX ORDER — MORPHINE SULFATE 4 MG/ML
INJECTION, SOLUTION INTRAMUSCULAR; INTRAVENOUS
Status: ON HOLD | COMMUNITY
Start: 2023-12-16 | End: 2024-06-18 | Stop reason: HOSPADM

## 2024-04-25 NOTE — PROGRESS NOTES
Overdue Diabetes Labs w/ Urine - the patient will call back to schedule. He would like to get labs needed for  and  done on the same day.    Overdue Diabetic Eye Exam - he has not had an eye exam, he will call back.

## 2024-04-26 ENCOUNTER — TELEPHONE (OUTPATIENT)
Dept: HEPATOLOGY | Facility: CLINIC | Age: 59
End: 2024-04-26
Payer: MEDICARE

## 2024-04-26 NOTE — TELEPHONE ENCOUNTER
Pt was called and voice mail left asking patient to call the office back in regards to lab orders.    Mary    ----- Message from Denise Rowe sent at 4/25/2024 12:58 PM CDT -----  Regarding: lab orders?  Contact: PT  [phone]  The patient called stating that Dr. Guaman was ordering some lab test for him. States he called to sched and no orders had been received. Please reach out and advise patient. States he thinks if was a hepatitis test? Asked pt where labs need to go, he states he doesn't know. I asked where he called to sched and he said here?    No further information provided      Patient can be contacted @#   [phone]

## 2024-04-26 NOTE — PROGRESS NOTES
Incoming call received from the patient, labs and urine appointment scheduled on 04/30/2024. Fasting instructions were given.    The patient reports having an eye exam, he is unsure of place. He will call back with the information.    The patient was notified of test results per the written phone note on 04/23/2024. Nurse visit scheduled for his Hepatitis B vaccine on 04/30/2024

## 2024-04-26 NOTE — TELEPHONE ENCOUNTER
Incoming call received from the patient, he was notified of the physician response and recommendations. He verbalized understanding.    Nurse visit scheduled on 04/30/2024 for his Hepatitis B Vaccine.

## 2024-04-30 ENCOUNTER — LAB VISIT (OUTPATIENT)
Dept: LAB | Facility: HOSPITAL | Age: 59
End: 2024-04-30
Attending: FAMILY MEDICINE
Payer: MEDICARE

## 2024-04-30 ENCOUNTER — CLINICAL SUPPORT (OUTPATIENT)
Dept: FAMILY MEDICINE | Facility: CLINIC | Age: 59
End: 2024-04-30
Payer: MEDICARE

## 2024-04-30 DIAGNOSIS — E11.9 CONTROLLED TYPE 2 DIABETES MELLITUS WITHOUT COMPLICATION, UNSPECIFIED WHETHER LONG TERM INSULIN USE: Chronic | ICD-10-CM

## 2024-04-30 DIAGNOSIS — Z12.5 PROSTATE CANCER SCREENING: ICD-10-CM

## 2024-04-30 DIAGNOSIS — Z00.00 VISIT FOR WELL MAN HEALTH CHECK: ICD-10-CM

## 2024-04-30 DIAGNOSIS — R53.83 FATIGUE, UNSPECIFIED TYPE: ICD-10-CM

## 2024-04-30 DIAGNOSIS — E78.2 MIXED HYPERLIPIDEMIA: Chronic | ICD-10-CM

## 2024-04-30 DIAGNOSIS — I10 ESSENTIAL HYPERTENSION: Chronic | ICD-10-CM

## 2024-04-30 DIAGNOSIS — Z23 NEED FOR VACCINATION: Primary | ICD-10-CM

## 2024-04-30 DIAGNOSIS — K21.9 GASTROESOPHAGEAL REFLUX DISEASE, UNSPECIFIED WHETHER ESOPHAGITIS PRESENT: Chronic | ICD-10-CM

## 2024-04-30 DIAGNOSIS — I69.351 HEMIPLEGIA AND HEMIPARESIS FOLLOWING CEREBRAL INFARCTION AFFECTING RIGHT DOMINANT SIDE: ICD-10-CM

## 2024-04-30 LAB
ALBUMIN SERPL BCP-MCNC: 4 G/DL (ref 3.5–5.2)
ALP SERPL-CCNC: 61 U/L (ref 55–135)
ALT SERPL W/O P-5'-P-CCNC: 20 U/L (ref 10–44)
ANION GAP SERPL CALC-SCNC: 12 MMOL/L (ref 8–16)
AST SERPL-CCNC: 15 U/L (ref 10–40)
BASOPHILS # BLD AUTO: 0.09 K/UL (ref 0–0.2)
BASOPHILS NFR BLD: 1.6 % (ref 0–1.9)
BILIRUB SERPL-MCNC: 0.3 MG/DL (ref 0.1–1)
BUN SERPL-MCNC: 10 MG/DL (ref 6–20)
CALCIUM SERPL-MCNC: 9.7 MG/DL (ref 8.7–10.5)
CHLORIDE SERPL-SCNC: 105 MMOL/L (ref 95–110)
CHOLEST SERPL-MCNC: 267 MG/DL (ref 120–199)
CHOLEST/HDLC SERPL: 8.6 {RATIO} (ref 2–5)
CO2 SERPL-SCNC: 24 MMOL/L (ref 23–29)
COMPLEXED PSA SERPL-MCNC: 0.39 NG/ML (ref 0–4)
CREAT SERPL-MCNC: 1.3 MG/DL (ref 0.5–1.4)
DIFFERENTIAL METHOD BLD: ABNORMAL
EOSINOPHIL # BLD AUTO: 0.2 K/UL (ref 0–0.5)
EOSINOPHIL NFR BLD: 4.3 % (ref 0–8)
ERYTHROCYTE [DISTWIDTH] IN BLOOD BY AUTOMATED COUNT: 13.2 % (ref 11.5–14.5)
EST. GFR  (NO RACE VARIABLE): >60 ML/MIN/1.73 M^2
ESTIMATED AVG GLUCOSE: 148 MG/DL (ref 68–131)
GLUCOSE SERPL-MCNC: 145 MG/DL (ref 70–110)
HBA1C MFR BLD: 6.8 % (ref 4–5.6)
HCT VFR BLD AUTO: 42.5 % (ref 40–54)
HDLC SERPL-MCNC: 31 MG/DL (ref 40–75)
HDLC SERPL: 11.6 % (ref 20–50)
HGB BLD-MCNC: 14.4 G/DL (ref 14–18)
IMM GRANULOCYTES # BLD AUTO: 0.01 K/UL (ref 0–0.04)
IMM GRANULOCYTES NFR BLD AUTO: 0.2 % (ref 0–0.5)
LDLC SERPL CALC-MCNC: 175.4 MG/DL (ref 63–159)
LYMPHOCYTES # BLD AUTO: 1.9 K/UL (ref 1–4.8)
LYMPHOCYTES NFR BLD: 33.5 % (ref 18–48)
MCH RBC QN AUTO: 32.8 PG (ref 27–31)
MCHC RBC AUTO-ENTMCNC: 33.9 G/DL (ref 32–36)
MCV RBC AUTO: 97 FL (ref 82–98)
MONOCYTES # BLD AUTO: 0.5 K/UL (ref 0.3–1)
MONOCYTES NFR BLD: 9 % (ref 4–15)
NEUTROPHILS # BLD AUTO: 2.9 K/UL (ref 1.8–7.7)
NEUTROPHILS NFR BLD: 51.4 % (ref 38–73)
NONHDLC SERPL-MCNC: 236 MG/DL
NRBC BLD-RTO: 0 /100 WBC
PLATELET # BLD AUTO: 219 K/UL (ref 150–450)
PMV BLD AUTO: 10 FL (ref 9.2–12.9)
POTASSIUM SERPL-SCNC: 3.8 MMOL/L (ref 3.5–5.1)
PROT SERPL-MCNC: 7.6 G/DL (ref 6–8.4)
RBC # BLD AUTO: 4.39 M/UL (ref 4.6–6.2)
SODIUM SERPL-SCNC: 141 MMOL/L (ref 136–145)
TESTOST SERPL-MCNC: 625 NG/DL (ref 304–1227)
TRIGL SERPL-MCNC: 303 MG/DL (ref 30–150)
WBC # BLD AUTO: 5.56 K/UL (ref 3.9–12.7)

## 2024-04-30 PROCEDURE — 36415 COLL VENOUS BLD VENIPUNCTURE: CPT | Mod: HCNC,PO | Performed by: FAMILY MEDICINE

## 2024-04-30 PROCEDURE — 83036 HEMOGLOBIN GLYCOSYLATED A1C: CPT | Mod: HCNC | Performed by: FAMILY MEDICINE

## 2024-04-30 PROCEDURE — 84403 ASSAY OF TOTAL TESTOSTERONE: CPT | Mod: HCNC | Performed by: FAMILY MEDICINE

## 2024-04-30 PROCEDURE — 90739 HEPB VACC 2/4 DOSE ADULT IM: CPT | Mod: HCNC,S$GLB,, | Performed by: FAMILY MEDICINE

## 2024-04-30 PROCEDURE — 80053 COMPREHEN METABOLIC PANEL: CPT | Mod: HCNC | Performed by: FAMILY MEDICINE

## 2024-04-30 PROCEDURE — G0010 ADMIN HEPATITIS B VACCINE: HCPCS | Mod: HCNC,S$GLB,, | Performed by: FAMILY MEDICINE

## 2024-04-30 PROCEDURE — 80061 LIPID PANEL: CPT | Mod: HCNC | Performed by: FAMILY MEDICINE

## 2024-04-30 PROCEDURE — 85025 COMPLETE CBC W/AUTO DIFF WBC: CPT | Mod: HCNC | Performed by: FAMILY MEDICINE

## 2024-04-30 PROCEDURE — 84153 ASSAY OF PSA TOTAL: CPT | Mod: HCNC | Performed by: FAMILY MEDICINE

## 2024-04-30 NOTE — PROGRESS NOTES
Administered Hepatitis B Vaccine .Patient tolerated well. VIS given. Advised to wait 15 minutes.

## 2024-05-02 ENCOUNTER — TELEPHONE (OUTPATIENT)
Dept: FAMILY MEDICINE | Facility: CLINIC | Age: 59
End: 2024-05-02
Payer: MEDICARE

## 2024-05-02 DIAGNOSIS — E11.9 CONTROLLED TYPE 2 DIABETES MELLITUS WITHOUT COMPLICATION, UNSPECIFIED WHETHER LONG TERM INSULIN USE: Primary | Chronic | ICD-10-CM

## 2024-05-02 DIAGNOSIS — E11.65 CONTROLLED TYPE 2 DIABETES MELLITUS WITH HYPERGLYCEMIA, UNSPECIFIED WHETHER LONG TERM INSULIN USE: Chronic | ICD-10-CM

## 2024-05-02 DIAGNOSIS — E78.2 MIXED HYPERLIPIDEMIA: Primary | Chronic | ICD-10-CM

## 2024-05-02 RX ORDER — ATORVASTATIN CALCIUM 80 MG/1
80 TABLET, FILM COATED ORAL DAILY
Qty: 90 TABLET | Refills: 3 | Status: ON HOLD | OUTPATIENT
Start: 2024-05-02 | End: 2024-06-19

## 2024-05-02 RX ORDER — METFORMIN HYDROCHLORIDE 1000 MG/1
1000 TABLET ORAL 2 TIMES DAILY WITH MEALS
Status: CANCELLED | OUTPATIENT
Start: 2024-05-02

## 2024-05-02 RX ORDER — METFORMIN HYDROCHLORIDE 1000 MG/1
1000 TABLET ORAL 2 TIMES DAILY WITH MEALS
Qty: 180 TABLET | Refills: 1 | Status: SHIPPED | OUTPATIENT
Start: 2024-05-02

## 2024-05-02 NOTE — PROGRESS NOTES
Please check to see if patient is taking his atorvastatin and metformin as his blood sugars and cholesterol are elevated    Thanks  Dr. Pryor

## 2024-05-02 NOTE — TELEPHONE ENCOUNTER
Attempted to contact pt regarding message below, no answer, left VM instructing pt to return call to clinic.

## 2024-05-02 NOTE — TELEPHONE ENCOUNTER
Metformin refilled and I increased his atorvastatin to help control his cholesterol better    Thanks  Dr. Pryor

## 2024-05-02 NOTE — TELEPHONE ENCOUNTER
----- Message from Stevo Pryor MD sent at 5/2/2024 11:39 AM CDT -----  Please check to see if patient is taking his atorvastatin and metformin as his blood sugars and cholesterol are elevated    Thanks  Dr. Pryor

## 2024-05-02 NOTE — TELEPHONE ENCOUNTER
No care due was identified.  [facility] Embedded Care Due Messages. Reference number: [id number].   5/02/2024 4:39:28 PM CDT

## 2024-05-02 NOTE — TELEPHONE ENCOUNTER
----- Message from Meri Sevilla sent at 5/2/2024  3:12 PM CDT -----  Regarding: self  Type:  Patient Returning Call     Who Called:self     Who Left Message for Patient:Stevo Moran LPN     Does the patient know what this is regarding?:results     Would the patient rather a call back or a response via [facility]?  Call back     Best Call Back Number: [phone]       Additional Information:     Thank you.

## 2024-05-02 NOTE — TELEPHONE ENCOUNTER
Pt given results as stated by MD below, pt states he has not taken Metformin in about 3 weeks because he is out and needs a refill. Pt states he is taking his Atorvastatin daily. Pt instructed to call office for refills when out of meds in the future.

## 2024-05-08 DIAGNOSIS — E11.9 TYPE 2 DIABETES MELLITUS WITHOUT COMPLICATION: ICD-10-CM

## 2024-05-27 ENCOUNTER — OFFICE VISIT (OUTPATIENT)
Dept: FAMILY MEDICINE | Facility: CLINIC | Age: 59
End: 2024-05-27
Payer: MEDICARE

## 2024-05-27 ENCOUNTER — LAB VISIT (OUTPATIENT)
Dept: LAB | Facility: HOSPITAL | Age: 59
End: 2024-05-27
Attending: FAMILY MEDICINE
Payer: MEDICARE

## 2024-05-27 VITALS
BODY MASS INDEX: 23.69 KG/M2 | OXYGEN SATURATION: 97 % | SYSTOLIC BLOOD PRESSURE: 110 MMHG | DIASTOLIC BLOOD PRESSURE: 60 MMHG | RESPIRATION RATE: 18 BRPM | HEIGHT: 64 IN | HEART RATE: 103 BPM | WEIGHT: 138.75 LBS | TEMPERATURE: 98 F

## 2024-05-27 DIAGNOSIS — I69.351 HEMIPLEGIA AND HEMIPARESIS FOLLOWING CEREBRAL INFARCTION AFFECTING RIGHT DOMINANT SIDE: ICD-10-CM

## 2024-05-27 DIAGNOSIS — E78.5 DYSLIPIDEMIA: Primary | ICD-10-CM

## 2024-05-27 DIAGNOSIS — K21.9 GASTROESOPHAGEAL REFLUX DISEASE, UNSPECIFIED WHETHER ESOPHAGITIS PRESENT: Chronic | ICD-10-CM

## 2024-05-27 DIAGNOSIS — E11.65 CONTROLLED TYPE 2 DIABETES MELLITUS WITH HYPERGLYCEMIA, UNSPECIFIED WHETHER LONG TERM INSULIN USE: Chronic | ICD-10-CM

## 2024-05-27 DIAGNOSIS — Z72.0 TOBACCO ABUSE: Chronic | ICD-10-CM

## 2024-05-27 DIAGNOSIS — Z86.73 HISTORY OF CVA (CEREBROVASCULAR ACCIDENT): Chronic | ICD-10-CM

## 2024-05-27 DIAGNOSIS — K74.60 CIRRHOSIS OF LIVER WITHOUT ASCITES, UNSPECIFIED HEPATIC CIRRHOSIS TYPE: Chronic | ICD-10-CM

## 2024-05-27 DIAGNOSIS — E11.9 TYPE 2 DIABETES MELLITUS WITHOUT COMPLICATION: ICD-10-CM

## 2024-05-27 DIAGNOSIS — F10.21 ALCOHOL USE DISORDER, SEVERE, IN SUSTAINED REMISSION: ICD-10-CM

## 2024-05-27 DIAGNOSIS — B18.2 CHRONIC HEPATITIS C WITHOUT HEPATIC COMA: Chronic | ICD-10-CM

## 2024-05-27 LAB
ALBUMIN/CREAT UR: 9.7 UG/MG (ref 0–30)
CREAT UR-MCNC: 145 MG/DL (ref 23–375)
MICROALBUMIN UR DL<=1MG/L-MCNC: 14 UG/ML

## 2024-05-27 PROCEDURE — G2211 COMPLEX E/M VISIT ADD ON: HCPCS | Mod: HCNC,S$GLB,, | Performed by: FAMILY MEDICINE

## 2024-05-27 PROCEDURE — 3008F BODY MASS INDEX DOCD: CPT | Mod: HCNC,CPTII,S$GLB, | Performed by: FAMILY MEDICINE

## 2024-05-27 PROCEDURE — 82043 UR ALBUMIN QUANTITATIVE: CPT | Mod: HCNC | Performed by: FAMILY MEDICINE

## 2024-05-27 PROCEDURE — 3044F HG A1C LEVEL LT 7.0%: CPT | Mod: HCNC,CPTII,S$GLB, | Performed by: FAMILY MEDICINE

## 2024-05-27 PROCEDURE — 3074F SYST BP LT 130 MM HG: CPT | Mod: HCNC,CPTII,S$GLB, | Performed by: FAMILY MEDICINE

## 2024-05-27 PROCEDURE — 1160F RVW MEDS BY RX/DR IN RCRD: CPT | Mod: HCNC,CPTII,S$GLB, | Performed by: FAMILY MEDICINE

## 2024-05-27 PROCEDURE — 1159F MED LIST DOCD IN RCRD: CPT | Mod: HCNC,CPTII,S$GLB, | Performed by: FAMILY MEDICINE

## 2024-05-27 PROCEDURE — 99214 OFFICE O/P EST MOD 30 MIN: CPT | Mod: HCNC,S$GLB,, | Performed by: FAMILY MEDICINE

## 2024-05-27 PROCEDURE — 3078F DIAST BP <80 MM HG: CPT | Mod: HCNC,CPTII,S$GLB, | Performed by: FAMILY MEDICINE

## 2024-05-27 PROCEDURE — 99999 PR PBB SHADOW E&M-EST. PATIENT-LVL V: CPT | Mod: PBBFAC,HCNC,, | Performed by: FAMILY MEDICINE

## 2024-05-27 NOTE — PROGRESS NOTES
Routine Office Visit    Patient Name: Nino Price    : 1965  MRN: [id number]    Subjective:  Nino is a 59 y.o. male who presents today for:    1. Follow up  Patient presenting today for follow up of dyslipidemia, type 2 DM, depression, and hemiparesis s/p cva.  He has been taking all medications as prescribed.  No recent illnesses.  He had labs done last month and reports he is taking all medications without adverse reaction.  He reports seeing an eye doctor on [email or web address], but does not recall the name.  He states he was seen there in the past year.  He sees ortho at [facility] for his back and chronic left shoulder pain.  He has also seen hepatology to manage cirrhosis secondary to hep c infection that he was treated from in .      Past Medical History  Past Medical History:   Diagnosis Date    Anxiety     Asthma     Colitis     Depression     Diabetes mellitus     Head injury     History of hepatitis C, s/p successful hcv treatment w/ SVR -2015  10/8/2012    SVR(24) as of 2015.  SVR(12) as of 2015.  completed harvoni 24 week regimen for genotype 1a 5/18/15     Hypertension     Shoulder pain     left, s/p 4 surgeries.     Stroke        Past Surgical History  Past Surgical History:   Procedure Laterality Date    COLONOSCOPY N/A 10/20/2015    Procedure: COLONOSCOPY;  Surgeon: Jagdish Patricia MD;  Location: [facility] ENDO;  Service: Endoscopy;  Laterality: N/A;    COLONOSCOPY N/A 2024    Procedure: COLONOSCOPY;  Surgeon: Pricila Shanks MD;  Location: [facility] ENDO;  Service: Endoscopy;  Laterality: N/A;    DIAGNOSTIC LAPAROSCOPY N/A 2021    Procedure: LAPAROSCOPY, DIAGNOSTIC;  Surgeon: Umesh Vallecillo MD;  Location: [facility] OR;  Service: General;  Laterality: N/A;  RN PREOP 3/29/21, COVID NEGATIVE ON 3/29  CONSENT AND H/P INCOMPLETE    ESOPHAGOGASTRODUODENOSCOPY N/A 2024    Procedure: EGD (ESOPHAGOGASTRODUODENOSCOPY);  Surgeon: Pricila Shanks MD;  Location: [facility] ENDO;  Service:  Endoscopy;  Laterality: N/A;  prep instructions mailed to pt / [facility] pt / Urgent/suprep  1/19- lvm for pc. DBM  1/23- left 2nd vm for pc. DBM  1/24 cirrohsis-labs prior, precall complete-st    GALLBLADDER SURGERY      pt not 100% if gall bladder removed    rotator cuff      left side       Family History  Family History   Problem Relation Name Age of Onset    Hypertension Mother      Heart disease Father      Cirrhosis Brother      Colon cancer Neg Hx      Esophageal cancer Neg Hx         Social History  Social History     Socioeconomic History    Marital status:     Number of children: 3   Tobacco Use    Smoking status: Every Day     Current packs/day: 1.00     Average packs/day: 1 pack/day for 35.0 years (35.0 ttl pk-yrs)     Types: Cigarettes     Passive exposure: Current    Smokeless tobacco: Never    Tobacco comments:     Will try to stop smoking by himself vs    Substance and Sexual Activity    Alcohol use: No    Drug use: Yes     Frequency: 7.0 times per week     Types: Marijuana    Sexual activity: Yes     Partners: Female   Social History Narrative    Reside on .     Lives with wife and young daughter (22yrs)    Not working, on disability since accident    Prior job-[employer]    Hobbies: carving, fishing    Not driving.      Social Determinants of Health     Financial Resource Strain: Medium Risk (5/19/2023)    Overall Financial Resource Strain (CARDIA)     Difficulty of Paying Living Expenses: Somewhat hard   Food Insecurity: No Food Insecurity (5/19/2023)    Hunger Vital Sign     Worried About Running Out of Food in the Last Year: Never true     Ran Out of Food in the Last Year: Never true   Transportation Needs: No Transportation Needs (5/19/2023)    PRAPARE - Transportation     Lack of Transportation (Medical): No     Lack of Transportation (Non-Medical): No   Physical Activity: Inactive (5/19/2023)    Exercise Vital Sign     Days of Exercise per Week: 0 days     Minutes of Exercise per  Session: 0 min   Stress: No Stress Concern Present (5/19/2023)    [employer] - Occupational Stress Questionnaire     Feeling of Stress : Only a little   Housing Stability: Low Risk  (5/19/2023)    Housing Stability Vital Sign     Unable to Pay for Housing in the Last Year: No     Number of Places Lived in the Last Year: 1     Unstable Housing in the Last Year: No       Current Medications  Current Outpatient Medications on File Prior to Visit   Medication Sig Dispense Refill    acetaminophen (TYLENOL) 500 MG tablet Take 1 tablet (500 mg total) by mouth every 6 (six) hours as needed for Pain (and fever). 30 tablet 0    aspirin (ECOTRIN) 81 MG EC tablet Take 1 tablet (81 mg total) by mouth once daily. 30 tablet 3    atorvastatin (LIPITOR) 80 MG tablet Take 1 tablet (80 mg total) by mouth once daily. 90 tablet 3    buPROPion (WELLBUTRIN XL) 150 MG TB24 tablet Take 1 tablet by mouth once daily.      busPIRone (BUSPAR) 10 MG tablet Take 5 mg by mouth every evening.      dicyclomine (BENTYL) 20 mg tablet Take 1 tablet (20 mg total) by mouth 2 (two) times daily as needed (stomach pain). 30 tablet 0    DULoxetine (CYMBALTA) 60 MG capsule Take 60 mg by mouth once daily. Takes in pm      gabapentin (NEURONTIN) 300 MG capsule Take 300 mg by mouth 3 (three) times daily.      HYDROcodone-acetaminophen (NORCO)  mg per tablet Take 1 tablet by mouth every 6 (six) hours as needed.      HYDROmorphone, PF, (DILAUDID) 1 mg/mL Soln       metFORMIN (GLUCOPHAGE) 1000 MG tablet Take 1 tablet (1,000 mg total) by mouth 2 (two) times daily with meals. 180 tablet 1    metoclopramide HCl (REGLAN) 10 MG tablet Take 1 tablet (10 mg total) by mouth every 6 (six) hours. 30 tablet 0    mirtazapine (REMERON) 15 MG tablet Take 15 mg by mouth every evening.      mirtazapine (REMERON) 45 MG tablet Take 1 tablet by mouth every evening.      morphine 4 mg/mL Soln       NAMENDA 10 mg Tab Take 10 mg by mouth once daily.    "    OMNIPAQUE 350 350 mg iodine/mL Soln injection       ondansetron (ZOFRAN-ODT) 4 MG TbDL Take 1 tablet (4 mg total) by mouth every 6 (six) hours as needed (nausea). 15 tablet 0    ondansetron 4 mg/2 mL Soln       oxyCODONE-acetaminophen (PERCOCET)  mg per tablet TAKE 1 TABLET AS NEEDED ORALLY EVERY 4 TO 6 HOURS      pantoprazole (PROTONIX) 40 MG tablet Take 1 tablet (40 mg total) by mouth once daily. 30 tablet 11    pantoprazole (PROTONIX) 40 MG tablet Take 1 tablet by mouth once daily.      propranoloL (INDERAL) 20 MG tablet Take 10 mg by mouth 2 (two) times daily.      tiZANidine (ZANAFLEX) 4 MG tablet Take 4 mg by mouth every evening.      traZODone (DESYREL) 50 MG tablet Take 100 mg by mouth every evening.      TRUE METRIX GLUCOSE TEST STRIP Strp       valsartan-hydrochlorothiazide (DIOVAN-HCT) 160-25 mg per tablet TAKE 1 TABLET BY MOUTH EVERY DAY 90 tablet 3    buPROPion (WELLBUTRIN XL) 300 MG 24 hr tablet Take 1 tablet by mouth every morning. (Patient not taking: Reported on 5/27/2024)      cyclobenzaprine (FLEXERIL) 5 MG tablet Take 5 mg by mouth nightly as needed. (Patient not taking: Reported on 5/27/2024)      traZODone (DESYREL) 100 MG tablet Take 150 mg by mouth. (Patient not taking: Reported on 5/27/2024)      TRUE METRIX GLUCOSE METER Misc  (Patient not taking: Reported on 5/27/2024)       No current facility-administered medications on file prior to visit.       Allergies   Review of patient's allergies indicates:   Allergen Reactions    Codeine Nausea Only       Review of Systems (Pertinent positives)  Review of Systems   Constitutional: Negative.    HENT: Negative.     Eyes: Negative.    Respiratory: Negative.     Cardiovascular: Negative.    Gastrointestinal: Negative.    Genitourinary: Negative.    Musculoskeletal:  Positive for back pain and joint pain.   Skin: Negative.          /60   Pulse 103   Temp 98.2 °F (36.8 °C) (Oral)   Resp 18   Ht 5' 4" (1.626 m)   Wt 62.9 kg (138 lb " 12.5 oz)   SpO2 97%   BMI 23.82 kg/m²     GENERAL APPEARANCE: in no apparent distress and well developed and well nourished  HEENT: PERRL, EOMI, Sclera clear, anicteric, Oropharynx clear, no lesions, Neck supple with midline trachea  NECK: normal, supple, no adenopathy, thyroid normal in size  RESPIRATORY: appears well, vitals normal, no respiratory distress, acyanotic, normal RR, chest clear, no wheezing, crepitations, rhonchi, normal symmetric air entry  HEART: regular rate and rhythm, S1, S2 normal, no murmur, click, rub or gallop.    ABDOMEN: abdomen is soft without tenderness, no masses, no hernias, no organomegaly, no rebound, no guarding. Suprapubic tenderness absent. No CVA tenderness..  Extremities: warm/well perfused.  No abnormal hair patterns. Decreased ROM in left shoulder due to pain  SKIN: no rashes, no wounds, no other lesions  PSYCH: Alert, oriented x 3, thought content appropriate, speech normal, pleasant and cooperative, good eye contact, well groomed    Assessment/Plan:  Nino Price is a 59 y.o. male who presents today for :    Nino was seen today for generalized body aches.    Diagnoses and all orders for this visit:    Dyslipidemia  - Labs up to date and on Statin  - Reports he is taking all meds as prescribed despite cholesterol being higher this time compared to last  - Likely not sticking with diet    Hemiplegia and hemiparesis following cerebral infarction affecting right dominant side  - Improved, but still has mild left sided hemiparesis    History of CVA (cerebrovascular accident)  - Abstaining from smoking and alcohol use  - On statin  - A1c controlled  - Htn conrolled    Alcohol use disorder, severe, in sustained remission  - continue abstaining from alcohol      Chronic hepatitis C without hepatic coma  - Managed by hepatology  - Will continue following up    Cirrhosis of liver without ascites, unspecified hepatic cirrhosis type  - Fibrosis noted on  fibroscan    Gastroesophageal reflux disease, unspecified whether esophagitis present  - Stable  - Had recent EGD    Tobacco abuse  - Encouraged quitting smoking, but not interested at this time

## 2024-06-16 ENCOUNTER — HOSPITAL ENCOUNTER (INPATIENT)
Facility: HOSPITAL | Age: 59
LOS: 2 days | Discharge: HOME | DRG: 322 | End: 2024-06-19
Attending: STUDENT IN AN ORGANIZED HEALTH CARE EDUCATION/TRAINING PROGRAM | Admitting: STUDENT IN AN ORGANIZED HEALTH CARE EDUCATION/TRAINING PROGRAM
Payer: MEDICARE

## 2024-06-16 DIAGNOSIS — F10.21 ALCOHOL USE DISORDER, SEVERE, IN SUSTAINED REMISSION: ICD-10-CM

## 2024-06-16 DIAGNOSIS — Z72.0 TOBACCO ABUSE: Chronic | ICD-10-CM

## 2024-06-16 DIAGNOSIS — E78.5 DYSLIPIDEMIA: ICD-10-CM

## 2024-06-16 DIAGNOSIS — B18.2 CHRONIC HEPATITIS C WITHOUT HEPATIC COMA: Chronic | ICD-10-CM

## 2024-06-16 DIAGNOSIS — I10 ESSENTIAL HYPERTENSION: Chronic | ICD-10-CM

## 2024-06-16 DIAGNOSIS — E83.39 HYPOPHOSPHATEMIA: ICD-10-CM

## 2024-06-16 DIAGNOSIS — R79.89 ELEVATED TROPONIN I LEVEL: ICD-10-CM

## 2024-06-16 DIAGNOSIS — R79.89 ELEVATED TROPONIN: ICD-10-CM

## 2024-06-16 DIAGNOSIS — R11.14 BILIOUS VOMITING WITH NAUSEA: ICD-10-CM

## 2024-06-16 DIAGNOSIS — I21.4 NSTEMI (NON-ST ELEVATED MYOCARDIAL INFARCTION): Primary | ICD-10-CM

## 2024-06-16 DIAGNOSIS — E78.2 MIXED HYPERLIPIDEMIA: Chronic | ICD-10-CM

## 2024-06-16 DIAGNOSIS — I70.0 AORTIC ATHEROSCLEROSIS: ICD-10-CM

## 2024-06-16 DIAGNOSIS — E11.65 CONTROLLED TYPE 2 DIABETES MELLITUS WITH HYPERGLYCEMIA, UNSPECIFIED WHETHER LONG TERM INSULIN USE: Chronic | ICD-10-CM

## 2024-06-16 DIAGNOSIS — R10.33 PERIUMBILICAL ABDOMINAL PAIN: ICD-10-CM

## 2024-06-16 DIAGNOSIS — R07.9 CHEST PAIN: ICD-10-CM

## 2024-06-16 DIAGNOSIS — I69.351 HEMIPLEGIA AND HEMIPARESIS FOLLOWING CEREBRAL INFARCTION AFFECTING RIGHT DOMINANT SIDE: ICD-10-CM

## 2024-06-16 DIAGNOSIS — F12.10 CANNABIS ABUSE: Chronic | ICD-10-CM

## 2024-06-16 DIAGNOSIS — R73.9 HYPERGLYCEMIA: ICD-10-CM

## 2024-06-16 DIAGNOSIS — K57.32 SIGMOID DIVERTICULITIS: ICD-10-CM

## 2024-06-16 DIAGNOSIS — K74.02 HEPATIC FIBROSIS, ADVANCED FIBROSIS: ICD-10-CM

## 2024-06-16 DIAGNOSIS — K20.90 ESOPHAGITIS: ICD-10-CM

## 2024-06-16 DIAGNOSIS — R10.13 EPIGASTRIC ABDOMINAL PAIN: ICD-10-CM

## 2024-06-16 DIAGNOSIS — Z86.73 HISTORY OF CVA (CEREBROVASCULAR ACCIDENT): Chronic | ICD-10-CM

## 2024-06-16 DIAGNOSIS — Z98.890 HISTORY OF LIVER BIOPSY: ICD-10-CM

## 2024-06-16 DIAGNOSIS — K74.60 CIRRHOSIS OF LIVER WITHOUT ASCITES, UNSPECIFIED HEPATIC CIRRHOSIS TYPE: Chronic | ICD-10-CM

## 2024-06-16 DIAGNOSIS — E83.42 HYPOMAGNESEMIA: ICD-10-CM

## 2024-06-16 DIAGNOSIS — K74.60 HEPATIC CIRRHOSIS, UNSPECIFIED HEPATIC CIRRHOSIS TYPE, UNSPECIFIED WHETHER ASCITES PRESENT: ICD-10-CM

## 2024-06-16 DIAGNOSIS — I25.10 CAD (CORONARY ARTERY DISEASE): ICD-10-CM

## 2024-06-16 DIAGNOSIS — I25.119 CORONARY ARTERY DISEASE INVOLVING NATIVE CORONARY ARTERY OF NATIVE HEART WITH ANGINA PECTORIS: ICD-10-CM

## 2024-06-16 DIAGNOSIS — I24.9 ACS (ACUTE CORONARY SYNDROME): ICD-10-CM

## 2024-06-16 DIAGNOSIS — Z86.19 HISTORY OF HEPATITIS C: ICD-10-CM

## 2024-06-16 DIAGNOSIS — K21.9 GASTROESOPHAGEAL REFLUX DISEASE, UNSPECIFIED WHETHER ESOPHAGITIS PRESENT: Chronic | ICD-10-CM

## 2024-06-16 DIAGNOSIS — F32.A DEPRESSION, UNSPECIFIED DEPRESSION TYPE: Chronic | ICD-10-CM

## 2024-06-16 LAB
ALBUMIN SERPL BCP-MCNC: 4.3 G/DL (ref 3.5–5.2)
ALLENS TEST: ABNORMAL
ALP SERPL-CCNC: 83 U/L (ref 55–135)
ALT SERPL W/O P-5'-P-CCNC: 18 U/L (ref 10–44)
ANION GAP SERPL CALC-SCNC: 15 MMOL/L (ref 8–16)
ANION GAP SERPL CALC-SCNC: 18 MMOL/L (ref 8–16)
AST SERPL-CCNC: 15 U/L (ref 10–40)
BASOPHILS # BLD AUTO: 0.04 K/UL (ref 0–0.2)
BASOPHILS NFR BLD: 0.4 % (ref 0–1.9)
BILIRUB SERPL-MCNC: 0.5 MG/DL (ref 0.1–1)
BUN SERPL-MCNC: 17 MG/DL (ref 6–20)
BUN SERPL-MCNC: 18 MG/DL (ref 6–30)
CALCIUM SERPL-MCNC: 10.5 MG/DL (ref 8.7–10.5)
CHLORIDE SERPL-SCNC: 105 MMOL/L (ref 95–110)
CHLORIDE SERPL-SCNC: 107 MMOL/L (ref 95–110)
CO2 SERPL-SCNC: 20 MMOL/L (ref 23–29)
CREAT SERPL-MCNC: 1.1 MG/DL (ref 0.5–1.4)
CREAT SERPL-MCNC: 1.4 MG/DL (ref 0.5–1.4)
DELSYS: ABNORMAL
DIFFERENTIAL METHOD BLD: ABNORMAL
EOSINOPHIL # BLD AUTO: 0 K/UL (ref 0–0.5)
EOSINOPHIL NFR BLD: 0 % (ref 0–8)
ERYTHROCYTE [DISTWIDTH] IN BLOOD BY AUTOMATED COUNT: 12.7 % (ref 11.5–14.5)
EST. GFR  (NO RACE VARIABLE): 58 ML/MIN/1.73 M^2
FIO2: 21
GLUCOSE SERPL-MCNC: 216 MG/DL (ref 70–110)
GLUCOSE SERPL-MCNC: 225 MG/DL (ref 70–110)
HCT VFR BLD AUTO: 45.3 % (ref 40–54)
HCT VFR BLD CALC: 42 %PCV (ref 36–54)
HGB BLD-MCNC: 15.5 G/DL (ref 14–18)
IMM GRANULOCYTES # BLD AUTO: 0.03 K/UL (ref 0–0.04)
IMM GRANULOCYTES NFR BLD AUTO: 0.3 % (ref 0–0.5)
LACTATE SERPL-SCNC: 2.3 MMOL/L (ref 0.5–2.2)
LACTATE SERPL-SCNC: 2.7 MMOL/L (ref 0.5–2.2)
LIPASE SERPL-CCNC: 21 U/L (ref 4–60)
LYMPHOCYTES # BLD AUTO: 1.1 K/UL (ref 1–4.8)
LYMPHOCYTES NFR BLD: 10 % (ref 18–48)
MCH RBC QN AUTO: 32.1 PG (ref 27–31)
MCHC RBC AUTO-ENTMCNC: 34.2 G/DL (ref 32–36)
MCV RBC AUTO: 94 FL (ref 82–98)
MODE: ABNORMAL
MONOCYTES # BLD AUTO: 0.6 K/UL (ref 0.3–1)
MONOCYTES NFR BLD: 5 % (ref 4–15)
NEUTROPHILS # BLD AUTO: 9.2 K/UL (ref 1.8–7.7)
NEUTROPHILS NFR BLD: 84.3 % (ref 38–73)
NRBC BLD-RTO: 0 /100 WBC
PLATELET # BLD AUTO: 224 K/UL (ref 150–450)
PMV BLD AUTO: 10.5 FL (ref 9.2–12.9)
POC IONIZED CALCIUM: 1.29 MMOL/L (ref 1.06–1.42)
POC TCO2 (MEASURED): 21 MMOL/L (ref 23–29)
POTASSIUM BLD-SCNC: 4.7 MMOL/L (ref 3.5–5.1)
POTASSIUM SERPL-SCNC: 5.1 MMOL/L (ref 3.5–5.1)
PROT SERPL-MCNC: 8.5 G/DL (ref 6–8.4)
RBC # BLD AUTO: 4.83 M/UL (ref 4.6–6.2)
SAMPLE: ABNORMAL
SITE: ABNORMAL
SODIUM BLD-SCNC: 141 MMOL/L (ref 136–145)
SODIUM SERPL-SCNC: 140 MMOL/L (ref 136–145)
TROPONIN I SERPL DL<=0.01 NG/ML-MCNC: 0.03 NG/ML (ref 0–0.03)
TROPONIN I SERPL DL<=0.01 NG/ML-MCNC: 0.08 NG/ML (ref 0–0.03)
WBC # BLD AUTO: 10.91 K/UL (ref 3.9–12.7)

## 2024-06-16 PROCEDURE — 25500020 PHARM REV CODE 255: Mod: HCNC | Performed by: STUDENT IN AN ORGANIZED HEALTH CARE EDUCATION/TRAINING PROGRAM

## 2024-06-16 PROCEDURE — 96375 TX/PRO/DX INJ NEW DRUG ADDON: CPT

## 2024-06-16 PROCEDURE — 25000003 PHARM REV CODE 250: Mod: HCNC

## 2024-06-16 PROCEDURE — 82565 ASSAY OF CREATININE: CPT | Mod: HCNC,ER

## 2024-06-16 PROCEDURE — 82330 ASSAY OF CALCIUM: CPT | Mod: HCNC,ER

## 2024-06-16 PROCEDURE — 81000 URINALYSIS NONAUTO W/SCOPE: CPT | Mod: HCNC,59

## 2024-06-16 PROCEDURE — 83605 ASSAY OF LACTIC ACID: CPT | Mod: HCNC

## 2024-06-16 PROCEDURE — 96361 HYDRATE IV INFUSION ADD-ON: CPT

## 2024-06-16 PROCEDURE — 93010 ELECTROCARDIOGRAM REPORT: CPT | Mod: HCNC,,, | Performed by: INTERNAL MEDICINE

## 2024-06-16 PROCEDURE — 84132 ASSAY OF SERUM POTASSIUM: CPT | Mod: HCNC,ER

## 2024-06-16 PROCEDURE — 83690 ASSAY OF LIPASE: CPT | Mod: HCNC

## 2024-06-16 PROCEDURE — 99900035 HC TECH TIME PER 15 MIN (STAT): Mod: HCNC,ER

## 2024-06-16 PROCEDURE — 63600175 PHARM REV CODE 636 W HCPCS: Mod: HCNC

## 2024-06-16 PROCEDURE — 96376 TX/PRO/DX INJ SAME DRUG ADON: CPT

## 2024-06-16 PROCEDURE — 99285 EMERGENCY DEPT VISIT HI MDM: CPT | Mod: 25

## 2024-06-16 PROCEDURE — 84484 ASSAY OF TROPONIN QUANT: CPT | Mod: 91,HCNC

## 2024-06-16 PROCEDURE — 84295 ASSAY OF SERUM SODIUM: CPT | Mod: HCNC,ER

## 2024-06-16 PROCEDURE — 80053 COMPREHEN METABOLIC PANEL: CPT | Mod: HCNC

## 2024-06-16 PROCEDURE — C9113 INJ PANTOPRAZOLE SODIUM, VIA: HCPCS | Mod: HCNC

## 2024-06-16 PROCEDURE — 80307 DRUG TEST PRSMV CHEM ANLYZR: CPT | Mod: HCNC

## 2024-06-16 PROCEDURE — 93005 ELECTROCARDIOGRAM TRACING: CPT | Mod: HCNC

## 2024-06-16 PROCEDURE — G0378 HOSPITAL OBSERVATION PER HR: HCPCS | Mod: HCNC

## 2024-06-16 PROCEDURE — 85025 COMPLETE CBC W/AUTO DIFF WBC: CPT | Mod: HCNC

## 2024-06-16 PROCEDURE — 85014 HEMATOCRIT: CPT | Mod: HCNC,ER

## 2024-06-16 RX ORDER — VALSARTAN 80 MG/1
160 TABLET ORAL
Status: COMPLETED | OUTPATIENT
Start: 2024-06-16 | End: 2024-06-16

## 2024-06-16 RX ORDER — ALUMINUM HYDROXIDE, MAGNESIUM HYDROXIDE, AND SIMETHICONE 1200; 120; 1200 MG/30ML; MG/30ML; MG/30ML
30 SUSPENSION ORAL ONCE
Status: COMPLETED | OUTPATIENT
Start: 2024-06-16 | End: 2024-06-16

## 2024-06-16 RX ORDER — ALUMINUM HYDROXIDE, MAGNESIUM HYDROXIDE, AND SIMETHICONE 1200; 120; 1200 MG/30ML; MG/30ML; MG/30ML
30 SUSPENSION ORAL 4 TIMES DAILY PRN
Status: DISCONTINUED | OUTPATIENT
Start: 2024-06-16 | End: 2024-06-19 | Stop reason: HOSPADM

## 2024-06-16 RX ORDER — IBUPROFEN 200 MG
24 TABLET ORAL
Status: DISCONTINUED | OUTPATIENT
Start: 2024-06-16 | End: 2024-06-19 | Stop reason: HOSPADM

## 2024-06-16 RX ORDER — IBUPROFEN 200 MG
16 TABLET ORAL
Status: DISCONTINUED | OUTPATIENT
Start: 2024-06-16 | End: 2024-06-19 | Stop reason: HOSPADM

## 2024-06-16 RX ORDER — TALC
6 POWDER (GRAM) TOPICAL NIGHTLY PRN
Status: DISCONTINUED | OUTPATIENT
Start: 2024-06-16 | End: 2024-06-19 | Stop reason: HOSPADM

## 2024-06-16 RX ORDER — ONDANSETRON HYDROCHLORIDE 2 MG/ML
4 INJECTION, SOLUTION INTRAVENOUS EVERY 8 HOURS PRN
Status: DISCONTINUED | OUTPATIENT
Start: 2024-06-16 | End: 2024-06-19 | Stop reason: HOSPADM

## 2024-06-16 RX ORDER — MORPHINE SULFATE 4 MG/ML
6 INJECTION, SOLUTION INTRAMUSCULAR; INTRAVENOUS
Status: COMPLETED | OUTPATIENT
Start: 2024-06-16 | End: 2024-06-16

## 2024-06-16 RX ORDER — NALOXONE HCL 0.4 MG/ML
0.02 VIAL (ML) INJECTION
Status: DISCONTINUED | OUTPATIENT
Start: 2024-06-16 | End: 2024-06-19 | Stop reason: HOSPADM

## 2024-06-16 RX ORDER — INSULIN ASPART 100 [IU]/ML
0-10 INJECTION, SOLUTION INTRAVENOUS; SUBCUTANEOUS EVERY 6 HOURS PRN
Status: DISCONTINUED | OUTPATIENT
Start: 2024-06-16 | End: 2024-06-19 | Stop reason: HOSPADM

## 2024-06-16 RX ORDER — ASPIRIN 325 MG
325 TABLET ORAL ONCE
Status: COMPLETED | OUTPATIENT
Start: 2024-06-16 | End: 2024-06-16

## 2024-06-16 RX ORDER — METOCLOPRAMIDE HYDROCHLORIDE 5 MG/ML
10 INJECTION INTRAMUSCULAR; INTRAVENOUS
Status: COMPLETED | OUTPATIENT
Start: 2024-06-16 | End: 2024-06-16

## 2024-06-16 RX ORDER — LIDOCAINE HYDROCHLORIDE 20 MG/ML
15 SOLUTION OROPHARYNGEAL ONCE
Status: COMPLETED | OUTPATIENT
Start: 2024-06-16 | End: 2024-06-16

## 2024-06-16 RX ORDER — HYDROCHLOROTHIAZIDE 25 MG/1
25 TABLET ORAL
Status: COMPLETED | OUTPATIENT
Start: 2024-06-16 | End: 2024-06-16

## 2024-06-16 RX ORDER — AMOXICILLIN 250 MG
1 CAPSULE ORAL DAILY PRN
Status: DISCONTINUED | OUTPATIENT
Start: 2024-06-16 | End: 2024-06-19 | Stop reason: HOSPADM

## 2024-06-16 RX ORDER — ONDANSETRON HYDROCHLORIDE 2 MG/ML
8 INJECTION, SOLUTION INTRAVENOUS
Status: COMPLETED | OUTPATIENT
Start: 2024-06-16 | End: 2024-06-16

## 2024-06-16 RX ORDER — ACETAMINOPHEN 325 MG/1
650 TABLET ORAL EVERY 4 HOURS PRN
Status: DISCONTINUED | OUTPATIENT
Start: 2024-06-16 | End: 2024-06-19 | Stop reason: HOSPADM

## 2024-06-16 RX ORDER — GLUCAGON 1 MG
1 KIT INJECTION
Status: DISCONTINUED | OUTPATIENT
Start: 2024-06-16 | End: 2024-06-19 | Stop reason: HOSPADM

## 2024-06-16 RX ORDER — MORPHINE SULFATE 4 MG/ML
4 INJECTION, SOLUTION INTRAMUSCULAR; INTRAVENOUS
Status: COMPLETED | OUTPATIENT
Start: 2024-06-16 | End: 2024-06-16

## 2024-06-16 RX ORDER — SODIUM CHLORIDE 0.9 % (FLUSH) 0.9 %
10 SYRINGE (ML) INJECTION EVERY 12 HOURS PRN
Status: DISCONTINUED | OUTPATIENT
Start: 2024-06-16 | End: 2024-06-19 | Stop reason: HOSPADM

## 2024-06-16 RX ORDER — PANTOPRAZOLE SODIUM 40 MG/10ML
40 INJECTION, POWDER, LYOPHILIZED, FOR SOLUTION INTRAVENOUS
Status: COMPLETED | OUTPATIENT
Start: 2024-06-16 | End: 2024-06-16

## 2024-06-16 RX ADMIN — SODIUM CHLORIDE, POTASSIUM CHLORIDE, SODIUM LACTATE AND CALCIUM CHLORIDE 1000 ML: 600; 310; 30; 20 INJECTION, SOLUTION INTRAVENOUS at 07:06

## 2024-06-16 RX ADMIN — VALSARTAN 160 MG: 80 TABLET, FILM COATED ORAL at 09:06

## 2024-06-16 RX ADMIN — ONDANSETRON 8 MG: 2 INJECTION INTRAMUSCULAR; INTRAVENOUS at 07:06

## 2024-06-16 RX ADMIN — LIDOCAINE HYDROCHLORIDE 15 ML: 20 SOLUTION ORAL at 09:06

## 2024-06-16 RX ADMIN — SODIUM CHLORIDE, POTASSIUM CHLORIDE, SODIUM LACTATE AND CALCIUM CHLORIDE 1000 ML: 600; 310; 30; 20 INJECTION, SOLUTION INTRAVENOUS at 10:06

## 2024-06-16 RX ADMIN — METOCLOPRAMIDE 10 MG: 5 INJECTION, SOLUTION INTRAMUSCULAR; INTRAVENOUS at 10:06

## 2024-06-16 RX ADMIN — ASPIRIN 325 MG ORAL TABLET 325 MG: 325 PILL ORAL at 11:06

## 2024-06-16 RX ADMIN — IOHEXOL 75 ML: 350 INJECTION, SOLUTION INTRAVENOUS at 08:06

## 2024-06-16 RX ADMIN — HYDROCHLOROTHIAZIDE 25 MG: 25 TABLET ORAL at 09:06

## 2024-06-16 RX ADMIN — PANTOPRAZOLE SODIUM 40 MG: 40 INJECTION, POWDER, FOR SOLUTION INTRAVENOUS at 09:06

## 2024-06-16 RX ADMIN — MORPHINE SULFATE 4 MG: 4 INJECTION INTRAVENOUS at 07:06

## 2024-06-16 RX ADMIN — ALUMINUM HYDROXIDE, MAGNESIUM HYDROXIDE, AND SIMETHICONE 30 ML: 200; 200; 20 SUSPENSION ORAL at 09:06

## 2024-06-16 RX ADMIN — MORPHINE SULFATE 6 MG: 4 INJECTION INTRAVENOUS at 10:06

## 2024-06-16 NOTE — Clinical Note
The catheter was inserted into the and was inserted over the wire into the distal   circumflex. Distal Circ to LM

## 2024-06-16 NOTE — Clinical Note
The DP pulses were 1+ bilaterally. The PT pulses were 1+ bilaterally. The right radial pulse was +2.

## 2024-06-17 DIAGNOSIS — I25.10 CORONARY ARTERY DISEASE, UNSPECIFIED VESSEL OR LESION TYPE, UNSPECIFIED WHETHER ANGINA PRESENT, UNSPECIFIED WHETHER NATIVE OR TRANSPLANTED HEART: Primary | ICD-10-CM

## 2024-06-17 PROBLEM — I70.0 AORTIC ATHEROSCLEROSIS: Status: ACTIVE | Noted: 2024-06-17

## 2024-06-17 LAB
ALLENS TEST: NORMAL
AMPHET+METHAMPHET UR QL: NEGATIVE
ANION GAP SERPL CALC-SCNC: 11 MMOL/L (ref 8–16)
APTT PPP: 27.7 SEC (ref 21–32)
APTT PPP: 31.6 SEC (ref 21–32)
APTT PPP: >150 SEC (ref 21–32)
AV INDEX (PROSTH): 0.51
AV MEAN GRADIENT: 5 MMHG
AV PEAK GRADIENT: 9 MMHG
AV VALVE AREA BY VELOCITY RATIO: 1.58 CM²
AV VALVE AREA: 1.63 CM²
AV VELOCITY RATIO: 0.5
BACTERIA #/AREA URNS HPF: NORMAL /HPF
BARBITURATES UR QL SCN>200 NG/ML: NEGATIVE
BASOPHILS # BLD AUTO: 0.05 K/UL (ref 0–0.2)
BASOPHILS # BLD AUTO: 0.06 K/UL (ref 0–0.2)
BASOPHILS NFR BLD: 0.5 % (ref 0–1.9)
BASOPHILS NFR BLD: 0.5 % (ref 0–1.9)
BENZODIAZ UR QL SCN>200 NG/ML: NEGATIVE
BILIRUB UR QL STRIP: NEGATIVE
BSA FOR ECHO PROCEDURE: 1.69 M2
BUN SERPL-MCNC: 15 MG/DL (ref 6–20)
BZE UR QL SCN: NEGATIVE
CALCIUM SERPL-MCNC: 9.3 MG/DL (ref 8.7–10.5)
CANNABINOIDS UR QL SCN: ABNORMAL
CHLORIDE SERPL-SCNC: 106 MMOL/L (ref 95–110)
CHOLEST SERPL-MCNC: 124 MG/DL (ref 120–199)
CHOLEST/HDLC SERPL: 5.4 {RATIO} (ref 2–5)
CLARITY UR: CLEAR
CO2 SERPL-SCNC: 25 MMOL/L (ref 23–29)
COLOR UR: YELLOW
CREAT SERPL-MCNC: 1 MG/DL (ref 0.5–1.4)
CREAT UR-MCNC: 75.4 MG/DL (ref 23–375)
CV ECHO LV RWT: 0.38 CM
DIFFERENTIAL METHOD BLD: ABNORMAL
DIFFERENTIAL METHOD BLD: ABNORMAL
DOP CALC AO PEAK VEL: 1.53 M/S
DOP CALC AO VTI: 30.2 CM
DOP CALC LVOT AREA: 3.2 CM2
DOP CALC LVOT DIAMETER: 2.01 CM
DOP CALC LVOT PEAK VEL: 0.76 M/S
DOP CALC LVOT STROKE VOLUME: 49.16 CM3
DOP CALC MV VTI: 35.3 CM
DOP CALCLVOT PEAK VEL VTI: 15.5 CM
E WAVE DECELERATION TIME: 202.91 MSEC
E/A RATIO: 0.79
E/E' RATIO: 9.63 M/S
ECHO LV POSTERIOR WALL: 0.83 CM (ref 0.6–1.1)
EOSINOPHIL # BLD AUTO: 0 K/UL (ref 0–0.5)
EOSINOPHIL # BLD AUTO: 0.1 K/UL (ref 0–0.5)
EOSINOPHIL NFR BLD: 0.2 % (ref 0–8)
EOSINOPHIL NFR BLD: 0.9 % (ref 0–8)
ERYTHROCYTE [DISTWIDTH] IN BLOOD BY AUTOMATED COUNT: 12.9 % (ref 11.5–14.5)
ERYTHROCYTE [DISTWIDTH] IN BLOOD BY AUTOMATED COUNT: 12.9 % (ref 11.5–14.5)
EST. GFR  (NO RACE VARIABLE): >60 ML/MIN/1.73 M^2
ESTIMATED AVG GLUCOSE: 131 MG/DL (ref 68–131)
FRACTIONAL SHORTENING: 17 % (ref 28–44)
GLUCOSE SERPL-MCNC: 118 MG/DL (ref 70–110)
GLUCOSE UR QL STRIP: ABNORMAL
HBA1C MFR BLD: 6.2 % (ref 4–5.6)
HCT VFR BLD AUTO: 37.8 % (ref 40–54)
HCT VFR BLD AUTO: 38.2 % (ref 40–54)
HDLC SERPL-MCNC: 23 MG/DL (ref 40–75)
HDLC SERPL: 18.5 % (ref 20–50)
HGB BLD-MCNC: 12.5 G/DL (ref 14–18)
HGB BLD-MCNC: 12.9 G/DL (ref 14–18)
HGB UR QL STRIP: NEGATIVE
HYALINE CASTS #/AREA URNS LPF: 0 /LPF
IMM GRANULOCYTES # BLD AUTO: 0.02 K/UL (ref 0–0.04)
IMM GRANULOCYTES # BLD AUTO: 0.05 K/UL (ref 0–0.04)
IMM GRANULOCYTES NFR BLD AUTO: 0.2 % (ref 0–0.5)
IMM GRANULOCYTES NFR BLD AUTO: 0.5 % (ref 0–0.5)
INR PPP: 1.1 (ref 0.8–1.2)
INTERVENTRICULAR SEPTUM: 0.95 CM (ref 0.6–1.1)
IVC DIAMETER: 1.72 CM
IVRT: 102.76 MSEC
KETONES UR QL STRIP: ABNORMAL
LA MAJOR: 3.86 CM
LA MINOR: 4.95 CM
LA WIDTH: 3.3 CM
LACTATE SERPL-SCNC: 1.9 MMOL/L (ref 0.5–2.2)
LDLC SERPL CALC-MCNC: 77.8 MG/DL (ref 63–159)
LEFT ATRIUM SIZE: 3.19 CM
LEFT ATRIUM VOLUME INDEX: 23.2 ML/M2
LEFT ATRIUM VOLUME: 38.81 CM3
LEFT INTERNAL DIMENSION IN SYSTOLE: 3.67 CM (ref 2.1–4)
LEFT VENTRICLE DIASTOLIC VOLUME INDEX: 52.53 ML/M2
LEFT VENTRICLE DIASTOLIC VOLUME: 87.72 ML
LEFT VENTRICLE MASS INDEX: 76 G/M2
LEFT VENTRICLE SYSTOLIC VOLUME INDEX: 34.1 ML/M2
LEFT VENTRICLE SYSTOLIC VOLUME: 57 ML
LEFT VENTRICULAR INTERNAL DIMENSION IN DIASTOLE: 4.4 CM (ref 3.5–6)
LEFT VENTRICULAR MASS: 126.1 G
LEUKOCYTE ESTERASE UR QL STRIP: NEGATIVE
LV LATERAL E/E' RATIO: 8.56 M/S
LV SEPTAL E/E' RATIO: 11 M/S
LVOT MG: 1.22 MMHG
LVOT MV: 0.51 CM/S
LYMPHOCYTES # BLD AUTO: 2.4 K/UL (ref 1–4.8)
LYMPHOCYTES # BLD AUTO: 3 K/UL (ref 1–4.8)
LYMPHOCYTES NFR BLD: 21.4 % (ref 18–48)
LYMPHOCYTES NFR BLD: 26.8 % (ref 18–48)
MAGNESIUM SERPL-MCNC: 1.2 MG/DL (ref 1.6–2.6)
MCH RBC QN AUTO: 31.6 PG (ref 27–31)
MCH RBC QN AUTO: 32.2 PG (ref 27–31)
MCHC RBC AUTO-ENTMCNC: 32.7 G/DL (ref 32–36)
MCHC RBC AUTO-ENTMCNC: 34.1 G/DL (ref 32–36)
MCV RBC AUTO: 94 FL (ref 82–98)
MCV RBC AUTO: 97 FL (ref 82–98)
METHADONE UR QL SCN>300 NG/ML: NEGATIVE
MICROSCOPIC COMMENT: NORMAL
MONOCYTES # BLD AUTO: 0.9 K/UL (ref 0.3–1)
MONOCYTES # BLD AUTO: 1 K/UL (ref 0.3–1)
MONOCYTES NFR BLD: 8.4 % (ref 4–15)
MONOCYTES NFR BLD: 9.2 % (ref 4–15)
MV MEAN GRADIENT: 2 MMHG
MV PEAK A VEL: 0.98 M/S
MV PEAK E VEL: 0.77 M/S
MV PEAK GRADIENT: 4 MMHG
MV STENOSIS PRESSURE HALF TIME: 58.84 MS
MV VALVE AREA BY CONTINUITY EQUATION: 1.39 CM2
MV VALVE AREA P 1/2 METHOD: 3.74 CM2
NEUTROPHILS # BLD AUTO: 7 K/UL (ref 1.8–7.7)
NEUTROPHILS # BLD AUTO: 7.5 K/UL (ref 1.8–7.7)
NEUTROPHILS NFR BLD: 63.2 % (ref 38–73)
NEUTROPHILS NFR BLD: 68.2 % (ref 38–73)
NITRITE UR QL STRIP: NEGATIVE
NONHDLC SERPL-MCNC: 101 MG/DL
NRBC BLD-RTO: 0 /100 WBC
NRBC BLD-RTO: 0 /100 WBC
OHS CV RV/LV RATIO: 0.55 CM
OPIATES UR QL SCN: ABNORMAL
PCP UR QL SCN>25 NG/ML: NEGATIVE
PH UR STRIP: 7 [PH] (ref 5–8)
PHOSPHATE SERPL-MCNC: 3.2 MG/DL (ref 2.7–4.5)
PLATELET # BLD AUTO: 190 K/UL (ref 150–450)
PLATELET # BLD AUTO: 205 K/UL (ref 150–450)
PMV BLD AUTO: 10 FL (ref 9.2–12.9)
PMV BLD AUTO: 10.5 FL (ref 9.2–12.9)
POC ACTIVATED CLOTTING TIME K: 134 SEC (ref 74–137)
POCT GLUCOSE: 111 MG/DL (ref 70–110)
POCT GLUCOSE: 170 MG/DL (ref 70–110)
POCT GLUCOSE: 195 MG/DL (ref 70–110)
POCT GLUCOSE: 244 MG/DL (ref 70–110)
POTASSIUM SERPL-SCNC: 4.5 MMOL/L (ref 3.5–5.1)
PROT UR QL STRIP: ABNORMAL
PROTHROMBIN TIME: 12.1 SEC (ref 9–12.5)
PULM VEIN S/D RATIO: 1.44
PV PEAK D VEL: 0.41 M/S
PV PEAK GRADIENT: 5 MMHG
PV PEAK S VEL: 0.59 M/S
PV PEAK VELOCITY: 1.07 M/S
RA MAJOR: 4.05 CM
RA PRESSURE ESTIMATED: 8 MMHG
RA WIDTH: 3.4 CM
RBC # BLD AUTO: 3.96 M/UL (ref 4.6–6.2)
RBC # BLD AUTO: 4.01 M/UL (ref 4.6–6.2)
RBC #/AREA URNS HPF: 1 /HPF (ref 0–4)
RIGHT VENTRICULAR END-DIASTOLIC DIMENSION: 2.43 CM
RV TISSUE DOPPLER FREE WALL SYSTOLIC VELOCITY 1 (APICAL 4 CHAMBER VIEW): 14.51 CM/S
SAMPLE: NORMAL
SINUS: 2.98 CM
SITE: NORMAL
SODIUM SERPL-SCNC: 142 MMOL/L (ref 136–145)
SP GR UR STRIP: >1.03 (ref 1–1.03)
SQUAMOUS #/AREA URNS HPF: 0 /HPF
TDI LATERAL: 0.09 M/S
TDI SEPTAL: 0.07 M/S
TDI: 0.08 M/S
TOXICOLOGY INFORMATION: ABNORMAL
TRICUSPID ANNULAR PLANE SYSTOLIC EXCURSION: 2.17 CM
TRIGL SERPL-MCNC: 116 MG/DL (ref 30–150)
TROPONIN I SERPL DL<=0.01 NG/ML-MCNC: 0.07 NG/ML (ref 0–0.03)
TROPONIN I SERPL DL<=0.01 NG/ML-MCNC: 0.1 NG/ML (ref 0–0.03)
TSH SERPL DL<=0.005 MIU/L-ACNC: 0.59 UIU/ML (ref 0.4–4)
URN SPEC COLLECT METH UR: ABNORMAL
UROBILINOGEN UR STRIP-ACNC: NEGATIVE EU/DL
WBC # BLD AUTO: 10.97 K/UL (ref 3.9–12.7)
WBC # BLD AUTO: 11.15 K/UL (ref 3.9–12.7)
WBC #/AREA URNS HPF: 0 /HPF (ref 0–5)
Z-SCORE OF LEFT VENTRICULAR DIMENSION IN END DIASTOLE: -0.59
Z-SCORE OF LEFT VENTRICULAR DIMENSION IN END SYSTOLE: 1.88

## 2024-06-17 PROCEDURE — 99152 MOD SED SAME PHYS/QHP 5/>YRS: CPT | Mod: ,,, | Performed by: INTERNAL MEDICINE

## 2024-06-17 PROCEDURE — 85025 COMPLETE CBC W/AUTO DIFF WBC: CPT | Mod: HCNC

## 2024-06-17 PROCEDURE — 80061 LIPID PANEL: CPT | Mod: HCNC

## 2024-06-17 PROCEDURE — C1894 INTRO/SHEATH, NON-LASER: HCPCS | Mod: HCNC | Performed by: INTERNAL MEDICINE

## 2024-06-17 PROCEDURE — 99223 1ST HOSP IP/OBS HIGH 75: CPT | Mod: HCNC,,, | Performed by: NURSE PRACTITIONER

## 2024-06-17 PROCEDURE — 85610 PROTHROMBIN TIME: CPT | Mod: HCNC

## 2024-06-17 PROCEDURE — 84443 ASSAY THYROID STIM HORMONE: CPT | Mod: HCNC

## 2024-06-17 PROCEDURE — B2111ZZ FLUOROSCOPY OF MULTIPLE CORONARY ARTERIES USING LOW OSMOLAR CONTRAST: ICD-10-PCS | Performed by: INTERNAL MEDICINE

## 2024-06-17 PROCEDURE — 80048 BASIC METABOLIC PNL TOTAL CA: CPT | Mod: HCNC

## 2024-06-17 PROCEDURE — 82962 GLUCOSE BLOOD TEST: CPT

## 2024-06-17 PROCEDURE — C1887 CATHETER, GUIDING: HCPCS | Mod: HCNC | Performed by: INTERNAL MEDICINE

## 2024-06-17 PROCEDURE — 83036 HEMOGLOBIN GLYCOSYLATED A1C: CPT | Mod: HCNC

## 2024-06-17 PROCEDURE — 85730 THROMBOPLASTIN TIME PARTIAL: CPT | Mod: 91,HCNC

## 2024-06-17 PROCEDURE — 99153 MOD SED SAME PHYS/QHP EA: CPT | Mod: HCNC | Performed by: INTERNAL MEDICINE

## 2024-06-17 PROCEDURE — 96366 THER/PROPH/DIAG IV INF ADDON: CPT

## 2024-06-17 PROCEDURE — 96365 THER/PROPH/DIAG IV INF INIT: CPT | Mod: 59

## 2024-06-17 PROCEDURE — 93458 L HRT ARTERY/VENTRICLE ANGIO: CPT | Mod: 26,,, | Performed by: INTERNAL MEDICINE

## 2024-06-17 PROCEDURE — 93010 ELECTROCARDIOGRAM REPORT: CPT | Mod: HCNC,,, | Performed by: INTERNAL MEDICINE

## 2024-06-17 PROCEDURE — C1769 GUIDE WIRE: HCPCS | Mod: HCNC | Performed by: INTERNAL MEDICINE

## 2024-06-17 PROCEDURE — 83605 ASSAY OF LACTIC ACID: CPT | Mod: HCNC

## 2024-06-17 PROCEDURE — 84100 ASSAY OF PHOSPHORUS: CPT | Mod: HCNC

## 2024-06-17 PROCEDURE — 84484 ASSAY OF TROPONIN QUANT: CPT | Mod: 91,HCNC

## 2024-06-17 PROCEDURE — 93458 L HRT ARTERY/VENTRICLE ANGIO: CPT | Performed by: INTERNAL MEDICINE

## 2024-06-17 PROCEDURE — 25000003 PHARM REV CODE 250: Mod: HCNC

## 2024-06-17 PROCEDURE — 83735 ASSAY OF MAGNESIUM: CPT | Mod: HCNC

## 2024-06-17 PROCEDURE — 63600175 PHARM REV CODE 636 W HCPCS: Mod: HCNC

## 2024-06-17 PROCEDURE — 93005 ELECTROCARDIOGRAM TRACING: CPT | Mod: HCNC

## 2024-06-17 PROCEDURE — 63600175 PHARM REV CODE 636 W HCPCS: Mod: HCNC | Performed by: STUDENT IN AN ORGANIZED HEALTH CARE EDUCATION/TRAINING PROGRAM

## 2024-06-17 PROCEDURE — 25000242 PHARM REV CODE 250 ALT 637 W/ HCPCS: Mod: HCNC

## 2024-06-17 PROCEDURE — 96372 THER/PROPH/DIAG INJ SC/IM: CPT

## 2024-06-17 PROCEDURE — 99223 1ST HOSP IP/OBS HIGH 75: CPT | Mod: 25,HCNC,, | Performed by: INTERNAL MEDICINE

## 2024-06-17 PROCEDURE — 25000003 PHARM REV CODE 250: Mod: HCNC | Performed by: INTERNAL MEDICINE

## 2024-06-17 PROCEDURE — 25500020 PHARM REV CODE 255: Mod: HCNC | Performed by: INTERNAL MEDICINE

## 2024-06-17 PROCEDURE — 85730 THROMBOPLASTIN TIME PARTIAL: CPT | Mod: 91,HCNC | Performed by: STUDENT IN AN ORGANIZED HEALTH CARE EDUCATION/TRAINING PROGRAM

## 2024-06-17 PROCEDURE — 36415 COLL VENOUS BLD VENIPUNCTURE: CPT | Mod: HCNC,XB | Performed by: STUDENT IN AN ORGANIZED HEALTH CARE EDUCATION/TRAINING PROGRAM

## 2024-06-17 PROCEDURE — 36415 COLL VENOUS BLD VENIPUNCTURE: CPT | Mod: HCNC,XB

## 2024-06-17 PROCEDURE — 85347 COAGULATION TIME ACTIVATED: CPT | Mod: HCNC | Performed by: INTERNAL MEDICINE

## 2024-06-17 PROCEDURE — 21400001 HC TELEMETRY ROOM: Mod: HCNC

## 2024-06-17 PROCEDURE — 4A023N7 MEASUREMENT OF CARDIAC SAMPLING AND PRESSURE, LEFT HEART, PERCUTANEOUS APPROACH: ICD-10-PCS | Performed by: INTERNAL MEDICINE

## 2024-06-17 PROCEDURE — 63600175 PHARM REV CODE 636 W HCPCS: Mod: HCNC | Performed by: INTERNAL MEDICINE

## 2024-06-17 PROCEDURE — 99152 MOD SED SAME PHYS/QHP 5/>YRS: CPT | Mod: HCNC | Performed by: INTERNAL MEDICINE

## 2024-06-17 RX ORDER — HEPARIN SODIUM 1000 [USP'U]/ML
INJECTION, SOLUTION INTRAVENOUS; SUBCUTANEOUS
Status: DISCONTINUED | OUTPATIENT
Start: 2024-06-17 | End: 2024-06-17 | Stop reason: HOSPADM

## 2024-06-17 RX ORDER — HEPARIN SODIUM,PORCINE/D5W 25000/250
0-40 INTRAVENOUS SOLUTION INTRAVENOUS CONTINUOUS
Status: DISCONTINUED | OUTPATIENT
Start: 2024-06-17 | End: 2024-06-18

## 2024-06-17 RX ORDER — ASPIRIN 81 MG/1
81 TABLET ORAL DAILY
Status: DISCONTINUED | OUTPATIENT
Start: 2024-06-17 | End: 2024-06-19 | Stop reason: HOSPADM

## 2024-06-17 RX ORDER — ATORVASTATIN CALCIUM 40 MG/1
80 TABLET, FILM COATED ORAL DAILY
Status: DISCONTINUED | OUTPATIENT
Start: 2024-06-17 | End: 2024-06-19 | Stop reason: HOSPADM

## 2024-06-17 RX ORDER — DIPHENHYDRAMINE HCL 25 MG
50 CAPSULE ORAL ONCE
Status: DISCONTINUED | OUTPATIENT
Start: 2024-06-17 | End: 2024-06-17 | Stop reason: HOSPADM

## 2024-06-17 RX ORDER — SODIUM CHLORIDE 0.9 % (FLUSH) 0.9 %
10 SYRINGE (ML) INJECTION
Status: DISCONTINUED | OUTPATIENT
Start: 2024-06-17 | End: 2024-06-19 | Stop reason: HOSPADM

## 2024-06-17 RX ORDER — ONDANSETRON HYDROCHLORIDE 2 MG/ML
4 INJECTION, SOLUTION INTRAVENOUS EVERY 12 HOURS PRN
Status: DISCONTINUED | OUTPATIENT
Start: 2024-06-17 | End: 2024-06-19 | Stop reason: HOSPADM

## 2024-06-17 RX ORDER — NITROGLYCERIN 0.4 MG/1
0.4 TABLET SUBLINGUAL
Status: DISCONTINUED | OUTPATIENT
Start: 2024-06-17 | End: 2024-06-19 | Stop reason: HOSPADM

## 2024-06-17 RX ORDER — PHENYLEPHRINE HCL IN 0.9% NACL 1 MG/10 ML
SYRINGE (ML) INTRAVENOUS
Status: DISCONTINUED | OUTPATIENT
Start: 2024-06-17 | End: 2024-06-17 | Stop reason: HOSPADM

## 2024-06-17 RX ORDER — MIDAZOLAM HYDROCHLORIDE 1 MG/ML
INJECTION INTRAMUSCULAR; INTRAVENOUS
Status: DISCONTINUED | OUTPATIENT
Start: 2024-06-17 | End: 2024-06-17 | Stop reason: HOSPADM

## 2024-06-17 RX ORDER — MIRTAZAPINE 15 MG/1
15 TABLET, FILM COATED ORAL NIGHTLY
Status: DISCONTINUED | OUTPATIENT
Start: 2024-06-17 | End: 2024-06-19 | Stop reason: HOSPADM

## 2024-06-17 RX ORDER — SODIUM CHLORIDE 9 MG/ML
INJECTION, SOLUTION INTRAVENOUS CONTINUOUS
Status: ACTIVE | OUTPATIENT
Start: 2024-06-17 | End: 2024-06-17

## 2024-06-17 RX ORDER — CLOPIDOGREL BISULFATE 300 MG/1
600 TABLET, FILM COATED ORAL ONCE
Status: COMPLETED | OUTPATIENT
Start: 2024-06-17 | End: 2024-06-17

## 2024-06-17 RX ORDER — MAGNESIUM SULFATE HEPTAHYDRATE 40 MG/ML
2 INJECTION, SOLUTION INTRAVENOUS ONCE
Status: COMPLETED | OUTPATIENT
Start: 2024-06-17 | End: 2024-06-17

## 2024-06-17 RX ORDER — LIDOCAINE HYDROCHLORIDE 10 MG/ML
INJECTION, SOLUTION EPIDURAL; INFILTRATION; INTRACAUDAL; PERINEURAL
Status: DISCONTINUED | OUTPATIENT
Start: 2024-06-17 | End: 2024-06-17 | Stop reason: HOSPADM

## 2024-06-17 RX ORDER — DICYCLOMINE HYDROCHLORIDE 10 MG/1
20 CAPSULE ORAL 2 TIMES DAILY PRN
Status: DISCONTINUED | OUTPATIENT
Start: 2024-06-17 | End: 2024-06-19 | Stop reason: HOSPADM

## 2024-06-17 RX ORDER — LOSARTAN POTASSIUM AND HYDROCHLOROTHIAZIDE 12.5; 5 MG/1; MG/1
1 TABLET ORAL DAILY
Status: DISCONTINUED | OUTPATIENT
Start: 2024-06-17 | End: 2024-06-19 | Stop reason: HOSPADM

## 2024-06-17 RX ORDER — VERAPAMIL HYDROCHLORIDE 2.5 MG/ML
INJECTION, SOLUTION INTRAVENOUS
Status: DISCONTINUED | OUTPATIENT
Start: 2024-06-17 | End: 2024-06-17 | Stop reason: HOSPADM

## 2024-06-17 RX ORDER — CLOPIDOGREL BISULFATE 75 MG/1
75 TABLET ORAL DAILY
Status: DISCONTINUED | OUTPATIENT
Start: 2024-06-18 | End: 2024-06-19 | Stop reason: HOSPADM

## 2024-06-17 RX ORDER — ACETAMINOPHEN 325 MG/1
650 TABLET ORAL EVERY 4 HOURS PRN
Status: DISCONTINUED | OUTPATIENT
Start: 2024-06-17 | End: 2024-06-19 | Stop reason: HOSPADM

## 2024-06-17 RX ORDER — PANTOPRAZOLE SODIUM 40 MG/1
40 TABLET, DELAYED RELEASE ORAL DAILY
Status: DISCONTINUED | OUTPATIENT
Start: 2024-06-17 | End: 2024-06-19 | Stop reason: HOSPADM

## 2024-06-17 RX ORDER — FENTANYL CITRATE 50 UG/ML
INJECTION, SOLUTION INTRAMUSCULAR; INTRAVENOUS
Status: DISCONTINUED | OUTPATIENT
Start: 2024-06-17 | End: 2024-06-17 | Stop reason: HOSPADM

## 2024-06-17 RX ADMIN — PANTOPRAZOLE SODIUM 40 MG: 40 TABLET, DELAYED RELEASE ORAL at 09:06

## 2024-06-17 RX ADMIN — CLOPIDOGREL BISULFATE 600 MG: 300 TABLET, FILM COATED ORAL at 09:06

## 2024-06-17 RX ADMIN — HEPARIN SODIUM 12 UNITS/KG/HR: 10000 INJECTION, SOLUTION INTRAVENOUS at 08:06

## 2024-06-17 RX ADMIN — INSULIN ASPART 4 UNITS: 100 INJECTION, SOLUTION INTRAVENOUS; SUBCUTANEOUS at 04:06

## 2024-06-17 RX ADMIN — ASPIRIN 81 MG: 81 TABLET, COATED ORAL at 09:06

## 2024-06-17 RX ADMIN — MIRTAZAPINE 15 MG: 15 TABLET, FILM COATED ORAL at 09:06

## 2024-06-17 RX ADMIN — ATORVASTATIN CALCIUM 80 MG: 40 TABLET, FILM COATED ORAL at 09:06

## 2024-06-17 RX ADMIN — NITROGLYCERIN 0.4 MG: 0.4 TABLET, ORALLY DISINTEGRATING SUBLINGUAL at 08:06

## 2024-06-17 RX ADMIN — SODIUM CHLORIDE: 9 INJECTION, SOLUTION INTRAVENOUS at 09:06

## 2024-06-17 RX ADMIN — LOSARTAN POTASSIUM AND HYDROCHLOROTHIAZIDE 1 TABLET: 12.5; 5 TABLET ORAL at 09:06

## 2024-06-17 RX ADMIN — MAGNESIUM SULFATE HEPTAHYDRATE 2 G: 40 INJECTION, SOLUTION INTRAVENOUS at 09:06

## 2024-06-17 RX ADMIN — SODIUM CHLORIDE: 9 INJECTION, SOLUTION INTRAVENOUS at 01:06

## 2024-06-17 NOTE — ASSESSMENT & PLAN NOTE
Pt presented to the ED with abdominal pain, N/V and found to have elevated troponin. EKG with sinus tach, Bigeminy with vent rate of 102 and Qtc of 422. Placed under Obs for ACS r/o.    - Pt denies CP on my interview  - Cardiology consulted, appreciate recs  - NPO  - Trend trops  - TTE pending  - Lipid, A1c, and TSH pending  - Continuous tele monitoring

## 2024-06-17 NOTE — CONSULTS
[facility] [place]  Cardiology  Consult Note    Patient Name: Nino Price  MRN: [id number]  Admission Date: 6/16/2024  Hospital Length of Stay: 0 days  Code Status: Full Code   Attending Provider: Jcarlos Dailey DO   Consulting Provider: Lavelle Carr MD  Primary Care Physician: Stevo Pryor MD  Principal Problem:ACS (acute coronary syndrome)    Patient information was obtained from patient and ER records.     Inpatient consult to Cardiology  Consult performed by: Lavelle Carr MD  Consult ordered by: Smitha Prakash PA-C  Reason for consult: ACS        Subjective:     Chief Complaint:  abd pain     HPI:   59 yr old male with a hx of hep C s/p successful treatment, DM type 2, HTN, cirrhosis of liver, depression, CVA, esophagitis, dyslipidemia, GERD, tobacco use who presented to the ED with a chief complaint of constant 10/10 sudden onset abdominal pain that began Saturday morning. When asked to describe his pain patient just states that it is painful. He also endorses occasional chills, nausea, vomiting, diaphoresis. Nothing seems to alleviate or exacerbate his symptoms, his pain does not radiate anywhere else. He is tried hyoscyamine at home without relief of his symptoms. He states that after treatment in the ED his abdominal pain has slightly improved. Patient states that prior to abdominal pain he had a normal appetite however, since his abdominal pain he has not been eating much. His last BM was yesterday morning and states that it was a normal bowel movement without blood. Patient denies Tylenol and NSAID use. He endorses that he has been seen for abdominal pain 3 times in the past without an answer as to what is going on. Of note, pt is unsure what meds he takes daily and says he will have his wife give an updated list as soon as she is available. He states that he smokes 1 pack of cigarettes every 2 days, quit drinking alcohol 2 years ago, and smokes marijuana occasionally  (last smoked a couple of days ago). He denies fatigue, fever, SOB, palpitations, CP, diarrhea, dysuria, hematuria, lightheadedness, dizziness, and syncope.     Follows with Dr. Rider, last seen Nov 2023.    Cardiology consulted for lilian trop.    The patient is a pleasant 59-year-old man who presents with abdominal/periumbilical pain.  This persists, and is reproducible on my examination, however the belly is not rigid and he has not guarding.  He also describes some left lateral chest discomfort which is reproducible my examination but ongoing.  Repeat EKGs nondiagnostic.  Recent cardiac testing was negative for ischemia back in August 20, 2023.  Troponins are going up, currently at 0.1.  I have discussed the case with Melany Tan NP of the gastroenterology service.  The patient is well known to their service with chronic abdominal pain.  CT of the abdomen is essentially negative for acute findings.  She cleared him for dual antiplatelet therapy and anticoagulation.  Given concerns for acute coronary syndrome, I have recommended heart catheterization the patient is agreeable, permit has been signed.  Dr. Rider to perform case if he was available.    Past Medical History:   Diagnosis Date    Anxiety     Asthma     Colitis     Depression     Diabetes mellitus     Head injury     History of hepatitis C, s/p successful hcv treatment w/ SVR 8-2015  10/8/2012    SVR(24) as of 11/2015.  SVR(12) as of 8/2015.  completed harvoni 24 week regimen for genotype 1a 5/18/15     Hypertension     Shoulder pain     left, s/p 4 surgeries.     Stroke        Past Surgical History:   Procedure Laterality Date    COLONOSCOPY N/A 10/20/2015    Procedure: COLONOSCOPY;  Surgeon: Jagdish Patricia MD;  Location: [facility] ENDO;  Service: Endoscopy;  Laterality: N/A;    COLONOSCOPY N/A 1/26/2024    Procedure: COLONOSCOPY;  Surgeon: Pricila Shanks MD;  Location: [facility] ENDO;  Service: Endoscopy;  Laterality: N/A;    DIAGNOSTIC LAPAROSCOPY N/A  4/1/2021    Procedure: LAPAROSCOPY, DIAGNOSTIC;  Surgeon: Umesh Vallecillo MD;  Location: [facility] OR;  Service: General;  Laterality: N/A;  RN PREOP 3/29/21, COVID NEGATIVE ON 3/29  CONSENT AND H/P INCOMPLETE    ESOPHAGOGASTRODUODENOSCOPY N/A 1/26/2024    Procedure: EGD (ESOPHAGOGASTRODUODENOSCOPY);  Surgeon: Pricila Shanks MD;  Location: [facility] ENDO;  Service: Endoscopy;  Laterality: N/A;  prep instructions mailed to pt / [facility] pt / Urgent/suprep  1/19- lvm for pc. DBM  1/23- left 2nd vm for pc. DBM  1/24 cirrohsis-labs prior, precall complete-st    GALLBLADDER SURGERY      pt not 100% if gall bladder removed    rotator cuff      left side       Review of patient's allergies indicates:   Allergen Reactions    Codeine Nausea Only       No current facility-administered medications on file prior to encounter.     Current Outpatient Medications on File Prior to Encounter   Medication Sig    acetaminophen (TYLENOL) 500 MG tablet Take 1 tablet (500 mg total) by mouth every 6 (six) hours as needed for Pain (and fever).    aspirin (ECOTRIN) 81 MG EC tablet Take 1 tablet (81 mg total) by mouth once daily.    atorvastatin (LIPITOR) 80 MG tablet Take 1 tablet (80 mg total) by mouth once daily.    buPROPion (WELLBUTRIN XL) 150 MG TB24 tablet Take 1 tablet by mouth once daily.    buPROPion (WELLBUTRIN XL) 300 MG 24 hr tablet Take 1 tablet by mouth every morning. (Patient not taking: Reported on 5/27/2024)    busPIRone (BUSPAR) 10 MG tablet Take 5 mg by mouth every evening.    cyclobenzaprine (FLEXERIL) 5 MG tablet Take 5 mg by mouth nightly as needed. (Patient not taking: Reported on 5/27/2024)    dicyclomine (BENTYL) 20 mg tablet Take 1 tablet (20 mg total) by mouth 2 (two) times daily as needed (stomach pain).    DULoxetine (CYMBALTA) 60 MG capsule Take 60 mg by mouth once daily. Takes in pm    gabapentin (NEURONTIN) 300 MG capsule Take 300 mg by mouth 3 (three) times daily.    HYDROcodone-acetaminophen (NORCO)  mg per  tablet Take 1 tablet by mouth every 6 (six) hours as needed.    HYDROmorphone, PF, (DILAUDID) 1 mg/mL Soln     metFORMIN (GLUCOPHAGE) 1000 MG tablet Take 1 tablet (1,000 mg total) by mouth 2 (two) times daily with meals.    metoclopramide HCl (REGLAN) 10 MG tablet Take 1 tablet (10 mg total) by mouth every 6 (six) hours.    mirtazapine (REMERON) 15 MG tablet Take 15 mg by mouth every evening.    mirtazapine (REMERON) 45 MG tablet Take 1 tablet by mouth every evening.    morphine 4 mg/mL Soln     NAMENDA 10 mg Tab Take 10 mg by mouth once daily.     OMNIPAQUE 350 350 mg iodine/mL Soln injection     ondansetron (ZOFRAN-ODT) 4 MG TbDL Take 1 tablet (4 mg total) by mouth every 6 (six) hours as needed (nausea).    ondansetron 4 mg/2 mL Soln     oxyCODONE-acetaminophen (PERCOCET)  mg per tablet TAKE 1 TABLET AS NEEDED ORALLY EVERY 4 TO 6 HOURS    pantoprazole (PROTONIX) 40 MG tablet Take 1 tablet (40 mg total) by mouth once daily.    pantoprazole (PROTONIX) 40 MG tablet Take 1 tablet by mouth once daily.    propranoloL (INDERAL) 20 MG tablet Take 10 mg by mouth 2 (two) times daily.    tiZANidine (ZANAFLEX) 4 MG tablet Take 4 mg by mouth every evening.    traZODone (DESYREL) 100 MG tablet Take 150 mg by mouth. (Patient not taking: Reported on 5/27/2024)    traZODone (DESYREL) 50 MG tablet Take 100 mg by mouth every evening.    TRUE METRIX GLUCOSE METER Misc  (Patient not taking: Reported on 5/27/2024)    TRUE METRIX GLUCOSE TEST STRIP Strp     valsartan-hydrochlorothiazide (DIOVAN-HCT) 160-25 mg per tablet TAKE 1 TABLET BY MOUTH EVERY DAY     Family History       Problem Relation (Age of Onset)    Cirrhosis Brother    Heart disease Father    Hypertension Mother          Tobacco Use    Smoking status: Every Day     Current packs/day: 1.00     Average packs/day: 1 pack/day for 35.0 years (35.0 ttl pk-yrs)     Types: Cigarettes     Passive exposure: Current    Smokeless tobacco: Never    Tobacco comments:     Will try  to stop smoking by himself vs    Substance and Sexual Activity    Alcohol use: No    Drug use: Yes     Frequency: 7.0 times per week     Types: Marijuana    Sexual activity: Yes     Partners: Female     Review of Systems   Constitutional: Negative for chills, diaphoresis, fever and malaise/fatigue.   HENT:  Negative for nosebleeds.    Eyes:  Negative for blurred vision and double vision.   Cardiovascular:  Positive for chest pain. Negative for claudication, cyanosis, dyspnea on exertion, leg swelling, orthopnea, palpitations, paroxysmal nocturnal dyspnea and syncope.   Respiratory:  Negative for cough, shortness of breath and wheezing.    Skin:  Negative for dry skin and poor wound healing.   Musculoskeletal:  Negative for back pain, joint swelling and myalgias.   Gastrointestinal:  Positive for abdominal pain. Negative for nausea and vomiting.   Genitourinary:  Negative for hematuria.   Neurological:  Negative for dizziness, headaches, numbness, seizures and weakness.   Psychiatric/Behavioral:  Negative for altered mental status and depression.      Objective:     Vital Signs (Most Recent):  Temp: 97.4 °F (36.3 °C) (06/17/24 0801)  Pulse: 82 (06/17/24 0801)  Resp: 18 (06/17/24 0801)  BP: 122/73 (06/17/24 0801)  SpO2: 95 % (06/17/24 0801) Vital Signs (24h Range):  Temp:  [97.4 °F (36.3 °C)-98.2 °F (36.8 °C)] 97.4 °F (36.3 °C)  Pulse:  [50-97] 82  Resp:  [17-28] 18  SpO2:  [95 %-99 %] 95 %  BP: (104-227)/() 122/73     Weight: 62.5 kg (137 lb 12.6 oz)  Body mass index is 23.65 kg/m².    SpO2: 95 %         Intake/Output Summary (Last 24 hours) at 6/17/2024 0822  Last data filed at 6/17/2024 0643  Gross per 24 hour   Intake 2000 ml   Output --   Net 2000 ml       Lines/Drains/Airways       Peripheral Intravenous Line  Duration                  Peripheral IV - Single Lumen 06/16/24 1935 20 G Right Forearm <1 day                     Physical Exam  Constitutional:       General: He is not in acute distress.      Appearance: Normal appearance. He is well-developed and normal weight. He is not ill-appearing, toxic-appearing or diaphoretic.   HENT:      Head: Normocephalic and atraumatic.   Eyes:      General: No scleral icterus.     Extraocular Movements: Extraocular movements intact.      Conjunctiva/sclera: Conjunctivae normal.      Pupils: Pupils are equal, round, and reactive to light.   Neck:      Thyroid: No thyromegaly.      Vascular: No JVD.      Trachea: No tracheal deviation.   Cardiovascular:      Rate and Rhythm: Normal rate and regular rhythm.      Heart sounds: S1 normal and S2 normal. No murmur heard.     No friction rub. No gallop.   Pulmonary:      Effort: Pulmonary effort is normal. No respiratory distress.      Breath sounds: Normal breath sounds. No stridor. No wheezing, rhonchi or rales.   Chest:      Chest wall: Tenderness present.   Abdominal:      General: There is no distension.      Palpations: Abdomen is soft.      Tenderness: There is abdominal tenderness (periumbilical). There is no guarding or rebound.   Musculoskeletal:         General: No swelling or tenderness. Normal range of motion.      Cervical back: Normal range of motion and neck supple. No rigidity.      Right lower leg: No edema.      Left lower leg: No edema.   Skin:     General: Skin is warm and dry.      Coloration: Skin is not jaundiced.      Findings: No rash.   Neurological:      General: No focal deficit present.      Mental Status: He is alert and oriented to person, place, and time.      Cranial Nerves: No cranial nerve deficit.   Psychiatric:         Mood and Affect: Mood normal.         Behavior: Behavior normal.          Current Medications:   aspirin  81 mg Oral Daily    atorvastatin  80 mg Oral Daily    losartan-hydrochlorothiazide 50-12.5 mg  1 tablet Oral Daily    magnesium sulfate IVPB  2 g Intravenous Once    mirtazapine  15 mg Oral QHS    pantoprazole  40 mg Oral Daily      heparin (porcine) in D5W  0-40 Units/kg/hr  (Adjusted) Intravenous Continuous 7.3 mL/hr at 06/17/24 0810 12 Units/kg/hr at 06/17/24 0810       Current Facility-Administered Medications:     acetaminophen, 650 mg, Oral, Q4H PRN    aluminum-magnesium hydroxide-simethicone, 30 mL, Oral, QID PRN    dextrose 10%, 12.5 g, Intravenous, PRN    dextrose 10%, 25 g, Intravenous, PRN    dicyclomine, 20 mg, Oral, BID PRN    glucagon (human recombinant), 1 mg, Intramuscular, PRN    glucose, 16 g, Oral, PRN    glucose, 24 g, Oral, PRN    heparin (PORCINE), 60 Units/kg (Adjusted), Intravenous, PRN    heparin (PORCINE), 30 Units/kg (Adjusted), Intravenous, PRN    insulin aspart U-100, 0-10 Units, Subcutaneous, Q6H PRN    melatonin, 6 mg, Oral, Nightly PRN    naloxone, 0.02 mg, Intravenous, PRN    nitroGLYCERIN, 0.4 mg, Sublingual, PRN    ondansetron, 4 mg, Intravenous, Q8H PRN    senna-docusate 8.6-50 mg, 1 tablet, Oral, Daily PRN    sodium chloride 0.9%, 10 mL, Intravenous, Q12H PRN    Laboratory (all labs reviewed):  CBC:  Recent Labs   Lab 01/26/24  1733 01/26/24  1736 04/30/24  0835 06/16/24 1952 06/16/24 1957 06/17/24  0407 06/17/24  0758   WBC 7.56  --  5.56 10.91  --  10.97 11.15   Hemoglobin 14.7  --  14.4 15.5  --  12.5 L 12.9 L   POC Hematocrit  --    < >  --   --  42  --   --    Hematocrit 41.3  --  42.5 45.3  --  38.2 L 37.8 L   Platelets 219  --  219 224  --  205 190    < > = values in this interval not displayed.       CHEMISTRIES:  Recent Labs   Lab 09/28/21  1537 05/26/22  0950 05/26/22  0950 04/26/23  0615 05/18/23  2356 05/19/23  0448 08/11/23  0514 08/25/23  0947 12/16/23 1959 01/26/24  1733 04/30/24  0835 06/16/24 1952 06/17/24  0407   Glucose 84 135 H  --  217 H 184 H   < > 89   < > 187 H 119 H 145 H 216 H 118 H   Sodium 141 141  --  137 139   < > 138   < > 134 L 140 141 140 142   Potassium 4.6 4.0  --  4.2 4.1   < > 4.0   < > 4.3 3.5 3.8 5.1 4.5   BUN 9 7  --  11 10   < > 21 H   < > 26 H 10 10 17 15   Creatinine 1.0 1.0  --  1.3 1.2   < > 1.4   < >  1.5 H 1.0 1.3 1.4 1.0   eGFR if non  >60 >60  --   --   --   --   --   --   --   --   --   --   --    eGFR  --   --    < > >60 >60   < > 58 A   < > 53 A >60 >60.0 58 A >60   Calcium 10.5 9.2  --  10.0 10.3   < > 9.3   < > 9.5 9.3 9.7 10.5 9.3   Magnesium  --   --   --  1.8 1.6  --  1.6  --   --   --   --   --  1.2 L    < > = values in this interval not displayed.       CARDIAC BIOMARKERS:  Recent Labs   Lab 08/10/23  0738 01/26/24 1733 06/16/24 1952 06/16/24 2154 06/17/24  0407   Troponin I 0.036 H <0.006 0.029 H 0.077 H 0.104 H       COAGS:  Recent Labs   Lab 05/26/22  0950 04/15/24  1513   INR 1.0 1.0       LIPIDS/LFTS:  Recent Labs   Lab 09/28/21  1537 05/26/22  0950 05/19/23  0448 08/09/23  2001 08/10/23  0738 08/11/23  0514 12/16/23  1959 01/26/24  1733 04/15/24  1513 04/30/24  0835 06/16/24 1952 06/17/24  0407   Cholesterol 230 H  --  174  --  218 H  --   --   --   --  267 H  --  124   Triglycerides 257 H  --  104  --  156 H  --   --   --   --  303 H  --  116   HDL 33 L  --  29 L  --  26 L  --   --   --   --  31 L  --  23 L   LDL Cholesterol 145.6  --  124.2  --  160.8 H  --   --   --   --  175.4 H  --  77.8   Non-HDL Cholesterol 197  --  145  --  192  --   --   --   --  236  --  101   AST 17   < > 14   < >  --    < > 23 29 14 15 15  --    ALT 15   < > 9 L   < >  --    < > 23 40 15 20 18  --     < > = values in this interval not displayed.       BNP:  Recent Labs   Lab 08/09/23  2046    H       TSH:  Recent Labs   Lab 08/10/23  0738 06/17/24  0407   TSH 1.125 0.593       Free T4:        Diagnostic Results:  ECG (personally reviewed and interpreted tracing(s)):  6/16/24 2005 , vent bigem    Chest X-Ray (personally reviewed and interpreted image(s)): 6/16/24 NAD    Echo: 8/10/23 (repeat ordered)    Left Ventricle: Normal wall motion. There is normal systolic function with a visually estimated ejection fraction of 55 - 60%.    Left Atrium: Left atrium is severely dilated.    Right  Ventricle: Normal right ventricular cavity size. Systolic function is normal.    Mitral Valve: There is moderate regurgitation with a posterolateral eccentriccally directed jet.    IVC/SVC: Intermediate venous pressure at 8 mmHg.    Stress Test: [place] SE 8/11/23    Left Ventricle: Normal wall motion. There is normal systolic function with a visually estimated ejection fraction of 55 - 60%.    ECG Conclusion: The ECG portion of the study is negative for ischemia (87% MPHR).    Post-stress Echo: The left ventricle systolic function is hyperdynamic.    Post-stress Impression: The study is normal.    Assessment and Plan:     * ACS (acute coronary syndrome)  Atypical symptoms, predominantly abdominal, but also with some reproducible chest pain.    EKGs nonischemic.    Troponin up trending to 0.1.  Ongoing symptoms noted.    Prior cardiac testing negative, including negative stress echocardiogram in August 20, 2023.    Echocardiogram pending.    Continue aspirin, continue heparin drip, and load Plavix.    Catheterization today with Dr. Rider    Risks, benefits and alternatives of the catheterization procedure were discussed with the patient which include but are not limited to: bleeding, infection, death, heart attack, arrhythmia, kidney failure, stroke, need for emergency surgery, etc.  Patient understands and and agrees to proceed.  Consent was placed on the chart.    .    Abdominal pain  Patient well known to GI service with chronic abdominal pain.    His abdomen is notable for periumbilical tenderness, but does not appear to be rigid.  CT of the abdomen and pelvis is essentially negative for acute findings.    Case discussed with Melany Tan NP, of the GI service who has cleared the patient for dual antiplatelet therapy and anticoagulation.    Dyslipidemia  Continue statin.    Essential hypertension  Continue medical therapy.    Type 2 diabetes mellitus, controlled  Management per IM.    History of hepatitis C, s/p  successful hcv treatment w/ SVR 8-2015   Management per IM/GI.    Aortic atherosclerosis  Noted on CTA of the abdomen and pelvis 06/16/2024.    Continue statin        VTE Risk Mitigation (From admission, onward)           Ordered     heparin 25,000 units in dextrose 5% (100 units/ml) IV bolus from bag LOW INTENSITY nomogram - [facility]  As needed (PRN)        Question:  Heparin Infusion Adjustment (DO NOT MODIFY ANSWER)  Answer:  [email or web address]\Images\Pharmacy\HeparinInfusions\heparin LOW INTENSITY nomogram for [facility] [id number]    06/17/24 0749     heparin 25,000 units in dextrose 5% (100 units/ml) IV bolus from bag LOW INTENSITY nomogram - [facility]  As needed (PRN)        Question:  Heparin Infusion Adjustment (DO NOT MODIFY ANSWER)  Answer:  [email or web address]\epic\Images\Pharmacy\HeparinInfusions\heparin LOW INTENSITY nomogram for [facility] [id number]    06/17/24 0749     heparin 25,000 units in dextrose 5% 250 mL (100 units/mL) infusion LOW INTENSITY nomogram - [facility]  Continuous        Question:  Begin at (units/kg/hr)  Answer:  12    06/17/24 0749     IP VTE LOW RISK PATIENT  Once         06/16/24 2239     Place sequential compression device  Until discontinued         06/16/24 2239                  Case d/w Dr. Rider.    Thank you for your consult. I will follow-up with patient. Please contact us if you have any additional questions.    Lavelle Carr MD  Cardiology   [facility] - Observation

## 2024-06-17 NOTE — SUBJECTIVE & OBJECTIVE
Past Medical History:   Diagnosis Date    Anxiety     Asthma     Colitis     Depression     Diabetes mellitus     Head injury     History of hepatitis C, s/p successful hcv treatment w/ SVR 8-2015  10/8/2012    SVR(24) as of 11/2015.  SVR(12) as of 8/2015.  completed harvoni 24 week regimen for genotype 1a 5/18/15     Hypertension     Shoulder pain     left, s/p 4 surgeries.     Stroke        Past Surgical History:   Procedure Laterality Date    COLONOSCOPY N/A 10/20/2015    Procedure: COLONOSCOPY;  Surgeon: Jagdish Patricia MD;  Location: [facility] ENDO;  Service: Endoscopy;  Laterality: N/A;    COLONOSCOPY N/A 1/26/2024    Procedure: COLONOSCOPY;  Surgeon: Pricila Shanks MD;  Location: [facility] ENDO;  Service: Endoscopy;  Laterality: N/A;    DIAGNOSTIC LAPAROSCOPY N/A 4/1/2021    Procedure: LAPAROSCOPY, DIAGNOSTIC;  Surgeon: Umesh Vallecillo MD;  Location: [facility] OR;  Service: General;  Laterality: N/A;  RN PREOP 3/29/21, COVID NEGATIVE ON 3/29  CONSENT AND H/P INCOMPLETE    ESOPHAGOGASTRODUODENOSCOPY N/A 1/26/2024    Procedure: EGD (ESOPHAGOGASTRODUODENOSCOPY);  Surgeon: Pricila Shanks MD;  Location: [facility] ENDO;  Service: Endoscopy;  Laterality: N/A;  prep instructions mailed to pt / [facility] pt / Urgent/suprep  1/19- lvm for pc. DBM  1/23- left 2nd vm for pc. DBM  1/24 cirrohsis-labs prior, precall complete-st    GALLBLADDER SURGERY      pt not 100% if gall bladder removed    rotator cuff      left side       Review of patient's allergies indicates:   Allergen Reactions    Codeine Nausea Only       No current facility-administered medications on file prior to encounter.     Current Outpatient Medications on File Prior to Encounter   Medication Sig    acetaminophen (TYLENOL) 500 MG tablet Take 1 tablet (500 mg total) by mouth every 6 (six) hours as needed for Pain (and fever).    aspirin (ECOTRIN) 81 MG EC tablet Take 1 tablet (81 mg total) by mouth once daily.    atorvastatin (LIPITOR) 80 MG tablet Take 1 tablet (80  mg total) by mouth once daily.    buPROPion (WELLBUTRIN XL) 150 MG TB24 tablet Take 1 tablet by mouth once daily.    buPROPion (WELLBUTRIN XL) 300 MG 24 hr tablet Take 1 tablet by mouth every morning. (Patient not taking: Reported on 5/27/2024)    busPIRone (BUSPAR) 10 MG tablet Take 5 mg by mouth every evening.    cyclobenzaprine (FLEXERIL) 5 MG tablet Take 5 mg by mouth nightly as needed. (Patient not taking: Reported on 5/27/2024)    dicyclomine (BENTYL) 20 mg tablet Take 1 tablet (20 mg total) by mouth 2 (two) times daily as needed (stomach pain).    DULoxetine (CYMBALTA) 60 MG capsule Take 60 mg by mouth once daily. Takes in pm    gabapentin (NEURONTIN) 300 MG capsule Take 300 mg by mouth 3 (three) times daily.    HYDROcodone-acetaminophen (NORCO)  mg per tablet Take 1 tablet by mouth every 6 (six) hours as needed.    HYDROmorphone, PF, (DILAUDID) 1 mg/mL Soln     metFORMIN (GLUCOPHAGE) 1000 MG tablet Take 1 tablet (1,000 mg total) by mouth 2 (two) times daily with meals.    metoclopramide HCl (REGLAN) 10 MG tablet Take 1 tablet (10 mg total) by mouth every 6 (six) hours.    mirtazapine (REMERON) 15 MG tablet Take 15 mg by mouth every evening.    mirtazapine (REMERON) 45 MG tablet Take 1 tablet by mouth every evening.    morphine 4 mg/mL Soln     NAMENDA 10 mg Tab Take 10 mg by mouth once daily.     OMNIPAQUE 350 350 mg iodine/mL Soln injection     ondansetron (ZOFRAN-ODT) 4 MG TbDL Take 1 tablet (4 mg total) by mouth every 6 (six) hours as needed (nausea).    ondansetron 4 mg/2 mL Soln     oxyCODONE-acetaminophen (PERCOCET)  mg per tablet TAKE 1 TABLET AS NEEDED ORALLY EVERY 4 TO 6 HOURS    pantoprazole (PROTONIX) 40 MG tablet Take 1 tablet (40 mg total) by mouth once daily.    pantoprazole (PROTONIX) 40 MG tablet Take 1 tablet by mouth once daily.    propranoloL (INDERAL) 20 MG tablet Take 10 mg by mouth 2 (two) times daily.    tiZANidine (ZANAFLEX) 4 MG tablet Take 4 mg by mouth every evening.     traZODone (DESYREL) 100 MG tablet Take 150 mg by mouth. (Patient not taking: Reported on 5/27/2024)    traZODone (DESYREL) 50 MG tablet Take 100 mg by mouth every evening.    TRUE METRIX GLUCOSE METER Misc  (Patient not taking: Reported on 5/27/2024)    TRUE METRIX GLUCOSE TEST STRIP Strp     valsartan-hydrochlorothiazide (DIOVAN-HCT) 160-25 mg per tablet TAKE 1 TABLET BY MOUTH EVERY DAY     Family History       Problem Relation (Age of Onset)    Cirrhosis Brother    Heart disease Father    Hypertension Mother          Tobacco Use    Smoking status: Every Day     Current packs/day: 1.00     Average packs/day: 1 pack/day for 35.0 years (35.0 ttl pk-yrs)     Types: Cigarettes     Passive exposure: Current    Smokeless tobacco: Never    Tobacco comments:     Will try to stop smoking by himself vs    Substance and Sexual Activity    Alcohol use: No    Drug use: Yes     Frequency: 7.0 times per week     Types: Marijuana    Sexual activity: Yes     Partners: Female     Review of Systems   Constitutional: Negative for chills, diaphoresis, fever and malaise/fatigue.   HENT:  Negative for nosebleeds.    Eyes:  Negative for blurred vision and double vision.   Cardiovascular:  Positive for chest pain. Negative for claudication, cyanosis, dyspnea on exertion, leg swelling, orthopnea, palpitations, paroxysmal nocturnal dyspnea and syncope.   Respiratory:  Negative for cough, shortness of breath and wheezing.    Skin:  Negative for dry skin and poor wound healing.   Musculoskeletal:  Negative for back pain, joint swelling and myalgias.   Gastrointestinal:  Positive for abdominal pain. Negative for nausea and vomiting.   Genitourinary:  Negative for hematuria.   Neurological:  Negative for dizziness, headaches, numbness, seizures and weakness.   Psychiatric/Behavioral:  Negative for altered mental status and depression.      Objective:     Vital Signs (Most Recent):  Temp: 97.4 °F (36.3 °C) (06/17/24 0801)  Pulse: 82 (06/17/24  0801)  Resp: 18 (06/17/24 0801)  BP: 122/73 (06/17/24 0801)  SpO2: 95 % (06/17/24 0801) Vital Signs (24h Range):  Temp:  [97.4 °F (36.3 °C)-98.2 °F (36.8 °C)] 97.4 °F (36.3 °C)  Pulse:  [50-97] 82  Resp:  [17-28] 18  SpO2:  [95 %-99 %] 95 %  BP: (104-227)/() 122/73     Weight: 62.5 kg (137 lb 12.6 oz)  Body mass index is 23.65 kg/m².    SpO2: 95 %         Intake/Output Summary (Last 24 hours) at 6/17/2024 0822  Last data filed at 6/17/2024 0643  Gross per 24 hour   Intake 2000 ml   Output --   Net 2000 ml       Lines/Drains/Airways       Peripheral Intravenous Line  Duration                  Peripheral IV - Single Lumen 06/16/24 1935 20 G Right Forearm <1 day                     Physical Exam  Constitutional:       General: He is not in acute distress.     Appearance: Normal appearance. He is well-developed and normal weight. He is not ill-appearing, toxic-appearing or diaphoretic.   HENT:      Head: Normocephalic and atraumatic.   Eyes:      General: No scleral icterus.     Extraocular Movements: Extraocular movements intact.      Conjunctiva/sclera: Conjunctivae normal.      Pupils: Pupils are equal, round, and reactive to light.   Neck:      Thyroid: No thyromegaly.      Vascular: No JVD.      Trachea: No tracheal deviation.   Cardiovascular:      Rate and Rhythm: Normal rate and regular rhythm.      Heart sounds: S1 normal and S2 normal. No murmur heard.     No friction rub. No gallop.   Pulmonary:      Effort: Pulmonary effort is normal. No respiratory distress.      Breath sounds: Normal breath sounds. No stridor. No wheezing, rhonchi or rales.   Chest:      Chest wall: Tenderness present.   Abdominal:      General: There is no distension.      Palpations: Abdomen is soft.      Tenderness: There is abdominal tenderness (periumbilical). There is no guarding or rebound.   Musculoskeletal:         General: No swelling or tenderness. Normal range of motion.      Cervical back: Normal range of motion and neck  supple. No rigidity.      Right lower leg: No edema.      Left lower leg: No edema.   Skin:     General: Skin is warm and dry.      Coloration: Skin is not jaundiced.      Findings: No rash.   Neurological:      General: No focal deficit present.      Mental Status: He is alert and oriented to person, place, and time.      Cranial Nerves: No cranial nerve deficit.   Psychiatric:         Mood and Affect: Mood normal.         Behavior: Behavior normal.          Current Medications:   aspirin  81 mg Oral Daily    atorvastatin  80 mg Oral Daily    losartan-hydrochlorothiazide 50-12.5 mg  1 tablet Oral Daily    magnesium sulfate IVPB  2 g Intravenous Once    mirtazapine  15 mg Oral QHS    pantoprazole  40 mg Oral Daily      heparin (porcine) in D5W  0-40 Units/kg/hr (Adjusted) Intravenous Continuous 7.3 mL/hr at 06/17/24 0810 12 Units/kg/hr at 06/17/24 0810       Current Facility-Administered Medications:     acetaminophen, 650 mg, Oral, Q4H PRN    aluminum-magnesium hydroxide-simethicone, 30 mL, Oral, QID PRN    dextrose 10%, 12.5 g, Intravenous, PRN    dextrose 10%, 25 g, Intravenous, PRN    dicyclomine, 20 mg, Oral, BID PRN    glucagon (human recombinant), 1 mg, Intramuscular, PRN    glucose, 16 g, Oral, PRN    glucose, 24 g, Oral, PRN    heparin (PORCINE), 60 Units/kg (Adjusted), Intravenous, PRN    heparin (PORCINE), 30 Units/kg (Adjusted), Intravenous, PRN    insulin aspart U-100, 0-10 Units, Subcutaneous, Q6H PRN    melatonin, 6 mg, Oral, Nightly PRN    naloxone, 0.02 mg, Intravenous, PRN    nitroGLYCERIN, 0.4 mg, Sublingual, PRN    ondansetron, 4 mg, Intravenous, Q8H PRN    senna-docusate 8.6-50 mg, 1 tablet, Oral, Daily PRN    sodium chloride 0.9%, 10 mL, Intravenous, Q12H PRN    Laboratory (all labs reviewed):  CBC:  Recent Labs   Lab 01/26/24  1733 01/26/24  1736 04/30/24  0835 06/16/24  1952 06/16/24  1957 06/17/24  0407 06/17/24  0758   WBC 7.56  --  5.56 10.91  --  10.97 11.15   Hemoglobin 14.7  --  14.4  15.5  --  12.5 L 12.9 L   POC Hematocrit  --    < >  --   --  42  --   --    Hematocrit 41.3  --  42.5 45.3  --  38.2 L 37.8 L   Platelets 219  --  219 224  --  205 190    < > = values in this interval not displayed.       CHEMISTRIES:  Recent Labs   Lab 09/28/21  1537 05/26/22  0950 05/26/22  0950 04/26/23  0615 05/18/23  2356 05/19/23  0448 08/11/23  0514 08/25/23  0947 12/16/23 1959 01/26/24 1733 04/30/24  0835 06/16/24 1952 06/17/24  0407   Glucose 84 135 H  --  217 H 184 H   < > 89   < > 187 H 119 H 145 H 216 H 118 H   Sodium 141 141  --  137 139   < > 138   < > 134 L 140 141 140 142   Potassium 4.6 4.0  --  4.2 4.1   < > 4.0   < > 4.3 3.5 3.8 5.1 4.5   BUN 9 7  --  11 10   < > 21 H   < > 26 H 10 10 17 15   Creatinine 1.0 1.0  --  1.3 1.2   < > 1.4   < > 1.5 H 1.0 1.3 1.4 1.0   eGFR if non  >60 >60  --   --   --   --   --   --   --   --   --   --   --    eGFR  --   --    < > >60 >60   < > 58 A   < > 53 A >60 >60.0 58 A >60   Calcium 10.5 9.2  --  10.0 10.3   < > 9.3   < > 9.5 9.3 9.7 10.5 9.3   Magnesium  --   --   --  1.8 1.6  --  1.6  --   --   --   --   --  1.2 L    < > = values in this interval not displayed.       CARDIAC BIOMARKERS:  Recent Labs   Lab 08/10/23  0738 01/26/24  1733 06/16/24  1952 06/16/24  2154 06/17/24  0407   Troponin I 0.036 H <0.006 0.029 H 0.077 H 0.104 H       REJI:  Recent Labs   Lab 05/26/22  0950 04/15/24  1513   INR 1.0 1.0       LIPIDS/LFTS:  Recent Labs   Lab 09/28/21  1537 05/26/22  0950 05/19/23  0448 08/09/23  2001 08/10/23  0738 08/11/23  0514 12/16/23 1959 01/26/24  1733 04/15/24  1513 04/30/24  0835 06/16/24 1952 06/17/24  0407   Cholesterol 230 H  --  174  --  218 H  --   --   --   --  267 H  --  124   Triglycerides 257 H  --  104  --  156 H  --   --   --   --  303 H  --  116   HDL 33 L  --  29 L  --  26 L  --   --   --   --  31 L  --  23 L   LDL Cholesterol 145.6  --  124.2  --  160.8 H  --   --   --   --  175.4 H  --  77.8   Non-HDL Cholesterol  197  --  145  --  192  --   --   --   --  236  --  101   AST 17   < > 14   < >  --    < > 23 29 14 15 15  --    ALT 15   < > 9 L   < >  --    < > 23 40 15 20 18  --     < > = values in this interval not displayed.       BNP:  Recent Labs   Lab 08/09/23  2046    H       TSH:  Recent Labs   Lab 08/10/23  0738 06/17/24  0407   TSH 1.125 0.593       Free T4:        Diagnostic Results:  ECG (personally reviewed and interpreted tracing(s)):  6/16/24 2005 , vent bigem    Chest X-Ray (personally reviewed and interpreted image(s)): 6/16/24 NAD    Echo: 8/10/23 (repeat ordered)    Left Ventricle: Normal wall motion. There is normal systolic function with a visually estimated ejection fraction of 55 - 60%.    Left Atrium: Left atrium is severely dilated.    Right Ventricle: Normal right ventricular cavity size. Systolic function is normal.    Mitral Valve: There is moderate regurgitation with a posterolateral eccentriccally directed jet.    IVC/SVC: Intermediate venous pressure at 8 mmHg.    Stress Test: [place] SE 8/11/23    Left Ventricle: Normal wall motion. There is normal systolic function with a visually estimated ejection fraction of 55 - 60%.    ECG Conclusion: The ECG portion of the study is negative for ischemia (87% MPHR).    Post-stress Echo: The left ventricle systolic function is hyperdynamic.    Post-stress Impression: The study is normal.

## 2024-06-17 NOTE — CARE UPDATE
"After reviewing the pt's chart, troponin noted to uptrend overnight. I was never contacted about troponin results through out the night. Initially, on my first interview, the pt had no complaints of CP. Upon going to speak with pt, pt noted anterior chest tightness after 4 AM this morning. He stated that he did not notify his nurse to let her know. He describes this tightness as 2-3/10 and says that it "just feels different than normal." Nursing staff is obtaining repeat EKG and lab has been called to come draw troponin. Heparin has been ordered and ACS workup is underway.    Smitha Prakash, PA-C  [facility]  "

## 2024-06-17 NOTE — ED NOTES
Assumed care of Pt from QING Bowling. Pt arrived to ED room from CT, placed on cardiac monitoring. Pt appears uncomfortable and guarding lower abdomen. Still endorsing 8/10 abd pain and nausea. Pt reminded of need for urine specimen, urinal placed at bedside, instructed to notify nurse when able to provide sample, Pt verbalized understanding. Call light within reach, bed rails raised x2.

## 2024-06-17 NOTE — ASSESSMENT & PLAN NOTE
Atypical symptoms, predominantly abdominal, but also with some reproducible chest pain.    EKGs nonischemic.    Troponin up trending to 0.1.  Ongoing symptoms noted.    Prior cardiac testing negative, including negative stress echocardiogram in August 20, 2023.    Echocardiogram pending.    Continue aspirin, continue heparin drip, and load Plavix.    Catheterization today with Dr. Rider    Risks, benefits and alternatives of the catheterization procedure were discussed with the patient which include but are not limited to: bleeding, infection, death, heart attack, arrhythmia, kidney failure, stroke, need for emergency surgery, etc.  Patient understands and and agrees to proceed.  Consent was placed on the chart.    .

## 2024-06-17 NOTE — PROGRESS NOTES
Hand off report to CHACORTA Barker RN.  Dr CALE Dailey and QING Barker notified of critical lab value of APTT>150.  Gauze dressing to right wrist access site cdi. No hematoma present. Armboard in place. Patient instructed not to push/pull/lift with right hand.  See chart for full assessment.   Waiting for transport.  Patient in communication with spouse via cell phone.

## 2024-06-17 NOTE — ASSESSMENT & PLAN NOTE
Patient well known to GI service with chronic abdominal pain.    His abdomen is notable for periumbilical tenderness, but does not appear to be rigid.  CT of the abdomen and pelvis is essentially negative for acute findings.    Case discussed with Melany Tan NP, of the GI service who has cleared the patient for dual antiplatelet therapy and anticoagulation.

## 2024-06-17 NOTE — HPI
59 yr old male with a hx of hep C s/p successful treatment, DM type 2, HTN, cirrhosis of liver, depression, CVA, esophagitis, dyslipidemia, GERD, tobacco use who presented to the ED with a chief complaint of constant 10/10 sudden onset abdominal pain that began Saturday morning. When asked to describe his pain patient just states that it is painful. He also endorses occasional chills, nausea, vomiting, diaphoresis. Nothing seems to alleviate or exacerbate his symptoms, his pain does not radiate anywhere else. He is tried hyoscyamine at home without relief of his symptoms. He states that after treatment in the ED his abdominal pain has slightly improved. Patient states that prior to abdominal pain he had a normal appetite however, since his abdominal pain he has not been eating much. His last BM was yesterday morning and states that it was a normal bowel movement without blood. Patient denies Tylenol and NSAID use. He endorses that he has been seen for abdominal pain 3 times in the past without an answer as to what is going on. Of note, pt is unsure what meds he takes daily and says he will have his wife give an updated list as soon as she is available. He states that he smokes 1 pack of cigarettes every 2 days, quit drinking alcohol 2 years ago, and smokes marijuana occasionally (last smoked a couple of days ago). He denies fatigue, fever, SOB, palpitations, CP, diarrhea, dysuria, hematuria, lightheadedness, dizziness, and syncope.     Follows with Dr. Rider, last seen Nov 2023.    Cardiology consulted for elev trop.    The patient is a pleasant 59-year-old man who presents with abdominal/periumbilical pain.  This persists, and is reproducible on my examination, however the belly is not rigid and he has not guarding.  He also describes some left lateral chest discomfort which is reproducible my examination but ongoing.  Repeat EKGs nondiagnostic.  Recent cardiac testing was negative for ischemia back in August 20,  2023.  Troponins are going up, currently at 0.1.  I have discussed the case with Melany Tan NP of the gastroenterology service.  The patient is well known to their service with chronic abdominal pain.  CT of the abdomen is essentially negative for acute findings.  She cleared him for dual antiplatelet therapy and anticoagulation.  Given concerns for acute coronary syndrome, I have recommended heart catheterization the patient is agreeable, permit has been signed.  Dr. Rider to perform case if he was available.

## 2024-06-17 NOTE — ED PROVIDER NOTES
Encounter Date: 6/16/2024    SCRIBE #1 NOTE: IFRANKY am scribing for, and in the presence of,  Neymar Longoria PA-C. I have scribed the following portions of the note - Other sections scribed: HPI, ROS, PE.       History     Chief Complaint   Patient presents with    Abdominal Pain    Nausea     Pt presents to ER with complaints of abdominal pain and nausea. Since earlier today. Pt rates pain 10/10 at this time. Pt states the prescribed meds he was given for this is not working. Pt reports that he has a HA that started this morning as well. Pt denies any other issues at this time.      Patient is a 59 y.o. male with a past medical history of Hepatitis C, cirrhosis, DM, HTN, CVA, diverticulitis, and asthma, who presents to the Emergency Department for evaluation of  nausea/vomiting since this morning. He also reports associated symptoms of abdominal pain, subjective fever, dry cough, and intermittent mid sternal chest pain when vomiting. He denies history of alcohol use, heavy NSAID use, pancreatitis, drug use. Reports last bowel movement today. He denies diarrhea, blood in the stool, hematemesis, dysuria, urinary frequency, or hematuria.    Per chart review: Patient was evaluated by Dr. Shanks and had EGD on 1/26/24 that showed the following:                           -Bleb found in the esophagus.                          - Web in the lower third of the esophagus.                          - 4 cm hiatal hernia.                          - Normal stomach.                          - Normal examined duodenum.                          - No specimens collected.   Patient also underwent colonoscopy with Dr. Shanks which showed:     - Two 4 to 5 mm polyps in the descending colon, removed with a cold snare. Resected and retrieved. Clips were placed. Clip : Moneyspyder.                     - One 2 mm polyp in the rectum, removed with a                          [employer] cold forceps. Resected and  retrieved.                          - Diverticulosis in the sigmoid colon.                          - The examination was otherwise normal.           The history is provided by the patient. No  was used.     Review of patient's allergies indicates:   Allergen Reactions    Codeine Nausea Only     Past Medical History:   Diagnosis Date    Anxiety     Asthma     Colitis     Depression     Diabetes mellitus     Head injury     History of hepatitis C, s/p successful hcv treatment w/ SVR 8-2015  10/8/2012    SVR(24) as of 11/2015.  SVR(12) as of 8/2015.  completed harvoni 24 week regimen for genotype 1a 5/18/15     Hypertension     Shoulder pain     left, s/p 4 surgeries.     Stroke      Past Surgical History:   Procedure Laterality Date    COLONOSCOPY N/A 10/20/2015    Procedure: COLONOSCOPY;  Surgeon: Jagdish Patricia MD;  Location: [facility] ENDO;  Service: Endoscopy;  Laterality: N/A;    COLONOSCOPY N/A 1/26/2024    Procedure: COLONOSCOPY;  Surgeon: Pricila Shanks MD;  Location: [facility] ENDO;  Service: Endoscopy;  Laterality: N/A;    DIAGNOSTIC LAPAROSCOPY N/A 4/1/2021    Procedure: LAPAROSCOPY, DIAGNOSTIC;  Surgeon: Umesh Vallecillo MD;  Location: [facility] OR;  Service: General;  Laterality: N/A;  RN PREOP 3/29/21, COVID NEGATIVE ON 3/29  CONSENT AND H/P INCOMPLETE    ESOPHAGOGASTRODUODENOSCOPY N/A 1/26/2024    Procedure: EGD (ESOPHAGOGASTRODUODENOSCOPY);  Surgeon: Pricila Shanks MD;  Location: [facility] ENDO;  Service: Endoscopy;  Laterality: N/A;  prep instructions mailed to pt / [place] pt / Urgent/suprep  1/19- lvm for pc. DBM  1/23- left 2nd vm for pc. DBM  1/24 cirrohsis-labs prior, precall complete-st    GALLBLADDER SURGERY      pt not 100% if gall bladder removed    rotator cuff      left side     Family History   Problem Relation Name Age of Onset    Hypertension Mother      Heart disease Father      Cirrhosis Brother      Colon cancer Neg Hx      Esophageal cancer Neg Hx       Social History      Tobacco Use    Smoking status: Every Day     Current packs/day: 1.00     Average packs/day: 1 pack/day for 35.0 years (35.0 ttl pk-yrs)     Types: Cigarettes     Passive exposure: Current    Smokeless tobacco: Never    Tobacco comments:     Will try to stop smoking by himself vs    Substance Use Topics    Alcohol use: No    Drug use: Yes     Frequency: 7.0 times per week     Types: Marijuana     Review of Systems   Constitutional:  Positive for fever. Negative for chills.   Respiratory:  Positive for cough. Negative for shortness of breath.    Cardiovascular:  Positive for chest pain. Negative for palpitations and leg swelling.   Gastrointestinal:  Positive for abdominal pain, nausea and vomiting. Negative for blood in stool and diarrhea.   Genitourinary:  Negative for dysuria, flank pain, frequency, hematuria and testicular pain.   Musculoskeletal:  Negative for back pain, neck pain and neck stiffness.   Neurological:  Negative for dizziness, light-headedness and headaches.       Physical Exam     Initial Vitals [06/16/24 1858]   BP Pulse Resp Temp SpO2   (!) 181/91 (!) 54 20 98.2 °F (36.8 °C) 99 %      MAP       --         Physical Exam    Nursing note and vitals reviewed.  Constitutional: He appears well-developed and well-nourished. He appears distressed.   HENT:   Head: Normocephalic and atraumatic.   Right Ear: External ear normal.   Left Ear: External ear normal.   Neck: Carotid bruit is not present.   Normal range of motion.  Cardiovascular:  Normal rate, regular rhythm, normal heart sounds and intact distal pulses.     Exam reveals no gallop and no friction rub.       No murmur heard.  Pulmonary/Chest: Breath sounds normal. No respiratory distress. He has no wheezes. He has no rhonchi. He has no rales.   Abdominal: Abdomen is soft. Bowel sounds are normal. He exhibits no distension. There is generalized abdominal tenderness. There is no rebound and no guarding.   Musculoskeletal:         General: Normal  range of motion.      Cervical back: Normal range of motion.     Neurological: He is alert and oriented to person, place, and time. GCS score is 15. GCS eye subscore is 4. GCS verbal subscore is 5. GCS motor subscore is 6.   Psychiatric: He has a normal mood and affect.         ED Course   Procedures  Labs Reviewed   CBC W/ AUTO DIFFERENTIAL - Abnormal; Notable for the following components:       Result Value    MCH 32.1 (*)     Gran # (ANC) 9.2 (*)     Gran % 84.3 (*)     Lymph % 10.0 (*)     All other components within normal limits   COMPREHENSIVE METABOLIC PANEL - Abnormal; Notable for the following components:    CO2 20 (*)     Glucose 216 (*)     Total Protein 8.5 (*)     eGFR 58 (*)     All other components within normal limits   TROPONIN I - Abnormal; Notable for the following components:    Troponin I 0.029 (*)     All other components within normal limits   LACTIC ACID, PLASMA - Abnormal; Notable for the following components:    Lactate (Lactic Acid) 2.3 (*)     All other components within normal limits   TROPONIN I - Abnormal; Notable for the following components:    Troponin I 0.077 (*)     All other components within normal limits   LACTIC ACID, PLASMA - Abnormal; Notable for the following components:    Lactate (Lactic Acid) 2.7 (*)     All other components within normal limits   ISTAT PROCEDURE - Abnormal; Notable for the following components:    POC Glucose 225 (*)     POC TCO2 (MEASURED) 21 (*)     POC Anion Gap 18 (*)     All other components within normal limits   LIPASE   URINALYSIS, REFLEX TO URINE CULTURE   DRUG SCREEN PANEL, URINE EMERGENCY   POCT GLUCOSE MONITORING CONTINUOUS   ISTAT CHEM8          Imaging Results              CT Abdomen Pelvis With IV Contrast NO Oral Contrast (Final result)  Result time 06/16/24 20:49:59      Final result by Dalton Lyle MD (06/16/24 20:49:59)                   Impression:      1. No acute intra-abdominal abnormalities identified.  2. Circumferential  wall thickening involving the distal esophagus which may be seen with esophagitis.  Similar findings are seen on previous CT from 01/26/2024.  Future EGD follow-up may be warranted if not previously or recently obtained.  3. Mild sigmoid diverticulosis with no evidence of acute diverticulitis.  4. Postsurgical changes and additional findings as detailed above.      Electronically signed by: Dalton Lyle MD  Date:    06/16/2024  Time:    20:49               Narrative:    EXAMINATION:  CT ABDOMEN PELVIS WITH IV CONTRAST    CLINICAL HISTORY:  Abdominal pain, acute, nonlocalized;    TECHNIQUE:  Low dose axial images, sagittal and coronal reformations were obtained from the lung bases to the pubic symphysis following the IV administration of 75 mL of Omnipaque 350 .  Oral contrast was not given.    COMPARISON:  CT abdomen pelvis from 01/26/2024.    FINDINGS:  The visualized portion of the heart is unremarkable.  The lung bases are clear.  Circumferential wall thickening is seen involving the distal esophagus.    No significant hepatic abnormalities are identified.  There is no intra-or extrahepatic biliary ductal dilatation.  The gallbladder is unremarkable.  The stomach, pancreas, spleen, and adrenal glands are unremarkable.    Kidneys enhance normally with no evidence of hydronephrosis.  No abnormalities are seen along the ureteral courses.  Urinary bladder is unremarkable.  Prostate is mildly enlarged small prostatic calcification seen.    Postsurgical changes are seen at the base of the cecum likely reflecting prior appendectomy.  There is mild sigmoid diverticulosis.  The visualized loops of small and large bowel show no evidence of obstruction or inflammation.  No free air or free fluid.    Aorta tapers normally with moderate atherosclerosis seen involving the distal abdominal aorta and extending into the iliacs.    No acute osseous abnormality identified.  Intervertebral disc space narrowing and degenerative  change is seen at the L5-S1 level.  Remote nonunited fracture seen of the posteromedial right 11th rib.  Unchanged serpiginous sclerosis is seen involving the femoral heads consistent likely reflecting avascular necrosis without evidence of subchondral collapse.  Small fat containing right inguinal hernia is noted.                                       X-Ray Chest AP Portable (Final result)  Result time 06/16/24 20:18:22      Final result by Dalton Lyle MD (06/16/24 20:18:22)                   Impression:      No acute cardiopulmonary process identified.      Electronically signed by: Dalton Lyle MD  Date:    06/16/2024  Time:    20:18               Narrative:    EXAMINATION:  XR CHEST AP PORTABLE    CLINICAL HISTORY:  Chest pain, unspecified    TECHNIQUE:  Single frontal view of the chest was performed.    COMPARISON:  01/26/2024.    FINDINGS:  Cardiac silhouette is normal in size.  Lungs are symmetrically expanded.  No evidence of focal consolidative process, pneumothorax, or significant pleural effusion.  No acute osseous abnormality identified.                                       Medications   lactated ringers bolus 1,000 mL (1,000 mLs Intravenous New Bag 6/16/24 2235)   lactated ringers bolus 1,000 mL (0 mLs Intravenous Stopped 6/16/24 2146)   ondansetron injection 8 mg (8 mg Intravenous Given 6/16/24 1938)   morphine injection 4 mg (4 mg Intravenous Given 6/16/24 1949)   iohexoL (OMNIPAQUE 350) injection 75 mL (75 mLs Intravenous Given 6/16/24 2024)   pantoprazole injection 40 mg (40 mg Intravenous Given 6/16/24 2128)   aluminum-magnesium hydroxide-simethicone 200-200-20 mg/5 mL suspension 30 mL (30 mLs Oral Given 6/16/24 2130)     And   LIDOcaine viscous HCl 2% oral solution 15 mL (15 mLs Oral Given 6/16/24 2130)   valsartan tablet 160 mg (160 mg Oral Given 6/16/24 2148)   hydroCHLOROthiazide tablet 25 mg (25 mg Oral Given 6/16/24 2148)   morphine injection 6 mg (6 mg Intravenous Given 6/16/24  2229)   metoclopramide injection 10 mg (10 mg Intravenous Given 6/16/24 2235)     Medical Decision Making  This is an emergent evaluation of a 59 y.o. male with a past medical history of Hepatitis C, DM, HTN, CVA, and asthma, who presents to the Emergency Department for evaluation of  generalized abdominal pain, chest pain, nausea/vomiting since this morning.  Has been seen by Dr. Shanks with GI in January 20, 2024 and had EGD and colonoscopy.    Patient does appear uncomfortable.  Regular rate rhythm without murmurs.  No carotid bruits appreciated on exam. Lungs are clear to auscultation bilaterally.  There is generalized abdominal tenderness.  Abdomen is soft, nondistended, with normal bowel sounds.    Differential diagnosis includes but is not limited to pancreatitis, cholecystitis, GERD/gastritis, appendicitis, diverticulitis, bowel obstruction, acute coronary syndrome, aortic dissection, pneumonia, pneumothorax, hyperglycemia.    Workup initiated with basic labs, lipase, i-STAT Chem 8, lactic acid, troponin, EKG, chest x-ray, CT abdomen and pelvis with IV contrast.  Ordered fluids, Zofran, morphine.  Vital signs, chart, labs, and/or imaging were all reviewed.  See ED course below and interpretations above. Patient admitted for observation for elevated troponin, hypertension. Patient accepted by [facility] medicine [facility]. Patient stable on my last encounter. Dr Briseno in agreement.     Neymar Longoria PA-C    DISCLAIMER: This note was prepared with [employer] voice recognition transcription software. Garbled syntax, mangled pronouns, and other bizarre constructions may be attributed to that software system.      Amount and/or Complexity of Data Reviewed  Labs: ordered. Decision-making details documented in ED Course.  Radiology: ordered. Decision-making details documented in ED Course.    Risk  OTC drugs.  Prescription drug management.            Scribe Attestation:   Scribe #1: I performed the above scribed  service and the documentation accurately describes the services I performed. I attest to the accuracy of the note.        ED Course as of 06/16/24 2237   Sun Jun 16, 2024 1950 Patient is a very poor historian. Presenting today for nausea, vomiting, chest/abdominal pain. Unable to tell me if his pain is really in his chest or abdomen. Reports his main issue today is the nausea and vomiting that began this morning. Reports he has been seen for this previously with no results. [TM]   1959 ISTAT PROCEDURE(!)  Chem 8 with normal renal function, glucose of 225, no other abnormalities. [TM]   2009 CBC auto differential(!)  CBC is without leukocytosis or anemia. Normal platelets. [TM]   2010 EKG shows sinus tachycardia with frequent PVCs in bigeminy, no acute infarction, ventricular rate of 102, normal QTC, signed by Dr. Briseno. Previous EKG on 6/26/24 similar. [TM]   2014 Patient will be moved to main side room for cardiac monitoring. [TM]   2027 X-Ray Chest AP Portable  Cardiac silhouette is normal in size.  Lungs are symmetrically expanded.  No evidence of focal consolidative process, pneumothorax, or significant pleural effusion.  No acute osseous abnormality identified. [TM]   2027 Comprehensive metabolic panel(!)  CMP with normal BUN/Cr, GFR of 58, glucose of 216, no electrolyte abnormalities. [TM]   2028 Lipase  Lipase within normal limits, pancreatitis unlikely. [TM]   2028 Lactic acid, plasma(!)  Lactic acid slightly elevated at 2.3.  We will recheck after fluids. [TM]   2037 Troponin I(!): 0.029  Troponin 0.029 [TM]   2051 Now that patient is more comfortable after morphine, I was able to speak with him more easily.  Reports pain is epigastric region of his abdomen with the nausea and vomiting.  He denies any pain in his chest or shortness of breath.  He is feeling improved after medications. [TM]   2052 I consulted with my attending physician, Dr. Briseno, about this patient in workup to this point.  He agrees  with my assessment and plan at this time.  CT abdomen and pelvis pending.  Will rechecked lactic acid after fluids.  We will plan for repeat troponin, but plan for patient to be admitted for his troponin to be trended.  Possible hospitalization order placed. [TM]   2107 CT Abdomen Pelvis With IV Contrast NO Oral Contrast     1. No acute intra-abdominal abnormalities identified.  2. Circumferential wall thickening involving the distal esophagus which may be seen with esophagitis.  Similar findings are seen on previous CT from 01/26/2024.  Future EGD follow-up may be warranted if not previously or recently obtained.  3. Mild sigmoid diverticulosis with no evidence of acute diverticulitis.  4. Postsurgical changes and additional findings as detailed above.   [TM]   2107 Informed patient of results. Ordered GI cocktail, Protonix as I believe his symptoms are GI related. Patient had a CT scan back in January 2024 that also showed some thickening of the esophagus. He underwent EGD and Colonoscopy with Dr. Shanks at that time. Will recheck Troponin and if no down trend will plan for admission for observation. Shared decision making done with the patient who is in agreement. If troponin downtrends and he is feeling better after GI cocktail will plan for discharge home with GI and Cardiology follow up. Dr Briseno aware and in agreement. [TM]   2140 Notified by RN that patient is hypertensive at 197/129.  He did not take his at home medications.  He takes a valsartan hydrochlorthiazide combo pill.  Ordered 160 mg of valsartan, 25 mg of hydrochlorthiazide. Will continue to monitor BP. [TM]   2213 Notified by RN that patient reports still being in pain. Ordered 6 mg Morphine. [TM]   2224 Reached out to [facility] Dr. Olvera about hospital admission for elevated troponin, intractable pain, hypertension. Patient meets criteria for observation. [TM]   2227 Patient accepted by Smitha Prakash PA-C [TM]   2230 Notified by RN that patient  had an episode of vomiting when giving Morphine. Will add Reglan. Patient agreeable to admission at this time. Patient stable on my last encounter. [TM]   2231 Lactic acid, plasma(!)  Added an additional liter of LR. [TM]   2232 Troponin I(!)  Troponin elevated at 0.077 [TM]      ED Course User Index  [TM] Neymar Longoria PA-C                           I, Neymar Longoria PA-C, personally performed the services described in this documentation.  All medical record entries made by the scribe were at my direction and in my presence.  I have reviewed the chart and agree that the record reflects my personal performance and is accurate and complete.    Clinical Impression:  Final diagnoses:  [R10.13] Epigastric abdominal pain (Primary)  [R11.14] Bilious vomiting with nausea  [R79.89] Elevated troponin I level  [R73.9] Hyperglycemia  [K57.32] Sigmoid diverticulitis  [R07.9] Chest pain          ED Disposition Condition    Observation                 Neymar Longoria PA-C  06/16/24 [id number]

## 2024-06-17 NOTE — ASSESSMENT & PLAN NOTE
- Hx noted  - Continue home Pantoprazole  - GI consulted, appreciate recs  - NPO  - See HPI for recent EGD results with Dr. Shanks.

## 2024-06-17 NOTE — ASSESSMENT & PLAN NOTE
"Mr. Price is a 59 yr old male with a hx of hep C s/p successful treatment, DM type 2, HTN, cirrhosis of liver, depression, CVA, esophagitis, dyslipidemia, GERD, tobacco use who presented to the ED with a chief complaint of constant 10/10 sudden onset abdominal pain that began Saturday morning. He also endorses occasional chills, nausea, vomiting, diaphoresis. Workup in the ED  revealed GFR of 58, glucose of 216, initial troponin of 0.029, repeat troponin of 0.077, initial lactic of 2.3, repeat lactic of 2.7.  UDS positive for opiates and marijuana.  UA with 1+ protein, 1+ glucose, and 1+ ketones.  CXR showed no acute cardiopulmonary process identified.   CT abdomen pelvis showed no acute intra-abdominal abnormalities identified.  Circumferential wall thickening involving the distal esophagus which may be seen with esophagitis.  Similar findings are seen on previous CT from 01/26/2024.  Mild sigmoid diverticulosis with no evidence of acute diverticulitis.  Postsurgical changes."Patient received GI cocktail, HCTZ 25 mg oral, 2 L LR boluses, metoclopramide 10 mg IV, morphine 4 mg IV, morphine 6 mg IV, ondansetron 8 mg IV, pantoprazole 40 mg IV, valsartan 160 mg oral, Omnipaque 75 mL IV contrast while in the ED.    - GI consulted, appreciate recs  - NPO  - Continue home Pantoprazole  - PRN analgesics  "

## 2024-06-17 NOTE — NURSING
[facility]  Nurses Note -- 4 Eyes      6/17/2024       Skin assessed on: Admit      [x] No Pressure Injuries Present    []Prevention Measures Documented    [] Yes LDA  for Pressure Injury Previously documented     [] Yes New Pressure Injury Discovered   [] LDA for New Pressure Injury Added      Attending RN:  Stella Babin RN     Second RN:  Rossi LONGO

## 2024-06-17 NOTE — PLAN OF CARE
06/17/24 0920   Discharge Planning   Assessment Type Discharge Planning Brief Assessment   Resource/Environmental Concerns none   Support Systems Spouse/significant other;Family members   Equipment Currently Used at Home none   Current Living Arrangements home   Patient/Family Anticipates Transition to home with family   DME Needed Upon Discharge  none   Discharge Plan A Home with family  (with instructions to follow up)         [id number] - [place] - [address] AT [facility] [address]  [address]  [place] [address]  Phone: [phone] Fax: [phone]

## 2024-06-17 NOTE — ASSESSMENT & PLAN NOTE
Chronic,  Latest blood pressure and vitals reviewed-     Temp:  [97.9 °F (36.6 °C)-98.2 °F (36.8 °C)]   Pulse:  [50-97]   Resp:  [17-28]   BP: (104-227)/()   SpO2:  [95 %-99 %] .   Home meds for hypertension were reviewed and noted below.   Hypertension Medications               propranoloL (INDERAL) 20 MG tablet Take 10 mg by mouth 2 (two) times daily.    valsartan-hydrochlorothiazide (DIOVAN-HCT) 160-25 mg per tablet TAKE 1 TABLET BY MOUTH EVERY DAY            While in the hospital, will manage blood pressure as follows; Adjust home antihypertensive regimen as follows- Continue home valsartan-HCTZ    Will utilize p.r.n. blood pressure medication only if patient's blood pressure greater than 180/110 and he develops symptoms such as worsening chest pain or shortness of breath.

## 2024-06-17 NOTE — NURSING
[facility] [place]  Nurses Note -- 4 Eyes      6/17/2024       Skin assessed on: Q Shift      [x] No Pressure Injuries Present    [x]Prevention Measures Documented    [] Yes LDA  for Pressure Injury Previously documented     [] Yes New Pressure Injury Discovered   [] LDA for New Pressure Injury Added      Attending RN:  Margot Barker RN     Second RN:  Juju Babin RN

## 2024-06-17 NOTE — CARE UPDATE
59 yr old male with a hx of hep C s/p successful treatment, DM type 2, HTN, cirrhosis of liver, depression, CVA, esophagitis, dyslipidemia, GERD, tobacco use apparent on 06/16/2024 for further evaluation of acute on chronic abdominal pain and chest pain.  CT abdomen without acute intra-abdominal abnormalities.  GI consulted.  Troponin mildly elevated on admission at 0.029, trended up to 0.1.  Echocardiogram ordered.  Cardiology consulted-continued aspirin heparin and load with Plavix.      Follow-up on GI and Cardiology recommendation, appreciate input.  Keep NPO.  Cardiology  Plan for heart catheterization today on 06/17/2024    I reviewed the patient's medications, past medical/surgical history and the H&P note. I agree with the physical exam and assessment and plan.

## 2024-06-17 NOTE — ASSESSMENT & PLAN NOTE
- Hx noted  - Pt unsure of the names of his daily meds, he stated would have wife give an updated list of his medications when she is available.

## 2024-06-17 NOTE — BRIEF OP NOTE
Left main and triple-vessel disease.  Discussed case with Dr. Gonzalez.  Patient has been accepted for transfer to [facility] for bypass graft.  [place]ima to LAD SVG SVG to RCA  Continue aspirin, Plavix 75 mg qd and lovenox at ACS dose    Update 5 pm:  Got a call back from CT surgery at [facility].  On review of chart they found that patient had biopsy-proven stage III/4 liver cirrhosis in 2014.  That increases the risk of CT surgery.  They are recommending PCI.  Plan for PCI at [facility] tomorrow morning.  No need to initiate transfer to [facility]    Continue aspirin Plavix and heparin drip

## 2024-06-17 NOTE — NURSING
Pt arrive to the unit by wheelchair accompanied by transport. Assisted pt to bed. Tele monitoring initiated.  Admit assessment initiated.  Pt is AAOx4.  NO distress noted.

## 2024-06-17 NOTE — ED NOTES
"    Pt presents with c/o epigastric pain constant since this am w/ N/V. Last BM today "normal" brown.   Emesis is "clear".  Denies ETOH abuse.  Pt is moaning, AAOx3, resp even and unlabored, skin pale warm and dry. Denies chest pain or sob.  HOB elevated, SR up x 2. NAD noted.   "

## 2024-06-17 NOTE — SUBJECTIVE & OBJECTIVE
Past Medical History:   Diagnosis Date    Anxiety     Asthma     Colitis     Depression     Diabetes mellitus     Head injury     History of hepatitis C, s/p successful hcv treatment w/ SVR 8-2015  10/8/2012    SVR(24) as of 11/2015.  SVR(12) as of 8/2015.  completed harvoni 24 week regimen for genotype 1a 5/18/15     Hypertension     Shoulder pain     left, s/p 4 surgeries.     Stroke        Past Surgical History:   Procedure Laterality Date    COLONOSCOPY N/A 10/20/2015    Procedure: COLONOSCOPY;  Surgeon: aJgdish Patricia MD;  Location: [facility] ENDO;  Service: Endoscopy;  Laterality: N/A;    COLONOSCOPY N/A 1/26/2024    Procedure: COLONOSCOPY;  Surgeon: Pricila Shanks MD;  Location: [facility] ENDO;  Service: Endoscopy;  Laterality: N/A;    DIAGNOSTIC LAPAROSCOPY N/A 4/1/2021    Procedure: LAPAROSCOPY, DIAGNOSTIC;  Surgeon: Umesh Vallecillo MD;  Location: [facility] OR;  Service: General;  Laterality: N/A;  RN PREOP 3/29/21, COVID NEGATIVE ON 3/29  CONSENT AND H/P INCOMPLETE    ESOPHAGOGASTRODUODENOSCOPY N/A 1/26/2024    Procedure: EGD (ESOPHAGOGASTRODUODENOSCOPY);  Surgeon: Pricila Shanks MD;  Location: [facility] ENDO;  Service: Endoscopy;  Laterality: N/A;  prep instructions mailed to pt / [facility] pt / Urgent/suprep  1/19- lvm for pc. DBM  1/23- left 2nd vm for pc. DBM  1/24 cirrohsis-labs prior, precall complete-st    GALLBLADDER SURGERY      pt not 100% if gall bladder removed    rotator cuff      left side       Review of patient's allergies indicates:   Allergen Reactions    Codeine Nausea Only       No current facility-administered medications on file prior to encounter.     Current Outpatient Medications on File Prior to Encounter   Medication Sig    acetaminophen (TYLENOL) 500 MG tablet Take 1 tablet (500 mg total) by mouth every 6 (six) hours as needed for Pain (and fever).    aspirin (ECOTRIN) 81 MG EC tablet Take 1 tablet (81 mg total) by mouth once daily.    atorvastatin (LIPITOR) 80 MG tablet Take 1 tablet (80  mg total) by mouth once daily.    buPROPion (WELLBUTRIN XL) 150 MG TB24 tablet Take 1 tablet by mouth once daily.    buPROPion (WELLBUTRIN XL) 300 MG 24 hr tablet Take 1 tablet by mouth every morning. (Patient not taking: Reported on 5/27/2024)    busPIRone (BUSPAR) 10 MG tablet Take 5 mg by mouth every evening.    cyclobenzaprine (FLEXERIL) 5 MG tablet Take 5 mg by mouth nightly as needed. (Patient not taking: Reported on 5/27/2024)    dicyclomine (BENTYL) 20 mg tablet Take 1 tablet (20 mg total) by mouth 2 (two) times daily as needed (stomach pain).    DULoxetine (CYMBALTA) 60 MG capsule Take 60 mg by mouth once daily. Takes in pm    gabapentin (NEURONTIN) 300 MG capsule Take 300 mg by mouth 3 (three) times daily.    HYDROcodone-acetaminophen (NORCO)  mg per tablet Take 1 tablet by mouth every 6 (six) hours as needed.    HYDROmorphone, PF, (DILAUDID) 1 mg/mL Soln     metFORMIN (GLUCOPHAGE) 1000 MG tablet Take 1 tablet (1,000 mg total) by mouth 2 (two) times daily with meals.    metoclopramide HCl (REGLAN) 10 MG tablet Take 1 tablet (10 mg total) by mouth every 6 (six) hours.    mirtazapine (REMERON) 15 MG tablet Take 15 mg by mouth every evening.    mirtazapine (REMERON) 45 MG tablet Take 1 tablet by mouth every evening.    morphine 4 mg/mL Soln     NAMENDA 10 mg Tab Take 10 mg by mouth once daily.     OMNIPAQUE 350 350 mg iodine/mL Soln injection     ondansetron (ZOFRAN-ODT) 4 MG TbDL Take 1 tablet (4 mg total) by mouth every 6 (six) hours as needed (nausea).    ondansetron 4 mg/2 mL Soln     oxyCODONE-acetaminophen (PERCOCET)  mg per tablet TAKE 1 TABLET AS NEEDED ORALLY EVERY 4 TO 6 HOURS    pantoprazole (PROTONIX) 40 MG tablet Take 1 tablet (40 mg total) by mouth once daily.    pantoprazole (PROTONIX) 40 MG tablet Take 1 tablet by mouth once daily.    propranoloL (INDERAL) 20 MG tablet Take 10 mg by mouth 2 (two) times daily.    tiZANidine (ZANAFLEX) 4 MG tablet Take 4 mg by mouth every evening.     traZODone (DESYREL) 100 MG tablet Take 150 mg by mouth. (Patient not taking: Reported on 5/27/2024)    traZODone (DESYREL) 50 MG tablet Take 100 mg by mouth every evening.    TRUE METRIX GLUCOSE METER Misc  (Patient not taking: Reported on 5/27/2024)    TRUE METRIX GLUCOSE TEST STRIP Strp     valsartan-hydrochlorothiazide (DIOVAN-HCT) 160-25 mg per tablet TAKE 1 TABLET BY MOUTH EVERY DAY     Family History       Problem Relation (Age of Onset)    Cirrhosis Brother    Heart disease Father    Hypertension Mother          Tobacco Use    Smoking status: Every Day     Current packs/day: 1.00     Average packs/day: 1 pack/day for 35.0 years (35.0 ttl pk-yrs)     Types: Cigarettes     Passive exposure: Current    Smokeless tobacco: Never    Tobacco comments:     Will try to stop smoking by himself vs    Substance and Sexual Activity    Alcohol use: No    Drug use: Yes     Frequency: 7.0 times per week     Types: Marijuana    Sexual activity: Yes     Partners: Female     Review of Systems   Constitutional:  Positive for chills (occasional) and diaphoresis. Negative for fatigue and fever.   Respiratory:  Negative for shortness of breath.    Cardiovascular:  Negative for chest pain and palpitations.   Gastrointestinal:  Positive for abdominal pain (upper), nausea and vomiting. Negative for diarrhea.   Genitourinary:  Negative for dysuria and hematuria.   Neurological:  Negative for dizziness, syncope and light-headedness.     Objective:     Vital Signs (Most Recent):  Temp: 97.9 °F (36.6 °C) (06/17/24 0124)  Pulse: 70 (06/17/24 0124)  Resp: 19 (06/17/24 0124)  BP: (!) 122/58 (06/17/24 0124)  SpO2: 95 % (06/17/24 0124) Vital Signs (24h Range):  Temp:  [97.9 °F (36.6 °C)-98.2 °F (36.8 °C)] 97.9 °F (36.6 °C)  Pulse:  [50-97] 70  Resp:  [17-28] 19  SpO2:  [95 %-99 %] 95 %  BP: (104-227)/() 122/58     Weight: 62.5 kg (137 lb 12.6 oz)  Body mass index is 23.65 kg/m².     Physical Exam  Vitals reviewed.   Constitutional:        General: He is awake. He is not in acute distress.     Appearance: Normal appearance. He is not diaphoretic.      Comments: Pt appears uncomfortable.   Cardiovascular:      Rate and Rhythm: Normal rate.      Heart sounds: Normal heart sounds. No murmur heard.     No friction rub. No gallop.   Pulmonary:      Effort: Pulmonary effort is normal. No accessory muscle usage or respiratory distress.      Breath sounds: Normal breath sounds. No wheezing, rhonchi or rales.   Abdominal:      General: Abdomen is flat. Bowel sounds are normal.      Palpations: Abdomen is soft.      Tenderness: There is generalized abdominal tenderness. There is no guarding or rebound.   Musculoskeletal:      Right lower leg: No edema.      Left lower leg: No edema.   Skin:     General: Skin is warm and dry.   Neurological:      Mental Status: He is alert and oriented to person, place, and time.   Psychiatric:         Behavior: Behavior is cooperative.                Significant Labs: All pertinent labs within the past 24 hours have been reviewed.  CBC:   Recent Labs   Lab 06/16/24 1952 06/16/24 1957   WBC 10.91  --    HGB 15.5  --    HCT 45.3 42     --      CMP:   Recent Labs   Lab 06/16/24 1952      K 5.1      CO2 20*   *   BUN 17   CREATININE 1.4   CALCIUM 10.5   PROT 8.5*   ALBUMIN 4.3   BILITOT 0.5   ALKPHOS 83   AST 15   ALT 18   ANIONGAP 15     Lactic Acid:   Recent Labs   Lab 06/16/24 1952 06/16/24 2154   LACTATE 2.3* 2.7*     Lipase:   Recent Labs   Lab 06/16/24 1952   LIPASE 21     Troponin:   Recent Labs   Lab 06/16/24 1952 06/16/24 2154   TROPONINI 0.029* 0.077*     Urine Studies:   Recent Labs   Lab 06/16/24  2336   COLORU Yellow   APPEARANCEUA Clear   PHUR 7.0   SPECGRAV >1.030*   PROTEINUA 1+*   GLUCUA 1+*   KETONESU 1+*   BILIRUBINUA Negative   OCCULTUA Negative   NITRITE Negative   UROBILINOGEN Negative   LEUKOCYTESUR Negative   RBCUA 1   WBCUA 0   BACTERIA None   SQUAMEPITHEL 0   HYALINECASTS  0       Significant Imaging:   Imaging Results              CT Abdomen Pelvis With IV Contrast NO Oral Contrast (Final result)  Result time 06/16/24 20:49:59      Final result by Dalton Lyle MD (06/16/24 20:49:59)                   Impression:      1. No acute intra-abdominal abnormalities identified.  2. Circumferential wall thickening involving the distal esophagus which may be seen with esophagitis.  Similar findings are seen on previous CT from 01/26/2024.  Future EGD follow-up may be warranted if not previously or recently obtained.  3. Mild sigmoid diverticulosis with no evidence of acute diverticulitis.  4. Postsurgical changes and additional findings as detailed above.      Electronically signed by: Dalton Lyle MD  Date:    06/16/2024  Time:    20:49               Narrative:    EXAMINATION:  CT ABDOMEN PELVIS WITH IV CONTRAST    CLINICAL HISTORY:  Abdominal pain, acute, nonlocalized;    TECHNIQUE:  Low dose axial images, sagittal and coronal reformations were obtained from the lung bases to the pubic symphysis following the IV administration of 75 mL of Omnipaque 350 .  Oral contrast was not given.    COMPARISON:  CT abdomen pelvis from 01/26/2024.    FINDINGS:  The visualized portion of the heart is unremarkable.  The lung bases are clear.  Circumferential wall thickening is seen involving the distal esophagus.    No significant hepatic abnormalities are identified.  There is no intra-or extrahepatic biliary ductal dilatation.  The gallbladder is unremarkable.  The stomach, pancreas, spleen, and adrenal glands are unremarkable.    Kidneys enhance normally with no evidence of hydronephrosis.  No abnormalities are seen along the ureteral courses.  Urinary bladder is unremarkable.  Prostate is mildly enlarged small prostatic calcification seen.    Postsurgical changes are seen at the base of the cecum likely reflecting prior appendectomy.  There is mild sigmoid diverticulosis.  The visualized loops  of small and large bowel show no evidence of obstruction or inflammation.  No free air or free fluid.    Aorta tapers normally with moderate atherosclerosis seen involving the distal abdominal aorta and extending into the iliacs.    No acute osseous abnormality identified.  Intervertebral disc space narrowing and degenerative change is seen at the L5-S1 level.  Remote nonunited fracture seen of the posteromedial right 11th rib.  Unchanged serpiginous sclerosis is seen involving the femoral heads consistent likely reflecting avascular necrosis without evidence of subchondral collapse.  Small fat containing right inguinal hernia is noted.                                       X-Ray Chest AP Portable (Final result)  Result time 06/16/24 20:18:22      Final result by Dalton Lyle MD (06/16/24 20:18:22)                   Impression:      No acute cardiopulmonary process identified.      Electronically signed by: Dalton Lyle MD  Date:    06/16/2024  Time:    20:18               Narrative:    EXAMINATION:  XR CHEST AP PORTABLE    CLINICAL HISTORY:  Chest pain, unspecified    TECHNIQUE:  Single frontal view of the chest was performed.    COMPARISON:  01/26/2024.    FINDINGS:  Cardiac silhouette is normal in size.  Lungs are symmetrically expanded.  No evidence of focal consolidative process, pneumothorax, or significant pleural effusion.  No acute osseous abnormality identified.

## 2024-06-17 NOTE — HPI
Mr. Price is a 59 yr old male with a hx of hep C s/p successful treatment, DM type 2, HTN, cirrhosis of liver, depression, CVA, esophagitis, dyslipidemia, GERD, tobacco use who presented to the ED with a chief complaint of constant 10/10 sudden onset abdominal pain that began Saturday morning.  When asked to describe his pain patient just states that it is painful.  He also endorses occasional chills, nausea, vomiting, diaphoresis.  Nothing seems to alleviate or exacerbate his symptoms, his pain does not radiate anywhere else.  He is tried hyoscyamine at home without relief of his symptoms.  He states that after treatment in the ED his abdominal pain has slightly improved.  Patient states that prior to abdominal pain he had a normal appetite however, since his abdominal pain he has not been eating much.  His last BM was yesterday morning and states that it was a normal bowel movement without blood.  Patient denies Tylenol and NSAID use.  He endorses that he has been seen for abdominal pain 3 times in the past without an answer as to what is going on. Of note, pt is unsure what meds he takes daily and says he will have his wife give an updated list as soon as she is available. He states that he smokes 1 pack of cigarettes every 2 days, quit drinking alcohol 2 years ago, and smokes marijuana occasionally (last smoked a couple of days ago).  He denies fatigue, fever, SOB, palpitations, CP, diarrhea, dysuria, hematuria, lightheadedness, dizziness, and syncope.    Per chart review, pt underwent EGD with Dr. Shanks on 1/26/24 with findings and recs below:  - Bleb found in the esophagus.   - Web in the lower third of the esophagus.   - 4 cm hiatal hernia.   - Normal stomach.   - Normal examined duodenum.   - No specimens collected.   Recommendation:   - Proceed to colonoscopy.   - Resume previous diet.   - Continue present medications.    Pt also underwent colonoscopy with Dr. Shanks on 1/26/24 with findings and recs  "below:  - Two 4 to 5 mm polyps in the descending colon, removed with a cold snare. Resected and retrieved. Clips were placed. Clip : [employer].   - One 2 mm polyp in the rectum, removed with a  jumbo cold forceps. Resected and retrieved.   - Diverticulosis in the sigmoid colon.   - The examination was otherwise normal.   Recommendation:  - Discharge patient to home.   - Resume previous diet.   - Continue present medications.   - Await pathology results.   - Repeat colonoscopy in 5 years for surveillance.       Upon arrival to ED, patient afebrile, HR of 54, RR of 20, BP of 181/91, satting 99% on RA.  Workup in the ED included CBC, CMP, lipase, troponin, lactic acid, UDS, UA, urine creatinine, CXR, CT abdomen pelvis.  Workup revealed GFR of 58, glucose of 216, initial troponin of 0.029, repeat troponin of 0.077, initial lactic of 2.3, repeat lactic of 2.7.  UDS positive for opiates and marijuana.  UA with 1+ protein, 1+ glucose, and 1+ ketones.  CXR showed no acute cardiopulmonary process identified.   CT abdomen pelvis showed no acute intra-abdominal abnormalities identified.  Circumferential wall thickening involving the distal esophagus which may be seen with esophagitis.  Similar findings are seen on previous CT from 01/26/2024.  Mild sigmoid diverticulosis with no evidence of acute diverticulitis.  Postsurgical changes."Patient received GI cocktail, HCTZ 25 mg oral, 2 L LR boluses, metoclopramide 10 mg IV, morphine 4 mg IV, morphine 6 mg IV, ondansetron 8 mg IV, pantoprazole 40 mg IV, valsartan 160 mg oral, Omnipaque 75 mL IV contrast while in the ED. case discussed with ED provider and patient will be placed under observation for further management.  "

## 2024-06-17 NOTE — ASSESSMENT & PLAN NOTE
Last A1c reviewed-   Lab Results   Component Value Date    HGBA1C 6.8 (H) 04/30/2024     Most recent fingerstick glucose reviewed-   Recent Labs   Lab 06/17/24  0005   POCTGLUCOSE 170*     Current correctional scale  Medium  Maintain anti-hyperglycemic dose as follows-   Antihyperglycemics (From admission, onward)      Start     Stop Route Frequency Ordered    06/16/24 2333  insulin aspart U-100 pen 0-10 Units         -- SubQ Every 6 hours PRN 06/16/24 2239          Hold Oral hypoglycemics while patient is in the hospital.

## 2024-06-17 NOTE — CONSULTS
[facility] Consultation Note    Patient Complaint: abdominal/ chest pain    PCP:   Stevo Pryor       LOS: 0        Initial History of Present Illness (HPI):  This is a 59 y.o. male consulted to GI service for abdominal pain. PMH hep C s/p successful treatment, DM type 2, HTN, cirrhosis of liver, depression, CVA, esophagitis, dyslipidemia, GERD, tobacco use. Patient complaint of worsening severe epigastric/chest pain with associated symptoms of vomiting that began on yesterday. Patient reports pain is now new but has become severe. Denies dizziness, lightheadedness, nausea, diarrhea or constipation. Patient known to GI service and has has GI workup on many occasions. Dual endoscopy in Jan 2024, report below was unrevealing. Patient on chronic pain medication with hx of chronic obstipation d/t pain meds.       Medical History:  has a past medical history of Anxiety, Asthma, Colitis, Depression, Diabetes mellitus, Head injury, History of hepatitis C, s/p successful hcv treatment w/ SVR 8-2015  (10/8/2012), Hypertension, Shoulder pain, and Stroke.    Surgical History:  has a past surgical history that includes rotator cuff; Colonoscopy (N/A, 10/20/2015); Gallbladder surgery; Diagnostic laparoscopy (N/A, 4/1/2021); Esophagogastroduodenoscopy (N/A, 1/26/2024); and Colonoscopy (N/A, 1/26/2024).      Objective Findings:    Vital Signs:  Temp:  [97.4 °F (36.3 °C)-98.2 °F (36.8 °C)]   Pulse:  [50-97]   Resp:  [17-28]   BP: (104-227)/()   SpO2:  [95 %-99 %]   Body mass index is 23.65 kg/m².      Physical Exam  Vitals and nursing note reviewed.   Constitutional:       Appearance: Normal appearance.   HENT:      Head: Normocephalic.   Pulmonary:      Effort: Pulmonary effort is normal.   Abdominal:      General: Bowel sounds are normal.      Palpations: Abdomen is soft.      Tenderness: There is abdominal tenderness (epigastric).   Skin:     General: Skin is warm and dry.   Neurological:      Mental  Status: He is alert and oriented to person, place, and time.   Psychiatric:         Mood and Affect: Mood normal.         Behavior: Behavior normal.         Thought Content: Thought content normal.         Judgment: Judgment normal.               Labs:  Lab Results   Component Value Date    WBC 11.15 2024    HGB 12.9 (L) 2024    HCT 37.8 (L) 2024     2024    CHOL 124 2024    TRIG 116 2024    HDL 23 (L) 2024    ALT 18 2024    AST 15 2024     2024    K 4.5 2024     2024    CREATININE 1.0 2024    BUN 15 2024    CO2 25 2024    TSH 0.593 2024    PSA 0.39 2024    INR 1.1 2024    HGBA1C 6.8 (H) 2024             Imagin/16 CT abdomen pelvis-  No acute intra-abdominal abnormalities identified.  Circumferential wall thickening involving the distal esophagus which may be seen with esophagitis.  Similar findings are seen on previous CT from 2024.    Mild sigmoid diverticulosis with no evidence of acute diverticulitis.      Endoscopy: 2024 egd-  Bleb found in the esophagus.                          - Web in the lower third of the esophagus.                          - 4 cm hiatal hernia.                          - Normal stomach.                          - Normal examined duodenum.                          - No specimens collected.   2024 Colonoscopy- Two 4 to 5 mm polyps in the descending colon,                          removed with a cold snare. Resected and retrieved.                          Clips were placed. Clip : [email or web address].                          - One 2 mm polyp in the rectum, removed with a                          CompleteSeto cold forceps. Resected and retrieved.                          - Diverticulosis in the sigmoid colon.                          - The examination was otherwise normal.     I have independently reviewed and  interpreted the imaging above           Acute on chronic epigastric pain/chest pain. Elevated troponin. Chronic opioid use.  Plan/ Recommendations:  1.  Dual endoscopy in Jan 2024 without acute findings, same finding of thicken esophagus on Jan imaging and egd done without findings. This admission acute on chronic chest/epigastric pain with finding of increased troponin. HM consulted cardiology and plan to take patient to cath lab. No plan for acute inpatient endoscopy at this time. Suspect chronic opioid use has led to narcotic bowel and would not be treatable via endoscopic procedure. May consider pain management referral to explore non opioid modalities, as improvement in narcotic bowel would require scale back of chronic opioid use.        Thank you so much for allowing us to participate in the care of Nino Price . Please contact us if you have any additional questions.    Melany Tan NP  Gastroenterology  [facility] - [place] - [facility] S[facility]

## 2024-06-17 NOTE — H&P
[facility] Medicine  History & Physical    Patient Name: Nino Price  MRN: [id number]  Patient Class: OP- Observation  Admission Date: 6/16/2024  Attending Physician: Patt Neal MD   Primary Care Provider: Stevo Pryor MD         Patient information was obtained from patient, past medical records, and ER records.     Subjective:     Principal Problem:Elevated troponin    Chief Complaint:   Chief Complaint   Patient presents with    Abdominal Pain    Nausea     Pt presents to ER with complaints of abdominal pain and nausea. Since earlier today. Pt rates pain 10/10 at this time. Pt states the prescribed meds he was given for this is not working. Pt reports that he has a HA that started this morning as well. Pt denies any other issues at this time.         HPI: Mr. Price is a 59 yr old male with a hx of hep C s/p successful treatment, DM type 2, HTN, cirrhosis of liver, depression, CVA, esophagitis, dyslipidemia, GERD, tobacco use who presented to the ED with a chief complaint of constant 10/10 sudden onset abdominal pain that began Saturday morning.  When asked to describe his pain patient just states that it is painful.  He also endorses occasional chills, nausea, vomiting, diaphoresis.  Nothing seems to alleviate or exacerbate his symptoms, his pain does not radiate anywhere else.  He is tried hyoscyamine at home without relief of his symptoms.  He states that after treatment in the ED his abdominal pain has slightly improved.  Patient states that prior to abdominal pain he had a normal appetite however, since his abdominal pain he has not been eating much.  His last BM was yesterday morning and states that it was a normal bowel movement without blood.  Patient denies Tylenol and NSAID use.  He endorses that he has been seen for abdominal pain 3 times in the past without an answer as to what is going on. Of note, pt is unsure what meds he takes daily and says he will have  his wife give an updated list as soon as she is available. He states that he smokes 1 pack of cigarettes every 2 days, quit drinking alcohol 2 years ago, and smokes marijuana occasionally (last smoked a couple of days ago).  He denies fatigue, fever, SOB, palpitations, CP, diarrhea, dysuria, hematuria, lightheadedness, dizziness, and syncope.    Per chart review, pt underwent EGD with Dr. Shanks on 1/26/24 with findings and recs below:  - Bleb found in the esophagus.   - Web in the lower third of the esophagus.   - 4 cm hiatal hernia.   - Normal stomach.   - Normal examined duodenum.   - No specimens collected.   Recommendation:   - Proceed to colonoscopy.   - Resume previous diet.   - Continue present medications.    Pt also underwent colonoscopy with Dr. Shanks on 1/26/24 with findings and recs below:  - Two 4 to 5 mm polyps in the descending colon, removed with a cold snare. Resected and retrieved. Clips were placed. Clip : [employer].   - One 2 mm polyp in the rectum, removed with a  Runnit cold forceps. Resected and retrieved.   - Diverticulosis in the sigmoid colon.   - The examination was otherwise normal.   Recommendation:  - Discharge patient to home.   - Resume previous diet.   - Continue present medications.   - Await pathology results.   - Repeat colonoscopy in 5 years for surveillance.       Upon arrival to ED, patient afebrile, HR of 54, RR of 20, BP of 181/91, satting 99% on RA.  Workup in the ED included CBC, CMP, lipase, troponin, lactic acid, UDS, UA, urine creatinine, CXR, CT abdomen pelvis.  Workup revealed GFR of 58, glucose of 216, initial troponin of 0.029, repeat troponin of 0.077, initial lactic of 2.3, repeat lactic of 2.7.  UDS positive for opiates and marijuana.  UA with 1+ protein, 1+ glucose, and 1+ ketones.  CXR showed no acute cardiopulmonary process identified.   CT abdomen pelvis showed no acute intra-abdominal abnormalities identified.  Circumferential wall  "thickening involving the distal esophagus which may be seen with esophagitis.  Similar findings are seen on previous CT from 01/26/2024.  Mild sigmoid diverticulosis with no evidence of acute diverticulitis.  Postsurgical changes."Patient received GI cocktail, HCTZ 25 mg oral, 2 L LR boluses, metoclopramide 10 mg IV, morphine 4 mg IV, morphine 6 mg IV, ondansetron 8 mg IV, pantoprazole 40 mg IV, valsartan 160 mg oral, Omnipaque 75 mL IV contrast while in the ED. case discussed with ED provider and patient will be placed under observation for further management.    Past Medical History:   Diagnosis Date    Anxiety     Asthma     Colitis     Depression     Diabetes mellitus     Head injury     History of hepatitis C, s/p successful hcv treatment w/ SVR 8-2015  10/8/2012    SVR(24) as of 11/2015.  SVR(12) as of 8/2015.  completed harvoni 24 week regimen for genotype 1a 5/18/15     Hypertension     Shoulder pain     left, s/p 4 surgeries.     Stroke        Past Surgical History:   Procedure Laterality Date    COLONOSCOPY N/A 10/20/2015    Procedure: COLONOSCOPY;  Surgeon: Jagdish Patricia MD;  Location: [facility] END[facility];  Service: Endoscopy;  Laterality: N/A;    COLONOSCOPY N/A 1/26/2024    Procedure: COLONOSCOPY;  Surgeon: Pricila Shanks MD;  Location: [facility] END[facility];  Service: Endoscopy;  Laterality: N/A;    DIAGNOSTIC LAPAROSCOPY N/A 4/1/2021    Procedure: LAPAROSCOPY, DIAGNOSTIC;  Surgeon: Umesh Vallecillo MD;  Location: [facility] OR;  Service: General;  Laterality: N/A;  RN PREOP 3/29/21, COVID NEGATIVE ON 3/29  CONSENT AND H/P INCOMPLETE    ESOPHAGOGASTRODUODENOSCOPY N/A 1/26/2024    Procedure: EGD (ESOPHAGOGASTRODUODENOSCOPY);  Surgeon: Pricila Shanks MD;  Location: [facility] END[facility];  Service: Endoscopy;  Laterality: N/A;  prep instructions mailed to pt / [facility] pt / Urgent/suprep  1/19- lvm for pc. DBM  1/23- left 2nd vm for pc. DBM  1/24 cirrohsis-labs prior, precall complete-st    GALLBLADDER SURGERY      pt not 100% if gall " bladder removed    rotator cuff      left side       Review of patient's allergies indicates:   Allergen Reactions    Codeine Nausea Only       No current facility-administered medications on file prior to encounter.     Current Outpatient Medications on File Prior to Encounter   Medication Sig    acetaminophen (TYLENOL) 500 MG tablet Take 1 tablet (500 mg total) by mouth every 6 (six) hours as needed for Pain (and fever).    aspirin (ECOTRIN) 81 MG EC tablet Take 1 tablet (81 mg total) by mouth once daily.    atorvastatin (LIPITOR) 80 MG tablet Take 1 tablet (80 mg total) by mouth once daily.    buPROPion (WELLBUTRIN XL) 150 MG TB24 tablet Take 1 tablet by mouth once daily.    buPROPion (WELLBUTRIN XL) 300 MG 24 hr tablet Take 1 tablet by mouth every morning. (Patient not taking: Reported on 5/27/2024)    busPIRone (BUSPAR) 10 MG tablet Take 5 mg by mouth every evening.    cyclobenzaprine (FLEXERIL) 5 MG tablet Take 5 mg by mouth nightly as needed. (Patient not taking: Reported on 5/27/2024)    dicyclomine (BENTYL) 20 mg tablet Take 1 tablet (20 mg total) by mouth 2 (two) times daily as needed (stomach pain).    DULoxetine (CYMBALTA) 60 MG capsule Take 60 mg by mouth once daily. Takes in pm    gabapentin (NEURONTIN) 300 MG capsule Take 300 mg by mouth 3 (three) times daily.    HYDROcodone-acetaminophen (NORCO)  mg per tablet Take 1 tablet by mouth every 6 (six) hours as needed.    HYDROmorphone, PF, (DILAUDID) 1 mg/mL Soln     metFORMIN (GLUCOPHAGE) 1000 MG tablet Take 1 tablet (1,000 mg total) by mouth 2 (two) times daily with meals.    metoclopramide HCl (REGLAN) 10 MG tablet Take 1 tablet (10 mg total) by mouth every 6 (six) hours.    mirtazapine (REMERON) 15 MG tablet Take 15 mg by mouth every evening.    mirtazapine (REMERON) 45 MG tablet Take 1 tablet by mouth every evening.    morphine 4 mg/mL Soln     NAMENDA 10 mg Tab Take 10 mg by mouth once daily.     OMNIPAQUE 350 350 mg iodine/mL Soln injection      ondansetron (ZOFRAN-ODT) 4 MG TbDL Take 1 tablet (4 mg total) by mouth every 6 (six) hours as needed (nausea).    ondansetron 4 mg/2 mL Soln     oxyCODONE-acetaminophen (PERCOCET)  mg per tablet TAKE 1 TABLET AS NEEDED ORALLY EVERY 4 TO 6 HOURS    pantoprazole (PROTONIX) 40 MG tablet Take 1 tablet (40 mg total) by mouth once daily.    pantoprazole (PROTONIX) 40 MG tablet Take 1 tablet by mouth once daily.    propranoloL (INDERAL) 20 MG tablet Take 10 mg by mouth 2 (two) times daily.    tiZANidine (ZANAFLEX) 4 MG tablet Take 4 mg by mouth every evening.    traZODone (DESYREL) 100 MG tablet Take 150 mg by mouth. (Patient not taking: Reported on 5/27/2024)    traZODone (DESYREL) 50 MG tablet Take 100 mg by mouth every evening.    TRUE METRIX GLUCOSE METER Misc  (Patient not taking: Reported on 5/27/2024)    TRUE METRIX GLUCOSE TEST STRIP Strp     valsartan-hydrochlorothiazide (DIOVAN-HCT) 160-25 mg per tablet TAKE 1 TABLET BY MOUTH EVERY DAY     Family History       Problem Relation (Age of Onset)    Cirrhosis Brother    Heart disease Father    Hypertension Mother          Tobacco Use    Smoking status: Every Day     Current packs/day: 1.00     Average packs/day: 1 pack/day for 35.0 years (35.0 ttl pk-yrs)     Types: Cigarettes     Passive exposure: Current    Smokeless tobacco: Never    Tobacco comments:     Will try to stop smoking by himself vs    Substance and Sexual Activity    Alcohol use: No    Drug use: Yes     Frequency: 7.0 times per week     Types: Marijuana    Sexual activity: Yes     Partners: Female     Review of Systems   Constitutional:  Positive for chills (occasional) and diaphoresis. Negative for fatigue and fever.   Respiratory:  Negative for shortness of breath.    Cardiovascular:  Negative for chest pain and palpitations.   Gastrointestinal:  Positive for abdominal pain (upper), nausea and vomiting. Negative for diarrhea.   Genitourinary:  Negative for dysuria and hematuria.    Neurological:  Negative for dizziness, syncope and light-headedness.     Objective:     Vital Signs (Most Recent):  Temp: 97.9 °F (36.6 °C) (06/17/24 0124)  Pulse: 70 (06/17/24 0124)  Resp: 19 (06/17/24 0124)  BP: (!) 122/58 (06/17/24 0124)  SpO2: 95 % (06/17/24 0124) Vital Signs (24h Range):  Temp:  [97.9 °F (36.6 °C)-98.2 °F (36.8 °C)] 97.9 °F (36.6 °C)  Pulse:  [50-97] 70  Resp:  [17-28] 19  SpO2:  [95 %-99 %] 95 %  BP: (104-227)/() 122/58     Weight: 62.5 kg (137 lb 12.6 oz)  Body mass index is 23.65 kg/m².     Physical Exam  Vitals reviewed.   Constitutional:       General: He is awake. He is not in acute distress.     Appearance: Normal appearance. He is not diaphoretic.      Comments: Pt appears uncomfortable.   Cardiovascular:      Rate and Rhythm: Normal rate.      Heart sounds: Normal heart sounds. No murmur heard.     No friction rub. No gallop.   Pulmonary:      Effort: Pulmonary effort is normal. No accessory muscle usage or respiratory distress.      Breath sounds: Normal breath sounds. No wheezing, rhonchi or rales.   Abdominal:      General: Abdomen is flat. Bowel sounds are normal.      Palpations: Abdomen is soft.      Tenderness: There is generalized abdominal tenderness. There is no guarding or rebound.   Musculoskeletal:      Right lower leg: No edema.      Left lower leg: No edema.   Skin:     General: Skin is warm and dry.   Neurological:      Mental Status: He is alert and oriented to person, place, and time.   Psychiatric:         Behavior: Behavior is cooperative.                Significant Labs: All pertinent labs within the past 24 hours have been reviewed.  CBC:   Recent Labs   Lab 06/16/24 1952 06/16/24 1957   WBC 10.91  --    HGB 15.5  --    HCT 45.3 42     --      CMP:   Recent Labs   Lab 06/16/24 1952      K 5.1      CO2 20*   *   BUN 17   CREATININE 1.4   CALCIUM 10.5   PROT 8.5*   ALBUMIN 4.3   BILITOT 0.5   ALKPHOS 83   AST 15   ALT 18    ANIONGAP 15     Lactic Acid:   Recent Labs   Lab 06/16/24 1952 06/16/24 2154   LACTATE 2.3* 2.7*     Lipase:   Recent Labs   Lab 06/16/24 1952   LIPASE 21     Troponin:   Recent Labs   Lab 06/16/24 1952 06/16/24 2154   TROPONINI 0.029* 0.077*     Urine Studies:   Recent Labs   Lab 06/16/24  2336   COLORU Yellow   APPEARANCEUA Clear   PHUR 7.0   SPECGRAV >1.030*   PROTEINUA 1+*   GLUCUA 1+*   KETONESU 1+*   BILIRUBINUA Negative   OCCULTUA Negative   NITRITE Negative   UROBILINOGEN Negative   LEUKOCYTESUR Negative   RBCUA 1   WBCUA 0   BACTERIA None   SQUAMEPITHEL 0   HYALINECASTS 0       Significant Imaging:   Imaging Results              CT Abdomen Pelvis With IV Contrast NO Oral Contrast (Final result)  Result time 06/16/24 20:49:59      Final result by Dalton Lyle MD (06/16/24 20:49:59)                   Impression:      1. No acute intra-abdominal abnormalities identified.  2. Circumferential wall thickening involving the distal esophagus which may be seen with esophagitis.  Similar findings are seen on previous CT from 01/26/2024.  Future EGD follow-up may be warranted if not previously or recently obtained.  3. Mild sigmoid diverticulosis with no evidence of acute diverticulitis.  4. Postsurgical changes and additional findings as detailed above.      Electronically signed by: Dalton Lyle MD  Date:    06/16/2024  Time:    20:49               Narrative:    EXAMINATION:  CT ABDOMEN PELVIS WITH IV CONTRAST    CLINICAL HISTORY:  Abdominal pain, acute, nonlocalized;    TECHNIQUE:  Low dose axial images, sagittal and coronal reformations were obtained from the lung bases to the pubic symphysis following the IV administration of 75 mL of Omnipaque 350 .  Oral contrast was not given.    COMPARISON:  CT abdomen pelvis from 01/26/2024.    FINDINGS:  The visualized portion of the heart is unremarkable.  The lung bases are clear.  Circumferential wall thickening is seen involving the distal  esophagus.    No significant hepatic abnormalities are identified.  There is no intra-or extrahepatic biliary ductal dilatation.  The gallbladder is unremarkable.  The stomach, pancreas, spleen, and adrenal glands are unremarkable.    Kidneys enhance normally with no evidence of hydronephrosis.  No abnormalities are seen along the ureteral courses.  Urinary bladder is unremarkable.  Prostate is mildly enlarged small prostatic calcification seen.    Postsurgical changes are seen at the base of the cecum likely reflecting prior appendectomy.  There is mild sigmoid diverticulosis.  The visualized loops of small and large bowel show no evidence of obstruction or inflammation.  No free air or free fluid.    Aorta tapers normally with moderate atherosclerosis seen involving the distal abdominal aorta and extending into the iliacs.    No acute osseous abnormality identified.  Intervertebral disc space narrowing and degenerative change is seen at the L5-S1 level.  Remote nonunited fracture seen of the posteromedial right 11th rib.  Unchanged serpiginous sclerosis is seen involving the femoral heads consistent likely reflecting avascular necrosis without evidence of subchondral collapse.  Small fat containing right inguinal hernia is noted.                                       X-Ray Chest AP Portable (Final result)  Result time 06/16/24 20:18:22      Final result by Dalton Lyle MD (06/16/24 20:18:22)                   Impression:      No acute cardiopulmonary process identified.      Electronically signed by: Dalton Lyle MD  Date:    06/16/2024  Time:    20:18               Narrative:    EXAMINATION:  XR CHEST AP PORTABLE    CLINICAL HISTORY:  Chest pain, unspecified    TECHNIQUE:  Single frontal view of the chest was performed.    COMPARISON:  01/26/2024.    FINDINGS:  Cardiac silhouette is normal in size.  Lungs are symmetrically expanded.  No evidence of focal consolidative process, pneumothorax, or significant  pleural effusion.  No acute osseous abnormality identified.                                     Assessment/Plan:     * Elevated troponin  Pt presented to the ED with abdominal pain, N/V and found to have elevated troponin. EKG with sinus tach, Bigeminy with vent rate of 102 and Qtc of 422. Placed under Obs for ACS r/o.    - Pt denies CP on my interview  - Cardiology consulted, appreciate recs  - NPO  - Trend trops  - TTE pending  - Lipid, A1c, and TSH pending  - Continuous tele monitoring    GERD (gastroesophageal reflux disease)  - Continue home Pantoprazole      Dyslipidemia  - Continue home statin      Esophagitis  - Hx noted  - Continue home Pantoprazole  - GI consulted, appreciate recs  - NPO  - See HPI for recent EGD results with Dr. Shanks.      History of CVA (cerebrovascular accident)  - Hx noted  - Continue home ASA/statin      Depression  - Hx noted  - Pt unsure of the names of his daily meds, he stated would have wife give an updated list of his medications when she is available.        Abdominal pain  Mr. Price is a 59 yr old male with a hx of hep C s/p successful treatment, DM type 2, HTN, cirrhosis of liver, depression, CVA, esophagitis, dyslipidemia, GERD, tobacco use who presented to the ED with a chief complaint of constant 10/10 sudden onset abdominal pain that began Saturday morning. He also endorses occasional chills, nausea, vomiting, diaphoresis. Workup in the ED  revealed GFR of 58, glucose of 216, initial troponin of 0.029, repeat troponin of 0.077, initial lactic of 2.3, repeat lactic of 2.7.  UDS positive for opiates and marijuana.  UA with 1+ protein, 1+ glucose, and 1+ ketones.  CXR showed no acute cardiopulmonary process identified.   CT abdomen pelvis showed no acute intra-abdominal abnormalities identified.  Circumferential wall thickening involving the distal esophagus which may be seen with esophagitis.  Similar findings are seen on previous CT from 01/26/2024.  Mild sigmoid  "diverticulosis with no evidence of acute diverticulitis.  Postsurgical changes."Patient received GI cocktail, HCTZ 25 mg oral, 2 L LR boluses, metoclopramide 10 mg IV, morphine 4 mg IV, morphine 6 mg IV, ondansetron 8 mg IV, pantoprazole 40 mg IV, valsartan 160 mg oral, Omnipaque 75 mL IV contrast while in the ED.    - GI consulted, appreciate recs  - NPO  - Continue home Pantoprazole  - PRN analgesics    Cirrhosis of liver without ascites  - Hx noted  - Continue to F/U with PCP/Hepatology    Essential hypertension  Chronic,  Latest blood pressure and vitals reviewed-     Temp:  [97.9 °F (36.6 °C)-98.2 °F (36.8 °C)]   Pulse:  [50-97]   Resp:  [17-28]   BP: (104-227)/()   SpO2:  [95 %-99 %] .   Home meds for hypertension were reviewed and noted below.   Hypertension Medications               propranoloL (INDERAL) 20 MG tablet Take 10 mg by mouth 2 (two) times daily.    valsartan-hydrochlorothiazide (DIOVAN-HCT) 160-25 mg per tablet TAKE 1 TABLET BY MOUTH EVERY DAY            While in the hospital, will manage blood pressure as follows; Adjust home antihypertensive regimen as follows- Continue home valsartan-HCTZ    Will utilize p.r.n. blood pressure medication only if patient's blood pressure greater than 180/110 and he develops symptoms such as worsening chest pain or shortness of breath.    Type 2 diabetes mellitus, controlled  Last A1c reviewed-   Lab Results   Component Value Date    HGBA1C 6.8 (H) 04/30/2024     Most recent fingerstick glucose reviewed-   Recent Labs   Lab 06/17/24  0005   POCTGLUCOSE 170*     Current correctional scale  Medium  Maintain anti-hyperglycemic dose as follows-   Antihyperglycemics (From admission, onward)      Start     Stop Route Frequency Ordered    06/16/24 2333  insulin aspart U-100 pen 0-10 Units         -- SubQ Every 6 hours PRN 06/16/24 1899          Hold Oral hypoglycemics while patient is in the hospital.    History of hepatitis C, s/p successful hcv treatment w/ SVR " 8-2015   - Hx noted  - Continue to follow up with PCP/Hepatology        VTE Risk Mitigation (From admission, onward)           Ordered     IP VTE LOW RISK PATIENT  Once         06/16/24 2239     Place sequential compression device  Until discontinued         06/16/24 2239                         On 06/17/2024, patient should be placed in hospital observation services under my care in collaboration with Patt Neal MD.      AdmissionCare    Guideline: Chest Pain - OBS, Observation    Based on the indications selected for the patient, the bed status of Observation was determined to be MET    The following indications were selected as present at the time of evaluation of the patient:      - Observation Care Admission Criteria            - Observation care is indicated for 1 or more of the following:      - Troponin results indeterminant or otherwise indicate need for monitoring and retesting beyond emergency department treatment time frame    AdmissionCare documentation entered by: PATEL Olvera    [facility] [employer], 28th edition, Copyright © 2024 [facility] [employer], [facility] All Rights Reserved.  [id number]:28:16-05:00    Smitha Prakash PA-C  Department of Hospital Medicine  [facility] - Observation

## 2024-06-17 NOTE — ADMISSIONCARE
AdmissionCare    Guideline: Chest Pain - OBS, Observation    Based on the indications selected for the patient, the bed status of Observation was determined to be MET    The following indications were selected as present at the time of evaluation of the patient:      - Observation Care Admission Criteria            - Observation care is indicated for 1 or more of the following:      - Troponin results indeterminant or otherwise indicate need for monitoring and retesting beyond emergency department treatment time frame    AdmissionCare documentation entered by: PATEL Olvera    [facility], 28th edition, Copyright © 2024 [facility], [facility] All Rights Reserved.  [id number]:28:16-05:00

## 2024-06-18 PROBLEM — E83.42 HYPOMAGNESEMIA: Status: ACTIVE | Noted: 2024-06-18

## 2024-06-18 PROBLEM — I25.10 CAD (CORONARY ARTERY DISEASE): Status: ACTIVE | Noted: 2024-06-18

## 2024-06-18 PROBLEM — E83.39 HYPOPHOSPHATEMIA: Status: ACTIVE | Noted: 2024-06-18

## 2024-06-18 LAB
ALLENS TEST: ABNORMAL
ALLENS TEST: ABNORMAL
ANION GAP SERPL CALC-SCNC: 10 MMOL/L (ref 8–16)
APTT PPP: 111.3 SEC (ref 21–32)
APTT PPP: 53.7 SEC (ref 21–32)
APTT PPP: 65.3 SEC (ref 21–32)
BASOPHILS # BLD AUTO: 0.07 K/UL (ref 0–0.2)
BASOPHILS NFR BLD: 0.9 % (ref 0–1.9)
BUN SERPL-MCNC: 11 MG/DL (ref 6–20)
CALCIUM SERPL-MCNC: 8.8 MG/DL (ref 8.7–10.5)
CHLORIDE SERPL-SCNC: 108 MMOL/L (ref 95–110)
CO2 SERPL-SCNC: 24 MMOL/L (ref 23–29)
CREAT SERPL-MCNC: 0.9 MG/DL (ref 0.5–1.4)
DIFFERENTIAL METHOD BLD: ABNORMAL
EOSINOPHIL # BLD AUTO: 0.2 K/UL (ref 0–0.5)
EOSINOPHIL NFR BLD: 2.3 % (ref 0–8)
ERYTHROCYTE [DISTWIDTH] IN BLOOD BY AUTOMATED COUNT: 12.8 % (ref 11.5–14.5)
EST. GFR  (NO RACE VARIABLE): >60 ML/MIN/1.73 M^2
GLUCOSE SERPL-MCNC: 129 MG/DL (ref 70–110)
HCT VFR BLD AUTO: 36 % (ref 40–54)
HGB BLD-MCNC: 12.7 G/DL (ref 14–18)
IMM GRANULOCYTES # BLD AUTO: 0.02 K/UL (ref 0–0.04)
IMM GRANULOCYTES NFR BLD AUTO: 0.2 % (ref 0–0.5)
LYMPHOCYTES # BLD AUTO: 2.9 K/UL (ref 1–4.8)
LYMPHOCYTES NFR BLD: 36.1 % (ref 18–48)
MAGNESIUM SERPL-MCNC: 1.5 MG/DL (ref 1.6–2.6)
MCH RBC QN AUTO: 32.3 PG (ref 27–31)
MCHC RBC AUTO-ENTMCNC: 35.3 G/DL (ref 32–36)
MCV RBC AUTO: 92 FL (ref 82–98)
MONOCYTES # BLD AUTO: 0.7 K/UL (ref 0.3–1)
MONOCYTES NFR BLD: 9 % (ref 4–15)
NEUTROPHILS # BLD AUTO: 4.2 K/UL (ref 1.8–7.7)
NEUTROPHILS NFR BLD: 51.5 % (ref 38–73)
NRBC BLD-RTO: 0 /100 WBC
PHOSPHATE SERPL-MCNC: 2.6 MG/DL (ref 2.7–4.5)
PLATELET # BLD AUTO: 171 K/UL (ref 150–450)
PMV BLD AUTO: 9.9 FL (ref 9.2–12.9)
POC ACTIVATED CLOTTING TIME K: 146 SEC (ref 74–137)
POC ACTIVATED CLOTTING TIME K: 293 SEC (ref 74–137)
POCT GLUCOSE: 104 MG/DL (ref 70–110)
POCT GLUCOSE: 133 MG/DL (ref 70–110)
POCT GLUCOSE: 157 MG/DL (ref 70–110)
POTASSIUM SERPL-SCNC: 4.1 MMOL/L (ref 3.5–5.1)
RBC # BLD AUTO: 3.93 M/UL (ref 4.6–6.2)
SAMPLE: ABNORMAL
SAMPLE: ABNORMAL
SITE: ABNORMAL
SITE: ABNORMAL
SODIUM SERPL-SCNC: 142 MMOL/L (ref 136–145)
WBC # BLD AUTO: 8.15 K/UL (ref 3.9–12.7)

## 2024-06-18 PROCEDURE — 25000003 PHARM REV CODE 250: Mod: HCNC | Performed by: INTERNAL MEDICINE

## 2024-06-18 PROCEDURE — 80048 BASIC METABOLIC PNL TOTAL CA: CPT | Mod: HCNC

## 2024-06-18 PROCEDURE — 36415 COLL VENOUS BLD VENIPUNCTURE: CPT | Mod: HCNC,XB | Performed by: STUDENT IN AN ORGANIZED HEALTH CARE EDUCATION/TRAINING PROGRAM

## 2024-06-18 PROCEDURE — 63600175 PHARM REV CODE 636 W HCPCS: Mod: HCNC

## 2024-06-18 PROCEDURE — C1769 GUIDE WIRE: HCPCS | Mod: HCNC | Performed by: INTERNAL MEDICINE

## 2024-06-18 PROCEDURE — 99153 MOD SED SAME PHYS/QHP EA: CPT | Mod: HCNC | Performed by: INTERNAL MEDICINE

## 2024-06-18 PROCEDURE — 36415 COLL VENOUS BLD VENIPUNCTURE: CPT | Mod: HCNC

## 2024-06-18 PROCEDURE — 25000003 PHARM REV CODE 250: Mod: HCNC

## 2024-06-18 PROCEDURE — 84100 ASSAY OF PHOSPHORUS: CPT | Mod: HCNC

## 2024-06-18 PROCEDURE — B241ZZ3 ULTRASONOGRAPHY OF MULTIPLE CORONARY ARTERIES, INTRAVASCULAR: ICD-10-PCS | Performed by: INTERNAL MEDICINE

## 2024-06-18 PROCEDURE — C1874 STENT, COATED/COV W/DEL SYS: HCPCS | Mod: HCNC | Performed by: INTERNAL MEDICINE

## 2024-06-18 PROCEDURE — 93010 ELECTROCARDIOGRAM REPORT: CPT | Mod: 59,,, | Performed by: INTERNAL MEDICINE

## 2024-06-18 PROCEDURE — 92978 ENDOLUMINL IVUS OCT C 1ST: CPT | Mod: 26,LC,, | Performed by: INTERNAL MEDICINE

## 2024-06-18 PROCEDURE — 85730 THROMBOPLASTIN TIME PARTIAL: CPT | Mod: HCNC | Performed by: STUDENT IN AN ORGANIZED HEALTH CARE EDUCATION/TRAINING PROGRAM

## 2024-06-18 PROCEDURE — 21400001 HC TELEMETRY ROOM: Mod: HCNC

## 2024-06-18 PROCEDURE — 92928 PRQ TCAT PLMT NTRAC ST 1 LES: CPT | Mod: LC,,, | Performed by: INTERNAL MEDICINE

## 2024-06-18 PROCEDURE — 99152 MOD SED SAME PHYS/QHP 5/>YRS: CPT | Mod: ,,, | Performed by: INTERNAL MEDICINE

## 2024-06-18 PROCEDURE — C1725 CATH, TRANSLUMIN NON-LASER: HCPCS | Mod: HCNC | Performed by: INTERNAL MEDICINE

## 2024-06-18 PROCEDURE — C1887 CATHETER, GUIDING: HCPCS | Mod: HCNC | Performed by: INTERNAL MEDICINE

## 2024-06-18 PROCEDURE — 027034Z DILATION OF CORONARY ARTERY, ONE ARTERY WITH DRUG-ELUTING INTRALUMINAL DEVICE, PERCUTANEOUS APPROACH: ICD-10-PCS | Performed by: INTERNAL MEDICINE

## 2024-06-18 PROCEDURE — 92978 ENDOLUMINL IVUS OCT C 1ST: CPT | Mod: LC | Performed by: INTERNAL MEDICINE

## 2024-06-18 PROCEDURE — 85730 THROMBOPLASTIN TIME PARTIAL: CPT | Mod: 91,HCNC | Performed by: STUDENT IN AN ORGANIZED HEALTH CARE EDUCATION/TRAINING PROGRAM

## 2024-06-18 PROCEDURE — 85025 COMPLETE CBC W/AUTO DIFF WBC: CPT | Mod: HCNC

## 2024-06-18 PROCEDURE — 63600175 PHARM REV CODE 636 W HCPCS: Mod: HCNC | Performed by: STUDENT IN AN ORGANIZED HEALTH CARE EDUCATION/TRAINING PROGRAM

## 2024-06-18 PROCEDURE — C1894 INTRO/SHEATH, NON-LASER: HCPCS | Mod: HCNC | Performed by: INTERNAL MEDICINE

## 2024-06-18 PROCEDURE — C9600 PERC DRUG-EL COR STENT SING: HCPCS | Mod: LC | Performed by: INTERNAL MEDICINE

## 2024-06-18 PROCEDURE — C1753 CATH, INTRAVAS ULTRASOUND: HCPCS | Mod: HCNC | Performed by: INTERNAL MEDICINE

## 2024-06-18 PROCEDURE — 63600175 PHARM REV CODE 636 W HCPCS: Mod: HCNC | Performed by: INTERNAL MEDICINE

## 2024-06-18 PROCEDURE — 93005 ELECTROCARDIOGRAM TRACING: CPT

## 2024-06-18 PROCEDURE — 27201423 OPTIME MED/SURG SUP & DEVICES STERILE SUPPLY: Mod: HCNC | Performed by: INTERNAL MEDICINE

## 2024-06-18 PROCEDURE — 25500020 PHARM REV CODE 255: Mod: HCNC | Performed by: INTERNAL MEDICINE

## 2024-06-18 PROCEDURE — 25000003 PHARM REV CODE 250: Mod: HCNC | Performed by: STUDENT IN AN ORGANIZED HEALTH CARE EDUCATION/TRAINING PROGRAM

## 2024-06-18 PROCEDURE — 83735 ASSAY OF MAGNESIUM: CPT | Mod: HCNC

## 2024-06-18 PROCEDURE — 85730 THROMBOPLASTIN TIME PARTIAL: CPT | Mod: 91,HCNC

## 2024-06-18 PROCEDURE — 99152 MOD SED SAME PHYS/QHP 5/>YRS: CPT | Mod: HCNC | Performed by: INTERNAL MEDICINE

## 2024-06-18 DEVICE — EVEROLIMUS-ELUTING PLATINUM CHROMIUM CORONARY STENT SYSTEM
Type: IMPLANTABLE DEVICE | Site: CORONARY | Status: FUNCTIONAL
Brand: SYNERGY™ XD

## 2024-06-18 RX ORDER — ONDANSETRON HYDROCHLORIDE 2 MG/ML
4 INJECTION, SOLUTION INTRAVENOUS EVERY 12 HOURS PRN
Status: DISCONTINUED | OUTPATIENT
Start: 2024-06-18 | End: 2024-06-19 | Stop reason: HOSPADM

## 2024-06-18 RX ORDER — LIDOCAINE HYDROCHLORIDE 10 MG/ML
INJECTION, SOLUTION EPIDURAL; INFILTRATION; INTRACAUDAL; PERINEURAL
Status: DISCONTINUED | OUTPATIENT
Start: 2024-06-18 | End: 2024-06-18 | Stop reason: HOSPADM

## 2024-06-18 RX ORDER — MAGNESIUM SULFATE HEPTAHYDRATE 40 MG/ML
2 INJECTION, SOLUTION INTRAVENOUS ONCE
Status: COMPLETED | OUTPATIENT
Start: 2024-06-18 | End: 2024-06-18

## 2024-06-18 RX ORDER — DIPHENHYDRAMINE HCL 25 MG
50 CAPSULE ORAL ONCE
Status: COMPLETED | OUTPATIENT
Start: 2024-06-18 | End: 2024-06-18

## 2024-06-18 RX ORDER — NITROGLYCERIN 5 MG/ML
INJECTION, SOLUTION INTRAVENOUS
Status: DISCONTINUED | OUTPATIENT
Start: 2024-06-18 | End: 2024-06-18 | Stop reason: HOSPADM

## 2024-06-18 RX ORDER — LANOLIN ALCOHOL/MO/W.PET/CERES
400 CREAM (GRAM) TOPICAL 2 TIMES DAILY
Status: DISCONTINUED | OUTPATIENT
Start: 2024-06-18 | End: 2024-06-19 | Stop reason: HOSPADM

## 2024-06-18 RX ORDER — MIDAZOLAM HYDROCHLORIDE 1 MG/ML
INJECTION, SOLUTION INTRAMUSCULAR; INTRAVENOUS
Status: DISCONTINUED | OUTPATIENT
Start: 2024-06-18 | End: 2024-06-18 | Stop reason: HOSPADM

## 2024-06-18 RX ORDER — VERAPAMIL HYDROCHLORIDE 2.5 MG/ML
INJECTION, SOLUTION INTRAVENOUS
Status: DISCONTINUED | OUTPATIENT
Start: 2024-06-18 | End: 2024-06-18 | Stop reason: HOSPADM

## 2024-06-18 RX ORDER — HEPARIN SODIUM 1000 [USP'U]/ML
INJECTION INTRAVENOUS; SUBCUTANEOUS
Status: DISCONTINUED | OUTPATIENT
Start: 2024-06-18 | End: 2024-06-18 | Stop reason: HOSPADM

## 2024-06-18 RX ORDER — FENTANYL CITRATE 50 UG/ML
INJECTION, SOLUTION INTRAMUSCULAR; INTRAVENOUS
Status: DISCONTINUED | OUTPATIENT
Start: 2024-06-18 | End: 2024-06-18 | Stop reason: HOSPADM

## 2024-06-18 RX ORDER — SODIUM CHLORIDE 9 MG/ML
INJECTION, SOLUTION INTRAVENOUS CONTINUOUS
Status: DISCONTINUED | OUTPATIENT
Start: 2024-06-18 | End: 2024-06-19 | Stop reason: HOSPADM

## 2024-06-18 RX ORDER — ACETAMINOPHEN 325 MG/1
650 TABLET ORAL EVERY 4 HOURS PRN
Status: DISCONTINUED | OUTPATIENT
Start: 2024-06-18 | End: 2024-06-19 | Stop reason: HOSPADM

## 2024-06-18 RX ADMIN — MAGNESIUM SULFATE HEPTAHYDRATE 2 G: 40 INJECTION, SOLUTION INTRAVENOUS at 10:06

## 2024-06-18 RX ADMIN — CLOPIDOGREL BISULFATE 75 MG: 75 TABLET ORAL at 10:06

## 2024-06-18 RX ADMIN — ASPIRIN 81 MG: 81 TABLET, COATED ORAL at 10:06

## 2024-06-18 RX ADMIN — Medication 400 MG: at 08:06

## 2024-06-18 RX ADMIN — SODIUM CHLORIDE: 9 INJECTION, SOLUTION INTRAVENOUS at 10:06

## 2024-06-18 RX ADMIN — ATORVASTATIN CALCIUM 80 MG: 40 TABLET, FILM COATED ORAL at 10:06

## 2024-06-18 RX ADMIN — INSULIN ASPART 1 UNITS: 100 INJECTION, SOLUTION INTRAVENOUS; SUBCUTANEOUS at 09:06

## 2024-06-18 RX ADMIN — DIPHENHYDRAMINE HYDROCHLORIDE 50 MG: 25 CAPSULE ORAL at 10:06

## 2024-06-18 RX ADMIN — MIRTAZAPINE 15 MG: 15 TABLET, FILM COATED ORAL at 08:06

## 2024-06-18 RX ADMIN — PANTOPRAZOLE SODIUM 40 MG: 40 TABLET, DELAYED RELEASE ORAL at 10:06

## 2024-06-18 RX ADMIN — ACETAMINOPHEN 650 MG: 325 TABLET ORAL at 01:06

## 2024-06-18 NOTE — ASSESSMENT & PLAN NOTE
-pt s/p LHC on 06/17, which showed left main and triple-vessel disease. Pt high risk for CT surgery given liver cirrhosis.  Cardiology plan for PCI  on 06/18  -Will continue ASA, Plavix, and Statin and monitor for S/Sx of angina/ACS. Continue to monitor on telemetry.   -cardiology following

## 2024-06-18 NOTE — HOSPITAL COURSE
59 yr old male with a hx of hep C s/p successful treatment, DM type 2, HTN, cirrhosis of liver, depression, CVA, esophagitis, dyslipidemia, GERD, tobacco use apparent on 06/16/2024 for further evaluation of acute on chronic abdominal pain and chest pain.  CT abdomen without acute intra-abdominal abnormalities.  GI consulted-No plan for acute inpatient endoscopy at this time. Suspect chronic opioid use has led to narcotic bowel and would not be treatable via endoscopic procedure.   Troponin mildly elevated on admission at 0.029, trended up to 0.1.  Echocardiogram with regional wall motion abnormalities present .  Cardiology consulted-continued aspirin heparin and load with Plavix.  s/p LHC on 06/17, which showed left main and triple-vessel disease. Pt high risk for CT surgery given liver cirrhosis.  Cardiology s/p PCI of LCx onh 06/19/24. Plan for outpatient staged PCI of LAD. Recommend to continue  ASA/Plavix/Statin.    Patient admits feeling significantly better.  States no longer having any chest pain or tightness.  Denies abdominal pain, nausea, vomiting.  pt pologized for being irate during hospitalization.  States he is feeling much better. Pt denies any fever, headaches, vision changes, chest pain, shortness of breath, palpitations, or any new weaknesses. Feels ready to go home. Patient's exam on discharge was as follow: Patient is alert and oriented, appears in no acute distress, heart with regular rate and rhythm, lungs clear to asculation with non-labored breathing, abdomen soft, and no new weaknesses or focal deficits seen. Right radial access site c/d/I, no bleeding and no hematoma seen prior to discharge    Patient was counseled regarding any abnormal labs, differential diagnosis, treatment options, risk-benefit, lifestyle changes, prognosis, current condition, and medications. Patient was interactive and attentive.  Patient's questions were answered in a respectful and timely manner. Patient was  instructed to follow-up with PCP within 1 week and to continue taking medications as prescribed.  Instructed to also follow up with Cardiology outpatient. Also, extensively discussed the risks, benefits, and side effects of patient's medications. Discussed with patient about any medication changes. Patient verbalized understanding and agrees to treatment plan.  Patient is stable for discharge.  Patient has no other questions or concerns at this time.  ED precautions discussed with the patient.    Vital signs are stable. Ambulating without any difficulty. Tolerating p.o. intake without any nausea or vomiting. Afebrile for over 24 hours. Patient is in stable condition and has no questions or concerns. Patient will be discharge to home once transportation secured . Prescriptions sent to pharmacy.  CM/SW to assist with discharge planning.     Vitals:    06/19/24 0338 06/19/24 0724 06/19/24 0745 06/19/24 1113   BP:   135/87 125/82   BP Location:   Right arm Right arm   Patient Position:   Lying Lying   Pulse: 98 103 93 95   Resp:   19 18   Temp:   98 °F (36.7 °C) 98 °F (36.7 °C)   TempSrc:   Oral Oral   SpO2:   96% 98%   Weight:       Height:

## 2024-06-18 NOTE — PLAN OF CARE
Case Management Assessment     PCP: Dr. Stevo Pryor  Pharmacy: [facility]    Patient Arrived From: Home  Existing Help at Home: Marcela- spouse    Barriers to Discharge: None    Discharge Plan:    A. Home with family   B. Other- tbd    CM discussed discharge planning with pt's spouse, Marcela. Pt is independent and doesn't use DME. Pt's spouse will provide transportation home when discharged.        06/18/24 1339   Discharge Assessment   Assessment Type Discharge Planning Assessment   Confirmed/corrected address, phone number and insurance Yes   Confirmed Demographics Correct on Facesheet   Source of Information family   When was your last doctors appointment? 05/27/24   Communicated YARELI with patient/caregiver Yes   Reason For Admission Acute coronary syndrome   People in Home spouse   Facility Arrived From: Home   Do you expect to return to your current living situation? No   Do you have help at home or someone to help you manage your care at home? Yes   Who are your caregiver(s) and their phone number(s)? AlbertMarcela (Spouse)  [phone]   Prior to hospitilization cognitive status: Alert/Oriented   Current cognitive status: Unable to Assess   Walking or Climbing Stairs Difficulty no   Dressing/Bathing Difficulty no   Equipment Currently Used at Home none   Readmission within 30 days? No   Patient currently being followed by outpatient case management? No   Do you currently have service(s) that help you manage your care at home? No   Do you take prescription medications? Yes   Do you have prescription coverage? Yes   Coverage [employer]   Do you have any problems affording any of your prescribed medications? No   Is the patient taking medications as prescribed? yes   Who is going to help you get home at discharge? Marcela- spouse   How do you get to doctors appointments? car, drives self;family or friend will provide   Are you on dialysis? No   Do you take coumadin? No   Discharge Plan  A Home with family   Discharge Plan B Other  (tbd)   DME Needed Upon Discharge  other (see comments)  (tbd)   Discharge Plan discussed with: Spouse/sig other   Name(s) and Number(s) Marcela Price (Spouse)  [phone]   Transition of Care Barriers None   SDOH   (none)   Physical Activity   On average, how many days per week do you engage in moderate to strenuous exercise (like a brisk walk)? 0 days   On average, how many minutes do you engage in exercise at this level? 0 min   Financial Resource Strain   How hard is it for you to pay for the very basics like food, housing, medical care, and heating? Not very   Housing Stability   In the last 12 months, was there a time when you were not able to pay the mortgage or rent on time? N   At any time in the past 12 months, were you homeless or living in a shelter (including now)? N   Transportation Needs   Has the lack of transportation kept you from medical appointments, meetings, work or from getting things needed for daily living? No   Food Insecurity   Within the past 12 months, you worried that your food would run out before you got the money to buy more. Never true   Within the past 12 months, the food you bought just didn't last and you didn't have money to get more. Never true   Stress   Do you feel stress - tense, restless, nervous, or anxious, or unable to sleep at night because your mind is troubled all the time - these days? Pt Unable   Social Isolation   How often do you feel lonely or isolated from those around you?  Never   Alcohol Use   Q1: How often do you have a drink containing alcohol? Never   Q2: How many drinks containing alcohol do you have on a typical day when you are drinking? None   Q3: How often do you have six or more drinks on one occasion? Never   Utilities   In the past 12 months has the electric, gas, oil, or water company threatened to shut off services in your home? No   Health Literacy   How often do you need to have someone help you  when you read instructions, pamphlets, or other written material from your doctor or pharmacy? Never   OTHER   Name(s) of People in Home Marcela- spouse

## 2024-06-18 NOTE — PROGRESS NOTES
[facility] Medicine  Progress Note    Patient Name: Nino Price  MRN: [id number]  Patient Class: IP- Inpatient   Admission Date: 6/16/2024  Length of Stay: 1 days  Attending Physician: Jcarlos Dailey DO  Primary Care Provider: Stevo Pryor MD        Subjective:     Principal Problem:ACS (acute coronary syndrome)        HPI:  Mr. Price is a 59 yr old male with a hx of hep C s/p successful treatment, DM type 2, HTN, cirrhosis of liver, depression, CVA, esophagitis, dyslipidemia, GERD, tobacco use who presented to the ED with a chief complaint of constant 10/10 sudden onset abdominal pain that began Saturday morning.  When asked to describe his pain patient just states that it is painful.  He also endorses occasional chills, nausea, vomiting, diaphoresis.  Nothing seems to alleviate or exacerbate his symptoms, his pain does not radiate anywhere else.  He is tried hyoscyamine at home without relief of his symptoms.  He states that after treatment in the ED his abdominal pain has slightly improved.  Patient states that prior to abdominal pain he had a normal appetite however, since his abdominal pain he has not been eating much.  His last BM was yesterday morning and states that it was a normal bowel movement without blood.  Patient denies Tylenol and NSAID use.  He endorses that he has been seen for abdominal pain 3 times in the past without an answer as to what is going on. Of note, pt is unsure what meds he takes daily and says he will have his wife give an updated list as soon as she is available. He states that he smokes 1 pack of cigarettes every 2 days, quit drinking alcohol 2 years ago, and smokes marijuana occasionally (last smoked a couple of days ago).  He denies fatigue, fever, SOB, palpitations, CP, diarrhea, dysuria, hematuria, lightheadedness, dizziness, and syncope.    Per chart review, pt underwent EGD with Dr. Shanks on 1/26/24 with findings and recs below:  - Bleb found  "in the esophagus.   - Web in the lower third of the esophagus.   - 4 cm hiatal hernia.   - Normal stomach.   - Normal examined duodenum.   - No specimens collected.   Recommendation:   - Proceed to colonoscopy.   - Resume previous diet.   - Continue present medications.    Pt also underwent colonoscopy with Dr. Shanks on 1/26/24 with findings and recs below:  - Two 4 to 5 mm polyps in the descending colon, removed with a cold snare. Resected and retrieved. Clips were placed. Clip : [employer].   - One 2 mm polyp in the rectum, removed with a  [employer] cold forceps. Resected and retrieved.   - Diverticulosis in the sigmoid colon.   - The examination was otherwise normal.   Recommendation:  - Discharge patient to home.   - Resume previous diet.   - Continue present medications.   - Await pathology results.   - Repeat colonoscopy in 5 years for surveillance.       Upon arrival to ED, patient afebrile, HR of 54, RR of 20, BP of 181/91, satting 99% on RA.  Workup in the ED included CBC, CMP, lipase, troponin, lactic acid, UDS, UA, urine creatinine, CXR, CT abdomen pelvis.  Workup revealed GFR of 58, glucose of 216, initial troponin of 0.029, repeat troponin of 0.077, initial lactic of 2.3, repeat lactic of 2.7.  UDS positive for opiates and marijuana.  UA with 1+ protein, 1+ glucose, and 1+ ketones.  CXR showed no acute cardiopulmonary process identified.   CT abdomen pelvis showed no acute intra-abdominal abnormalities identified.  Circumferential wall thickening involving the distal esophagus which may be seen with esophagitis.  Similar findings are seen on previous CT from 01/26/2024.  Mild sigmoid diverticulosis with no evidence of acute diverticulitis.  Postsurgical changes."Patient received GI cocktail, HCTZ 25 mg oral, 2 L LR boluses, metoclopramide 10 mg IV, morphine 4 mg IV, morphine 6 mg IV, ondansetron 8 mg IV, pantoprazole 40 mg IV, valsartan 160 mg oral, Omnipaque 75 mL IV contrast while in " the ED. case discussed with ED provider and patient will be placed under observation for further management.    Overview/Hospital Course:  59 yr old male with a hx of hep C s/p successful treatment, DM type 2, HTN, cirrhosis of liver, depression, CVA, esophagitis, dyslipidemia, GERD, tobacco use apparent on 06/16/2024 for further evaluation of acute on chronic abdominal pain and chest pain.  CT abdomen without acute intra-abdominal abnormalities.  GI consulted-No plan for acute inpatient endoscopy at this time. Suspect chronic opioid use has led to narcotic bowel and would not be treatable via endoscopic procedure.   Troponin mildly elevated on admission at 0.029, trended up to 0.1.  Echocardiogram with regional wall motion abnormalities present .  Cardiology consulted-continued aspirin heparin and load with Plavix.  s/p LHC on 06/17, which showed left main and triple-vessel disease. Pt high risk for CT surgery given liver cirrhosis.  Cardiology plan for PCI  on 06/18    Interval History:  No acute overnight events.  Patient on rate this morning.  Demanding to have the procedure for his heart done immediately.  Threatened to leave AMA.  After extensive discussion and after calling his wife on the phone, patient agreeable to stay for the procedure.  Continues to have chest tightness and chronic abdominal pain.  No nausea or vomiting.    Review of Systems   Constitutional:  Negative for fever.   Respiratory:  Positive for chest tightness. Negative for cough and shortness of breath.    Cardiovascular:  Positive for chest pain. Negative for palpitations.   Gastrointestinal:  Positive for abdominal pain. Negative for nausea and vomiting.   Psychiatric/Behavioral:  Positive for agitation.      Objective:     Vital Signs (Most Recent):  Temp: 97.5 °F (36.4 °C) (06/18/24 1300)  Pulse: 80 (06/18/24 1400)  Resp: (!) 27 (06/18/24 1400)  BP: (!) 179/113 (06/18/24 1345)  SpO2: 98 % (06/18/24 1400) Vital Signs (24h Range):  Temp:   [97.3 °F (36.3 °C)-98.1 °F (36.7 °C)] 97.5 °F (36.4 °C)  Pulse:  [] 80  Resp:  [14-29] 27  SpO2:  [92 %-98 %] 98 %  BP: (121-179)/() 179/113     Weight: 62.5 kg (137 lb 12.6 oz)  Body mass index is 23.65 kg/m².    Intake/Output Summary (Last 24 hours) at 6/18/2024 1416  Last data filed at 6/18/2024 0733  Gross per 24 hour   Intake 200 ml   Output 2500 ml   Net -2300 ml         Physical Exam  Vitals and nursing note reviewed.   Constitutional:       General: He is not in acute distress.     Appearance: He is ill-appearing.   Eyes:      Extraocular Movements: Extraocular movements intact.   Cardiovascular:      Rate and Rhythm: Normal rate and regular rhythm.      Heart sounds: No murmur heard.  Pulmonary:      Effort: Pulmonary effort is normal. No respiratory distress.      Breath sounds: Normal breath sounds. No wheezing.   Abdominal:      General: There is no distension.      Palpations: Abdomen is soft.      Tenderness: There is abdominal tenderness (mild tenderness near the epigastric region).   Musculoskeletal:         General: No swelling or tenderness.      Right lower leg: No edema.      Left lower leg: No edema.   Skin:     General: Skin is warm and dry.   Neurological:      Mental Status: He is alert and oriented to person, place, and time.   Psychiatric:         Mood and Affect: Affect is angry.         Thought Content: Thought content normal.             Significant Labs: All pertinent labs within the past 24 hours have been reviewed.    Significant Imaging: I have reviewed all pertinent imaging results/findings within the past 24 hours.    Assessment/Plan:      * ACS (acute coronary syndrome)  Pt presented to the ED with abdominal pain, N/V and found to have elevated troponin. EKG with sinus tach, Bigeminy with vent rate of 102 and Qtc of 422. Placed under Obs for ACS r/o.    - initial troponin 0.029, trended upward to 0.1  - EKG with no acute ischemic changes.   -Started asa, plavix,  heparin, statin.   -TTE with wall motion abnormalities.   -Cardiology consulted: s/p LHC on 06/17, which showed left main and triple-vessel disease. Pt high risk for CT surgery given liver cirrhosis.  Cardiology plan for PCI  on 06/18      CAD (coronary artery disease)  -pt s/p LHC on 06/17, which showed left main and triple-vessel disease. Pt high risk for CT surgery given liver cirrhosis.  Cardiology plan for PCI  on 06/18  -Will continue ASA, Plavix, and Statin and monitor for S/Sx of angina/ACS. Continue to monitor on telemetry.   -cardiology following    Abdominal pain  - GI consulted: No plan for acute inpatient endoscopy at this time. Suspect chronic opioid use has led to narcotic bowel and would not be treatable via endoscopic procedure.  - Continue home Pantoprazole  - PRN analgesics    Aortic atherosclerosis  -continue statin       Essential hypertension  Chronic,    Continue home losartan-hydrochlorothiazide   Hydralazine IV as needed SBP>180    Latest blood pressure and vitals reviewed-     Temp:  [97.3 °F (36.3 °C)-98.1 °F (36.7 °C)]   Pulse:  []   Resp:  [14-29]   BP: (121-179)/()   SpO2:  [92 %-98 %] .   Home meds for hypertension were reviewed and noted below.   Hypertension Medications               propranoloL (INDERAL) 20 MG tablet Take 10 mg by mouth 2 (two) times daily.    valsartan-hydrochlorothiazide (DIOVAN-HCT) 160-25 mg per tablet TAKE 1 TABLET BY MOUTH EVERY DAY            While in the hospital, will manage blood pressure as follows; Adjust home antihypertensive regimen as follows- Continue home valsartan-HCTZ    Will utilize p.r.n. blood pressure medication only if patient's blood pressure greater than 180/110 and he develops symptoms such as worsening chest pain or shortness of breath.    Dyslipidemia  - Continue home statin      Type 2 diabetes mellitus, controlled  Last A1c reviewed-   Lab Results   Component Value Date    HGBA1C 6.2 (H) 06/17/2024     Most recent  fingerstick glucose reviewed-   Recent Labs   Lab 06/17/24  1630 06/17/24  1935 06/18/24  1101 06/18/24  1303   POCTGLUCOSE 244* 195* 133* 104       Current correctional scale  Medium  Maintain anti-hyperglycemic dose as follows-   Antihyperglycemics (From admission, onward)      Start     Stop Route Frequency Ordered    06/16/24 2333  insulin aspart U-100 pen 0-10 Units         -- SubQ Every 6 hours PRN 06/16/24 2239          Hold Oral hypoglycemics while patient is in the hospital.    Esophagitis  - Hx noted  - Continue home Pantoprazole  - GI consulted, appreciate recs      GERD (gastroesophageal reflux disease)  - Continue home Pantoprazole      History of hepatitis C, s/p successful hcv treatment w/ SVR 8-2015   - Hx noted  - Continue to follow up with PCP/Hepatology      Cirrhosis of liver without ascites  - Hx noted  - Continue to F/U with PCP/Hepatology    History of CVA (cerebrovascular accident)  - Hx noted  - Continue home ASA/statin      Depression  - Hx noted  - continue nightly Remeron      Hypomagnesemia  Patient has Abnormal Magnesium: hypomagnesemia. Will continue to monitor electrolytes closely. Will replace the affected electrolytes and repeat labs to be done after interventions completed. The patient's magnesium results have been reviewed and are listed below.  Recent Labs   Lab 06/18/24  0251   MG 1.5*        -replace as needed  -Goal Mg >2    Hypophosphatemia  Patient has Abnormal Phosphorus: hypophosphatemia. Will continue to monitor electrolytes closely. Will replace the affected electrolytes and repeat labs to be done after interventions completed. The patient's phosphorus results have been reviewed and are listed below.  Recent Labs   Lab 06/18/24  0251   PHOS 2.6*      -replace as needed      VTE Risk Mitigation (From admission, onward)           Ordered     heparin, porcine (PF) injection  As needed (PRN)         06/18/24 1215     IP VTE LOW RISK PATIENT  Once         06/16/24 2239      Place sequential compression device  Until discontinued         06/16/24 8856                    Discharge Planning   YARELI:      Code Status: Full Code   Is the patient medically ready for discharge?:     Reason for patient still in hospital (select all that apply): Patient trending condition, Treatment, and Consult recommendations  Discharge Plan A: Home with family                  Jcarlos Dailey DO  Department of Hospital Medicine   [facility]

## 2024-06-18 NOTE — NURSING
0045 : APTT result taken at 0045 is 111.3. As per nomogram, stop infusion and inform MD. Smitha Prakash informed and ordered STAT re-draw of APTT.     0251: STAT APTT result showed 65.3. As per nomogram restart at dose decreased by 4 units and rechecked APTT after 6 hours.     0357: After result came out. Heparin restarted at 11 units and next APTT ordered.

## 2024-06-18 NOTE — ASSESSMENT & PLAN NOTE
Patient has Abnormal Magnesium: hypomagnesemia. Will continue to monitor electrolytes closely. Will replace the affected electrolytes and repeat labs to be done after interventions completed. The patient's magnesium results have been reviewed and are listed below.  Recent Labs   Lab 06/18/24  0251   MG 1.5*        -replace as needed  -Goal Mg >2

## 2024-06-18 NOTE — ASSESSMENT & PLAN NOTE
- GI consulted: No plan for acute inpatient endoscopy at this time. Suspect chronic opioid use has led to narcotic bowel and would not be treatable via endoscopic procedure.  - Continue home Pantoprazole  - PRN analgesics

## 2024-06-18 NOTE — PLAN OF CARE
Problem: Adult Inpatient Plan of Care  Goal: Plan of Care Review  Outcome: Progressing  Goal: Optimal Comfort and Wellbeing  Outcome: Progressing  Goal: Readiness for Transition of Care  Outcome: Progressing     Problem: Diabetes Comorbidity  Goal: Blood Glucose Level Within Targeted Range  Outcome: Progressing

## 2024-06-18 NOTE — SUBJECTIVE & OBJECTIVE
Interval History:  No acute overnight events.  Patient on rate this morning.  Demanding to have the procedure for his heart done immediately.  Threatened to leave AMA.  After extensive discussion and after calling his wife on the phone, patient agreeable to stay for the procedure.  Continues to have chest tightness and chronic abdominal pain.  No nausea or vomiting.    Review of Systems   Constitutional:  Negative for fever.   Respiratory:  Positive for chest tightness. Negative for cough and shortness of breath.    Cardiovascular:  Positive for chest pain. Negative for palpitations.   Gastrointestinal:  Positive for abdominal pain. Negative for nausea and vomiting.   Psychiatric/Behavioral:  Positive for agitation.      Objective:     Vital Signs (Most Recent):  Temp: 97.5 °F (36.4 °C) (06/18/24 1300)  Pulse: 80 (06/18/24 1400)  Resp: (!) 27 (06/18/24 1400)  BP: (!) 179/113 (06/18/24 1345)  SpO2: 98 % (06/18/24 1400) Vital Signs (24h Range):  Temp:  [97.3 °F (36.3 °C)-98.1 °F (36.7 °C)] 97.5 °F (36.4 °C)  Pulse:  [] 80  Resp:  [14-29] 27  SpO2:  [92 %-98 %] 98 %  BP: (121-179)/() 179/113     Weight: 62.5 kg (137 lb 12.6 oz)  Body mass index is 23.65 kg/m².    Intake/Output Summary (Last 24 hours) at 6/18/2024 1416  Last data filed at 6/18/2024 0733  Gross per 24 hour   Intake 200 ml   Output 2500 ml   Net -2300 ml         Physical Exam  Vitals and nursing note reviewed.   Constitutional:       General: He is not in acute distress.     Appearance: He is ill-appearing.   Eyes:      Extraocular Movements: Extraocular movements intact.   Cardiovascular:      Rate and Rhythm: Normal rate and regular rhythm.      Heart sounds: No murmur heard.  Pulmonary:      Effort: Pulmonary effort is normal. No respiratory distress.      Breath sounds: Normal breath sounds. No wheezing.   Abdominal:      General: There is no distension.      Palpations: Abdomen is soft.      Tenderness: There is abdominal tenderness (mild  tenderness near the epigastric region).   Musculoskeletal:         General: No swelling or tenderness.      Right lower leg: No edema.      Left lower leg: No edema.   Skin:     General: Skin is warm and dry.   Neurological:      Mental Status: He is alert and oriented to person, place, and time.   Psychiatric:         Mood and Affect: Affect is angry.         Thought Content: Thought content normal.             Significant Labs: All pertinent labs within the past 24 hours have been reviewed.    Significant Imaging: I have reviewed all pertinent imaging results/findings within the past 24 hours.

## 2024-06-18 NOTE — NURSING
[facility] [place]  Nurses Note -- 4 Eyes      6/18/2024       Skin assessed on: Q Shift      [x] No Pressure Injuries Present    [x]Prevention Measures Documented    [] Yes LDA  for Pressure Injury Previously documented     [] Yes New Pressure Injury Discovered   [] LDA for New Pressure Injury Added      Attending RN:  Margot Barker RN     Second RN:  Stella Babin RN

## 2024-06-18 NOTE — NURSING
Patient return from PACU after having  angiogram to right wrist.  Cardiologist  noted that patient have left main and triple-vessel disease  and patient's plan of care for PCI tomorrow morning.  No order for NPO past midnight for procedure but nurse will ask if NPO is need.  Patient continue on heparin at 15  units/kg/hr.  Last PTT was 31.6 and will be drawn again at 0045.  Handoff given to QING Simons.

## 2024-06-18 NOTE — ASSESSMENT & PLAN NOTE
Pt presented to the ED with abdominal pain, N/V and found to have elevated troponin. EKG with sinus tach, Bigeminy with vent rate of 102 and Qtc of 422. Placed under Obs for ACS r/o.    - initial troponin 0.029, trended upward to 0.1  - EKG with no acute ischemic changes.   -Started asa, plavix, heparin, statin.   -TTE with wall motion abnormalities.   -Cardiology consulted: s/p LHC on 06/17, which showed left main and triple-vessel disease. Pt high risk for CT surgery given liver cirrhosis.  Cardiology plan for PCI  on 06/18

## 2024-06-18 NOTE — NURSING
[facility]  Nurses Note -- 4 Eyes      6/17/2024       Skin assessed on: Q Shift      [x] No Pressure Injuries Present    []Prevention Measures Documented    [] Yes LDA  for Pressure Injury Previously documented     [] Yes New Pressure Injury Discovered   [] LDA for New Pressure Injury Added      Attending RN:  Stella Babin, QING     Second RN:  Margot Barker RN

## 2024-06-18 NOTE — ASSESSMENT & PLAN NOTE
Patient has Abnormal Phosphorus: hypophosphatemia. Will continue to monitor electrolytes closely. Will replace the affected electrolytes and repeat labs to be done after interventions completed. The patient's phosphorus results have been reviewed and are listed below.  Recent Labs   Lab 06/18/24  0251   PHOS 2.6*      -replace as needed

## 2024-06-18 NOTE — ASSESSMENT & PLAN NOTE
Chronic,    Continue home losartan-hydrochlorothiazide   Hydralazine IV as needed SBP>180    Latest blood pressure and vitals reviewed-     Temp:  [97.3 °F (36.3 °C)-98.1 °F (36.7 °C)]   Pulse:  []   Resp:  [14-29]   BP: (121-179)/()   SpO2:  [92 %-98 %] .   Home meds for hypertension were reviewed and noted below.   Hypertension Medications               propranoloL (INDERAL) 20 MG tablet Take 10 mg by mouth 2 (two) times daily.    valsartan-hydrochlorothiazide (DIOVAN-HCT) 160-25 mg per tablet TAKE 1 TABLET BY MOUTH EVERY DAY            While in the hospital, will manage blood pressure as follows; Adjust home antihypertensive regimen as follows- Continue home valsartan-HCTZ    Will utilize p.r.n. blood pressure medication only if patient's blood pressure greater than 180/110 and he develops symptoms such as worsening chest pain or shortness of breath.

## 2024-06-18 NOTE — ASSESSMENT & PLAN NOTE
Last A1c reviewed-   Lab Results   Component Value Date    HGBA1C 6.2 (H) 06/17/2024     Most recent fingerstick glucose reviewed-   Recent Labs   Lab 06/17/24  1630 06/17/24  1935 06/18/24  1101 06/18/24  1303   POCTGLUCOSE 244* 195* 133* 104       Current correctional scale  Medium  Maintain anti-hyperglycemic dose as follows-   Antihyperglycemics (From admission, onward)    Start     Stop Route Frequency Ordered    06/16/24 2333  insulin aspart U-100 pen 0-10 Units         -- SubQ Every 6 hours PRN 06/16/24 2239        Hold Oral hypoglycemics while patient is in the hospital.

## 2024-06-19 VITALS
HEART RATE: 95 BPM | TEMPERATURE: 98 F | DIASTOLIC BLOOD PRESSURE: 82 MMHG | WEIGHT: 137.81 LBS | BODY MASS INDEX: 23.53 KG/M2 | OXYGEN SATURATION: 98 % | HEIGHT: 64 IN | SYSTOLIC BLOOD PRESSURE: 125 MMHG | RESPIRATION RATE: 18 BRPM

## 2024-06-19 LAB
ANION GAP SERPL CALC-SCNC: 11 MMOL/L (ref 8–16)
BASOPHILS # BLD AUTO: 0.09 K/UL (ref 0–0.2)
BASOPHILS NFR BLD: 1.3 % (ref 0–1.9)
BUN SERPL-MCNC: 11 MG/DL (ref 6–20)
CALCIUM SERPL-MCNC: 8.6 MG/DL (ref 8.7–10.5)
CHLORIDE SERPL-SCNC: 108 MMOL/L (ref 95–110)
CO2 SERPL-SCNC: 21 MMOL/L (ref 23–29)
CREAT SERPL-MCNC: 1 MG/DL (ref 0.5–1.4)
DIFFERENTIAL METHOD BLD: ABNORMAL
EOSINOPHIL # BLD AUTO: 0.3 K/UL (ref 0–0.5)
EOSINOPHIL NFR BLD: 4.4 % (ref 0–8)
ERYTHROCYTE [DISTWIDTH] IN BLOOD BY AUTOMATED COUNT: 12.7 % (ref 11.5–14.5)
EST. GFR  (NO RACE VARIABLE): >60 ML/MIN/1.73 M^2
GLUCOSE SERPL-MCNC: 130 MG/DL (ref 70–110)
HCT VFR BLD AUTO: 37.3 % (ref 40–54)
HGB BLD-MCNC: 12.5 G/DL (ref 14–18)
IMM GRANULOCYTES # BLD AUTO: 0.02 K/UL (ref 0–0.04)
IMM GRANULOCYTES NFR BLD AUTO: 0.3 % (ref 0–0.5)
LYMPHOCYTES # BLD AUTO: 2.3 K/UL (ref 1–4.8)
LYMPHOCYTES NFR BLD: 33.4 % (ref 18–48)
MAGNESIUM SERPL-MCNC: 1.4 MG/DL (ref 1.6–2.6)
MCH RBC QN AUTO: 31.8 PG (ref 27–31)
MCHC RBC AUTO-ENTMCNC: 33.5 G/DL (ref 32–36)
MCV RBC AUTO: 95 FL (ref 82–98)
MONOCYTES # BLD AUTO: 0.6 K/UL (ref 0.3–1)
MONOCYTES NFR BLD: 8.6 % (ref 4–15)
NEUTROPHILS # BLD AUTO: 3.6 K/UL (ref 1.8–7.7)
NEUTROPHILS NFR BLD: 52 % (ref 38–73)
NRBC BLD-RTO: 0 /100 WBC
OHS QRS DURATION: 78 MS
OHS QRS DURATION: 86 MS
OHS QTC CALCULATION: 422 MS
OHS QTC CALCULATION: 440 MS
PHOSPHATE SERPL-MCNC: 2.4 MG/DL (ref 2.7–4.5)
PLATELET # BLD AUTO: 180 K/UL (ref 150–450)
PMV BLD AUTO: 9.8 FL (ref 9.2–12.9)
POCT GLUCOSE: 153 MG/DL (ref 70–110)
POCT GLUCOSE: 244 MG/DL (ref 70–110)
POTASSIUM SERPL-SCNC: 3.7 MMOL/L (ref 3.5–5.1)
RBC # BLD AUTO: 3.93 M/UL (ref 4.6–6.2)
SODIUM SERPL-SCNC: 140 MMOL/L (ref 136–145)
WBC # BLD AUTO: 6.98 K/UL (ref 3.9–12.7)

## 2024-06-19 PROCEDURE — 63600175 PHARM REV CODE 636 W HCPCS: Mod: HCNC | Performed by: STUDENT IN AN ORGANIZED HEALTH CARE EDUCATION/TRAINING PROGRAM

## 2024-06-19 PROCEDURE — 80048 BASIC METABOLIC PNL TOTAL CA: CPT

## 2024-06-19 PROCEDURE — 99233 SBSQ HOSP IP/OBS HIGH 50: CPT | Mod: ,,, | Performed by: INTERNAL MEDICINE

## 2024-06-19 PROCEDURE — 25000003 PHARM REV CODE 250: Performed by: STUDENT IN AN ORGANIZED HEALTH CARE EDUCATION/TRAINING PROGRAM

## 2024-06-19 PROCEDURE — 83735 ASSAY OF MAGNESIUM: CPT

## 2024-06-19 PROCEDURE — 36415 COLL VENOUS BLD VENIPUNCTURE: CPT

## 2024-06-19 PROCEDURE — 85025 COMPLETE CBC W/AUTO DIFF WBC: CPT

## 2024-06-19 PROCEDURE — 25000003 PHARM REV CODE 250

## 2024-06-19 PROCEDURE — 25000003 PHARM REV CODE 250: Performed by: INTERNAL MEDICINE

## 2024-06-19 PROCEDURE — 84100 ASSAY OF PHOSPHORUS: CPT

## 2024-06-19 RX ORDER — ASPIRIN 81 MG/1
81 TABLET ORAL DAILY
Qty: 30 TABLET | Refills: 3 | Status: SHIPPED | OUTPATIENT
Start: 2024-06-19 | End: 2024-10-17

## 2024-06-19 RX ORDER — SODIUM,POTASSIUM PHOSPHATES 280-250MG
2 POWDER IN PACKET (EA) ORAL
Status: COMPLETED | OUTPATIENT
Start: 2024-06-19 | End: 2024-06-19

## 2024-06-19 RX ORDER — CLOPIDOGREL BISULFATE 75 MG/1
75 TABLET ORAL DAILY
Qty: 30 TABLET | Refills: 11 | Status: SHIPPED | OUTPATIENT
Start: 2024-06-20 | End: 2025-06-20

## 2024-06-19 RX ORDER — NITROGLYCERIN 0.4 MG/1
0.4 TABLET SUBLINGUAL
Qty: 25 TABLET | Refills: 0 | Status: SHIPPED | OUTPATIENT
Start: 2024-06-19

## 2024-06-19 RX ORDER — ATORVASTATIN CALCIUM 80 MG/1
80 TABLET, FILM COATED ORAL DAILY
Qty: 30 TABLET | Refills: 2 | Status: SHIPPED | OUTPATIENT
Start: 2024-06-19 | End: 2024-09-19

## 2024-06-19 RX ORDER — MAGNESIUM SULFATE HEPTAHYDRATE 40 MG/ML
2 INJECTION, SOLUTION INTRAVENOUS ONCE
Status: COMPLETED | OUTPATIENT
Start: 2024-06-19 | End: 2024-06-19

## 2024-06-19 RX ADMIN — ATORVASTATIN CALCIUM 80 MG: 40 TABLET, FILM COATED ORAL at 08:06

## 2024-06-19 RX ADMIN — LOSARTAN POTASSIUM AND HYDROCHLOROTHIAZIDE 1 TABLET: 12.5; 5 TABLET ORAL at 08:06

## 2024-06-19 RX ADMIN — PANTOPRAZOLE SODIUM 40 MG: 40 TABLET, DELAYED RELEASE ORAL at 08:06

## 2024-06-19 RX ADMIN — Medication 2 PACKET: at 10:06

## 2024-06-19 RX ADMIN — MAGNESIUM SULFATE HEPTAHYDRATE 2 G: 40 INJECTION, SOLUTION INTRAVENOUS at 06:06

## 2024-06-19 RX ADMIN — CLOPIDOGREL BISULFATE 75 MG: 75 TABLET ORAL at 08:06

## 2024-06-19 RX ADMIN — Medication 2 PACKET: at 06:06

## 2024-06-19 RX ADMIN — ASPIRIN 81 MG: 81 TABLET, COATED ORAL at 08:06

## 2024-06-19 RX ADMIN — Medication 400 MG: at 08:06

## 2024-06-19 NOTE — ASSESSMENT & PLAN NOTE
Cath with MV CAD, turned down for CABG  Now s/p culprit PCI LCx 6/18/24.  Residual LAD lesion for staged PCI as outpatient (LM IVUS >7mm2)  EF preserved  Cont med rx: ASA/Plavix/Statin  Home today  Follow up with Dr. Rider    .

## 2024-06-19 NOTE — PROGRESS NOTES
[facility] [place] - [facility]  Cardiology  Progress Note    Patient Name: Nino Price  MRN: [id number]  Admission Date: 6/16/2024  Hospital Length of Stay: 2 days  Code Status: Full Code   Attending Physician: Jcarlos Dailey DO   Primary Care Physician: Stevo Pryor MD  Expected Discharge Date:   Principal Problem:ACS (acute coronary syndrome)    Subjective:     Interval Hx: PCI of LCx yesterday.  Outpat staged PCI of LAD planned.  No cp/sob.  Importance of DAPT adherence stressed to pt.    Tele: SR 90s (personally reviewed and interpreted)      Past Medical History:   Diagnosis Date    Anxiety     Asthma     Colitis     Depression     Diabetes mellitus     Head injury     History of hepatitis C, s/p successful hcv treatment w/ SVR 8-2015  10/8/2012    SVR(24) as of 11/2015.  SVR(12) as of 8/2015.  completed harvoni 24 week regimen for genotype 1a 5/18/15     Hypertension     Shoulder pain     left, s/p 4 surgeries.     Stroke        Past Surgical History:   Procedure Laterality Date    ANGIOGRAM, CORONARY, WITH LEFT HEART CATHETERIZATION Left 6/17/2024    Procedure: Angiogram, Coronary, with Left Heart Cath;  Surgeon: Gabriela Rider MD;  Location: [facility] CATH LAB;  Service: Cardiology;  Laterality: Left;  12pm start, R rad access    COLONOSCOPY N/A 10/20/2015    Procedure: COLONOSCOPY;  Surgeon: Jagdish Patricia MD;  Location: [facility] ENDO;  Service: Endoscopy;  Laterality: N/A;    COLONOSCOPY N/A 1/26/2024    Procedure: COLONOSCOPY;  Surgeon: Pricila Shanks MD;  Location: [facility] ENDO;  Service: Endoscopy;  Laterality: N/A;    CORONARY STENT PLACEMENT N/A 6/18/2024    Procedure: INSERTION, STENT, CORONARY ARTERY;  Surgeon: Gabriela Rider MD;  Location: [facility] CATH LAB;  Service: Cardiology;  Laterality: N/A;  lcx pci. radial. lbu 3.5. 12 pm access    DIAGNOSTIC LAPAROSCOPY N/A 4/1/2021    Procedure: LAPAROSCOPY, DIAGNOSTIC;  Surgeon: Umesh Vallecillo MD;  Location: [facility] OR;  Service: General;  Laterality: N/A;   RN PREOP 3/29/21, COVID NEGATIVE ON 3/29  CONSENT AND H/P INCOMPLETE    ESOPHAGOGASTRODUODENOSCOPY N/A 1/26/2024    Procedure: EGD (ESOPHAGOGASTRODUODENOSCOPY);  Surgeon: Pricila Shanks MD;  Location: [facility] ENDO;  Service: Endoscopy;  Laterality: N/A;  prep instructions mailed to pt / [place] pt / Urgent/suprep  1/19- lvm for pc. DBM  1/23- left 2nd vm for pc. DBM  1/24 cirrohsis-labs prior, precall complete-st    GALLBLADDER SURGERY      pt not 100% if gall bladder removed    IVUS, CORONARY  6/18/2024    Procedure: IVUS, Coronary;  Surgeon: Gabriela Rider MD;  Location: [facility] CATH LAB;  Service: Cardiology;;    PERCUTANEOUS CORONARY INTERVENTION, ARTERY N/A 6/18/2024    Procedure: Percutaneous coronary intervention;  Surgeon: Gabriela Rider MD;  Location: [facility] CATH LAB;  Service: Cardiology;  Laterality: N/A;    rotator cuff      left side       Review of patient's allergies indicates:   Allergen Reactions    Codeine Nausea Only       No current facility-administered medications on file prior to encounter.     Current Outpatient Medications on File Prior to Encounter   Medication Sig    acetaminophen (TYLENOL) 500 MG tablet Take 1 tablet (500 mg total) by mouth every 6 (six) hours as needed for Pain (and fever).    aspirin (ECOTRIN) 81 MG EC tablet Take 1 tablet (81 mg total) by mouth once daily.    atorvastatin (LIPITOR) 80 MG tablet Take 1 tablet (80 mg total) by mouth once daily.    buPROPion (WELLBUTRIN XL) 150 MG TB24 tablet Take 1 tablet by mouth once daily.    buPROPion (WELLBUTRIN XL) 300 MG 24 hr tablet Take 1 tablet by mouth every morning. (Patient not taking: Reported on 5/27/2024)    busPIRone (BUSPAR) 10 MG tablet Take 5 mg by mouth every evening.    cyclobenzaprine (FLEXERIL) 5 MG tablet Take 5 mg by mouth nightly as needed. (Patient not taking: Reported on 5/27/2024)    dicyclomine (BENTYL) 20 mg tablet Take 1 tablet (20 mg total) by mouth 2 (two) times daily as needed (stomach pain).     DULoxetine (CYMBALTA) 60 MG capsule Take 60 mg by mouth once daily. Takes in pm    gabapentin (NEURONTIN) 300 MG capsule Take 300 mg by mouth 3 (three) times daily.    HYDROcodone-acetaminophen (NORCO)  mg per tablet Take 1 tablet by mouth every 6 (six) hours as needed.    metFORMIN (GLUCOPHAGE) 1000 MG tablet Take 1 tablet (1,000 mg total) by mouth 2 (two) times daily with meals.    metoclopramide HCl (REGLAN) 10 MG tablet Take 1 tablet (10 mg total) by mouth every 6 (six) hours.    mirtazapine (REMERON) 15 MG tablet Take 15 mg by mouth every evening.    mirtazapine (REMERON) 45 MG tablet Take 1 tablet by mouth every evening.    NAMENDA 10 mg Tab Take 10 mg by mouth once daily.     ondansetron (ZOFRAN-ODT) 4 MG TbDL Take 1 tablet (4 mg total) by mouth every 6 (six) hours as needed (nausea).    ondansetron 4 mg/2 mL Soln     pantoprazole (PROTONIX) 40 MG tablet Take 1 tablet (40 mg total) by mouth once daily.    pantoprazole (PROTONIX) 40 MG tablet Take 1 tablet by mouth once daily.    propranoloL (INDERAL) 20 MG tablet Take 10 mg by mouth 2 (two) times daily.    tiZANidine (ZANAFLEX) 4 MG tablet Take 4 mg by mouth every evening.    TRUE METRIX GLUCOSE METER Misc  (Patient not taking: Reported on 5/27/2024)    TRUE METRIX GLUCOSE TEST STRIP Strp     valsartan-hydrochlorothiazide (DIOVAN-HCT) 160-25 mg per tablet TAKE 1 TABLET BY MOUTH EVERY DAY     Family History       Problem Relation (Age of Onset)    Cirrhosis Brother    Heart disease Father    Hypertension Mother          Tobacco Use    Smoking status: Every Day     Current packs/day: 1.00     Average packs/day: 1 pack/day for 35.0 years (35.0 ttl pk-yrs)     Types: Cigarettes     Passive exposure: Current    Smokeless tobacco: Never    Tobacco comments:     Will try to stop smoking by himself vs    Substance and Sexual Activity    Alcohol use: No    Drug use: Yes     Frequency: 7.0 times per week     Types: Marijuana    Sexual activity: Yes     Partners:  Female     Review of Systems   Gastrointestinal:  Negative for melena.   Genitourinary:  Negative for hematuria.     Objective:     Vital Signs (Most Recent):  Temp: 98 °F (36.7 °C) (06/19/24 0745)  Pulse: 93 (06/19/24 0745)  Resp: 19 (06/19/24 0745)  BP: 135/87 (06/19/24 0745)  SpO2: 96 % (06/19/24 0745) Vital Signs (24h Range):  Temp:  [97.3 °F (36.3 °C)-98 °F (36.7 °C)] 98 °F (36.7 °C)  Pulse:  [] 93  Resp:  [14-27] 19  SpO2:  [96 %-98 %] 96 %  BP: (125-179)/() 135/87     Weight: 62.5 kg (137 lb 12.6 oz)  Body mass index is 23.65 kg/m².    SpO2: 96 %       No intake or output data in the 24 hours ending 06/19/24 0933      Lines/Drains/Airways       Peripheral Intravenous Line  Duration                  Peripheral IV - Single Lumen 06/16/24 1935 20 G Right Forearm 2 days         Peripheral IV - Single Lumen 06/17/24 0906 20 G Left;Posterior;Proximal Forearm 2 days                     Physical Exam  Constitutional:       General: He is not in acute distress.     Appearance: Normal appearance. He is well-developed and normal weight. He is not ill-appearing, toxic-appearing or diaphoretic.   HENT:      Head: Normocephalic and atraumatic.   Eyes:      General: No scleral icterus.     Extraocular Movements: Extraocular movements intact.      Conjunctiva/sclera: Conjunctivae normal.      Pupils: Pupils are equal, round, and reactive to light.   Neck:      Thyroid: No thyromegaly.      Vascular: No JVD.      Trachea: No tracheal deviation.   Cardiovascular:      Rate and Rhythm: Normal rate and regular rhythm.      Heart sounds: S1 normal and S2 normal. No murmur heard.     No friction rub. No gallop.      Comments: R rad access site c/d/i  Pulmonary:      Effort: Pulmonary effort is normal. No respiratory distress.      Breath sounds: Normal breath sounds. No stridor. No wheezing, rhonchi or rales.   Chest:      Chest wall: No tenderness.   Abdominal:      General: There is no distension.      Palpations:  Abdomen is soft.   Musculoskeletal:         General: No swelling or tenderness. Normal range of motion.      Cervical back: Normal range of motion and neck supple. No rigidity.      Right lower leg: No edema.      Left lower leg: No edema.   Skin:     General: Skin is warm and dry.      Coloration: Skin is not jaundiced.      Findings: No rash.   Neurological:      General: No focal deficit present.      Mental Status: He is alert and oriented to person, place, and time.      Cranial Nerves: No cranial nerve deficit.   Psychiatric:         Mood and Affect: Mood normal.         Behavior: Behavior normal.          Current Medications:   aspirin  81 mg Oral Daily    atorvastatin  80 mg Oral Daily    clopidogreL  75 mg Oral Daily    losartan-hydrochlorothiazide 50-12.5 mg  1 tablet Oral Daily    magnesium oxide  400 mg Oral BID    mirtazapine  15 mg Oral QHS    pantoprazole  40 mg Oral Daily    potassium, sodium phosphates  2 packet Oral QID (AC & HS)      sodium chloride 0.9%   Intravenous Continuous 75 mL/hr at 06/18/24 1051 New Bag at 06/18/24 1051    sodium chloride 0.9%    Continuous PRN   Stopped at 06/18/24 1300       Current Facility-Administered Medications:     acetaminophen, 650 mg, Oral, Q4H PRN    acetaminophen, 650 mg, Oral, Q4H PRN    acetaminophen, 650 mg, Oral, Q4H PRN    aluminum-magnesium hydroxide-simethicone, 30 mL, Oral, QID PRN    dextrose 10%, 12.5 g, Intravenous, PRN    dextrose 10%, 25 g, Intravenous, PRN    dicyclomine, 20 mg, Oral, BID PRN    glucagon (human recombinant), 1 mg, Intramuscular, PRN    glucose, 16 g, Oral, PRN    glucose, 24 g, Oral, PRN    insulin aspart U-100, 0-10 Units, Subcutaneous, Q6H PRN    melatonin, 6 mg, Oral, Nightly PRN    naloxone, 0.02 mg, Intravenous, PRN    nitroGLYCERIN, 0.4 mg, Sublingual, PRN    ondansetron, 4 mg, Intravenous, Q8H PRN    ondansetron, 4 mg, Intravenous, Q12H PRN    ondansetron, 4 mg, Intravenous, Q12H PRN    senna-docusate 8.6-50 mg, 1 tablet,  Oral, Daily PRN    sodium chloride 0.9%, , , Continuous PRN    sodium chloride 0.9%, 10 mL, Intravenous, Q12H PRN    sodium chloride 0.9%, 10 mL, Intravenous, PRN    Laboratory (all labs reviewed):  CBC:  Recent Labs   Lab 06/16/24 1952 06/16/24 1957 06/17/24  0407 06/17/24  0758 06/18/24  0251 06/19/24  0521   WBC 10.91  --  10.97 11.15 8.15 6.98   Hemoglobin 15.5  --  12.5 L 12.9 L 12.7 L 12.5 L   POC Hematocrit  --  42  --   --   --   --    Hematocrit 45.3  --  38.2 L 37.8 L 36.0 L 37.3 L   Platelets 224  --  205 190 171 180       CHEMISTRIES:  Recent Labs   Lab 09/28/21  1537 05/26/22  0950 04/26/23  0615 05/18/23  2356 05/19/23  0448 08/11/23  0514 08/25/23  0947 04/30/24  0835 06/16/24 1952 06/17/24  0407 06/18/24  0251 06/19/24  0521   Glucose 84 135 H   < > 184 H   < > 89   < > 145 H 216 H 118 H 129 H 130 H   Sodium 141 141   < > 139   < > 138   < > 141 140 142 142 140   Potassium 4.6 4.0   < > 4.1   < > 4.0   < > 3.8 5.1 4.5 4.1 3.7   BUN 9 7   < > 10   < > 21 H   < > 10 17 15 11 11   Creatinine 1.0 1.0   < > 1.2   < > 1.4   < > 1.3 1.4 1.0 0.9 1.0   eGFR if non  >60 >60  --   --   --   --   --   --   --   --   --   --    eGFR  --   --    < > >60   < > 58 A   < > >60.0 58 A >60 >60 >60   Calcium 10.5 9.2   < > 10.3   < > 9.3   < > 9.7 10.5 9.3 8.8 8.6 L   Magnesium  --   --    < > 1.6  --  1.6  --   --   --  1.2 L 1.5 L 1.4 L    < > = values in this interval not displayed.       CARDIAC BIOMARKERS:  Recent Labs   Lab 01/26/24  1733 06/16/24 1952 06/16/24  2154 06/17/24  0407 06/17/24  0758   Troponin I <0.006 0.029 H 0.077 H 0.104 H 0.068 H       COAGS:  Recent Labs   Lab 05/26/22  0950 04/15/24  1513 06/17/24  0758   INR 1.0 1.0 1.1       LIPIDS/LFTS:  Recent Labs   Lab 09/28/21  1537 05/26/22  0950 05/19/23  0448 08/09/23  2001 08/10/23  0738 08/11/23  0514 12/16/23 1959 01/26/24  1733 04/15/24  1513 04/30/24  0835 06/16/24 1952 06/17/24  0407   Cholesterol 230 H  --  174  --  218 H   --   --   --   --  267 H  --  124   Triglycerides 257 H  --  104  --  156 H  --   --   --   --  303 H  --  116   HDL 33 L  --  29 L  --  26 L  --   --   --   --  31 L  --  23 L   LDL Cholesterol 145.6  --  124.2  --  160.8 H  --   --   --   --  175.4 H  --  77.8   Non-HDL Cholesterol 197  --  145  --  192  --   --   --   --  236  --  101   AST 17   < > 14   < >  --    < > 23 29 14 15 15  --    ALT 15   < > 9 L   < >  --    < > 23 40 15 20 18  --     < > = values in this interval not displayed.       BNP:  Recent Labs   Lab 08/09/23  2046    H       TSH:  Recent Labs   Lab 08/10/23  0738 06/17/24  0407   TSH 1.125 0.593       Free T4:        Diagnostic Results:  ECG (personally reviewed and interpreted tracing(s)):  6/16/24 2005 , vent bigem    Chest X-Ray (personally reviewed and interpreted image(s)): 6/16/24 NAD    Cath/PCI LCx 6/18/24    Successful IVUS guided PCI of circumflex into OM1 with excellent angiographic results.  IVUS post PCI revealed well-expanded and apposed stent.    Prox Cx lesion: A STENT SYNERGY XD 3.0X48MM  at 12 KENDRA for 12 sec.    The Prox Cx lesion was 95% stenosed with 0% stenosis post-intervention.    IVUS left main showed Kacie of 7.6 millimeter sq in the proximal and ostial left main    The estimated blood loss was <50 mL.  Procedures performed:   1. Coronary angiogram   2. PCI of proximal circ into OM1  3. IVUS of circumflex   4. IVUS of left main   Coronary angiogram:  Left main: Proximal 40 to 50% stenosis.  IVUS done.  MLA in the proximal segment is 7.6 millimeter sq  Lad:  Proximal to mid 80% stenosis.  Will stage PCI  Circumflex: Culprit vessel.  Severe 95% stenosis.  Successful PCI done  RCA not studed today (diffuse nonobst disease on cath 6/17/24)  Access: Right radial artery access  Assessment and plan   Continue aspirin 81 mg daily indefinitely   Plavix 70 mg daily uninterrupted for at least 1 year and longer if no contraindications   Statins with goal LDL less  than 70   Smoking cessation   Staged PCI of LAD  Follow-up in Cardiology Clinic with Dr. Rider    Echo: 6/17/24 (images personally reviewed and interpreted)    Left Ventricle: The left ventricle is normal in size. Normal wall thickness. Regional wall motion abnormalities present (canot exclude mild posterolateral HK). There is low normal systolic function with a visually estimated ejection fraction of 50 - 55%. Grade I diastolic dysfunction.    Right Ventricle: Normal right ventricular cavity size. Systolic function is normal.    Stress Test: Dob[place] SE 8/11/23    Left Ventricle: Normal wall motion. There is normal systolic function with a visually estimated ejection fraction of 55 - 60%.    ECG Conclusion: The ECG portion of the study is negative for ischemia (87% MPHR).    Post-stress Echo: The left ventricle systolic function is hyperdynamic.    Post-stress Impression: The study is normal.    Assessment and Plan:     * ACS (acute coronary syndrome)  Cath with MV CAD, turned down for CABG  Now s/p culprit PCI LCx 6/18/24.  Residual LAD lesion for staged PCI as outpatient (LM IVUS >7mm2)  EF preserved  Cont med rx: ASA/Plavix/Statin  Home today  Follow up with Dr. Rider    .    Abdominal pain  Resolved    Dyslipidemia  Continue statin.    Essential hypertension  Continue medical therapy.    Type 2 diabetes mellitus, controlled  Management per IM.    History of hepatitis C, s/p successful hcv treatment w/ SVR 8-2015   Management per IM/GI.    Aortic atherosclerosis  Noted on CTA of the abdomen and pelvis 06/16/2024.    Continue statin        VTE Risk Mitigation (From admission, onward)           Ordered     IP VTE LOW RISK PATIENT  Once         06/16/24 2239     Place sequential compression device  Until discontinued         06/16/24 2239                  Dispo planning appropriate.  Pt to follow up with Dr. Rider after discharge.    Lavelle Carr MD  Cardiology  [facility] - Observation

## 2024-06-19 NOTE — PLAN OF CARE
Case Management Final Discharge Note    Discharge Disposition: Home    New DME ordered / company name: None    Relevant SDOH / Transition of Care Barriers:  None    Primary Caretaker and contact information: Marcela [phone]    Scheduled followup appointment: PCP and cardiology scheduled (Message sent to staff for an earlier appointment).    Referrals placed: None    Transportation: Pt's family will provide transportation home when discharged.    Patient and family educated on discharge services and updated on DC plan. Bedside RN notified, patient clear to discharge from Case Management Perspective.       06/19/24 1024   Final Note   Assessment Type Final Discharge Note   Anticipated Discharge Disposition Home   What phone number can be called within the next 1-3 days to see how you are doing after discharge? [phone]   Hospital Resources/Appts/Education Provided Appointments scheduled and added to AVS   Post-Acute Status   Coverage [employer]   Discharge Delays None known at this time

## 2024-06-19 NOTE — NURSING
[facility]  Nurses Note -- 4 Eyes      6/19/2024       Skin assessed on: Q Shift      [x] No Pressure Injuries Present    [x]Prevention Measures Documented    [] Yes LDA  for Pressure Injury Previously documented     [] Yes New Pressure Injury Discovered   [] LDA for New Pressure Injury Added      Attending RN:  Margot Barker RN     Second RN:  Gulshan Da Silva RN

## 2024-06-19 NOTE — NURSING
Discharge orders written, IV, and tele removed.  Virtual nurse notified to complete patient's discharge  teaching.

## 2024-06-19 NOTE — NURSING
Patient arrived from  PACU post PCI procedure.  Received handoff from nurse which stated that received one stint and tolerated procedure without difficulties.  Patient continue to leave against medical advice, say that he isn't getting information about health issues. Providers have visited with patient and explained procedure with staff presented.  Will continue to reinforce patient teaching to assistance patient with understanding plan of care.

## 2024-06-19 NOTE — PLAN OF CARE
Problem: Adult Inpatient Plan of Care  Goal: Plan of Care Review  6/19/2024 1537 by Margot Barker RN  Outcome: Adequate for Care Transition  6/19/2024 1536 by Margot Barker RN  Outcome: Progressing  Goal: Patient-Specific Goal (Individualized)  6/19/2024 1537 by Margot Barker RN  Outcome: Adequate for Care Transition  6/19/2024 1536 by Margot Barker RN  Outcome: Progressing  Goal: Absence of Hospital-Acquired Illness or Injury  6/19/2024 1537 by Margot Barker RN  Outcome: Adequate for Care Transition  6/19/2024 1536 by Margot Barker RN  Outcome: Progressing  Goal: Optimal Comfort and Wellbeing  6/19/2024 1537 by Margot Barker RN  Outcome: Adequate for Care Transition  6/19/2024 1536 by Margot Barker RN  Outcome: Progressing  Goal: Readiness for Transition of Care  6/19/2024 1537 by Margot Barker RN  Outcome: Adequate for Care Transition  6/19/2024 1536 by Margot Barker RN  Outcome: Progressing     Problem: Diabetes Comorbidity  Goal: Blood Glucose Level Within Targeted Range  6/19/2024 1537 by Margot Barker RN  Outcome: Adequate for Care Transition  6/19/2024 1536 by Margot Barker RN  Outcome: Progressing     Problem: Wound  Goal: Optimal Coping  6/19/2024 1537 by Margot Barker RN  Outcome: Adequate for Care Transition  6/19/2024 1536 by Margot Barker RN  Outcome: Progressing  Goal: Optimal Functional Ability  6/19/2024 1537 by Margot Barker RN  Outcome: Adequate for Care Transition  6/19/2024 1536 by Margot Barker RN  Outcome: Progressing  Goal: Absence of Infection Signs and Symptoms  6/19/2024 1537 by Margot Barker RN  Outcome: Adequate for Care Transition  6/19/2024 1536 by Margot Barker RN  Outcome: Progressing  Goal: Improved Oral Intake  6/19/2024 1537 by Margot Barker RN  Outcome: Adequate for Care Transition  6/19/2024 1536 by Margot Barker RN  Outcome: Progressing  Goal: Optimal Pain Control and  Function  6/19/2024 1537 by Margot Barker RN  Outcome: Adequate for Care Transition  6/19/2024 1536 by Margot Barker RN  Outcome: Progressing  Goal: Skin Health and Integrity  6/19/2024 1537 by Margot Barker RN  Outcome: Adequate for Care Transition  6/19/2024 1536 by Margot Barker RN  Outcome: Progressing  Goal: Optimal Wound Healing  6/19/2024 1537 by Margot Barker RN  Outcome: Adequate for Care Transition  6/19/2024 1536 by Margot Barker RN  Outcome: Progressing     Problem: Fall Injury Risk  Goal: Absence of Fall and Fall-Related Injury  6/19/2024 1537 by Margot Barker RN  Outcome: Adequate for Care Transition  6/19/2024 1536 by Margot Barker RN  Outcome: Progressing

## 2024-06-19 NOTE — DISCHARGE SUMMARY
[facility] Medicine  Discharge Summary      Patient Name: Nino Priec  MRN: [id number]  [facility]: [id number]  Patient Class: IP- Inpatient  Admission Date: 6/16/2024  Hospital Length of Stay: 2 days  Discharge Date and Time: 6/19/2024 12:41 PM  Attending Physician: No att. providers found   Discharging Provider: Jcarlos Dailey,   Primary Care Provider: Stevo Pryor MD    Primary Care Team: Networked reference to record PCT     HPI:   Mr. Price is a 59 yr old male with a hx of hep C s/p successful treatment, DM type 2, HTN, cirrhosis of liver, depression, CVA, esophagitis, dyslipidemia, GERD, tobacco use who presented to the ED with a chief complaint of constant 10/10 sudden onset abdominal pain that began Saturday morning.  When asked to describe his pain patient just states that it is painful.  He also endorses occasional chills, nausea, vomiting, diaphoresis.  Nothing seems to alleviate or exacerbate his symptoms, his pain does not radiate anywhere else.  He is tried hyoscyamine at home without relief of his symptoms.  He states that after treatment in the ED his abdominal pain has slightly improved.  Patient states that prior to abdominal pain he had a normal appetite however, since his abdominal pain he has not been eating much.  His last BM was yesterday morning and states that it was a normal bowel movement without blood.  Patient denies Tylenol and NSAID use.  He endorses that he has been seen for abdominal pain 3 times in the past without an answer as to what is going on. Of note, pt is unsure what meds he takes daily and says he will have his wife give an updated list as soon as she is available. He states that he smokes 1 pack of cigarettes every 2 days, quit drinking alcohol 2 years ago, and smokes marijuana occasionally (last smoked a couple of days ago).  He denies fatigue, fever, SOB, palpitations, CP, diarrhea, dysuria, hematuria, lightheadedness, dizziness, and  "syncope.    Per chart review, pt underwent EGD with Dr. Shanks on 1/26/24 with findings and recs below:  - Bleb found in the esophagus.   - Web in the lower third of the esophagus.   - 4 cm hiatal hernia.   - Normal stomach.   - Normal examined duodenum.   - No specimens collected.   Recommendation:   - Proceed to colonoscopy.   - Resume previous diet.   - Continue present medications.    Pt also underwent colonoscopy with Dr. Shanks on 1/26/24 with findings and recs below:  - Two 4 to 5 mm polyps in the descending colon, removed with a cold snare. Resected and retrieved. Clips were placed. Clip : WeTOWNS.   - One 2 mm polyp in the rectum, removed with a  [employer] cold forceps. Resected and retrieved.   - Diverticulosis in the sigmoid colon.   - The examination was otherwise normal.   Recommendation:  - Discharge patient to home.   - Resume previous diet.   - Continue present medications.   - Await pathology results.   - Repeat colonoscopy in 5 years for surveillance.       Upon arrival to ED, patient afebrile, HR of 54, RR of 20, BP of 181/91, satting 99% on RA.  Workup in the ED included CBC, CMP, lipase, troponin, lactic acid, UDS, UA, urine creatinine, CXR, CT abdomen pelvis.  Workup revealed GFR of 58, glucose of 216, initial troponin of 0.029, repeat troponin of 0.077, initial lactic of 2.3, repeat lactic of 2.7.  UDS positive for opiates and marijuana.  UA with 1+ protein, 1+ glucose, and 1+ ketones.  CXR showed no acute cardiopulmonary process identified.   CT abdomen pelvis showed no acute intra-abdominal abnormalities identified.  Circumferential wall thickening involving the distal esophagus which may be seen with esophagitis.  Similar findings are seen on previous CT from 01/26/2024.  Mild sigmoid diverticulosis with no evidence of acute diverticulitis.  Postsurgical changes."Patient received GI cocktail, HCTZ 25 mg oral, 2 L LR boluses, metoclopramide 10 mg IV, morphine 4 mg IV, " morphine 6 mg IV, ondansetron 8 mg IV, pantoprazole 40 mg IV, valsartan 160 mg oral, Omnipaque 75 mL IV contrast while in the ED. case discussed with ED provider and patient will be placed under observation for further management.    Procedure(s) (LRB):  INSERTION, STENT, CORONARY ARTERY (N/A)  IVUS, Coronary  Percutaneous coronary intervention (N/A)      Hospital Course:   59 yr old male with a hx of hep C s/p successful treatment, DM type 2, HTN, cirrhosis of liver, depression, CVA, esophagitis, dyslipidemia, GERD, tobacco use apparent on 06/16/2024 for further evaluation of acute on chronic abdominal pain and chest pain.  CT abdomen without acute intra-abdominal abnormalities.  GI consulted-No plan for acute inpatient endoscopy at this time. Suspect chronic opioid use has led to narcotic bowel and would not be treatable via endoscopic procedure.   Troponin mildly elevated on admission at 0.029, trended up to 0.1.  Echocardiogram with regional wall motion abnormalities present .  Cardiology consulted-continued aspirin heparin and load with Plavix.  s/p LHC on 06/17, which showed left main and triple-vessel disease. Pt high risk for CT surgery given liver cirrhosis.  Cardiology s/p PCI of LCx onh 06/19/24. Plan for outpatient staged PCI of LAD. Recommend to continue  ASA/Plavix/Statin.    Patient admits feeling significantly better.  States no longer having any chest pain or tightness.  Denies abdominal pain, nausea, vomiting.  pt pologized for being irate during hospitalization.  States he is feeling much better. Pt denies any fever, headaches, vision changes, chest pain, shortness of breath, palpitations, or any new weaknesses. Feels ready to go home. Patient's exam on discharge was as follow: Patient is alert and oriented, appears in no acute distress, heart with regular rate and rhythm, lungs clear to asculation with non-labored breathing, abdomen soft, and no new weaknesses or focal deficits seen. Right radial  access site c/d/I, no bleeding and no hematoma seen prior to discharge    Patient was counseled regarding any abnormal labs, differential diagnosis, treatment options, risk-benefit, lifestyle changes, prognosis, current condition, and medications. Patient was interactive and attentive.  Patient's questions were answered in a respectful and timely manner. Patient was instructed to follow-up with PCP within 1 week and to continue taking medications as prescribed.  Instructed to also follow up with Cardiology outpatient. Also, extensively discussed the risks, benefits, and side effects of patient's medications. Discussed with patient about any medication changes. Patient verbalized understanding and agrees to treatment plan.  Patient is stable for discharge.  Patient has no other questions or concerns at this time.  ED precautions discussed with the patient.    Vital signs are stable. Ambulating without any difficulty. Tolerating p.o. intake without any nausea or vomiting. Afebrile for over 24 hours. Patient is in stable condition and has no questions or concerns. Patient will be discharge to home once transportation secured . Prescriptions sent to pharmacy.  CM/SW to assist with discharge planning.     Vitals:    06/19/24 0338 06/19/24 0724 06/19/24 0745 06/19/24 1113   BP:   135/87 125/82   BP Location:   Right arm Right arm   Patient Position:   Lying Lying   Pulse: 98 103 93 95   Resp:   19 18   Temp:   98 °F (36.7 °C) 98 °F (36.7 °C)   TempSrc:   Oral Oral   SpO2:   96% 98%   Weight:       Height:                Goals of Care Treatment Preferences:  Code Status: Full Code      Consults:   Consults (From admission, onward)          Status Ordering Provider     Inpatient consult to Gastroenterology  Once        Provider:  Dilan Solares MD    Completed ELMA BOJORQUEZ     Inpatient consult to Cardiology  Once        Provider:  Lavelle Carr MD    Completed ELMA BOJORQUEZ             Cardiac/Vascular  * ACS (acute coronary syndrome)  Pt presented to the ED with abdominal pain, N/V and found to have elevated troponin. EKG with sinus tach, Bigeminy with vent rate of 102 and Qtc of 422. Placed under Obs for ACS r/o.    - initial troponin 0.029, trended upward to 0.1  - EKG with no acute ischemic changes.   -Started asa, plavix, heparin, statin.   -TTE with wall motion abnormalities.   -Cardiology consulted: s/p LHC on 06/17, which showed left main and triple-vessel disease. Pt high risk for CT surgery given liver cirrhosis.  Cardiology plan for PCI  on 06/18        Final Active Diagnoses:    Diagnosis Date Noted POA    PRINCIPAL PROBLEM:  ACS (acute coronary syndrome) [I24.9] 10/22/2016 Yes    CAD (coronary artery disease) [I25.10] 06/18/2024 Yes    Abdominal pain [R10.9] 06/03/2016 Yes    Aortic atherosclerosis [I70.0] 06/17/2024 Yes    Essential hypertension [I10] 05/29/2015 Yes     Chronic    Dyslipidemia [E78.5] 10/22/2016 Yes    Type 2 diabetes mellitus, controlled [E11.9] 05/29/2015 Yes     Chronic    Esophagitis [K20.90] 06/04/2016 Yes    GERD (gastroesophageal reflux disease) [K21.9] 10/22/2016 Yes     Chronic    History of hepatitis C, s/p successful hcv treatment w/ SVR 8-2015  [Z86.19] 10/08/2012 Yes    Cirrhosis of liver without ascites [K74.60] 08/20/2015 Yes     Chronic    History of CVA (cerebrovascular accident) [Z86.73] 06/03/2016 Not Applicable     Chronic    Depression [F32.A] 06/03/2016 Yes     Chronic    Hypomagnesemia [E83.42] 06/18/2024 Yes    Hypophosphatemia [E83.39] 06/18/2024 Yes      Problems Resolved During this Admission:       Discharged Condition: stable    Disposition: Home or Self Care    Follow Up:   Follow-up Information       Gabriela Rider MD Follow up.    Specialties: Cardiology, Interventional Cardiology  Why: Appointment scheduled for Septmeber. Message sent to staff for an earlier appointment.  Contact information:  [address]  [address]  [place] [address]  [phone]                           Patient Instructions:      Diet Cardiac     Notify your health care provider if you experience any of the following:  temperature >100.4     Notify your health care provider if you experience any of the following:  persistent nausea and vomiting or diarrhea     Notify your health care provider if you experience any of the following:  severe uncontrolled pain     Notify your health care provider if you experience any of the following:  persistent dizziness, light-headedness, or visual disturbances     Notify your health care provider if you experience any of the following:  increased confusion or weakness     Activity as tolerated       Significant Diagnostic Studies: Labs: All labs within the past 24 hours have been reviewed      Recent Results (from the past 100 hour(s))   CBC auto differential    Collection Time: 06/16/24  7:52 PM   Result Value Ref Range    WBC 10.91 3.90 - 12.70 K/uL    RBC 4.83 4.60 - 6.20 M/uL    Hemoglobin 15.5 14.0 - 18.0 g/dL    Hematocrit 45.3 40.0 - 54.0 %    MCV 94 82 - 98 fL    MCH 32.1 (H) 27.0 - 31.0 pg    MCHC 34.2 32.0 - 36.0 g/dL    RDW 12.7 11.5 - 14.5 %    Platelets 224 150 - 450 K/uL    MPV 10.5 9.2 - 12.9 fL    Immature Granulocytes 0.3 0.0 - 0.5 %    Gran # (ANC) 9.2 (H) 1.8 - 7.7 K/uL    Immature Grans (Abs) 0.03 0.00 - 0.04 K/uL    Lymph # 1.1 1.0 - 4.8 K/uL    Mono # 0.6 0.3 - 1.0 K/uL    Eos # 0.0 0.0 - 0.5 K/uL    Baso # 0.04 0.00 - 0.20 K/uL    nRBC 0 0 /100 WBC    Gran % 84.3 (H) 38.0 - 73.0 %    Lymph % 10.0 (L) 18.0 - 48.0 %    Mono % 5.0 4.0 - 15.0 %    Eosinophil % 0.0 0.0 - 8.0 %    Basophil % 0.4 0.0 - 1.9 %    Differential Method Automated    Comprehensive metabolic panel    Collection Time: 06/16/24  7:52 PM   Result Value Ref Range    Sodium 140 136 - 145 mmol/L    Potassium 5.1 3.5 - 5.1 mmol/L    Chloride 105 95 - 110 mmol/L    CO2 20 (L) 23 - 29 mmol/L    Glucose 216 (H) 70 - 110 mg/dL    BUN 17 6  - 20 mg/dL    Creatinine 1.4 0.5 - 1.4 mg/dL    Calcium 10.5 8.7 - 10.5 mg/dL    Total Protein 8.5 (H) 6.0 - 8.4 g/dL    Albumin 4.3 3.5 - 5.2 g/dL    Total Bilirubin 0.5 0.1 - 1.0 mg/dL    Alkaline Phosphatase 83 55 - 135 U/L    AST 15 10 - 40 U/L    ALT 18 10 - 44 U/L    eGFR 58 (A) >60 mL/min/1.73 m^2    Anion Gap 15 8 - 16 mmol/L   Troponin I    Collection Time: 06/16/24  7:52 PM   Result Value Ref Range    Troponin I 0.029 (H) 0.000 - 0.026 ng/mL   Lipase    Collection Time: 06/16/24  7:52 PM   Result Value Ref Range    Lipase 21 4 - 60 U/L   Lactic acid, plasma    Collection Time: 06/16/24  7:52 PM   Result Value Ref Range    Lactate (Lactic Acid) 2.3 (H) 0.5 - 2.2 mmol/L   ISTAT PROCEDURE    Collection Time: 06/16/24  7:57 PM   Result Value Ref Range    POC Glucose 225 (H) 70 - 110 mg/dL    POC BUN 18 6 - 30 mg/dL    POC Creatinine 1.1 0.5 - 1.4 mg/dL    POC Sodium 141 136 - 145 mmol/L    POC Potassium 4.7 3.5 - 5.1 mmol/L    POC Chloride 107 95 - 110 mmol/L    POC TCO2 (MEASURED) 21 (L) 23 - 29 mmol/L    POC Anion Gap 18 (H) 8 - 16 mmol/L    POC Ionized Calcium 1.29 1.06 - 1.42 mmol/L    POC Hematocrit 42 36 - 54 %PCV    Sample VENOUS     Site Other     Allens Test N/A     DelSys Room Air     Mode SPONT     FiO2 21    EKG 12-lead    Collection Time: 06/16/24  8:05 PM   Result Value Ref Range    QRS Duration 86 ms    OHS QTC Calculation 422 ms   Troponin I    Collection Time: 06/16/24  9:54 PM   Result Value Ref Range    Troponin I 0.077 (H) 0.000 - 0.026 ng/mL   Lactic acid, plasma    Collection Time: 06/16/24  9:54 PM   Result Value Ref Range    Lactate (Lactic Acid) 2.7 (H) 0.5 - 2.2 mmol/L   Urinalysis, Reflex to Urine Culture Urine, Clean Catch    Collection Time: 06/16/24 11:36 PM    Specimen: Urine   Result Value Ref Range    Specimen UA Urine, Clean Catch     Color, UA Yellow Yellow, Straw, Jordyn    Appearance, UA Clear Clear    pH, UA 7.0 5.0 - 8.0    Specific Gravity, UA >1.030 (A) 1.005 - 1.030     Protein, UA 1+ (A) Negative    Glucose, UA 1+ (A) Negative    Ketones, UA 1+ (A) Negative    Bilirubin (UA) Negative Negative    Occult Blood UA Negative Negative    Nitrite, UA Negative Negative    Urobilinogen, UA Negative <2.0 EU/dL    Leukocytes, UA Negative Negative   Drug screen panel, emergency    Collection Time: 06/16/24 11:36 PM   Result Value Ref Range    Benzodiazepines Negative Negative    Methadone metabolites Negative Negative    Cocaine (Metab.) Negative Negative    Opiate Scrn, Ur Presumptive Positive (A) Negative    Barbiturate Screen, Ur Negative Negative    Amphetamine Screen, Ur Negative Negative    THC Presumptive Positive (A) Negative    Phencyclidine Negative Negative    Creatinine, Urine 75.4 23.0 - 375.0 mg/dL    Toxicology Information SEE COMMENT    Urinalysis Microscopic    Collection Time: 06/16/24 11:36 PM   Result Value Ref Range    RBC, UA 1 0 - 4 /hpf    WBC, UA 0 0 - 5 /hpf    Bacteria None None-Occ /hpf    Squam Epithel, UA 0 /hpf    Hyaline Casts, UA 0 0-1/lpf /lpf    Microscopic Comment SEE COMMENT    POCT glucose    Collection Time: 06/17/24 12:05 AM   Result Value Ref Range    POCT Glucose 170 (H) 70 - 110 mg/dL   Basic Metabolic Panel (BMP)    Collection Time: 06/17/24  4:07 AM   Result Value Ref Range    Sodium 142 136 - 145 mmol/L    Potassium 4.5 3.5 - 5.1 mmol/L    Chloride 106 95 - 110 mmol/L    CO2 25 23 - 29 mmol/L    Glucose 118 (H) 70 - 110 mg/dL    BUN 15 6 - 20 mg/dL    Creatinine 1.0 0.5 - 1.4 mg/dL    Calcium 9.3 8.7 - 10.5 mg/dL    Anion Gap 11 8 - 16 mmol/L    eGFR >60 >60 mL/min/1.73 m^2   TSH    Collection Time: 06/17/24  4:07 AM   Result Value Ref Range    TSH 0.593 0.400 - 4.000 uIU/mL   Magnesium    Collection Time: 06/17/24  4:07 AM   Result Value Ref Range    Magnesium 1.2 (L) 1.6 - 2.6 mg/dL   Phosphorus    Collection Time: 06/17/24  4:07 AM   Result Value Ref Range    Phosphorus 3.2 2.7 - 4.5 mg/dL   Lactic acid, plasma    Collection Time: 06/17/24   4:07 AM   Result Value Ref Range    Lactate (Lactic Acid) 1.9 0.5 - 2.2 mmol/L   Troponin I    Collection Time: 06/17/24  4:07 AM   Result Value Ref Range    Troponin I 0.104 (H) 0.000 - 0.026 ng/mL   Lipid panel    Collection Time: 06/17/24  4:07 AM   Result Value Ref Range    Cholesterol 124 120 - 199 mg/dL    Triglycerides 116 30 - 150 mg/dL    HDL 23 (L) 40 - 75 mg/dL    LDL Cholesterol 77.8 63.0 - 159.0 mg/dL    HDL/Cholesterol Ratio 18.5 (L) 20.0 - 50.0 %    Total Cholesterol/HDL Ratio 5.4 (H) 2.0 - 5.0    Non-HDL Cholesterol 101 mg/dL   Hemoglobin A1c    Collection Time: 06/17/24  4:07 AM   Result Value Ref Range    Hemoglobin A1C 6.2 (H) 4.0 - 5.6 %    Estimated Avg Glucose 131 68 - 131 mg/dL   CBC with Automated Differential    Collection Time: 06/17/24  4:07 AM   Result Value Ref Range    WBC 10.97 3.90 - 12.70 K/uL    RBC 3.96 (L) 4.60 - 6.20 M/uL    Hemoglobin 12.5 (L) 14.0 - 18.0 g/dL    Hematocrit 38.2 (L) 40.0 - 54.0 %    MCV 97 82 - 98 fL    MCH 31.6 (H) 27.0 - 31.0 pg    MCHC 32.7 32.0 - 36.0 g/dL    RDW 12.9 11.5 - 14.5 %    Platelets 205 150 - 450 K/uL    MPV 10.5 9.2 - 12.9 fL    Immature Granulocytes 0.5 0.0 - 0.5 %    Gran # (ANC) 7.5 1.8 - 7.7 K/uL    Immature Grans (Abs) 0.05 (H) 0.00 - 0.04 K/uL    Lymph # 2.4 1.0 - 4.8 K/uL    Mono # 1.0 0.3 - 1.0 K/uL    Eos # 0.0 0.0 - 0.5 K/uL    Baso # 0.05 0.00 - 0.20 K/uL    nRBC 0 0 /100 WBC    Gran % 68.2 38.0 - 73.0 %    Lymph % 21.4 18.0 - 48.0 %    Mono % 9.2 4.0 - 15.0 %    Eosinophil % 0.2 0.0 - 8.0 %    Basophil % 0.5 0.0 - 1.9 %    Differential Method Automated    EKG 12-lead    Collection Time: 06/17/24  7:53 AM   Result Value Ref Range    QRS Duration 78 ms    OHS QTC Calculation 440 ms   Troponin I    Collection Time: 06/17/24  7:58 AM   Result Value Ref Range    Troponin I 0.068 (H) 0.000 - 0.026 ng/mL   APTT    Collection Time: 06/17/24  7:58 AM   Result Value Ref Range    aPTT 27.7 21.0 - 32.0 sec   Protime-INR    Collection Time:  06/17/24  7:58 AM   Result Value Ref Range    Prothrombin Time 12.1 9.0 - 12.5 sec    INR 1.1 0.8 - 1.2   CBC auto differential    Collection Time: 06/17/24  7:58 AM   Result Value Ref Range    WBC 11.15 3.90 - 12.70 K/uL    RBC 4.01 (L) 4.60 - 6.20 M/uL    Hemoglobin 12.9 (L) 14.0 - 18.0 g/dL    Hematocrit 37.8 (L) 40.0 - 54.0 %    MCV 94 82 - 98 fL    MCH 32.2 (H) 27.0 - 31.0 pg    MCHC 34.1 32.0 - 36.0 g/dL    RDW 12.9 11.5 - 14.5 %    Platelets 190 150 - 450 K/uL    MPV 10.0 9.2 - 12.9 fL    Immature Granulocytes 0.2 0.0 - 0.5 %    Gran # (ANC) 7.0 1.8 - 7.7 K/uL    Immature Grans (Abs) 0.02 0.00 - 0.04 K/uL    Lymph # 3.0 1.0 - 4.8 K/uL    Mono # 0.9 0.3 - 1.0 K/uL    Eos # 0.1 0.0 - 0.5 K/uL    Baso # 0.06 0.00 - 0.20 K/uL    nRBC 0 0 /100 WBC    Gran % 63.2 38.0 - 73.0 %    Lymph % 26.8 18.0 - 48.0 %    Mono % 8.4 4.0 - 15.0 %    Eosinophil % 0.9 0.0 - 8.0 %    Basophil % 0.5 0.0 - 1.9 %    Differential Method Automated    Echo    Collection Time: 06/17/24 11:05 AM   Result Value Ref Range    BSA 1.69 m2    LVOT stroke volume 49.16 cm3    LVIDd 4.40 3.5 - 6.0 cm    LV Systolic Volume 57.00 mL    LV Systolic Volume Index 34.1 mL/m2    LVIDs 3.67 2.1 - 4.0 cm    LV Diastolic Volume 87.72 mL    LV Diastolic Volume Index 52.53 mL/m2    IVS 0.95 0.6 - 1.1 cm    LVOT diameter 2.01 cm    LVOT area 3.2 cm2    FS 17 (A) 28 - 44 %    Left Ventricle Relative Wall Thickness 0.38 cm    Posterior Wall 0.83 0.6 - 1.1 cm    LV mass 126.10 g    LV Mass Index 76 g/m2    MV Peak E Timi 0.77 m/s    TDI LATERAL 0.09 m/s    TDI SEPTAL 0.07 m/s    E/E' ratio 9.63 m/s    MV Peak A Timi 0.98 m/s    E/A ratio 0.79     IVRT 102.76 msec    E wave deceleration time 202.91 msec    LV SEPTAL E/E' RATIO 11.00 m/s    LV LATERAL E/E' RATIO 8.56 m/s    PV Peak S Timi 0.59 m/s    PV Peak D Timi 0.41 m/s    Pulm vein S/D ratio 1.44     LVOT peak timi 0.76 m/s    Left Ventricular Outflow Tract Mean Velocity 0.51 cm/s    Left Ventricular Outflow Tract  Mean Gradient 1.22 mmHg    RVDD 2.43 cm    RV S' 14.51 cm/s    TAPSE 2.17 cm    RV/LV Ratio 0.55 cm    LA size 3.19 cm    Left Atrium Minor Axis 4.95 cm    Left Atrium Major Axis 3.86 cm    RA Major Axis 4.05 cm    AV mean gradient 5 mmHg    AV peak gradient 9 mmHg    Ao peak rocío 1.53 m/s    Ao VTI 30.20 cm    LVOT peak VTI 15.50 cm    AV valve area 1.63 cm²    AV Velocity Ratio 0.50     AV index (prosthetic) 0.51     TRAE by Velocity Ratio 1.58 cm²    MV mean gradient 2 mmHg    MV peak gradient 4 mmHg    MV stenosis pressure 1/2 time 58.84 ms    MV valve area p 1/2 method 3.74 cm2    MV valve area by continuity eq 1.39 cm2    MV VTI 35.3 cm    PV PEAK VELOCITY 1.07 m/s    PV peak gradient 5 mmHg    Sinus 2.98 cm    IVC diameter 1.72 cm    Mean e' 0.08 m/s    ZLVIDS 1.88     ZLVIDD -0.59     LA Volume Index 23.2 mL/m2    LA volume 38.81 cm3    LA WIDTH 3.3 cm    RA Width 3.4 cm    Est. RA pres 8 mmHg   ISTAT ACT-K    Collection Time: 06/17/24 12:37 PM   Result Value Ref Range    POC ACTIVATED CLOTTING TIME K 134 74 - 137 sec    Sample ARTERIAL     Site RR     Allens Test Pass    POCT glucose    Collection Time: 06/17/24  1:55 PM   Result Value Ref Range    POCT Glucose 111 (H) 70 - 110 mg/dL   APTT    Collection Time: 06/17/24  2:38 PM   Result Value Ref Range    aPTT >150.0 (AA) 21.0 - 32.0 sec   POCT glucose    Collection Time: 06/17/24  4:30 PM   Result Value Ref Range    POCT Glucose 244 (H) 70 - 110 mg/dL   APTT    Collection Time: 06/17/24  5:35 PM   Result Value Ref Range    aPTT 31.6 21.0 - 32.0 sec   POCT glucose    Collection Time: 06/17/24  7:35 PM   Result Value Ref Range    POCT Glucose 195 (H) 70 - 110 mg/dL   APTT    Collection Time: 06/18/24 12:48 AM   Result Value Ref Range    aPTT 111.3 (H) 21.0 - 32.0 sec   Basic Metabolic Panel (BMP)    Collection Time: 06/18/24  2:51 AM   Result Value Ref Range    Sodium 142 136 - 145 mmol/L    Potassium 4.1 3.5 - 5.1 mmol/L    Chloride 108 95 - 110 mmol/L    CO2  24 23 - 29 mmol/L    Glucose 129 (H) 70 - 110 mg/dL    BUN 11 6 - 20 mg/dL    Creatinine 0.9 0.5 - 1.4 mg/dL    Calcium 8.8 8.7 - 10.5 mg/dL    Anion Gap 10 8 - 16 mmol/L    eGFR >60 >60 mL/min/1.73 m^2   Magnesium    Collection Time: 06/18/24  2:51 AM   Result Value Ref Range    Magnesium 1.5 (L) 1.6 - 2.6 mg/dL   Phosphorus    Collection Time: 06/18/24  2:51 AM   Result Value Ref Range    Phosphorus 2.6 (L) 2.7 - 4.5 mg/dL   CBC with Automated Differential    Collection Time: 06/18/24  2:51 AM   Result Value Ref Range    WBC 8.15 3.90 - 12.70 K/uL    RBC 3.93 (L) 4.60 - 6.20 M/uL    Hemoglobin 12.7 (L) 14.0 - 18.0 g/dL    Hematocrit 36.0 (L) 40.0 - 54.0 %    MCV 92 82 - 98 fL    MCH 32.3 (H) 27.0 - 31.0 pg    MCHC 35.3 32.0 - 36.0 g/dL    RDW 12.8 11.5 - 14.5 %    Platelets 171 150 - 450 K/uL    MPV 9.9 9.2 - 12.9 fL    Immature Granulocytes 0.2 0.0 - 0.5 %    Gran # (ANC) 4.2 1.8 - 7.7 K/uL    Immature Grans (Abs) 0.02 0.00 - 0.04 K/uL    Lymph # 2.9 1.0 - 4.8 K/uL    Mono # 0.7 0.3 - 1.0 K/uL    Eos # 0.2 0.0 - 0.5 K/uL    Baso # 0.07 0.00 - 0.20 K/uL    nRBC 0 0 /100 WBC    Gran % 51.5 38.0 - 73.0 %    Lymph % 36.1 18.0 - 48.0 %    Mono % 9.0 4.0 - 15.0 %    Eosinophil % 2.3 0.0 - 8.0 %    Basophil % 0.9 0.0 - 1.9 %    Differential Method Automated    APTT    Collection Time: 06/18/24  2:51 AM   Result Value Ref Range    aPTT 65.3 (H) 21.0 - 32.0 sec   APTT    Collection Time: 06/18/24  9:50 AM   Result Value Ref Range    aPTT 53.7 (H) 21.0 - 32.0 sec   POCT glucose    Collection Time: 06/18/24 11:01 AM   Result Value Ref Range    POCT Glucose 133 (H) 70 - 110 mg/dL   ISTAT ACT-K    Collection Time: 06/18/24 12:21 PM   Result Value Ref Range    POC ACTIVATED CLOTTING TIME K 146 (H) 74 - 137 sec    Sample ARTERIAL     Site [place]     Allens Test Pass    ISTAT ACT-K    Collection Time: 06/18/24 12:46 PM   Result Value Ref Range    POC ACTIVATED CLOTTING TIME K 293 (H) 74 - 137 sec    Sample ARTERIAL     Site  [place]     Allens Test Pass    POCT glucose    Collection Time: 06/18/24  1:03 PM   Result Value Ref Range    POCT Glucose 104 70 - 110 mg/dL   POCT glucose    Collection Time: 06/18/24  7:28 PM   Result Value Ref Range    POCT Glucose 157 (H) 70 - 110 mg/dL   Basic Metabolic Panel (BMP)    Collection Time: 06/19/24  5:21 AM   Result Value Ref Range    Sodium 140 136 - 145 mmol/L    Potassium 3.7 3.5 - 5.1 mmol/L    Chloride 108 95 - 110 mmol/L    CO2 21 (L) 23 - 29 mmol/L    Glucose 130 (H) 70 - 110 mg/dL    BUN 11 6 - 20 mg/dL    Creatinine 1.0 0.5 - 1.4 mg/dL    Calcium 8.6 (L) 8.7 - 10.5 mg/dL    Anion Gap 11 8 - 16 mmol/L    eGFR >60 >60 mL/min/1.73 m^2   Magnesium    Collection Time: 06/19/24  5:21 AM   Result Value Ref Range    Magnesium 1.4 (L) 1.6 - 2.6 mg/dL   Phosphorus    Collection Time: 06/19/24  5:21 AM   Result Value Ref Range    Phosphorus 2.4 (L) 2.7 - 4.5 mg/dL   CBC with Automated Differential    Collection Time: 06/19/24  5:21 AM   Result Value Ref Range    WBC 6.98 3.90 - 12.70 K/uL    RBC 3.93 (L) 4.60 - 6.20 M/uL    Hemoglobin 12.5 (L) 14.0 - 18.0 g/dL    Hematocrit 37.3 (L) 40.0 - 54.0 %    MCV 95 82 - 98 fL    MCH 31.8 (H) 27.0 - 31.0 pg    MCHC 33.5 32.0 - 36.0 g/dL    RDW 12.7 11.5 - 14.5 %    Platelets 180 150 - 450 K/uL    MPV 9.8 9.2 - 12.9 fL    Immature Granulocytes 0.3 0.0 - 0.5 %    Gran # (ANC) 3.6 1.8 - 7.7 K/uL    Immature Grans (Abs) 0.02 0.00 - 0.04 K/uL    Lymph # 2.3 1.0 - 4.8 K/uL    Mono # 0.6 0.3 - 1.0 K/uL    Eos # 0.3 0.0 - 0.5 K/uL    Baso # 0.09 0.00 - 0.20 K/uL    nRBC 0 0 /100 WBC    Gran % 52.0 38.0 - 73.0 %    Lymph % 33.4 18.0 - 48.0 %    Mono % 8.6 4.0 - 15.0 %    Eosinophil % 4.4 0.0 - 8.0 %    Basophil % 1.3 0.0 - 1.9 %    Differential Method Automated    POCT glucose    Collection Time: 06/19/24  7:45 AM   Result Value Ref Range    POCT Glucose 153 (H) 70 - 110 mg/dL   POCT glucose    Collection Time: 06/19/24 11:12 AM   Result Value Ref Range    POCT  Glucose 244 (H) 70 - 110 mg/dL       Microbiology Results (last 7 days)       ** No results found for the last 168 hours. **            Imaging Results              CT Abdomen Pelvis With IV Contrast NO Oral Contrast (Final result)  Result time 06/16/24 20:49:59      Final result by Dalton Lyle MD (06/16/24 20:49:59)                   Impression:      1. No acute intra-abdominal abnormalities identified.  2. Circumferential wall thickening involving the distal esophagus which may be seen with esophagitis.  Similar findings are seen on previous CT from 01/26/2024.  Future EGD follow-up may be warranted if not previously or recently obtained.  3. Mild sigmoid diverticulosis with no evidence of acute diverticulitis.  4. Postsurgical changes and additional findings as detailed above.      Electronically signed by: Dalton Lyle MD  Date:    06/16/2024  Time:    20:49               Narrative:    EXAMINATION:  CT ABDOMEN PELVIS WITH IV CONTRAST    CLINICAL HISTORY:  Abdominal pain, acute, nonlocalized;    TECHNIQUE:  Low dose axial images, sagittal and coronal reformations were obtained from the lung bases to the pubic symphysis following the IV administration of 75 mL of Omnipaque 350 .  Oral contrast was not given.    COMPARISON:  CT abdomen pelvis from 01/26/2024.    FINDINGS:  The visualized portion of the heart is unremarkable.  The lung bases are clear.  Circumferential wall thickening is seen involving the distal esophagus.    No significant hepatic abnormalities are identified.  There is no intra-or extrahepatic biliary ductal dilatation.  The gallbladder is unremarkable.  The stomach, pancreas, spleen, and adrenal glands are unremarkable.    Kidneys enhance normally with no evidence of hydronephrosis.  No abnormalities are seen along the ureteral courses.  Urinary bladder is unremarkable.  Prostate is mildly enlarged small prostatic calcification seen.    Postsurgical changes are seen at the base of the  cecum likely reflecting prior appendectomy.  There is mild sigmoid diverticulosis.  The visualized loops of small and large bowel show no evidence of obstruction or inflammation.  No free air or free fluid.    Aorta tapers normally with moderate atherosclerosis seen involving the distal abdominal aorta and extending into the iliacs.    No acute osseous abnormality identified.  Intervertebral disc space narrowing and degenerative change is seen at the L5-S1 level.  Remote nonunited fracture seen of the posteromedial right 11th rib.  Unchanged serpiginous sclerosis is seen involving the femoral heads consistent likely reflecting avascular necrosis without evidence of subchondral collapse.  Small fat containing right inguinal hernia is noted.                                       X-Ray Chest AP Portable (Final result)  Result time 06/16/24 20:18:22      Final result by Dalton Lyle MD (06/16/24 20:18:22)                   Impression:      No acute cardiopulmonary process identified.      Electronically signed by: Dalton Lyle MD  Date:    06/16/2024  Time:    20:18               Narrative:    EXAMINATION:  XR CHEST AP PORTABLE    CLINICAL HISTORY:  Chest pain, unspecified    TECHNIQUE:  Single frontal view of the chest was performed.    COMPARISON:  01/26/2024.    FINDINGS:  Cardiac silhouette is normal in size.  Lungs are symmetrically expanded.  No evidence of focal consolidative process, pneumothorax, or significant pleural effusion.  No acute osseous abnormality identified.                                          Pending Diagnostic Studies:       Procedure Component Value Units Date/Time    EKG 12-LEAD on arrival to floor [[id number]]     Order Status: Sent Lab Status: No result     EKG 12-lead [[id number]]     Order Status: Sent Lab Status: No result            Medications:  Reconciled Home Medications:      Medication List        START taking these medications      clopidogreL 75 mg tablet  Commonly  known as: PLAVIX  Take 1 tablet (75 mg total) by mouth once daily.  Start taking on: June 20, 2024     nitroGLYCERIN 0.4 MG SL tablet  Commonly known as: NITROSTAT  Place 1 tablet (0.4 mg total) under the tongue as needed for Chest pain.            CHANGE how you take these medications      mirtazapine 15 MG tablet  Commonly known as: REMERON  Take 15 mg by mouth every evening.  What changed: Another medication with the same name was removed. Continue taking this medication, and follow the directions you see here.            CONTINUE taking these medications      acetaminophen 500 MG tablet  Commonly known as: TYLENOL  Take 1 tablet (500 mg total) by mouth every 6 (six) hours as needed for Pain (and fever).     aspirin 81 MG EC tablet  Commonly known as: ECOTRIN  Take 1 tablet (81 mg total) by mouth once daily.     atorvastatin 80 MG tablet  Commonly known as: LIPITOR  Take 1 tablet (80 mg total) by mouth once daily.     busPIRone 10 MG tablet  Commonly known as: BUSPAR  Take 5 mg by mouth every evening.     dicyclomine 20 mg tablet  Commonly known as: BENTYL  Take 1 tablet (20 mg total) by mouth 2 (two) times daily as needed (stomach pain).     DULoxetine 60 MG capsule  Commonly known as: CYMBALTA  Take 60 mg by mouth once daily. Takes in pm     gabapentin 300 MG capsule  Commonly known as: NEURONTIN  Take 300 mg by mouth 3 (three) times daily.     HYDROcodone-acetaminophen  mg per tablet  Commonly known as: NORCO  Take 1 tablet by mouth every 6 (six) hours as needed.     metFORMIN 1000 MG tablet  Commonly known as: GLUCOPHAGE  Take 1 tablet (1,000 mg total) by mouth 2 (two) times daily with meals.     metoclopramide HCl 10 MG tablet  Commonly known as: REGLAN  Take 1 tablet (10 mg total) by mouth every 6 (six) hours.     ondansetron 4 MG Tbdl  Commonly known as: ZOFRAN-ODT  Take 1 tablet (4 mg total) by mouth every 6 (six) hours as needed (nausea).     * pantoprazole 40 MG tablet  Commonly known as:  PROTONIX  Take 1 tablet by mouth once daily.     * pantoprazole 40 MG tablet  Commonly known as: PROTONIX  Take 1 tablet (40 mg total) by mouth once daily.     tiZANidine 4 MG tablet  Commonly known as: ZANAFLEX  Take 4 mg by mouth every evening.     TRUE METRIX GLUCOSE TEST STRIP Strp  Generic drug: blood sugar diagnostic     valsartan-hydrochlorothiazide 160-25 mg per tablet  Commonly known as: DIOVAN-HCT  TAKE 1 TABLET BY MOUTH EVERY DAY           * This list has 2 medication(s) that are the same as other medications prescribed for you. Read the directions carefully, and ask your doctor or other care provider to review them with you.                STOP taking these medications      buPROPion 150 MG TB24 tablet  Commonly known as: WELLBUTRIN XL     buPROPion 300 MG 24 hr tablet  Commonly known as: WELLBUTRIN XL     cyclobenzaprine 5 MG tablet  Commonly known as: FLEXERIL     HYDROmorphone (PF) 1 mg/mL Soln  Commonly known as: DILAUDID     morphine 4 mg/mL Soln     NAMENDA 10 mg Tab  Generic drug: memantine     OMNIPAQUE 350 350 mg iodine/mL Soln injection  Generic drug: iohexoL     ondansetron 4 mg/2 mL Soln     oxyCODONE-acetaminophen  mg per tablet  Commonly known as: PERCOCET     propranoloL 20 MG tablet  Commonly known as: INDERAL     traZODone 100 MG tablet  Commonly known as: DESYREL     traZODone 50 MG tablet  Commonly known as: DESYREL            ASK your doctor about these medications      TRUE METRIX GLUCOSE METER Misc  Generic drug: blood-glucose meter              Indwelling Lines/Drains at time of discharge:   Lines/Drains/Airways       None                   Time spent on the discharge of patient: Greater than 35 minutes         Jcarlos Dailey DO  Department of Hospital Medicine  [facility] [place] - Observation

## 2024-06-19 NOTE — NURSING
Patient refused to wait for virtual nurse discharge teaching so primary nurse completed d/c teaching with patient.

## 2024-06-19 NOTE — SUBJECTIVE & OBJECTIVE
Past Medical History:   Diagnosis Date    Anxiety     Asthma     Colitis     Depression     Diabetes mellitus     Head injury     History of hepatitis C, s/p successful hcv treatment w/ SVR 8-2015  10/8/2012    SVR(24) as of 11/2015.  SVR(12) as of 8/2015.  completed harvoni 24 week regimen for genotype 1a 5/18/15     Hypertension     Shoulder pain     left, s/p 4 surgeries.     Stroke        Past Surgical History:   Procedure Laterality Date    ANGIOGRAM, CORONARY, WITH LEFT HEART CATHETERIZATION Left 6/17/2024    Procedure: Angiogram, Coronary, with Left Heart Cath;  Surgeon: Gabriela Rider MD;  Location: [facility] CATH LAB;  Service: Cardiology;  Laterality: Left;  12pm start, R rad access    COLONOSCOPY N/A 10/20/2015    Procedure: COLONOSCOPY;  Surgeon: Jagdish Patricia MD;  Location: [facility] ENDO;  Service: Endoscopy;  Laterality: N/A;    COLONOSCOPY N/A 1/26/2024    Procedure: COLONOSCOPY;  Surgeon: Pricila Shanks MD;  Location: [facility] ENDO;  Service: Endoscopy;  Laterality: N/A;    CORONARY STENT PLACEMENT N/A 6/18/2024    Procedure: INSERTION, STENT, CORONARY ARTERY;  Surgeon: Gabriela Rider MD;  Location: [facility] CATH LAB;  Service: Cardiology;  Laterality: N/A;  lcx pci. radial. lbu 3.5. 12 pm access    DIAGNOSTIC LAPAROSCOPY N/A 4/1/2021    Procedure: LAPAROSCOPY, DIAGNOSTIC;  Surgeon: Umesh Vallecillo MD;  Location: [facility] OR;  Service: General;  Laterality: N/A;  RN PREOP 3/29/21, COVID NEGATIVE ON 3/29  CONSENT AND H/P INCOMPLETE    ESOPHAGOGASTRODUODENOSCOPY N/A 1/26/2024    Procedure: EGD (ESOPHAGOGASTRODUODENOSCOPY);  Surgeon: Pricila Shanks MD;  Location: [facility] ENDO;  Service: Endoscopy;  Laterality: N/A;  prep instructions mailed to pt / [facility] pt / Urgent/suprep  1/19- lvm for pc. DBM  1/23- left 2nd vm for pc. DBM  1/24 cirrohsis-labs prior, precall complete-st    GALLBLADDER SURGERY      pt not 100% if gall bladder removed    IVUS, CORONARY  6/18/2024    Procedure: IVUS, Coronary;  Surgeon: Adry  Gabriela KELLEY MD;  Location: [facility] CATH LAB;  Service: Cardiology;;    PERCUTANEOUS CORONARY INTERVENTION, ARTERY N/A 6/18/2024    Procedure: Percutaneous coronary intervention;  Surgeon: Gabriela Rider MD;  Location: [facility] CATH LAB;  Service: Cardiology;  Laterality: N/A;    rotator cuff      left side       Review of patient's allergies indicates:   Allergen Reactions    Codeine Nausea Only       No current facility-administered medications on file prior to encounter.     Current Outpatient Medications on File Prior to Encounter   Medication Sig    acetaminophen (TYLENOL) 500 MG tablet Take 1 tablet (500 mg total) by mouth every 6 (six) hours as needed for Pain (and fever).    aspirin (ECOTRIN) 81 MG EC tablet Take 1 tablet (81 mg total) by mouth once daily.    atorvastatin (LIPITOR) 80 MG tablet Take 1 tablet (80 mg total) by mouth once daily.    buPROPion (WELLBUTRIN XL) 150 MG TB24 tablet Take 1 tablet by mouth once daily.    buPROPion (WELLBUTRIN XL) 300 MG 24 hr tablet Take 1 tablet by mouth every morning. (Patient not taking: Reported on 5/27/2024)    busPIRone (BUSPAR) 10 MG tablet Take 5 mg by mouth every evening.    cyclobenzaprine (FLEXERIL) 5 MG tablet Take 5 mg by mouth nightly as needed. (Patient not taking: Reported on 5/27/2024)    dicyclomine (BENTYL) 20 mg tablet Take 1 tablet (20 mg total) by mouth 2 (two) times daily as needed (stomach pain).    DULoxetine (CYMBALTA) 60 MG capsule Take 60 mg by mouth once daily. Takes in pm    gabapentin (NEURONTIN) 300 MG capsule Take 300 mg by mouth 3 (three) times daily.    HYDROcodone-acetaminophen (NORCO)  mg per tablet Take 1 tablet by mouth every 6 (six) hours as needed.    metFORMIN (GLUCOPHAGE) 1000 MG tablet Take 1 tablet (1,000 mg total) by mouth 2 (two) times daily with meals.    metoclopramide HCl (REGLAN) 10 MG tablet Take 1 tablet (10 mg total) by mouth every 6 (six) hours.    mirtazapine (REMERON) 15 MG tablet Take 15 mg by mouth every  evening.    mirtazapine (REMERON) 45 MG tablet Take 1 tablet by mouth every evening.    NAMENDA 10 mg Tab Take 10 mg by mouth once daily.     ondansetron (ZOFRAN-ODT) 4 MG TbDL Take 1 tablet (4 mg total) by mouth every 6 (six) hours as needed (nausea).    ondansetron 4 mg/2 mL Soln     pantoprazole (PROTONIX) 40 MG tablet Take 1 tablet (40 mg total) by mouth once daily.    pantoprazole (PROTONIX) 40 MG tablet Take 1 tablet by mouth once daily.    propranoloL (INDERAL) 20 MG tablet Take 10 mg by mouth 2 (two) times daily.    tiZANidine (ZANAFLEX) 4 MG tablet Take 4 mg by mouth every evening.    TRUE METRIX GLUCOSE METER Misc  (Patient not taking: Reported on 5/27/2024)    TRUE METRIX GLUCOSE TEST STRIP Strp     valsartan-hydrochlorothiazide (DIOVAN-HCT) 160-25 mg per tablet TAKE 1 TABLET BY MOUTH EVERY DAY     Family History       Problem Relation (Age of Onset)    Cirrhosis Brother    Heart disease Father    Hypertension Mother          Tobacco Use    Smoking status: Every Day     Current packs/day: 1.00     Average packs/day: 1 pack/day for 35.0 years (35.0 ttl pk-yrs)     Types: Cigarettes     Passive exposure: Current    Smokeless tobacco: Never    Tobacco comments:     Will try to stop smoking by himself vs    Substance and Sexual Activity    Alcohol use: No    Drug use: Yes     Frequency: 7.0 times per week     Types: Marijuana    Sexual activity: Yes     Partners: Female     Review of Systems   Gastrointestinal:  Negative for melena.   Genitourinary:  Negative for hematuria.     Objective:     Vital Signs (Most Recent):  Temp: 98 °F (36.7 °C) (06/19/24 0745)  Pulse: 93 (06/19/24 0745)  Resp: 19 (06/19/24 0745)  BP: 135/87 (06/19/24 0745)  SpO2: 96 % (06/19/24 0745) Vital Signs (24h Range):  Temp:  [97.3 °F (36.3 °C)-98 °F (36.7 °C)] 98 °F (36.7 °C)  Pulse:  [] 93  Resp:  [14-27] 19  SpO2:  [96 %-98 %] 96 %  BP: (125-179)/() 135/87     Weight: 62.5 kg (137 lb 12.6 oz)  Body mass index is 23.65  kg/m².    SpO2: 96 %       No intake or output data in the 24 hours ending 06/19/24 0933      Lines/Drains/Airways       Peripheral Intravenous Line  Duration                  Peripheral IV - Single Lumen 06/16/24 1935 20 G Right Forearm 2 days         Peripheral IV - Single Lumen 06/17/24 0906 20 G Left;Posterior;Proximal Forearm 2 days                     Physical Exam  Constitutional:       General: He is not in acute distress.     Appearance: Normal appearance. He is well-developed and normal weight. He is not ill-appearing, toxic-appearing or diaphoretic.   HENT:      Head: Normocephalic and atraumatic.   Eyes:      General: No scleral icterus.     Extraocular Movements: Extraocular movements intact.      Conjunctiva/sclera: Conjunctivae normal.      Pupils: Pupils are equal, round, and reactive to light.   Neck:      Thyroid: No thyromegaly.      Vascular: No JVD.      Trachea: No tracheal deviation.   Cardiovascular:      Rate and Rhythm: Normal rate and regular rhythm.      Heart sounds: S1 normal and S2 normal. No murmur heard.     No friction rub. No gallop.      Comments: R rad access site c/d/i  Pulmonary:      Effort: Pulmonary effort is normal. No respiratory distress.      Breath sounds: Normal breath sounds. No stridor. No wheezing, rhonchi or rales.   Chest:      Chest wall: No tenderness.   Abdominal:      General: There is no distension.      Palpations: Abdomen is soft.   Musculoskeletal:         General: No swelling or tenderness. Normal range of motion.      Cervical back: Normal range of motion and neck supple. No rigidity.      Right lower leg: No edema.      Left lower leg: No edema.   Skin:     General: Skin is warm and dry.      Coloration: Skin is not jaundiced.      Findings: No rash.   Neurological:      General: No focal deficit present.      Mental Status: He is alert and oriented to person, place, and time.      Cranial Nerves: No cranial nerve deficit.   Psychiatric:         Mood  and Affect: Mood normal.         Behavior: Behavior normal.          Current Medications:   aspirin  81 mg Oral Daily    atorvastatin  80 mg Oral Daily    clopidogreL  75 mg Oral Daily    losartan-hydrochlorothiazide 50-12.5 mg  1 tablet Oral Daily    magnesium oxide  400 mg Oral BID    mirtazapine  15 mg Oral QHS    pantoprazole  40 mg Oral Daily    potassium, sodium phosphates  2 packet Oral QID (AC & HS)      sodium chloride 0.9%   Intravenous Continuous 75 mL/hr at 06/18/24 1051 New Bag at 06/18/24 1051    sodium chloride 0.9%    Continuous PRN   Stopped at 06/18/24 1300       Current Facility-Administered Medications:     acetaminophen, 650 mg, Oral, Q4H PRN    acetaminophen, 650 mg, Oral, Q4H PRN    acetaminophen, 650 mg, Oral, Q4H PRN    aluminum-magnesium hydroxide-simethicone, 30 mL, Oral, QID PRN    dextrose 10%, 12.5 g, Intravenous, PRN    dextrose 10%, 25 g, Intravenous, PRN    dicyclomine, 20 mg, Oral, BID PRN    glucagon (human recombinant), 1 mg, Intramuscular, PRN    glucose, 16 g, Oral, PRN    glucose, 24 g, Oral, PRN    insulin aspart U-100, 0-10 Units, Subcutaneous, Q6H PRN    melatonin, 6 mg, Oral, Nightly PRN    naloxone, 0.02 mg, Intravenous, PRN    nitroGLYCERIN, 0.4 mg, Sublingual, PRN    ondansetron, 4 mg, Intravenous, Q8H PRN    ondansetron, 4 mg, Intravenous, Q12H PRN    ondansetron, 4 mg, Intravenous, Q12H PRN    senna-docusate 8.6-50 mg, 1 tablet, Oral, Daily PRN    sodium chloride 0.9%, , , Continuous PRN    sodium chloride 0.9%, 10 mL, Intravenous, Q12H PRN    sodium chloride 0.9%, 10 mL, Intravenous, PRN    Laboratory (all labs reviewed):  CBC:  Recent Labs   Lab 06/16/24 1952 06/16/24 1957 06/17/24 0407 06/17/24  0758 06/18/24  0251 06/19/24  0521   WBC 10.91  --  10.97 11.15 8.15 6.98   Hemoglobin 15.5  --  12.5 L 12.9 L 12.7 L 12.5 L   POC Hematocrit  --  42  --   --   --   --    Hematocrit 45.3  --  38.2 L 37.8 L 36.0 L 37.3 L   Platelets 224  --  205 190 171 180        CHEMISTRIES:  Recent Labs   Lab 09/28/21  1537 05/26/22  0950 04/26/23  0615 05/18/23  2356 05/19/23 0448 08/11/23  0514 08/25/23  0947 04/30/24  0835 06/16/24 1952 06/17/24  0407 06/18/24  0251 06/19/24  0521   Glucose 84 135 H   < > 184 H   < > 89   < > 145 H 216 H 118 H 129 H 130 H   Sodium 141 141   < > 139   < > 138   < > 141 140 142 142 140   Potassium 4.6 4.0   < > 4.1   < > 4.0   < > 3.8 5.1 4.5 4.1 3.7   BUN 9 7   < > 10   < > 21 H   < > 10 17 15 11 11   Creatinine 1.0 1.0   < > 1.2   < > 1.4   < > 1.3 1.4 1.0 0.9 1.0   eGFR if non  >60 >60  --   --   --   --   --   --   --   --   --   --    eGFR  --   --    < > >60   < > 58 A   < > >60.0 58 A >60 >60 >60   Calcium 10.5 9.2   < > 10.3   < > 9.3   < > 9.7 10.5 9.3 8.8 8.6 L   Magnesium  --   --    < > 1.6  --  1.6  --   --   --  1.2 L 1.5 L 1.4 L    < > = values in this interval not displayed.       CARDIAC BIOMARKERS:  Recent Labs   Lab 01/26/24  1733 06/16/24 1952 06/16/24  2154 06/17/24 0407 06/17/24  0758   Troponin I <0.006 0.029 H 0.077 H 0.104 H 0.068 H       COAGS:  Recent Labs   Lab 05/26/22  0950 04/15/24  1513 06/17/24  0758   INR 1.0 1.0 1.1       LIPIDS/LFTS:  Recent Labs   Lab 09/28/21  1537 05/26/22  0950 05/19/23  0448 08/09/23  2001 08/10/23  0738 08/11/23  0514 12/16/23 1959 01/26/24  1733 04/15/24  1513 04/30/24  0835 06/16/24 1952 06/17/24  0407   Cholesterol 230 H  --  174  --  218 H  --   --   --   --  267 H  --  124   Triglycerides 257 H  --  104  --  156 H  --   --   --   --  303 H  --  116   HDL 33 L  --  29 L  --  26 L  --   --   --   --  31 L  --  23 L   LDL Cholesterol 145.6  --  124.2  --  160.8 H  --   --   --   --  175.4 H  --  77.8   Non-HDL Cholesterol 197  --  145  --  192  --   --   --   --  236  --  101   AST 17   < > 14   < >  --    < > 23 29 14 15 15  --    ALT 15   < > 9 L   < >  --    < > 23 40 15 20 18  --     < > = values in this interval not displayed.       BNP:  Recent Labs   Lab  08/09/23  2046    H       TSH:  Recent Labs   Lab 08/10/23  0738 06/17/24  0407   TSH 1.125 0.593       Free T4:        Diagnostic Results:  ECG (personally reviewed and interpreted tracing(s)):  6/16/24 2005 , vent bigem    Chest X-Ray (personally reviewed and interpreted image(s)): 6/16/24 NAD    Cath/PCI LCx 6/18/24    Successful IVUS guided PCI of circumflex into OM1 with excellent angiographic results.  IVUS post PCI revealed well-expanded and apposed stent.    Prox Cx lesion: A STENT SYNERGY XD 3.0X48MM  at 12 KENDRA for 12 sec.    The Prox Cx lesion was 95% stenosed with 0% stenosis post-intervention.    IVUS left main showed Kacie of 7.6 millimeter sq in the proximal and ostial left main    The estimated blood loss was <50 mL.  Procedures performed:   1. Coronary angiogram   2. PCI of proximal circ into OM1  3. IVUS of circumflex   4. IVUS of left main   Coronary angiogram:  Left main: Proximal 40 to 50% stenosis.  IVUS done.  MLA in the proximal segment is 7.6 millimeter sq  Lad:  Proximal to mid 80% stenosis.  Will stage PCI  Circumflex: Culprit vessel.  Severe 95% stenosis.  Successful PCI done  RCA not studed today (diffuse nonobst disease on cath 6/17/24)  Access: Right radial artery access  Assessment and plan   Continue aspirin 81 mg daily indefinitely   Plavix 70 mg daily uninterrupted for at least 1 year and longer if no contraindications   Statins with goal LDL less than 70   Smoking cessation   Staged PCI of LAD  Follow-up in Cardiology Clinic with Dr. Rider    Echo: 6/17/24 (images personally reviewed and interpreted)    Left Ventricle: The left ventricle is normal in size. Normal wall thickness. Regional wall motion abnormalities present (canot exclude mild posterolateral HK). There is low normal systolic function with a visually estimated ejection fraction of 50 - 55%. Grade I diastolic dysfunction.    Right Ventricle: Normal right ventricular cavity size. Systolic function is  normal.    Stress Test: [place] SE 8/11/23    Left Ventricle: Normal wall motion. There is normal systolic function with a visually estimated ejection fraction of 55 - 60%.    ECG Conclusion: The ECG portion of the study is negative for ischemia (87% MPHR).    Post-stress Echo: The left ventricle systolic function is hyperdynamic.    Post-stress Impression: The study is normal.

## 2024-06-19 NOTE — NURSING
[facility]  Nurses Note -- 4 Eyes      6/18/2024       Skin assessed on: Q Shift      [x] No Pressure Injuries Present    [x]Prevention Measures Documented    [] Yes LDA  for Pressure Injury Previously documented     [] Yes New Pressure Injury Discovered   [] LDA for New Pressure Injury Added      Attending RN:  Gulshan Da Silva RN     Second RN:  Margot Barker RN

## 2024-06-19 NOTE — HOSPITAL COURSE
Interval Hx: PCI of LCx yesterday.  Outpat staged PCI of LAD planned.  No cp/sob.  Importance of DAPT adherence stressed to pt.    Tele: SR 90s (personally reviewed and interpreted)

## 2024-06-20 ENCOUNTER — OUTPATIENT CASE MANAGEMENT (OUTPATIENT)
Dept: ADMINISTRATIVE | Facility: OTHER | Age: 59
End: 2024-06-20
Payer: MEDICARE

## 2024-06-20 DIAGNOSIS — I24.9 ACS (ACUTE CORONARY SYNDROME): Primary | ICD-10-CM

## 2024-06-20 DIAGNOSIS — I10 ESSENTIAL HYPERTENSION: Chronic | ICD-10-CM

## 2024-06-21 ENCOUNTER — OUTPATIENT CASE MANAGEMENT (OUTPATIENT)
Dept: ADMINISTRATIVE | Facility: OTHER | Age: 59
End: 2024-06-21
Payer: MEDICARE

## 2024-06-21 NOTE — PROGRESS NOTES
Outpatient Care Management  Patient Does Not Consent    Patient: Nino Price  MRN:  [id number]  Date of Service:  6/21/2024  Completed by:  Tammy Rojas, RN    Chief Complaint   Patient presents with    OPCM RN First Assessment Attempt    OPCM Chart Review    OPCM Enrollment Call    Case Closure       Patient Summary           Consent Received:  Decline      Pt reports that his family is assisting with his care at this time.

## 2024-06-24 ENCOUNTER — PATIENT OUTREACH (OUTPATIENT)
Dept: ADMINISTRATIVE | Facility: HOSPITAL | Age: 59
End: 2024-06-24
Payer: MEDICARE

## 2024-06-24 ENCOUNTER — TELEPHONE (OUTPATIENT)
Dept: CARDIOTHORACIC SURGERY | Facility: CLINIC | Age: 59
End: 2024-06-24
Payer: MEDICARE

## 2024-06-24 RX ORDER — ICOSAPENT ETHYL 1 G/1
2 CAPSULE ORAL
COMMUNITY
Start: 2023-08-25

## 2024-06-24 RX ORDER — HEPATITIS B VACCINE (RECOMBINANT) ADJUVANTED 20 UG/.5ML
INJECTION, SOLUTION INTRAMUSCULAR
COMMUNITY
Start: 2024-04-30

## 2024-06-24 RX ORDER — OXYCODONE AND ACETAMINOPHEN 10; 325 MG/1; MG/1
1 TABLET ORAL EVERY 6 HOURS PRN
COMMUNITY
Start: 2024-06-20

## 2024-06-24 NOTE — TELEPHONE ENCOUNTER
Returned call to pt who inquired about seeing Dr. Gonzalez.  Informed pt that he needs to follow up with Dr. Rider, following PCI with stenting last week, which pt verbalized understanding to.  Message forwarded to Dr. Rider's staff for follow up.    ----- Message from Bridget Padilla sent at 6/24/2024 10:02 AM CDT -----  Regarding: pre -op instructions  Contact: [phone]  Patient is calling to get all of his pre-op instructions for his procedure scheduled on 06/25  Please contact patient at [phone]

## 2024-06-24 NOTE — PROGRESS NOTES
[facility] 1 JUNE MA GAP REPORT 06.18.24 [facility] - Urine Microalbumin completed on 05/27/2024.    Overdue Diabetic Eye Exam - the patient will call back with the information.

## 2024-06-27 ENCOUNTER — LAB VISIT (OUTPATIENT)
Dept: LAB | Facility: HOSPITAL | Age: 59
End: 2024-06-27
Attending: FAMILY MEDICINE
Payer: MEDICARE

## 2024-06-27 ENCOUNTER — OFFICE VISIT (OUTPATIENT)
Dept: FAMILY MEDICINE | Facility: CLINIC | Age: 59
End: 2024-06-27
Payer: MEDICARE

## 2024-06-27 VITALS
SYSTOLIC BLOOD PRESSURE: 136 MMHG | HEIGHT: 64 IN | DIASTOLIC BLOOD PRESSURE: 82 MMHG | BODY MASS INDEX: 24.59 KG/M2 | OXYGEN SATURATION: 96 % | TEMPERATURE: 98 F | WEIGHT: 144.06 LBS | RESPIRATION RATE: 18 BRPM

## 2024-06-27 DIAGNOSIS — E11.9 CONTROLLED TYPE 2 DIABETES MELLITUS WITHOUT COMPLICATION, UNSPECIFIED WHETHER LONG TERM INSULIN USE: Chronic | ICD-10-CM

## 2024-06-27 DIAGNOSIS — K74.02 HEPATIC FIBROSIS, ADVANCED FIBROSIS: ICD-10-CM

## 2024-06-27 DIAGNOSIS — Z72.0 TOBACCO ABUSE: Chronic | ICD-10-CM

## 2024-06-27 DIAGNOSIS — I10 ESSENTIAL HYPERTENSION: Chronic | ICD-10-CM

## 2024-06-27 DIAGNOSIS — E78.2 MIXED HYPERLIPIDEMIA: Chronic | ICD-10-CM

## 2024-06-27 DIAGNOSIS — E11.65 CONTROLLED TYPE 2 DIABETES MELLITUS WITH HYPERGLYCEMIA, UNSPECIFIED WHETHER LONG TERM INSULIN USE: Chronic | ICD-10-CM

## 2024-06-27 DIAGNOSIS — I10 BENIGN ESSENTIAL HTN: ICD-10-CM

## 2024-06-27 DIAGNOSIS — K20.90 ESOPHAGITIS: ICD-10-CM

## 2024-06-27 DIAGNOSIS — I25.10 CORONARY ARTERY DISEASE, UNSPECIFIED VESSEL OR LESION TYPE, UNSPECIFIED WHETHER ANGINA PRESENT, UNSPECIFIED WHETHER NATIVE OR TRANSPLANTED HEART: ICD-10-CM

## 2024-06-27 DIAGNOSIS — E78.5 DYSLIPIDEMIA: ICD-10-CM

## 2024-06-27 DIAGNOSIS — I25.2 HISTORY OF NON-ST ELEVATION MYOCARDIAL INFARCTION (NSTEMI): Primary | ICD-10-CM

## 2024-06-27 DIAGNOSIS — I25.2 HISTORY OF NON-ST ELEVATION MYOCARDIAL INFARCTION (NSTEMI): ICD-10-CM

## 2024-06-27 DIAGNOSIS — Z86.19 HISTORY OF HEPATITIS C: ICD-10-CM

## 2024-06-27 LAB
ALBUMIN SERPL BCP-MCNC: 3.8 G/DL (ref 3.5–5.2)
ALP SERPL-CCNC: 75 U/L (ref 55–135)
ALT SERPL W/O P-5'-P-CCNC: 51 U/L (ref 10–44)
ANION GAP SERPL CALC-SCNC: 12 MMOL/L (ref 8–16)
AST SERPL-CCNC: 23 U/L (ref 10–40)
BILIRUB SERPL-MCNC: 0.2 MG/DL (ref 0.1–1)
BUN SERPL-MCNC: 9 MG/DL (ref 6–20)
CALCIUM SERPL-MCNC: 9.8 MG/DL (ref 8.7–10.5)
CHLORIDE SERPL-SCNC: 105 MMOL/L (ref 95–110)
CO2 SERPL-SCNC: 23 MMOL/L (ref 23–29)
CREAT SERPL-MCNC: 1.1 MG/DL (ref 0.5–1.4)
EST. GFR  (NO RACE VARIABLE): >60 ML/MIN/1.73 M^2
GLUCOSE SERPL-MCNC: 201 MG/DL (ref 70–110)
MAGNESIUM SERPL-MCNC: 1.8 MG/DL (ref 1.6–2.6)
PHOSPHATE SERPL-MCNC: 3.8 MG/DL (ref 2.7–4.5)
POTASSIUM SERPL-SCNC: 4.8 MMOL/L (ref 3.5–5.1)
PROT SERPL-MCNC: 7.3 G/DL (ref 6–8.4)
SODIUM SERPL-SCNC: 140 MMOL/L (ref 136–145)

## 2024-06-27 PROCEDURE — 83735 ASSAY OF MAGNESIUM: CPT | Performed by: FAMILY MEDICINE

## 2024-06-27 PROCEDURE — 1159F MED LIST DOCD IN RCRD: CPT | Mod: CPTII,S$GLB,, | Performed by: FAMILY MEDICINE

## 2024-06-27 PROCEDURE — 3075F SYST BP GE 130 - 139MM HG: CPT | Mod: CPTII,S$GLB,, | Performed by: FAMILY MEDICINE

## 2024-06-27 PROCEDURE — 84100 ASSAY OF PHOSPHORUS: CPT | Performed by: FAMILY MEDICINE

## 2024-06-27 PROCEDURE — 3061F NEG MICROALBUMINURIA REV: CPT | Mod: CPTII,S$GLB,, | Performed by: FAMILY MEDICINE

## 2024-06-27 PROCEDURE — 3008F BODY MASS INDEX DOCD: CPT | Mod: CPTII,S$GLB,, | Performed by: FAMILY MEDICINE

## 2024-06-27 PROCEDURE — 3079F DIAST BP 80-89 MM HG: CPT | Mod: CPTII,S$GLB,, | Performed by: FAMILY MEDICINE

## 2024-06-27 PROCEDURE — 80053 COMPREHEN METABOLIC PANEL: CPT | Performed by: FAMILY MEDICINE

## 2024-06-27 PROCEDURE — 3044F HG A1C LEVEL LT 7.0%: CPT | Mod: CPTII,S$GLB,, | Performed by: FAMILY MEDICINE

## 2024-06-27 PROCEDURE — 3066F NEPHROPATHY DOC TX: CPT | Mod: CPTII,S$GLB,, | Performed by: FAMILY MEDICINE

## 2024-06-27 PROCEDURE — G2211 COMPLEX E/M VISIT ADD ON: HCPCS | Mod: S$GLB,,, | Performed by: FAMILY MEDICINE

## 2024-06-27 PROCEDURE — 99214 OFFICE O/P EST MOD 30 MIN: CPT | Mod: S$GLB,,, | Performed by: FAMILY MEDICINE

## 2024-06-27 PROCEDURE — 1160F RVW MEDS BY RX/DR IN RCRD: CPT | Mod: CPTII,S$GLB,, | Performed by: FAMILY MEDICINE

## 2024-06-27 PROCEDURE — 99999 PR PBB SHADOW E&M-EST. PATIENT-LVL V: CPT | Mod: PBBFAC,,, | Performed by: FAMILY MEDICINE

## 2024-06-27 PROCEDURE — 36415 COLL VENOUS BLD VENIPUNCTURE: CPT | Mod: PO | Performed by: FAMILY MEDICINE

## 2024-06-27 PROCEDURE — 1111F DSCHRG MED/CURRENT MED MERGE: CPT | Mod: CPTII,S$GLB,, | Performed by: FAMILY MEDICINE

## 2024-06-27 RX ORDER — METFORMIN HYDROCHLORIDE 1000 MG/1
1000 TABLET ORAL 2 TIMES DAILY WITH MEALS
Qty: 180 TABLET | Refills: 1 | Status: CANCELLED | OUTPATIENT
Start: 2024-06-27

## 2024-06-27 RX ORDER — GABAPENTIN 300 MG/1
300 CAPSULE ORAL 3 TIMES DAILY
Status: CANCELLED | OUTPATIENT
Start: 2024-06-27

## 2024-06-27 RX ORDER — DULOXETIN HYDROCHLORIDE 60 MG/1
60 CAPSULE, DELAYED RELEASE ORAL DAILY
Status: CANCELLED | OUTPATIENT
Start: 2024-06-27

## 2024-06-27 RX ORDER — DICYCLOMINE HYDROCHLORIDE 20 MG/1
20 TABLET ORAL 2 TIMES DAILY PRN
Qty: 30 TABLET | Refills: 0 | Status: CANCELLED | OUTPATIENT
Start: 2024-06-27

## 2024-06-27 RX ORDER — PANTOPRAZOLE SODIUM 40 MG/1
40 TABLET, DELAYED RELEASE ORAL DAILY
Qty: 30 TABLET | Refills: 11 | Status: CANCELLED | OUTPATIENT
Start: 2024-06-27 | End: 2025-06-27

## 2024-06-27 RX ORDER — ONDANSETRON 4 MG/1
4 TABLET, ORALLY DISINTEGRATING ORAL EVERY 6 HOURS PRN
Qty: 15 TABLET | Refills: 0 | Status: CANCELLED | OUTPATIENT
Start: 2024-06-27

## 2024-06-27 RX ORDER — TIZANIDINE 4 MG/1
4 TABLET ORAL NIGHTLY
Status: CANCELLED | OUTPATIENT
Start: 2024-06-27

## 2024-06-27 RX ORDER — BUSPIRONE HYDROCHLORIDE 10 MG/1
5 TABLET ORAL NIGHTLY
Status: CANCELLED | OUTPATIENT
Start: 2024-06-27

## 2024-06-27 RX ORDER — CLOPIDOGREL BISULFATE 75 MG/1
75 TABLET ORAL DAILY
Qty: 30 TABLET | Refills: 11 | Status: CANCELLED | OUTPATIENT
Start: 2024-06-27 | End: 2025-06-27

## 2024-06-27 RX ORDER — ATORVASTATIN CALCIUM 80 MG/1
80 TABLET, FILM COATED ORAL DAILY
Qty: 30 TABLET | Refills: 2 | Status: CANCELLED | OUTPATIENT
Start: 2024-06-27 | End: 2024-09-25

## 2024-06-27 RX ORDER — PANTOPRAZOLE SODIUM 40 MG/1
40 TABLET, DELAYED RELEASE ORAL DAILY
Status: CANCELLED | OUTPATIENT
Start: 2024-06-27

## 2024-06-27 RX ORDER — VALSARTAN AND HYDROCHLOROTHIAZIDE 160; 25 MG/1; MG/1
1 TABLET ORAL
Qty: 90 TABLET | Refills: 3 | Status: CANCELLED | OUTPATIENT
Start: 2024-06-27

## 2024-06-27 NOTE — Clinical Note
Hey, I just wanted to make sure this patient was ok to wait until August to see you.  He said you wanted to put in 2 other stents, but he also told he me he was told to stop all medications when he was discharged.  So, I just wanted to double check with you.  Stevo

## 2024-06-27 NOTE — PROGRESS NOTES
Routine Office Visit    Patient Name: Nino Price    : 1965  MRN: [id number]    Subjective:  Nino is a 59 y.o. male who presents today for:    1. Hospital follow up  Patient presenting today for hospital follow after having an MI and requiring stent placement.  He has a history of HTN, dyslipidemia, type 2 DM, tobacco dependence, and alcohol dependence (currently in remission).  He has not been taking his statin for quite some time stating he didn't like the risks associated.  When he was discharged, patient states he was told to stop all medications, so he has not been taking blood pressure, cholesterol, or diabetic medications.  He was prescribed lipitor, plavix, and several other medications at the time of discharge.  He states that he was told one stent was placed, but hat he needs a few.  He was being considered for CT surgery, but due to cirrhosis, was told they would stent at this time.  He continues to smoke, but has cut down to 4 cigarettes a day and intends on stopping.  Other than fatigue, he he has no other symptoms currently.      Past Medical History  Past Medical History:   Diagnosis Date    Anxiety     Asthma     Colitis     Depression     Diabetes mellitus     Head injury     History of hepatitis C, s/p successful hcv treatment w/ SVR -2015  10/8/2012    SVR(24) as of 2015.  SVR(12) as of 2015.  completed harvoni 24 week regimen for genotype 1a 5/18/15     Hypertension     Shoulder pain     left, s/p 4 surgeries.     Stroke        Past Surgical History  Past Surgical History:   Procedure Laterality Date    ANGIOGRAM, CORONARY, WITH LEFT HEART CATHETERIZATION Left 2024    Procedure: Angiogram, Coronary, with Left Heart Cath;  Surgeon: Gabriela Rider MD;  Location: [facility] CATH LAB;  Service: Cardiology;  Laterality: Left;  12pm start, R rad access    COLONOSCOPY N/A 10/20/2015    Procedure: COLONOSCOPY;  Surgeon: Jagdish Patricia MD;  Location: [facility] ENDO;  Service: Endoscopy;   Laterality: N/A;    COLONOSCOPY N/A 1/26/2024    Procedure: COLONOSCOPY;  Surgeon: Pricila Shanks MD;  Location: [facility] ENDO;  Service: Endoscopy;  Laterality: N/A;    CORONARY STENT PLACEMENT N/A 6/18/2024    Procedure: INSERTION, STENT, CORONARY ARTERY;  Surgeon: Gabriela Rider MD;  Location: [facility] CATH LAB;  Service: Cardiology;  Laterality: N/A;  lcx pci. radial. lbu 3.5. 12 pm access    DIAGNOSTIC LAPAROSCOPY N/A 4/1/2021    Procedure: LAPAROSCOPY, DIAGNOSTIC;  Surgeon: Umesh Vallecillo MD;  Location: [facility] OR;  Service: General;  Laterality: N/A;  RN PREOP 3/29/21, COVID NEGATIVE ON 3/29  CONSENT AND H/P INCOMPLETE    ESOPHAGOGASTRODUODENOSCOPY N/A 1/26/2024    Procedure: EGD (ESOPHAGOGASTRODUODENOSCOPY);  Surgeon: Pricila Shanks MD;  Location: [facility] ENDO;  Service: Endoscopy;  Laterality: N/A;  prep instructions mailed to pt / [facility] pt / Urgent/suprep  1/19- lvm for pc. DBM  1/23- left 2nd vm for pc. DBM  1/24 cirrohsis-labs prior, precall complete-st    GALLBLADDER SURGERY      pt not 100% if gall bladder removed    IVUS, CORONARY  6/18/2024    Procedure: IVUS, Coronary;  Surgeon: Gabriela Rider MD;  Location: [facility] CATH LAB;  Service: Cardiology;;    PERCUTANEOUS CORONARY INTERVENTION, ARTERY N/A 6/18/2024    Procedure: Percutaneous coronary intervention;  Surgeon: Gabriela Rider MD;  Location: [facility] CATH LAB;  Service: Cardiology;  Laterality: N/A;    rotator cuff      left side       Family History  Family History   Problem Relation Name Age of Onset    Hypertension Mother      Heart disease Father      Cirrhosis Brother      Colon cancer Neg Hx      Esophageal cancer Neg Hx         Social History  Social History     Socioeconomic History    Marital status:     Number of children: 3   Tobacco Use    Smoking status: Every Day     Current packs/day: 1.00     Average packs/day: 1 pack/day for 35.0 years (35.0 ttl pk-yrs)     Types: Cigarettes     Passive exposure: Current    Smokeless  tobacco: Never    Tobacco comments:     Will try to stop smoking by himself vs    Substance and Sexual Activity    Alcohol use: No    Drug use: Yes     Frequency: 7.0 times per week     Types: Marijuana    Sexual activity: Yes     Partners: Female   Social History Narrative    Reside on .     Lives with wife and young daughter (22yrs)    Not working, on disability since accident    Prior job-[employer]    Hobbies: carving, fishing    Not driving.      Social Determinants of Health     Financial Resource Strain: Low Risk  (6/18/2024)    Overall Financial Resource Strain (CARDIA)     Difficulty of Paying Living Expenses: Not very hard   Food Insecurity: No Food Insecurity (6/18/2024)    Hunger Vital Sign     Worried About Running Out of Food in the Last Year: Never true     Ran Out of Food in the Last Year: Never true   Transportation Needs: No Transportation Needs (6/18/2024)    TRANSPORTATION NEEDS     Transportation : No   Physical Activity: Inactive (6/18/2024)    Exercise Vital Sign     Days of Exercise per Week: 0 days     Minutes of Exercise per Session: 0 min   Stress: Patient Unable To Answer (6/18/2024)    [employer] - Occupational Stress Questionnaire     Feeling of Stress : Patient unable to answer   Housing Stability: Low Risk  (6/18/2024)    Housing Stability Vital Sign     Unable to Pay for Housing in the Last Year: No     Homeless in the Last Year: No       Current Medications  Current Outpatient Medications on File Prior to Visit   Medication Sig Dispense Refill    acetaminophen (TYLENOL) 500 MG tablet Take 1 tablet (500 mg total) by mouth every 6 (six) hours as needed for Pain (and fever). 30 tablet 0    aspirin (ECOTRIN) 81 MG EC tablet Take 1 tablet (81 mg total) by mouth once daily. 30 tablet 3    atorvastatin (LIPITOR) 80 MG tablet Take 1 tablet (80 mg total) by mouth once daily. 30 tablet 2    busPIRone (BUSPAR) 10 MG tablet Take 5 mg by mouth every evening.       clopidogreL (PLAVIX) 75 mg tablet Take 1 tablet (75 mg total) by mouth once daily. 30 tablet 11    dicyclomine (BENTYL) 20 mg tablet Take 1 tablet (20 mg total) by mouth 2 (two) times daily as needed (stomach pain). 30 tablet 0    DULoxetine (CYMBALTA) 60 MG capsule Take 60 mg by mouth once daily. Takes in pm      gabapentin (NEURONTIN) 300 MG capsule Take 300 mg by mouth 3 (three) times daily.      HEPLISAV-B, PF, 20 mcg/0.5 mL Syrg       HYDROcodone-acetaminophen (NORCO)  mg per tablet Take 1 tablet by mouth every 6 (six) hours as needed.      icosapent ethyL (VASCEPA) 1 gram Cap Take 2 g by mouth.      metFORMIN (GLUCOPHAGE) 1000 MG tablet Take 1 tablet (1,000 mg total) by mouth 2 (two) times daily with meals. 180 tablet 1    metoclopramide HCl (REGLAN) 10 MG tablet Take 1 tablet (10 mg total) by mouth every 6 (six) hours. 30 tablet 0    mirtazapine (REMERON) 15 MG tablet Take 15 mg by mouth every evening.      nitroGLYCERIN (NITROSTAT) 0.4 MG SL tablet Place 1 tablet (0.4 mg total) under the tongue as needed for Chest pain. 25 tablet 0    ondansetron (ZOFRAN-ODT) 4 MG TbDL Take 1 tablet (4 mg total) by mouth every 6 (six) hours as needed (nausea). 15 tablet 0    oxyCODONE-acetaminophen (PERCOCET)  mg per tablet Take 1 tablet by mouth every 6 (six) hours as needed.      pantoprazole (PROTONIX) 40 MG tablet Take 1 tablet (40 mg total) by mouth once daily. 30 tablet 11    pantoprazole (PROTONIX) 40 MG tablet Take 1 tablet by mouth once daily.      tiZANidine (ZANAFLEX) 4 MG tablet Take 4 mg by mouth every evening.      TRUE METRIX GLUCOSE METER Misc       TRUE METRIX GLUCOSE TEST STRIP Strp       valsartan-hydrochlorothiazide (DIOVAN-HCT) 160-25 mg per tablet TAKE 1 TABLET BY MOUTH EVERY DAY 90 tablet 3     No current facility-administered medications on file prior to visit.       Allergies   Review of patient's allergies indicates:   Allergen Reactions    Codeine Nausea Only       Review of Systems  "(Pertinent positives)  Review of Systems   Constitutional:  Positive for malaise/fatigue.   HENT: Negative.     Eyes: Negative.    Respiratory: Negative.     Cardiovascular: Negative.    Gastrointestinal: Negative.    Genitourinary: Negative.    Musculoskeletal:  Positive for back pain and joint pain.   Skin: Negative.          /82   Pulse (P) 83   Temp 98.3 °F (36.8 °C) (Oral)   Resp 18   Ht 5' 4" (1.626 m)   Wt 65.4 kg (144 lb 1.1 oz)   SpO2 96%   BMI 24.73 kg/m²     GENERAL APPEARANCE: in no apparent distress and well developed and well nourished  HEENT: PERRL, EOMI, Sclera clear, anicteric,   NECK: normal, supple, no adenopathy, thyroid normal in size  RESPIRATORY: appears well, vitals normal, no respiratory distress, acyanotic, normal RR, chest clear, no wheezing, crepitations, rhonchi, normal symmetric air entry  HEART: regular rate and rhythm, S1, S2 normal, no murmur, click, rub or gallop.    SKIN: no rashes, no wounds, no other lesions  PSYCH: Alert, oriented x 3, thought content appropriate, speech normal, pleasant and cooperative, good eye contact, well groomed,    Assessment/Plan:  Nino Price is a 59 y.o. male who presents today for :    Nino was seen today for hospital follow up.    Diagnoses and all orders for this visit:    MI  -     Comprehensive Metabolic Panel; Future  -     Magnesium; Future  -     PHOSPHORUS; Future  - Labs today  - He is to resume all meds including statin, ARB, and plavix  - Follow up with Dr. Rider scheduled for August  - He is to return to ED for any chest pain, weakness, numbness, or slurred speec  - He is to continue working on smoking cessation    Mixed hyperlipidemia  - He is to take statin every day      Controlled type 2 diabetes mellitus with hyperglycemia, unspecified whether long term insulin use  - Patient to resume metformin  - Last A1c was controlled    Benign essential HTN  - Patient to resume ARB  - Blood pressure is normally controlled when " taking medication    Controlled type 2 diabetes mellitus without complication, unspecified whether long term insulin use    Dyslipidemia  - Patient to take lipitor as prescribed  - He is to call with any complications from medication    Coronary artery disease, unspecified vessel or lesion type, unspecified whether angina present, unspecified whether native or transplanted heart  - Follow up with cardiology as scheduled    Tobacco abuse  - Encouraged smoking cessation and he states he is currently working on it    Hepatic fibrosis, advanced fibrosis  - Sees hepatology  -  No jaundice  - Last liver function was normal    History of hepatitis C, s/p successful hcv treatment w/ SVR 8-2015     Essential hypertension

## 2024-07-01 ENCOUNTER — TELEPHONE (OUTPATIENT)
Dept: FAMILY MEDICINE | Facility: CLINIC | Age: 59
End: 2024-07-01
Payer: MEDICARE

## 2024-07-01 DIAGNOSIS — F41.9 ANXIETY: Primary | ICD-10-CM

## 2024-07-01 RX ORDER — BUSPIRONE HYDROCHLORIDE 10 MG/1
10 TABLET ORAL NIGHTLY
Qty: 30 TABLET | Refills: 1 | Status: SHIPPED | OUTPATIENT
Start: 2024-07-01

## 2024-07-01 NOTE — TELEPHONE ENCOUNTER
Patient called and states that when he was seen last week Dr Pryor was supposed to send him something to the pharmacy, patient states he does not know the name of medication and would not tell me what it was for. Please advise.

## 2024-07-11 ENCOUNTER — OFFICE VISIT (OUTPATIENT)
Dept: HOME HEALTH SERVICES | Facility: CLINIC | Age: 59
End: 2024-07-11
Payer: MEDICARE

## 2024-07-11 VITALS
BODY MASS INDEX: 24.59 KG/M2 | WEIGHT: 144 LBS | DIASTOLIC BLOOD PRESSURE: 70 MMHG | HEART RATE: 98 BPM | HEIGHT: 64 IN | SYSTOLIC BLOOD PRESSURE: 102 MMHG

## 2024-07-11 DIAGNOSIS — M87.059 AVASCULAR NECROSIS OF BONE OF HIP, UNSPECIFIED LATERALITY: ICD-10-CM

## 2024-07-11 DIAGNOSIS — F12.10 CANNABIS ABUSE: Chronic | ICD-10-CM

## 2024-07-11 DIAGNOSIS — E11.65 CONTROLLED TYPE 2 DIABETES MELLITUS WITH HYPERGLYCEMIA, WITHOUT LONG-TERM CURRENT USE OF INSULIN: Chronic | ICD-10-CM

## 2024-07-11 DIAGNOSIS — I25.10 CORONARY ARTERY DISEASE, UNSPECIFIED VESSEL OR LESION TYPE, UNSPECIFIED WHETHER ANGINA PRESENT, UNSPECIFIED WHETHER NATIVE OR TRANSPLANTED HEART: ICD-10-CM

## 2024-07-11 DIAGNOSIS — I10 ESSENTIAL HYPERTENSION: Chronic | ICD-10-CM

## 2024-07-11 DIAGNOSIS — Z72.0 TOBACCO ABUSE: Chronic | ICD-10-CM

## 2024-07-11 DIAGNOSIS — G47.00 INSOMNIA, UNSPECIFIED TYPE: ICD-10-CM

## 2024-07-11 DIAGNOSIS — F33.1 DEPRESSION, MAJOR, RECURRENT, MODERATE: ICD-10-CM

## 2024-07-11 DIAGNOSIS — Z86.73 HISTORY OF CVA (CEREBROVASCULAR ACCIDENT): Chronic | ICD-10-CM

## 2024-07-11 DIAGNOSIS — Z00.00 ENCOUNTER FOR PREVENTIVE HEALTH EXAMINATION: Primary | ICD-10-CM

## 2024-07-11 DIAGNOSIS — I70.0 AORTIC ATHEROSCLEROSIS: ICD-10-CM

## 2024-07-11 DIAGNOSIS — K21.9 GASTROESOPHAGEAL REFLUX DISEASE, UNSPECIFIED WHETHER ESOPHAGITIS PRESENT: Chronic | ICD-10-CM

## 2024-07-11 DIAGNOSIS — E78.5 DYSLIPIDEMIA: ICD-10-CM

## 2024-07-11 DIAGNOSIS — K74.02 HEPATIC FIBROSIS, ADVANCED FIBROSIS: ICD-10-CM

## 2024-07-11 DIAGNOSIS — I69.351 HEMIPLEGIA AND HEMIPARESIS FOLLOWING CEREBRAL INFARCTION AFFECTING RIGHT DOMINANT SIDE: ICD-10-CM

## 2024-07-11 DIAGNOSIS — M19.90 OSTEOARTHRITIS, UNSPECIFIED OSTEOARTHRITIS TYPE, UNSPECIFIED SITE: ICD-10-CM

## 2024-07-11 DIAGNOSIS — Z86.19 HISTORY OF HEPATITIS C: ICD-10-CM

## 2024-07-11 PROCEDURE — 3044F HG A1C LEVEL LT 7.0%: CPT | Mod: CPTII,S$GLB,, | Performed by: NURSE PRACTITIONER

## 2024-07-11 PROCEDURE — 1159F MED LIST DOCD IN RCRD: CPT | Mod: CPTII,S$GLB,, | Performed by: NURSE PRACTITIONER

## 2024-07-11 PROCEDURE — 1160F RVW MEDS BY RX/DR IN RCRD: CPT | Mod: CPTII,S$GLB,, | Performed by: NURSE PRACTITIONER

## 2024-07-11 PROCEDURE — G0439 PPPS, SUBSEQ VISIT: HCPCS | Mod: S$GLB,,, | Performed by: NURSE PRACTITIONER

## 2024-07-11 PROCEDURE — 3074F SYST BP LT 130 MM HG: CPT | Mod: CPTII,S$GLB,, | Performed by: NURSE PRACTITIONER

## 2024-07-11 PROCEDURE — 3061F NEG MICROALBUMINURIA REV: CPT | Mod: CPTII,S$GLB,, | Performed by: NURSE PRACTITIONER

## 2024-07-11 PROCEDURE — 3066F NEPHROPATHY DOC TX: CPT | Mod: CPTII,S$GLB,, | Performed by: NURSE PRACTITIONER

## 2024-07-11 PROCEDURE — 3078F DIAST BP <80 MM HG: CPT | Mod: CPTII,S$GLB,, | Performed by: NURSE PRACTITIONER

## 2024-07-11 RX ORDER — CYCLOBENZAPRINE HCL 5 MG
5 TABLET ORAL NIGHTLY PRN
COMMUNITY
Start: 2024-07-01

## 2024-07-11 RX ORDER — TRAZODONE HYDROCHLORIDE 100 MG/1
150 TABLET ORAL NIGHTLY PRN
COMMUNITY

## 2024-07-11 NOTE — PATIENT INSTRUCTIONS
Counseling and Referral of Other Preventative  (Italic type indicates deductible and co-insurance are waived)    Patient Name: Nino Price  Today's Date: 7/11/2024    Health Maintenance       Date Due Completion Date    Foot Exam Never done ---    LDCT Lung Screen Never done ---    Shingles Vaccine (1 of 2) Never done ---    Eye Exam 10/13/2022 10/13/2021    COVID-19 Vaccine (4 - 2023-24 season) 09/01/2023 1/22/2022    RSV Vaccine (Age 60+ and Pregnant patients) (1 - 1-dose 60+ series) Never done ---    Influenza Vaccine (1) 09/01/2024 11/27/2023    Hemoglobin A1c 12/17/2024 6/17/2024    Diabetes Urine Screening 05/27/2025 5/27/2024    Lipid Panel 06/17/2025 6/17/2024    TETANUS VACCINE 03/16/2026 3/16/2016    Colorectal Cancer Screening 01/26/2029 1/26/2024        No orders of the defined types were placed in this encounter.      The following information is provided to all patients.  This information is to help you find resources for any of the problems found today that may be affecting your health:                  Living healthy guide: www.[facility].[place].gov      Understanding Diabetes: www.diabetes.org      Eating healthy: www.cdc.gov/healthyweight      CDC home safety checklist: www.cdc.gov/steadi/patient.html      Agency on Aging: www.goea.[place].[facility]      Alcoholics anonymous (AA): www.aa.org      Physical Activity: www.gerry.nih.gov/vv3fxbb      Tobacco use: www.quitwithusla.org

## 2024-07-11 NOTE — PROGRESS NOTES
"  Nino Price presented for an initial Medicare AWV today. The following components were reviewed and updated:    Medical history  Family History  Social history  Allergies and Current Medications  Health Risk Assessment  Health Maintenance  Care Team    **See Completed Assessments for Annual Wellness visit with in the encounter summary    The following assessments were completed:  Depression Screening  Cognitive function Screening    Timed Get Up Test  Whisper Test      Opioid documentation:      Patient does have a current opioid prescription.      Patient accepted further discussion regarding opioid medication use.      Patient is currently taking oxycodone narcotic for generalized pain.        Pain level today is 2/10.       In addition to narcotic pain medications, patient is also using acetaminophen and flexeril for pain control.       Patient is followed by a specialist currently for their pain and will not be referred today.       Patient's opioid risk potential based on ORT-OUD tool:       Aaron each box that applies   No   Yes     Family history of substance abuse   Alcohol [x] []   Illegal drugs [x] []   Rx drugs [x] []     Personal history of substance abuse   Alcohol [] [x]   Illegal drugs [] [x]   Rx drugs [x] []     Age between 16-45 years   []   [x]     Patient with ADD, OCD, Bipolar disorder, schizoprenia   [x]   []     Patient with depression   []   [x]                         Scoring total                                                        4         Non-opioid treatment options have been discussed today and added to the patient's after visit summary.          Vitals:    07/11/24 1202   BP: 102/70   Pulse: 98   Weight: 65.3 kg (144 lb)   Height: 5' 4" (1.626 m)     Body mass index is 24.72 kg/m².       Physical Exam  Constitutional:       Appearance: Normal appearance.   HENT:      Head: Normocephalic and atraumatic.      Nose: Nose normal.      Mouth/Throat:      Mouth: Mucous membranes " are moist.   Eyes:      Extraocular Movements: Extraocular movements intact.   Cardiovascular:      Rate and Rhythm: Normal rate and regular rhythm.      Heart sounds: Normal heart sounds.   Pulmonary:      Effort: Pulmonary effort is normal. No respiratory distress.      Breath sounds: Normal breath sounds.   Abdominal:      General: Bowel sounds are normal. There is no distension.      Palpations: Abdomen is soft.   Musculoskeletal:         General: No swelling. Normal range of motion.      Cervical back: Normal range of motion.      Comments: Right sided weakness   Skin:     General: Skin is warm and dry.   Neurological:      General: No focal deficit present.      Mental Status: He is alert and oriented to person, place, and time.   Psychiatric:         Mood and Affect: Mood normal.         Behavior: Behavior normal.           Diagnoses and health risks identified today and associated recommendations/orders:  1. Encounter for preventive health examination  Assessments completed. Preventive measures, health maintenance, and immunizations reviewed with patient.    2. Controlled type 2 diabetes mellitus with hyperglycemia, without long-term current use of insulin  Stable, patient on Metformin. Followed by PCP.    3. Depression, major, recurrent, moderate  Stable, patient on Remeron and Buspar. Followed by PCP.    4. Avascular necrosis of bone of hip, unspecified laterality  Stable, followed by Orthopedics.    5. Aortic atherosclerosis  Stable, patient on Aspirin. Followed by Cardiology.    6. Coronary artery disease, unspecified vessel or lesion type, unspecified whether angina present, unspecified whether native or transplanted heart  Stable, patient on Nitro prn. Followed by Cardiology.    7. Hemiplegia and hemiparesis following cerebral infarction affecting right dominant side  Stable, patient on Plavix. Followed by Cardiology.    8. History of CVA (cerebrovascular accident)  Stable, patient on Plavix. Followed  by Cardiology.    9. Essential hypertension  Stable, patient on Diovan-HCT. Followed by PCP.    10. Dyslipidemia  Stable, patient on Lipitor. Followed by PCP.    11. Hepatic fibrosis, advanced fibrosis  Stable, followed by Hepatology.     12. Gastroesophageal reflux disease, unspecified whether esophagitis present  Stable, patient on Protonix. Followed by PCP.    13. Osteoarthritis, unspecified osteoarthritis type, unspecified site  Stable, patient on Oxycodone-Acetaminophen 10/325 mg prn and Flexeril. Followed by Orthopedics.    14. Insomnia, unspecified type  Stable, patient on Trazodone. Followed by PCP.    15. Tobacco abuse  Stable, followed by PCP. Smoking cessation encouraged and discussed. Patient reports that he's quitting on his own. Reports that he's cut down to 4 to 5 cigarettes a day.    16. Cannabis abuse  Stable, followed by PCP.    17. History of hepatitis C, s/p successful hcv treatment w/ SVR 8-2015   Stable, followed by Hepatology.      Provided Nino with a 5-10 year written screening schedule and personal prevention plan. Recommendations were developed using the USPSTF age appropriate recommendations. Education, counseling, and referrals were provided as needed.  After Visit Summary printed and given to patient which includes a list of additional screenings\tests needed.    Follow up in about 1 year (around 7/11/2025) for your next annual wellness visit.      Karena Negron NP  I offered to discuss advanced care planning, including how to pick a person who would make decisions for you if you were unable to make them for yourself, called a health care power of , and what kind of decisions you might make such as use of life sustaining treatments such as ventilators and tube feeding when faced with a life limiting illness recorded on a living will that they will need to know. (How you want to be cared for as you near the end of your natural life)     X Patient is interested in learning  more about how to make advanced directives.  I provided them paperwork and offered to discuss this with them.

## 2024-07-12 ENCOUNTER — TELEPHONE (OUTPATIENT)
Dept: ADMINISTRATIVE | Facility: HOSPITAL | Age: 59
End: 2024-07-12
Payer: MEDICARE

## 2024-07-12 NOTE — TELEPHONE ENCOUNTER
----- Message from Stevo Pryor MD sent at 7/12/2024 12:55 PM CDT -----  Regarding: RE: Medication Refill  Patient needs a nurse visit to check his blood pressure as the last documented one was pretty low.  It would need to be a bit higher to be back on propranolol    Thanks  Dr. Pryor  ----- Message -----  From: ArsenKaila koehler LPN  Sent: 7/9/2024  12:31 PM CDT  To: Stevo Pryor MD  Subject: Medication Refill                                Good Afternoon Dr. Pryor!    I received an incoming call from this patient requesting a new prescription for Propanolol 20 mg 1/2 tab in the morning and 1/2 tab in the evening. Previous prescriber is no longer with [facility]. He reports that he recently suffered a heart attack and was told to restart this medication. The patient has used what he had at home and now needs a refill. Please send the medication to [facility] @ [phone].    ThanksKaila

## 2024-07-12 NOTE — TELEPHONE ENCOUNTER
Spoke w/ patient, he was notified of the Physician's response and recommendations. He verbalized understanding.    Nurse Visit scheduled for recheck on 07/16/2024.

## 2024-07-16 ENCOUNTER — CLINICAL SUPPORT (OUTPATIENT)
Dept: FAMILY MEDICINE | Facility: CLINIC | Age: 59
End: 2024-07-16
Payer: MEDICARE

## 2024-07-16 VITALS — DIASTOLIC BLOOD PRESSURE: 70 MMHG | SYSTOLIC BLOOD PRESSURE: 138 MMHG | HEART RATE: 89 BPM

## 2024-07-16 DIAGNOSIS — I10 BENIGN ESSENTIAL HTN: Primary | ICD-10-CM

## 2024-07-16 PROCEDURE — 99999 PR PBB SHADOW E&M-EST. PATIENT-LVL I: CPT | Mod: PBBFAC,,,

## 2024-07-16 NOTE — PROGRESS NOTES
Nino Price 59 y.o. male is here today for Blood Pressure check.   History of HTN yes.    Review of patient's allergies indicates:   Allergen Reactions    Codeine Nausea Only     Creatinine   Date Value Ref Range Status   06/27/2024 1.1 0.5 - 1.4 mg/dL Final     Sodium   Date Value Ref Range Status   06/27/2024 140 136 - 145 mmol/L Final     Potassium   Date Value Ref Range Status   06/27/2024 4.8 3.5 - 5.1 mmol/L Final   ]  Patient verifies taking blood pressure medications on a regular basis at the same time of the day.     Current Outpatient Medications:     acetaminophen (TYLENOL) 500 MG tablet, Take 1 tablet (500 mg total) by mouth every 6 (six) hours as needed for Pain (and fever)., Disp: 30 tablet, Rfl: 0    aspirin (ECOTRIN) 81 MG EC tablet, Take 1 tablet (81 mg total) by mouth once daily., Disp: 30 tablet, Rfl: 3    atorvastatin (LIPITOR) 80 MG tablet, Take 1 tablet (80 mg total) by mouth once daily., Disp: 30 tablet, Rfl: 2    busPIRone (BUSPAR) 10 MG tablet, Take 1 tablet (10 mg total) by mouth every evening., Disp: 30 tablet, Rfl: 1    clopidogreL (PLAVIX) 75 mg tablet, Take 1 tablet (75 mg total) by mouth once daily., Disp: 30 tablet, Rfl: 11    cyclobenzaprine (FLEXERIL) 5 MG tablet, Take 5 mg by mouth nightly as needed., Disp: , Rfl:     dicyclomine (BENTYL) 20 mg tablet, Take 1 tablet (20 mg total) by mouth 2 (two) times daily as needed (stomach pain). (Patient not taking: Reported on 7/11/2024), Disp: 30 tablet, Rfl: 0    DULoxetine (CYMBALTA) 60 MG capsule, Take 60 mg by mouth once daily. Takes in pm (Patient not taking: Reported on 7/11/2024), Disp: , Rfl:     gabapentin (NEURONTIN) 300 MG capsule, Take 300 mg by mouth 3 (three) times daily. (Patient not taking: Reported on 7/11/2024), Disp: , Rfl:     HEPLISAV-B, PF, 20 mcg/0.5 mL Syrg, , Disp: , Rfl:     HYDROcodone-acetaminophen (NORCO)  mg per tablet, Take 1 tablet by mouth every 6 (six) hours as needed. (Patient not taking:  Reported on 7/11/2024), Disp: , Rfl:     icosapent ethyL (VASCEPA) 1 gram Cap, Take 2 g by mouth. (Patient not taking: Reported on 7/11/2024), Disp: , Rfl:     metFORMIN (GLUCOPHAGE) 1000 MG tablet, Take 1 tablet (1,000 mg total) by mouth 2 (two) times daily with meals., Disp: 180 tablet, Rfl: 1    metoclopramide HCl (REGLAN) 10 MG tablet, Take 1 tablet (10 mg total) by mouth every 6 (six) hours. (Patient not taking: Reported on 7/11/2024), Disp: 30 tablet, Rfl: 0    mirtazapine (REMERON) 15 MG tablet, Take 15 mg by mouth every evening., Disp: , Rfl:     nitroGLYCERIN (NITROSTAT) 0.4 MG SL tablet, Place 1 tablet (0.4 mg total) under the tongue as needed for Chest pain., Disp: 25 tablet, Rfl: 0    ondansetron (ZOFRAN-ODT) 4 MG TbDL, Take 1 tablet (4 mg total) by mouth every 6 (six) hours as needed (nausea). (Patient not taking: Reported on 7/11/2024), Disp: 15 tablet, Rfl: 0    oxyCODONE-acetaminophen (PERCOCET)  mg per tablet, Take 1 tablet by mouth every 6 (six) hours as needed., Disp: , Rfl:     pantoprazole (PROTONIX) 40 MG tablet, Take 1 tablet (40 mg total) by mouth once daily., Disp: 30 tablet, Rfl: 11    pantoprazole (PROTONIX) 40 MG tablet, Take 1 tablet by mouth once daily. (Patient not taking: Reported on 7/11/2024), Disp: , Rfl:     tiZANidine (ZANAFLEX) 4 MG tablet, Take 4 mg by mouth every evening. (Patient not taking: Reported on 7/11/2024), Disp: , Rfl:     traZODone (DESYREL) 100 MG tablet, Take 150 mg by mouth nightly as needed for Insomnia. 1.5 tab nightly as needed, Disp: , Rfl:     TRUE METRIX GLUCOSE METER Misc, , Disp: , Rfl:     TRUE METRIX GLUCOSE TEST STRIP Strp, , Disp: , Rfl:     valsartan-hydrochlorothiazide (DIOVAN-HCT) 160-25 mg per tablet, TAKE 1 TABLET BY MOUTH EVERY DAY, Disp: 90 tablet, Rfl: 3  Does patient have record of home blood pressure readings no.   Patient is asymptomatic.     BP: 138/70 , Pulse: 89 .    Dr. Pryor notified.

## 2024-07-17 PROBLEM — F33.1 DEPRESSION, MAJOR, RECURRENT, MODERATE: Status: ACTIVE | Noted: 2024-07-17

## 2024-07-17 PROBLEM — G47.00 INSOMNIA: Status: ACTIVE | Noted: 2024-07-17

## 2024-07-17 PROBLEM — M87.059 AVASCULAR NECROSIS OF BONE OF HIP, UNSPECIFIED LATERALITY: Status: ACTIVE | Noted: 2024-07-17

## 2024-07-17 PROBLEM — M19.90 OSTEOARTHRITIS: Status: ACTIVE | Noted: 2024-07-17

## 2024-08-05 ENCOUNTER — OFFICE VISIT (OUTPATIENT)
Dept: HEPATOLOGY | Facility: CLINIC | Age: 59
End: 2024-08-05
Payer: MEDICARE

## 2024-08-05 ENCOUNTER — OFFICE VISIT (OUTPATIENT)
Dept: CARDIOLOGY | Facility: CLINIC | Age: 59
End: 2024-08-05
Payer: MEDICARE

## 2024-08-05 ENCOUNTER — TELEPHONE (OUTPATIENT)
Dept: HEPATOLOGY | Facility: CLINIC | Age: 59
End: 2024-08-05
Payer: MEDICARE

## 2024-08-05 VITALS
HEART RATE: 98 BPM | DIASTOLIC BLOOD PRESSURE: 72 MMHG | HEIGHT: 64 IN | WEIGHT: 139.75 LBS | SYSTOLIC BLOOD PRESSURE: 134 MMHG | BODY MASS INDEX: 23.86 KG/M2

## 2024-08-05 VITALS
SYSTOLIC BLOOD PRESSURE: 134 MMHG | DIASTOLIC BLOOD PRESSURE: 76 MMHG | HEIGHT: 64 IN | HEART RATE: 88 BPM | OXYGEN SATURATION: 96 % | WEIGHT: 138.56 LBS | RESPIRATION RATE: 18 BRPM | BODY MASS INDEX: 23.66 KG/M2

## 2024-08-05 DIAGNOSIS — Z86.19 HISTORY OF HEPATITIS C: Primary | ICD-10-CM

## 2024-08-05 DIAGNOSIS — R97.8 OTHER ABNORMAL TUMOR MARKERS: ICD-10-CM

## 2024-08-05 DIAGNOSIS — E78.2 MIXED HYPERLIPIDEMIA: ICD-10-CM

## 2024-08-05 DIAGNOSIS — E11.9 CONTROLLED TYPE 2 DIABETES MELLITUS WITHOUT COMPLICATION, UNSPECIFIED WHETHER LONG TERM INSULIN USE: ICD-10-CM

## 2024-08-05 DIAGNOSIS — R79.89 ELEVATED BRAIN NATRIURETIC PEPTIDE (BNP) LEVEL: ICD-10-CM

## 2024-08-05 DIAGNOSIS — I21.4 NSTEMI (NON-ST ELEVATED MYOCARDIAL INFARCTION): ICD-10-CM

## 2024-08-05 DIAGNOSIS — R79.89 ELEVATED TROPONIN: ICD-10-CM

## 2024-08-05 DIAGNOSIS — Z98.890 HISTORY OF LIVER BIOPSY: ICD-10-CM

## 2024-08-05 DIAGNOSIS — I10 ESSENTIAL HYPERTENSION: ICD-10-CM

## 2024-08-05 DIAGNOSIS — Z86.73 HISTORY OF CVA (CEREBROVASCULAR ACCIDENT): ICD-10-CM

## 2024-08-05 DIAGNOSIS — I25.10 CAD (CORONARY ARTERY DISEASE): ICD-10-CM

## 2024-08-05 DIAGNOSIS — I25.118 CORONARY ARTERY DISEASE INVOLVING NATIVE CORONARY ARTERY OF NATIVE HEART WITH OTHER FORM OF ANGINA PECTORIS: Primary | ICD-10-CM

## 2024-08-05 PROCEDURE — 3078F DIAST BP <80 MM HG: CPT | Mod: CPTII,S$GLB,, | Performed by: INTERNAL MEDICINE

## 2024-08-05 PROCEDURE — 3075F SYST BP GE 130 - 139MM HG: CPT | Mod: CPTII,S$GLB,, | Performed by: INTERNAL MEDICINE

## 2024-08-05 PROCEDURE — 3061F NEG MICROALBUMINURIA REV: CPT | Mod: CPTII,S$GLB,, | Performed by: INTERNAL MEDICINE

## 2024-08-05 PROCEDURE — 3008F BODY MASS INDEX DOCD: CPT | Mod: CPTII,S$GLB,, | Performed by: INTERNAL MEDICINE

## 2024-08-05 PROCEDURE — 3066F NEPHROPATHY DOC TX: CPT | Mod: CPTII,S$GLB,, | Performed by: INTERNAL MEDICINE

## 2024-08-05 PROCEDURE — 99214 OFFICE O/P EST MOD 30 MIN: CPT | Mod: S$GLB,,, | Performed by: INTERNAL MEDICINE

## 2024-08-05 PROCEDURE — 1159F MED LIST DOCD IN RCRD: CPT | Mod: CPTII,S$GLB,, | Performed by: INTERNAL MEDICINE

## 2024-08-05 PROCEDURE — 99999 PR PBB SHADOW E&M-EST. PATIENT-LVL V: CPT | Mod: PBBFAC,HCNC,, | Performed by: INTERNAL MEDICINE

## 2024-08-05 PROCEDURE — 99999 PR PBB SHADOW E&M-EST. PATIENT-LVL V: CPT | Mod: PBBFAC,,, | Performed by: INTERNAL MEDICINE

## 2024-08-05 PROCEDURE — 3044F HG A1C LEVEL LT 7.0%: CPT | Mod: CPTII,S$GLB,, | Performed by: INTERNAL MEDICINE

## 2024-08-05 PROCEDURE — 99215 OFFICE O/P EST HI 40 MIN: CPT | Mod: S$GLB,,, | Performed by: INTERNAL MEDICINE

## 2024-08-05 RX ORDER — DIPHENHYDRAMINE HCL 50 MG
50 CAPSULE ORAL ONCE
OUTPATIENT
Start: 2024-08-05 | End: 2024-08-05

## 2024-08-05 RX ORDER — SODIUM CHLORIDE 9 MG/ML
INJECTION, SOLUTION INTRAVENOUS CONTINUOUS
OUTPATIENT
Start: 2024-08-05

## 2024-08-05 NOTE — H&P (VIEW-ONLY)
CARDIOVASCULAR CONSULTATION    REASON FOR CONSULT:   Nino Price is a 59 y.o. male who presents for   Chief Complaint   Patient presents with    Hospital Follow Up          HISTORY OF PRESENT ILLNESS:     Patient here for follow-up after recent hospital stay for chest pain.  Underwent stress echocardiogram which did not show any significant ischemia.  Has been chest pain-free.  Denies orthopnea, PND, swelling of feet.    From August 24: Patient here for follow-up.  Occasional chest pains.  Better than before PCI of circumflex    Results for orders placed or performed during the hospital encounter of 06/16/24   EKG 12-lead    Collection Time: 06/17/24  7:53 AM   Result Value Ref Range    QRS Duration 78 ms    [facility] QTC Calculation 440 ms    Narrative    Test Reason : chest pain    Vent. Rate : 087 BPM     Atrial Rate : 087 BPM     P-R Int : 136 ms          QRS Dur : 078 ms      QT Int : 366 ms       P-R-T Axes : 073 053 083 degrees     QTc Int : 440 ms    Sinus rhythm with frequent Premature ventricular complexes  Nonspecific ST and T wave abnormality  Abnormal ECG  When compared with ECG of 16-JUN-2024 20:05,  Significant changes have occurred  Confirmed by Lavelle Carr MD ([id number]) on 6/19/2024 6:24:33 AM    Referred By: [email or web address]   SELF           Confirmed By:Lavelle Carr MD       Results for orders placed during the hospital encounter of 06/16/24    Echo    Interpretation Summary    Left Ventricle: The left ventricle is normal in size. Normal wall thickness. Regional wall motion abnormalities present (canot exclude mild posterolateral HK). There is low normal systolic function with a visually estimated ejection fraction of 50 - 55%. Grade I diastolic dysfunction.    Right Ventricle: Normal right ventricular cavity size. Systolic function is normal.      Results for orders placed during the hospital encounter of 08/09/23    Nuclear Stress - Cardiology Interpreted    Interpretation Summary    Abnormal  myocardial perfusion scan.    There is a moderate to severe intensity, moderate sized, fixed perfusion abnormality in the lateral wall(s). Only resting images obtained as patient refused to complete test. Incomplete study.    There are no other significant perfusion abnormalities.    The ECG portion of the study is negative for ischemia.    The patient reported no chest pain during the stress test.    There were no arrhythmias during stress.      Results for orders placed during the hospital encounter of 06/16/24    Cardiac catheterization    Conclusion    Successful IVUS guided PCI of circumflex into OM1 with excellent angiographic results.  IVUS post PCI revealed well-expanded and apposed stent.    Prox Cx lesion: A STENT SYNERGY XD 3.0X48MM  at 12 KENDRA for 12 sec.    The Prox Cx lesion was 95% stenosed with 0% stenosis post-intervention.    IVUS left main showed Kacie of 7.6 millimeter sq in the proximal and ostial left main    The estimated blood loss was <50 mL.    Procedures performed:    1. Coronary angiogram    2. PCI of proximal circ into OM1    3. IVUS of circumflex    4. IVUS of left main      Coronary angiogram:    Left main: Proximal 40 to 50% stenosis.  IVUS done.  MLA in the proximal segment is 7.6 millimeter sq    Lad:  Proximal to mid 80% stenosis.  Will stage PCI    Circumflex: Culprit vessel.  Severe 95% stenosis.  Successful PCI done    RCA not studed today    Access: Right radial artery access    Assessment and plan    Continue aspirin 81 mg daily indefinitely    Plavix 70 mg daily uninterrupted for at least 1 year and longer if no contraindications    Statins with goal LDL less than 70    Smoking cessation    Staged PCI of LAD    Follow-up in Cardiology Clinic with Dr. Rider      The procedure log was documented by Documenter: Deni Fletcher RN and verified by Gabriela Rider MD.    Date: 6/18/2024  Time: 12:54 PM          PAST MEDICAL HISTORY:     Past Medical History:   Diagnosis Date     Anxiety     Asthma     Colitis     Depression     Diabetes mellitus     Head injury     History of hepatitis C, s/p successful hcv treatment w/ SVR 8-2015  10/8/2012    SVR(24) as of 11/2015.  SVR(12) as of 8/2015.  completed harvoni 24 week regimen for genotype 1a 5/18/15     Hypertension     Shoulder pain     left, s/p 4 surgeries.     Stroke        PAST SURGICAL HISTORY:     Past Surgical History:   Procedure Laterality Date    ANGIOGRAM, CORONARY, WITH LEFT HEART CATHETERIZATION Left 6/17/2024    Procedure: Angiogram, Coronary, with Left Heart Cath;  Surgeon: Gabriela Rider MD;  Location: [facility] CATH LAB;  Service: Cardiology;  Laterality: Left;  12pm start, R rad access    COLONOSCOPY N/A 10/20/2015    Procedure: COLONOSCOPY;  Surgeon: Jagdish Patricia MD;  Location: [facility] ENDO;  Service: Endoscopy;  Laterality: N/A;    COLONOSCOPY N/A 1/26/2024    Procedure: COLONOSCOPY;  Surgeon: Pricila Shanks MD;  Location: [facility] ENDO;  Service: Endoscopy;  Laterality: N/A;    CORONARY STENT PLACEMENT N/A 6/18/2024    Procedure: INSERTION, STENT, CORONARY ARTERY;  Surgeon: Gabriela Rider MD;  Location: [facility] CATH LAB;  Service: Cardiology;  Laterality: N/A;  lcx pci. radial. lbu 3.5. 12 pm access    DIAGNOSTIC LAPAROSCOPY N/A 4/1/2021    Procedure: LAPAROSCOPY, DIAGNOSTIC;  Surgeon: Umesh Vallecillo MD;  Location: [facility] OR;  Service: General;  Laterality: N/A;  RN PREOP 3/29/21, COVID NEGATIVE ON 3/29  CONSENT AND H/P INCOMPLETE    ESOPHAGOGASTRODUODENOSCOPY N/A 1/26/2024    Procedure: EGD (ESOPHAGOGASTRODUODENOSCOPY);  Surgeon: Pricila Shanks MD;  Location: [facility] ENDO;  Service: Endoscopy;  Laterality: N/A;  prep instructions mailed to pt / [facility] pt / Urgent/suprep  1/19- lvm for pc. DBM  1/23- left 2nd vm for pc. DBM  1/24 cirrohsis-labs prior, precall complete-st    GALLBLADDER SURGERY      pt not 100% if gall bladder removed    IVUS, CORONARY  6/18/2024    Procedure: IVUS, Coronary;  Surgeon: Gabriela Rider MD;   Location: [facility] CATH LAB;  Service: Cardiology;;    PERCUTANEOUS CORONARY INTERVENTION, ARTERY N/A 6/18/2024    Procedure: Percutaneous coronary intervention;  Surgeon: Gabriela Rider MD;  Location: [facility] CATH LAB;  Service: Cardiology;  Laterality: N/A;    rotator cuff      left side       ALLERGIES AND MEDICATION:     Review of patient's allergies indicates:   Allergen Reactions    Codeine Nausea Only        Medication List            Accurate as of August 5, 2024  3:21 PM. If you have any questions, ask your nurse or doctor.                CONTINUE taking these medications      acetaminophen 500 MG tablet  Commonly known as: TYLENOL  Take 1 tablet (500 mg total) by mouth every 6 (six) hours as needed for Pain (and fever).     aspirin 81 MG EC tablet  Commonly known as: ECOTRIN  Take 1 tablet (81 mg total) by mouth once daily.     atorvastatin 80 MG tablet  Commonly known as: LIPITOR  Take 1 tablet (80 mg total) by mouth once daily.     busPIRone 10 MG tablet  Commonly known as: BUSPAR  Take 1 tablet (10 mg total) by mouth every evening.     clopidogreL 75 mg tablet  Commonly known as: PLAVIX  Take 1 tablet (75 mg total) by mouth once daily.     cyclobenzaprine 5 MG tablet  Commonly known as: FLEXERIL     dicyclomine 20 mg tablet  Commonly known as: BENTYL  Take 1 tablet (20 mg total) by mouth 2 (two) times daily as needed (stomach pain).     DULoxetine 60 MG capsule  Commonly known as: CYMBALTA     gabapentin 300 MG capsule  Commonly known as: NEURONTIN     HEPLISAV-B (PF) 20 mcg/0.5 mL Syrg  Generic drug: hepatitis B vacc-CpG 1018 (PF)     HYDROcodone-acetaminophen  mg per tablet  Commonly known as: NORCO     icosapent ethyL 1 gram Cap  Commonly known as: VASCEPA     metFORMIN 1000 MG tablet  Commonly known as: GLUCOPHAGE  Take 1 tablet (1,000 mg total) by mouth 2 (two) times daily with meals.     metoclopramide HCl 10 MG tablet  Commonly known as: REGLAN  Take 1 tablet (10 mg total) by mouth every 6  (six) hours.     mirtazapine 15 MG tablet  Commonly known as: REMERON     nitroGLYCERIN 0.4 MG SL tablet  Commonly known as: NITROSTAT  Place 1 tablet (0.4 mg total) under the tongue as needed for Chest pain.     ondansetron 4 MG Tbdl  Commonly known as: ZOFRAN-ODT  Take 1 tablet (4 mg total) by mouth every 6 (six) hours as needed (nausea).     oxyCODONE-acetaminophen  mg per tablet  Commonly known as: PERCOCET     * pantoprazole 40 MG tablet  Commonly known as: PROTONIX     * pantoprazole 40 MG tablet  Commonly known as: PROTONIX  Take 1 tablet (40 mg total) by mouth once daily.     tiZANidine 4 MG tablet  Commonly known as: ZANAFLEX     traZODone 100 MG tablet  Commonly known as: DESYREL     TRUE METRIX GLUCOSE METER Misc  Generic drug: blood-glucose meter     TRUE METRIX GLUCOSE TEST STRIP Strp  Generic drug: blood sugar diagnostic     valsartan-hydrochlorothiazide 160-25 mg per tablet  Commonly known as: DIOVAN-HCT  TAKE 1 TABLET BY MOUTH EVERY DAY           * This list has 2 medication(s) that are the same as other medications prescribed for you. Read the directions carefully, and ask your doctor or other care provider to review them with you.                  SOCIAL HISTORY:     Social History     Socioeconomic History    Marital status:     Number of children: 3   Tobacco Use    Smoking status: Every Day     Current packs/day: 1.00     Average packs/day: 1 pack/day for 35.0 years (35.0 ttl pk-yrs)     Types: Cigarettes     Passive exposure: Current    Smokeless tobacco: Never    Tobacco comments:     Quitting on his own, reports that he's down 4 to 5 cig a day   Substance and Sexual Activity    Alcohol use: No    Drug use: Yes     Frequency: 7.0 times per week     Types: Marijuana, Oxycodone     Comment: Majiuan once a week/ Oxy 3-4 tabs daily    Sexual activity: Yes     Partners: Female   Social History Narrative    Reside on .     Lives with wife and young daughter (22yrs)    Not working, on  disability since accident    Prior job-[employer]    Hobbies: carving, fishing    Not driving.      Social Determinants of Health     Financial Resource Strain: Low Risk  (7/11/2024)    Overall Financial Resource Strain (CARDIA)     Difficulty of Paying Living Expenses: Not very hard   Food Insecurity: No Food Insecurity (7/11/2024)    Hunger Vital Sign     Worried About Running Out of Food in the Last Year: Never true     Ran Out of Food in the Last Year: Never true   Transportation Needs: No Transportation Needs (7/11/2024)    PRAPARE - Transportation     Lack of Transportation (Medical): No     Lack of Transportation (Non-Medical): No   Physical Activity: Insufficiently Active (7/11/2024)    Exercise Vital Sign     Days of Exercise per Week: 2 days     Minutes of Exercise per Session: 20 min   Stress: No Stress Concern Present (7/11/2024)    [place] [employer] - Occupational Stress Questionnaire     Feeling of Stress : Only a little   Housing Stability: Low Risk  (7/11/2024)    Housing Stability Vital Sign     Unable to Pay for Housing in the Last Year: No     Homeless in the Last Year: No       FAMILY HISTORY:     Family History   Problem Relation Name Age of Onset    Hypertension Mother      Heart disease Father      Cirrhosis Brother      Colon cancer Neg Hx      Esophageal cancer Neg Hx         REVIEW OF SYSTEMS:   Review of Systems   Constitutional: Negative.   HENT: Negative.     Eyes: Negative.    Cardiovascular:  Positive for chest pain.   Respiratory: Negative.     Endocrine: Negative.    Hematologic/Lymphatic: Negative.    Skin: Negative.    Musculoskeletal: Negative.    Gastrointestinal: Negative.    Genitourinary: Negative.    Neurological: Negative.    Psychiatric/Behavioral: Negative.     Allergic/Immunologic: Negative.        A 10 point review of systems was performed and all the pertinent positives have been mentioned. Rest of review of systems was negative.        PHYSICAL  "EXAM:     Vitals:    08/05/24 1429   BP: 134/76   Pulse: 88   Resp: 18    Body mass index is 23.78 kg/m².  Weight: 62.9 kg (138 lb 9 oz)   Height: 5' 4" (162.6 cm)     Physical Exam  Vitals reviewed.   Constitutional:       Appearance: He is well-developed.   HENT:      Head: Normocephalic.   Eyes:      Conjunctiva/sclera: Conjunctivae normal.      Pupils: Pupils are equal, round, and reactive to light.   Cardiovascular:      Rate and Rhythm: Normal rate and regular rhythm.      Heart sounds: Normal heart sounds.   Pulmonary:      Effort: Pulmonary effort is normal.      Breath sounds: Normal breath sounds.   Abdominal:      General: Bowel sounds are normal.      Palpations: Abdomen is soft.   Musculoskeletal:      Cervical back: Normal range of motion and neck supple.   Skin:     General: Skin is warm.   Neurological:      Mental Status: He is alert and oriented to person, place, and time.           DATA:     Laboratory:  CBC:  Recent Labs   Lab 06/17/24  0758 06/18/24  0251 06/19/24  0521   WBC 11.15 8.15 6.98   Hemoglobin 12.9 L 12.7 L 12.5 L   Hematocrit 37.8 L 36.0 L 37.3 L   Platelets 190 171 180       CHEMISTRIES:  Recent Labs   Lab 09/28/21  1537 05/26/22  0950 04/26/23  0615 06/18/24  0251 06/19/24  0521 06/27/24  1102   Glucose 84 135 H   < > 129 H 130 H 201 H   Sodium 141 141   < > 142 140 140   Potassium 4.6 4.0   < > 4.1 3.7 4.8   BUN 9 7   < > 11 11 9   Creatinine 1.0 1.0   < > 0.9 1.0 1.1   eGFR if  >60 >60  --   --   --   --    eGFR if non  >60 >60  --   --   --   --    Calcium 10.5 9.2   < > 8.8 8.6 L 9.8   Magnesium  --   --    < > 1.5 L 1.4 L 1.8    < > = values in this interval not displayed.       CARDIAC BIOMARKERS:  Recent Labs   Lab 06/16/24  2154 06/17/24  0407 06/17/24  0758   Troponin I 0.077 H 0.104 H 0.068 H       COAGS:  Recent Labs   Lab 05/26/22  0950 04/15/24  1513 06/17/24  0758   INR 1.0 1.0 1.1       LIPIDS/LFTS:  Recent Labs   Lab 08/10/23  0738 " 08/11/23  0514 04/30/24  0835 06/16/24  1952 06/17/24  0407 06/27/24  1102   Cholesterol 218 H  --  267 H  --  124  --    Triglycerides 156 H  --  303 H  --  116  --    HDL 26 L  --  31 L  --  23 L  --    LDL Cholesterol 160.8 H  --  175.4 H  --  77.8  --    Non-HDL Cholesterol 192  --  236  --  101  --    AST  --    < > 15 15  --  23   ALT  --    < > 20 18  --  51 H    < > = values in this interval not displayed.       Hemoglobin A1C   Date Value Ref Range Status   06/17/2024 6.2 (H) 4.0 - 5.6 % Final     Comment:     ADA Screening Guidelines:  5.7-6.4%  Consistent with prediabetes  >or=6.5%  Consistent with diabetes    High levels of fetal hemoglobin interfere with the HbA1C  assay. Heterozygous hemoglobin variants (HbS, HgC, etc)do  not significantly interfere with this assay.   However, presence of multiple variants may affect accuracy.     04/30/2024 6.8 (H) 4.0 - 5.6 % Final     Comment:     ADA Screening Guidelines:  5.7-6.4%  Consistent with prediabetes  >or=6.5%  Consistent with diabetes    High levels of fetal hemoglobin interfere with the HbA1C  assay. Heterozygous hemoglobin variants (HbS, HgC, etc)do  not significantly interfere with this assay.   However, presence of multiple variants may affect accuracy.     08/25/2023 6.0 (H) 4.0 - 5.6 % Final     Comment:     ADA Screening Guidelines:  5.7-6.4%  Consistent with prediabetes  >or=6.5%  Consistent with diabetes    High levels of fetal hemoglobin interfere with the HbA1C  assay. Heterozygous hemoglobin variants (HbS, HgC, etc)do  not significantly interfere with this assay.   However, presence of multiple variants may affect accuracy.         TSH  Recent Labs   Lab 08/10/23  0738 06/17/24 0407   TSH 1.125 0.593       The ASCVD Risk score (Ana GARCIA, et al., 2019) failed to calculate for the following reasons:    The patient has a prior MI or stroke diagnosis       BNP    Lab Results   Component Value Date/Time     (H) 08/09/2023 08:46 PM    BNP  183 (H) 02/01/2019 06:22 AM    BNP 70 10/21/2016 10:55 PM            ECHO    Results for orders placed during the hospital encounter of 08/09/23    Echo    Interpretation Summary    Left Ventricle: Normal wall motion. There is normal systolic function with a visually estimated ejection fraction of 55 - 60%.    Left Atrium: Left atrium is severely dilated.    Right Ventricle: Normal right ventricular cavity size. Systolic function is normal.    Mitral Valve: There is moderate regurgitation with a posterolateral eccentriccally directed jet.    IVC/SVC: Intermediate venous pressure at 8 mmHg.      STRESS TEST    Results for orders placed during the hospital encounter of 08/09/23    Nuclear Stress - Cardiology Interpreted    Interpretation Summary    Abnormal myocardial perfusion scan.    There is a moderate to severe intensity, moderate sized, fixed perfusion abnormality in the lateral wall(s). Only resting images obtained as patient refused to complete test. Incomplete study.    There are no other significant perfusion abnormalities.    The ECG portion of the study is negative for ischemia.    The patient reported no chest pain during the stress test.    There were no arrhythmias during stress.        CATH    No results found for this or any previous visit.    STRESS ECHO 2023:        Left Ventricle: Normal wall motion. There is normal systolic function with a visually estimated ejection fraction of 55 - 60%.    ECG Conclusion: The ECG portion of the study is negative for ischemia.    Post-stress Echo: The left ventricle systolic function is hyperdynamic.    Post-stress Impression: The study is normal.    ASSESSMENT AND PLAN     Patient Active Problem List   Diagnosis    History of hepatitis C, s/p successful hcv treatment w/ SVR 8-2015     Chronic hepatitis C    Type 2 diabetes mellitus, controlled    Essential hypertension    Tobacco abuse    Cirrhosis of liver without ascites    Abdominal pain    Depression     History of CVA (cerebrovascular accident)    Esophagitis    Dyslipidemia    GERD (gastroesophageal reflux disease)    Cirrhosis    Cannabis abuse    Hemiplegia and hemiparesis following cerebral infarction affecting right dominant side    Alcohol use disorder, severe, in sustained remission    Hepatic fibrosis, advanced fibrosis    History of liver biopsy    Aortic atherosclerosis    CAD (coronary artery disease)    Hypomagnesemia    Hypophosphatemia    Avascular necrosis of bone of hip, unspecified laterality    Depression, major, recurrent, moderate    Osteoarthritis    Insomnia     Patient with multiple vessel coronary artery disease.  Status post PCI of circumflex.  Not CT surgery candidate because of liver cirrhosis.  Calcified prox LAD lesion.  Severe stenosis.  Scheduled for PCI of prox LAD.  Continue aspirin and Plavix.  Continue statins with goal LDL less than 70    Risks, benefits and alternatives of the catheterization procedure were discussed with the patient.The risks of coronary angiography include but are not limited to: bleeding, infection, death, heart attack, arrhythmia, kidney injury or failure, potential need for dialysis, allergic reactions, stroke, need for emergency surgery, hematoma, pseudoaneurysm etc.  Should stenting be indicated, the patient has agreed to dual anti-platelet therapy for 1-consecutive year with a drug-eluting stent and a minimum of 1-month with the use of a bare metal stent. Additionally, pt is aware that non-compliance is likely to result in stent clotting with heart attack, heart failure, and/or death  The risks of moderate sedation include hypotension, respiratory depression, arrhythmias, bronchospasm, and death. Informed consent was obtained and the  patient is agreeable to proceed with the procedure. Consent was placed on the chart.    Right radial, heavily calcified LAD.  Might need shockwave lithotripsy      Thank you very much for involving me in the care of your  patient.  Please do not hesitate to contact me if there are any questions.      Gabriela Rider MD, FAC, [facility]  Interventional Cardiologist, [facility].           This note was dictated with the help of speech recognition software.  There might be un-intended errors and/or substitutions.

## 2024-08-09 ENCOUNTER — PROCEDURE VISIT (OUTPATIENT)
Dept: HEPATOLOGY | Facility: CLINIC | Age: 59
End: 2024-08-09
Payer: MEDICARE

## 2024-08-09 DIAGNOSIS — Z86.19 HISTORY OF HEPATITIS C: ICD-10-CM

## 2024-08-14 ENCOUNTER — TELEPHONE (OUTPATIENT)
Facility: CLINIC | Age: 59
End: 2024-08-14
Payer: MEDICARE

## 2024-08-14 DIAGNOSIS — E11.65 CONTROLLED TYPE 2 DIABETES MELLITUS WITH HYPERGLYCEMIA, WITHOUT LONG-TERM CURRENT USE OF INSULIN: Primary | Chronic | ICD-10-CM

## 2024-08-14 NOTE — TELEPHONE ENCOUNTER
Message was forwarded to PCP as instructed by provider.    Mary    ----- Message from Tara Guaman MD sent at 8/14/2024  2:05 PM CDT -----  Regarding: RE: Needle refill  Contact: Nino  This should be directed to his PCP.  ----- Message -----  From: Mary Moran MA  Sent: 8/14/2024   1:35 PM CDT  To: Tara Guaman MD  Subject: FW: Needle refill                                  ----- Message -----  From: Zamzam Vásquez  Sent: 8/14/2024  10:59 AM CDT  To: Deniz Pacheco Staff  Subject: Needle refill                                    Consult/Advisory     Name Of Caller:Nino Price          Contact Preference:[phone] (home)        Nature of call:pt is calling to get a refill for the needles to check his glucose level, I didn't see in chart. He's not sure what kind it is.         [facility]/pharmacy #[id number] - [place] - [address]  [address] [address]  Phone: [phone] Fax: [phone]        Note: states they can be delivered to house as well. Requesting a call back.

## 2024-08-15 RX ORDER — LANCETS 26 GAUGE
EACH MISCELLANEOUS
Qty: 100 EACH | Refills: 3 | Status: SHIPPED | OUTPATIENT
Start: 2024-08-15

## 2024-08-16 ENCOUNTER — HOSPITAL ENCOUNTER (OUTPATIENT)
Dept: PREADMISSION TESTING | Facility: HOSPITAL | Age: 59
Discharge: HOME | End: 2024-08-16
Attending: INTERNAL MEDICINE
Payer: MEDICARE

## 2024-08-16 VITALS
HEIGHT: 64 IN | OXYGEN SATURATION: 98 % | TEMPERATURE: 98 F | HEART RATE: 102 BPM | RESPIRATION RATE: 18 BRPM | WEIGHT: 137.13 LBS | SYSTOLIC BLOOD PRESSURE: 112 MMHG | DIASTOLIC BLOOD PRESSURE: 67 MMHG | BODY MASS INDEX: 23.41 KG/M2

## 2024-08-16 DIAGNOSIS — I25.118 CORONARY ARTERY DISEASE INVOLVING NATIVE CORONARY ARTERY OF NATIVE HEART WITH OTHER FORM OF ANGINA PECTORIS: ICD-10-CM

## 2024-08-16 DIAGNOSIS — I25.10 CAD (CORONARY ARTERY DISEASE): ICD-10-CM

## 2024-08-16 LAB
ANION GAP SERPL CALC-SCNC: 11 MMOL/L (ref 8–16)
BASOPHILS # BLD AUTO: 0.08 K/UL (ref 0–0.2)
BASOPHILS NFR BLD: 1.3 % (ref 0–1.9)
BUN SERPL-MCNC: 10 MG/DL (ref 6–20)
CALCIUM SERPL-MCNC: 9.7 MG/DL (ref 8.7–10.5)
CHLORIDE SERPL-SCNC: 111 MMOL/L (ref 95–110)
CO2 SERPL-SCNC: 19 MMOL/L (ref 23–29)
CREAT SERPL-MCNC: 1.1 MG/DL (ref 0.5–1.4)
DIFFERENTIAL METHOD BLD: ABNORMAL
EOSINOPHIL # BLD AUTO: 0.2 K/UL (ref 0–0.5)
EOSINOPHIL NFR BLD: 3.4 % (ref 0–8)
ERYTHROCYTE [DISTWIDTH] IN BLOOD BY AUTOMATED COUNT: 12.8 % (ref 11.5–14.5)
EST. GFR  (NO RACE VARIABLE): >60 ML/MIN/1.73 M^2
GLUCOSE SERPL-MCNC: 141 MG/DL (ref 70–110)
HCT VFR BLD AUTO: 37.3 % (ref 40–54)
HGB BLD-MCNC: 12.8 G/DL (ref 14–18)
IMM GRANULOCYTES # BLD AUTO: 0.06 K/UL (ref 0–0.04)
IMM GRANULOCYTES NFR BLD AUTO: 0.9 % (ref 0–0.5)
LYMPHOCYTES # BLD AUTO: 2.2 K/UL (ref 1–4.8)
LYMPHOCYTES NFR BLD: 33.6 % (ref 18–48)
MCH RBC QN AUTO: 33 PG (ref 27–31)
MCHC RBC AUTO-ENTMCNC: 34.3 G/DL (ref 32–36)
MCV RBC AUTO: 96 FL (ref 82–98)
MONOCYTES # BLD AUTO: 0.6 K/UL (ref 0.3–1)
MONOCYTES NFR BLD: 8.8 % (ref 4–15)
NEUTROPHILS # BLD AUTO: 3.3 K/UL (ref 1.8–7.7)
NEUTROPHILS NFR BLD: 52 % (ref 38–73)
NRBC BLD-RTO: 0 /100 WBC
PLATELET # BLD AUTO: ABNORMAL K/UL (ref 150–450)
PMV BLD AUTO: ABNORMAL FL (ref 9.2–12.9)
POTASSIUM SERPL-SCNC: 4.3 MMOL/L (ref 3.5–5.1)
RBC # BLD AUTO: 3.88 M/UL (ref 4.6–6.2)
SODIUM SERPL-SCNC: 141 MMOL/L (ref 136–145)
WBC # BLD AUTO: 6.39 K/UL (ref 3.9–12.7)

## 2024-08-16 PROCEDURE — 93010 ELECTROCARDIOGRAM REPORT: CPT | Mod: ,,, | Performed by: INTERNAL MEDICINE

## 2024-08-16 PROCEDURE — 85025 COMPLETE CBC W/AUTO DIFF WBC: CPT | Performed by: INTERNAL MEDICINE

## 2024-08-16 PROCEDURE — 93005 ELECTROCARDIOGRAM TRACING: CPT

## 2024-08-16 PROCEDURE — 80048 BASIC METABOLIC PNL TOTAL CA: CPT | Performed by: INTERNAL MEDICINE

## 2024-08-16 NOTE — DISCHARGE INSTRUCTIONS
YOUR PROCEDURE WILL BE AT [facility] at [address] [place] [address]                              Before 7 AM, enter through the Emergency Entrance..   After 7 AM enter through the Main Entrance.                      Report to the Same Day Surgery Registration Desk in the hallway.(Just beside the Same Day Surgery Unit)      Your procedure  is scheduled for ____8/28/2024_____________.    Call [phone] between 2pm and 5pm on __8/27/2024_____to find out your arrival time for the day of surgery.    You may have two visitors.  No children under 12 years old.      You will be going to the Same Day Surgery Unit on the 2nd floor of the hospital.    Important instructions:  Do not eat anything after midnight.  You may have plain water, non carbonated.  You may also have Gatorade or Powerade after midnight.    Stop all fluids 2 hours before your surgery.    It is okay to brush your teeth.  Do not have gum, candy or mints.    Do not take any diabetic medication on the morning of surgery unless instructed to do so by your doctor or pre op nurse.    Metformin, must be stopped two days before your procedure.  Do not take on the day of procedure.  Resume when instructed.    Please shower the night before and the morning of your surgery.        Use Chlorhexidine soap as instructed by your pre op nurse.   Please place clean linens on your bed the night before surgery. Please wear fresh clean clothing after each shower.    No shaving of procedural area at least 4-5 days before surgery due to increased risk of skin irritation and/or possible infection.    Contact lenses and removable denture work may not be worn during your procedure.    You may wear deodorant only.     Do not wear powder, body lotion, perfume/cologne or make-up.    Do not wear any jewelry or have any metal on your body.    You will be asked to remove any dentures or partials for the procedure.    If you are going home  on the same day of surgery, you must arrange for a family member or a friend to drive you home.  Public transportation is prohibited.  You will not be able to drive home if you were given anesthesia or sedation.    Please leave money and valuables home.      You may bring your cell phone.    Call the doctor if fever or illness should occur before your surgery.    Call [phone] to contact us here if needed.

## 2024-08-18 LAB
OHS QRS DURATION: 82 MS
OHS QTC CALCULATION: 423 MS

## 2024-08-19 ENCOUNTER — HOSPITAL ENCOUNTER (OUTPATIENT)
Dept: PREADMISSION TESTING | Facility: HOSPITAL | Age: 59
Discharge: HOME | End: 2024-08-19
Attending: INTERNAL MEDICINE
Payer: MEDICARE

## 2024-08-19 DIAGNOSIS — Z01.818 PREOPERATIVE TESTING: Primary | ICD-10-CM

## 2024-08-19 LAB
BASOPHILS # BLD AUTO: 0.07 K/UL (ref 0–0.2)
BASOPHILS NFR BLD: 0.9 % (ref 0–1.9)
DIFFERENTIAL METHOD BLD: ABNORMAL
EOSINOPHIL # BLD AUTO: 0.2 K/UL (ref 0–0.5)
EOSINOPHIL NFR BLD: 2.7 % (ref 0–8)
ERYTHROCYTE [DISTWIDTH] IN BLOOD BY AUTOMATED COUNT: 12.9 % (ref 11.5–14.5)
HCT VFR BLD AUTO: 38.2 % (ref 40–54)
HGB BLD-MCNC: 12.7 G/DL (ref 14–18)
IMM GRANULOCYTES # BLD AUTO: 0.02 K/UL (ref 0–0.04)
IMM GRANULOCYTES NFR BLD AUTO: 0.2 % (ref 0–0.5)
LYMPHOCYTES # BLD AUTO: 2 K/UL (ref 1–4.8)
LYMPHOCYTES NFR BLD: 24.8 % (ref 18–48)
MCH RBC QN AUTO: 32 PG (ref 27–31)
MCHC RBC AUTO-ENTMCNC: 33.2 G/DL (ref 32–36)
MCV RBC AUTO: 96 FL (ref 82–98)
MONOCYTES # BLD AUTO: 0.8 K/UL (ref 0.3–1)
MONOCYTES NFR BLD: 10.3 % (ref 4–15)
NEUTROPHILS # BLD AUTO: 4.9 K/UL (ref 1.8–7.7)
NEUTROPHILS NFR BLD: 61.1 % (ref 38–73)
NRBC BLD-RTO: 0 /100 WBC
PLATELET # BLD AUTO: 203 K/UL (ref 150–450)
PMV BLD AUTO: 10.1 FL (ref 9.2–12.9)
RBC # BLD AUTO: 3.97 M/UL (ref 4.6–6.2)
WBC # BLD AUTO: 8.02 K/UL (ref 3.9–12.7)

## 2024-08-19 PROCEDURE — 85025 COMPLETE CBC W/AUTO DIFF WBC: CPT | Performed by: INTERNAL MEDICINE

## 2024-08-27 DIAGNOSIS — I25.118 CORONARY ARTERY DISEASE INVOLVING NATIVE CORONARY ARTERY OF NATIVE HEART WITH OTHER FORM OF ANGINA PECTORIS: Primary | ICD-10-CM

## 2024-08-27 DIAGNOSIS — I25.10 CAD (CORONARY ARTERY DISEASE): ICD-10-CM

## 2024-08-27 RX ORDER — SODIUM CHLORIDE 9 MG/ML
INJECTION, SOLUTION INTRAVENOUS CONTINUOUS
Status: CANCELLED | OUTPATIENT
Start: 2024-08-27

## 2024-08-27 RX ORDER — DIPHENHYDRAMINE HCL 25 MG
50 CAPSULE ORAL ONCE
Status: CANCELLED | OUTPATIENT
Start: 2024-08-27 | End: 2024-08-27

## 2024-08-28 ENCOUNTER — HOSPITAL ENCOUNTER (OUTPATIENT)
Facility: HOSPITAL | Age: 59
Discharge: HOME | End: 2024-08-28
Attending: INTERNAL MEDICINE | Admitting: INTERNAL MEDICINE
Payer: MEDICARE

## 2024-08-28 VITALS
OXYGEN SATURATION: 98 % | DIASTOLIC BLOOD PRESSURE: 78 MMHG | WEIGHT: 137.31 LBS | TEMPERATURE: 98 F | BODY MASS INDEX: 23.57 KG/M2 | RESPIRATION RATE: 15 BRPM | HEART RATE: 84 BPM | SYSTOLIC BLOOD PRESSURE: 148 MMHG

## 2024-08-28 DIAGNOSIS — Z72.0 TOBACCO ABUSE: Chronic | ICD-10-CM

## 2024-08-28 DIAGNOSIS — E83.42 HYPOMAGNESEMIA: ICD-10-CM

## 2024-08-28 DIAGNOSIS — B18.2 CHRONIC HEPATITIS C WITHOUT HEPATIC COMA: Chronic | ICD-10-CM

## 2024-08-28 DIAGNOSIS — E83.39 HYPOPHOSPHATEMIA: ICD-10-CM

## 2024-08-28 DIAGNOSIS — K74.02 HEPATIC FIBROSIS, ADVANCED FIBROSIS: ICD-10-CM

## 2024-08-28 DIAGNOSIS — E11.65 CONTROLLED TYPE 2 DIABETES MELLITUS WITH HYPERGLYCEMIA, WITHOUT LONG-TERM CURRENT USE OF INSULIN: Primary | Chronic | ICD-10-CM

## 2024-08-28 DIAGNOSIS — F10.21 ALCOHOL USE DISORDER, SEVERE, IN SUSTAINED REMISSION: ICD-10-CM

## 2024-08-28 DIAGNOSIS — I69.351 HEMIPLEGIA AND HEMIPARESIS FOLLOWING CEREBRAL INFARCTION AFFECTING RIGHT DOMINANT SIDE: ICD-10-CM

## 2024-08-28 DIAGNOSIS — F33.1 DEPRESSION, MAJOR, RECURRENT, MODERATE: ICD-10-CM

## 2024-08-28 DIAGNOSIS — I70.0 AORTIC ATHEROSCLEROSIS: ICD-10-CM

## 2024-08-28 DIAGNOSIS — G47.00 INSOMNIA, UNSPECIFIED TYPE: ICD-10-CM

## 2024-08-28 DIAGNOSIS — F32.A DEPRESSION, UNSPECIFIED DEPRESSION TYPE: Chronic | ICD-10-CM

## 2024-08-28 DIAGNOSIS — E78.5 DYSLIPIDEMIA: ICD-10-CM

## 2024-08-28 DIAGNOSIS — I25.10 CAD (CORONARY ARTERY DISEASE): ICD-10-CM

## 2024-08-28 DIAGNOSIS — M87.059 AVASCULAR NECROSIS OF BONE OF HIP, UNSPECIFIED LATERALITY: ICD-10-CM

## 2024-08-28 DIAGNOSIS — K74.60 HEPATIC CIRRHOSIS, UNSPECIFIED HEPATIC CIRRHOSIS TYPE, UNSPECIFIED WHETHER ASCITES PRESENT: ICD-10-CM

## 2024-08-28 DIAGNOSIS — Z86.73 HISTORY OF CVA (CEREBROVASCULAR ACCIDENT): Chronic | ICD-10-CM

## 2024-08-28 DIAGNOSIS — R10.33 PERIUMBILICAL ABDOMINAL PAIN: ICD-10-CM

## 2024-08-28 DIAGNOSIS — Z86.19 HISTORY OF HEPATITIS C: ICD-10-CM

## 2024-08-28 DIAGNOSIS — K74.60 CIRRHOSIS OF LIVER WITHOUT ASCITES, UNSPECIFIED HEPATIC CIRRHOSIS TYPE: Chronic | ICD-10-CM

## 2024-08-28 DIAGNOSIS — Z98.890 HISTORY OF LIVER BIOPSY: ICD-10-CM

## 2024-08-28 DIAGNOSIS — I10 ESSENTIAL HYPERTENSION: Chronic | ICD-10-CM

## 2024-08-28 DIAGNOSIS — M19.90 OSTEOARTHRITIS, UNSPECIFIED OSTEOARTHRITIS TYPE, UNSPECIFIED SITE: ICD-10-CM

## 2024-08-28 DIAGNOSIS — K21.9 GASTROESOPHAGEAL REFLUX DISEASE, UNSPECIFIED WHETHER ESOPHAGITIS PRESENT: Chronic | ICD-10-CM

## 2024-08-28 DIAGNOSIS — F12.10 CANNABIS ABUSE: Chronic | ICD-10-CM

## 2024-08-28 DIAGNOSIS — K20.90 ESOPHAGITIS: ICD-10-CM

## 2024-08-28 DIAGNOSIS — I25.118 CORONARY ARTERY DISEASE INVOLVING NATIVE CORONARY ARTERY OF NATIVE HEART WITH OTHER FORM OF ANGINA PECTORIS: ICD-10-CM

## 2024-08-28 LAB
POC ACTIVATED CLOTTING TIME K: 226 SEC (ref 74–137)
POC ACTIVATED CLOTTING TIME K: 348 SEC (ref 74–137)
SAMPLE: ABNORMAL
SAMPLE: ABNORMAL
SITE: ABNORMAL
SITE: ABNORMAL

## 2024-08-28 PROCEDURE — C1769 GUIDE WIRE: HCPCS | Performed by: INTERNAL MEDICINE

## 2024-08-28 PROCEDURE — 99152 MOD SED SAME PHYS/QHP 5/>YRS: CPT | Mod: HCNC,,, | Performed by: INTERNAL MEDICINE

## 2024-08-28 PROCEDURE — C1753 CATH, INTRAVAS ULTRASOUND: HCPCS | Performed by: INTERNAL MEDICINE

## 2024-08-28 PROCEDURE — C1874 STENT, COATED/COV W/DEL SYS: HCPCS | Performed by: INTERNAL MEDICINE

## 2024-08-28 PROCEDURE — 25500020 PHARM REV CODE 255: Performed by: INTERNAL MEDICINE

## 2024-08-28 PROCEDURE — 92972 PERQ TRLUML CORONRY LITHOTRP: CPT | Mod: LD,HCNC | Performed by: INTERNAL MEDICINE

## 2024-08-28 PROCEDURE — 99153 MOD SED SAME PHYS/QHP EA: CPT | Performed by: INTERNAL MEDICINE

## 2024-08-28 PROCEDURE — C9600 PERC DRUG-EL COR STENT SING: HCPCS | Mod: LD,HCNC | Performed by: INTERNAL MEDICINE

## 2024-08-28 PROCEDURE — C1894 INTRO/SHEATH, NON-LASER: HCPCS | Performed by: INTERNAL MEDICINE

## 2024-08-28 PROCEDURE — C1725 CATH, TRANSLUMIN NON-LASER: HCPCS | Performed by: INTERNAL MEDICINE

## 2024-08-28 PROCEDURE — 92928 PRQ TCAT PLMT NTRAC ST 1 LES: CPT | Mod: LD,HCNC,, | Performed by: INTERNAL MEDICINE

## 2024-08-28 PROCEDURE — 27201423 OPTIME MED/SURG SUP & DEVICES STERILE SUPPLY: Performed by: INTERNAL MEDICINE

## 2024-08-28 PROCEDURE — C1761 OPTIME CATH, TRANSLUMINAL INTRAVASC LITHO, CORONARY: HCPCS | Performed by: INTERNAL MEDICINE

## 2024-08-28 PROCEDURE — 92972 PERQ TRLUML CORONRY LITHOTRP: CPT | Mod: LD,HCNC,, | Performed by: INTERNAL MEDICINE

## 2024-08-28 PROCEDURE — 25000003 PHARM REV CODE 250: Performed by: INTERNAL MEDICINE

## 2024-08-28 PROCEDURE — 63600175 PHARM REV CODE 636 W HCPCS: Mod: JZ,JG | Performed by: INTERNAL MEDICINE

## 2024-08-28 PROCEDURE — 99152 MOD SED SAME PHYS/QHP 5/>YRS: CPT | Performed by: INTERNAL MEDICINE

## 2024-08-28 PROCEDURE — 92978 ENDOLUMINL IVUS OCT C 1ST: CPT | Mod: 26,LD,HCNC, | Performed by: INTERNAL MEDICINE

## 2024-08-28 PROCEDURE — C1887 CATHETER, GUIDING: HCPCS | Performed by: INTERNAL MEDICINE

## 2024-08-28 PROCEDURE — 92978 ENDOLUMINL IVUS OCT C 1ST: CPT | Mod: LD,HCNC | Performed by: INTERNAL MEDICINE

## 2024-08-28 DEVICE — EVEROLIMUS-ELUTING PLATINUM CHROMIUM CORONARY STENT SYSTEM
Type: IMPLANTABLE DEVICE | Site: CORONARY | Status: FUNCTIONAL
Brand: SYNERGY™ XD

## 2024-08-28 RX ORDER — NITROGLYCERIN 5 MG/ML
INJECTION, SOLUTION INTRAVENOUS
Status: DISCONTINUED | OUTPATIENT
Start: 2024-08-28 | End: 2024-09-10 | Stop reason: HOSPADM

## 2024-08-28 RX ORDER — SODIUM CHLORIDE 9 MG/ML
INJECTION, SOLUTION INTRAVENOUS CONTINUOUS
Status: DISCONTINUED | OUTPATIENT
Start: 2024-08-28 | End: 2024-09-10 | Stop reason: HOSPADM

## 2024-08-28 RX ORDER — VERAPAMIL HYDROCHLORIDE 2.5 MG/ML
INJECTION, SOLUTION INTRAVENOUS
Status: DISCONTINUED | OUTPATIENT
Start: 2024-08-28 | End: 2024-09-10 | Stop reason: HOSPADM

## 2024-08-28 RX ORDER — ASPIRIN 81 MG/1
81 TABLET ORAL ONCE
Status: COMPLETED | OUTPATIENT
Start: 2024-08-28 | End: 2024-08-28

## 2024-08-28 RX ORDER — HEPARIN SODIUM 1000 [USP'U]/ML
INJECTION INTRAVENOUS; SUBCUTANEOUS
Status: DISCONTINUED | OUTPATIENT
Start: 2024-08-28 | End: 2024-09-10 | Stop reason: HOSPADM

## 2024-08-28 RX ORDER — ONDANSETRON HYDROCHLORIDE 2 MG/ML
4 INJECTION, SOLUTION INTRAVENOUS EVERY 12 HOURS PRN
Status: DISCONTINUED | OUTPATIENT
Start: 2024-08-28 | End: 2024-09-10 | Stop reason: HOSPADM

## 2024-08-28 RX ORDER — FENTANYL CITRATE 50 UG/ML
INJECTION, SOLUTION INTRAMUSCULAR; INTRAVENOUS
Status: DISCONTINUED | OUTPATIENT
Start: 2024-08-28 | End: 2024-09-10 | Stop reason: HOSPADM

## 2024-08-28 RX ORDER — CLOPIDOGREL BISULFATE 75 MG/1
75 TABLET ORAL ONCE
Status: COMPLETED | OUTPATIENT
Start: 2024-08-28 | End: 2024-08-28

## 2024-08-28 RX ORDER — DIPHENHYDRAMINE HCL 25 MG
50 CAPSULE ORAL ONCE
Status: COMPLETED | OUTPATIENT
Start: 2024-08-28 | End: 2024-08-28

## 2024-08-28 RX ORDER — ACETAMINOPHEN 325 MG/1
650 TABLET ORAL EVERY 4 HOURS PRN
Status: DISCONTINUED | OUTPATIENT
Start: 2024-08-28 | End: 2024-09-10 | Stop reason: HOSPADM

## 2024-08-28 RX ORDER — MIDAZOLAM HYDROCHLORIDE 1 MG/ML
INJECTION, SOLUTION INTRAMUSCULAR; INTRAVENOUS
Status: DISCONTINUED | OUTPATIENT
Start: 2024-08-28 | End: 2024-09-10 | Stop reason: HOSPADM

## 2024-08-28 RX ORDER — LIDOCAINE HYDROCHLORIDE 10 MG/ML
INJECTION, SOLUTION EPIDURAL; INFILTRATION; INTRACAUDAL; PERINEURAL
Status: DISCONTINUED | OUTPATIENT
Start: 2024-08-28 | End: 2024-09-10 | Stop reason: HOSPADM

## 2024-08-28 RX ADMIN — DIPHENHYDRAMINE HYDROCHLORIDE 50 MG: 25 CAPSULE ORAL at 10:08

## 2024-08-28 RX ADMIN — SODIUM CHLORIDE: 9 INJECTION, SOLUTION INTRAVENOUS at 10:08

## 2024-08-28 RX ADMIN — ACETAMINOPHEN 650 MG: 325 TABLET ORAL at 02:08

## 2024-08-28 RX ADMIN — ASPIRIN 81 MG: 81 TABLET, COATED ORAL at 11:08

## 2024-08-28 RX ADMIN — CLOPIDOGREL BISULFATE 75 MG: 75 TABLET ORAL at 11:08

## 2024-08-28 NOTE — DISCHARGE INSTRUCTIONS
Limit movement of the wrist.  Do not bend wrist or perform heavy lifting for 24 hours.      NO blood pressure cuff or venipuncture to affected arm for 24 hours.    If bleeding occurs, apply pressure to site for 20 minutes. Apply band aid once bleeding stops.  Call 911 if unable to stop bleeding with pressure.     Keep your follow up appointment.      Do not drive, drink alcohol, or sign legal documents for 24 hours, or if taking narcotic pain medication.Limit movement of the wrist.  Do not bend wrist or perform heavy lifting for 24 hours.      NO blood pressure cuff or venipuncture to affected arm for 24 hours.    If bleeding occurs, apply pressure to site for 20 minutes. Apply band aid once bleeding stops.  Call 911 if unable to stop bleeding with pressure.     Keep your follow up appointment.      Do not drive, drink alcohol, or sign legal documents for 24 hours, or if taking narcotic pain medication.    Hold Metformin for 48 hours.     Keep right  hand splint on for 24 hours.

## 2024-08-28 NOTE — Clinical Note
An angiography was performed of the left coronary arteries. The angiography was performed via power injection. The injected amount was 10 mL contrast at 4 mL/s.

## 2024-08-28 NOTE — Clinical Note
Medication requested:  PLAQUENIL 200 MG   Last refilled: 9/26/18  Qty: 60  MED REC:  Continue plaquenil 200mg BID       Last seen: 10/2/18  RTC: 5 WEEKS  Next appt: 11/8/18  Routing refill request to provider for review/approval because:  Drug not on the refill protocol     RF PENDING (30 DAYS) UNITL APPT.   The ECG showed sinus rhythm.

## 2024-09-10 ENCOUNTER — OFFICE VISIT (OUTPATIENT)
Dept: CARDIOLOGY | Facility: CLINIC | Age: 59
End: 2024-09-10
Payer: MEDICARE

## 2024-09-10 VITALS
HEART RATE: 109 BPM | BODY MASS INDEX: 24.17 KG/M2 | RESPIRATION RATE: 18 BRPM | OXYGEN SATURATION: 95 % | DIASTOLIC BLOOD PRESSURE: 56 MMHG | WEIGHT: 141.56 LBS | HEIGHT: 64 IN | SYSTOLIC BLOOD PRESSURE: 92 MMHG

## 2024-09-10 DIAGNOSIS — I25.118 CORONARY ARTERY DISEASE INVOLVING NATIVE CORONARY ARTERY OF NATIVE HEART WITH OTHER FORM OF ANGINA PECTORIS: Primary | ICD-10-CM

## 2024-09-10 DIAGNOSIS — I10 BENIGN ESSENTIAL HTN: ICD-10-CM

## 2024-09-10 DIAGNOSIS — K21.9 GASTROESOPHAGEAL REFLUX DISEASE, UNSPECIFIED WHETHER ESOPHAGITIS PRESENT: ICD-10-CM

## 2024-09-10 DIAGNOSIS — E78.5 DYSLIPIDEMIA: ICD-10-CM

## 2024-09-10 DIAGNOSIS — I25.10 CORONARY ARTERY DISEASE, UNSPECIFIED VESSEL OR LESION TYPE, UNSPECIFIED WHETHER ANGINA PRESENT, UNSPECIFIED WHETHER NATIVE OR TRANSPLANTED HEART: ICD-10-CM

## 2024-09-10 DIAGNOSIS — I70.0 AORTIC ATHEROSCLEROSIS: ICD-10-CM

## 2024-09-10 DIAGNOSIS — E11.65 CONTROLLED TYPE 2 DIABETES MELLITUS WITH HYPERGLYCEMIA, WITHOUT LONG-TERM CURRENT USE OF INSULIN: ICD-10-CM

## 2024-09-10 DIAGNOSIS — E11.9 CONTROLLED TYPE 2 DIABETES MELLITUS WITHOUT COMPLICATION, UNSPECIFIED WHETHER LONG TERM INSULIN USE: Chronic | ICD-10-CM

## 2024-09-10 DIAGNOSIS — M19.90 OSTEOARTHRITIS, UNSPECIFIED OSTEOARTHRITIS TYPE, UNSPECIFIED SITE: ICD-10-CM

## 2024-09-10 PROCEDURE — 3074F SYST BP LT 130 MM HG: CPT | Mod: HCNC,CPTII,S$GLB, | Performed by: INTERNAL MEDICINE

## 2024-09-10 PROCEDURE — 3061F NEG MICROALBUMINURIA REV: CPT | Mod: HCNC,CPTII,S$GLB, | Performed by: INTERNAL MEDICINE

## 2024-09-10 PROCEDURE — 3044F HG A1C LEVEL LT 7.0%: CPT | Mod: HCNC,CPTII,S$GLB, | Performed by: INTERNAL MEDICINE

## 2024-09-10 PROCEDURE — 1159F MED LIST DOCD IN RCRD: CPT | Mod: HCNC,CPTII,S$GLB, | Performed by: INTERNAL MEDICINE

## 2024-09-10 PROCEDURE — 3078F DIAST BP <80 MM HG: CPT | Mod: HCNC,CPTII,S$GLB, | Performed by: INTERNAL MEDICINE

## 2024-09-10 PROCEDURE — 3066F NEPHROPATHY DOC TX: CPT | Mod: HCNC,CPTII,S$GLB, | Performed by: INTERNAL MEDICINE

## 2024-09-10 PROCEDURE — 93000 ELECTROCARDIOGRAM COMPLETE: CPT | Mod: HCNC,S$GLB,, | Performed by: INTERNAL MEDICINE

## 2024-09-10 PROCEDURE — 99214 OFFICE O/P EST MOD 30 MIN: CPT | Mod: HCNC,S$GLB,, | Performed by: INTERNAL MEDICINE

## 2024-09-10 PROCEDURE — 99999 PR PBB SHADOW E&M-EST. PATIENT-LVL V: CPT | Mod: PBBFAC,HCNC,, | Performed by: INTERNAL MEDICINE

## 2024-09-10 PROCEDURE — 3008F BODY MASS INDEX DOCD: CPT | Mod: HCNC,CPTII,S$GLB, | Performed by: INTERNAL MEDICINE

## 2024-09-10 RX ORDER — VALSARTAN AND HYDROCHLOROTHIAZIDE 160; 25 MG/1; MG/1
0.5 TABLET ORAL DAILY
Qty: 45 TABLET | Refills: 3 | Status: SHIPPED | OUTPATIENT
Start: 2024-09-10

## 2024-09-10 NOTE — PROGRESS NOTES
CARDIOVASCULAR CONSULTATION    REASON FOR CONSULT:   Nino Price is a 59 y.o. male who presents for   Chief Complaint   Patient presents with    Post-op Evaluation          HISTORY OF PRESENT ILLNESS:     Patient here for follow-up after recent hospital stay for chest pain.  Underwent stress echocardiogram which did not show any significant ischemia.  Has been chest pain-free.  Denies orthopnea, PND, swelling of feet.    From August 24: Patient here for follow-up.  Occasional chest pains.  Better than before PCI of circumflex    Results for orders placed or performed during the hospital encounter of 08/16/24   EKG 12-lead    Collection Time: 08/16/24  1:29 PM   Result Value Ref Range    QRS Duration 82 ms    OHS QTC Calculation 423 ms    Narrative    Test Reason : I25.10,    Vent. Rate : 090 BPM     Atrial Rate : 090 BPM     P-R Int : 144 ms          QRS Dur : 082 ms      QT Int : 346 ms       P-R-T Axes : 059 010 063 degrees     QTc Int : 423 ms    Normal sinus rhythm  Normal ECG  When compared with ECG of 18-JUN-2024 12:06,  Significant changes have occurred  Confirmed by Gabriela Rider MD ([id number]) on 8/18/2024 8:35:28 PM    Referred By:             Confirmed By:Gabriela Rider MD       Results for orders placed during the hospital encounter of 06/16/24    Echo    Interpretation Summary    Left Ventricle: The left ventricle is normal in size. Normal wall thickness. Regional wall motion abnormalities present (canot exclude mild posterolateral HK). There is low normal systolic function with a visually estimated ejection fraction of 50 - 55%. Grade I diastolic dysfunction.    Right Ventricle: Normal right ventricular cavity size. Systolic function is normal.      Results for orders placed during the hospital encounter of 08/09/23    Nuclear Stress - Cardiology Interpreted    Interpretation Summary    Abnormal myocardial perfusion scan.    There is a moderate to severe intensity, moderate sized, fixed perfusion  abnormality in the lateral wall(s). Only resting images obtained as patient refused to complete test. Incomplete study.    There are no other significant perfusion abnormalities.    The ECG portion of the study is negative for ischemia.    The patient reported no chest pain during the stress test.    There were no arrhythmias during stress.      Results for orders placed during the hospital encounter of 06/16/24    Cardiac catheterization    Conclusion    Successful IVUS guided PCI of circumflex into OM1 with excellent angiographic results.  IVUS post PCI revealed well-expanded and apposed stent.    Prox Cx lesion: A STENT SYNERGY XD 3.0X48MM  at 12 KENDRA for 12 sec.    The Prox Cx lesion was 95% stenosed with 0% stenosis post-intervention.    IVUS left main showed Kacie of 7.6 millimeter sq in the proximal and ostial left main    The estimated blood loss was <50 mL.    Procedures performed:    1. Coronary angiogram    2. PCI of proximal circ into OM1    3. IVUS of circumflex    4. IVUS of left main      Coronary angiogram:    Left main: Proximal 40 to 50% stenosis.  IVUS done.  MLA in the proximal segment is 7.6 millimeter sq    Lad:  Proximal to mid 80% stenosis.  Will stage PCI    Circumflex: Culprit vessel.  Severe 95% stenosis.  Successful PCI done    RCA not studed today    Access: Right radial artery access    Assessment and plan    Continue aspirin 81 mg daily indefinitely    Plavix 70 mg daily uninterrupted for at least 1 year and longer if no contraindications    Statins with goal LDL less than 70    Smoking cessation    Staged PCI of LAD    Follow-up in Cardiology Clinic with Dr. Rider      The procedure log was documented by Documenter: Deni Fletcher RN and verified by Gabriela Rider MD.    Date: 6/18/2024  Time: 12:54 PM    Notes from September 24: Patient here for follow-up.  No complications from angiogram.  Shortness of breath and chest pain better.  Trying to cut down smoking.  Unfortunately still  smokes about 8 cigarettes a day    Results for orders placed during the hospital encounter of 08/28/24    Cardiac catheterization    Conclusion    The Prox LAD lesion was 90% stenosed with 0% stenosis post-intervention.    Prox LAD lesion: A STENT SYNERGY XD 3.0X38MM stent was successfully placed at 12 KENDRA for 23 sec.    The estimated blood loss was <50 mL.    Successful IVUS guided PCI of heavily calcified prox to mid LAD with JUSTIN with excellent angiographic results.  Shock wave lithotripsy performed prior to stent implantation because of heavily calcified lesion    Procedures performed    1. Coronary angiography    2. Shock wave lithotripsy of prox to mid LAD (CPT +90455)    3. BALLOON ANGIOPLASTY AND PCI OF LAD WITH JUSTIN    ACCESS: RIGHT RADIAL ARTERY ACCESS    ASSESSMENT PLAN    CONTINUE ASPIRIN 81 MG DAILY INDEFINITELY AND PLAVIX 70 MG DAILY UNINTERRUPTED FOR AT LEAST 1 YEAR AND LONGER IF NO CONTRAINDICATIONS              The procedure log was documented by Documenter: Yamileth Alfonso RVT and verified by Gabriela Rider MD.    Date: 8/28/2024  Time: 12:59 PM        PAST MEDICAL HISTORY:     Past Medical History:   Diagnosis Date    Anxiety     Asthma     Colitis     Depression     Diabetes mellitus     Head injury     History of hepatitis C, s/p successful hcv treatment w/ SVR 8-2015  10/8/2012    SVR(24) as of 11/2015.  SVR(12) as of 8/2015.  completed harvoni 24 week regimen for genotype 1a 5/18/15     Hypertension     Shoulder pain     left, s/p 4 surgeries.     Stroke        PAST SURGICAL HISTORY:     Past Surgical History:   Procedure Laterality Date    ANGIOGRAM, CORONARY, WITH LEFT HEART CATHETERIZATION Left 6/17/2024    Procedure: Angiogram, Coronary, with Left Heart Cath;  Surgeon: Gabriela Rider MD;  Location: [facility] CATH LAB;  Service: Cardiology;  Laterality: Left;  12pm start, R rad access    COLONOSCOPY N/A 10/20/2015    Procedure: COLONOSCOPY;  Surgeon: Jagdish Patricia MD;  Location: [facility]  ENDO;  Service: Endoscopy;  Laterality: N/A;    COLONOSCOPY N/A 1/26/2024    Procedure: COLONOSCOPY;  Surgeon: Pricila Shanks MD;  Location: [facility] ENDO;  Service: Endoscopy;  Laterality: N/A;    CORONARY STENT PLACEMENT N/A 6/18/2024    Procedure: INSERTION, STENT, CORONARY ARTERY;  Surgeon: Gabriela Rider MD;  Location: [facility] CATH LAB;  Service: Cardiology;  Laterality: N/A;  lcx pci. radial. lbu 3.5. 12 pm access    CORONARY STENT PLACEMENT N/A 8/28/2024    Procedure: INSERTION, STENT, CORONARY ARTERY;  Surgeon: Gabriela Rider MD;  Location: [facility] CATH LAB;  Service: Cardiology;  Laterality: N/A;  radial access. pci lad. have shockwave ready  RN PREOP 8/16/2024    DIAGNOSTIC LAPAROSCOPY N/A 4/1/2021    Procedure: LAPAROSCOPY, DIAGNOSTIC;  Surgeon: Umesh Vallecillo MD;  Location: [facility] OR;  Service: General;  Laterality: N/A;  RN PREOP 3/29/21, COVID NEGATIVE ON 3/29  CONSENT AND H/P INCOMPLETE    ESOPHAGOGASTRODUODENOSCOPY N/A 1/26/2024    Procedure: EGD (ESOPHAGOGASTRODUODENOSCOPY);  Surgeon: Pricila Shanks MD;  Location: [facility] ENDO;  Service: Endoscopy;  Laterality: N/A;  prep instructions mailed to pt / Spevenancio pt / Urgent/suprep  1/19- lvm for pc. DBM  1/23- left 2nd vm for pc. DBM  1/24 cirrohsis-labs prior, precall complete-st    GALLBLADDER SURGERY      pt not 100% if gall bladder removed    IVUS, CORONARY  6/18/2024    Procedure: IVUS, Coronary;  Surgeon: Gabriela Rider MD;  Location: [facility] CATH LAB;  Service: Cardiology;;    PERCUTANEOUS CORONARY INTERVENTION, ARTERY N/A 6/18/2024    Procedure: Percutaneous coronary intervention;  Surgeon: Gabriela Rider MD;  Location: [facility] CATH LAB;  Service: Cardiology;  Laterality: N/A;    rotator cuff      left side       ALLERGIES AND MEDICATION:     Review of patient's allergies indicates:   Allergen Reactions    Codeine Nausea Only        Medication List            Accurate as of September 10, 2024  1:33 PM. If you have any questions, ask your nurse or  doctor.                CHANGE how you take these medications      valsartan-hydrochlorothiazide 160-25 mg per tablet  Commonly known as: DIOVAN-HCT  Take 0.5 tablets by mouth once daily.  What changed:   how much to take  when to take this  Changed by: Gabriela Rider MD            CONTINUE taking these medications      aspirin 81 MG EC tablet  Commonly known as: ECOTRIN  Take 1 tablet (81 mg total) by mouth once daily.     atorvastatin 80 MG tablet  Commonly known as: LIPITOR  Take 1 tablet (80 mg total) by mouth once daily.     busPIRone 10 MG tablet  Commonly known as: BUSPAR  Take 1 tablet (10 mg total) by mouth every evening.     clopidogreL 75 mg tablet  Commonly known as: PLAVIX  Take 1 tablet (75 mg total) by mouth once daily.     cyclobenzaprine 5 MG tablet  Commonly known as: FLEXERIL     dicyclomine 20 mg tablet  Commonly known as: BENTYL  Take 1 tablet (20 mg total) by mouth 2 (two) times daily as needed (stomach pain).     DULoxetine 60 MG capsule  Commonly known as: CYMBALTA     gabapentin 300 MG capsule  Commonly known as: NEURONTIN     icosapent ethyL 1 gram Cap  Commonly known as: VASCEPA     lancing device with lancets Kit  To check blood glucose twice daily.     metFORMIN 1000 MG tablet  Commonly known as: GLUCOPHAGE  Take 1 tablet (1,000 mg total) by mouth 2 (two) times daily with meals.     metoclopramide HCl 10 MG tablet  Commonly known as: REGLAN  Take 1 tablet (10 mg total) by mouth every 6 (six) hours.     mirtazapine 15 MG tablet  Commonly known as: REMERON     nitroGLYCERIN 0.4 MG SL tablet  Commonly known as: NITROSTAT  Place 1 tablet (0.4 mg total) under the tongue as needed for Chest pain.     oxyCODONE-acetaminophen  mg per tablet  Commonly known as: PERCOCET     pantoprazole 40 MG tablet  Commonly known as: PROTONIX  Take 1 tablet (40 mg total) by mouth once daily.     tiZANidine 4 MG tablet  Commonly known as: ZANAFLEX     traZODone 100 MG tablet  Commonly known as: DESYREL      TRUE METRIX GLUCOSE METER Misc  Generic drug: blood-glucose meter               Where to Get Your Medications        These medications were sent to [facility]/pharmacy #[id number] - [place] Chopra - [address]  [address] [address]      Phone: [phone]   valsartan-hydrochlorothiazide 160-25 mg per tablet         SOCIAL HISTORY:     Social History     Socioeconomic History    Marital status:     Number of children: 3   Tobacco Use    Smoking status: Every Day     Current packs/day: 1.00     Average packs/day: 1 pack/day for 35.0 years (35.0 ttl pk-yrs)     Types: Cigarettes     Passive exposure: Current    Smokeless tobacco: Never    Tobacco comments:     Quitting on his own, reports that he's down 4 to 5 cig a day   Substance and Sexual Activity    Alcohol use: No    Drug use: Yes     Frequency: 7.0 times per week     Types: Marijuana, Oxycodone     Comment: Majiuan once a week/ Oxy 3-4 tabs daily    Sexual activity: Yes     Partners: Female   Social History Narrative    Reside on .     Lives with wife and young daughter (22yrs)    Not working, on disability since accident    Prior job-[employer]    Hobbies: carving, fishing    Not driving.      Social Determinants of Health     Financial Resource Strain: Patient Declined (8/8/2024)    Overall Financial Resource Strain (CARDIA)     Difficulty of Paying Living Expenses: Patient declined   Food Insecurity: Patient Declined (8/8/2024)    Hunger Vital Sign     Worried About Running Out of Food in the Last Year: Patient declined     Ran Out of Food in the Last Year: Patient declined   Transportation Needs: No Transportation Needs (7/11/2024)    PRAPARE - Transportation     Lack of Transportation (Medical): No     Lack of Transportation (Non-Medical): No   Physical Activity: Unknown (8/8/2024)    Exercise Vital Sign     Days of Exercise per Week: 0 days     Minutes of Exercise per Session: Patient declined   Recent Concern: Physical Activity -  "Insufficiently Active (7/11/2024)    Exercise Vital Sign     Days of Exercise per Week: 2 days     Minutes of Exercise per Session: 20 min   Stress: Stress Concern Present (8/8/2024)    [employer] - Occupational Stress Questionnaire     Feeling of Stress : To some extent   Housing Stability: Unknown (8/8/2024)    Housing Stability Vital Sign     Unable to Pay for Housing in the Last Year: Patient declined     Homeless in the Last Year: No       FAMILY HISTORY:     Family History   Problem Relation Name Age of Onset    Hypertension Mother      Heart disease Father      Cirrhosis Brother      Colon cancer Neg Hx      Esophageal cancer Neg Hx         REVIEW OF SYSTEMS:   Review of Systems   Constitutional: Negative.   HENT: Negative.     Eyes: Negative.    Cardiovascular:  Positive for chest pain.   Respiratory: Negative.     Endocrine: Negative.    Hematologic/Lymphatic: Negative.    Skin: Negative.    Musculoskeletal: Negative.    Gastrointestinal: Negative.    Genitourinary: Negative.    Neurological: Negative.    Psychiatric/Behavioral: Negative.     Allergic/Immunologic: Negative.        A 10 point review of systems was performed and all the pertinent positives have been mentioned. Rest of review of systems was negative.        PHYSICAL EXAM:     Vitals:    09/10/24 1306   BP: (!) 92/56   Pulse: 109   Resp: 18    Body mass index is 24.29 kg/m².  Weight: 64.2 kg (141 lb 8.6 oz)   Height: 5' 4" (162.6 cm)     Physical Exam  Vitals reviewed.   Constitutional:       Appearance: He is well-developed.   HENT:      Head: Normocephalic.   Eyes:      Conjunctiva/sclera: Conjunctivae normal.      Pupils: Pupils are equal, round, and reactive to light.   Cardiovascular:      Rate and Rhythm: Normal rate and regular rhythm.      Heart sounds: Normal heart sounds.   Pulmonary:      Effort: Pulmonary effort is normal.      Breath sounds: Normal breath sounds.   Abdominal:      General: Bowel sounds " are normal.      Palpations: Abdomen is soft.   Musculoskeletal:      Cervical back: Normal range of motion and neck supple.   Skin:     General: Skin is warm.   Neurological:      Mental Status: He is alert and oriented to person, place, and time.           DATA:     Laboratory:  CBC:  Recent Labs   Lab 06/19/24  0521 08/16/24  1330 08/19/24  1205   WBC 6.98 6.39 8.02   Hemoglobin 12.5 L 12.8 L 12.7 L   Hematocrit 37.3 L 37.3 L 38.2 L   Platelets 180 SEE COMMENT 203       CHEMISTRIES:  Recent Labs   Lab 09/28/21  1537 05/26/22  0950 04/26/23  0615 06/18/24  0251 06/19/24  0521 06/27/24  1102 08/16/24  1330   Glucose 84 135 H   < > 129 H 130 H 201 H 141 H   Sodium 141 141   < > 142 140 140 141   Potassium 4.6 4.0   < > 4.1 3.7 4.8 4.3   BUN 9 7   < > 11 11 9 10   Creatinine 1.0 1.0   < > 0.9 1.0 1.1 1.1   eGFR if  >60 >60  --   --   --   --   --    eGFR if non  >60 >60  --   --   --   --   --    Calcium 10.5 9.2   < > 8.8 8.6 L 9.8 9.7   Magnesium  --   --    < > 1.5 L 1.4 L 1.8  --     < > = values in this interval not displayed.       CARDIAC BIOMARKERS:  Recent Labs   Lab 06/16/24  2154 06/17/24  0407 06/17/24  0758   Troponin I 0.077 H 0.104 H 0.068 H       COAGS:  Recent Labs   Lab 05/26/22  0950 04/15/24  1513 06/17/24  0758   INR 1.0 1.0 1.1       LIPIDS/LFTS:  Recent Labs   Lab 08/10/23  0738 08/11/23  0514 04/30/24  0835 06/16/24  1952 06/17/24  0407 06/27/24  1102   Cholesterol 218 H  --  267 H  --  124  --    Triglycerides 156 H  --  303 H  --  116  --    HDL 26 L  --  31 L  --  23 L  --    LDL Cholesterol 160.8 H  --  175.4 H  --  77.8  --    Non-HDL Cholesterol 192  --  236  --  101  --    AST  --    < > 15 15  --  23   ALT  --    < > 20 18  --  51 H    < > = values in this interval not displayed.       Hemoglobin A1C   Date Value Ref Range Status   06/17/2024 6.2 (H) 4.0 - 5.6 % Final     Comment:     ADA Screening Guidelines:  5.7-6.4%  Consistent with  prediabetes  >or=6.5%  Consistent with diabetes    High levels of fetal hemoglobin interfere with the HbA1C  assay. Heterozygous hemoglobin variants (HbS, HgC, etc)do  not significantly interfere with this assay.   However, presence of multiple variants may affect accuracy.     04/30/2024 6.8 (H) 4.0 - 5.6 % Final     Comment:     ADA Screening Guidelines:  5.7-6.4%  Consistent with prediabetes  >or=6.5%  Consistent with diabetes    High levels of fetal hemoglobin interfere with the HbA1C  assay. Heterozygous hemoglobin variants (HbS, HgC, etc)do  not significantly interfere with this assay.   However, presence of multiple variants may affect accuracy.     08/25/2023 6.0 (H) 4.0 - 5.6 % Final     Comment:     ADA Screening Guidelines:  5.7-6.4%  Consistent with prediabetes  >or=6.5%  Consistent with diabetes    High levels of fetal hemoglobin interfere with the HbA1C  assay. Heterozygous hemoglobin variants (HbS, HgC, etc)do  not significantly interfere with this assay.   However, presence of multiple variants may affect accuracy.         TSH  Recent Labs   Lab 08/10/23  0738 06/17/24  0407   TSH 1.125 0.593       The ASCVD Risk score (Ana DK, et al., 2019) failed to calculate for the following reasons:    The patient has a prior MI or stroke diagnosis       BNP    Lab Results   Component Value Date/Time     (H) 08/09/2023 08:46 PM     (H) 02/01/2019 06:22 AM    BNP 70 10/21/2016 10:55 PM            ECHO    Results for orders placed during the hospital encounter of 08/09/23    Echo    Interpretation Summary    Left Ventricle: Normal wall motion. There is normal systolic function with a visually estimated ejection fraction of 55 - 60%.    Left Atrium: Left atrium is severely dilated.    Right Ventricle: Normal right ventricular cavity size. Systolic function is normal.    Mitral Valve: There is moderate regurgitation with a posterolateral eccentriccally directed jet.    IVC/SVC: Intermediate venous  pressure at 8 mmHg.      STRESS TEST    Results for orders placed during the hospital encounter of 08/09/23    Nuclear Stress - Cardiology Interpreted    Interpretation Summary    Abnormal myocardial perfusion scan.    There is a moderate to severe intensity, moderate sized, fixed perfusion abnormality in the lateral wall(s). Only resting images obtained as patient refused to complete test. Incomplete study.    There are no other significant perfusion abnormalities.    The ECG portion of the study is negative for ischemia.    The patient reported no chest pain during the stress test.    There were no arrhythmias during stress.        CATH    No results found for this or any previous visit.    STRESS ECHO 2023:        Left Ventricle: Normal wall motion. There is normal systolic function with a visually estimated ejection fraction of 55 - 60%.    ECG Conclusion: The ECG portion of the study is negative for ischemia.    Post-stress Echo: The left ventricle systolic function is hyperdynamic.    Post-stress Impression: The study is normal.    ASSESSMENT AND PLAN     Patient Active Problem List   Diagnosis    History of hepatitis C, s/p successful hcv treatment w/ SVR 8-2015     Chronic hepatitis C    Type 2 diabetes mellitus, controlled    Essential hypertension    Tobacco abuse    Cirrhosis of liver without ascites    Abdominal pain    Depression    History of CVA (cerebrovascular accident)    Esophagitis    Dyslipidemia    GERD (gastroesophageal reflux disease)    Cirrhosis    Cannabis abuse    Hemiplegia and hemiparesis following cerebral infarction affecting right dominant side    Alcohol use disorder, severe, in sustained remission    Hepatic fibrosis, advanced fibrosis    History of liver biopsy    Aortic atherosclerosis    CAD (coronary artery disease)    Hypomagnesemia    Hypophosphatemia    Avascular necrosis of bone of hip, unspecified laterality    Depression, major, recurrent, moderate    Osteoarthritis     Insomnia     Patient with multiple vessel coronary artery disease.  Status post PCI of circumflex and LAD.  Not CT surgery candidate because of liver cirrhosis.   Continue aspirin and Plavix.  Continue statins with goal LDL less than 70      Patient refused cardiac rehab    Smoking cessation again emphasized    Borderline blood pressure, reduce valsartan hydrochlorothiazide to 80/12.5 mg daily    Follow-up in 3 m    Thank you very much for involving me in the care of your patient.  Please do not hesitate to contact me if there are any questions.      Gabriela Rider MD, FAC, [facility]  Interventional Cardiologist, [facility].           This note was dictated with the help of speech recognition software.  There might be un-intended errors and/or substitutions.

## 2024-09-12 DIAGNOSIS — I25.118 CORONARY ARTERY DISEASE INVOLVING NATIVE CORONARY ARTERY OF NATIVE HEART WITH OTHER FORM OF ANGINA PECTORIS: Primary | ICD-10-CM

## 2024-09-12 LAB
OHS QRS DURATION: 84 MS
OHS QTC CALCULATION: 441 MS

## 2024-10-10 ENCOUNTER — HOSPITAL ENCOUNTER (INPATIENT)
Facility: HOSPITAL | Age: 59
LOS: 2 days | Discharge: HOME | DRG: 282 | End: 2024-10-12
Attending: STUDENT IN AN ORGANIZED HEALTH CARE EDUCATION/TRAINING PROGRAM
Payer: MEDICARE

## 2024-10-10 DIAGNOSIS — I10 HTN (HYPERTENSION): ICD-10-CM

## 2024-10-10 DIAGNOSIS — I21.4 NON-ST ELEVATION MYOCARDIAL INFARCTION (NSTEMI): ICD-10-CM

## 2024-10-10 DIAGNOSIS — I21.4 NSTEMI (NON-ST ELEVATION MYOCARDIAL INFARCTION): Primary | ICD-10-CM

## 2024-10-10 DIAGNOSIS — I25.10 CAD (CORONARY ARTERY DISEASE): ICD-10-CM

## 2024-10-10 DIAGNOSIS — F17.200 TOBACCO DEPENDENCY: ICD-10-CM

## 2024-10-10 DIAGNOSIS — Z72.0 TOBACCO ABUSE: Chronic | ICD-10-CM

## 2024-10-10 DIAGNOSIS — I21.4 NSTEMI (NON-ST ELEVATED MYOCARDIAL INFARCTION): ICD-10-CM

## 2024-10-10 LAB
ABO + RH BLD: NORMAL
ALBUMIN SERPL BCP-MCNC: 4.6 G/DL (ref 3.5–5.2)
ALLENS TEST: ABNORMAL
ALLENS TEST: ABNORMAL
ALP SERPL-CCNC: 77 U/L (ref 55–135)
ALT SERPL W/O P-5'-P-CCNC: 32 U/L (ref 10–44)
AMPHET+METHAMPHET UR QL: NEGATIVE
ANION GAP SERPL CALC-SCNC: 17 MMOL/L (ref 8–16)
ANION GAP SERPL CALC-SCNC: 18 MMOL/L (ref 8–16)
APTT PPP: 26.5 SEC (ref 21–32)
APTT PPP: 26.5 SEC (ref 21–32)
AST SERPL-CCNC: 22 U/L (ref 10–40)
BACTERIA #/AREA URNS HPF: NORMAL /HPF
BARBITURATES UR QL SCN>200 NG/ML: NEGATIVE
BASOPHILS # BLD AUTO: 0.05 K/UL (ref 0–0.2)
BASOPHILS # BLD AUTO: 0.07 K/UL (ref 0–0.2)
BASOPHILS NFR BLD: 0.4 % (ref 0–1.9)
BASOPHILS NFR BLD: 0.7 % (ref 0–1.9)
BENZODIAZ UR QL SCN>200 NG/ML: NEGATIVE
BILIRUB SERPL-MCNC: 0.5 MG/DL (ref 0.1–1)
BILIRUB UR QL STRIP: NEGATIVE
BLD GP AB SCN CELLS X3 SERPL QL: NORMAL
BNP SERPL-MCNC: 357 PG/ML (ref 0–99)
BUN SERPL-MCNC: 10 MG/DL (ref 6–30)
BUN SERPL-MCNC: 11 MG/DL (ref 6–20)
BZE UR QL SCN: NEGATIVE
CALCIUM SERPL-MCNC: 10.6 MG/DL (ref 8.7–10.5)
CANNABINOIDS UR QL SCN: ABNORMAL
CHLORIDE SERPL-SCNC: 102 MMOL/L (ref 95–110)
CHLORIDE SERPL-SCNC: 103 MMOL/L (ref 95–110)
CHOLEST SERPL-MCNC: 264 MG/DL (ref 120–199)
CHOLEST/HDLC SERPL: 6.9 {RATIO} (ref 2–5)
CLARITY UR: CLEAR
CO2 SERPL-SCNC: 22 MMOL/L (ref 23–29)
COLOR UR: YELLOW
CREAT SERPL-MCNC: 1.1 MG/DL (ref 0.5–1.4)
CREAT SERPL-MCNC: 1.3 MG/DL (ref 0.5–1.4)
CREAT UR-MCNC: 27.6 MG/DL (ref 23–375)
DIFFERENTIAL METHOD BLD: ABNORMAL
DIFFERENTIAL METHOD BLD: ABNORMAL
EOSINOPHIL # BLD AUTO: 0 K/UL (ref 0–0.5)
EOSINOPHIL # BLD AUTO: 0 K/UL (ref 0–0.5)
EOSINOPHIL NFR BLD: 0 % (ref 0–8)
EOSINOPHIL NFR BLD: 0.3 % (ref 0–8)
ERYTHROCYTE [DISTWIDTH] IN BLOOD BY AUTOMATED COUNT: 13.5 % (ref 11.5–14.5)
ERYTHROCYTE [DISTWIDTH] IN BLOOD BY AUTOMATED COUNT: 13.6 % (ref 11.5–14.5)
EST. GFR  (NO RACE VARIABLE): >60 ML/MIN/1.73 M^2
ETHANOL SERPL-MCNC: <10 MG/DL
GLUCOSE SERPL-MCNC: 279 MG/DL (ref 70–110)
GLUCOSE SERPL-MCNC: 289 MG/DL (ref 70–110)
GLUCOSE UR QL STRIP: ABNORMAL
HCO3 UR-SCNC: 28.4 MMOL/L (ref 24–28)
HCT VFR BLD AUTO: 44.4 % (ref 40–54)
HCT VFR BLD AUTO: 44.7 % (ref 40–54)
HCT VFR BLD CALC: 46 %PCV (ref 36–54)
HDLC SERPL-MCNC: 38 MG/DL (ref 40–75)
HDLC SERPL: 14.4 % (ref 20–50)
HGB BLD-MCNC: 15.3 G/DL (ref 14–18)
HGB BLD-MCNC: 15.4 G/DL (ref 14–18)
HGB UR QL STRIP: ABNORMAL
HYALINE CASTS #/AREA URNS LPF: 0 /LPF
IMM GRANULOCYTES # BLD AUTO: 0.04 K/UL (ref 0–0.04)
IMM GRANULOCYTES # BLD AUTO: 0.04 K/UL (ref 0–0.04)
IMM GRANULOCYTES NFR BLD AUTO: 0.3 % (ref 0–0.5)
IMM GRANULOCYTES NFR BLD AUTO: 0.4 % (ref 0–0.5)
INR PPP: 1 (ref 0.8–1.2)
INR PPP: 1 (ref 0.8–1.2)
KETONES UR QL STRIP: NEGATIVE
LDLC SERPL CALC-MCNC: 194.4 MG/DL (ref 63–159)
LEUKOCYTE ESTERASE UR QL STRIP: NEGATIVE
LIPASE SERPL-CCNC: 14 U/L (ref 4–60)
LYMPHOCYTES # BLD AUTO: 1 K/UL (ref 1–4.8)
LYMPHOCYTES # BLD AUTO: 1.1 K/UL (ref 1–4.8)
LYMPHOCYTES NFR BLD: 9 % (ref 18–48)
LYMPHOCYTES NFR BLD: 9.5 % (ref 18–48)
MCH RBC QN AUTO: 32.6 PG (ref 27–31)
MCH RBC QN AUTO: 33 PG (ref 27–31)
MCHC RBC AUTO-ENTMCNC: 34.5 G/DL (ref 32–36)
MCHC RBC AUTO-ENTMCNC: 34.5 G/DL (ref 32–36)
MCV RBC AUTO: 95 FL (ref 82–98)
MCV RBC AUTO: 96 FL (ref 82–98)
METHADONE UR QL SCN>300 NG/ML: NEGATIVE
MICROSCOPIC COMMENT: NORMAL
MONOCYTES # BLD AUTO: 0.9 K/UL (ref 0.3–1)
MONOCYTES # BLD AUTO: 1 K/UL (ref 0.3–1)
MONOCYTES NFR BLD: 8.1 % (ref 4–15)
MONOCYTES NFR BLD: 8.5 % (ref 4–15)
NEUTROPHILS # BLD AUTO: 8.6 K/UL (ref 1.8–7.7)
NEUTROPHILS # BLD AUTO: 9.3 K/UL (ref 1.8–7.7)
NEUTROPHILS NFR BLD: 81 % (ref 38–73)
NEUTROPHILS NFR BLD: 81.8 % (ref 38–73)
NITRITE UR QL STRIP: NEGATIVE
NONHDLC SERPL-MCNC: 226 MG/DL
NRBC BLD-RTO: 0 /100 WBC
NRBC BLD-RTO: 0 /100 WBC
OHS QRS DURATION: 80 MS
OHS QTC CALCULATION: 426 MS
OPIATES UR QL SCN: ABNORMAL
PCO2 BLDA: 39.4 MMHG (ref 35–45)
PCP UR QL SCN>25 NG/ML: NEGATIVE
PH SMN: 7.47 [PH] (ref 7.35–7.45)
PH UR STRIP: 8 [PH] (ref 5–8)
PLATELET # BLD AUTO: 257 K/UL (ref 150–450)
PLATELET # BLD AUTO: 264 K/UL (ref 150–450)
PMV BLD AUTO: 9.5 FL (ref 9.2–12.9)
PMV BLD AUTO: 9.7 FL (ref 9.2–12.9)
PO2 BLDA: 35 MMHG (ref 40–60)
POC BE: 4 MMOL/L
POC IONIZED CALCIUM: 1.26 MMOL/L (ref 1.06–1.42)
POC SATURATED O2: 72 % (ref 95–100)
POC TCO2 (MEASURED): 28 MMOL/L (ref 23–29)
POC TCO2: 30 MMOL/L (ref 24–29)
POCT GLUCOSE: 280 MG/DL (ref 70–110)
POTASSIUM BLD-SCNC: 4 MMOL/L (ref 3.5–5.1)
POTASSIUM SERPL-SCNC: 4.1 MMOL/L (ref 3.5–5.1)
PROT SERPL-MCNC: 8.7 G/DL (ref 6–8.4)
PROT UR QL STRIP: ABNORMAL
PROTHROMBIN TIME: 11.2 SEC (ref 9–12.5)
PROTHROMBIN TIME: 11.2 SEC (ref 9–12.5)
RBC # BLD AUTO: 4.67 M/UL (ref 4.6–6.2)
RBC # BLD AUTO: 4.69 M/UL (ref 4.6–6.2)
RBC #/AREA URNS HPF: 4 /HPF (ref 0–4)
SAMPLE: ABNORMAL
SAMPLE: ABNORMAL
SITE: ABNORMAL
SITE: ABNORMAL
SODIUM BLD-SCNC: 142 MMOL/L (ref 136–145)
SODIUM SERPL-SCNC: 142 MMOL/L (ref 136–145)
SP GR UR STRIP: 1.02 (ref 1–1.03)
SPECIMEN OUTDATE: NORMAL
TOXICOLOGY INFORMATION: ABNORMAL
TRIGL SERPL-MCNC: 158 MG/DL (ref 30–150)
TROPONIN I SERPL DL<=0.01 NG/ML-MCNC: 0.03 NG/ML (ref 0–0.03)
TROPONIN I SERPL DL<=0.01 NG/ML-MCNC: 0.04 NG/ML (ref 0–0.03)
TROPONIN I SERPL DL<=0.01 NG/ML-MCNC: 0.06 NG/ML (ref 0–0.03)
TROPONIN I SERPL DL<=0.01 NG/ML-MCNC: 0.06 NG/ML (ref 0–0.03)
URN SPEC COLLECT METH UR: ABNORMAL
UROBILINOGEN UR STRIP-ACNC: NEGATIVE EU/DL
WBC # BLD AUTO: 10.55 K/UL (ref 3.9–12.7)
WBC # BLD AUTO: 11.48 K/UL (ref 3.9–12.7)
WBC #/AREA URNS HPF: 2 /HPF (ref 0–5)
YEAST URNS QL MICRO: NORMAL

## 2024-10-10 PROCEDURE — 63600175 PHARM REV CODE 636 W HCPCS: Mod: HCNC | Performed by: STUDENT IN AN ORGANIZED HEALTH CARE EDUCATION/TRAINING PROGRAM

## 2024-10-10 PROCEDURE — 25000242 PHARM REV CODE 250 ALT 637 W/ HCPCS: Mod: HCNC

## 2024-10-10 PROCEDURE — 93010 ELECTROCARDIOGRAM REPORT: CPT | Mod: HCNC,,, | Performed by: INTERNAL MEDICINE

## 2024-10-10 PROCEDURE — 82565 ASSAY OF CREATININE: CPT | Mod: HCNC

## 2024-10-10 PROCEDURE — 85025 COMPLETE CBC W/AUTO DIFF WBC: CPT | Mod: 91,HCNC

## 2024-10-10 PROCEDURE — 86901 BLOOD TYPING SEROLOGIC RH(D): CPT | Mod: HCNC

## 2024-10-10 PROCEDURE — 80053 COMPREHEN METABOLIC PANEL: CPT | Mod: HCNC | Performed by: EMERGENCY MEDICINE

## 2024-10-10 PROCEDURE — 63600175 PHARM REV CODE 636 W HCPCS: Mod: HCNC

## 2024-10-10 PROCEDURE — 96366 THER/PROPH/DIAG IV INF ADDON: CPT | Mod: HCNC

## 2024-10-10 PROCEDURE — 80307 DRUG TEST PRSMV CHEM ANLYZR: CPT | Mod: HCNC | Performed by: EMERGENCY MEDICINE

## 2024-10-10 PROCEDURE — 83036 HEMOGLOBIN GLYCOSYLATED A1C: CPT | Mod: HCNC

## 2024-10-10 PROCEDURE — 82077 ASSAY SPEC XCP UR&BREATH IA: CPT | Mod: HCNC | Performed by: EMERGENCY MEDICINE

## 2024-10-10 PROCEDURE — 85025 COMPLETE CBC W/AUTO DIFF WBC: CPT | Mod: HCNC | Performed by: EMERGENCY MEDICINE

## 2024-10-10 PROCEDURE — 99285 EMERGENCY DEPT VISIT HI MDM: CPT | Mod: 25,HCNC

## 2024-10-10 PROCEDURE — 82962 GLUCOSE BLOOD TEST: CPT | Mod: HCNC

## 2024-10-10 PROCEDURE — 85730 THROMBOPLASTIN TIME PARTIAL: CPT | Mod: HCNC

## 2024-10-10 PROCEDURE — 80061 LIPID PANEL: CPT | Mod: HCNC

## 2024-10-10 PROCEDURE — 85610 PROTHROMBIN TIME: CPT | Mod: HCNC

## 2024-10-10 PROCEDURE — 84484 ASSAY OF TROPONIN QUANT: CPT | Mod: 91,HCNC

## 2024-10-10 PROCEDURE — 93005 ELECTROCARDIOGRAM TRACING: CPT | Mod: HCNC

## 2024-10-10 PROCEDURE — 96361 HYDRATE IV INFUSION ADD-ON: CPT | Mod: HCNC

## 2024-10-10 PROCEDURE — 25500020 PHARM REV CODE 255: Mod: HCNC | Performed by: STUDENT IN AN ORGANIZED HEALTH CARE EDUCATION/TRAINING PROGRAM

## 2024-10-10 PROCEDURE — 84132 ASSAY OF SERUM POTASSIUM: CPT | Mod: HCNC

## 2024-10-10 PROCEDURE — 25000003 PHARM REV CODE 250: Mod: HCNC

## 2024-10-10 PROCEDURE — 82330 ASSAY OF CALCIUM: CPT | Mod: HCNC

## 2024-10-10 PROCEDURE — 83880 ASSAY OF NATRIURETIC PEPTIDE: CPT | Mod: HCNC | Performed by: STUDENT IN AN ORGANIZED HEALTH CARE EDUCATION/TRAINING PROGRAM

## 2024-10-10 PROCEDURE — 11000001 HC ACUTE MED/SURG PRIVATE ROOM: Mod: HCNC

## 2024-10-10 PROCEDURE — 83690 ASSAY OF LIPASE: CPT | Mod: HCNC | Performed by: EMERGENCY MEDICINE

## 2024-10-10 PROCEDURE — 81000 URINALYSIS NONAUTO W/SCOPE: CPT | Mod: HCNC,59 | Performed by: EMERGENCY MEDICINE

## 2024-10-10 PROCEDURE — 85014 HEMATOCRIT: CPT | Mod: HCNC

## 2024-10-10 PROCEDURE — 96375 TX/PRO/DX INJ NEW DRUG ADDON: CPT | Mod: HCNC

## 2024-10-10 PROCEDURE — 86850 RBC ANTIBODY SCREEN: CPT | Mod: HCNC

## 2024-10-10 PROCEDURE — 99900035 HC TECH TIME PER 15 MIN (STAT): Mod: HCNC

## 2024-10-10 PROCEDURE — 82803 BLOOD GASES ANY COMBINATION: CPT | Mod: HCNC

## 2024-10-10 PROCEDURE — 96376 TX/PRO/DX INJ SAME DRUG ADON: CPT | Mod: HCNC

## 2024-10-10 PROCEDURE — 12000002 HC ACUTE/MED SURGE SEMI-PRIVATE ROOM: Mod: HCNC

## 2024-10-10 PROCEDURE — 84484 ASSAY OF TROPONIN QUANT: CPT | Mod: HCNC | Performed by: STUDENT IN AN ORGANIZED HEALTH CARE EDUCATION/TRAINING PROGRAM

## 2024-10-10 PROCEDURE — 96365 THER/PROPH/DIAG IV INF INIT: CPT | Mod: HCNC

## 2024-10-10 PROCEDURE — 84295 ASSAY OF SERUM SODIUM: CPT | Mod: HCNC

## 2024-10-10 PROCEDURE — 96374 THER/PROPH/DIAG INJ IV PUSH: CPT | Mod: HCNC

## 2024-10-10 PROCEDURE — 25000003 PHARM REV CODE 250: Mod: HCNC | Performed by: STUDENT IN AN ORGANIZED HEALTH CARE EDUCATION/TRAINING PROGRAM

## 2024-10-10 PROCEDURE — 93010 ELECTROCARDIOGRAM REPORT: CPT | Mod: HCNC,76,, | Performed by: INTERNAL MEDICINE

## 2024-10-10 RX ORDER — MORPHINE SULFATE 4 MG/ML
2 INJECTION, SOLUTION INTRAMUSCULAR; INTRAVENOUS
Status: DISCONTINUED | OUTPATIENT
Start: 2024-10-10 | End: 2024-10-12 | Stop reason: HOSPADM

## 2024-10-10 RX ORDER — ALUMINUM HYDROXIDE, MAGNESIUM HYDROXIDE, AND SIMETHICONE 1200; 120; 1200 MG/30ML; MG/30ML; MG/30ML
30 SUSPENSION ORAL
Status: COMPLETED | OUTPATIENT
Start: 2024-10-10 | End: 2024-10-10

## 2024-10-10 RX ORDER — NITROGLYCERIN 0.4 MG/1
0.4 TABLET SUBLINGUAL EVERY 5 MIN PRN
Status: DISCONTINUED | OUTPATIENT
Start: 2024-10-10 | End: 2024-10-12 | Stop reason: HOSPADM

## 2024-10-10 RX ORDER — SODIUM CHLORIDE 9 MG/ML
INJECTION, SOLUTION INTRAVENOUS CONTINUOUS
Status: DISCONTINUED | OUTPATIENT
Start: 2024-10-10 | End: 2024-10-11

## 2024-10-10 RX ORDER — ONDANSETRON HYDROCHLORIDE 2 MG/ML
4 INJECTION, SOLUTION INTRAVENOUS
Status: COMPLETED | OUTPATIENT
Start: 2024-10-10 | End: 2024-10-10

## 2024-10-10 RX ORDER — CLOPIDOGREL BISULFATE 75 MG/1
75 TABLET ORAL DAILY
Status: DISCONTINUED | OUTPATIENT
Start: 2024-10-11 | End: 2024-10-12 | Stop reason: HOSPADM

## 2024-10-10 RX ORDER — VALSARTAN 80 MG/1
80 TABLET ORAL
Status: COMPLETED | OUTPATIENT
Start: 2024-10-10 | End: 2024-10-10

## 2024-10-10 RX ORDER — NITROGLYCERIN 0.4 MG/1
0.4 TABLET SUBLINGUAL EVERY 5 MIN PRN
Status: COMPLETED | OUTPATIENT
Start: 2024-10-10 | End: 2024-10-10

## 2024-10-10 RX ORDER — MORPHINE SULFATE 4 MG/ML
4 INJECTION, SOLUTION INTRAMUSCULAR; INTRAVENOUS ONCE
Status: COMPLETED | OUTPATIENT
Start: 2024-10-10 | End: 2024-10-10

## 2024-10-10 RX ORDER — CLONIDINE HYDROCHLORIDE 0.1 MG/1
0.2 TABLET ORAL
Status: DISCONTINUED | OUTPATIENT
Start: 2024-10-10 | End: 2024-10-10

## 2024-10-10 RX ORDER — GLUCAGON 1 MG
1 KIT INJECTION
Status: DISCONTINUED | OUTPATIENT
Start: 2024-10-11 | End: 2024-10-12 | Stop reason: HOSPADM

## 2024-10-10 RX ORDER — NAPROXEN SODIUM 220 MG/1
81 TABLET, FILM COATED ORAL DAILY
Status: DISCONTINUED | OUTPATIENT
Start: 2024-10-11 | End: 2024-10-12 | Stop reason: HOSPADM

## 2024-10-10 RX ORDER — FAMOTIDINE 10 MG/ML
20 INJECTION INTRAVENOUS
Status: COMPLETED | OUTPATIENT
Start: 2024-10-10 | End: 2024-10-10

## 2024-10-10 RX ORDER — MORPHINE SULFATE 4 MG/ML
4 INJECTION, SOLUTION INTRAMUSCULAR; INTRAVENOUS
Status: DISCONTINUED | OUTPATIENT
Start: 2024-10-10 | End: 2024-10-12 | Stop reason: HOSPADM

## 2024-10-10 RX ORDER — CLOPIDOGREL BISULFATE 300 MG/1
600 TABLET, FILM COATED ORAL ONCE
Status: COMPLETED | OUTPATIENT
Start: 2024-10-10 | End: 2024-10-10

## 2024-10-10 RX ORDER — PANTOPRAZOLE SODIUM 40 MG/10ML
80 INJECTION, POWDER, LYOPHILIZED, FOR SOLUTION INTRAVENOUS ONCE
Status: COMPLETED | OUTPATIENT
Start: 2024-10-10 | End: 2024-10-10

## 2024-10-10 RX ORDER — METOPROLOL TARTRATE 25 MG/1
25 TABLET, FILM COATED ORAL 2 TIMES DAILY
Status: DISCONTINUED | OUTPATIENT
Start: 2024-10-10 | End: 2024-10-12 | Stop reason: HOSPADM

## 2024-10-10 RX ORDER — HYDROCHLOROTHIAZIDE 25 MG/1
25 TABLET ORAL
Status: COMPLETED | OUTPATIENT
Start: 2024-10-10 | End: 2024-10-10

## 2024-10-10 RX ORDER — NAPROXEN SODIUM 220 MG/1
324 TABLET, FILM COATED ORAL ONCE
Status: COMPLETED | OUTPATIENT
Start: 2024-10-10 | End: 2024-10-10

## 2024-10-10 RX ORDER — LABETALOL HYDROCHLORIDE 5 MG/ML
10 INJECTION, SOLUTION INTRAVENOUS
Status: COMPLETED | OUTPATIENT
Start: 2024-10-10 | End: 2024-10-10

## 2024-10-10 RX ORDER — INSULIN ASPART 100 [IU]/ML
0-5 INJECTION, SOLUTION INTRAVENOUS; SUBCUTANEOUS EVERY 6 HOURS PRN
Status: DISCONTINUED | OUTPATIENT
Start: 2024-10-11 | End: 2024-10-12 | Stop reason: HOSPADM

## 2024-10-10 RX ORDER — HEPARIN SODIUM,PORCINE/D5W 25000/250
0-40 INTRAVENOUS SOLUTION INTRAVENOUS CONTINUOUS
Status: DISCONTINUED | OUTPATIENT
Start: 2024-10-10 | End: 2024-10-11

## 2024-10-10 RX ORDER — MORPHINE SULFATE 4 MG/ML
4 INJECTION, SOLUTION INTRAMUSCULAR; INTRAVENOUS
Status: COMPLETED | OUTPATIENT
Start: 2024-10-10 | End: 2024-10-10

## 2024-10-10 RX ORDER — ATORVASTATIN CALCIUM 40 MG/1
80 TABLET, FILM COATED ORAL DAILY
Status: DISCONTINUED | OUTPATIENT
Start: 2024-10-11 | End: 2024-10-12 | Stop reason: HOSPADM

## 2024-10-10 RX ORDER — CLONIDINE HYDROCHLORIDE 0.1 MG/1
0.1 TABLET ORAL
Status: COMPLETED | OUTPATIENT
Start: 2024-10-10 | End: 2024-10-10

## 2024-10-10 RX ADMIN — MORPHINE SULFATE 4 MG: 4 INJECTION, SOLUTION INTRAMUSCULAR; INTRAVENOUS at 11:10

## 2024-10-10 RX ADMIN — MORPHINE SULFATE 4 MG: 4 INJECTION, SOLUTION INTRAMUSCULAR; INTRAVENOUS at 02:10

## 2024-10-10 RX ADMIN — ONDANSETRON 4 MG: 2 INJECTION INTRAMUSCULAR; INTRAVENOUS at 12:10

## 2024-10-10 RX ADMIN — FAMOTIDINE 20 MG: 10 INJECTION, SOLUTION INTRAVENOUS at 12:10

## 2024-10-10 RX ADMIN — VALSARTAN 80 MG: 80 TABLET, FILM COATED ORAL at 05:10

## 2024-10-10 RX ADMIN — ALUMINUM HYDROXIDE, MAGNESIUM HYDROXIDE, AND SIMETHICONE 30 ML: 1200; 120; 1200 SUSPENSION ORAL at 02:10

## 2024-10-10 RX ADMIN — PANTOPRAZOLE SODIUM 80 MG: 40 INJECTION, POWDER, LYOPHILIZED, FOR SOLUTION INTRAVENOUS at 06:10

## 2024-10-10 RX ADMIN — LABETALOL HYDROCHLORIDE 10 MG: 5 INJECTION INTRAVENOUS at 02:10

## 2024-10-10 RX ADMIN — NITROGLYCERIN 0.4 MG: 0.4 TABLET, ORALLY DISINTEGRATING SUBLINGUAL at 05:10

## 2024-10-10 RX ADMIN — MORPHINE SULFATE 4 MG: 4 INJECTION, SOLUTION INTRAMUSCULAR; INTRAVENOUS at 12:10

## 2024-10-10 RX ADMIN — HEPARIN SODIUM 12 UNITS/KG/HR: 10000 INJECTION, SOLUTION INTRAVENOUS at 06:10

## 2024-10-10 RX ADMIN — IOHEXOL 75 ML: 350 INJECTION, SOLUTION INTRAVENOUS at 01:10

## 2024-10-10 RX ADMIN — MORPHINE SULFATE 4 MG: 4 INJECTION, SOLUTION INTRAMUSCULAR; INTRAVENOUS at 06:10

## 2024-10-10 RX ADMIN — CLONIDINE HYDROCHLORIDE 0.1 MG: 0.1 TABLET ORAL at 04:10

## 2024-10-10 RX ADMIN — HYDROCHLOROTHIAZIDE 25 MG: 25 TABLET ORAL at 05:10

## 2024-10-10 RX ADMIN — ASPIRIN 81 MG CHEWABLE TABLET 324 MG: 81 TABLET CHEWABLE at 06:10

## 2024-10-10 RX ADMIN — CLOPIDOGREL BISULFATE 600 MG: 300 TABLET, FILM COATED ORAL at 06:10

## 2024-10-10 RX ADMIN — SODIUM CHLORIDE: 9 INJECTION, SOLUTION INTRAVENOUS at 08:10

## 2024-10-10 NOTE — HPI
Nino Price is a 59 y.o. male with CAD s/p Recent PCI + JUSTIN in June to LCX and August to Prox LAD, Non-Insulin-dependent DM2 without complications, HTN, and HLD who presented to [facility] ED on 10/10/2024 for eval and treatment of epigastric pain.  They describe their pain as sharp and cramping, 10/10, located just below the sternum with radiation to the lateral ribcage.  It started 1 day prior to presentation on 10/9 and has been worsening since, with symptom frequency now constant.  There is associated diaphoresis, dyspnea, emesis.  They deny associated burning, reflux.  He reports he feels the same as he felt just prior to both of his recent LHCs in June and August.  Symptoms are alleviated by none of the OTC NSAIDs he tried.  He reported to ED staff that he has not been fully compliant in taking all of his medications, including his DAPT and valsartan-HCTZ.    ED course notable for the following: Diaphoretic and pale on initial eval.  BP up to 238/110.  Exam without JVD, crackles, or THA.  Labs Trop 0.025 -> 0.041 -> 0.057.  .  WBC 11.48 Hgb 15.3.  PT 11.2 INR 1.  Crt 1.3 (recent baseline 1.0-1.1).  .  Tcholest 264 HDL 38 .  EKG without obvious ST changes.  Imaging: CT A/P: Bilateral perinephric fat stranding noting distention of the urinary bladder.  Vasculature patent.  ED therapy/stabilization measures: Morphine and his home BP med failed to improve his sxs.  Sublingual Nitro resolved his pain.  Case d/w Cardiology attending on-call; ACS protocol started.  They were admitted to [facility] for further evaluation and treatment.

## 2024-10-10 NOTE — SUBJECTIVE & OBJECTIVE
Past Medical History:   Diagnosis Date    Anxiety     Asthma     Colitis     Depression     Diabetes mellitus     Head injury     History of hepatitis C, s/p successful hcv treatment w/ SVR 8-2015  10/8/2012    SVR(24) as of 11/2015.  SVR(12) as of 8/2015.  completed harvoni 24 week regimen for genotype 1a 5/18/15     Hypertension     Shoulder pain     left, s/p 4 surgeries.     Stroke        Past Surgical History:   Procedure Laterality Date    ANGIOGRAM, CORONARY, WITH LEFT HEART CATHETERIZATION Left 6/17/2024    Procedure: Angiogram, Coronary, with Left Heart Cath;  Surgeon: Gabriela Rider MD;  Location: [facility] CATH LAB;  Service: Cardiology;  Laterality: Left;  12pm start, R rad access    COLONOSCOPY N/A 10/20/2015    Procedure: COLONOSCOPY;  Surgeon: Jagdish Patricia MD;  Location: [facility] ENDO;  Service: Endoscopy;  Laterality: N/A;    COLONOSCOPY N/A 1/26/2024    Procedure: COLONOSCOPY;  Surgeon: Pricila Shanks MD;  Location: [facility] ENDO;  Service: Endoscopy;  Laterality: N/A;    CORONARY STENT PLACEMENT N/A 6/18/2024    Procedure: INSERTION, STENT, CORONARY ARTERY;  Surgeon: Gabriela Rider MD;  Location: [facility] CATH LAB;  Service: Cardiology;  Laterality: N/A;  lcx pci. radial. lbu 3.5. 12 pm access    CORONARY STENT PLACEMENT N/A 8/28/2024    Procedure: INSERTION, STENT, CORONARY ARTERY;  Surgeon: Gabriela Rider MD;  Location: [facility] CATH LAB;  Service: Cardiology;  Laterality: N/A;  radial access. pci lad. have shockwave ready  RN PREOP 8/16/2024    DIAGNOSTIC LAPAROSCOPY N/A 4/1/2021    Procedure: LAPAROSCOPY, DIAGNOSTIC;  Surgeon: Umesh Vallecillo MD;  Location: [facility] OR;  Service: General;  Laterality: N/A;  RN PREOP 3/29/21, COVID NEGATIVE ON 3/29  CONSENT AND H/P INCOMPLETE    ESOPHAGOGASTRODUODENOSCOPY N/A 1/26/2024    Procedure: EGD (ESOPHAGOGASTRODUODENOSCOPY);  Surgeon: Pricila Shanks MD;  Location: [facility] ENDO;  Service: Endoscopy;  Laterality: N/A;  prep instructions mailed to  pt / [facility] pt / Urgent/suprep  1/19- lvm for pc. DBM  1/23- left 2nd vm for pc. DBM  1/24 cirrohsis-labs prior, precall complete-st    GALLBLADDER SURGERY      pt not 100% if gall bladder removed    IVUS, CORONARY  6/18/2024    Procedure: IVUS, Coronary;  Surgeon: Gabriela Rider MD;  [facility];  Service: Cardiology;;    PERCUTANEOUS CORONARY INTERVENTION, ARTERY N/A 6/18/2024    Procedure: Percutaneous coronary intervention;  Surgeon: Gabriela Rider MD;  [facility] CATH [facility];  Service: Cardiology;  Laterality: N/A;    rotator cuff      left side       Review of patient's allergies indicates:   Allergen Reactions    Codeine Nausea Only       No current facility-administered medications on file prior to encounter.     Current Outpatient Medications on File Prior to Encounter   Medication Sig    aspirin (ECOTRIN) 81 MG EC tablet Take 1 tablet (81 mg total) by mouth once daily.    atorvastatin (LIPITOR) 80 MG tablet Take 1 tablet (80 mg total) by mouth once daily.    busPIRone (BUSPAR) 10 MG tablet Take 1 tablet (10 mg total) by mouth every evening.    clopidogreL (PLAVIX) 75 mg tablet Take 1 tablet (75 mg total) by mouth once daily.    metFORMIN (GLUCOPHAGE) 1000 MG tablet Take 1 tablet (1,000 mg total) by mouth 2 (two) times daily with meals.    cyclobenzaprine (FLEXERIL) 5 MG tablet Take 5 mg by mouth nightly as needed.    dicyclomine (BENTYL) 20 mg tablet Take 1 tablet (20 mg total) by mouth 2 (two) times daily as needed (stomach pain).    DULoxetine (CYMBALTA) 60 MG capsule Take 60 mg by mouth once daily. Takes in pm    gabapentin (NEURONTIN) 300 MG capsule Take 300 mg by mouth 3 (three) times daily.    icosapent ethyL (VASCEPA) 1 gram Cap Take 2 g by mouth.    lancing device with lancets Kit To check blood glucose twice daily.    metoclopramide HCl (REGLAN) 10 MG tablet Take 1 tablet (10 mg total) by mouth every 6 (six) hours.    mirtazapine (REMERON) 15 MG tablet Take 15 mg  by mouth every evening.    nitroGLYCERIN (NITROSTAT) 0.4 MG SL tablet Place 1 tablet (0.4 mg total) under the tongue as needed for Chest pain.    oxyCODONE-acetaminophen (PERCOCET)  mg per tablet Take 1 tablet by mouth every 6 (six) hours as needed.    pantoprazole (PROTONIX) 40 MG tablet Take 1 tablet (40 mg total) by mouth once daily.    tiZANidine (ZANAFLEX) 4 MG tablet Take 4 mg by mouth every evening.    traZODone (DESYREL) 100 MG tablet Take 150 mg by mouth nightly as needed for Insomnia. 1.5 tab nightly as needed    TRUE METRIX GLUCOSE METER Misc     valsartan-hydrochlorothiazide (DIOVAN-HCT) 160-25 mg per tablet Take 0.5 tablets by mouth once daily.     Family History       Problem Relation (Age of Onset)    Cirrhosis Brother    Heart disease Father    Hypertension Mother          Tobacco Use    Smoking status: Every Day     Current packs/day: 1.00     Average packs/day: 1 pack/day for 35.0 years (35.0 ttl pk-yrs)     Types: Cigarettes     Passive exposure: Current    Smokeless tobacco: Never    Tobacco comments:     Quitting on his own, reports that he's down 4 to 5 cig a day   Substance and Sexual Activity    Alcohol use: No    Drug use: Yes     Frequency: 7.0 times per week     Types: Marijuana, Oxycodone     Comment: Majiuan once a week/ Oxy 3-4 tabs daily    Sexual activity: Yes     Partners: Female     Review of Systems   Reason unable to perform ROS: ROS was performed and pertinent +s and -s are listed in HPI.     Objective:     Vital Signs (Most Recent):  Temp: 98.7 °F (37.1 °C) (10/10/24 1137)  Pulse: (!) 59 (10/10/24 1638)  Resp: (!) 22 (10/10/24 1638)  BP: (!) 210/156 (10/10/24 1712)  SpO2: 96 % (10/10/24 1638) Vital Signs (24h Range):  Temp:  [98.7 °F (37.1 °C)] 98.7 °F (37.1 °C)  Pulse:  [59-87] 59  Resp:  [18-26] 22  SpO2:  [94 %-100 %] 96 %  BP: (186-238)/() 210/156     Weight: 64 kg (141 lb)  Body mass index is 24.2 kg/m².     Physical Exam           Significant Labs:  All pertinent labs within the past 24 hours have been reviewed.  CBC:   Recent Labs   Lab 10/10/24  1217 10/10/24  1217   WBC 10.55  --    HGB 15.4  --    HCT 44.7 46     --      CMP:   Recent Labs   Lab 10/10/24  1217      K 4.1      CO2 22*   *   BUN 11   CREATININE 1.3   CALCIUM 10.6*   PROT 8.7*   ALBUMIN 4.6   BILITOT 0.5   ALKPHOS 77   AST 22   ALT 32   ANIONGAP 18*     Cardiac Markers:   Recent Labs   Lab 10/10/24  1223   *     Troponin:   Recent Labs   Lab 10/10/24  1223 10/10/24  1500   TROPONINI 0.025 0.041*     Urine Studies:   Recent Labs   Lab 10/10/24  1326   COLORU Yellow   APPEARANCEUA Clear   PHUR 8.0   SPECGRAV 1.025   PROTEINUA 3+*   GLUCUA 3+*   KETONESU Negative   BILIRUBINUA Negative   OCCULTUA Trace*   NITRITE Negative   UROBILINOGEN Negative   LEUKOCYTESUR Negative   RBCUA 4   WBCUA 2   BACTERIA None   HYALINECASTS 0       Significant Imaging: I have reviewed all pertinent imaging results/findings within the past 24 hours.

## 2024-10-10 NOTE — ED TRIAGE NOTES
Pt to the ED with complaints of pain to the middle of his abdomen accompanied by N/V since last night. Pt reports vomiting yesterday but only dry heaving today. Pt denies diarrhea. LNBM yesterday. Pt denies chest pain or shortness of breath. Pt writhing around in bed, groaning, on initial assessment.

## 2024-10-10 NOTE — ASSESSMENT & PLAN NOTE
CAD  HLD  HTN    This 59M with known CV disease describes pain as constant epigastric cramping and gnawing that is similar to the pain he had prior to both of his recent PCI + JUSTIN (June to LCx and August to Prox LAD).  Pain and HTN were only minimally affected by morphine, but were relieved by 1 sublingual nitro film.  Pt admitted to ED staff that he has not been consistently taking his home meds which include DAPT.  EKG without clear ST changes concerning for ischemia  Troponin rising 0.025 on arrival -> 0.041 -> 0.057  Crt 1.3 on arrival, baseline 1.0-1.1   Subjective and objective data at presentation are most concerning for NSTEMI.  Admittedly, this is not a slam dunk case and his Troponin .057 in isolation would usually not be high enough to pique such concern.  But the similarity of sxs and Troponin now compared to June and August as well as his sxs' response to Nitro and admission to ED staff that he hasn't been fully compliant with his home meds warrant admission and further treatment.    LEO score for UA/NSTEMI 5    Cardiology consulted  Admit to Hospital Medicine, but will step up to ICU if he becomes hemodynamically unstable  Start ACS protocol with  mg load followed by 82 mg qd, P2Y12 inhibitor load followed by maintenance dosage, and heparin gtt  High intensity Statin: atorvastatin 80 daily  Will defer B-blocker to Cardiology given his AV block   Resume home valsartan as tolerated; holding on admission in anticipation of possible dye load and Crt 1.3  TTE pending  Trend troponin (to peak or flat) q3hr  Lipid panel, TSH  NTG PRN and morphine for chest pain  If CP worsens or pt becomes HDS, please let MD know so that we can call Cardiology at once

## 2024-10-10 NOTE — ED PROVIDER NOTES
Encounter Date: 10/10/2024       History     Chief Complaint   Patient presents with    Abdominal Pain     Constant periumbilical with N/V since last night. Denies fever or diarrhea. No tx used.      HPI    60 yo M w/CAD s/p PCI, CVA, esophagitis, GERD, cirrhosis, hep C s/p harvoni, presenting with periumbilical pain and epigastric pain  w/n/v since last night.    Denies diarrhea, denies fever/cough, dyspnea. Denies chest pain. Reports epigastric pain began after nausea/vomiting episodes last night.    Epigastric and periumbilical pain is constant since last night, nonradiating, has not attempted nitro sublingual for treatment, no flank pain, no dysuria, no hematuria, normal BM yesterday prior to pain beginning.    Denies EtOH use. Reports compliance with plavix and other prescribed medications, however missed his antihypertensives medications today.    Review of patient's allergies indicates:   Allergen Reactions    Codeine Nausea Only     Past Medical History:   Diagnosis Date    Anxiety     Asthma     Colitis     Depression     Diabetes mellitus     Head injury     History of hepatitis C, s/p successful hcv treatment w/ SVR 8-2015  10/8/2012    SVR(24) as of 11/2015.  SVR(12) as of 8/2015.  completed harvoni 24 week regimen for genotype 1a 5/18/15     Hypertension     Shoulder pain     left, s/p 4 surgeries.     Stroke      Past Surgical History:   Procedure Laterality Date    ANGIOGRAM, CORONARY, WITH LEFT HEART CATHETERIZATION Left 6/17/2024    Procedure: Angiogram, Coronary, with Left Heart Cath;  Surgeon: Gabriela Rider MD;  Location: [facility] CATH LAB;  Service: Cardiology;  Laterality: Left;  12pm start, R rad access    COLONOSCOPY N/A 10/20/2015    Procedure: COLONOSCOPY;  Surgeon: Jagdish Patricia MD;  Location: [facility] ENDO;  Service: Endoscopy;  Laterality: N/A;    COLONOSCOPY N/A 1/26/2024    Procedure: COLONOSCOPY;  Surgeon: Pricila Shanks MD;  Location: [facility] ENDO;  Service: Endoscopy;  Laterality:  N/A;    CORONARY STENT PLACEMENT N/A 6/18/2024    Procedure: INSERTION, STENT, CORONARY ARTERY;  Surgeon: Gabriela Rider MD;  Location: [facility] CATH LAB;  Service: Cardiology;  Laterality: N/A;  lcx pci. radial. lbu 3.5. 12 pm access    CORONARY STENT PLACEMENT N/A 8/28/2024    Procedure: INSERTION, STENT, CORONARY ARTERY;  Surgeon: Gabriela Rider MD;  Location: [facility] CATH LAB;  Service: Cardiology;  Laterality: N/A;  radial access. pci lad. have shockwave ready  RN PREOP 8/16/2024    DIAGNOSTIC LAPAROSCOPY N/A 4/1/2021    Procedure: LAPAROSCOPY, DIAGNOSTIC;  Surgeon: Umesh Vallecillo MD;  Location: [facility] OR;  Service: General;  Laterality: N/A;  RN PREOP 3/29/21, COVID NEGATIVE ON 3/29  CONSENT AND H/P INCOMPLETE    ESOPHAGOGASTRODUODENOSCOPY N/A 1/26/2024    Procedure: EGD (ESOPHAGOGASTRODUODENOSCOPY);  Surgeon: Pricila Shanks MD;  Location: [facility] ENDO;  Service: Endoscopy;  Laterality: N/A;  prep instructions mailed to pt / [facility] pt / Urgent/suprep  1/19- lvm for pc. DBM  1/23- left 2nd vm for pc. DBM  1/24 cirrohsis-labs prior, precall complete-st    GALLBLADDER SURGERY      pt not 100% if gall bladder removed    IVUS, CORONARY  6/18/2024    Procedure: IVUS, Coronary;  Surgeon: Gabriela Rider MD;  Location: [facility] CATH LAB;  Service: Cardiology;;    PERCUTANEOUS CORONARY INTERVENTION, ARTERY N/A 6/18/2024    Procedure: Percutaneous coronary intervention;  Surgeon: Gabriela Rider MD;  Location: [facility] CATH LAB;  Service: Cardiology;  Laterality: N/A;    rotator cuff      left side     Family History   Problem Relation Name Age of Onset    Hypertension Mother      Heart disease Father      Cirrhosis Brother      Colon cancer Neg Hx      Esophageal cancer Neg Hx       Social History     Tobacco Use    Smoking status: Every Day     Current packs/day: 1.00     Average packs/day: 1 pack/day for 35.0 years (35.0 ttl pk-yrs)     Types: Cigarettes     Passive exposure: Current    Smokeless tobacco: Never    Tobacco  comments:     Quitting on his own, reports that he's down 4 to 5 cig a day   Substance Use Topics    Alcohol use: No    Drug use: Yes     Frequency: 7.0 times per week     Types: Marijuana, Oxycodone     Comment: iuan once a week/ Oxy 3-4 tabs daily     Review of Systems    Physical Exam     Initial Vitals [10/10/24 1137]   BP Pulse Resp Temp SpO2   (S) (!) 221/97 68 18 98.7 °F (37.1 °C) 96 %      MAP       --         Physical Exam    Constitutional: He appears well-developed and well-nourished.   HENT:   Head: Normocephalic and atraumatic.   Eyes: EOM are normal.   Cardiovascular:  Normal rate and regular rhythm.           Equal radial pulses   Pulmonary/Chest: Effort normal and breath sounds normal. No respiratory distress. He has no rales. He exhibits no tenderness.   Abdominal: Abdomen is soft. He exhibits no distension.   No distension, +epigastric, + periumbilical ttp, no RUQ tenderness   Musculoskeletal:         General: No tenderness or edema.     Neurological: He is alert and oriented to person, place, and time.   Skin: Skin is warm and dry.   Psychiatric: He has a normal mood and affect.         ED Course   Procedures  Labs Reviewed   CBC W/ AUTO DIFFERENTIAL - Abnormal       Result Value    WBC 10.55      RBC 4.67      Hemoglobin 15.4      Hematocrit 44.7      MCV 96      MCH 33.0 (*)     MCHC 34.5      RDW 13.5      Platelets 257      MPV 9.7      Immature Granulocytes 0.4      Gran # (ANC) 8.6 (*)     Immature Grans (Abs) 0.04      Lymph # 1.0      Mono # 0.9      Eos # 0.0      Baso # 0.07      nRBC 0      Gran % 81.8 (*)     Lymph % 9.0 (*)     Mono % 8.1      Eosinophil % 0.0      Basophil % 0.7      Differential Method Automated     COMPREHENSIVE METABOLIC PANEL - Abnormal    Sodium 142      Potassium 4.1      Chloride 102      CO2 22 (*)     Glucose 279 (*)     BUN 11      Creatinine 1.3      Calcium 10.6 (*)     Total Protein 8.7 (*)     Albumin 4.6      Total Bilirubin 0.5      Alkaline  Phosphatase 77      AST 22      ALT 32      eGFR >60      Anion Gap 18 (*)    URINALYSIS, REFLEX TO URINE CULTURE - Abnormal    Specimen UA Urine, Clean Catch      Color, UA Yellow      Appearance, UA Clear      pH, UA 8.0      Specific Gravity, UA 1.025      Protein, UA 3+ (*)     Glucose, UA 3+ (*)     Ketones, UA Negative      Bilirubin (UA) Negative      Occult Blood UA Trace (*)     Nitrite, UA Negative      Urobilinogen, UA Negative      Leukocytes, UA Negative      Narrative:     Specimen Source->Urine   DRUG SCREEN PANEL, URINE EMERGENCY - Abnormal    Benzodiazepines Negative      Methadone metabolites Negative      Cocaine (Metab.) Negative      Opiate Scrn, Ur Presumptive Positive (*)     Barbiturate Screen, Ur Negative      Amphetamine Screen, Ur Negative      THC Presumptive Positive (*)     Phencyclidine Negative      Creatinine, Urine 27.6      Toxicology Information SEE COMMENT      Narrative:     Specimen Source->Urine   B-TYPE NATRIURETIC PEPTIDE - Abnormal     (*)    TROPONIN I - Abnormal    Troponin I 0.041 (*)    LIPID PANEL - Abnormal    Cholesterol 264 (*)     Triglycerides 158 (*)     HDL 38 (*)     LDL Cholesterol 194.4 (*)     HDL/Cholesterol Ratio 14.4 (*)     Total Cholesterol/HDL Ratio 6.9 (*)     Non-HDL Cholesterol 226      Narrative:     Draw baseline aPTT prior to starting the heparin bolus or  infusion  (if patient is on warfarin prior to heparin therapy)   CBC W/ AUTO DIFFERENTIAL - Abnormal    WBC 11.48      RBC 4.69      Hemoglobin 15.3      Hematocrit 44.4      MCV 95      MCH 32.6 (*)     MCHC 34.5      RDW 13.6      Platelets 264      MPV 9.5      Immature Granulocytes 0.3      Gran # (ANC) 9.3 (*)     Immature Grans (Abs) 0.04      Lymph # 1.1      Mono # 1.0      Eos # 0.0      Baso # 0.05      nRBC 0      Gran % 81.0 (*)     Lymph % 9.5 (*)     Mono % 8.5      Eosinophil % 0.3      Basophil % 0.4      Differential Method Automated      Narrative:     Draw baseline  aPTT prior to starting the heparin bolus or  infusion  (if patient is on warfarin prior to heparin therapy)   TROPONIN I - Abnormal    Troponin I 0.057 (*)    TROPONIN I - Abnormal    Troponin I 0.060 (*)    POCT GLUCOSE - Abnormal    POCT Glucose 280 (*)    ISTAT PROCEDURE - Abnormal    POC Glucose 289 (*)     POC BUN 10      POC Creatinine 1.1      POC Sodium 142      POC Potassium 4.0      POC Chloride 103      POC TCO2 (MEASURED) 28      POC Anion Gap 17 (*)     POC Ionized Calcium 1.26      POC Hematocrit 46      Sample VENOUS      Site Other      Allens Test N/A     ISTAT PROCEDURE - Abnormal    POC PH 7.466 (*)     POC PCO2 39.4      POC PO2 35 (*)     POC HCO3 28.4 (*)     POC BE 4 (*)     POC SATURATED O2 72      POC TCO2 30 (*)     Sample VENOUS      Site Other      Allens Test N/A     LIPASE    Lipase 14     ALCOHOL,MEDICAL (ETHANOL)    Alcohol, Serum <10     TROPONIN I    Troponin I 0.025     URINALYSIS MICROSCOPIC    RBC, UA 4      WBC, UA 2      Bacteria None      Yeast, UA None      Hyaline Casts, UA 0      Microscopic Comment SEE COMMENT      Narrative:     Specimen Source->Urine   PROTIME-INR    Prothrombin Time 11.2      INR 1.0      Narrative:     Draw baseline aPTT prior to starting the heparin bolus or  infusion  (if patient is on warfarin prior to heparin therapy)   APTT    aPTT 26.5      Narrative:     Draw baseline aPTT prior to starting the heparin bolus or  infusion  (if patient is on warfarin prior to heparin therapy)   APTT    aPTT 26.5      Narrative:     Draw baseline aPTT prior to starting the heparin bolus or  infusion  (if patient is on warfarin prior to heparin therapy)   PROTIME-INR    Prothrombin Time 11.2      INR 1.0      Narrative:     Draw baseline aPTT prior to starting the heparin bolus or  infusion  (if patient is on warfarin prior to heparin therapy)   TYPE & SCREEN    Group & Rh A POS      Indirect Dior NEG      Specimen Outdate 10/13/2024 23:59     ISTAT CHEM8   POCT  GLUCOSE MONITORING CONTINUOUS   POCT GLUCOSE MONITORING CONTINUOUS     EKG Readings: (Independently Interpreted)   EKG independently interpreted by me with sinus rhythm with PVCs, rate of 67, bifid P waves w/ possible bilateral atrial enlargement, no STEMI, Qtc 426     ECG Results              EKG 12-lead (Final result)        Collection Time Result Time QRS Duration [facility] QTC Calculation    10/10/24 12:18:31 10/10/24 15:07:34 80 426                     Final result by Interface, Lab In [facility] (10/10/24 15:07:41)                   Narrative:    Test Reason : I10,    Vent. Rate : 067 BPM     Atrial Rate : 067 BPM     P-R Int : 130 ms          QRS Dur : 080 ms      QT Int : 404 ms       P-R-T Axes : 067 027 058 degrees     QTc Int : 426 ms    Sinus rhythm with sinus arrhythmia with occasional Premature ventricular  complexes  Right atrial enlargement  Borderline Abnormal ECG  When compared with ECG of 10-SEP-2024 13:18,  Significant changes have occurred  Confirmed by Lavelle Carr MD ([id number]) on 10/10/2024 3:07:30 PM    Referred By: [email or web address]   SELF           Confirmed By:Lavelle Carr MD                                  Imaging Results               CT Abdomen Pelvis With IV Contrast NO Oral Contrast (Final result)  Result time 10/10/24 13:30:42      Final result by Aneudy Hamilton MD (10/10/24 13:30:42)                   Impression:      This report was flagged in Epic as abnormal.    1. Bilateral perinephric fat stranding noting distention of the urinary bladder.  Correlation with any history of urinary bladder outlet obstruction or urinary retention.  Correlation with urinalysis advised to exclude changes of cystitis.  2. Findings suggest hepatic steatosis, correlation with LFTs recommended.  3. Distal esophageal thickening, correlation with any history of reflux esophagitis.  Direct visualization as warranted.  4. Please see above for several additional findings.      Electronically signed by: Aneudy  MD Alfonso  Date:    10/10/2024  Time:    13:30               Narrative:    EXAMINATION:  CT ABDOMEN PELVIS WITH IV CONTRAST    CLINICAL HISTORY:  Epigastric pain;Nausea/vomiting;    TECHNIQUE:  Low dose axial images, sagittal and coronal reformations were obtained from the lung bases to the pubic symphysis following the IV administration of 75 mL of Omnipaque 350 .  Oral contrast was not given.    COMPARISON:  CT 06/16/2024    FINDINGS:  Images of the lower thorax are remarkable for bilateral dependent atelectasis.  There is thickening of the distal esophagus noting small hiatal hernia.  Correlation with any history of reflux esophagitis.  Direct visualization as warranted.    The liver is hypoattenuating suggesting steatosis, correlation with LFTs recommended.  The liver is prominent.  The spleen, pancreas, gallbladder and adrenal glands are grossly unremarkable.  No significant gastric wall thickening.  The portal vein, splenic vein, SMV, celiac axis and SMA all are patent.  No significant abdominal lymphadenopathy.    The kidneys enhance symmetrically.  There is bilateral perinephric fat stranding.  No hydronephrosis or nephrolithiasis.  The bilateral ureters are unremarkable without calculi seen.  The urinary bladder is distended without wall thickening.  The prostate is prominent.    There are several scattered colonic diverticula without surrounding inflammation to suggest diverticulitis.  The terminal ileum is unremarkable.  There is surgical change of appendectomy.  The small bowel is grossly unremarkable.  There are a few scattered shotty periaortic, pericaval, and mesenteric lymph nodes.  There is prominent atherosclerotic plaque and calcification of the infrarenal abdominal aorta.    There are degenerative changes of the bilateral femoral heads/changes of AVN.  There are degenerative changes of the spine.  There is some degree of osteopenia.  Degenerative changes most significantly involve L5-S1.  No  significant inguinal lymphadenopathy.                                       X-Ray Chest AP Portable (Final result)  Result time 10/10/24 13:01:40      Final result by Zac Rincon MD (10/10/24 13:01:40)                   Impression:      No acute radiographic findings in the chest.      Electronically signed by: Zac Rincon MD  Date:    10/10/2024  Time:    13:01               Narrative:    EXAMINATION:  XR CHEST AP PORTABLE    CLINICAL HISTORY:  epigastric pain;    TECHNIQUE:  Single frontal view of the chest was performed.    COMPARISON:  06/16/2024    FINDINGS:  The cardiac silhouette is normal in size.  There are aortic calcifications.  Lungs show no significant airspace consolidation, effusion or pneumothorax.  Osseous structures show degenerative changes.  Well-circumscribed lucency in the left proximal humerus may be postoperative.                                       Medications   aspirin chewable tablet 81 mg (81 mg Oral Given 10/11/24 0856)   metoprolol tartrate (LOPRESSOR) tablet 25 mg (25 mg Oral Given 10/11/24 2043)   nitroGLYCERIN SL tablet 0.4 mg (has no administration in time range)   clopidogreL tablet 75 mg (75 mg Oral Given 10/11/24 0857)   atorvastatin tablet 80 mg (80 mg Oral Given 10/11/24 0856)   morphine injection 4 mg (4 mg Intravenous Given 10/11/24 1542)   morphine injection 2 mg (has no administration in time range)   dextrose 50% injection 12.5 g (has no administration in time range)   glucagon (human recombinant) injection 1 mg (has no administration in time range)   insulin aspart U-100 pen 0-5 Units (2 Units Subcutaneous Given 10/11/24 1714)   pantoprazole EC tablet 40 mg (40 mg Oral Given 10/11/24 0856)   0.9%  NaCl infusion ( Intravenous New Bag 10/11/24 2046)   acetaminophen tablet 650 mg (has no administration in time range)   ondansetron injection 4 mg (4 mg Intravenous Not Given 10/11/24 1532)   sucralfate 100 mg/mL suspension 1 g (1 g Oral Given 10/11/24 2044)    traZODone tablet 150 mg (150 mg Oral Given 10/11/24 2043)   morphine injection 4 mg (4 mg Intravenous Given 10/10/24 1227)   ondansetron injection 4 mg (4 mg Intravenous Given 10/10/24 1226)   famotidine (PF) injection 20 mg (20 mg Intravenous Given 10/10/24 1227)   iohexoL (OMNIPAQUE 350) injection 75 mL (75 mLs Intravenous Given 10/10/24 1308)   morphine injection 4 mg (4 mg Intravenous Given 10/10/24 1416)   aluminum-magnesium hydroxide-simethicone 200-200-20 mg/5 mL suspension 30 mL (30 mLs Oral Given 10/10/24 1415)   labetaloL injection 10 mg (10 mg Intravenous Given 10/10/24 1421)   cloNIDine tablet 0.1 mg (0.1 mg Oral Given 10/10/24 1618)   valsartan tablet 80 mg (80 mg Oral Given 10/10/24 1712)   hydroCHLOROthiazide tablet 25 mg (25 mg Oral Given 10/10/24 1712)   morphine injection 4 mg (4 mg Intravenous Given 10/10/24 1828)   nitroGLYCERIN SL tablet 0.4 mg (0.4 mg Sublingual Given 10/10/24 1750)   pantoprazole injection 80 mg (80 mg Intravenous Given 10/10/24 1830)   aspirin chewable tablet 324 mg (324 mg Oral Given 10/10/24 1827)   clopidogreL tablet 600 mg (600 mg Oral Given 10/10/24 1829)   heparin 25,000 units in dextrose 5% (100 units/ml) IV bolus from bag LOW INTENSITY nomogram - [facility] (3,660 Units Intravenous Bolus from Bag 10/10/24 1842)   morphine injection 4 mg (4 mg Intravenous Given 10/10/24 2346)   diphenhydrAMINE capsule 50 mg (50 mg Oral Given 10/11/24 1107)     Medical Decision Making  58 yo M w/CAD s/p PCI, CVA, esophagitis, GERD, cirrhosis, hep C s/p harvoni, presenting with periumbilical pain and epigastric pain  w/NBNB n/v since last night.      Differential diagnosis includes but is not limited to: gastric ulcer, esophagitis, ACS, pancreatitis, biliary pathology, abdominal aortic aneurysm, cannabinoid hyperemesis    Less concern for esophageal perforation given nonbloody emesis, no coffee ground emesis.   New right atrial enlargement seen on EKG, with elevated BNP in the 300s, with no  history of heart failure, with epigastric pain possibly exertional (difficult historian, with changing history), may be due to noncompliance with antihypertensives, given significant elevated blood pressures in the ED >200 systolic, however with high heart score of 5, patient reporting he had similar epigastric pains that resolved after his most recent stenting, may be unstable angina causing his epigastric pain, with new onset heart failure given changes on EKG and elevation in BNP today.    Less concern for aortic dissection given no thoracic chest pain, denies any dyspnea, denies any ripping or tearing, equal blood pressures and pulses in bilateral arms, no syncope/near syncope, no focal neuro deficits.    Plan to obs/admit for high risk atypical chest pain, trop bump, possible new onset HF w/EKG changes.     After review of the patient's physical exam, ED testing, and history/symptoms, the patient requires additional care in the hospital overnight. The hospital medicine service will accept the patient and relevant labs, imaging, or procedures were discussed and they were made aware of any pending labs/imaging/interventions. The diagnosis, treatment and plan were discussed with the patient. All questions and/or concerns have been addressed to the best of my ability.      Amount and/or Complexity of Data Reviewed  Labs: ordered. Decision-making details documented in ED Course.  Radiology: ordered.    Risk  OTC drugs.  Prescription drug management.  Decision regarding hospitalization.      Additional MDM:   Heart Score:    History:          Slightly suspicious.  ECG:             Nonspecific repolarisation disturbance  Age:               45-65 years  Risk factors: >= 3 risk factors or history of atherosclerotic disease  Troponin:       1-2x normal limit  Heart Score = 5                ED Course as of 10/12/24 0323   u Oct 10, 2024   1228 POC Glucose(!): 289 [LF]   1326 Troponin I: 0.025 [LF]   1326 BNP(!): 357  [LF]   1410 No significant infectious or obstructive abnormalities on CTAP, with possible urinary retention and periureteric stranding, however patient with urination after CT scan performed. Significant esophagitis revisualized on CTAP, this may be cause of patient's epigastric/periumbilical pain [LF]   1439 Patient reassessed, feeling improved in terms of epigastric pain after morphine, Mylanta, no further episodes of vomiting   [LF]   1638 Troponin I(!): 0.041 [LF]      ED Course User Index  [LF] Jenniffer Saunders MD                           Clinical Impression:  Final diagnoses:  [I10] HTN (hypertension)          ED Disposition Condition    Admit                 Jenniffer Saunders MD  10/12/24 0320

## 2024-10-11 LAB
ALLENS TEST: NORMAL
APTT PPP: 39.4 SEC (ref 21–32)
APTT PPP: 51 SEC (ref 21–32)
ASCENDING AORTA: 2.55 CM
AV INDEX (PROSTH): 0.53
AV MEAN GRADIENT: 4 MMHG
AV PEAK GRADIENT: 5.8 MMHG
AV VALVE AREA BY VELOCITY RATIO: 2.1 CM²
AV VALVE AREA: 1.7 CM²
AV VELOCITY RATIO: 0.67
BASOPHILS # BLD AUTO: 0.06 K/UL (ref 0–0.2)
BASOPHILS NFR BLD: 0.5 % (ref 0–1.9)
BSA FOR ECHO PROCEDURE: 1.68 M2
CV ECHO LV RWT: 0.52 CM
DIFFERENTIAL METHOD BLD: ABNORMAL
DOP CALC AO PEAK VEL: 1.2 M/S
DOP CALC AO VTI: 32.8 CM
DOP CALC LVOT AREA: 3.1 CM2
DOP CALC LVOT DIAMETER: 2 CM
DOP CALC LVOT PEAK VEL: 0.8 M/S
DOP CALC LVOT STROKE VOLUME: 55 CM3
DOP CALC MV VTI: 36 CM
DOP CALCLVOT PEAK VEL VTI: 17.5 CM
E WAVE DECELERATION TIME: 192.95 MSEC
E/A RATIO: 1.48
E/E' RATIO: 11.33 M/S
ECHO LV POSTERIOR WALL: 1.1 CM (ref 0.6–1.1)
EOSINOPHIL # BLD AUTO: 0.1 K/UL (ref 0–0.5)
EOSINOPHIL NFR BLD: 0.8 % (ref 0–8)
ERYTHROCYTE [DISTWIDTH] IN BLOOD BY AUTOMATED COUNT: 13.6 % (ref 11.5–14.5)
ESTIMATED AVG GLUCOSE: 148 MG/DL (ref 68–131)
FRACTIONAL SHORTENING: 23.8 % (ref 28–44)
HBA1C MFR BLD: 6.8 % (ref 4–5.6)
HCT VFR BLD AUTO: 38.1 % (ref 40–54)
HGB BLD-MCNC: 12.9 G/DL (ref 14–18)
IMM GRANULOCYTES # BLD AUTO: 0.06 K/UL (ref 0–0.04)
IMM GRANULOCYTES NFR BLD AUTO: 0.5 % (ref 0–0.5)
INTERVENTRICULAR SEPTUM: 1.1 CM (ref 0.6–1.1)
IVC DIAMETER: 1.94 CM
LA MAJOR: 5.6 CM
LA MINOR: 4.88 CM
LA WIDTH: 3.7 CM
LEFT ATRIUM SIZE: 3.4 CM
LEFT ATRIUM VOLUME INDEX: 33.4 ML/M2
LEFT ATRIUM VOLUME: 55.77 CM3
LEFT INTERNAL DIMENSION IN SYSTOLE: 3.2 CM (ref 2.1–4)
LEFT VENTRICLE DIASTOLIC VOLUME INDEX: 46.68 ML/M2
LEFT VENTRICLE DIASTOLIC VOLUME: 77.95 ML
LEFT VENTRICLE MASS INDEX: 94 G/M2
LEFT VENTRICLE SYSTOLIC VOLUME INDEX: 25.1 ML/M2
LEFT VENTRICLE SYSTOLIC VOLUME: 41.85 ML
LEFT VENTRICULAR INTERNAL DIMENSION IN DIASTOLE: 4.2 CM (ref 3.5–6)
LEFT VENTRICULAR MASS: 157.1 G
LV LATERAL E/E' RATIO: 9.27 M/S
LV SEPTAL E/E' RATIO: 14.57 M/S
LVED V (TEICH): 77.95 ML
LVES V (TEICH): 41.85 ML
LVOT MG: 1.3 MMHG
LVOT MV: 0.52 CM/S
LYMPHOCYTES # BLD AUTO: 2.7 K/UL (ref 1–4.8)
LYMPHOCYTES NFR BLD: 23.8 % (ref 18–48)
MCH RBC QN AUTO: 33 PG (ref 27–31)
MCHC RBC AUTO-ENTMCNC: 33.9 G/DL (ref 32–36)
MCV RBC AUTO: 97 FL (ref 82–98)
MONOCYTES # BLD AUTO: 1.2 K/UL (ref 0.3–1)
MONOCYTES NFR BLD: 10.4 % (ref 4–15)
MV MEAN GRADIENT: 2 MMHG
MV PEAK A VEL: 0.69 M/S
MV PEAK E VEL: 1.02 M/S
MV PEAK GRADIENT: 5 MMHG
MV STENOSIS PRESSURE HALF TIME: 55.96 MS
MV VALVE AREA BY CONTINUITY EQUATION: 1.53 CM2
MV VALVE AREA P 1/2 METHOD: 3.93 CM2
NEUTROPHILS # BLD AUTO: 7.2 K/UL (ref 1.8–7.7)
NEUTROPHILS NFR BLD: 64 % (ref 38–73)
NRBC BLD-RTO: 0 /100 WBC
OHS CV RV/LV RATIO: 0.67 CM
PISA TR MAX VEL: 2.19 M/S
PLATELET # BLD AUTO: 224 K/UL (ref 150–450)
PMV BLD AUTO: 9.6 FL (ref 9.2–12.9)
POC ACTIVATED CLOTTING TIME K: 116 SEC (ref 74–137)
POCT GLUCOSE: 118 MG/DL (ref 70–110)
POCT GLUCOSE: 135 MG/DL (ref 70–110)
POCT GLUCOSE: 138 MG/DL (ref 70–110)
POCT GLUCOSE: 153 MG/DL (ref 70–110)
POCT GLUCOSE: 223 MG/DL (ref 70–110)
PULM VEIN S/D RATIO: 0.94
PV PEAK D VEL: 0.48 M/S
PV PEAK GRADIENT: 4 MMHG
PV PEAK S VEL: 0.45 M/S
PV PEAK VELOCITY: 1 M/S
RA MAJOR: 4.63 CM
RA PRESSURE ESTIMATED: 3 MMHG
RA WIDTH: 3.6 CM
RBC # BLD AUTO: 3.91 M/UL (ref 4.6–6.2)
RIGHT VENTRICLE DIASTOLIC BASEL DIMENSION: 2.8 CM
RIGHT VENTRICULAR END-DIASTOLIC DIMENSION: 2.85 CM
RV TB RVSP: 5 MMHG
RV TISSUE DOPPLER FREE WALL SYSTOLIC VELOCITY 1 (APICAL 4 CHAMBER VIEW): 8.75 CM/S
SAMPLE: NORMAL
SINUS: 3.11 CM
SITE: NORMAL
STJ: 2.36 CM
TDI LATERAL: 0.11 M/S
TDI SEPTAL: 0.07 M/S
TDI: 0.09 M/S
TR MAX PG: 19 MMHG
TRICUSPID ANNULAR PLANE SYSTOLIC EXCURSION: 1.77 CM
TROPONIN I SERPL DL<=0.01 NG/ML-MCNC: 0.04 NG/ML (ref 0–0.03)
TROPONIN I SERPL DL<=0.01 NG/ML-MCNC: 0.04 NG/ML (ref 0–0.03)
TROPONIN I SERPL DL<=0.01 NG/ML-MCNC: 0.05 NG/ML (ref 0–0.03)
TSH SERPL DL<=0.005 MIU/L-ACNC: 0.49 UIU/ML (ref 0.4–4)
TV REST PULMONARY ARTERY PRESSURE: 22 MMHG
WBC # BLD AUTO: 11.21 K/UL (ref 3.9–12.7)
Z-SCORE OF LEFT VENTRICULAR DIMENSION IN END DIASTOLE: -1.06
Z-SCORE OF LEFT VENTRICULAR DIMENSION IN END SYSTOLE: 0.8

## 2024-10-11 PROCEDURE — 25500020 PHARM REV CODE 255: Mod: HCNC | Performed by: INTERNAL MEDICINE

## 2024-10-11 PROCEDURE — C1894 INTRO/SHEATH, NON-LASER: HCPCS | Mod: HCNC | Performed by: INTERNAL MEDICINE

## 2024-10-11 PROCEDURE — B2111ZZ FLUOROSCOPY OF MULTIPLE CORONARY ARTERIES USING LOW OSMOLAR CONTRAST: ICD-10-PCS | Performed by: INTERNAL MEDICINE

## 2024-10-11 PROCEDURE — 25000003 PHARM REV CODE 250: Mod: HCNC | Performed by: INTERNAL MEDICINE

## 2024-10-11 PROCEDURE — 84443 ASSAY THYROID STIM HORMONE: CPT | Mod: HCNC

## 2024-10-11 PROCEDURE — 99223 1ST HOSP IP/OBS HIGH 75: CPT | Mod: HCNC,25,ICN, | Performed by: INTERNAL MEDICINE

## 2024-10-11 PROCEDURE — 93458 L HRT ARTERY/VENTRICLE ANGIO: CPT | Mod: 26,HCNC,, | Performed by: INTERNAL MEDICINE

## 2024-10-11 PROCEDURE — 25000003 PHARM REV CODE 250: Mod: HCNC

## 2024-10-11 PROCEDURE — C1769 GUIDE WIRE: HCPCS | Mod: HCNC | Performed by: INTERNAL MEDICINE

## 2024-10-11 PROCEDURE — 4A023N7 MEASUREMENT OF CARDIAC SAMPLING AND PRESSURE, LEFT HEART, PERCUTANEOUS APPROACH: ICD-10-PCS | Performed by: INTERNAL MEDICINE

## 2024-10-11 PROCEDURE — 93010 ELECTROCARDIOGRAM REPORT: CPT | Mod: HCNC,,, | Performed by: INTERNAL MEDICINE

## 2024-10-11 PROCEDURE — 63600175 PHARM REV CODE 636 W HCPCS: Mod: HCNC | Performed by: INTERNAL MEDICINE

## 2024-10-11 PROCEDURE — 85025 COMPLETE CBC W/AUTO DIFF WBC: CPT | Mod: HCNC

## 2024-10-11 PROCEDURE — 21400001 HC TELEMETRY ROOM: Mod: HCNC

## 2024-10-11 PROCEDURE — 63600175 PHARM REV CODE 636 W HCPCS: Mod: HCNC

## 2024-10-11 PROCEDURE — 93458 L HRT ARTERY/VENTRICLE ANGIO: CPT | Mod: HCNC | Performed by: INTERNAL MEDICINE

## 2024-10-11 PROCEDURE — 99152 MOD SED SAME PHYS/QHP 5/>YRS: CPT | Mod: HCNC | Performed by: INTERNAL MEDICINE

## 2024-10-11 PROCEDURE — 25000003 PHARM REV CODE 250: Mod: HCNC | Performed by: STUDENT IN AN ORGANIZED HEALTH CARE EDUCATION/TRAINING PROGRAM

## 2024-10-11 PROCEDURE — 84484 ASSAY OF TROPONIN QUANT: CPT | Mod: 91,HCNC

## 2024-10-11 PROCEDURE — 36415 COLL VENOUS BLD VENIPUNCTURE: CPT | Mod: HCNC

## 2024-10-11 PROCEDURE — 36415 COLL VENOUS BLD VENIPUNCTURE: CPT | Mod: HCNC | Performed by: STUDENT IN AN ORGANIZED HEALTH CARE EDUCATION/TRAINING PROGRAM

## 2024-10-11 PROCEDURE — 99152 MOD SED SAME PHYS/QHP 5/>YRS: CPT | Mod: HCNC,,, | Performed by: INTERNAL MEDICINE

## 2024-10-11 PROCEDURE — 85730 THROMBOPLASTIN TIME PARTIAL: CPT | Mod: 91,HCNC | Performed by: STUDENT IN AN ORGANIZED HEALTH CARE EDUCATION/TRAINING PROGRAM

## 2024-10-11 PROCEDURE — C1887 CATHETER, GUIDING: HCPCS | Mod: HCNC | Performed by: INTERNAL MEDICINE

## 2024-10-11 PROCEDURE — 93005 ELECTROCARDIOGRAM TRACING: CPT | Mod: HCNC

## 2024-10-11 RX ORDER — SUCRALFATE 1 G/10ML
1 SUSPENSION ORAL
Status: DISCONTINUED | OUTPATIENT
Start: 2024-10-11 | End: 2024-10-12 | Stop reason: HOSPADM

## 2024-10-11 RX ORDER — ONDANSETRON HYDROCHLORIDE 2 MG/ML
4 INJECTION, SOLUTION INTRAVENOUS EVERY 12 HOURS PRN
Status: DISCONTINUED | OUTPATIENT
Start: 2024-10-11 | End: 2024-10-12 | Stop reason: HOSPADM

## 2024-10-11 RX ORDER — NITROGLYCERIN 5 MG/ML
INJECTION, SOLUTION INTRAVENOUS
Status: DISCONTINUED | OUTPATIENT
Start: 2024-10-11 | End: 2024-10-11 | Stop reason: HOSPADM

## 2024-10-11 RX ORDER — SODIUM CHLORIDE 9 MG/ML
INJECTION, SOLUTION INTRAVENOUS CONTINUOUS
Status: DISCONTINUED | OUTPATIENT
Start: 2024-10-11 | End: 2024-10-12 | Stop reason: HOSPADM

## 2024-10-11 RX ORDER — DIPHENHYDRAMINE HCL 25 MG
50 CAPSULE ORAL ONCE
Status: COMPLETED | OUTPATIENT
Start: 2024-10-11 | End: 2024-10-11

## 2024-10-11 RX ORDER — FENTANYL CITRATE 50 UG/ML
INJECTION, SOLUTION INTRAMUSCULAR; INTRAVENOUS
Status: DISCONTINUED | OUTPATIENT
Start: 2024-10-11 | End: 2024-10-11 | Stop reason: HOSPADM

## 2024-10-11 RX ORDER — TRAZODONE HYDROCHLORIDE 50 MG/1
150 TABLET ORAL NIGHTLY PRN
Status: DISCONTINUED | OUTPATIENT
Start: 2024-10-11 | End: 2024-10-12 | Stop reason: HOSPADM

## 2024-10-11 RX ORDER — VERAPAMIL HYDROCHLORIDE 2.5 MG/ML
INJECTION, SOLUTION INTRAVENOUS
Status: DISCONTINUED | OUTPATIENT
Start: 2024-10-11 | End: 2024-10-11 | Stop reason: HOSPADM

## 2024-10-11 RX ORDER — PANTOPRAZOLE SODIUM 40 MG/1
40 TABLET, DELAYED RELEASE ORAL DAILY
Status: DISCONTINUED | OUTPATIENT
Start: 2024-10-11 | End: 2024-10-12 | Stop reason: HOSPADM

## 2024-10-11 RX ORDER — LIDOCAINE HYDROCHLORIDE 10 MG/ML
INJECTION, SOLUTION EPIDURAL; INFILTRATION; INTRACAUDAL; PERINEURAL
Status: DISCONTINUED | OUTPATIENT
Start: 2024-10-11 | End: 2024-10-11 | Stop reason: HOSPADM

## 2024-10-11 RX ORDER — ACETAMINOPHEN 325 MG/1
650 TABLET ORAL EVERY 4 HOURS PRN
Status: DISCONTINUED | OUTPATIENT
Start: 2024-10-11 | End: 2024-10-12 | Stop reason: HOSPADM

## 2024-10-11 RX ORDER — MIDAZOLAM HYDROCHLORIDE 1 MG/ML
INJECTION, SOLUTION INTRAMUSCULAR; INTRAVENOUS
Status: DISCONTINUED | OUTPATIENT
Start: 2024-10-11 | End: 2024-10-11 | Stop reason: HOSPADM

## 2024-10-11 RX ADMIN — CLOPIDOGREL BISULFATE 75 MG: 75 TABLET ORAL at 08:10

## 2024-10-11 RX ADMIN — SUCRALFATE 1 G: 1 SUSPENSION ORAL at 03:10

## 2024-10-11 RX ADMIN — SODIUM CHLORIDE: 9 INJECTION, SOLUTION INTRAVENOUS at 08:10

## 2024-10-11 RX ADMIN — SODIUM CHLORIDE: 9 INJECTION, SOLUTION INTRAVENOUS at 11:10

## 2024-10-11 RX ADMIN — SODIUM CHLORIDE: 9 INJECTION, SOLUTION INTRAVENOUS at 07:10

## 2024-10-11 RX ADMIN — DIPHENHYDRAMINE HYDROCHLORIDE 50 MG: 25 CAPSULE ORAL at 11:10

## 2024-10-11 RX ADMIN — ATORVASTATIN CALCIUM 80 MG: 40 TABLET, FILM COATED ORAL at 08:10

## 2024-10-11 RX ADMIN — SUCRALFATE 1 G: 1 SUSPENSION ORAL at 08:10

## 2024-10-11 RX ADMIN — PANTOPRAZOLE SODIUM 40 MG: 40 TABLET, DELAYED RELEASE ORAL at 08:10

## 2024-10-11 RX ADMIN — METOPROLOL TARTRATE 25 MG: 25 TABLET, FILM COATED ORAL at 08:10

## 2024-10-11 RX ADMIN — ASPIRIN 81 MG CHEWABLE TABLET 81 MG: 81 TABLET CHEWABLE at 08:10

## 2024-10-11 RX ADMIN — MORPHINE SULFATE 4 MG: 4 INJECTION, SOLUTION INTRAMUSCULAR; INTRAVENOUS at 03:10

## 2024-10-11 RX ADMIN — TRAZODONE HYDROCHLORIDE 150 MG: 50 TABLET ORAL at 08:10

## 2024-10-11 RX ADMIN — INSULIN ASPART 2 UNITS: 100 INJECTION, SOLUTION INTRAVENOUS; SUBCUTANEOUS at 05:10

## 2024-10-11 NOTE — ASSESSMENT & PLAN NOTE
CAD  HLD  HTN    59M with known CV disease describes pain as constant epigastric cramping and gnawing that is similar to the pain he had prior to both of his recent PCI + JUSTIN (June to LCx and August to Prox LAD).  Pain and HTN were only minimally affected by morphine, but were relieved by 1 sublingual nitro film.  Pt admitted to ED staff that he has not been consistently taking his home meds which include DAPT.  EKG without clear ST changes concerning for ischemia  Troponin rising 0.025 on arrival -> 0.041 -> 0.057  Crt 1.3 on arrival, baseline 1.0-1.1   Presentation most concerning for NSTEMI.      LEO score for UA/NSTEMI 5    Cardiology consulted and started on ACS protocol  Patient underwent angiogram as below - no intervention performed  Continue with aspirin/plavix/statin

## 2024-10-11 NOTE — CONSULTS
[place] - Telemetry  Cardiology  Consult Note    Patient Name: Nino Price  MRN: [id number]  Admission Date: 10/10/2024  Hospital Length of Stay: 1 days  Code Status: Full Code   Attending Provider: Gama Michaud MD   Consulting Provider: Gabriela Rider MD  Primary Care Physician: Stevo Pryor MD  Principal Problem:NSTEMI (non-ST elevated myocardial infarction)    Patient information was obtained from patient and ER records.     Inpatient consult to Cardiology  Consult performed by: Gabriela Rider MD  Consult ordered by: Chidi Angulo MD        Subjective:     Chief Complaint:  cp     HPI:   Patient with non history of coronary artery disease status post PCI.  Came in with chest pain.  Similar to his prior pains.  States has been noncompliant with his dual antiplatelet therapy.  No evidence of ST elevation MI on EKG.  Troponins minimally elevated.  Currently chest pain-free.      HPI: Nino Price is a 59 y.o. male with CAD s/p Recent PCI + JUSTIN in June to LCX and August to Prox LAD, Non-Insulin-dependent DM2 without complications, HTN, and HLD who presented to [facility] ED on 10/10/2024 for eval and treatment of epigastric pain.  They describe their pain as sharp and cramping, 10/10, located just below the sternum with radiation to the lateral ribcage.  It started 1 day prior to presentation on 10/9 and has been worsening since, with symptom frequency now constant.  There is associated diaphoresis, dyspnea, emesis.  They deny associated burning, reflux.  He reports he feels the same as he felt just prior to both of his recent LHCs in June and August.  Symptoms are alleviated by none of the OTC NSAIDs he tried.  He reported to ED staff that he has not been fully compliant in taking all of his medications, including his DAPT and valsartan-HCTZ.     ED course notable for the following: Diaphoretic and pale on initial eval.  BP up to 238/110.  Exam without JVD, crackles, or THA.  Labs Trop  0.025 -> 0.041 -> 0.057.  .  WBC 11.48 Hgb 15.3.  PT 11.2 INR 1.  Crt 1.3 (recent baseline 1.0-1.1).  .  Tcholest 264 HDL 38 .  EKG without obvious ST changes.  Imaging: CT A/P: Bilateral perinephric fat stranding noting distention of the urinary bladder.  Vasculature patent.  ED therapy/stabilization measures: Morphine and his home BP med failed to improve his sxs.  Sublingual Nitro resolved his pain.  Case d/w Cardiology attending on-call; ACS protocol started.  They were [facility] for further evaluation and treatment.         Past Medical History:   Diagnosis Date    Anxiety     Asthma     Colitis     Depression     Diabetes mellitus     Head injury     History of hepatitis C, s/p successful hcv treatment w/ SVR 8-2015  10/8/2012    SVR(24) as of 11/2015.  SVR(12) as of 8/2015.  completed harvoni 24 week regimen for genotype 1a 5/18/15     Hypertension     Shoulder pain     left, s/p 4 surgeries.     Stroke        Past Surgical History:   Procedure Laterality Date    ANGIOGRAM, CORONARY, WITH LEFT HEART CATHETERIZATION Left 6/17/2024    Procedure: Angiogram, Coronary, with Left Heart Cath;  Surgeon: Gabriela Rider MD;  Location: [facility] CATH LAB;  Service: Cardiology;  Laterality: Left;  12pm start, R rad access    COLONOSCOPY N/A 10/20/2015    Procedure: COLONOSCOPY;  Surgeon: Jagdish Patricia MD;  Location: [facility] ENDO;  Service: Endoscopy;  Laterality: N/A;    COLONOSCOPY N/A 1/26/2024    Procedure: COLONOSCOPY;  Surgeon: Pricila Shanks MD;  Location: [facility] ENDO;  Service: Endoscopy;  Laterality: N/A;    CORONARY STENT PLACEMENT N/A 6/18/2024    Procedure: INSERTION, STENT, CORONARY ARTERY;  Surgeon: Gabriela Rider MD;  Location: [facility] CATH LAB;  Service: Cardiology;  Laterality: N/A;  lcx pci. radial. lbu 3.5. 12 pm access    CORONARY STENT PLACEMENT N/A 8/28/2024    Procedure: INSERTION, STENT, CORONARY ARTERY;  Surgeon: Gabriela Rider MD;  Location:  [facility] CATH LAB;  Service: Cardiology;  Laterality: N/A;  radial access. pci lad. have shockwave ready  RN PREOP 8/16/2024    DIAGNOSTIC LAPAROSCOPY N/A 4/1/2021    Procedure: LAPAROSCOPY, DIAGNOSTIC;  Surgeon: Umesh Vallecillo MD;  Location: [facility] OR;  Service: General;  Laterality: N/A;  RN PREOP 3/29/21, COVID NEGATIVE ON 3/29  CONSENT AND H/P INCOMPLETE    ESOPHAGOGASTRODUODENOSCOPY N/A 1/26/2024    Procedure: EGD (ESOPHAGOGASTRODUODENOSCOPY);  Surgeon: Pricila Shanks MD;  Location: [facility] ENDO;  Service: Endoscopy;  Laterality: N/A;  prep instructions mailed to pt / [place] pt / Urgent/suprep  1/19- lvm for pc. DBM  1/23- left 2nd vm for pc. DBM  1/24 cirrohsis-labs prior, precall complete-st    GALLBLADDER SURGERY      pt not 100% if gall bladder removed    IVUS, CORONARY  6/18/2024    Procedure: IVUS, Coronary;  Surgeon: Gabriela Rider MD;  Location: [facility] CATH LAB;  Service: Cardiology;;    PERCUTANEOUS CORONARY INTERVENTION, ARTERY N/A 6/18/2024    Procedure: Percutaneous coronary intervention;  Surgeon: Gabriela Rider MD;  Location: [facility] CATH LAB;  Service: Cardiology;  Laterality: N/A;    rotator cuff      left side       Review of patient's allergies indicates:   Allergen Reactions    Codeine Nausea Only       No current facility-administered medications on file prior to encounter.     Current Outpatient Medications on File Prior to Encounter   Medication Sig    aspirin (ECOTRIN) 81 MG EC tablet Take 1 tablet (81 mg total) by mouth once daily.    atorvastatin (LIPITOR) 80 MG tablet Take 1 tablet (80 mg total) by mouth once daily.    busPIRone (BUSPAR) 10 MG tablet Take 1 tablet (10 mg total) by mouth every evening.    clopidogreL (PLAVIX) 75 mg tablet Take 1 tablet (75 mg total) by mouth once daily.    metFORMIN (GLUCOPHAGE) 1000 MG tablet Take 1 tablet (1,000 mg total) by mouth 2 (two) times daily with meals.    dicyclomine (BENTYL) 20 mg tablet Take 1 tablet (20 mg total) by mouth 2 (two) times daily  as needed (stomach pain).    DULoxetine (CYMBALTA) 60 MG capsule Take 60 mg by mouth once daily. Takes in pm    gabapentin (NEURONTIN) 300 MG capsule Take 300 mg by mouth 3 (three) times daily.    icosapent ethyL (VASCEPA) 1 gram Cap Take 2 g by mouth.    lancing device with lancets Kit To check blood glucose twice daily.    metoclopramide HCl (REGLAN) 10 MG tablet Take 1 tablet (10 mg total) by mouth every 6 (six) hours.    mirtazapine (REMERON) 15 MG tablet Take 15 mg by mouth every evening.    nitroGLYCERIN (NITROSTAT) 0.4 MG SL tablet Place 1 tablet (0.4 mg total) under the tongue as needed for Chest pain.    oxyCODONE-acetaminophen (PERCOCET)  mg per tablet Take 1 tablet by mouth every 6 (six) hours as needed.    pantoprazole (PROTONIX) 40 MG tablet Take 1 tablet (40 mg total) by mouth once daily.    tiZANidine (ZANAFLEX) 4 MG tablet Take 4 mg by mouth every evening.    traZODone (DESYREL) 100 MG tablet Take 150 mg by mouth nightly as needed for Insomnia. 1.5 tab nightly as needed    TRUE METRIX GLUCOSE METER Misc     valsartan-hydrochlorothiazide (DIOVAN-HCT) 160-25 mg per tablet Take 0.5 tablets by mouth once daily.     Family History       Problem Relation (Age of Onset)    Cirrhosis Brother    Heart disease Father    Hypertension Mother          Tobacco Use    Smoking status: Every Day     Current packs/day: 1.00     Average packs/day: 1 pack/day for 35.0 years (35.0 ttl pk-yrs)     Types: Cigarettes     Passive exposure: Current    Smokeless tobacco: Never    Tobacco comments:     Quitting on his own, reports that he's down 4 to 5 cig a day   Substance and Sexual Activity    Alcohol use: No    Drug use: Yes     Frequency: 7.0 times per week     Types: Marijuana, Oxycodone     Comment: Majiuan once a week/ Oxy 3-4 tabs daily    Sexual activity: Yes     Partners: Female     Review of Systems   Constitutional: Negative.   HENT: Negative.     Eyes: Negative.    Cardiovascular:  Positive for chest pain.    Respiratory: Negative.     Endocrine: Negative.    Hematologic/Lymphatic: Negative.    Skin: Negative.    Musculoskeletal: Negative.    Gastrointestinal: Negative.    Genitourinary: Negative.    Neurological: Negative.    Psychiatric/Behavioral: Negative.     Allergic/Immunologic: Negative.      Objective:     Vital Signs (Most Recent):  Temp: 97.9 °F (36.6 °C) (10/11/24 0752)  Pulse: 77 (10/11/24 0752)  Resp: 18 (10/11/24 0752)  BP: 128/66 (10/11/24 0752)  SpO2: 95 % (10/11/24 0752) Vital Signs (24h Range):  Temp:  [97.6 °F (36.4 °C)-98.7 °F (37.1 °C)] 97.9 °F (36.6 °C)  Pulse:  [59-92] 77  Resp:  [17-26] 18  SpO2:  [94 %-100 %] 95 %  BP: ()/() 128/66     Weight: 62.8 kg (138 lb 7.2 oz)  Body mass index is 23.76 kg/m².    SpO2: 95 %         Intake/Output Summary (Last 24 hours) at 10/11/2024 1033  Last data filed at 10/11/2024 0936  Gross per 24 hour   Intake 0 ml   Output 200 ml   Net -200 ml       Lines/Drains/Airways       Peripheral Intravenous Line  Duration                  Peripheral IV - Single Lumen 10/10/24 1225 20 G Anterior;Proximal;Right Forearm <1 day         Peripheral IV - Single Lumen 10/10/24 1834 20 G Anterior;Left;Proximal Forearm <1 day                     Physical Exam  Vitals reviewed.   Constitutional:       Appearance: He is well-developed.   HENT:      Head: Normocephalic.   Eyes:      Conjunctiva/sclera: Conjunctivae normal.      Pupils: Pupils are equal, round, and reactive to light.   Cardiovascular:      Rate and Rhythm: Normal rate and regular rhythm.      Heart sounds: Normal heart sounds.   Pulmonary:      Effort: Pulmonary effort is normal.      Breath sounds: Normal breath sounds.   Abdominal:      General: Bowel sounds are normal.      Palpations: Abdomen is soft.   Musculoskeletal:      Cervical back: Normal range of motion and neck supple.   Skin:     General: Skin is warm.   Neurological:      Mental Status: He is alert and oriented to person, place, and time.           Significant Labs:     DATA:     Laboratory:  CBC:  Recent Labs   Lab 10/10/24  1217 10/10/24  1217 10/10/24  1822 10/11/24  0604   WBC 10.55  --  11.48 11.21   Hemoglobin 15.4  --  15.3 12.9 L   POC Hematocrit  --  46  --   --    Hematocrit 44.7  --  44.4 38.1 L   Platelets 257  --  264 224       CHEMISTRIES:  Recent Labs   Lab 05/26/22  0950 04/26/23  0615 06/18/24  0251 06/19/24  0521 06/27/24  1102 08/16/24  1330 10/10/24  1217   Glucose 135 H   < > 129 H 130 H 201 H 141 H 279 H   Sodium 141   < > 142 140 140 141 142   Potassium 4.0   < > 4.1 3.7 4.8 4.3 4.1   BUN 7   < > 11 11 9 10 11   Creatinine 1.0   < > 0.9 1.0 1.1 1.1 1.3   eGFR if  >60  --   --   --   --   --   --    eGFR if non  >60  --   --   --   --   --   --    Calcium 9.2   < > 8.8 8.6 L 9.8 9.7 10.6 H   Magnesium  --    < > 1.5 L 1.4 L 1.8  --   --     < > = values in this interval not displayed.       CARDIAC BIOMARKERS:  Recent Labs   Lab 10/11/24  0016 10/11/24  0411 10/11/24  0604   Troponin I 0.042 H 0.049 H 0.037 H       COAGS:  Recent Labs   Lab 04/15/24  1513 06/17/24  0758 10/10/24  1822   INR 1.0 1.1 1.0  1.0       LIPIDS/LFTS:  Recent Labs   Lab 04/30/24  0835 06/16/24  1952 06/17/24  0407 06/27/24  1102 10/10/24  1217 10/10/24  1822   Cholesterol 267 H  --  124  --   --  264 H   Triglycerides 303 H  --  116  --   --  158 H   HDL 31 L  --  23 L  --   --  38 L   LDL Cholesterol 175.4 H  --  77.8  --   --  194.4 H   Non-HDL Cholesterol 236  --  101  --   --  226   AST 15 15  --  23 22  --    ALT 20 18  --  51 H 32  --        Hemoglobin A1C   Date Value Ref Range Status   06/17/2024 6.2 (H) 4.0 - 5.6 % Final     Comment:     ADA Screening Guidelines:  5.7-6.4%  Consistent with prediabetes  >or=6.5%  Consistent with diabetes    High levels of fetal hemoglobin interfere with the HbA1C  assay. Heterozygous hemoglobin variants (HbS, HgC, etc)do  not significantly interfere with this assay.   However,  presence of multiple variants may affect accuracy.     04/30/2024 6.8 (H) 4.0 - 5.6 % Final     Comment:     ADA Screening Guidelines:  5.7-6.4%  Consistent with prediabetes  >or=6.5%  Consistent with diabetes    High levels of fetal hemoglobin interfere with the HbA1C  assay. Heterozygous hemoglobin variants (HbS, HgC, etc)do  not significantly interfere with this assay.   However, presence of multiple variants may affect accuracy.     08/25/2023 6.0 (H) 4.0 - 5.6 % Final     Comment:     ADA Screening Guidelines:  5.7-6.4%  Consistent with prediabetes  >or=6.5%  Consistent with diabetes    High levels of fetal hemoglobin interfere with the HbA1C  assay. Heterozygous hemoglobin variants (HbS, HgC, etc)do  not significantly interfere with this assay.   However, presence of multiple variants may affect accuracy.         TSH  Recent Labs   Lab 08/10/23  0738 06/17/24  0407 10/11/24  0604   TSH 1.125 0.593 0.492       The ASCVD Risk score (Ana GARCIA, et al., 2019) failed to calculate for the following reasons:    Risk score cannot be calculated because patient has a medical history suggesting prior/existing ASCVD       BNP    Lab Results   Component Value Date/Time     (H) 10/10/2024 12:23 PM     (H) 08/09/2023 08:46 PM     (H) 02/01/2019 06:22 AM    BNP 70 10/21/2016 10:55 PM            ECHO    Results for orders placed during the hospital encounter of 06/16/24    Echo    Interpretation Summary    Left Ventricle: The left ventricle is normal in size. Normal wall thickness. Regional wall motion abnormalities present (canot exclude mild posterolateral HK). There is low normal systolic function with a visually estimated ejection fraction of 50 - 55%. Grade I diastolic dysfunction.    Right Ventricle: Normal right ventricular cavity size. Systolic function is normal.      STRESS TEST    Results for orders placed during the hospital encounter of 08/09/23    Nuclear Stress - Cardiology  Interpreted    Interpretation Summary    Abnormal myocardial perfusion scan.    There is a moderate to severe intensity, moderate sized, fixed perfusion abnormality in the lateral wall(s). Only resting images obtained as patient refused to complete test. Incomplete study.    There are no other significant perfusion abnormalities.    The ECG portion of the study is negative for ischemia.    The patient reported no chest pain during the stress test.    There were no arrhythmias during stress.        CATH    Results for orders placed during the hospital encounter of 08/28/24    Cardiac catheterization    Conclusion    The Prox LAD lesion was 90% stenosed with 0% stenosis post-intervention.    Prox LAD lesion: A STENT SYNERGY XD [id number] stent was successfully placed at 12 KENDRA for 23 sec.    The estimated blood loss was <50 mL.    Successful IVUS guided PCI of heavily calcified prox to mid LAD with JUSTIN with excellent angiographic results.  Shock wave lithotripsy performed prior to stent implantation because of heavily calcified lesion    Procedures performed    1. Coronary angiography    2. Shock wave lithotripsy of prox to mid LAD (CPT +93543)    3. BALLOON ANGIOPLASTY AND PCI OF LAD WITH JUSTIN    ACCESS: RIGHT RADIAL ARTERY ACCESS    ASSESSMENT PLAN    CONTINUE ASPIRIN 81 MG DAILY INDEFINITELY AND PLAVIX 70 MG DAILY UNINTERRUPTED FOR AT LEAST 1 YEAR AND LONGER IF NO CONTRAINDICATIONS              The procedure log was documented by Documenter: Yamileth Alfonso RVT and verified by Gabriela Rider MD.    Date: 8/28/2024  Time: 12:59 PM      Assessment and Plan:     * NSTEMI (non-ST elevated myocardial infarction)  Patient with known history of coronary artery disease.  Noncompliance with dual antiplatelet therapy.  Had a detailed discussion with the patient.  States that pain similar to the pain he had prior to his PCI.  States that he was noncompliant, but understands the importance of compliance now.  States he will  be compliant with his medications from now on.  We discussed various options with the patient including continued medical management versus angiogram.  Because of chest pain, will decide to proceed with angiogram    Risks, benefits and alternatives of the catheterization procedure were discussed with the patient.The risks of coronary angiography include but are not limited to: bleeding, infection, death, heart attack, arrhythmia, kidney injury or failure, potential need for dialysis, allergic reactions, stroke, need for emergency surgery, hematoma, pseudoaneurysm etc.  Should stenting be indicated, the patient has agreed to dual anti-platelet therapy for 1-consecutive year with a drug-eluting stent and a minimum of 1-month with the use of a bare metal stent. Additionally, pt is aware that non-compliance is likely to result in stent clotting with heart attack, heart failure, and/or death  The risks of moderate sedation include hypotension, respiratory depression, arrhythmias, bronchospasm, and death. Informed consent was obtained and the  patient is agreeable to proceed with the procedure. Consent was placed on the chart.      Continue aspirin Plavix    Dyslipidemia        Essential hypertension  Patients blood pressure range in the last 24 hours was: BP  Min: 84/50  Max: 238/110.The patient's inpatient anti-hypertensive regimen is listed below:  Current Antihypertensives  metoprolol tartrate (LOPRESSOR) tablet 25 mg, 2 times daily, Oral  nitroGLYCERIN SL tablet 0.4 mg, Every 5 min PRN, Sublingual    Plan  - BP is uncontrolled, will adjust as follows:    Will titrate antihypertensives as needed for appropriate blood pressure control    Type 2 diabetes mellitus, controlled        History of hepatitis C, s/p successful hcv treatment w/ SVR 8-2015             VTE Risk Mitigation (From admission, onward)           Ordered     heparin 25,000 units in dextrose 5% (100 units/ml) IV bolus from bag LOW INTENSITY nomogram -  [facility]  As needed (PRN)        Question:  Heparin Infusion Adjustment (DO NOT MODIFY ANSWER)  Answer:  [email or web address]\Images\Pharmacy\HeparinInfusions\heparin LOW INTENSITY nomogram for [facility] [id number]    10/10/24 1758     heparin 25,000 units in dextrose 5% (100 units/ml) IV bolus from bag LOW INTENSITY nomogram - [facility]  As needed (PRN)        Question:  Heparin Infusion Adjustment (DO NOT MODIFY ANSWER)  Answer:  [email or web address]\[email or web address]\Images\Pharmacy\HeparinInfusions\heparin LOW INTENSITY nomogram for [facility] [id number]    10/10/24 1758     heparin 25,000 units in dextrose 5% 250 mL (100 units/mL) infusion LOW INTENSITY nomogram - [facility]  Continuous        Question:  Begin at (units/kg/hr)  Answer:  12    10/10/24 1758     Place sequential compression device  Until discontinued         10/10/24 1758     IP VTE LOW RISK PATIENT  Once         10/10/24 1758                    Thank you for your consult. I will follow-up with patient. Please contact us if you have any additional questions.    Gabriela Rider MD  Cardiology   [facility] - [place] - Telemetry

## 2024-10-11 NOTE — SUBJECTIVE & OBJECTIVE
Past Medical History:   Diagnosis Date    Anxiety     Asthma     Colitis     Depression     Diabetes mellitus     Head injury     History of hepatitis C, s/p successful hcv treatment w/ SVR 8-2015  10/8/2012    SVR(24) as of 11/2015.  SVR(12) as of 8/2015.  completed harvoni 24 week regimen for genotype 1a 5/18/15     Hypertension     Shoulder pain     left, s/p 4 surgeries.     Stroke        Past Surgical History:   Procedure Laterality Date    ANGIOGRAM, CORONARY, WITH LEFT HEART CATHETERIZATION Left 6/17/2024    Procedure: Angiogram, Coronary, with Left Heart Cath;  Surgeon: Gabriela Rider MD;  Location: [facility] CATH LAB;  Service: Cardiology;  Laterality: Left;  12pm start, R rad access    COLONOSCOPY N/A 10/20/2015    Procedure: COLONOSCOPY;  Surgeon: Jagdish Patricia MD;  Location: [facility] ENDO;  Service: Endoscopy;  Laterality: N/A;    COLONOSCOPY N/A 1/26/2024    Procedure: COLONOSCOPY;  Surgeon: Pricila Shanks MD;  Location: [facility] ENDO;  Service: Endoscopy;  Laterality: N/A;    CORONARY STENT PLACEMENT N/A 6/18/2024    Procedure: INSERTION, STENT, CORONARY ARTERY;  Surgeon: Gabriela Rider MD;  Location: [facility] CATH LAB;  Service: Cardiology;  Laterality: N/A;  lcx pci. radial. lbu 3.5. 12 pm access    CORONARY STENT PLACEMENT N/A 8/28/2024    Procedure: INSERTION, STENT, CORONARY ARTERY;  Surgeon: Gabriela Rider MD;  Location: [facility] CATH LAB;  Service: Cardiology;  Laterality: N/A;  radial access. pci lad. have shockwave ready  RN PREOP 8/16/2024    DIAGNOSTIC LAPAROSCOPY N/A 4/1/2021    Procedure: LAPAROSCOPY, DIAGNOSTIC;  Surgeon: Umesh Vallecillo MD;  Location: [facility] OR;  Service: General;  Laterality: N/A;  RN PREOP 3/29/21, COVID NEGATIVE ON 3/29  CONSENT AND H/P INCOMPLETE    ESOPHAGOGASTRODUODENOSCOPY N/A 1/26/2024    Procedure: EGD (ESOPHAGOGASTRODUODENOSCOPY);  Surgeon: Pricila Shanks MD;  Location: [facility] ENDO;  Service: Endoscopy;  Laterality: N/A;  prep instructions mailed to pt / [place] pt /  Urgent/suprep  1/19- lvm for pc. DBM  1/23- left 2nd vm for pc. DBM  1/24 cirrohsis-labs prior, precall complete-st    GALLBLADDER SURGERY      pt not 100% if gall bladder removed    IVUS, CORONARY  6/18/2024    Procedure: IVUS, Coronary;  Surgeon: Gabriela Rider MD;  Location: [facility] CATH LAB;  Service: Cardiology;;    PERCUTANEOUS CORONARY INTERVENTION, ARTERY N/A 6/18/2024    Procedure: Percutaneous coronary intervention;  Surgeon: Gabriela Rider MD;  Location: [facility] CATH LAB;  Service: Cardiology;  Laterality: N/A;    rotator cuff      left side       Review of patient's allergies indicates:   Allergen Reactions    Codeine Nausea Only       No current facility-administered medications on file prior to encounter.     Current Outpatient Medications on File Prior to Encounter   Medication Sig    aspirin (ECOTRIN) 81 MG EC tablet Take 1 tablet (81 mg total) by mouth once daily.    atorvastatin (LIPITOR) 80 MG tablet Take 1 tablet (80 mg total) by mouth once daily.    busPIRone (BUSPAR) 10 MG tablet Take 1 tablet (10 mg total) by mouth every evening.    clopidogreL (PLAVIX) 75 mg tablet Take 1 tablet (75 mg total) by mouth once daily.    metFORMIN (GLUCOPHAGE) 1000 MG tablet Take 1 tablet (1,000 mg total) by mouth 2 (two) times daily with meals.    dicyclomine (BENTYL) 20 mg tablet Take 1 tablet (20 mg total) by mouth 2 (two) times daily as needed (stomach pain).    DULoxetine (CYMBALTA) 60 MG capsule Take 60 mg by mouth once daily. Takes in pm    gabapentin (NEURONTIN) 300 MG capsule Take 300 mg by mouth 3 (three) times daily.    icosapent ethyL (VASCEPA) 1 gram Cap Take 2 g by mouth.    lancing device with lancets Kit To check blood glucose twice daily.    metoclopramide HCl (REGLAN) 10 MG tablet Take 1 tablet (10 mg total) by mouth every 6 (six) hours.    mirtazapine (REMERON) 15 MG tablet Take 15 mg by mouth every evening.    nitroGLYCERIN (NITROSTAT) 0.4 MG SL tablet Place 1 tablet (0.4 mg total) under the  tongue as needed for Chest pain.    oxyCODONE-acetaminophen (PERCOCET)  mg per tablet Take 1 tablet by mouth every 6 (six) hours as needed.    pantoprazole (PROTONIX) 40 MG tablet Take 1 tablet (40 mg total) by mouth once daily.    tiZANidine (ZANAFLEX) 4 MG tablet Take 4 mg by mouth every evening.    traZODone (DESYREL) 100 MG tablet Take 150 mg by mouth nightly as needed for Insomnia. 1.5 tab nightly as needed    TRUE METRIX GLUCOSE METER Misc     valsartan-hydrochlorothiazide (DIOVAN-HCT) 160-25 mg per tablet Take 0.5 tablets by mouth once daily.     Family History       Problem Relation (Age of Onset)    Cirrhosis Brother    Heart disease Father    Hypertension Mother          Tobacco Use    Smoking status: Every Day     Current packs/day: 1.00     Average packs/day: 1 pack/day for 35.0 years (35.0 ttl pk-yrs)     Types: Cigarettes     Passive exposure: Current    Smokeless tobacco: Never    Tobacco comments:     Quitting on his own, reports that he's down 4 to 5 cig a day   Substance and Sexual Activity    Alcohol use: No    Drug use: Yes     Frequency: 7.0 times per week     Types: Marijuana, Oxycodone     Comment: Majiuan once a week/ Oxy 3-4 tabs daily    Sexual activity: Yes     Partners: Female     Review of Systems   Constitutional: Negative.   HENT: Negative.     Eyes: Negative.    Cardiovascular:  Positive for chest pain.   Respiratory: Negative.     Endocrine: Negative.    Hematologic/Lymphatic: Negative.    Skin: Negative.    Musculoskeletal: Negative.    Gastrointestinal: Negative.    Genitourinary: Negative.    Neurological: Negative.    Psychiatric/Behavioral: Negative.     Allergic/Immunologic: Negative.      Objective:     Vital Signs (Most Recent):  Temp: 97.9 °F (36.6 °C) (10/11/24 0752)  Pulse: 77 (10/11/24 0752)  Resp: 18 (10/11/24 0752)  BP: 128/66 (10/11/24 0752)  SpO2: 95 % (10/11/24 0752) Vital Signs (24h Range):  Temp:  [97.6 °F (36.4 °C)-98.7 °F (37.1 °C)] 97.9 °F (36.6  °C)  Pulse:  [59-92] 77  Resp:  [17-26] 18  SpO2:  [94 %-100 %] 95 %  BP: ()/() 128/66     Weight: 62.8 kg (138 lb 7.2 oz)  Body mass index is 23.76 kg/m².    SpO2: 95 %         Intake/Output Summary (Last 24 hours) at 10/11/2024 1033  Last data filed at 10/11/2024 0936  Gross per 24 hour   Intake 0 ml   Output 200 ml   Net -200 ml       Lines/Drains/Airways       Peripheral Intravenous Line  Duration                  Peripheral IV - Single Lumen 10/10/24 1225 20 G Anterior;Proximal;Right Forearm <1 day         Peripheral IV - Single Lumen 10/10/24 1834 20 G Anterior;Left;Proximal Forearm <1 day                     Physical Exam  Vitals reviewed.   Constitutional:       Appearance: He is well-developed.   HENT:      Head: Normocephalic.   Eyes:      Conjunctiva/sclera: Conjunctivae normal.      Pupils: Pupils are equal, round, and reactive to light.   Cardiovascular:      Rate and Rhythm: Normal rate and regular rhythm.      Heart sounds: Normal heart sounds.   Pulmonary:      Effort: Pulmonary effort is normal.      Breath sounds: Normal breath sounds.   Abdominal:      General: Bowel sounds are normal.      Palpations: Abdomen is soft.   Musculoskeletal:      Cervical back: Normal range of motion and neck supple.   Skin:     General: Skin is warm.   Neurological:      Mental Status: He is alert and oriented to person, place, and time.          Significant Labs:     DATA:     Laboratory:  CBC:  Recent Labs   Lab 10/10/24  1217 10/10/24  1217 10/10/24  1822 10/11/24  0604   WBC 10.55  --  11.48 11.21   Hemoglobin 15.4  --  15.3 12.9 L   POC Hematocrit  --  46  --   --    Hematocrit 44.7  --  44.4 38.1 L   Platelets 257  --  264 224       CHEMISTRIES:  Recent Labs   Lab 05/26/22  0950 04/26/23  0615 06/18/24  0251 06/19/24  0521 06/27/24  1102 08/16/24  1330 10/10/24  1217   Glucose 135 H   < > 129 H 130 H 201 H 141 H 279 H   Sodium 141   < > 142 140 140 141 142   Potassium 4.0   < > 4.1 3.7 4.8 4.3 4.1    BUN 7   < > 11 11 9 10 11   Creatinine 1.0   < > 0.9 1.0 1.1 1.1 1.3   eGFR if  >60  --   --   --   --   --   --    eGFR if non  >60  --   --   --   --   --   --    Calcium 9.2   < > 8.8 8.6 L 9.8 9.7 10.6 H   Magnesium  --    < > 1.5 L 1.4 L 1.8  --   --     < > = values in this interval not displayed.       CARDIAC BIOMARKERS:  Recent Labs   Lab 10/11/24  0016 10/11/24  0411 10/11/24  0604   Troponin I 0.042 H 0.049 H 0.037 H       COAGS:  Recent Labs   Lab 04/15/24  1513 06/17/24  0758 10/10/24  1822   INR 1.0 1.1 1.0  1.0       LIPIDS/LFTS:  Recent Labs   Lab 04/30/24  0835 06/16/24  1952 06/17/24  0407 06/27/24  1102 10/10/24  1217 10/10/24  1822   Cholesterol 267 H  --  124  --   --  264 H   Triglycerides 303 H  --  116  --   --  158 H   HDL 31 L  --  23 L  --   --  38 L   LDL Cholesterol 175.4 H  --  77.8  --   --  194.4 H   Non-HDL Cholesterol 236  --  101  --   --  226   AST 15 15  --  23 22  --    ALT 20 18  --  51 H 32  --        Hemoglobin A1C   Date Value Ref Range Status   06/17/2024 6.2 (H) 4.0 - 5.6 % Final     Comment:     ADA Screening Guidelines:  5.7-6.4%  Consistent with prediabetes  >or=6.5%  Consistent with diabetes    High levels of fetal hemoglobin interfere with the HbA1C  assay. Heterozygous hemoglobin variants (HbS, HgC, etc)do  not significantly interfere with this assay.   However, presence of multiple variants may affect accuracy.     04/30/2024 6.8 (H) 4.0 - 5.6 % Final     Comment:     ADA Screening Guidelines:  5.7-6.4%  Consistent with prediabetes  >or=6.5%  Consistent with diabetes    High levels of fetal hemoglobin interfere with the HbA1C  assay. Heterozygous hemoglobin variants (HbS, HgC, etc)do  not significantly interfere with this assay.   However, presence of multiple variants may affect accuracy.     08/25/2023 6.0 (H) 4.0 - 5.6 % Final     Comment:     ADA Screening Guidelines:  5.7-6.4%  Consistent with prediabetes  >or=6.5%  Consistent  with diabetes    High levels of fetal hemoglobin interfere with the HbA1C  assay. Heterozygous hemoglobin variants (HbS, HgC, etc)do  not significantly interfere with this assay.   However, presence of multiple variants may affect accuracy.         TSH  Recent Labs   Lab 08/10/23  0738 06/17/24  0407 10/11/24  0604   TSH 1.125 0.593 0.492       The ASCVD Risk score (Ana GARCIA, et al., 2019) failed to calculate for the following reasons:    Risk score cannot be calculated because patient has a medical history suggesting prior/existing ASCVD       BNP    Lab Results   Component Value Date/Time     (H) 10/10/2024 12:23 PM     (H) 08/09/2023 08:46 PM     (H) 02/01/2019 06:22 AM    BNP 70 10/21/2016 10:55 PM            ECHO    Results for orders placed during the hospital encounter of 06/16/24    Echo    Interpretation Summary    Left Ventricle: The left ventricle is normal in size. Normal wall thickness. Regional wall motion abnormalities present (canot exclude mild posterolateral HK). There is low normal systolic function with a visually estimated ejection fraction of 50 - 55%. Grade I diastolic dysfunction.    Right Ventricle: Normal right ventricular cavity size. Systolic function is normal.      STRESS TEST    Results for orders placed during the hospital encounter of 08/09/23    Nuclear Stress - Cardiology Interpreted    Interpretation Summary    Abnormal myocardial perfusion scan.    There is a moderate to severe intensity, moderate sized, fixed perfusion abnormality in the lateral wall(s). Only resting images obtained as patient refused to complete test. Incomplete study.    There are no other significant perfusion abnormalities.    The ECG portion of the study is negative for ischemia.    The patient reported no chest pain during the stress test.    There were no arrhythmias during stress.        CATH    Results for orders placed during the hospital encounter of 08/28/24    Cardiac  catheterization    Conclusion    The Prox LAD lesion was 90% stenosed with 0% stenosis post-intervention.    Prox LAD lesion: A STENT SYNERGY XD [id number] stent was successfully placed at 12 KENDRA for 23 sec.    The estimated blood loss was <50 mL.    Successful IVUS guided PCI of heavily calcified prox to mid LAD with JUSTIN with excellent angiographic results.  Shock wave lithotripsy performed prior to stent implantation because of heavily calcified lesion    Procedures performed    1. Coronary angiography    2. Shock wave lithotripsy of prox to mid LAD (CPT +50286)    3. BALLOON ANGIOPLASTY AND PCI OF LAD WITH JUSTIN    ACCESS: RIGHT RADIAL ARTERY ACCESS    ASSESSMENT PLAN    CONTINUE ASPIRIN 81 MG DAILY INDEFINITELY AND PLAVIX 70 MG DAILY UNINTERRUPTED FOR AT LEAST 1 YEAR AND LONGER IF NO CONTRAINDICATIONS              The procedure log was documented by Documenter: Yamileth Alfonso RVT and verified by Gabriela Rider MD.    Date: 8/28/2024  Time: 12:59 PM

## 2024-10-11 NOTE — HPI
Patient with non history of coronary artery disease status post PCI.  Came in with chest pain.  Similar to his prior pains.  States has been noncompliant with his dual antiplatelet therapy.  No evidence of ST elevation MI on EKG.  Troponins minimally elevated.  Currently chest pain-free.      HPI: Nino Price is a 59 y.o. male with CAD s/p Recent PCI + JUSTIN in June to LCX and August to Prox LAD, Non-Insulin-dependent DM2 without complications, HTN, and HLD who presented to [facility] ED on 10/10/2024 for eval and treatment of epigastric pain.  They describe their pain as sharp and cramping, 10/10, located just below the sternum with radiation to the lateral ribcage.  It started 1 day prior to presentation on 10/9 and has been worsening since, with symptom frequency now constant.  There is associated diaphoresis, dyspnea, emesis.  They deny associated burning, reflux.  He reports he feels the same as he felt just prior to both of his recent LHCs in June and August.  Symptoms are alleviated by none of the OTC NSAIDs he tried.  He reported to ED staff that he has not been fully compliant in taking all of his medications, including his DAPT and valsartan-HCTZ.     ED course notable for the following: Diaphoretic and pale on initial eval.  BP up to 238/110.  Exam without JVD, crackles, or THA.  Labs Trop 0.025 -> 0.041 -> 0.057.  .  WBC 11.48 Hgb 15.3.  PT 11.2 INR 1.  Crt 1.3 (recent baseline 1.0-1.1).  .  Tcholest 264 HDL 38 .  EKG without obvious ST changes.  Imaging: CT A/P: Bilateral perinephric fat stranding noting distention of the urinary bladder.  Vasculature patent.  ED therapy/stabilization measures: Morphine and his home BP med failed to improve his sxs.  Sublingual Nitro resolved his pain.  Case d/w Cardiology attending on-call; ACS protocol started.  They were admitted to [facility] for further evaluation and treatment.

## 2024-10-11 NOTE — NURSING
[facility], [facility]  Nurses Note -- 4 Eyes      10/11/2024       Skin assessed on: Q Shift      [x] No Pressure Injuries Present    []Prevention Measures Documented    [] Yes LDA  for Pressure Injury Previously documented     [] Yes New Pressure Injury Discovered   [] LDA for New Pressure Injury Added      Attending RN:  Shea Ordonez LPN     Second RN:  QING West

## 2024-10-11 NOTE — ASSESSMENT & PLAN NOTE
Patient with known history of coronary artery disease.  Noncompliance with dual antiplatelet therapy.  Had a detailed discussion with the patient.  States that pain similar to the pain he had prior to his PCI.  States that he was noncompliant, but understands the importance of compliance now.  States he will be compliant with his medications from now on.  We discussed various options with the patient including continued medical management versus angiogram.  Because of chest pain, will decide to proceed with angiogram    Risks, benefits and alternatives of the catheterization procedure were discussed with the patient.The risks of coronary angiography include but are not limited to: bleeding, infection, death, heart attack, arrhythmia, kidney injury or failure, potential need for dialysis, allergic reactions, stroke, need for emergency surgery, hematoma, pseudoaneurysm etc.  Should stenting be indicated, the patient has agreed to dual anti-platelet therapy for 1-consecutive year with a drug-eluting stent and a minimum of 1-month with the use of a bare metal stent. Additionally, pt is aware that non-compliance is likely to result in stent clotting with heart attack, heart failure, and/or death  The risks of moderate sedation include hypotension, respiratory depression, arrhythmias, bronchospasm, and death. Informed consent was obtained and the  patient is agreeable to proceed with the procedure. Consent was placed on the chart.      Continue aspirin Plavix

## 2024-10-11 NOTE — SUBJECTIVE & OBJECTIVE
Interval History: seen after angiogram, eating lunch. He doesn't feel bad, but doesn't feel great. Still having abdominal pain. WE discussed his angiogram results as well as imaging showing esophagitis. Started PPI and adding carafate    Review of Systems  Objective:     Vital Signs (Most Recent):  Temp: 98 °F (36.7 °C) (10/11/24 1400)  Pulse: 81 (10/11/24 1502)  Resp: 20 (10/11/24 1400)  BP: (!) 158/78 (10/11/24 1400)  SpO2: (!) 94 % (10/11/24 1400) Vital Signs (24h Range):  Temp:  [97.4 °F (36.3 °C)-98.6 °F (37 °C)] 98 °F (36.7 °C)  Pulse:  [59-92] 81  Resp:  [17-24] 20  SpO2:  [94 %-99 %] 94 %  BP: ()/() 158/78     Weight: 62.8 kg (138 lb 7.2 oz)  Body mass index is 23.76 kg/m².    Intake/Output Summary (Last 24 hours) at 10/11/2024 1507  Last data filed at 10/11/2024 1358  Gross per 24 hour   Intake 200 ml   Output 500 ml   Net -300 ml         Physical Exam  Vitals and nursing note reviewed.   Constitutional:       General: He is not in acute distress.     Appearance: He is ill-appearing.   Cardiovascular:      Rate and Rhythm: Normal rate and regular rhythm.      Pulses: Normal pulses.   Pulmonary:      Effort: Pulmonary effort is normal. No respiratory distress.   Abdominal:      General: Bowel sounds are normal. There is no distension.   Neurological:      General: No focal deficit present.      Mental Status: He is alert and oriented to person, place, and time.   Psychiatric:         Mood and Affect: Mood normal.             Significant Labs: All pertinent labs within the past 24 hours have been reviewed.    Significant Imaging: I have reviewed all pertinent imaging results/findings within the past 24 hours.

## 2024-10-11 NOTE — PLAN OF CARE
Problem: Adult Inpatient Plan of Care  Goal: Plan of Care Review  Outcome: Progressing  Goal: Patient-Specific Goal (Individualized)  Outcome: Progressing  Goal: Absence of Hospital-Acquired Illness or Injury  Outcome: Progressing  Goal: Optimal Comfort and Wellbeing  Outcome: Progressing  Goal: Readiness for Transition of Care  Outcome: Progressing     Problem: Acute Coronary Syndrome  Goal: Optimal Adaptation to Illness  Outcome: Progressing  Goal: Absence of Cardiac-Related Pain  Outcome: Progressing  Goal: Normalized Cardiac Rhythm  Outcome: Progressing  Goal: Effective Cardiac Pump Function  Outcome: Progressing  Goal: Adequate Tissue Perfusion  Outcome: Progressing     Problem: Cardiac Catheterization (Diagnostic/Interventional)  Goal: Absence of Bleeding  Outcome: Progressing  Goal: Absence of Contrast-Induced Injury  Outcome: Progressing  Goal: Stable Heart Rate and Rhythm  Outcome: Progressing  Goal: Absence of Embolism Signs and Symptoms  Outcome: Progressing  Goal: Anesthesia/Sedation Recovery  Outcome: Progressing  Goal: Optimal Pain Control and Function  Outcome: Progressing  Goal: Absence of Vascular Access Complication  Outcome: Progressing     Problem: Diabetes Comorbidity  Goal: Blood Glucose Level Within Targeted Range  Outcome: Progressing     Problem: Wound  Goal: Optimal Coping  Outcome: Progressing  Goal: Optimal Functional Ability  Outcome: Progressing  Goal: Absence of Infection Signs and Symptoms  Outcome: Progressing  Goal: Improved Oral Intake  Outcome: Progressing  Goal: Optimal Pain Control and Function  Outcome: Progressing  Goal: Skin Health and Integrity  Outcome: Progressing  Goal: Optimal Wound Healing  Outcome: Progressing

## 2024-10-11 NOTE — H&P
[facility] [place] [facility]  [facility]  History & Physical    Patient Name: Nino Price  MRN: [id number]  Patient Class: IP- Inpatient  Admission Date: 10/10/2024  Attending Physician: Gama Michaud MD   Primary Care Provider: Stevo Pryor MD         Patient information was obtained from patient, spouse/SO, past medical records, and ER records.     Subjective:     Principal Problem:NSTEMI (non-ST elevated myocardial infarction)    Chief Complaint:   Chief Complaint   Patient presents with    Abdominal Pain     Constant periumbilical with N/V since last night. Denies fever or diarrhea. No tx used.         HPI: Nino Price is a 59 y.o. male with CAD s/p Recent PCI + JUSTIN in June to LCX and August to Prox LAD, Non-Insulin-dependent DM2 without complications, HTN, and HLD who presented to [facility] ED on 10/10/2024 for eval and treatment of epigastric pain.  They describe their pain as sharp and cramping, 10/10, located just below the sternum with radiation to the lateral ribcage.  It started 1 day prior to presentation on 10/9 and has been worsening since, with symptom frequency now constant.  There is associated diaphoresis, dyspnea, emesis.  They deny associated burning, reflux.  He reports he feels the same as he felt just prior to both of his recent LHCs in June and August.  Symptoms are alleviated by none of the OTC NSAIDs he tried.  He reported to ED staff that he has not been fully compliant in taking all of his medications, including his DAPT and valsartan-HCTZ.    ED course notable for the following: Diaphoretic and pale on initial eval.  BP up to 238/110.  Exam without JVD, crackles, or THA.  Labs Trop 0.025 -> 0.041 -> 0.057.  .  WBC 11.48 Hgb 15.3.  PT 11.2 INR 1.  Crt 1.3 (recent baseline 1.0-1.1).  .  Tcholest 264 HDL 38 .  EKG without obvious ST changes.  Imaging: CT A/P: Bilateral perinephric fat stranding noting distention of the urinary bladder.   Vasculature patent.  ED therapy/stabilization measures: Morphine and his home BP med failed to improve his sxs.  Sublingual Nitro resolved his pain.  Case d/w Cardiology attending on-call; ACS protocol started.  They were admitted to [facility] for further evaluation and treatment.         Past Medical History:   Diagnosis Date    Anxiety     Asthma     Colitis     Depression     Diabetes mellitus     Head injury     History of hepatitis C, s/p successful hcv treatment w/ SVR 8-2015  10/8/2012    SVR(24) as of 11/2015.  SVR(12) as of 8/2015.  completed harvoni 24 week regimen for genotype 1a 5/18/15     Hypertension     Shoulder pain     left, s/p 4 surgeries.     Stroke        Past Surgical History:   Procedure Laterality Date    ANGIOGRAM, CORONARY, WITH LEFT HEART CATHETERIZATION Left 6/17/2024    Procedure: Angiogram, Coronary, with Left Heart Cath;  Surgeon: Gabriela Rider MD;  Location: [facility] CATH LAB;  Service: Cardiology;  Laterality: Left;  12pm start, R rad access    COLONOSCOPY N/A 10/20/2015    Procedure: COLONOSCOPY;  Surgeon: Jagdish Patricia MD;  Location: [facility] ENDO;  Service: Endoscopy;  Laterality: N/A;    COLONOSCOPY N/A 1/26/2024    Procedure: COLONOSCOPY;  Surgeon: Pricila Shanks MD;  Location: [facility] ENDO;  Service: Endoscopy;  Laterality: N/A;    CORONARY STENT PLACEMENT N/A 6/18/2024    Procedure: INSERTION, STENT, CORONARY ARTERY;  Surgeon: Gabriela Rider MD;  Location: [facility] CATH LAB;  Service: Cardiology;  Laterality: N/A;  lcx pci. radial. lbu 3.5. 12 pm access    CORONARY STENT PLACEMENT N/A 8/28/2024    Procedure: INSERTION, STENT, CORONARY ARTERY;  Surgeon: Gabriela Rider MD;  Location: [facility] CATH LAB;  Service: Cardiology;  Laterality: N/A;  radial access. pci lad. have shockwave ready  RN PREOP 8/16/2024    DIAGNOSTIC LAPAROSCOPY N/A 4/1/2021    Procedure: LAPAROSCOPY, DIAGNOSTIC;  Surgeon: Umesh Vallecillo MD;  Location: [facility] OR;  Service: General;   Laterality: N/A;  RN PREOP 3/29/21, COVID NEGATIVE ON 3/29  CONSENT AND H/P INCOMPLETE    ESOPHAGOGASTRODUODENOSCOPY N/A 1/26/2024    Procedure: EGD (ESOPHAGOGASTRODUODENOSCOPY);  Surgeon: Pricila Shanks MD;  Location: [facility] ENDO;  Service: Endoscopy;  Laterality: N/A;  prep instructions mailed to pt / [facility] pt / Urgent/suprep  1/19- lvm for pc. DBM  1/23- left 2nd vm for pc. DBM  1/24 cirrohsis-labs prior, precall complete-st    GALLBLADDER SURGERY      pt not 100% if gall bladder removed    IVUS, CORONARY  6/18/2024    Procedure: IVUS, Coronary;  Surgeon: Gabriela Rider MD;  Location: [facility] CATH LAB;  Service: Cardiology;;    PERCUTANEOUS CORONARY INTERVENTION, ARTERY N/A 6/18/2024    Procedure: Percutaneous coronary intervention;  Surgeon: Gabriela Rider MD;  Location: [facility] CATH LAB;  Service: Cardiology;  Laterality: N/A;    rotator cuff      left side       Review of patient's allergies indicates:   Allergen Reactions    Codeine Nausea Only       No current facility-administered medications on file prior to encounter.     Current Outpatient Medications on File Prior to Encounter   Medication Sig    aspirin (ECOTRIN) 81 MG EC tablet Take 1 tablet (81 mg total) by mouth once daily.    atorvastatin (LIPITOR) 80 MG tablet Take 1 tablet (80 mg total) by mouth once daily.    busPIRone (BUSPAR) 10 MG tablet Take 1 tablet (10 mg total) by mouth every evening.    clopidogreL (PLAVIX) 75 mg tablet Take 1 tablet (75 mg total) by mouth once daily.    metFORMIN (GLUCOPHAGE) 1000 MG tablet Take 1 tablet (1,000 mg total) by mouth 2 (two) times daily with meals.    cyclobenzaprine (FLEXERIL) 5 MG tablet Take 5 mg by mouth nightly as needed.    dicyclomine (BENTYL) 20 mg tablet Take 1 tablet (20 mg total) by mouth 2 (two) times daily as needed (stomach pain).    DULoxetine (CYMBALTA) 60 MG capsule Take 60 mg by mouth once daily. Takes in pm    gabapentin (NEURONTIN) 300 MG capsule Take 300 mg by mouth 3 (three) times  daily.    icosapent ethyL (VASCEPA) 1 gram Cap Take 2 g by mouth.    lancing device with lancets Kit To check blood glucose twice daily.    metoclopramide HCl (REGLAN) 10 MG tablet Take 1 tablet (10 mg total) by mouth every 6 (six) hours.    mirtazapine (REMERON) 15 MG tablet Take 15 mg by mouth every evening.    nitroGLYCERIN (NITROSTAT) 0.4 MG SL tablet Place 1 tablet (0.4 mg total) under the tongue as needed for Chest pain.    oxyCODONE-acetaminophen (PERCOCET)  mg per tablet Take 1 tablet by mouth every 6 (six) hours as needed.    pantoprazole (PROTONIX) 40 MG tablet Take 1 tablet (40 mg total) by mouth once daily.    tiZANidine (ZANAFLEX) 4 MG tablet Take 4 mg by mouth every evening.    traZODone (DESYREL) 100 MG tablet Take 150 mg by mouth nightly as needed for Insomnia. 1.5 tab nightly as needed    TRUE METRIX GLUCOSE METER Misc     valsartan-hydrochlorothiazide (DIOVAN-HCT) 160-25 mg per tablet Take 0.5 tablets by mouth once daily.     Family History       Problem Relation (Age of Onset)    Cirrhosis Brother    Heart disease Father    Hypertension Mother          Tobacco Use    Smoking status: Every Day     Current packs/day: 1.00     Average packs/day: 1 pack/day for 35.0 years (35.0 ttl pk-yrs)     Types: Cigarettes     Passive exposure: Current    Smokeless tobacco: Never    Tobacco comments:     Quitting on his own, reports that he's down 4 to 5 cig a day   Substance and Sexual Activity    Alcohol use: No    Drug use: Yes     Frequency: 7.0 times per week     Types: Marijuana, Oxycodone     Comment: Majiuan once a week/ Oxy 3-4 tabs daily    Sexual activity: Yes     Partners: Female     Review of Systems   Reason unable to perform ROS: ROS was performed and pertinent +s and -s are listed in HPI.     Objective:     Vital Signs (Most Recent):  Temp: 98.7 °F (37.1 °C) (10/10/24 1137)  Pulse: (!) 59 (10/10/24 1638)  Resp: (!) 22 (10/10/24 1638)  BP: (!) 210/156 (10/10/24 1712)  SpO2: 96 % (10/10/24  1638) Vital Signs (24h Range):  Temp:  [98.7 °F (37.1 °C)] 98.7 °F (37.1 °C)  Pulse:  [59-87] 59  Resp:  [18-26] 22  SpO2:  [94 %-100 %] 96 %  BP: (186-238)/() 210/156     Weight: 64 kg (141 lb)  Body mass index is 24.2 kg/m².     Physical Exam           Significant Labs: All pertinent labs within the past 24 hours have been reviewed.  CBC:   Recent Labs   Lab 10/10/24  1217 10/10/24  1217   WBC 10.55  --    HGB 15.4  --    HCT 44.7 46     --      CMP:   Recent Labs   Lab 10/10/24  1217      K 4.1      CO2 22*   *   BUN 11   CREATININE 1.3   CALCIUM 10.6*   PROT 8.7*   ALBUMIN 4.6   BILITOT 0.5   ALKPHOS 77   AST 22   ALT 32   ANIONGAP 18*     Cardiac Markers:   Recent Labs   Lab 10/10/24  1223   *     Troponin:   Recent Labs   Lab 10/10/24  1223 10/10/24  1500   TROPONINI 0.025 0.041*     Urine Studies:   Recent Labs   Lab 10/10/24  1326   COLORU Yellow   APPEARANCEUA Clear   PHUR 8.0   SPECGRAV 1.025   PROTEINUA 3+*   GLUCUA 3+*   KETONESU Negative   BILIRUBINUA Negative   OCCULTUA Trace*   NITRITE Negative   UROBILINOGEN Negative   LEUKOCYTESUR Negative   RBCUA 4   WBCUA 2   BACTERIA None   HYALINECASTS 0       Significant Imaging: I have reviewed all pertinent imaging results/findings within the past 24 hours.           Assessment/Plan:     * NSTEMI (non-ST elevated myocardial infarction)  CAD  HLD  HTN    This 59M with known CV disease describes pain as constant epigastric cramping and gnawing that is similar to the pain he had prior to both of his recent PCI + JUSTIN (June to LCx and August to Prox LAD).  Pain and HTN were only minimally affected by morphine, but were relieved by 1 sublingual nitro film.  Pt admitted to ED staff that he has not been consistently taking his home meds which include DAPT.  EKG without clear ST changes concerning for ischemia  Troponin rising 0.025 on arrival -> 0.041 -> 0.057  Crt 1.3 on arrival, baseline 1.0-1.1   Subjective and objective data  at presentation are most concerning for NSTEMI.  Admittedly, this is not a slam dunk case and his Troponin .057 in isolation would usually not be high enough to pique such concern.  But the similarity of sxs and Troponin now compared to June and August as well as his sxs' response to Nitro and admission to ED staff that he hasn't been fully compliant with his home meds warrant admission and further treatment.    LEO score for UA/NSTEMI 5    Cardiology consulted  Admit to Hospital Medicine, but will step up to ICU if he becomes hemodynamically unstable  Start ACS protocol with  mg load followed by 82 mg qd, P2Y12 inhibitor load followed by maintenance dosage, and heparin gtt  High intensity Statin: atorvastatin 80 daily  Will defer B-blocker to Cardiology given his AV block   Resume home valsartan as tolerated; holding on admission in anticipation of possible dye load and Crt 1.3  TTE pending  Trend troponin (to peak or flat) q3hr  Lipid panel, TSH  NTG PRN and morphine for chest pain  If CP worsens or pt becomes HDS, please let MD know so that we can call Cardiology at once        Tobacco dependency  Dangers of cigarette smoking were reviewed with patient in detail. Patient was Counseled for 3-10 minutes. Nicotine replacement options were discussed. Nicotine replacement was discussed- not prescribed per patient's request    Esophagitis  Although this could explain epigastric pain/cramping, he has too many other data points that suggest NSTEMI for this to top our DDx.  Will nevertheless provide PPI and morphine.      Essential hypertension  Patients blood pressure range in the last 24 hours was: BP  Min: 84/50  Max: 238/110.The patient's inpatient anti-hypertensive regimen is listed below:  Current Antihypertensives  metoprolol tartrate (LOPRESSOR) tablet 25 mg, 2 times daily, Oral  nitroGLYCERIN SL tablet 0.4 mg, Every 5 min PRN, Sublingual    Plan  - BP is uncontrolled, will adjust as follows: resume home  valsartan-HCTZ after Cardiology eval; holding in anticipation of possible contrast load  - PRNs in place for SBP > 180 in setting of alarm sxs      Type 2 diabetes mellitus, controlled  Patient's FSGs are uncontrolled due to hyperglycemia on current medication regimen.  Last A1c reviewed-   Lab Results   Component Value Date    HGBA1C 6.2 (H) 06/17/2024     Most recent fingerstick glucose reviewed-   Recent Labs   Lab 10/10/24  1215   POCTGLUCOSE 280*     Current correctional scale  Low  Maintain anti-hyperglycemic dose as follows-   Antihyperglycemics (From admission, onward)      Start     Stop Route Frequency Ordered    10/11/24 0006  insulin aspart U-100 pen 0-5 Units         -- SubQ Every 6 hours PRN 10/10/24 2307          Hold Oral hypoglycemics while patient is in the hospital.  Will titrate up regimen to meet inpatient goal -180 after possible LHC in the AM.    History of hepatitis C, s/p successful hcv treatment w/ SVR 8-2015   Liver labs nl on CMP on admission.  WCTM.        VTE Risk Mitigation (From admission, onward)           Ordered     heparin 25,000 units in dextrose 5% (100 units/ml) IV bolus from bag LOW INTENSITY nomogram - [facility]  As needed (PRN)        Question:  Heparin Infusion Adjustment (DO NOT MODIFY ANSWER)  Answer:  [email or web address]\Images\Pharmacy\HeparinInfusions\heparin LOW INTENSITY nomogram for [facility] [id number]    10/10/24 1758     heparin 25,000 units in dextrose 5% (100 units/ml) IV bolus from bag LOW INTENSITY nomogram - [facility]  As needed (PRN)        Question:  Heparin Infusion Adjustment (DO NOT MODIFY ANSWER)  Answer:  \[email or web address]\Images\Pharmacy\HeparinInfusions\heparin LOW INTENSITY nomogram for [facility] [id number].pdf    10/10/24 1758     heparin 25,000 units in dextrose 5% 250 mL (100 units/mL) infusion LOW INTENSITY nomogram - [facility]  Continuous        Question:  Begin at (units/kg/hr)  Answer:  12    10/10/24 1758     Place sequential compression device  Until discontinued          10/10/24 1758     IP VTE LOW RISK PATIENT  Once         10/10/24 1758                                    Chidi Angulo MD  Department of Hospital Medicine  [facility] - Emergency Dept

## 2024-10-11 NOTE — PROGRESS NOTES
"Patient returned from cath lab without signs of distress. Left wrist TR band removed prior to returning to unit. No signs of bleeding.      However, this patient is now wanting to leave AMA. He siad "I'm done here". Provider notified via secure chat   "

## 2024-10-11 NOTE — ASSESSMENT & PLAN NOTE
Although this could explain epigastric pain/cramping, he has too many other data points that suggest NSTEMI for this to top our DDx.  Will nevertheless provide PPI and morphine.

## 2024-10-11 NOTE — PROGRESS NOTES
[facility]  Nurses Note -- 4 Eyes      10/11/2024       Skin assessed on: Q Shift      [x] No Pressure Injuries Present    []Prevention Measures Documented    [] Yes LDA  for Pressure Injury Previously documented     [] Yes New Pressure Injury Discovered   [] LDA for New Pressure Injury Added      Attending RN:  Matthew Howard, RN     Second RN:  Shea Ordonez LPN

## 2024-10-11 NOTE — ASSESSMENT & PLAN NOTE
Patient's FSGs are uncontrolled due to hyperglycemia on current medication regimen.  Last A1c reviewed-   Lab Results   Component Value Date    HGBA1C 6.2 (H) 06/17/2024     Most recent fingerstick glucose reviewed-   Recent Labs   Lab 10/10/24  1215   POCTGLUCOSE 280*     Current correctional scale  Low  Maintain anti-hyperglycemic dose as follows-   Antihyperglycemics (From admission, onward)      Start     Stop Route Frequency Ordered    10/11/24 0006  insulin aspart U-100 pen 0-5 Units         -- SubQ Every 6 hours PRN 10/10/24 2307          Hold Oral hypoglycemics while patient is in the hospital.  Will titrate up regimen to meet inpatient goal -180 after possible LHC in the AM.

## 2024-10-11 NOTE — PLAN OF CARE
Case Management Assessment     PCP: Stevo Pryor MD   Pharmacy:     Patient Arrived From: Home   Existing Help at Home: SpouseMarcela    Barriers to Discharge: None     Discharge Plan:    A. Home with family    B. Home with family       CM met with pt at bedside to discuss discharge planning. Pt resides with his spouse and uses a RW and cane for mobility.     Pt's spouse provides needed support at home and will provide transportation upon discharge.    CM will continue to follow-up for discharge needs.      10/11/24 1536   Discharge Assessment   Assessment Type Discharge Planning Assessment   Confirmed/corrected address, phone number and insurance Yes   Confirmed Demographics Correct on Facesheet   Source of Information patient   When was your last doctors appointment? 09/10/24   Communicated YARELI with patient/caregiver Date not available/Unable to determine   Reason For Admission NSTEMI   People in Home spouse   Facility Arrived From: Home   Do you expect to return to your current living situation? Yes   Do you have help at home or someone to help you manage your care at home? Yes   Who are your caregiver(s) and their phone number(s)? SpouseMarcela [phone]   Prior to hospitilization cognitive status: Alert/Oriented   Current cognitive status: Alert/Oriented   Walking or Climbing Stairs Difficulty yes   Walking or Climbing Stairs ambulation difficulty, requires equipment   Dressing/Bathing Difficulty no   Equipment Currently Used at Home walker, rolling;cane, straight   Readmission within 30 days? No   Patient currently being followed by outpatient case management? No   Do you currently have service(s) that help you manage your care at home? No   Do you take prescription medications? Yes   Do you have prescription coverage? Yes   Coverage [employer]   Do you have any problems affording any of your prescribed medications? No   Is the patient taking medications as prescribed? yes   Who is  going to help you get home at discharge? Spouse, Marcela   How do you get to doctors appointments? car, drives self   Are you on dialysis? No   Do you take coumadin? No   Discharge Plan A Home with family   Discharge Plan B Home with family   DME Needed Upon Discharge  none   Discharge Plan discussed with: Patient   Transition of Care Barriers None   SDOH   (None)   Physical Activity   On average, how many days per week do you engage in moderate to strenuous exercise (like a brisk walk)? 0 days   On average, how many minutes do you engage in exercise at this level? 0 min   Financial Resource Strain   How hard is it for you to pay for the very basics like food, housing, medical care, and heating? Not hard   Housing Stability   In the last 12 months, was there a time when you were not able to pay the mortgage or rent on time? N   At any time in the past 12 months, were you homeless or living in a shelter (including now)? N   Transportation Needs   Has the lack of transportation kept you from medical appointments, meetings, work or from getting things needed for daily living? No   Food Insecurity   Within the past 12 months, you worried that your food would run out before you got the money to buy more. Never true   Within the past 12 months, the food you bought just didn't last and you didn't have money to get more. Never true   Stress   Do you feel stress - tense, restless, nervous, or anxious, or unable to sleep at night because your mind is troubled all the time - these days? To some exte   Social Isolation   How often do you feel lonely or isolated from those around you?  Never   Alcohol Use   Q1: How often do you have a drink containing alcohol? Never   Q2: How many drinks containing alcohol do you have on a typical day when you are drinking? None   Q3: How often do you have six or more drinks on one occasion? Never

## 2024-10-11 NOTE — HOSPITAL COURSE
Patient was admitted with NSTEMI and esophagitis. He was started on aspirin, plavix and heparin drip. Cardiology recommending angiogram given his symptoms and history. He underwent angiogram on 10/11 with results below, no intervention. Started PPI and carafate for esophagitis.     LHC done 10/11/2024 - which shows   Main left main: Ostial 20% stenosis    Lad:  Recently placed LAD stent widely patent.  Mid LAD 50-60% stenosis.  Diagonal 2 ostial 70% stenosis  Circumflex:  Recently placed prox circumflex stent widely patent.  Luminal irregularities in rest of circumflex   RCA:  50% mid and distal lesions noted.  PLB 50% stenosis.  PDA ostial 80% stenosis medical management ideal for these stenosis.

## 2024-10-11 NOTE — ASSESSMENT & PLAN NOTE
Patients blood pressure range in the last 24 hours was: BP  Min: 84/50  Max: 238/110.The patient's inpatient anti-hypertensive regimen is listed below:  Current Antihypertensives  metoprolol tartrate (LOPRESSOR) tablet 25 mg, 2 times daily, Oral  nitroGLYCERIN SL tablet 0.4 mg, Every 5 min PRN, Sublingual    Plan  - BP is uncontrolled, will adjust as follows: resume home valsartan-HCTZ after Cardiology eval; holding in anticipation of possible contrast load  - PRNs in place for SBP > 180 in setting of alarm sxs

## 2024-10-11 NOTE — CONSULTS
Food & Nutrition  Education    Diet Education: Heart Healthy Diet  Time Spent: 15 min  Learners: Patient      Nutrition Education provided with handouts: Heart Healthy Nutrition Therapy      Comments: Educated patient on heart healthy diet. Discussed not using salt when preparing food and making heart healthy swaps such as olive oil instead of butter and 1% or skim milk instead on whole milk. Pt stated he is already following a heart healthy diet.       All questions and concerns answered. Dietitian's contact information provided.       Follow-Up: 10/17/24    Please Re-consult as needed        Thanks!

## 2024-10-11 NOTE — PROGRESS NOTES
[facility] Medicine  Progress Note    Patient Name: Nino Price  MRN: [id number]  Patient Class: IP- Inpatient   Admission Date: 10/10/2024  Length of Stay: 1 days  Attending Physician: Gama Michaud MD  Primary Care Provider: Stevo Pryor MD        Subjective:     Principal Problem:NSTEMI (non-ST elevated myocardial infarction)        HPI:  Nino Price is a 59 y.o. male with CAD s/p Recent PCI + JUSTIN in June to LCX and August to Prox LAD, Non-Insulin-dependent DM2 without complications, HTN, and HLD who presented to [facility] ED on 10/10/2024 for eval and treatment of epigastric pain.  They describe their pain as sharp and cramping, 10/10, located just below the sternum with radiation to the lateral ribcage.  It started 1 day prior to presentation on 10/9 and has been worsening since, with symptom frequency now constant.  There is associated diaphoresis, dyspnea, emesis.  They deny associated burning, reflux.  He reports he feels the same as he felt just prior to both of his recent LHCs in June and August.  Symptoms are alleviated by none of the OTC NSAIDs he tried.  He reported to ED staff that he has not been fully compliant in taking all of his medications, including his DAPT and valsartan-HCTZ.    ED course notable for the following: Diaphoretic and pale on initial eval.  BP up to 238/110.  Exam without JVD, crackles, or THA.  Labs Trop 0.025 -> 0.041 -> 0.057.  .  WBC 11.48 Hgb 15.3.  PT 11.2 INR 1.  Crt 1.3 (recent baseline 1.0-1.1).  .  Tcholest 264 HDL 38 .  EKG without obvious ST changes.  Imaging: CT A/P: Bilateral perinephric fat stranding noting distention of the urinary bladder.  Vasculature patent.  ED therapy/stabilization measures: Morphine and his home BP med failed to improve his sxs.  Sublingual Nitro resolved his pain.  Case d/w Cardiology attending on-call; ACS protocol started.  They were admitted to [facility] for  further evaluation and treatment.         Overview/Hospital Course:  Mr. Price is a 58 yo M admitted with NSTEMI and esophagitis. He was started on aspirin, plavix and heparin drip. Cardiology recommending angiogram given his symptoms and history. He underwent angiogram on 10/11 with results below, no intervention. Started PPI and carafate for esophagitis.     Interval History: seen after angiogram, eating lunch. He doesn't feel bad, but doesn't feel great. Still having abdominal pain. WE discussed his angiogram results as well as imaging showing esophagitis. Started PPI and adding carafate    Review of Systems  Objective:     Vital Signs (Most Recent):  Temp: 98 °F (36.7 °C) (10/11/24 1400)  Pulse: 81 (10/11/24 1502)  Resp: 20 (10/11/24 1400)  BP: (!) 158/78 (10/11/24 1400)  SpO2: (!) 94 % (10/11/24 1400) Vital Signs (24h Range):  Temp:  [97.4 °F (36.3 °C)-98.6 °F (37 °C)] 98 °F (36.7 °C)  Pulse:  [59-92] 81  Resp:  [17-24] 20  SpO2:  [94 %-99 %] 94 %  BP: ()/() 158/78     Weight: 62.8 kg (138 lb 7.2 oz)  Body mass index is 23.76 kg/m².    Intake/Output Summary (Last 24 hours) at 10/11/2024 1507  Last data filed at 10/11/2024 1358  Gross per 24 hour   Intake 200 ml   Output 500 ml   Net -300 ml         Physical Exam  Vitals and nursing note reviewed.   Constitutional:       General: He is not in acute distress.     Appearance: He is ill-appearing.   Cardiovascular:      Rate and Rhythm: Normal rate and regular rhythm.      Pulses: Normal pulses.   Pulmonary:      Effort: Pulmonary effort is normal. No respiratory distress.   Abdominal:      General: Bowel sounds are normal. There is no distension.   Neurological:      General: No focal deficit present.      Mental Status: He is alert and oriented to person, place, and time.   Psychiatric:         Mood and Affect: Mood normal.             Significant Labs: All pertinent labs within the past 24 hours have been reviewed.    Significant Imaging: I have  reviewed all pertinent imaging results/findings within the past 24 hours.    Assessment/Plan:      * NSTEMI (non-ST elevated myocardial infarction)  CAD  HLD  HTN    59M with known CV disease describes pain as constant epigastric cramping and gnawing that is similar to the pain he had prior to both of his recent PCI + JUSTIN (June to LCx and August to Prox LAD).  Pain and HTN were only minimally affected by morphine, but were relieved by 1 sublingual nitro film.  Pt admitted to ED staff that he has not been consistently taking his home meds which include DAPT.  EKG without clear ST changes concerning for ischemia  Troponin rising 0.025 on arrival -> 0.041 -> 0.057  Crt 1.3 on arrival, baseline 1.0-1.1   Presentation most concerning for NSTEMI.      LEO score for UA/NSTEMI 5    Cardiology consulted and started on ACS protocol  Patient underwent angiogram as below - no intervention performed  Continue with aspirin/plavix/statin      Tobacco dependency  Dangers of cigarette smoking were reviewed with patient in detail. Patient was Counseled for 3-10 minutes. Nicotine replacement options were discussed. Nicotine replacement was discussed- not prescribed per patient's request    CAD (coronary artery disease)  See NSTEMI    Dyslipidemia  - uncontrolled hyperlipidemia  - on high intensity statin      Esophagitis  - noted on imaging, could explain patient's epigastric abdominal pain symptoms  - started on PPI  - add carafate  - he likely needs follow up with GI with his risk factors tobacco/alcohol use     Essential hypertension  Patients blood pressure range in the last 24 hours was: BP  Min: 84/50  Max: 238/110.The patient's inpatient anti-hypertensive regimen is listed below:  Current Antihypertensives  metoprolol tartrate (LOPRESSOR) tablet 25 mg, 2 times daily, Oral  nitroGLYCERIN SL tablet 0.4 mg, Every 5 min PRN, Sublingual    Plan  - BP is uncontrolled, will adjust as follows: resume home valsartan-HCTZ after  Cardiology eval; holding in anticipation of possible contrast load  - PRNs in place for SBP > 180 in setting of alarm sxs      Type 2 diabetes mellitus, controlled  Patient's FSGs are uncontrolled due to hyperglycemia on current medication regimen.  Last A1c reviewed-   Lab Results   Component Value Date    HGBA1C 6.2 (H) 06/17/2024     Most recent fingerstick glucose reviewed-   Recent Labs   Lab 10/10/24  1215   POCTGLUCOSE 280*     Current correctional scale  Low  Maintain anti-hyperglycemic dose as follows-   Antihyperglycemics (From admission, onward)      Start     Stop Route Frequency Ordered    10/11/24 0006  insulin aspart U-100 pen 0-5 Units         -- SubQ Every 6 hours PRN 10/10/24 2307          Hold Oral hypoglycemics while patient is in the hospital.  Will titrate up regimen to meet inpatient goal -180 after possible LHC in the AM.    History of hepatitis C, s/p successful hcv treatment w/ SVR 8-2015   - LFTs stable        VTE Risk Mitigation (From admission, onward)           Ordered     Place sequential compression device  Until discontinued         10/10/24 1758     IP VTE LOW RISK PATIENT  Once         10/10/24 1758                    Discharge Planning   YARELI:      Code Status: Full Code   Is the patient medically ready for discharge?:     Reason for patient still in hospital (select all that apply): Treatment                     Gama Michaud MD  Department of Hospital Medicine   [facility]

## 2024-10-11 NOTE — ASSESSMENT & PLAN NOTE
- noted on imaging, could explain patient's epigastric abdominal pain symptoms  - started on PPI  - add carafate  - he likely needs follow up with GI with his risk factors tobacco/alcohol use

## 2024-10-11 NOTE — ASSESSMENT & PLAN NOTE
Patients blood pressure range in the last 24 hours was: BP  Min: 84/50  Max: 238/110.The patient's inpatient anti-hypertensive regimen is listed below:  Current Antihypertensives  metoprolol tartrate (LOPRESSOR) tablet 25 mg, 2 times daily, Oral  nitroGLYCERIN SL tablet 0.4 mg, Every 5 min PRN, Sublingual    Plan  - BP is uncontrolled, will adjust as follows:    Will titrate antihypertensives as needed for appropriate blood pressure control

## 2024-10-12 VITALS
OXYGEN SATURATION: 98 % | RESPIRATION RATE: 18 BRPM | HEART RATE: 60 BPM | HEIGHT: 64 IN | WEIGHT: 138.44 LBS | BODY MASS INDEX: 23.64 KG/M2 | DIASTOLIC BLOOD PRESSURE: 70 MMHG | SYSTOLIC BLOOD PRESSURE: 141 MMHG | TEMPERATURE: 98 F

## 2024-10-12 LAB
ANION GAP SERPL CALC-SCNC: 8 MMOL/L (ref 8–16)
BASOPHILS # BLD AUTO: 0.06 K/UL (ref 0–0.2)
BASOPHILS NFR BLD: 0.9 % (ref 0–1.9)
BUN SERPL-MCNC: 21 MG/DL (ref 6–20)
CALCIUM SERPL-MCNC: 8.5 MG/DL (ref 8.7–10.5)
CHLORIDE SERPL-SCNC: 110 MMOL/L (ref 95–110)
CO2 SERPL-SCNC: 22 MMOL/L (ref 23–29)
CREAT SERPL-MCNC: 1.4 MG/DL (ref 0.5–1.4)
DIFFERENTIAL METHOD BLD: ABNORMAL
EOSINOPHIL # BLD AUTO: 0.2 K/UL (ref 0–0.5)
EOSINOPHIL NFR BLD: 3.3 % (ref 0–8)
ERYTHROCYTE [DISTWIDTH] IN BLOOD BY AUTOMATED COUNT: 13.3 % (ref 11.5–14.5)
EST. GFR  (NO RACE VARIABLE): 58 ML/MIN/1.73 M^2
GLUCOSE SERPL-MCNC: 142 MG/DL (ref 70–110)
HCT VFR BLD AUTO: 36.4 % (ref 40–54)
HGB BLD-MCNC: 12.1 G/DL (ref 14–18)
IMM GRANULOCYTES # BLD AUTO: 0.01 K/UL (ref 0–0.04)
IMM GRANULOCYTES NFR BLD AUTO: 0.1 % (ref 0–0.5)
LYMPHOCYTES # BLD AUTO: 2 K/UL (ref 1–4.8)
LYMPHOCYTES NFR BLD: 28.9 % (ref 18–48)
MCH RBC QN AUTO: 32.6 PG (ref 27–31)
MCHC RBC AUTO-ENTMCNC: 33.2 G/DL (ref 32–36)
MCV RBC AUTO: 98 FL (ref 82–98)
MONOCYTES # BLD AUTO: 0.8 K/UL (ref 0.3–1)
MONOCYTES NFR BLD: 12.2 % (ref 4–15)
NEUTROPHILS # BLD AUTO: 3.8 K/UL (ref 1.8–7.7)
NEUTROPHILS NFR BLD: 54.6 % (ref 38–73)
NRBC BLD-RTO: 0 /100 WBC
OHS QRS DURATION: 72 MS
OHS QRS DURATION: 74 MS
OHS QRS DURATION: 78 MS
OHS QTC CALCULATION: 441 MS
OHS QTC CALCULATION: 471 MS
OHS QTC CALCULATION: 482 MS
PLATELET # BLD AUTO: 202 K/UL (ref 150–450)
PMV BLD AUTO: 10 FL (ref 9.2–12.9)
POCT GLUCOSE: 216 MG/DL (ref 70–110)
POCT GLUCOSE: 251 MG/DL (ref 70–110)
POTASSIUM SERPL-SCNC: 3.8 MMOL/L (ref 3.5–5.1)
RBC # BLD AUTO: 3.71 M/UL (ref 4.6–6.2)
SODIUM SERPL-SCNC: 140 MMOL/L (ref 136–145)
WBC # BLD AUTO: 6.88 K/UL (ref 3.9–12.7)

## 2024-10-12 PROCEDURE — 25000003 PHARM REV CODE 250: Mod: HCNC | Performed by: INTERNAL MEDICINE

## 2024-10-12 PROCEDURE — 94761 N-INVAS EAR/PLS OXIMETRY MLT: CPT | Mod: HCNC

## 2024-10-12 PROCEDURE — 25000003 PHARM REV CODE 250: Mod: HCNC | Performed by: STUDENT IN AN ORGANIZED HEALTH CARE EDUCATION/TRAINING PROGRAM

## 2024-10-12 PROCEDURE — 80048 BASIC METABOLIC PNL TOTAL CA: CPT | Mod: HCNC | Performed by: STUDENT IN AN ORGANIZED HEALTH CARE EDUCATION/TRAINING PROGRAM

## 2024-10-12 PROCEDURE — 25000003 PHARM REV CODE 250: Mod: HCNC

## 2024-10-12 PROCEDURE — 36415 COLL VENOUS BLD VENIPUNCTURE: CPT | Mod: HCNC | Performed by: STUDENT IN AN ORGANIZED HEALTH CARE EDUCATION/TRAINING PROGRAM

## 2024-10-12 PROCEDURE — 63600175 PHARM REV CODE 636 W HCPCS: Mod: HCNC

## 2024-10-12 PROCEDURE — 85025 COMPLETE CBC W/AUTO DIFF WBC: CPT | Mod: HCNC | Performed by: STUDENT IN AN ORGANIZED HEALTH CARE EDUCATION/TRAINING PROGRAM

## 2024-10-12 PROCEDURE — 99233 SBSQ HOSP IP/OBS HIGH 50: CPT | Mod: HCNC,,, | Performed by: INTERNAL MEDICINE

## 2024-10-12 RX ADMIN — MORPHINE SULFATE 4 MG: 4 INJECTION, SOLUTION INTRAMUSCULAR; INTRAVENOUS at 06:10

## 2024-10-12 RX ADMIN — ATORVASTATIN CALCIUM 80 MG: 40 TABLET, FILM COATED ORAL at 09:10

## 2024-10-12 RX ADMIN — ASPIRIN 81 MG CHEWABLE TABLET 81 MG: 81 TABLET CHEWABLE at 09:10

## 2024-10-12 RX ADMIN — SUCRALFATE 1 G: 1 SUSPENSION ORAL at 11:10

## 2024-10-12 RX ADMIN — SUCRALFATE 1 G: 1 SUSPENSION ORAL at 06:10

## 2024-10-12 RX ADMIN — INSULIN ASPART 2 UNITS: 100 INJECTION, SOLUTION INTRAVENOUS; SUBCUTANEOUS at 11:10

## 2024-10-12 RX ADMIN — METOPROLOL TARTRATE 25 MG: 25 TABLET, FILM COATED ORAL at 09:10

## 2024-10-12 RX ADMIN — SODIUM CHLORIDE: 9 INJECTION, SOLUTION INTRAVENOUS at 07:10

## 2024-10-12 RX ADMIN — CLOPIDOGREL BISULFATE 75 MG: 75 TABLET ORAL at 09:10

## 2024-10-12 RX ADMIN — PANTOPRAZOLE SODIUM 40 MG: 40 TABLET, DELAYED RELEASE ORAL at 09:10

## 2024-10-12 NOTE — DISCHARGE SUMMARY
[facility] Medicine  Discharge Summary      Patient Name: Nino Price  MRN: [id number]  [facility]: [id number]  Patient Class: IP- Inpatient  Admission Date: 10/10/2024  Hospital Length of Stay: 2 days  Discharge Date and Time:  10/12/2024 4:55 PM  Attending Physician: No att. providers found   Discharging Provider: Lisa Lujan MD  Primary Care Provider: Stevo Pryor MD    Primary Care Team: Networked reference to record PCT     HPI:   Nino Price is a 59 y.o. male with CAD s/p Recent PCI + JUSTIN in June to LCX and August to Prox LAD, Non-Insulin-dependent DM2 without complications, HTN, and HLD who presented to [facility] ED on 10/10/2024 for eval and treatment of epigastric pain.  They describe their pain as sharp and cramping, 10/10, located just below the sternum with radiation to the lateral ribcage.  It started 1 day prior to presentation on 10/9 and has been worsening since, with symptom frequency now constant.  There is associated diaphoresis, dyspnea, emesis.  They deny associated burning, reflux.  He reports he feels the same as he felt just prior to both of his recent LHCs in June and August.  Symptoms are alleviated by none of the OTC NSAIDs he tried.  He reported to ED staff that he has not been fully compliant in taking all of his medications, including his DAPT and valsartan-HCTZ.    ED course notable for the following: Diaphoretic and pale on initial eval.  BP up to 238/110.  Exam without JVD, crackles, or THA.  Labs Trop 0.025 -> 0.041 -> 0.057.  .  WBC 11.48 Hgb 15.3.  PT 11.2 INR 1.  Crt 1.3 (recent baseline 1.0-1.1).  .  Tcholest 264 HDL 38 .  EKG without obvious ST changes.  Imaging: CT A/P: Bilateral perinephric fat stranding noting distention of the urinary bladder.  Vasculature patent.  ED therapy/stabilization measures: Morphine and his home BP med failed to improve his sxs.  Sublingual Nitro resolved his pain.  Case d/w Cardiology attending  on-call; ACS protocol started.  They were admitted to [facility] for further evaluation and treatment.         Procedure(s) (LRB):  Left heart cath (Left)      Hospital Course:   Patient was admitted with NSTEMI and esophagitis. He was started on aspirin, plavix and heparin drip. Cardiology recommending angiogram given his symptoms and history. He underwent angiogram on 10/11 with results below, no intervention. Started PPI and carafate for esophagitis.     C done 10/11/2024 - which shows   Main left main: Ostial 20% stenosis    Lad:  Recently placed LAD stent widely patent.  Mid LAD 50-60% stenosis.  Diagonal 2 ostial 70% stenosis  Circumflex:  Recently placed prox circumflex stent widely patent.  Luminal irregularities in rest of circumflex   RCA:  50% mid and distal lesions noted.  PLB 50% stenosis.  PDA ostial 80% stenosis medical management ideal for these stenosis.        Goals of Care Treatment Preferences:  Code Status: Full Code      SDOH Screening:  The patient was screened for utility difficulties, food insecurity, transport difficulties, housing insecurity, and interpersonal safety and there were no concerns identified this admission.     Consults:   Consults (From admission, onward)          Status Ordering Provider     Inpatient consult to Registered Dietitian/Nutritionist  Once        Provider:  (Not yet assigned)    Completed FRITZ THRASHER     Inpatient consult to Social Work/Case Management  Once        Provider:  (Not yet assigned)    Completed FRITZ THRASHER     Inpatient consult to Cardiology  Once        Provider:  Gabriela Rider MD    Completed FRITZ THRASHER            No new Assessment & Plan notes have been filed under this hospital service since the last note was generated.  Service: Hospital Medicine    Final Active Diagnoses:    Diagnosis Date Noted POA    PRINCIPAL PROBLEM:  NSTEMI (non-ST elevated myocardial infarction) [I21.4] 10/10/2024 Yes    Tobacco  dependency [F17.200] 10/10/2024 Yes    CAD (coronary artery disease) [I25.10] 06/18/2024 Yes    Dyslipidemia [E78.5] 10/22/2016 Yes    Esophagitis [K20.90] 06/04/2016 Yes    Type 2 diabetes mellitus, controlled [E11.9] 05/29/2015 Yes     Chronic    Essential hypertension [I10] 05/29/2015 Yes     Chronic    History of hepatitis C, s/p successful hcv treatment w/ SVR 8-2015  [Z86.19] 10/08/2012 Yes      Problems Resolved During this Admission:       Discharged Condition: good    Disposition: Home or Self Care    Follow Up:   Follow-up Information       Page, Stevo STOVER MD. Schedule an appointment as soon as possible for a visit.    Specialties: Family Medicine, Wound Care  Why: Please call to schedule hospital follow-up to be seen within 1 week.  Contact information:  [address] [place] [id number] [phone]                           Patient Instructions:      Ambulatory referral/consult to Smoking Cessation Program   Standing Status: Future   Referral Priority: Routine Referral Type: Consultation   Referral Reason: Specialty Services Required   Requested Specialty: CTTS   Number of Visits Requested: 1     Diet Cardiac     Reason for not Prescribing Nicotine Replacement     Order Specific Question Answer Comments   Reason for not Prescribing: Patient refused        Significant Diagnostic Studies: N/A    Pending Diagnostic Studies:       Procedure Component Value Units Date/Time    EKG 12-lead [[id number]]     Order Status: Sent Lab Status: No result            Medications:  Reconciled Home Medications:      Medication List        CONTINUE taking these medications      aspirin 81 MG EC tablet  Commonly known as: ECOTRIN  Take 1 tablet (81 mg total) by mouth once daily.     atorvastatin 80 MG tablet  Commonly known as: LIPITOR  Take 1 tablet (80 mg total) by mouth once daily.     busPIRone 10 MG tablet  Commonly known as: BUSPAR  Take 1 tablet (10 mg total) by mouth every evening.     clopidogreL 75 mg  tablet  Commonly known as: PLAVIX  Take 1 tablet (75 mg total) by mouth once daily.     dicyclomine 20 mg tablet  Commonly known as: BENTYL  Take 1 tablet (20 mg total) by mouth 2 (two) times daily as needed (stomach pain).     DULoxetine 60 MG capsule  Commonly known as: CYMBALTA  Take 60 mg by mouth once daily. Takes in pm     gabapentin 300 MG capsule  Commonly known as: NEURONTIN  Take 300 mg by mouth 3 (three) times daily.     icosapent ethyL 1 gram Cap  Commonly known as: VASCEPA  Take 2 g by mouth.     lancing device with lancets Kit  To check blood glucose twice daily.     metFORMIN 1000 MG tablet  Commonly known as: GLUCOPHAGE  Take 1 tablet (1,000 mg total) by mouth 2 (two) times daily with meals.     metoclopramide HCl 10 MG tablet  Commonly known as: REGLAN  Take 1 tablet (10 mg total) by mouth every 6 (six) hours.     mirtazapine 15 MG tablet  Commonly known as: REMERON  Take 15 mg by mouth every evening.     nitroGLYCERIN 0.4 MG SL tablet  Commonly known as: NITROSTAT  Place 1 tablet (0.4 mg total) under the tongue as needed for Chest pain.     oxyCODONE-acetaminophen  mg per tablet  Commonly known as: PERCOCET  Take 1 tablet by mouth every 6 (six) hours as needed.     pantoprazole 40 MG tablet  Commonly known as: PROTONIX  Take 1 tablet (40 mg total) by mouth once daily.     tiZANidine 4 MG tablet  Commonly known as: ZANAFLEX  Take 4 mg by mouth every evening.     traZODone 100 MG tablet  Commonly known as: DESYREL  Take 150 mg by mouth nightly as needed for Insomnia. 1.5 tab nightly as needed     TRUE METRIX GLUCOSE METER Misc  Generic drug: blood-glucose meter     valsartan-hydrochlorothiazide 160-25 mg per tablet  Commonly known as: DIOVAN-HCT  Take 0.5 tablets by mouth once daily.              Indwelling Lines/Drains at time of discharge:   Lines/Drains/Airways       None                   Time spent on the discharge of patient: 45 minutes         Lisa Lujan MD  Department of Hospital  Medicine  [facility]

## 2024-10-12 NOTE — PROGRESS NOTES
[facility]  Cardiology  Progress Note    Patient Name: Nino Price  MRN: [id number]  Admission Date: 10/10/2024  Hospital Length of Stay: 2 days  Code Status: Full Code   Attending Physician: Lisa Lujan MD   Primary Care Physician: Stevo Pryor MD  Expected Discharge Date: 10/12/2024  Principal Problem:NSTEMI (non-ST elevated myocardial infarction)    Subjective:     Interval History:  Patient status post angiogram.  Doing fine.  No chest pains.  No complications from angiogram.  Feeling fine.  Eager to go home    Review of Systems   Constitutional: Negative.   HENT: Negative.     Eyes: Negative.    Endocrine: Negative.    Hematologic/Lymphatic: Negative.    Skin: Negative.    Musculoskeletal: Negative.    Gastrointestinal: Negative.    Genitourinary: Negative.    Neurological: Negative.    Psychiatric/Behavioral: Negative.     Allergic/Immunologic: Negative.      Objective:     Vital Signs (Most Recent):  Temp: 98.3 °F (36.8 °C) (10/12/24 1110)  Pulse: 75 (10/12/24 1110)  Resp: 18 (10/12/24 1110)  BP: (!) 141/70 (10/12/24 1110)  SpO2: 98 % (10/12/24 1110) Vital Signs (24h Range):  Temp:  [97.5 °F (36.4 °C)-98.3 °F (36.8 °C)] 98.3 °F (36.8 °C)  Pulse:  [64-82] 75  Resp:  [18-20] 18  SpO2:  [94 %-98 %] 98 %  BP: (122-158)/(59-78) 141/70     Weight: 62.8 kg (138 lb 7.2 oz)  Body mass index is 23.76 kg/m².     SpO2: 98 %         Intake/Output Summary (Last 24 hours) at 10/12/2024 1322  Last data filed at 10/11/2024 1837  Gross per 24 hour   Intake 440 ml   Output 500 ml   Net -60 ml       Lines/Drains/Airways       Peripheral Intravenous Line  Duration                  Peripheral IV - Single Lumen 10/10/24 1225 20 G Anterior;Proximal;Right Forearm 2 days         Peripheral IV - Single Lumen 10/10/24 1834 20 G Anterior;Left;Proximal Forearm 1 day                       Physical Exam  Vitals reviewed.   Constitutional:       Appearance: He is well-developed.   HENT:      Head: Normocephalic.   Eyes:       Conjunctiva/sclera: Conjunctivae normal.      Pupils: Pupils are equal, round, and reactive to light.   Cardiovascular:      Rate and Rhythm: Normal rate and regular rhythm.      Heart sounds: Normal heart sounds.   Pulmonary:      Effort: Pulmonary effort is normal.      Breath sounds: Normal breath sounds.   Abdominal:      General: Bowel sounds are normal.      Palpations: Abdomen is soft.   Musculoskeletal:      Cervical back: Normal range of motion and neck supple.   Skin:     General: Skin is warm.   Neurological:      Mental Status: He is alert and oriented to person, place, and time.            Significant Labs:      DATA:     Laboratory:  CBC:  Recent Labs   Lab 10/10/24  1822 10/11/24  0604 10/12/24  0436   WBC 11.48 11.21 6.88   Hemoglobin 15.3 12.9 L 12.1 L   Hematocrit 44.4 38.1 L 36.4 L   Platelets 264 224 202       CHEMISTRIES:  Recent Labs   Lab 05/26/22  0950 04/26/23  0615 06/18/24  0251 06/19/24  0521 06/27/24  1102 08/16/24  1330 10/10/24  1217 10/12/24  0436   Glucose 135 H   < > 129 H 130 H 201 H 141 H 279 H 142 H   Sodium 141   < > 142 140 140 141 142 140   Potassium 4.0   < > 4.1 3.7 4.8 4.3 4.1 3.8   BUN 7   < > 11 11 9 10 11 21 H   Creatinine 1.0   < > 0.9 1.0 1.1 1.1 1.3 1.4   eGFR if  >60  --   --   --   --   --   --   --    eGFR if non  >60  --   --   --   --   --   --   --    Calcium 9.2   < > 8.8 8.6 L 9.8 9.7 10.6 H 8.5 L   Magnesium  --    < > 1.5 L 1.4 L 1.8  --   --   --     < > = values in this interval not displayed.       CARDIAC BIOMARKERS:  Recent Labs   Lab 10/11/24  0016 10/11/24  0411 10/11/24  0604   Troponin I 0.042 H 0.049 H 0.037 H       COAGS:  Recent Labs   Lab 04/15/24  1513 06/17/24  0758 10/10/24  1822   INR 1.0 1.1 1.0  1.0       LIPIDS/LFTS:  Recent Labs   Lab 04/30/24  0835 06/16/24  1952 06/17/24  0407 06/27/24  1102 10/10/24  1217 10/10/24  1822   Cholesterol 267 H  --  124  --   --  264 H   Triglycerides 303 H  --  116   --   --  158 H   HDL 31 L  --  23 L  --   --  38 L   LDL Cholesterol 175.4 H  --  77.8  --   --  194.4 H   Non-HDL Cholesterol 236  --  101  --   --  226   AST 15 15  --  23 22  --    ALT 20 18  --  51 H 32  --        Hemoglobin A1C   Date Value Ref Range Status   10/10/2024 6.8 (H) 4.0 - 5.6 % Final     Comment:     ADA Screening Guidelines:  5.7-6.4%  Consistent with prediabetes  >or=6.5%  Consistent with diabetes    High levels of fetal hemoglobin interfere with the HbA1C  assay. Heterozygous hemoglobin variants (HbS, HgC, etc)do  not significantly interfere with this assay.   However, presence of multiple variants may affect accuracy.     06/17/2024 6.2 (H) 4.0 - 5.6 % Final     Comment:     ADA Screening Guidelines:  5.7-6.4%  Consistent with prediabetes  >or=6.5%  Consistent with diabetes    High levels of fetal hemoglobin interfere with the HbA1C  assay. Heterozygous hemoglobin variants (HbS, HgC, etc)do  not significantly interfere with this assay.   However, presence of multiple variants may affect accuracy.     04/30/2024 6.8 (H) 4.0 - 5.6 % Final     Comment:     ADA Screening Guidelines:  5.7-6.4%  Consistent with prediabetes  >or=6.5%  Consistent with diabetes    High levels of fetal hemoglobin interfere with the HbA1C  assay. Heterozygous hemoglobin variants (HbS, HgC, etc)do  not significantly interfere with this assay.   However, presence of multiple variants may affect accuracy.         TSH  Recent Labs   Lab 08/10/23  0738 06/17/24  0407 10/11/24  0604   TSH 1.125 0.593 0.492       The ASCVD Risk score (Ana DK, et al., 2019) failed to calculate for the following reasons:    Risk score cannot be calculated because patient has a medical history suggesting prior/existing ASCVD       BNP    Lab Results   Component Value Date/Time     (H) 10/10/2024 12:23 PM     (H) 08/09/2023 08:46 PM     (H) 02/01/2019 06:22 AM    BNP 70 10/21/2016 10:55 PM            ECHO    Results for orders  placed during the hospital encounter of 10/10/24    Echo    Interpretation Summary    Left Ventricle: The left ventricle is normal in size. There is normal systolic function with a visually estimated ejection fraction of 55 - 60%. There is normal diastolic function.    Right Ventricle: Normal right ventricular cavity size. Systolic function is normal.    Left Atrium: Left atrium is mildly dilated.    Pulmonary Artery: The estimated pulmonary artery systolic pressure is 22 mmHg.    IVC/SVC: Normal venous pressure at 3 mmHg.      STRESS TEST    Results for orders placed during the hospital encounter of 08/09/23    Nuclear Stress - Cardiology Interpreted    Interpretation Summary    Abnormal myocardial perfusion scan.    There is a moderate to severe intensity, moderate sized, fixed perfusion abnormality in the lateral wall(s). Only resting images obtained as patient refused to complete test. Incomplete study.    There are no other significant perfusion abnormalities.    The ECG portion of the study is negative for ischemia.    The patient reported no chest pain during the stress test.    There were no arrhythmias during stress.        CATH    Results for orders placed during the hospital encounter of 10/10/24    Cardiac catheterization    Conclusion    Patent LAD and LCX stents.    The pre-procedure left ventricular end diastolic pressure was 12.    The estimated blood loss was <50 mL.    Main left main: Ostial 20% stenosis    Lad:  Recently placed LAD stent widely patent.  Mid LAD 50-60% stenosis.  Diagonal 2 ostial 70% stenosis    Circumflex:  Recently placed prox circumflex stent widely patent.  Luminal irregularities in rest of circumflex    RCA:  50% mid and distal lesions noted.  PLB 50% stenosis.  PDA ostial 80% stenosis medical management ideal for these stenosis.    Access: Right radial artery access    Assessment plan    Aspirin 81 mg daily    Plavix 75 mg daily uninterrupted for at least 1  year    Statins        The procedure log was documented by Documenter: Shannon Love RN and verified by Gabriela Rider MD.    Date: 10/11/2024  Time: 12:30 PM      Assessment and Plan:     Brief HPI:     * NSTEMI (non-ST elevated myocardial infarction)  Patient with known history of coronary artery disease.  Noncompliance with dual antiplatelet therapy.  Had a detailed discussion with the patient.  States that pain similar to the pain he had prior to his PCI.  States that he was noncompliant, but understands the importance of compliance now.  States he will be compliant with his medications from now on.  We discussed various options with the patient including continued medical management versus angiogram.  Because of chest pain, will decide to proceed with angiogram    Risks, benefits and alternatives of the catheterization procedure were discussed with the patient.The risks of coronary angiography include but are not limited to: bleeding, infection, death, heart attack, arrhythmia, kidney injury or failure, potential need for dialysis, allergic reactions, stroke, need for emergency surgery, hematoma, pseudoaneurysm etc.  Should stenting be indicated, the patient has agreed to dual anti-platelet therapy for 1-consecutive year with a drug-eluting stent and a minimum of 1-month with the use of a bare metal stent. Additionally, pt is aware that non-compliance is likely to result in stent clotting with heart attack, heart failure, and/or death  The risks of moderate sedation include hypotension, respiratory depression, arrhythmias, bronchospasm, and death. Informed consent was obtained and the  patient is agreeable to proceed with the procedure. Consent was placed on the chart.      Continue aspirin Plavix    Notes from October 12th: Patient chest pain-free.  Underwent angiogram yesterday.  No significant culprit lesion detected.  Continue medical management.  Aspirin Plavix statins.  Importance of compliance with  medications discussed again in detail with the patient.  His lipid profile is uncontrolled.  Importance of compliance with statins discussed in detail with the patient.  Patient states he understands and will now be compliant.  Follow-up in Cardiology Clinic with me    Will sign off    Dyslipidemia        Essential hypertension  Patients blood pressure range in the last 24 hours was: BP  Min: 84/50  Max: 238/110.The patient's inpatient anti-hypertensive regimen is listed below:  Current Antihypertensives  metoprolol tartrate (LOPRESSOR) tablet 25 mg, 2 times daily, Oral  nitroGLYCERIN SL tablet 0.4 mg, Every 5 min PRN, Sublingual    Plan  - BP is uncontrolled, will adjust as follows:    Will titrate antihypertensives as needed for appropriate blood pressure control    Type 2 diabetes mellitus, controlled        History of hepatitis C, s/p successful hcv treatment w/ SVR 8-2015             VTE Risk Mitigation (From admission, onward)           Ordered     Place sequential compression device  Until discontinued         10/10/24 1758     IP VTE LOW RISK PATIENT  Once         10/10/24 1758                    Gabriela Rider MD  Cardiology  [facility] [place] - Telemetry

## 2024-10-12 NOTE — PLAN OF CARE
Case Management Final Discharge Note      Discharge Disposition: home     New DME ordered / company name: none    Relevant SDOH / Transition of Care Barriers:  none    Primary Caretaker and contact information: Marcela Price (Spouse)  [phone] (Mobile)     Scheduled followup appointment: PCP    Referrals placed: Smoking Cessation Program    Transportation: family    Patient and family educated on discharge services and updated on DC plan. Bedside RN notified, patient clear to discharge from Case Management Perspective.      10/12/24 1440   Final Note   Assessment Type Final Discharge Note   Anticipated Discharge Disposition Home   What phone number can be called within the next 1-3 days to see how you are doing after discharge? [phone]   Hospital Resources/Appts/Education Provided Appointments scheduled and added to AVS   Post-Acute Status   Coverage [employer] Medicare   Discharge Delays None known at this time

## 2024-10-12 NOTE — NURSING
[facility]  Nurses Note -- 4 Eyes      10/12/2024       Skin assessed on: Q Shift      [x] No Pressure Injuries Present    [x]Prevention Measures Documented    [] Yes LDA  for Pressure Injury Previously documented     [] Yes New Pressure Injury Discovered   [] LDA for New Pressure Injury Added      Attending RN:  Pancho Matamoros, RN     Second RN:  Shea SLOAN LPN

## 2024-10-12 NOTE — ASSESSMENT & PLAN NOTE
Patient with known history of coronary artery disease.  Noncompliance with dual antiplatelet therapy.  Had a detailed discussion with the patient.  States that pain similar to the pain he had prior to his PCI.  States that he was noncompliant, but understands the importance of compliance now.  States he will be compliant with his medications from now on.  We discussed various options with the patient including continued medical management versus angiogram.  Because of chest pain, will decide to proceed with angiogram    Risks, benefits and alternatives of the catheterization procedure were discussed with the patient.The risks of coronary angiography include but are not limited to: bleeding, infection, death, heart attack, arrhythmia, kidney injury or failure, potential need for dialysis, allergic reactions, stroke, need for emergency surgery, hematoma, pseudoaneurysm etc.  Should stenting be indicated, the patient has agreed to dual anti-platelet therapy for 1-consecutive year with a drug-eluting stent and a minimum of 1-month with the use of a bare metal stent. Additionally, pt is aware that non-compliance is likely to result in stent clotting with heart attack, heart failure, and/or death  The risks of moderate sedation include hypotension, respiratory depression, arrhythmias, bronchospasm, and death. Informed consent was obtained and the  patient is agreeable to proceed with the procedure. Consent was placed on the chart.      Continue aspirin Plavix    Notes from October 12th: Patient chest pain-free.  Underwent angiogram yesterday.  No significant culprit lesion detected.  Continue medical management.  Aspirin Plavix statins.  Importance of compliance with medications discussed again in detail with the patient.  His lipid profile is uncontrolled.  Importance of compliance with statins discussed in detail with the patient.  Patient states he understands and will now be compliant.  Follow-up in Cardiology Clinic  with me    Will sign off

## 2024-10-12 NOTE — PLAN OF CARE
Problem: Adult Inpatient Plan of Care  Goal: Plan of Care Review  Outcome: Adequate for Care Transition  Goal: Patient-Specific Goal (Individualized)  Outcome: Adequate for Care Transition  Goal: Absence of Hospital-Acquired Illness or Injury  Outcome: Adequate for Care Transition  Goal: Optimal Comfort and Wellbeing  Outcome: Adequate for Care Transition  Goal: Readiness for Transition of Care  Outcome: Adequate for Care Transition     Problem: Acute Coronary Syndrome  Goal: Optimal Adaptation to Illness  Outcome: Adequate for Care Transition  Goal: Absence of Cardiac-Related Pain  Outcome: Adequate for Care Transition  Goal: Normalized Cardiac Rhythm  Outcome: Adequate for Care Transition  Goal: Effective Cardiac Pump Function  Outcome: Adequate for Care Transition  Goal: Adequate Tissue Perfusion  Outcome: Adequate for Care Transition     Problem: Cardiac Catheterization (Diagnostic/Interventional)  Goal: Absence of Bleeding  Outcome: Adequate for Care Transition  Goal: Absence of Contrast-Induced Injury  Outcome: Adequate for Care Transition  Goal: Stable Heart Rate and Rhythm  Outcome: Adequate for Care Transition  Goal: Absence of Embolism Signs and Symptoms  Outcome: Adequate for Care Transition  Goal: Anesthesia/Sedation Recovery  Outcome: Adequate for Care Transition  Goal: Optimal Pain Control and Function  Outcome: Adequate for Care Transition  Goal: Absence of Vascular Access Complication  Outcome: Adequate for Care Transition     Problem: Diabetes Comorbidity  Goal: Blood Glucose Level Within Targeted Range  Outcome: Adequate for Care Transition     Problem: Wound  Goal: Optimal Coping  Outcome: Adequate for Care Transition  Goal: Optimal Functional Ability  Outcome: Adequate for Care Transition  Goal: Absence of Infection Signs and Symptoms  Outcome: Adequate for Care Transition  Goal: Improved Oral Intake  Outcome: Adequate for Care Transition  Goal: Optimal Pain Control and Function  Outcome:  Adequate for Care Transition  Goal: Skin Health and Integrity  Outcome: Adequate for Care Transition  Goal: Optimal Wound Healing  Outcome: Adequate for Care Transition

## 2024-10-12 NOTE — NURSING
[facility], [facility]  Nurses Note -- 4 Eyes      10/11/2024       Skin assessed on: Q Shift      [x] No Pressure Injuries Present    []Prevention Measures Documented    [] Yes LDA  for Pressure Injury Previously documented     [] Yes New Pressure Injury Discovered   [] LDA for New Pressure Injury Added      Attending RN:  Shea Ordonez LPN     Second RN:  QING Castro

## 2024-10-12 NOTE — NURSING
PER handoff received from Matthew    Pt resting in bed quietly. NAD noted. No c/o pain.     Fall and safety precautions maintained. Bed alarm activated and audible.. Bed locked in lowest position, with side rails up x2. Call bell and personal items within reach

## 2024-10-12 NOTE — SUBJECTIVE & OBJECTIVE
Interval History:  Patient status post angiogram.  Doing fine.  No chest pains.  No complications from angiogram.  Feeling fine.  Eager to go home    Review of Systems   Constitutional: Negative.   HENT: Negative.     Eyes: Negative.    Endocrine: Negative.    Hematologic/Lymphatic: Negative.    Skin: Negative.    Musculoskeletal: Negative.    Gastrointestinal: Negative.    Genitourinary: Negative.    Neurological: Negative.    Psychiatric/Behavioral: Negative.     Allergic/Immunologic: Negative.      Objective:     Vital Signs (Most Recent):  Temp: 98.3 °F (36.8 °C) (10/12/24 1110)  Pulse: 75 (10/12/24 1110)  Resp: 18 (10/12/24 1110)  BP: (!) 141/70 (10/12/24 1110)  SpO2: 98 % (10/12/24 1110) Vital Signs (24h Range):  Temp:  [97.5 °F (36.4 °C)-98.3 °F (36.8 °C)] 98.3 °F (36.8 °C)  Pulse:  [64-82] 75  Resp:  [18-20] 18  SpO2:  [94 %-98 %] 98 %  BP: (122-158)/(59-78) 141/70     Weight: 62.8 kg (138 lb 7.2 oz)  Body mass index is 23.76 kg/m².     SpO2: 98 %         Intake/Output Summary (Last 24 hours) at 10/12/2024 1322  Last data filed at 10/11/2024 1837  Gross per 24 hour   Intake 440 ml   Output 500 ml   Net -60 ml       Lines/Drains/Airways       Peripheral Intravenous Line  Duration                  Peripheral IV - Single Lumen 10/10/24 1225 20 G Anterior;Proximal;Right Forearm 2 days         Peripheral IV - Single Lumen 10/10/24 1834 20 G Anterior;Left;Proximal Forearm 1 day                       Physical Exam  Vitals reviewed.   Constitutional:       Appearance: He is well-developed.   HENT:      Head: Normocephalic.   Eyes:      Conjunctiva/sclera: Conjunctivae normal.      Pupils: Pupils are equal, round, and reactive to light.   Cardiovascular:      Rate and Rhythm: Normal rate and regular rhythm.      Heart sounds: Normal heart sounds.   Pulmonary:      Effort: Pulmonary effort is normal.      Breath sounds: Normal breath sounds.   Abdominal:      General: Bowel sounds are normal.      Palpations: Abdomen  is soft.   Musculoskeletal:      Cervical back: Normal range of motion and neck supple.   Skin:     General: Skin is warm.   Neurological:      Mental Status: He is alert and oriented to person, place, and time.            Significant Labs:      DATA:     Laboratory:  CBC:  Recent Labs   Lab 10/10/24  1822 10/11/24  0604 10/12/24  0436   WBC 11.48 11.21 6.88   Hemoglobin 15.3 12.9 L 12.1 L   Hematocrit 44.4 38.1 L 36.4 L   Platelets 264 224 202       CHEMISTRIES:  Recent Labs   Lab 05/26/22  0950 04/26/23  0615 06/18/24  0251 06/19/24  0521 06/27/24  1102 08/16/24  1330 10/10/24  1217 10/12/24  0436   Glucose 135 H   < > 129 H 130 H 201 H 141 H 279 H 142 H   Sodium 141   < > 142 140 140 141 142 140   Potassium 4.0   < > 4.1 3.7 4.8 4.3 4.1 3.8   BUN 7   < > 11 11 9 10 11 21 H   Creatinine 1.0   < > 0.9 1.0 1.1 1.1 1.3 1.4   eGFR if  >60  --   --   --   --   --   --   --    eGFR if non  >60  --   --   --   --   --   --   --    Calcium 9.2   < > 8.8 8.6 L 9.8 9.7 10.6 H 8.5 L   Magnesium  --    < > 1.5 L 1.4 L 1.8  --   --   --     < > = values in this interval not displayed.       CARDIAC BIOMARKERS:  Recent Labs   Lab 10/11/24  0016 10/11/24  0411 10/11/24  0604   Troponin I 0.042 H 0.049 H 0.037 H       COAGS:  Recent Labs   Lab 04/15/24  1513 06/17/24  0758 10/10/24  1822   INR 1.0 1.1 1.0  1.0       LIPIDS/LFTS:  Recent Labs   Lab 04/30/24  0835 06/16/24  1952 06/17/24  0407 06/27/24  1102 10/10/24  1217 10/10/24  1822   Cholesterol 267 H  --  124  --   --  264 H   Triglycerides 303 H  --  116  --   --  158 H   HDL 31 L  --  23 L  --   --  38 L   LDL Cholesterol 175.4 H  --  77.8  --   --  194.4 H   Non-HDL Cholesterol 236  --  101  --   --  226   AST 15 15  --  23 22  --    ALT 20 18  --  51 H 32  --        Hemoglobin A1C   Date Value Ref Range Status   10/10/2024 6.8 (H) 4.0 - 5.6 % Final     Comment:     ADA Screening Guidelines:  5.7-6.4%  Consistent with prediabetes  >or=6.5%   Consistent with diabetes    High levels of fetal hemoglobin interfere with the HbA1C  assay. Heterozygous hemoglobin variants (HbS, HgC, etc)do  not significantly interfere with this assay.   However, presence of multiple variants may affect accuracy.     06/17/2024 6.2 (H) 4.0 - 5.6 % Final     Comment:     ADA Screening Guidelines:  5.7-6.4%  Consistent with prediabetes  >or=6.5%  Consistent with diabetes    High levels of fetal hemoglobin interfere with the HbA1C  assay. Heterozygous hemoglobin variants (HbS, HgC, etc)do  not significantly interfere with this assay.   However, presence of multiple variants may affect accuracy.     04/30/2024 6.8 (H) 4.0 - 5.6 % Final     Comment:     ADA Screening Guidelines:  5.7-6.4%  Consistent with prediabetes  >or=6.5%  Consistent with diabetes    High levels of fetal hemoglobin interfere with the HbA1C  assay. Heterozygous hemoglobin variants (HbS, HgC, etc)do  not significantly interfere with this assay.   However, presence of multiple variants may affect accuracy.         TSH  Recent Labs   Lab 08/10/23  0738 06/17/24  0407 10/11/24  0604   TSH 1.125 0.593 0.492       The ASCVD Risk score (Ana GARCIA, et al., 2019) failed to calculate for the following reasons:    Risk score cannot be calculated because patient has a medical history suggesting prior/existing ASCVD       BNP    Lab Results   Component Value Date/Time     (H) 10/10/2024 12:23 PM     (H) 08/09/2023 08:46 PM     (H) 02/01/2019 06:22 AM    BNP 70 10/21/2016 10:55 PM            ECHO    Results for orders placed during the hospital encounter of 10/10/24    Echo    Interpretation Summary    Left Ventricle: The left ventricle is normal in size. There is normal systolic function with a visually estimated ejection fraction of 55 - 60%. There is normal diastolic function.    Right Ventricle: Normal right ventricular cavity size. Systolic function is normal.    Left Atrium: Left atrium is mildly  dilated.    Pulmonary Artery: The estimated pulmonary artery systolic pressure is 22 mmHg.    IVC/SVC: Normal venous pressure at 3 mmHg.      STRESS TEST    Results for orders placed during the hospital encounter of 08/09/23    Nuclear Stress - Cardiology Interpreted    Interpretation Summary    Abnormal myocardial perfusion scan.    There is a moderate to severe intensity, moderate sized, fixed perfusion abnormality in the lateral wall(s). Only resting images obtained as patient refused to complete test. Incomplete study.    There are no other significant perfusion abnormalities.    The ECG portion of the study is negative for ischemia.    The patient reported no chest pain during the stress test.    There were no arrhythmias during stress.        CATH    Results for orders placed during the hospital encounter of 10/10/24    Cardiac catheterization    Conclusion    Patent LAD and LCX stents.    The pre-procedure left ventricular end diastolic pressure was 12.    The estimated blood loss was <50 mL.    Main left main: Ostial 20% stenosis    Lad:  Recently placed LAD stent widely patent.  Mid LAD 50-60% stenosis.  Diagonal 2 ostial 70% stenosis    Circumflex:  Recently placed prox circumflex stent widely patent.  Luminal irregularities in rest of circumflex    RCA:  50% mid and distal lesions noted.  PLB 50% stenosis.  PDA ostial 80% stenosis medical management ideal for these stenosis.    Access: Right radial artery access    Assessment plan    Aspirin 81 mg daily    Plavix 75 mg daily uninterrupted for at least 1 year    Statins        The procedure log was documented by Documenter: Shannon Love RN and verified by Gabriela Rider MD.    Date: 10/11/2024  Time: 12:30 PM

## 2024-10-15 ENCOUNTER — PATIENT MESSAGE (OUTPATIENT)
Dept: ADMINISTRATIVE | Facility: CLINIC | Age: 59
End: 2024-10-15
Payer: MEDICARE

## 2024-10-15 ENCOUNTER — PATIENT OUTREACH (OUTPATIENT)
Dept: ADMINISTRATIVE | Facility: CLINIC | Age: 59
End: 2024-10-15
Payer: MEDICARE

## 2024-10-15 ENCOUNTER — TELEPHONE (OUTPATIENT)
Dept: FAMILY MEDICINE | Facility: CLINIC | Age: 59
End: 2024-10-15
Payer: MEDICARE

## 2024-10-15 NOTE — TELEPHONE ENCOUNTER
Attempted to contact pt to schedule hosp f/u ov, no answer, left VM instructing pt to return call to clinic.

## 2024-10-15 NOTE — PROGRESS NOTES
C3 nurse attempted to contact Nino Price for a TCC post hospital discharge follow up call. No answer. Left voicemail with callback information. The patient does not have a scheduled HOSFU appointment. Message sent to PCP staff for assistance with scheduling visit with patient.

## 2024-10-15 NOTE — TELEPHONE ENCOUNTER
Attempted to contact pt to schedule hosp f/u ov, no answer, left VM instructing pt to return call o clinic.

## 2024-10-16 NOTE — PROGRESS NOTES
2nd attempt-C3 nurse attempted to contact Nino Price for a TCC post hospital discharge follow up call. No answer. Left voicemail with callback information. The patient does not have a scheduled HOSFU appointment. Message previously sent to PCP staff for assistance with scheduling visit with patient, and they have attempted to contact the patient for scheduling.

## 2024-10-16 NOTE — PROGRESS NOTES
3rd attempt-C3 nurse attempted to contact Nino Price for a TCC post hospital discharge follow up call. No answer. Left voicemail with callback information, and OOC#. The patient does not have a scheduled HOSFU appointment. Message previously sent to PCP staff for assistance with scheduling visit with patient, and they have attempted to contact the patient for scheduling.

## 2024-10-17 ENCOUNTER — TELEPHONE (OUTPATIENT)
Dept: FAMILY MEDICINE | Facility: CLINIC | Age: 59
End: 2024-10-17
Payer: MEDICARE

## 2024-10-30 DIAGNOSIS — E11.65 CONTROLLED TYPE 2 DIABETES MELLITUS WITH HYPERGLYCEMIA, UNSPECIFIED WHETHER LONG TERM INSULIN USE: Chronic | ICD-10-CM

## 2024-10-30 NOTE — TELEPHONE ENCOUNTER
No care due was identified.  [facility] Embedded Care Due Messages. Reference number: [id number].   10/30/2024 12:45:40 AM CDT

## 2024-10-31 ENCOUNTER — TELEPHONE (OUTPATIENT)
Dept: FAMILY MEDICINE | Facility: CLINIC | Age: 59
End: 2024-10-31
Payer: MEDICARE

## 2024-10-31 ENCOUNTER — HOSPITAL ENCOUNTER (EMERGENCY)
Facility: HOSPITAL | Age: 59
Discharge: HOME | End: 2024-11-01
Attending: INTERNAL MEDICINE
Payer: MEDICARE

## 2024-10-31 VITALS
OXYGEN SATURATION: 97 % | WEIGHT: 140 LBS | SYSTOLIC BLOOD PRESSURE: 109 MMHG | RESPIRATION RATE: 19 BRPM | TEMPERATURE: 99 F | HEART RATE: 101 BPM | BODY MASS INDEX: 23.9 KG/M2 | DIASTOLIC BLOOD PRESSURE: 78 MMHG | HEIGHT: 64 IN

## 2024-10-31 DIAGNOSIS — K52.9 GASTROENTERITIS: ICD-10-CM

## 2024-10-31 DIAGNOSIS — R11.2 CANNABINOID HYPEREMESIS SYNDROME: ICD-10-CM

## 2024-10-31 DIAGNOSIS — R07.9 CHEST PAIN: ICD-10-CM

## 2024-10-31 DIAGNOSIS — F12.90 CANNABINOID HYPEREMESIS SYNDROME: ICD-10-CM

## 2024-10-31 DIAGNOSIS — K52.9 COLITIS: Primary | ICD-10-CM

## 2024-10-31 LAB
ALBUMIN SERPL BCP-MCNC: 4.4 G/DL (ref 3.5–5.2)
ALP SERPL-CCNC: 61 U/L (ref 40–150)
ALT SERPL W/O P-5'-P-CCNC: 20 U/L (ref 10–44)
AMPHET+METHAMPHET UR QL: NEGATIVE
ANION GAP SERPL CALC-SCNC: 14 MMOL/L (ref 8–16)
AST SERPL-CCNC: 20 U/L (ref 10–40)
BACTERIA #/AREA URNS HPF: ABNORMAL /HPF
BARBITURATES UR QL SCN>200 NG/ML: NEGATIVE
BASOPHILS # BLD AUTO: 0.06 K/UL (ref 0–0.2)
BASOPHILS NFR BLD: 0.6 % (ref 0–1.9)
BENZODIAZ UR QL SCN>200 NG/ML: NEGATIVE
BILIRUB SERPL-MCNC: 0.5 MG/DL (ref 0.1–1)
BILIRUB UR QL STRIP: NEGATIVE
BNP SERPL-MCNC: 145 PG/ML (ref 0–99)
BUN SERPL-MCNC: 18 MG/DL (ref 6–20)
BZE UR QL SCN: NEGATIVE
CALCIUM SERPL-MCNC: 9.7 MG/DL (ref 8.7–10.5)
CANNABINOIDS UR QL SCN: ABNORMAL
CHLORIDE SERPL-SCNC: 107 MMOL/L (ref 95–110)
CLARITY UR: CLEAR
CO2 SERPL-SCNC: 20 MMOL/L (ref 23–29)
COLOR UR: YELLOW
CREAT SERPL-MCNC: 1.2 MG/DL (ref 0.5–1.4)
CREAT UR-MCNC: 109 MG/DL (ref 23–375)
DIFFERENTIAL METHOD BLD: ABNORMAL
EOSINOPHIL # BLD AUTO: 0 K/UL (ref 0–0.5)
EOSINOPHIL NFR BLD: 0.1 % (ref 0–8)
ERYTHROCYTE [DISTWIDTH] IN BLOOD BY AUTOMATED COUNT: 12.4 % (ref 11.5–14.5)
EST. GFR  (NO RACE VARIABLE): >60 ML/MIN/1.73 M^2
GLUCOSE SERPL-MCNC: 200 MG/DL (ref 70–110)
GLUCOSE UR QL STRIP: ABNORMAL
HCT VFR BLD AUTO: 43.2 % (ref 40–54)
HGB BLD-MCNC: 15.2 G/DL (ref 14–18)
HGB UR QL STRIP: NEGATIVE
HYALINE CASTS #/AREA URNS LPF: 0 /LPF
IMM GRANULOCYTES # BLD AUTO: 0.03 K/UL (ref 0–0.04)
IMM GRANULOCYTES NFR BLD AUTO: 0.3 % (ref 0–0.5)
KETONES UR QL STRIP: ABNORMAL
LACTATE SERPL-SCNC: 1.2 MMOL/L (ref 0.5–2.2)
LEUKOCYTE ESTERASE UR QL STRIP: NEGATIVE
LIPASE SERPL-CCNC: 23 U/L (ref 4–60)
LYMPHOCYTES # BLD AUTO: 1.1 K/UL (ref 1–4.8)
LYMPHOCYTES NFR BLD: 11.6 % (ref 18–48)
MCH RBC QN AUTO: 32.4 PG (ref 27–31)
MCHC RBC AUTO-ENTMCNC: 35.2 G/DL (ref 32–36)
MCV RBC AUTO: 92 FL (ref 82–98)
METHADONE UR QL SCN>300 NG/ML: NEGATIVE
MICROSCOPIC COMMENT: ABNORMAL
MONOCYTES # BLD AUTO: 0.4 K/UL (ref 0.3–1)
MONOCYTES NFR BLD: 4.2 % (ref 4–15)
NEUTROPHILS # BLD AUTO: 8.1 K/UL (ref 1.8–7.7)
NEUTROPHILS NFR BLD: 83.2 % (ref 38–73)
NITRITE UR QL STRIP: NEGATIVE
NRBC BLD-RTO: 0 /100 WBC
OPIATES UR QL SCN: NEGATIVE
PCP UR QL SCN>25 NG/ML: NEGATIVE
PH UR STRIP: 7 [PH] (ref 5–8)
PLATELET # BLD AUTO: 251 K/UL (ref 150–450)
PMV BLD AUTO: 10.2 FL (ref 9.2–12.9)
POTASSIUM SERPL-SCNC: 4.1 MMOL/L (ref 3.5–5.1)
PROT SERPL-MCNC: 8.4 G/DL (ref 6–8.4)
PROT UR QL STRIP: ABNORMAL
RBC # BLD AUTO: 4.69 M/UL (ref 4.6–6.2)
RBC #/AREA URNS HPF: 7 /HPF (ref 0–4)
SODIUM SERPL-SCNC: 141 MMOL/L (ref 136–145)
SP GR UR STRIP: >1.03 (ref 1–1.03)
SQUAMOUS #/AREA URNS HPF: 1 /HPF
TOXICOLOGY INFORMATION: ABNORMAL
TROPONIN I SERPL DL<=0.01 NG/ML-MCNC: 0.03 NG/ML (ref 0–0.03)
URN SPEC COLLECT METH UR: ABNORMAL
UROBILINOGEN UR STRIP-ACNC: NEGATIVE EU/DL
WBC # BLD AUTO: 9.77 K/UL (ref 3.9–12.7)
WBC #/AREA URNS HPF: 0 /HPF (ref 0–5)

## 2024-10-31 PROCEDURE — 99285 EMERGENCY DEPT VISIT HI MDM: CPT | Mod: 25,HCNC

## 2024-10-31 PROCEDURE — 93010 ELECTROCARDIOGRAM REPORT: CPT | Mod: HCNC,,, | Performed by: INTERNAL MEDICINE

## 2024-10-31 PROCEDURE — 25000003 PHARM REV CODE 250: Mod: HCNC | Performed by: INTERNAL MEDICINE

## 2024-10-31 PROCEDURE — 25500020 PHARM REV CODE 255: Mod: HCNC | Performed by: INTERNAL MEDICINE

## 2024-10-31 PROCEDURE — 83880 ASSAY OF NATRIURETIC PEPTIDE: CPT | Mod: HCNC | Performed by: NURSE PRACTITIONER

## 2024-10-31 PROCEDURE — 93005 ELECTROCARDIOGRAM TRACING: CPT | Mod: HCNC

## 2024-10-31 PROCEDURE — 84484 ASSAY OF TROPONIN QUANT: CPT | Mod: HCNC | Performed by: NURSE PRACTITIONER

## 2024-10-31 PROCEDURE — 80053 COMPREHEN METABOLIC PANEL: CPT | Mod: HCNC | Performed by: NURSE PRACTITIONER

## 2024-10-31 PROCEDURE — 83605 ASSAY OF LACTIC ACID: CPT | Mod: HCNC | Performed by: INTERNAL MEDICINE

## 2024-10-31 PROCEDURE — 81000 URINALYSIS NONAUTO W/SCOPE: CPT | Mod: HCNC | Performed by: INTERNAL MEDICINE

## 2024-10-31 PROCEDURE — 96374 THER/PROPH/DIAG INJ IV PUSH: CPT | Mod: HCNC

## 2024-10-31 PROCEDURE — 63600175 PHARM REV CODE 636 W HCPCS: Mod: HCNC | Performed by: INTERNAL MEDICINE

## 2024-10-31 PROCEDURE — 80307 DRUG TEST PRSMV CHEM ANLYZR: CPT | Mod: HCNC | Performed by: INTERNAL MEDICINE

## 2024-10-31 PROCEDURE — 85025 COMPLETE CBC W/AUTO DIFF WBC: CPT | Mod: HCNC | Performed by: NURSE PRACTITIONER

## 2024-10-31 PROCEDURE — 83690 ASSAY OF LIPASE: CPT | Mod: HCNC | Performed by: NURSE PRACTITIONER

## 2024-10-31 RX ORDER — ONDANSETRON 4 MG/1
4 TABLET, ORALLY DISINTEGRATING ORAL
Status: COMPLETED | OUTPATIENT
Start: 2024-10-31 | End: 2024-10-31

## 2024-10-31 RX ORDER — ONDANSETRON 4 MG/1
4 TABLET, ORALLY DISINTEGRATING ORAL EVERY 8 HOURS PRN
Qty: 10 TABLET | Refills: 0 | Status: SHIPPED | OUTPATIENT
Start: 2024-10-31

## 2024-10-31 RX ORDER — HALOPERIDOL 5 MG/ML
5 INJECTION INTRAMUSCULAR
Status: COMPLETED | OUTPATIENT
Start: 2024-10-31 | End: 2024-10-31

## 2024-10-31 RX ADMIN — IOHEXOL 75 ML: 350 INJECTION, SOLUTION INTRAVENOUS at 08:10

## 2024-10-31 RX ADMIN — ONDANSETRON 4 MG: 4 TABLET, ORALLY DISINTEGRATING ORAL at 10:10

## 2024-10-31 RX ADMIN — HALOPERIDOL LACTATE 5 MG: 5 INJECTION, SOLUTION INTRAMUSCULAR at 07:10

## 2024-10-31 NOTE — FIRST PROVIDER EVALUATION
"Medical screening examination initiated.  I have conducted a focused provider triage encounter, findings are as follows:    Brief history of present illness:  epigastric pain that began yesterday with N/V/D. Also having dizziness.     Vitals:    10/31/24 1549   BP: (!) 220/99   BP Location: Right arm   Pulse: 84   Resp: 18   Temp: 98.5 °F (36.9 °C)   TempSrc: Oral   SpO2: 98%   Weight: 63.5 kg (140 lb)   Height: 5' 4" (1.626 m)       Pertinent physical exam:  no distress    Brief workup plan:  ACS rule out    Preliminary workup initiated; this workup will be continued and followed by the physician or advanced practice provider that is assigned to the patient when roomed.  "

## 2024-11-01 ENCOUNTER — OFFICE VISIT (OUTPATIENT)
Dept: FAMILY MEDICINE | Facility: CLINIC | Age: 59
End: 2024-11-01
Payer: MEDICARE

## 2024-11-01 VITALS
WEIGHT: 140.19 LBS | OXYGEN SATURATION: 97 % | HEART RATE: 102 BPM | HEIGHT: 64 IN | DIASTOLIC BLOOD PRESSURE: 58 MMHG | TEMPERATURE: 98 F | BODY MASS INDEX: 23.93 KG/M2 | SYSTOLIC BLOOD PRESSURE: 90 MMHG

## 2024-11-01 DIAGNOSIS — K52.9 COLITIS: Primary | ICD-10-CM

## 2024-11-01 DIAGNOSIS — I95.9 HYPOTENSION, UNSPECIFIED HYPOTENSION TYPE: ICD-10-CM

## 2024-11-01 PROCEDURE — 99999 PR PBB SHADOW E&M-EST. PATIENT-LVL IV: CPT | Mod: PBBFAC,HCNC,, | Performed by: FAMILY MEDICINE

## 2024-11-01 RX ORDER — SUCRALFATE 1 G/1
1 TABLET ORAL 2 TIMES DAILY
Qty: 60 TABLET | Refills: 0 | Status: SHIPPED | OUTPATIENT
Start: 2024-11-01 | End: 2024-12-01

## 2024-11-01 NOTE — TELEPHONE ENCOUNTER
Refill Routing Note   Medication(s) are not appropriate for processing by [facility] for the following reason(s):        Drug-disease interaction      ED/Hospital Visit since last OV with provider    [facility] action(s):  Defer             Pharmacist review requested: Yes     Appointments  past 12m or future 3m with PCP    Date Provider   Last Visit   6/27/2024 Stevo Pryor MD   Next Visit   10/31/2024 Stevo Pryor MD   ED visits in past 90 days: 1        Note composed:1:28 PM 11/01/2024

## 2024-11-01 NOTE — ED PROVIDER NOTES
Encounter Date: 10/31/2024       History     Chief Complaint   Patient presents with    Abdominal Pain     Patient reports abdominal pain that started on yesterday. Reports N/V/D. Pain rated 10/10. Reports mid epigastric pain.reports dizziness. /99 in triage.      59-year-old male with a past medical history significant for anxiety, depression, diabetes mellitus, hepatitis-C, hypertension and CVA presents to emergency department complaining of generalized abdominal pain x2 days.  Abdominal pain is associated with multiple episodes of nausea/vomiting/diarrhea and patient rates pain 10/10 in intensity.    The history is provided by the patient. No  was used.     Review of patient's allergies indicates:   Allergen Reactions    Codeine Nausea Only     Past Medical History:   Diagnosis Date    Anxiety     Asthma     Colitis     Depression     Diabetes mellitus     Head injury     History of hepatitis C, s/p successful hcv treatment w/ SVR 8-2015  10/8/2012    SVR(24) as of 11/2015.  SVR(12) as of 8/2015.  completed harvoni 24 week regimen for genotype 1a 5/18/15     Hypertension     Shoulder pain     left, s/p 4 surgeries.     Stroke      Past Surgical History:   Procedure Laterality Date    ANGIOGRAM, CORONARY, WITH LEFT HEART CATHETERIZATION Left 6/17/2024    Procedure: Angiogram, Coronary, with Left Heart Cath;  Surgeon: Gabriela Rider MD;  Location: [facility] CATH LAB;  Service: Cardiology;  Laterality: Left;  12pm start, R rad access    COLONOSCOPY N/A 10/20/2015    Procedure: COLONOSCOPY;  Surgeon: Jagdish Patricia MD;  Location: [facility] ENDO;  Service: Endoscopy;  Laterality: N/A;    COLONOSCOPY N/A 1/26/2024    Procedure: COLONOSCOPY;  Surgeon: Pricila Shanks MD;  Location: [facility] ENDO;  Service: Endoscopy;  Laterality: N/A;    CORONARY STENT PLACEMENT N/A 6/18/2024    Procedure: INSERTION, STENT, CORONARY ARTERY;  Surgeon: Gabriela Rider MD;  Location: [facility] CATH LAB;  Service: Cardiology;   Laterality: N/A;  lcx pci. radial. lbu 3.5. 12 pm access    CORONARY STENT PLACEMENT N/A 8/28/2024    Procedure: INSERTION, STENT, CORONARY ARTERY;  Surgeon: Gabriela Rider MD;  Location: [facility] CATH LAB;  Service: Cardiology;  Laterality: N/A;  radial access. pci lad. have shockwave ready  RN PREOP 8/16/2024    DIAGNOSTIC LAPAROSCOPY N/A 4/1/2021    Procedure: LAPAROSCOPY, DIAGNOSTIC;  Surgeon: Umesh Vallecillo MD;  Location: [facility] OR;  Service: General;  Laterality: N/A;  RN PREOP 3/29/21, COVID NEGATIVE ON 3/29  CONSENT AND H/P INCOMPLETE    ESOPHAGOGASTRODUODENOSCOPY N/A 1/26/2024    Procedure: EGD (ESOPHAGOGASTRODUODENOSCOPY);  Surgeon: Pricila Shanks MD;  Location: [facility] ENDO;  Service: Endoscopy;  Laterality: N/A;  prep instructions mailed to pt / [place] pt / Urgent/suprep  1/19- lvm for pc. DBM  1/23- left 2nd vm for pc. DBM  1/24 cirrohsis-labs prior, precall complete-st    GALLBLADDER SURGERY      pt not 100% if gall bladder removed    IVUS, CORONARY  6/18/2024    Procedure: IVUS, Coronary;  Surgeon: Gabriela Rider MD;  Location: [facility] CATH LAB;  Service: Cardiology;;    LEFT HEART CATHETERIZATION Left 10/11/2024    Procedure: Left heart cath;  Surgeon: Gabriela Rider MD;  Location: [facility] CATH LAB;  Service: Cardiology;  Laterality: Left;    PERCUTANEOUS CORONARY INTERVENTION, ARTERY N/A 6/18/2024    Procedure: Percutaneous coronary intervention;  Surgeon: Gabriela Rider MD;  Location: [facility] CATH LAB;  Service: Cardiology;  Laterality: N/A;    rotator cuff      left side     Family History   Problem Relation Name Age of Onset    Hypertension Mother      Heart disease Father      Cirrhosis Brother      Colon cancer Neg Hx      Esophageal cancer Neg Hx       Social History     Tobacco Use    Smoking status: Every Day     Current packs/day: 1.00     Average packs/day: 1 pack/day for 35.0 years (35.0 ttl pk-yrs)     Types: Cigarettes     Passive exposure: Current    Smokeless tobacco: Never    Tobacco  comments:     Quitting on his own, reports that he's down 4 to 5 cig a day   Substance Use Topics    Alcohol use: No    Drug use: Yes     Frequency: 7.0 times per week     Types: Marijuana, Oxycodone     Comment: Majiuan once a week/ Oxy 3-4 tabs daily     Review of Systems   Constitutional:  Negative for chills and fever.   Respiratory:  Negative for shortness of breath.    Cardiovascular:  Negative for chest pain.   Gastrointestinal:  Positive for abdominal pain, diarrhea, nausea and vomiting. Negative for abdominal distention and blood in stool.   Genitourinary:  Negative for penile discharge.   All other systems reviewed and are negative.      Physical Exam     Initial Vitals [10/31/24 1549]   BP Pulse Resp Temp SpO2   (!) 220/99 84 18 98.5 °F (36.9 °C) 98 %      MAP       --         Physical Exam    Nursing note and vitals reviewed.  Constitutional: He is not diaphoretic. No distress.   HENT:   Head: Normocephalic and atraumatic.   Right Ear: External ear normal.   Left Ear: External ear normal. Mouth/Throat: Oropharynx is clear and moist.   Eyes: Conjunctivae are normal.   Neck:   Normal range of motion.  Cardiovascular:  Normal rate and regular rhythm.           Pulmonary/Chest: Breath sounds normal. No respiratory distress.   Abdominal: Abdomen is soft. Bowel sounds are normal. There is abdominal tenderness (Generalized abdominal tenderness to palpation without peritoneal signs).   Musculoskeletal:         General: Normal range of motion.      Cervical back: Normal range of motion.     Neurological: He is alert. He has normal strength.   Skin: Skin is warm and dry. Capillary refill takes less than 2 seconds.   Psychiatric: He has a normal mood and affect. Thought content normal.         ED Course   Procedures  Labs Reviewed   CBC W/ AUTO DIFFERENTIAL - Abnormal       Result Value    WBC 9.77      RBC 4.69      Hemoglobin 15.2      Hematocrit 43.2      MCV 92      MCH 32.4 (*)     MCHC 35.2      RDW 12.4       Platelets 251      MPV 10.2      Immature Granulocytes 0.3      Gran # (ANC) 8.1 (*)     Immature Grans (Abs) 0.03      Lymph # 1.1      Mono # 0.4      Eos # 0.0      Baso # 0.06      nRBC 0      Gran % 83.2 (*)     Lymph % 11.6 (*)     Mono % 4.2      Eosinophil % 0.1      Basophil % 0.6      Differential Method Automated     COMPREHENSIVE METABOLIC PANEL - Abnormal    Sodium 141      Potassium 4.1      Chloride 107      CO2 20 (*)     Glucose 200 (*)     BUN 18      Creatinine 1.2      Calcium 9.7      Total Protein 8.4      Albumin 4.4      Total Bilirubin 0.5      Alkaline Phosphatase 61      AST 20      ALT 20      eGFR >60      Anion Gap 14     B-TYPE NATRIURETIC PEPTIDE - Abnormal     (*)    URINALYSIS, REFLEX TO URINE CULTURE - Abnormal    Specimen UA Urine, Clean Catch      Color, UA Yellow      Appearance, UA Clear      pH, UA 7.0      Specific Gravity, UA >1.030 (*)     Protein, UA 1+ (*)     Glucose, UA Trace (*)     Ketones, UA 1+ (*)     Bilirubin (UA) Negative      Occult Blood UA Negative      Nitrite, UA Negative      Urobilinogen, UA Negative      Leukocytes, UA Negative      Narrative:     Specimen Source->Urine   DRUG SCREEN PANEL, URINE EMERGENCY - Abnormal    Benzodiazepines Negative      Methadone metabolites Negative      Cocaine (Metab.) Negative      Opiate Scrn, Ur Negative      Barbiturate Screen, Ur Negative      Amphetamine Screen, Ur Negative      THC Presumptive Positive (*)     Phencyclidine Negative      Creatinine, Urine 109.0      Toxicology Information SEE COMMENT      Narrative:     Specimen Source->Urine   URINALYSIS MICROSCOPIC - Abnormal    RBC, UA 7 (*)     WBC, UA 0      Bacteria None      Squam Epithel, UA 1      Hyaline Casts, UA 0      Microscopic Comment SEE COMMENT      Narrative:     Specimen Source->Urine   TROPONIN I    Troponin I 0.026     LIPASE    Lipase 23     LACTIC ACID, PLASMA    Lactate (Lactic Acid) 1.2            Imaging Results              CT  Abdomen Pelvis With IV Contrast NO Oral Contrast (Final result)  Result time 10/31/24 20:45:03      Final result by Jeramy Boswell MD (10/31/24 20:45:03)                   Impression:      Diffuse wall thickening of the entire large bowel, suggestive of colitis.    Extensive calcifications of the abdominal aorta and its branch vessels.    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    Additional findings as above.      Electronically signed by: Jeramy Boswell MD  Date:    10/31/2024  Time:    20:45               Narrative:    EXAMINATION:  CT ABDOMEN PELVIS WITH IV CONTRAST    CLINICAL HISTORY:  Abdominal pain, acute, nonlocalized;    TECHNIQUE:  Low dose axial images, sagittal and coronal reformations were obtained from the lung bases to the pubic symphysis following the IV administration of 75 mL of Omnipaque 350 .  Oral contrast was not given.    COMPARISON:  CT scan of the abdomen pelvis dated 10/10/2024 and CT scan dated 06/16/2024.    FINDINGS:  There are no pleural effusions.  There is no evidence of a pneumothorax.  No airspace opacity is present.  No pulmonary nodules identified.  There are minimal emphysematous changes in the left lung base.    The heart is unremarkable.  There are extensive calcified noncalcified plaque along the course of the abdominal aorta and the iliac vessels.  There also extensive calcifications at the ostia of the renal arteries and the mesenteric vessels.  The celiac trunk, SMA, and KWADWO are within normal limits.  The portal veins and mesenteric veins are unremarkable.  The IVC and the remainder of the venous structures are within normal limits.    There is no evidence of lymphadenopathy in the abdomen or pelvis.    There is persistent wall thickening of the distal esophagus.  The stomach is unremarkable.  The duodenum is within normal limits.  The small bowel loops are unremarkable.  The patient is status post appendectomy.  There is wall thickening of the entire  large bowel.  There is no CT evidence of bowel obstruction.    There is hepatic steatosis.  The liver is otherwise unremarkable.  The biliary tree is within normal limits.  The spleen is unremarkable.  The pancreas is within normal limits.  The adrenal glands are unremarkable.    The kidneys are within normal limits.  The ureters and urinary bladder are unremarkable.  The prostate gland is enlarged.  There are calcifications in the prostate gland.    There is no evidence of free fluid in the abdomen or pelvis.  There is no evidence of free air.  There is no evidence of pneumatosis.  No portal venous air is identified.    The psoas margins are unremarkable.  The abdominal wall is within normal limits.    There are findings of plaque avascular necrosis of the bilateral femoral heads.  There is no evidence of subchondral collapse.  There are degenerative changes in the osseous structures.  There are multiple Schmorl's nodes.                                       Medications   haloperidol lactate injection 5 mg (5 mg Intravenous Given 10/31/24 1925)   iohexoL (OMNIPAQUE 350) injection 75 mL (75 mLs Intravenous Given 10/31/24 2004)   ondansetron disintegrating tablet 4 mg (4 mg Oral Given 10/31/24 2234)     Medical Decision Making  59-year-old male with a past medical history significant for anxiety, depression, diabetes mellitus, hepatitis-C, hypertension and CVA presents to emergency department complaining of generalized abdominal pain x2 days.  Abdominal pain is associated with multiple episodes of nausea/vomiting/diarrhea and patient rates pain 10/10 in intensity.  Course of ED stay:   1. Cardiovascular-patient has been hemodynamically stable throughout ED stay and chest pain-free.  Troponin was normal.  BNP was 145.  2. Pulmonary-patient has had normal O2 sats on room air without signs or symptoms of respiratory distress.  3. Hematology/infectious disease-patient has been afebrile and CBC is reassuring.  Serum  lactate was normal.  Urinalysis is pending.  4. GI/nutrition/renal/endocrine-CMP is reassuring except for elevated glucose (200) lipase was normal.  CT of the abdomen and pelvis with contrast shows evidence of colitis, but no other acute disease.  Urine drug screen was ordered.  Urine drug screen was positive for THC.  Patient received haloperidol in the ED and nausea improved prior to discharge.  Instructions for cannabinoid hyperemesis syndrome were given and the patient was advised to follow-up with his primary care physician within the next week for re-evaluation/return to the emergency department if condition worsens.    Amount and/or Complexity of Data Reviewed  Labs: ordered.  Radiology: ordered.    Risk  Prescription drug management.                                      Clinical Impression:  Final diagnoses:  [R07.9] Chest pain  [K52.9] Colitis (Primary)  [K52.9] Gastroenteritis  [R11.2, F12.90] Cannabinoid hyperemesis syndrome          ED Disposition Condition    Discharge Stable          ED Prescriptions       Medication Sig Dispense Start Date End Date Auth. Provider    ondansetron (ZOFRAN-ODT) 4 MG TbDL Take 1 tablet (4 mg total) by mouth every 8 (eight) hours as needed (Nausea). 10 tablet 10/31/2024 -- Juventino Marrufo MD          Follow-up Information       Follow up With Specialties Details Why Contact Info    Stevo Pryor MD Family Medicine, Wound Care Schedule an appointment as soon as possible for a visit in 1 week For reevaluation [address] [facility] [address]  [phone]               Juventino Marrufo MD  10/31/24 2222       Juventino Marrufo MD  11/01/24 2052

## 2024-11-02 LAB
OHS QRS DURATION: 76 MS
OHS QRS DURATION: 84 MS
OHS QTC CALCULATION: 459 MS
OHS QTC CALCULATION: 463 MS

## 2024-11-02 NOTE — TELEPHONE ENCOUNTER
Refill Routing Note   Medication(s) are not appropriate for processing by [facility] for the following reason(s):        Drug-disease interaction  ED/Hospital Visit since last OV with provider    [facility] action(s):  Defer           Pharmacist review requested: Yes     Appointments  past 12m or future 3m with PCP    Date Provider   Last Visit   6/27/2024 Stevo Pryor MD   Next Visit   10/31/2024 Stevo Pryor MD   ED visits in past 90 days: 1        Note composed:11:37 PM 11/01/2024

## 2024-11-04 ENCOUNTER — TELEPHONE (OUTPATIENT)
Dept: ADMINISTRATIVE | Facility: HOSPITAL | Age: 59
End: 2024-11-04
Payer: MEDICARE

## 2024-11-04 ENCOUNTER — LAB VISIT (OUTPATIENT)
Dept: LAB | Facility: HOSPITAL | Age: 59
End: 2024-11-04
Attending: FAMILY MEDICINE
Payer: MEDICARE

## 2024-11-04 DIAGNOSIS — K52.9 COLITIS: ICD-10-CM

## 2024-11-04 LAB
ALBUMIN SERPL BCP-MCNC: 4.1 G/DL (ref 3.5–5.2)
ALP SERPL-CCNC: 57 U/L (ref 40–150)
ALT SERPL W/O P-5'-P-CCNC: 18 U/L (ref 10–44)
ANION GAP SERPL CALC-SCNC: 12 MMOL/L (ref 8–16)
AST SERPL-CCNC: 17 U/L (ref 10–40)
BASOPHILS # BLD AUTO: 0.11 K/UL (ref 0–0.2)
BASOPHILS NFR BLD: 1.2 % (ref 0–1.9)
BILIRUB SERPL-MCNC: 0.5 MG/DL (ref 0.1–1)
BUN SERPL-MCNC: 25 MG/DL (ref 6–20)
CALCIUM SERPL-MCNC: 9.8 MG/DL (ref 8.7–10.5)
CHLORIDE SERPL-SCNC: 101 MMOL/L (ref 95–110)
CO2 SERPL-SCNC: 22 MMOL/L (ref 23–29)
CREAT SERPL-MCNC: 1.4 MG/DL (ref 0.5–1.4)
CRP SERPL-MCNC: 0.8 MG/L (ref 0–8.2)
DIFFERENTIAL METHOD BLD: ABNORMAL
EOSINOPHIL # BLD AUTO: 0.2 K/UL (ref 0–0.5)
EOSINOPHIL NFR BLD: 2.2 % (ref 0–8)
ERYTHROCYTE [DISTWIDTH] IN BLOOD BY AUTOMATED COUNT: 12.7 % (ref 11.5–14.5)
ERYTHROCYTE [SEDIMENTATION RATE] IN BLOOD BY PHOTOMETRIC METHOD: 20 MM/HR (ref 0–23)
EST. GFR  (NO RACE VARIABLE): 57.5 ML/MIN/1.73 M^2
GLUCOSE SERPL-MCNC: 262 MG/DL (ref 70–110)
HCT VFR BLD AUTO: 39.2 % (ref 40–54)
HGB BLD-MCNC: 13.3 G/DL (ref 14–18)
IMM GRANULOCYTES # BLD AUTO: 0.03 K/UL (ref 0–0.04)
IMM GRANULOCYTES NFR BLD AUTO: 0.3 % (ref 0–0.5)
LYMPHOCYTES # BLD AUTO: 2.5 K/UL (ref 1–4.8)
LYMPHOCYTES NFR BLD: 26.2 % (ref 18–48)
MCH RBC QN AUTO: 32.1 PG (ref 27–31)
MCHC RBC AUTO-ENTMCNC: 33.9 G/DL (ref 32–36)
MCV RBC AUTO: 95 FL (ref 82–98)
MONOCYTES # BLD AUTO: 0.8 K/UL (ref 0.3–1)
MONOCYTES NFR BLD: 8.7 % (ref 4–15)
NEUTROPHILS # BLD AUTO: 5.7 K/UL (ref 1.8–7.7)
NEUTROPHILS NFR BLD: 61.4 % (ref 38–73)
NRBC BLD-RTO: 0 /100 WBC
PLATELET # BLD AUTO: 242 K/UL (ref 150–450)
PMV BLD AUTO: 11 FL (ref 9.2–12.9)
POTASSIUM SERPL-SCNC: 3.6 MMOL/L (ref 3.5–5.1)
PROT SERPL-MCNC: 7.4 G/DL (ref 6–8.4)
RBC # BLD AUTO: 4.14 M/UL (ref 4.6–6.2)
SODIUM SERPL-SCNC: 135 MMOL/L (ref 136–145)
WBC # BLD AUTO: 9.34 K/UL (ref 3.9–12.7)

## 2024-11-04 PROCEDURE — 86140 C-REACTIVE PROTEIN: CPT | Mod: HCNC | Performed by: FAMILY MEDICINE

## 2024-11-04 PROCEDURE — 36415 COLL VENOUS BLD VENIPUNCTURE: CPT | Mod: HCNC,PO | Performed by: FAMILY MEDICINE

## 2024-11-04 PROCEDURE — 85652 RBC SED RATE AUTOMATED: CPT | Mod: HCNC | Performed by: FAMILY MEDICINE

## 2024-11-04 PROCEDURE — 80053 COMPREHEN METABOLIC PANEL: CPT | Mod: HCNC | Performed by: FAMILY MEDICINE

## 2024-11-04 PROCEDURE — 85025 COMPLETE CBC W/AUTO DIFF WBC: CPT | Mod: HCNC | Performed by: FAMILY MEDICINE

## 2024-11-04 RX ORDER — LANCING DEVICE
EACH MISCELLANEOUS
COMMUNITY
Start: 2024-08-15

## 2024-11-04 RX ORDER — LANCETS 33 GAUGE
EACH MISCELLANEOUS
COMMUNITY
Start: 2024-08-15

## 2024-11-04 RX ORDER — BUPROPION HYDROCHLORIDE 300 MG/1
300 TABLET ORAL
COMMUNITY
Start: 2024-09-18

## 2024-11-04 RX ORDER — METFORMIN HYDROCHLORIDE 1000 MG/1
1000 TABLET ORAL 2 TIMES DAILY WITH MEALS
Qty: 180 TABLET | Refills: 0 | Status: SHIPPED | OUTPATIENT
Start: 2024-11-04

## 2024-11-04 RX ORDER — MIRTAZAPINE 45 MG/1
45 TABLET, FILM COATED ORAL
COMMUNITY
Start: 2024-09-18

## 2024-11-04 NOTE — TELEPHONE ENCOUNTER
Incoming call received from the patient, he is following up on a prescription that Dr. Pryor sent on Friday 11/01/2024 for his stomach.    The patient was informed that a prescription was sent to [facility] on Friday 11/01/24. He reports that the prescription should have been sent to [facility] pharmacy.@ [phone].    The patient was offered an apology for the error, he accepted. Prescription was called into [facility], I spoke w/ Gabriella.

## 2024-11-12 RX ORDER — ASPIRIN 81 MG/1
81 TABLET ORAL DAILY
Qty: 30 TABLET | Refills: 3 | OUTPATIENT
Start: 2024-11-12 | End: 2025-03-12

## 2024-11-15 ENCOUNTER — TELEPHONE (OUTPATIENT)
Dept: FAMILY MEDICINE | Facility: CLINIC | Age: 59
End: 2024-11-15
Payer: MEDICARE

## 2024-11-15 NOTE — TELEPHONE ENCOUNTER
----- Message from Stevo Pryor MD sent at 11/15/2024 10:14 AM CST -----  Please check with patient to see how he is doing with his abdominal pain.  His labs showed he was mildly anemic.  If he is still having abdominal pain I need to rescan him and possibly place on antibiotics  Thanks  Dr. Pryor

## 2024-11-15 NOTE — TELEPHONE ENCOUNTER
Please check with patient to see how he is doing with his abdominal pain.  His labs showed he was mildly anemic.  If he is still having abdominal pain I need to rescan him and possibly place on antibiotics   Thanks   Sent this message by my chart patient voice mail is full as of 11/15/24 @ 10:39 am vs

## 2024-11-20 DIAGNOSIS — E11.9 TYPE 2 DIABETES MELLITUS WITHOUT COMPLICATION, UNSPECIFIED WHETHER LONG TERM INSULIN USE: ICD-10-CM

## 2024-11-26 ENCOUNTER — PATIENT MESSAGE (OUTPATIENT)
Dept: ADMINISTRATIVE | Facility: HOSPITAL | Age: 59
End: 2024-11-26
Payer: MEDICARE

## 2024-12-02 ENCOUNTER — OFFICE VISIT (OUTPATIENT)
Dept: FAMILY MEDICINE | Facility: CLINIC | Age: 59
End: 2024-12-02
Payer: MEDICARE

## 2024-12-02 ENCOUNTER — HOSPITAL ENCOUNTER (OUTPATIENT)
Dept: RADIOLOGY | Facility: HOSPITAL | Age: 59
Discharge: HOME | End: 2024-12-02
Attending: INTERNAL MEDICINE
Payer: MEDICARE

## 2024-12-02 VITALS
SYSTOLIC BLOOD PRESSURE: 148 MMHG | DIASTOLIC BLOOD PRESSURE: 74 MMHG | BODY MASS INDEX: 25.46 KG/M2 | OXYGEN SATURATION: 95 % | HEART RATE: 96 BPM | RESPIRATION RATE: 18 BRPM | HEIGHT: 64 IN | WEIGHT: 149.13 LBS | TEMPERATURE: 98 F

## 2024-12-02 DIAGNOSIS — Z23 IMMUNIZATION DUE: ICD-10-CM

## 2024-12-02 DIAGNOSIS — Z98.890 HISTORY OF LIVER BIOPSY: ICD-10-CM

## 2024-12-02 DIAGNOSIS — I25.118 CORONARY ARTERY DISEASE OF NATIVE ARTERY OF NATIVE HEART WITH STABLE ANGINA PECTORIS: ICD-10-CM

## 2024-12-02 DIAGNOSIS — E11.9 CONTROLLED TYPE 2 DIABETES MELLITUS WITHOUT COMPLICATION, UNSPECIFIED WHETHER LONG TERM INSULIN USE: Chronic | ICD-10-CM

## 2024-12-02 DIAGNOSIS — K20.90 ESOPHAGITIS: ICD-10-CM

## 2024-12-02 DIAGNOSIS — G89.29 CHRONIC ABDOMINAL PAIN: ICD-10-CM

## 2024-12-02 DIAGNOSIS — E11.8 DIABETIC FOOT: ICD-10-CM

## 2024-12-02 DIAGNOSIS — F41.9 ANXIETY: ICD-10-CM

## 2024-12-02 DIAGNOSIS — R10.9 CHRONIC ABDOMINAL PAIN: ICD-10-CM

## 2024-12-02 DIAGNOSIS — F17.210 TOBACCO DEPENDENCE DUE TO CIGARETTES: ICD-10-CM

## 2024-12-02 DIAGNOSIS — E11.65 CONTROLLED TYPE 2 DIABETES MELLITUS WITH HYPERGLYCEMIA, UNSPECIFIED WHETHER LONG TERM INSULIN USE: Chronic | ICD-10-CM

## 2024-12-02 DIAGNOSIS — Z86.19 HISTORY OF HEPATITIS C: ICD-10-CM

## 2024-12-02 DIAGNOSIS — F17.200 SMOKING ADDICTION: ICD-10-CM

## 2024-12-02 DIAGNOSIS — E78.2 MIXED HYPERLIPIDEMIA: Chronic | ICD-10-CM

## 2024-12-02 DIAGNOSIS — I10 BENIGN ESSENTIAL HTN: Primary | ICD-10-CM

## 2024-12-02 PROCEDURE — 3077F SYST BP >= 140 MM HG: CPT | Mod: CPTII,S$GLB,, | Performed by: FAMILY MEDICINE

## 2024-12-02 PROCEDURE — 1159F MED LIST DOCD IN RCRD: CPT | Mod: CPTII,S$GLB,, | Performed by: FAMILY MEDICINE

## 2024-12-02 PROCEDURE — 1160F RVW MEDS BY RX/DR IN RCRD: CPT | Mod: CPTII,S$GLB,, | Performed by: FAMILY MEDICINE

## 2024-12-02 PROCEDURE — 3078F DIAST BP <80 MM HG: CPT | Mod: CPTII,S$GLB,, | Performed by: FAMILY MEDICINE

## 2024-12-02 PROCEDURE — 3061F NEG MICROALBUMINURIA REV: CPT | Mod: CPTII,S$GLB,, | Performed by: FAMILY MEDICINE

## 2024-12-02 PROCEDURE — 3008F BODY MASS INDEX DOCD: CPT | Mod: CPTII,S$GLB,, | Performed by: FAMILY MEDICINE

## 2024-12-02 PROCEDURE — 3066F NEPHROPATHY DOC TX: CPT | Mod: CPTII,S$GLB,, | Performed by: FAMILY MEDICINE

## 2024-12-02 PROCEDURE — 3044F HG A1C LEVEL LT 7.0%: CPT | Mod: CPTII,S$GLB,, | Performed by: FAMILY MEDICINE

## 2024-12-02 PROCEDURE — 99214 OFFICE O/P EST MOD 30 MIN: CPT | Mod: S$GLB,,, | Performed by: FAMILY MEDICINE

## 2024-12-02 PROCEDURE — G2211 COMPLEX E/M VISIT ADD ON: HCPCS | Mod: S$GLB,,, | Performed by: FAMILY MEDICINE

## 2024-12-02 PROCEDURE — 99999 PR PBB SHADOW E&M-EST. PATIENT-LVL V: CPT | Mod: PBBFAC,,, | Performed by: FAMILY MEDICINE

## 2024-12-02 RX ORDER — GABAPENTIN 300 MG/1
300 CAPSULE ORAL 3 TIMES DAILY
Status: CANCELLED | OUTPATIENT
Start: 2024-12-02

## 2024-12-02 RX ORDER — DULOXETIN HYDROCHLORIDE 60 MG/1
60 CAPSULE, DELAYED RELEASE ORAL DAILY
Status: CANCELLED | OUTPATIENT
Start: 2024-12-02

## 2024-12-02 RX ORDER — CLOPIDOGREL BISULFATE 75 MG/1
75 TABLET ORAL DAILY
Qty: 30 TABLET | Refills: 11 | Status: SHIPPED | OUTPATIENT
Start: 2024-12-02 | End: 2025-12-02

## 2024-12-02 RX ORDER — ATORVASTATIN CALCIUM 80 MG/1
80 TABLET, FILM COATED ORAL DAILY
Qty: 30 TABLET | Refills: 2 | Status: SHIPPED | OUTPATIENT
Start: 2024-12-02 | End: 2025-03-02

## 2024-12-02 RX ORDER — MIRTAZAPINE 45 MG/1
45 TABLET, FILM COATED ORAL
Status: CANCELLED | OUTPATIENT
Start: 2024-12-02

## 2024-12-02 RX ORDER — BUSPIRONE HYDROCHLORIDE 10 MG/1
10 TABLET ORAL NIGHTLY
Qty: 30 TABLET | Refills: 1 | Status: SHIPPED | OUTPATIENT
Start: 2024-12-02

## 2024-12-02 RX ORDER — PANTOPRAZOLE SODIUM 40 MG/1
40 TABLET, DELAYED RELEASE ORAL DAILY
Qty: 30 TABLET | Refills: 11 | Status: CANCELLED | OUTPATIENT
Start: 2024-12-02 | End: 2025-12-02

## 2024-12-02 RX ORDER — DICYCLOMINE HYDROCHLORIDE 20 MG/1
20 TABLET ORAL 2 TIMES DAILY PRN
Qty: 30 TABLET | Refills: 0 | Status: CANCELLED | OUTPATIENT
Start: 2024-12-02

## 2024-12-02 RX ORDER — TRAZODONE HYDROCHLORIDE 100 MG/1
150 TABLET ORAL NIGHTLY PRN
Status: CANCELLED | OUTPATIENT
Start: 2024-12-02

## 2024-12-02 RX ORDER — VALSARTAN AND HYDROCHLOROTHIAZIDE 160; 25 MG/1; MG/1
0.5 TABLET ORAL DAILY
Qty: 45 TABLET | Refills: 3 | Status: SHIPPED | OUTPATIENT
Start: 2024-12-02

## 2024-12-02 RX ORDER — METFORMIN HYDROCHLORIDE 1000 MG/1
1000 TABLET ORAL 2 TIMES DAILY WITH MEALS
Qty: 180 TABLET | Refills: 2 | Status: SHIPPED | OUTPATIENT
Start: 2024-12-02

## 2024-12-02 RX ORDER — TIZANIDINE 4 MG/1
4 TABLET ORAL NIGHTLY
Status: CANCELLED | OUTPATIENT
Start: 2024-12-02

## 2024-12-02 NOTE — PROGRESS NOTES
Routine Office Visit    Patient Name: Nino Price    : 1965  MRN: [id number]    Subjective:  Nino is a 59 y.o. male who presents today for:    1. Follow up  Patient presenting today for follow up of dyslipidemia, type 2 DM, depression, and hemiparesis s/p cva.  He has been taking all medications as prescribed.  Was hospitalized a little over a month for colitis and states the pain has improved, but still occurs at random.  There are occasional black stools, but no bright red blood in stool.  His last EGD was in January of this year and no ulcers/inflammation was seen.  He had labs done last month and reports he is taking all medications without adverse reaction.  He reports seeing an eye doctor on [address], but does not recall the name.  He states he was seen there in the past year.  He sees ortho at [facility] for his back and chronic left shoulder pain.      Past Medical History  Past Medical History:   Diagnosis Date    Anxiety     Asthma     Colitis     Depression     Diabetes mellitus     Head injury     History of hepatitis C, s/p successful hcv treatment w/ SVR -2015  10/8/2012    SVR(24) as of 2015.  SVR(12) as of 2015.  completed harvoni 24 week regimen for genotype 1a 5/18/15     Hypertension     Shoulder pain     left, s/p 4 surgeries.     Stroke        Past Surgical History  Past Surgical History:   Procedure Laterality Date    ANGIOGRAM, CORONARY, WITH LEFT HEART CATHETERIZATION Left 2024    Procedure: Angiogram, Coronary, with Left Heart Cath;  Surgeon: Gabriela Rider MD;  Location: [facility] CATH LAB;  Service: Cardiology;  Laterality: Left;  12pm start, R rad access    COLONOSCOPY N/A 10/20/2015    Procedure: COLONOSCOPY;  Surgeon: Jagdish Patricia MD;  Location: [facility] ENDO;  Service: Endoscopy;  Laterality: N/A;    COLONOSCOPY N/A 2024    Procedure: COLONOSCOPY;  Surgeon: Pricila Shanks MD;  Location: [facility] ENDO;  Service: Endoscopy;  Laterality: N/A;    CORONARY STENT  PLACEMENT N/A 6/18/2024    Procedure: INSERTION, STENT, CORONARY ARTERY;  Surgeon: Gabriela Rider MD;  Location: [facility] CATH LAB;  Service: Cardiology;  Laterality: N/A;  lcx pci. radial. lbu 3.5. 12 pm access    CORONARY STENT PLACEMENT N/A 8/28/2024    Procedure: INSERTION, STENT, CORONARY ARTERY;  Surgeon: Gabriela Rider MD;  Location: [facility] CATH LAB;  Service: Cardiology;  Laterality: N/A;  radial access. pci lad. have shockwave ready  RN PREOP 8/16/2024    DIAGNOSTIC LAPAROSCOPY N/A 4/1/2021    Procedure: LAPAROSCOPY, DIAGNOSTIC;  Surgeon: Umesh Vallecillo MD;  Location: [facility] OR;  Service: General;  Laterality: N/A;  RN PREOP 3/29/21, COVID NEGATIVE ON 3/29  CONSENT AND H/P INCOMPLETE    ESOPHAGOGASTRODUODENOSCOPY N/A 1/26/2024    Procedure: EGD (ESOPHAGOGASTRODUODENOSCOPY);  Surgeon: Pricila Shanks MD;  Location: [facility] ENDO;  Service: Endoscopy;  Laterality: N/A;  prep instructions mailed to pt / [facility] pt / Urgent/suprep  1/19- lvm for pc. DBM  1/23- left 2nd vm for pc. DBM  1/24 cirrohsis-labs prior, precall complete-st    GALLBLADDER SURGERY      pt not 100% if gall bladder removed    IVUS, CORONARY  6/18/2024    Procedure: IVUS, Coronary;  Surgeon: Gabriela Rider MD;  Location: [facility] CATH LAB;  Service: Cardiology;;    LEFT HEART CATHETERIZATION Left 10/11/2024    Procedure: Left heart cath;  Surgeon: Gabriela Rider MD;  Location: [facility] CATH LAB;  Service: Cardiology;  Laterality: Left;    PERCUTANEOUS CORONARY INTERVENTION, ARTERY N/A 6/18/2024    Procedure: Percutaneous coronary intervention;  Surgeon: Gabriela Rider MD;  Location: [facility] CATH LAB;  Service: Cardiology;  Laterality: N/A;    rotator cuff      left side       Family History  Family History   Problem Relation Name Age of Onset    Hypertension Mother      Heart disease Father      Cirrhosis Brother      Colon cancer Neg Hx      Esophageal cancer Neg Hx         Social History  Social History     Socioeconomic History    Marital status:      Number of children: 3   Tobacco Use    Smoking status: Every Day     Current packs/day: 1.00     Average packs/day: 1 pack/day for 35.0 years (35.0 ttl pk-yrs)     Types: Cigarettes     Passive exposure: Current    Smokeless tobacco: Never    Tobacco comments:     Quitting on his own, reports that he's down 4 to 5 cig a day      Patient will try the smoking program vs    Substance and Sexual Activity    Alcohol use: No    Drug use: Yes     Frequency: 7.0 times per week     Types: Marijuana, Oxycodone     Comment: Majiuan once a week/ Oxy 3-4 tabs daily    Sexual activity: Yes     Partners: Female   Social History Narrative    Reside on .     Lives with wife and young daughter (22yrs)    Not working, on disability since accident    Prior job-[employer]    Hobbies: carving, fishing    Not driving.      Social Drivers of Health     Financial Resource Strain: Low Risk  (10/11/2024)    Overall Financial Resource Strain (CARDIA)     Difficulty of Paying Living Expenses: Not hard at all   Recent Concern: Financial Resource Strain - High Risk (10/11/2024)    Overall Financial Resource Strain (CARDIA)     Difficulty of Paying Living Expenses: Hard   Food Insecurity: No Food Insecurity (10/11/2024)    Hunger Vital Sign     Worried About Running Out of Food in the Last Year: Never true     Ran Out of Food in the Last Year: Never true   Recent Concern: Food Insecurity - Food Insecurity Present (10/11/2024)    Hunger Vital Sign     Worried About Running Out of Food in the Last Year: Sometimes true     Ran Out of Food in the Last Year: Often true   Transportation Needs: No Transportation Needs (10/11/2024)    TRANSPORTATION NEEDS     Transportation : No   Physical Activity: Inactive (10/11/2024)    Exercise Vital Sign     Days of Exercise per Week: 0 days     Minutes of Exercise per Session: 0 min   Stress: Stress Concern Present (10/11/2024)    [employer] - Occupational Stress  Questionnaire     Feeling of Stress : To some extent   Housing Stability: Low Risk  (10/11/2024)    Housing Stability Vital Sign     Unable to Pay for Housing in the Last Year: No     Homeless in the Last Year: No   Recent Concern: Housing Stability - High Risk (10/11/2024)    Housing Stability Vital Sign     Unable to Pay for Housing in the Last Year: Yes     Homeless in the Last Year: No       Current Medications  Current Outpatient Medications on File Prior to Visit   Medication Sig Dispense Refill    buPROPion (WELLBUTRIN XL) 300 MG 24 hr tablet Take 300 mg by mouth.      dicyclomine (BENTYL) 20 mg tablet Take 1 tablet (20 mg total) by mouth 2 (two) times daily as needed (stomach pain). 30 tablet 0    DULoxetine (CYMBALTA) 60 MG capsule Take 60 mg by mouth once daily. Takes in pm      gabapentin (NEURONTIN) 300 MG capsule Take 300 mg by mouth 3 (three) times daily.      icosapent ethyL (VASCEPA) 1 gram Cap Take 2 g by mouth.      lancing device Misc AS DIRECTED TO CHECK BLOOD GLUCOSE TWICE DAILY      lancing device with lancets Kit To check blood glucose twice daily. 100 each 3    metoclopramide HCl (REGLAN) 10 MG tablet Take 1 tablet (10 mg total) by mouth every 6 (six) hours. 30 tablet 0    mirtazapine (REMERON) 15 MG tablet Take 15 mg by mouth every evening.      mirtazapine (REMERON) 45 MG tablet Take 45 mg by mouth.      nitroGLYCERIN (NITROSTAT) 0.4 MG SL tablet Place 1 tablet (0.4 mg total) under the tongue as needed for Chest pain. 25 tablet 0    ondansetron (ZOFRAN-ODT) 4 MG TbDL Take 1 tablet (4 mg total) by mouth every 8 (eight) hours as needed (Nausea). 10 tablet 0    oxyCODONE-acetaminophen (PERCOCET)  mg per tablet Take 1 tablet by mouth every 6 (six) hours as needed.      pantoprazole (PROTONIX) 40 MG tablet Take 1 tablet (40 mg total) by mouth once daily. 30 tablet 11    tiZANidine (ZANAFLEX) 4 MG tablet Take 4 mg by mouth every evening.      traZODone (DESYREL) 100 MG tablet Take 150 mg by  "mouth nightly as needed for Insomnia. 1.5 tab nightly as needed      TRUE METRIX GLUCOSE METER Misc       TRUEPLUS LANCETS 33 gauge [facility] AS DIRECTED TO TEST BLOOD GLUCOSE TWICE DAILY      [DISCONTINUED] busPIRone (BUSPAR) 10 MG tablet Take 1 tablet (10 mg total) by mouth every evening. 30 tablet 1    [DISCONTINUED] clopidogreL (PLAVIX) 75 mg tablet Take 1 tablet (75 mg total) by mouth once daily. 30 tablet 11    [DISCONTINUED] metFORMIN (GLUCOPHAGE) 1000 MG tablet Take 1 tablet (1,000 mg total) by mouth 2 (two) times daily with meals. 180 tablet 0    [DISCONTINUED] valsartan-hydrochlorothiazide (DIOVAN-HCT) 160-25 mg per tablet Take 0.5 tablets by mouth once daily. 45 tablet 3    aspirin (ECOTRIN) 81 MG EC tablet Take 1 tablet (81 mg total) by mouth once daily. 30 tablet 3    [] sucralfate (CARAFATE) 1 gram tablet Take 1 tablet (1 g total) by mouth 2 (two) times daily. 60 tablet 0    [DISCONTINUED] atorvastatin (LIPITOR) 80 MG tablet Take 1 tablet (80 mg total) by mouth once daily. 30 tablet 2     No current facility-administered medications on file prior to visit.       Allergies   Review of patient's allergies indicates:   Allergen Reactions    Codeine Nausea Only       Review of Systems (Pertinent positives)  Review of Systems   Constitutional: Negative.    HENT: Negative.     Eyes: Negative.    Respiratory: Negative.     Cardiovascular: Negative.    Gastrointestinal:  Positive for abdominal pain. Negative for blood in stool, constipation, diarrhea, melena, nausea and vomiting.   Genitourinary: Negative.    Musculoskeletal:  Positive for back pain and joint pain.   Skin: Negative.          BP (!) 148/74   Pulse 96   Temp 98.2 °F (36.8 °C) (Oral)   Resp 18   Ht 5' 4" (1.626 m)   Wt 67.7 kg (149 lb 2.3 oz)   SpO2 95%   BMI 25.60 kg/m²     GENERAL APPEARANCE: in no apparent distress and well developed and well nourished  HEENT: PERRL, EOMI, Sclera clear, anicteric, Oropharynx clear, no lesions, Neck " supple with midline trachea  NECK: normal, supple, no adenopathy, thyroid normal in size  RESPIRATORY: appears well, vitals normal, no respiratory distress, acyanotic, normal RR, chest clear, no wheezing, crepitations, rhonchi, normal symmetric air entry  HEART: regular rate and rhythm, S1, S2 normal, no murmur, click, rub or gallop.    ABDOMEN: abdomen is soft without tenderness, no masses, no hernias, no organomegaly, no rebound, no guarding. Suprapubic tenderness absent. No CVA tenderness..  Extremities: warm/well perfused.  No abnormal hair patterns. Decreased ROM in left shoulder due to pain  SKIN: no rashes, no wounds, no other lesions  PSYCH: Alert, oriented x 3, thought content appropriate, speech normal, pleasant and cooperative, good eye contact, well groomed    Assessment/Plan:  Nino Price is a 59 y.o. male who presents today for :    Nino was seen today for generalized body aches.    Diagnoses and all orders for this visit:    1. Benign essential HTN  valsartan-hydrochlorothiazide (DIOVAN-HCT) 160-25 mg per tablet  - blood pressure is in normal range after he doesn't smoke for 30 minutes      2. Smoking addiction  Ambulatory referral/consult to Smoking Cessation Program      3. Immunization due  COVID-19 (Pfizer) 30 mcg/0.3 mL IM vaccine (>/= 11 yo) 0.3 mL  -  had to defer this visit as he was late for his ultrasound      4. Diabetic foot  Ambulatory referral/consult to Podiatry  - no known foot wounds      5. Mixed hyperlipidemia  atorvastatin (LIPITOR) 80 MG tablet  - tolerating statin well      6. Esophagitis  - pt to follow up with GI      7. Anxiety  busPIRone (BUSPAR) 10 MG tablet  - tolerating well and feels it is helping control anxiety      8. Controlled type 2 diabetes mellitus with hyperglycemia, unspecified whether long term insulin use  metFORMIN (GLUCOPHAGE) 1000 MG tablet  - A1c up to date and was 6.8      9. Controlled type 2 diabetes mellitus without complication, unspecified  whether long term insulin use  valsartan-hydrochlorothiazide (DIOVAN-HCT) 160-25 mg per tablet  - will have renal function rechecked today with Dr. Hunter' labs      10. Coronary artery disease of native artery of native heart with stable angina pectoris  clopidogreL (PLAVIX) 75 mg tablet  - patient to follow up with cardiology as scheduled  - no current chest pain or shortness of breath      11. Tobacco dependence due to cigarettes  CT Chest Lung Screening Low Dose      12. Chronic abdominal pain  Ambulatory referral/consult to Gastroenterology  - will refer back to GI as he has not followed up since his hospitalization for colitis/diverticulitis          Stevo Pryor MD

## 2024-12-04 ENCOUNTER — HOSPITAL ENCOUNTER (OUTPATIENT)
Dept: RADIOLOGY | Facility: HOSPITAL | Age: 59
Discharge: HOME | End: 2024-12-04
Attending: INTERNAL MEDICINE
Payer: MEDICARE

## 2024-12-04 PROCEDURE — 76705 ECHO EXAM OF ABDOMEN: CPT | Mod: TC

## 2024-12-04 PROCEDURE — 76705 ECHO EXAM OF ABDOMEN: CPT | Mod: 26,,, | Performed by: INTERNAL MEDICINE

## 2024-12-06 ENCOUNTER — TELEPHONE (OUTPATIENT)
Dept: PODIATRY | Facility: CLINIC | Age: 59
End: 2024-12-06
Payer: MEDICARE

## 2024-12-10 DIAGNOSIS — K52.9 COLITIS: ICD-10-CM

## 2024-12-12 RX ORDER — SUCRALFATE 1 G/1
1 TABLET ORAL 2 TIMES DAILY
Qty: 60 TABLET | Refills: 0 | Status: SHIPPED | OUTPATIENT
Start: 2024-12-12

## 2024-12-12 NOTE — TELEPHONE ENCOUNTER
Refill Routing Note   Medication(s) are not appropriate for processing by [facility] for the following reason(s):        Outside of protocol    [facility] action(s):  Route             Appointments  past 12m or future 3m with PCP    Date Provider   Last Visit   12/2/2024 Stevo Pryor MD   Next Visit   6/4/2025 Stevo Pryor MD   ED visits in past 90 days: 1        Note composed:8:16 AM 12/12/2024

## 2024-12-16 ENCOUNTER — OFFICE VISIT (OUTPATIENT)
Dept: PODIATRY | Facility: CLINIC | Age: 59
End: 2024-12-16
Payer: MEDICARE

## 2024-12-16 VITALS — WEIGHT: 149.25 LBS | BODY MASS INDEX: 25.48 KG/M2 | HEIGHT: 64 IN

## 2024-12-16 DIAGNOSIS — L60.0 INGROWN NAIL: Primary | ICD-10-CM

## 2024-12-16 DIAGNOSIS — E11.65 CONTROLLED TYPE 2 DIABETES MELLITUS WITH HYPERGLYCEMIA, WITHOUT LONG-TERM CURRENT USE OF INSULIN: Chronic | ICD-10-CM

## 2024-12-16 DIAGNOSIS — E11.8 DIABETIC FOOT: ICD-10-CM

## 2024-12-16 PROCEDURE — 1159F MED LIST DOCD IN RCRD: CPT | Mod: HCNC,CPTII,S$GLB, | Performed by: PODIATRIST

## 2024-12-16 PROCEDURE — 99204 OFFICE O/P NEW MOD 45 MIN: CPT | Mod: HCNC,S$GLB,, | Performed by: PODIATRIST

## 2024-12-16 PROCEDURE — 3066F NEPHROPATHY DOC TX: CPT | Mod: HCNC,CPTII,S$GLB, | Performed by: PODIATRIST

## 2024-12-16 PROCEDURE — 3061F NEG MICROALBUMINURIA REV: CPT | Mod: HCNC,CPTII,S$GLB, | Performed by: PODIATRIST

## 2024-12-16 PROCEDURE — 99999 PR PBB SHADOW E&M-EST. PATIENT-LVL IV: CPT | Mod: PBBFAC,HCNC,, | Performed by: PODIATRIST

## 2024-12-16 PROCEDURE — 3008F BODY MASS INDEX DOCD: CPT | Mod: HCNC,CPTII,S$GLB, | Performed by: PODIATRIST

## 2024-12-16 PROCEDURE — 3044F HG A1C LEVEL LT 7.0%: CPT | Mod: HCNC,CPTII,S$GLB, | Performed by: PODIATRIST

## 2024-12-16 RX ORDER — LANCETS 26 GAUGE
EACH MISCELLANEOUS
Qty: 100 EACH | Refills: 3 | Status: CANCELLED | OUTPATIENT
Start: 2024-12-16

## 2024-12-16 RX ORDER — TOBRAMYCIN 3 MG/ML
SOLUTION/ DROPS OPHTHALMIC
Qty: 5 ML | Refills: 3 | Status: SHIPPED | OUTPATIENT
Start: 2024-12-16

## 2024-12-16 NOTE — TELEPHONE ENCOUNTER
No care due was identified.  [facility] Embedded Care Due Messages. Reference number: [id number].   12/16/2024 11:11:15 AM CST

## 2024-12-16 NOTE — PROGRESS NOTES
Subjective:      Patient ID: Nino Price is a 59 y.o. male.    Chief Complaint: Nail Problem (Right great toenail pain)    Diabetes, increased risk amputation needing evaluation/management/optomization of foot care.    Cc2 sharp throbbing pain right big toe/nail.  Gradual onset, worsening over past several weeks, aggravated by increased weight bearing, shoe gear, pressure.  No previous medical treatment.  OTC pain med not helping. Denies trauma, surgery right hallux.    Chief Complaint   Patient presents with    Nail Problem     Right great toenail pain     Casual shoes both feet    Review of Systems   Constitutional: Negative for chills, diaphoresis, fever, malaise/fatigue and night sweats.   Cardiovascular:  Negative for claudication, cyanosis, leg swelling and syncope.   Skin:  Positive for nail changes. Negative for color change, dry skin, rash, suspicious lesions and unusual hair distribution.   Musculoskeletal:  Negative for falls, joint pain, joint swelling, muscle cramps, muscle weakness and stiffness.   Gastrointestinal:  Negative for constipation, diarrhea, nausea and vomiting.   Neurological:  Negative for brief paralysis, disturbances in coordination, focal weakness, numbness, paresthesias, sensory change and tremors.         Objective:      Physical Exam  Constitutional:       General: He is not in acute distress.     Appearance: He is well-developed. He is not diaphoretic.   Cardiovascular:      Pulses:           Popliteal pulses are 2+ on the right side and 2+ on the left side.        Dorsalis pedis pulses are 2+ on the right side and 2+ on the left side.        Posterior tibial pulses are 2+ on the right side and 2+ on the left side.      Comments: Capillary refill 3 seconds all toes/distal feet, all toes/both feet warm to touch.      Negative lymphadenopathy bilateral popliteal fossa and tarsal tunnel.      Negavie lower extremity edema bilateral.    Musculoskeletal:      Right ankle: No  swelling, deformity, ecchymosis or lacerations. Normal range of motion. Normal pulse.      Right Achilles Tendon: Normal. No defects. Vásquez's test negative.      Comments: Pain to palpation and end range of motion left 1st mtpj with visible and palpable periarticular exostoses, and reduced range of motion with mild crepitus.      Otherwise, Normal angle, base, station of gait. All ten toes without clubbing, cyanosis, or signs of ischemia.  No pain to palpation bilateral lower extremities.  Range of motion, stability, muscle strength, and muscle tone normal bilateral feet and legs.    Lymphadenopathy:      Lower Body: No right inguinal adenopathy. No left inguinal adenopathy.      Comments: Negative lymphadenopathy bilateral popliteal fossa and tarsal tunnel.    Negative lymphangitic streaking bilateral feet/ankles/legs.   Skin:     General: Skin is warm and dry.      Capillary Refill: Capillary refill takes 2 to 3 seconds.      Coloration: Skin is not pale.      Findings: No abrasion, bruising, burn, ecchymosis, erythema, laceration, lesion or rash.      Nails: There is no clubbing.      Comments: Skin is normal age and health appropriate color, turgor, texture, and temperature bilateral lower extremities without ulceration, hyperpigmentation, discoloration, masses nodules or cords palpated.  No ecchymosis, erythema, edema, or cardinal signs of infection bilateral lower extremities.    Visible and palpable ingrowth of toenail medial border right hallux with pain to palpation, and focal localized erythema and edema,  without ulceration, drainage, pus, tracking, fluctuance, malodor, or cardinal signs infection.      Toenails normal color and trophic qualities.        Neurological:      Mental Status: He is alert and oriented to person, place, and time.      Sensory: No sensory deficit.      Motor: No tremor, atrophy or abnormal muscle tone.      Gait: Gait normal.      Deep Tendon Reflexes:      Reflex Scores:        Patellar reflexes are 2+ on the right side and 2+ on the left side.       Achilles reflexes are 2+ on the right side and 2+ on the left side.     Comments: Negative tinel sign to percussion sural, superficial peroneal, deep peroneal, saphenous, and posterior tibial nerves right and left ankles and feet.    Psychiatric:         Behavior: Behavior is cooperative.           Assessment:       Encounter Diagnoses   Name Primary?    Diabetic foot     Ingrown nail Yes         Plan:       Nino was seen today for nail problem.    Diagnoses and all orders for this visit:    Ingrown nail    Diabetic foot  -     Ambulatory referral/consult to Podiatry    Other orders  -     tobramycin sulfate 0.3% (TOBREX) 0.3 % ophthalmic solution; 1-2 drops topically twice daily to affected toe(s).      I counseled the patient on his conditions, their implications and medical management.        The patient has received literature on basic diabetic foot care.  Patient will inspect feet daily, wear protective shoe gear when ambulatory, and apply moisturizer to skin as needed to maintain elasticity and help prevent ulceration.    Inspect feet multiple times daily for signs of occurrence/recurrence ulceration.    With the patient's permission, I debrided right hallux toenail with a sterile nipper and curette, removing all offending nail and debris.  Patient tolerated the procedure well and related significant relief.    Topical tobramycin bid right hallux.    Cover right hallux all times with band aid/similar changing daily until completely healed.    Discussed conservative treatment with shoes of adequate dimensions, material, and style to alleviate symptoms and delay or prevent surgical intervention.           No follow-ups on file.

## 2024-12-17 RX ORDER — LANCETS 33 GAUGE
EACH MISCELLANEOUS
Qty: 200 EACH | Refills: 3 | Status: SHIPPED | OUTPATIENT
Start: 2024-12-17

## 2024-12-17 RX ORDER — CALCIUM CITRATE/VITAMIN D3 200MG-6.25
TABLET ORAL
Qty: 200 EACH | Refills: 3 | Status: SHIPPED | OUTPATIENT
Start: 2024-12-17

## 2024-12-17 NOTE — TELEPHONE ENCOUNTER
Refill Routing Note   Medication(s) are not appropriate for processing by [facility] for the following reason(s):        No active prescription written by provider    [facility] action(s):  Defer               Appointments  past 12m or future 3m with PCP    Date Provider   Last Visit   12/2/2024 Stevo Pryor MD   Next Visit   6/4/2025 Stevo Pryor MD   ED visits in past 90 days: 1        Note composed:10:44 AM 12/17/2024

## 2024-12-23 ENCOUNTER — TELEPHONE (OUTPATIENT)
Dept: ADMINISTRATIVE | Facility: HOSPITAL | Age: 59
End: 2024-12-23
Payer: MEDICARE

## 2024-12-23 NOTE — TELEPHONE ENCOUNTER
Spoke w/ patient, he is requesting a refill on his sucralfate 1 gram tablets. The patient was informed that this medication was refilled on 12/12/2024 and sent [facility]. He reports that they don't have one for him. The patient was informed that I would contact [facility] to have the prescription taken off of hold and filled. [facility] contacted, I spoke w/ Sharon. She has refilled the patient's medication and he can pick it up today. The patient was notified of my findings.

## 2024-12-29 DIAGNOSIS — F41.9 ANXIETY: ICD-10-CM

## 2024-12-29 NOTE — TELEPHONE ENCOUNTER
No care due was identified.  [facility] Embedded Care Due Messages. Reference number: [id number].   12/29/2024 9:31:55 AM CST

## 2024-12-30 RX ORDER — BUSPIRONE HYDROCHLORIDE 10 MG/1
10 TABLET ORAL NIGHTLY
Qty: 90 TABLET | Refills: 1 | Status: SHIPPED | OUTPATIENT
Start: 2024-12-30

## 2024-12-30 NOTE — TELEPHONE ENCOUNTER
Refill Routing Note   Medication(s) are not appropriate for processing by [facility] for the following reason(s):        Outside of protocol    [facility] action(s):  Route               Appointments  past 12m or future 3m with PCP    Date Provider   Last Visit   12/2/2024 Stevo Pryor MD   Next Visit   6/4/2025 Stevo Pryor MD   ED visits in past 90 days: 1        Note composed:11:57 AM 12/30/2024

## 2025-01-03 ENCOUNTER — OFFICE VISIT (OUTPATIENT)
Dept: CARDIOLOGY | Facility: CLINIC | Age: 60
End: 2025-01-03
Payer: MEDICARE

## 2025-01-03 VITALS
SYSTOLIC BLOOD PRESSURE: 158 MMHG | DIASTOLIC BLOOD PRESSURE: 74 MMHG | RESPIRATION RATE: 18 BRPM | BODY MASS INDEX: 24.67 KG/M2 | HEART RATE: 99 BPM | WEIGHT: 144.5 LBS | OXYGEN SATURATION: 98 % | HEIGHT: 64 IN

## 2025-01-03 DIAGNOSIS — I70.0 AORTIC ATHEROSCLEROSIS: ICD-10-CM

## 2025-01-03 DIAGNOSIS — I10 HYPERTENSION, UNSPECIFIED TYPE: Primary | ICD-10-CM

## 2025-01-03 DIAGNOSIS — E78.5 DYSLIPIDEMIA: ICD-10-CM

## 2025-01-03 DIAGNOSIS — I25.118 CORONARY ARTERY DISEASE OF NATIVE ARTERY OF NATIVE HEART WITH STABLE ANGINA PECTORIS: ICD-10-CM

## 2025-01-03 PROCEDURE — 99999 PR PBB SHADOW E&M-EST. PATIENT-LVL V: CPT | Mod: PBBFAC,HCNC,, | Performed by: INTERNAL MEDICINE

## 2025-01-03 RX ORDER — EZETIMIBE 10 MG/1
10 TABLET ORAL DAILY
Qty: 90 TABLET | Refills: 3 | Status: SHIPPED | OUTPATIENT
Start: 2025-01-03 | End: 2026-01-03

## 2025-01-03 NOTE — PROGRESS NOTES
CARDIOVASCULAR CONSULTATION    REASON FOR CONSULT:   Nino Price is a 59 y.o. male who presents for   Chief Complaint   Patient presents with    Follow-up          HISTORY OF PRESENT ILLNESS:     Patient here for follow-up after recent hospital stay for chest pain.  Underwent stress echocardiogram which did not show any significant ischemia.  Has been chest pain-free.  Denies orthopnea, PND, swelling of feet.    From August 24: Patient here for follow-up.  Occasional chest pains.  Better than before PCI of circumflex    Results for orders placed or performed in visit on 11/01/24   EKG 12-lead    Collection Time: 11/01/24  3:42 PM   Result Value Ref Range    QRS Duration 76 ms    OHS QTC Calculation 459 ms    Narrative    Test Reason : I95.9,    Vent. Rate : 097 BPM     Atrial Rate : 097 BPM     P-R Int : 132 ms          QRS Dur : 076 ms      QT Int : 362 ms       P-R-T Axes : 067 -09 059 degrees     QTc Int : 459 ms    Normal sinus rhythm  Possible Left atrial enlargement  Possible Inferior infarct ,age undetermined  Abnormal ECG  When compared with ECG of 31-OCT-2024 19:35,  Premature ventricular complexes are no longer Present  Borderline criteria for Inferior infarct are now Present  Confirmed by Lavelle Carr MD ([id number]) on 11/2/2024 10:05:36 AM    Referred By: TONI PIZANO           Confirmed By:Lavelle Carr MD       Results for orders placed during the hospital encounter of 06/16/24    Echo    Interpretation Summary    Left Ventricle: The left ventricle is normal in size. Normal wall thickness. Regional wall motion abnormalities present (canot exclude mild posterolateral HK). There is low normal systolic function with a visually estimated ejection fraction of 50 - 55%. Grade I diastolic dysfunction.    Right Ventricle: Normal right ventricular cavity size. Systolic function is normal.      Results for orders placed during the hospital encounter of 08/09/23    Nuclear Stress - Cardiology  Interpreted    Interpretation Summary    Abnormal myocardial perfusion scan.    There is a moderate to severe intensity, moderate sized, fixed perfusion abnormality in the lateral wall(s). Only resting images obtained as patient refused to complete test. Incomplete study.    There are no other significant perfusion abnormalities.    The ECG portion of the study is negative for ischemia.    The patient reported no chest pain during the stress test.    There were no arrhythmias during stress.      Results for orders placed during the hospital encounter of 06/16/24    Cardiac catheterization    Conclusion    Successful IVUS guided PCI of circumflex into OM1 with excellent angiographic results.  IVUS post PCI revealed well-expanded and apposed stent.    Prox Cx lesion: A STENT SYNERGY XD 3.0X48MM  at 12 KENDRA for 12 sec.    The Prox Cx lesion was 95% stenosed with 0% stenosis post-intervention.    IVUS left main showed Kacie of 7.6 millimeter sq in the proximal and ostial left main    The estimated blood loss was <50 mL.    Procedures performed:    1. Coronary angiogram    2. PCI of proximal circ into OM1    3. IVUS of circumflex    4. IVUS of left main      Coronary angiogram:    Left main: Proximal 40 to 50% stenosis.  IVUS done.  MLA in the proximal segment is 7.6 millimeter sq    Lad:  Proximal to mid 80% stenosis.  Will stage PCI    Circumflex: Culprit vessel.  Severe 95% stenosis.  Successful PCI done    RCA not studed today    Access: Right radial artery access    Assessment and plan    Continue aspirin 81 mg daily indefinitely    Plavix 70 mg daily uninterrupted for at least 1 year and longer if no contraindications    Statins with goal LDL less than 70    Smoking cessation    Staged PCI of LAD    Follow-up in Cardiology Clinic with Dr. Rider      The procedure log was documented by Documenter: Deni Fletcher RN and verified by Gabriela Rider MD.    Date: 6/18/2024  Time: 12:54 PM    Notes from September 24:  Patient here for follow-up.  No complications from angiogram.  Shortness of breath and chest pain better.  Trying to cut down smoking.  Unfortunately still smokes about 8 cigarettes a day    Results for orders placed during the hospital encounter of 08/28/24    Cardiac catheterization    Conclusion    The Prox LAD lesion was 90% stenosed with 0% stenosis post-intervention.    Prox LAD lesion: A STENT SYNERGY XD 3.0X38MM stent was successfully placed at 12 KENDRA for 23 sec.    The estimated blood loss was <50 mL.    Successful IVUS guided PCI of heavily calcified prox to mid LAD with JUSTIN with excellent angiographic results.  Shock wave lithotripsy performed prior to stent implantation because of heavily calcified lesion    Procedures performed    1. Coronary angiography    2. Shock wave lithotripsy of prox to mid LAD (CPT +01841)    3. BALLOON ANGIOPLASTY AND PCI OF LAD WITH JUSTIN    ACCESS: RIGHT RADIAL ARTERY ACCESS    ASSESSMENT PLAN    CONTINUE ASPIRIN 81 MG DAILY INDEFINITELY AND PLAVIX 70 MG DAILY UNINTERRUPTED FOR AT LEAST 1 YEAR AND LONGER IF NO CONTRAINDICATIONS              The procedure log was documented by Documenter: Yamileth Alfonso RVT and verified by Gabriela Rider MD.    Date: 8/28/2024  Time: 12:59 PM    January 2025    History of Present Illness    CHIEF COMPLAINT:  Nino presents today for follow-up regarding back and shoulder pain.    MUSCULOSKELETAL:  He reports chronic back and shoulder pain with no new changes in symptoms.    GASTROINTESTINAL:  He reports constant stomach rumbling and pain.    HYPERLIPIDEMIA:  His cholesterol was 264 in October.    MEDICATIONS:  He takes aspirin, Plavix, and Lipitor daily. He reports uncertainty about taking atorvastatin regularly.         Results for orders placed or performed in visit on 11/01/24   EKG 12-lead    Collection Time: 11/01/24  3:42 PM   Result Value Ref Range    QRS Duration 76 ms    OHS QTC Calculation 459 ms    Narrative    Test Reason :  I95.9,    Vent. Rate : 097 BPM     Atrial Rate : 097 BPM     P-R Int : 132 ms          QRS Dur : 076 ms      QT Int : 362 ms       P-R-T Axes : 067 -09 059 degrees     QTc Int : 459 ms    Normal sinus rhythm  Possible Left atrial enlargement  Possible Inferior infarct ,age undetermined  Abnormal ECG  When compared with ECG of 31-OCT-2024 19:35,  Premature ventricular complexes are no longer Present  Borderline criteria for Inferior infarct are now Present  Confirmed by Lavelle Carr MD ([id number]) on 11/2/2024 10:05:36 AM    Referred By: TONI PIZANO           Confirmed By:Lavelle Carr MD       Results for orders placed during the hospital encounter of 10/10/24    Echo    Interpretation Summary    Left Ventricle: The left ventricle is normal in size. There is normal systolic function with a visually estimated ejection fraction of 55 - 60%. There is normal diastolic function.    Right Ventricle: Normal right ventricular cavity size. Systolic function is normal.    Left Atrium: Left atrium is mildly dilated.    Pulmonary Artery: The estimated pulmonary artery systolic pressure is 22 mmHg.    IVC/SVC: Normal venous pressure at 3 mmHg.      Results for orders placed during the hospital encounter of 08/09/23    Nuclear Stress - Cardiology Interpreted    Interpretation Summary    Abnormal myocardial perfusion scan.    There is a moderate to severe intensity, moderate sized, fixed perfusion abnormality in the lateral wall(s). Only resting images obtained as patient refused to complete test. Incomplete study.    There are no other significant perfusion abnormalities.    The ECG portion of the study is negative for ischemia.    The patient reported no chest pain during the stress test.    There were no arrhythmias during stress.      Results for orders placed during the hospital encounter of 10/10/24    Cardiac catheterization    Conclusion    Patent LAD and LCX stents.    The pre-procedure left ventricular end diastolic  pressure was 12.    The estimated blood loss was <50 mL.    Main left main: Ostial 20% stenosis    Lad:  Recently placed LAD stent widely patent.  Mid LAD 50-60% stenosis.  Diagonal 2 ostial 70% stenosis    Circumflex:  Recently placed prox circumflex stent widely patent.  Luminal irregularities in rest of circumflex    RCA:  50% mid and distal lesions noted.  PLB 50% stenosis.  PDA ostial 80% stenosis medical management ideal for these stenosis.    Access: Right radial artery access    Assessment plan    Aspirin 81 mg daily    Plavix 75 mg daily uninterrupted for at least 1 year    Statins        The procedure log was documented by Documenter: Shannon Love RN and verified by Gabriela Rider MD.    Date: 10/11/2024  Time: 12:30 PM          PAST MEDICAL HISTORY:     Past Medical History:   Diagnosis Date    Anxiety     Asthma     Colitis     Depression     Diabetes mellitus     Head injury     History of hepatitis C, s/p successful hcv treatment w/ SVR 8-2015  10/8/2012    SVR(24) as of 11/2015.  SVR(12) as of 8/2015.  completed harvoni 24 week regimen for genotype 1a 5/18/15     Hypertension     Shoulder pain     left, s/p 4 surgeries.     Stroke        PAST SURGICAL HISTORY:     Past Surgical History:   Procedure Laterality Date    ANGIOGRAM, CORONARY, WITH LEFT HEART CATHETERIZATION Left 6/17/2024    Procedure: Angiogram, Coronary, with Left Heart Cath;  Surgeon: Gabriela Rider MD;  Location: [facility] CATH LAB;  Service: Cardiology;  Laterality: Left;  12pm start, R rad access    COLONOSCOPY N/A 10/20/2015    Procedure: COLONOSCOPY;  Surgeon: Jagdish Patricia MD;  Location: [facility] ENDO;  Service: Endoscopy;  Laterality: N/A;    COLONOSCOPY N/A 1/26/2024    Procedure: COLONOSCOPY;  Surgeon: Pricila Shanks MD;  Location: [facility] ENDO;  Service: Endoscopy;  Laterality: N/A;    CORONARY STENT PLACEMENT N/A 6/18/2024    Procedure: INSERTION, STENT, CORONARY ARTERY;  Surgeon: Gabriela Rider MD;  Location: [facility] CATH LAB;   Service: Cardiology;  Laterality: N/A;  lcx pci. radial. lbu 3.5. 12 pm access    CORONARY STENT PLACEMENT N/A 8/28/2024    Procedure: INSERTION, STENT, CORONARY ARTERY;  Surgeon: Gabriela Rider MD;  Location: [facility] CATH LAB;  Service: Cardiology;  Laterality: N/A;  radial access. pci lad. have shockwave ready  RN PREOP 8/16/2024    DIAGNOSTIC LAPAROSCOPY N/A 4/1/2021    Procedure: LAPAROSCOPY, DIAGNOSTIC;  Surgeon: Umesh Vallecillo MD;  Location: [facility] OR;  Service: General;  Laterality: N/A;  RN PREOP 3/29/21, COVID NEGATIVE ON 3/29  CONSENT AND H/P INCOMPLETE    ESOPHAGOGASTRODUODENOSCOPY N/A 1/26/2024    Procedure: EGD (ESOPHAGOGASTRODUODENOSCOPY);  Surgeon: Pricila Shanks MD;  Location: [facility] ENDO;  Service: Endoscopy;  Laterality: N/A;  prep instructions mailed to pt / [place] pt / Urgent/suprep  1/19- lvm for pc. DBM  1/23- left 2nd vm for pc. DBM  1/24 cirrohsis-labs prior, precall complete-st    GALLBLADDER SURGERY      pt not 100% if gall bladder removed    IVUS, CORONARY  6/18/2024    Procedure: IVUS, Coronary;  Surgeon: Gabriela Rider MD;  Location: [facility] CATH LAB;  Service: Cardiology;;    LEFT HEART CATHETERIZATION Left 10/11/2024    Procedure: Left heart cath;  Surgeon: Gabriela Rider MD;  Location: [facility] CATH LAB;  Service: Cardiology;  Laterality: Left;    PERCUTANEOUS CORONARY INTERVENTION, ARTERY N/A 6/18/2024    Procedure: Percutaneous coronary intervention;  Surgeon: Gabriela Rider MD;  Location: [facility] CATH LAB;  Service: Cardiology;  Laterality: N/A;    rotator cuff      left side       ALLERGIES AND MEDICATION:     Review of patient's allergies indicates:   Allergen Reactions    Codeine Nausea Only        Medication List            Accurate as of January 3, 2025 11:39 AM. If you have any questions, ask your nurse or doctor.                START taking these medications      ezetimibe 10 mg tablet  Commonly known as: ZETIA  Take 1 tablet (10 mg total) by mouth once daily.  Started by:  Gabriela Rider MD            CONTINUE taking these medications      aspirin 81 MG EC tablet  Commonly known as: ECOTRIN  Take 1 tablet (81 mg total) by mouth once daily.     atorvastatin 80 MG tablet  Commonly known as: LIPITOR  Take 1 tablet (80 mg total) by mouth once daily.     buPROPion 300 MG 24 hr tablet  Commonly known as: WELLBUTRIN XL     busPIRone 10 MG tablet  Commonly known as: BUSPAR  TAKE 1 TABLET BY MOUTH EVERY DAY IN THE EVENING     clopidogreL 75 mg tablet  Commonly known as: PLAVIX  Take 1 tablet (75 mg total) by mouth once daily.     dicyclomine 20 mg tablet  Commonly known as: BENTYL  Take 1 tablet (20 mg total) by mouth 2 (two) times daily as needed (stomach pain).     DULoxetine 60 MG capsule  Commonly known as: CYMBALTA     gabapentin 300 MG capsule  Commonly known as: NEURONTIN     icosapent ethyL 1 gram Cap  Commonly known as: VASCEPA     lancing device Misc     lancing device with lancets Kit  To check blood glucose twice daily.     metFORMIN 1000 MG tablet  Commonly known as: GLUCOPHAGE  Take 1 tablet (1,000 mg total) by mouth 2 (two) times daily with meals.     metoclopramide HCl 10 MG tablet  Commonly known as: REGLAN  Take 1 tablet (10 mg total) by mouth every 6 (six) hours.     * mirtazapine 15 MG tablet  Commonly known as: REMERON     * mirtazapine 45 MG tablet  Commonly known as: REMERON     nitroGLYCERIN 0.4 MG SL tablet  Commonly known as: NITROSTAT  Place 1 tablet (0.4 mg total) under the tongue as needed for Chest pain.     ondansetron 4 MG Tbdl  Commonly known as: ZOFRAN-ODT  Take 1 tablet (4 mg total) by mouth every 8 (eight) hours as needed (Nausea).     oxyCODONE-acetaminophen  mg per tablet  Commonly known as: PERCOCET     pantoprazole 40 MG tablet  Commonly known as: PROTONIX  Take 1 tablet (40 mg total) by mouth once daily.     sucralfate 1 gram tablet  Commonly known as: CARAFATE  TAKE 1 TABLET(1 GRAM) BY MOUTH TWICE DAILY     tiZANidine 4 MG tablet  Commonly known  as: ZANAFLEX     tobramycin sulfate 0.3% 0.3 % ophthalmic solution  Commonly known as: TOBREX  1-2 drops topically twice daily to affected toe(s).     traZODone 100 MG tablet  Commonly known as: DESYREL     TRUE METRIX GLUCOSE METER Misc  Generic drug: blood-glucose meter     TRUE METRIX GLUCOSE TEST STRIP Strp  Generic drug: blood sugar diagnostic  TEST BLOOD GLUCOSE TWICE A DAY AS DIRECTED     TRUEPLUS LANCETS 33 gauge Misc  Generic drug: lancets  TEST BLOOD GLUCOSE TWICE DAILY AS DIRECTED     valsartan-hydrochlorothiazide 160-25 mg per tablet  Commonly known as: DIOVAN-HCT  Take 0.5 tablets by mouth once daily.           * This list has 2 medication(s) that are the same as other medications prescribed for you. Read the directions carefully, and ask your doctor or other care provider to review them with you.                   Where to Get Your Medications        These medications were sent to [facility]/pharmacy #[id number] - [place] - [address] [place] [address]      Phone: [phone]   ezetimibe 10 mg tablet         SOCIAL HISTORY:     Social History     Socioeconomic History    Marital status:     Number of children: 3   Tobacco Use    Smoking status: Every Day     Current packs/day: 1.00     Average packs/day: 1 pack/day for 35.0 years (35.0 ttl pk-yrs)     Types: Cigarettes     Passive exposure: Current    Smokeless tobacco: Never    Tobacco comments:     Quitting on his own, reports that he's down 4 to 5 cig a day      Patient will try the smoking program vs    Substance and Sexual Activity    Alcohol use: No    Drug use: Yes     Frequency: 7.0 times per week     Types: Marijuana, Oxycodone     Comment: Majiuan once a week/ Oxy 3-4 tabs daily    Sexual activity: Yes     Partners: Female   Social History Narrative    Reside on .     Lives with wife and young daughter (22yrs)    Not working, on disability since accident    Prior job-[employer]    Hobbies: carving, fishing    Not  driving.      Social Drivers of Health     Financial Resource Strain: Low Risk  (10/11/2024)    Overall Financial Resource Strain (CARDIA)     Difficulty of Paying Living Expenses: Not hard at all   Recent Concern: Financial Resource Strain - High Risk (10/11/2024)    Overall Financial Resource Strain (CARDIA)     Difficulty of Paying Living Expenses: Hard   Food Insecurity: No Food Insecurity (10/11/2024)    Hunger Vital Sign     Worried About Running Out of Food in the Last Year: Never true     Ran Out of Food in the Last Year: Never true   Recent Concern: Food Insecurity - Food Insecurity Present (10/11/2024)    Hunger Vital Sign     Worried About Running Out of Food in the Last Year: Sometimes true     Ran Out of Food in the Last Year: Often true   Transportation Needs: No Transportation Needs (10/11/2024)    TRANSPORTATION NEEDS     Transportation : No   Physical Activity: Inactive (10/11/2024)    Exercise Vital Sign     Days of Exercise per Week: 0 days     Minutes of Exercise per Session: 0 min   Stress: Stress Concern Present (10/11/2024)    [employer] - Occupational Stress Questionnaire     Feeling of Stress : To some extent   Housing Stability: Low Risk  (10/11/2024)    Housing Stability Vital Sign     Unable to Pay for Housing in the Last Year: No     Homeless in the Last Year: No   Recent Concern: Housing Stability - High Risk (10/11/2024)    Housing Stability Vital Sign     Unable to Pay for Housing in the Last Year: Yes     Homeless in the Last Year: No       FAMILY HISTORY:     Family History   Problem Relation Name Age of Onset    Hypertension Mother      Heart disease Father      Cirrhosis Brother      Colon cancer Neg Hx      Esophageal cancer Neg Hx         REVIEW OF SYSTEMS:   Review of Systems   Constitutional: Negative.   HENT: Negative.     Eyes: Negative.    Respiratory: Negative.     Endocrine: Negative.    Hematologic/Lymphatic: Negative.    Skin: Negative.   "  Musculoskeletal: Negative.    Gastrointestinal: Negative.    Genitourinary: Negative.    Neurological: Negative.    Psychiatric/Behavioral: Negative.     Allergic/Immunologic: Negative.        A 10 point review of systems was performed and all the pertinent positives have been mentioned. Rest of review of systems was negative.        PHYSICAL EXAM:     Vitals:    01/03/25 0935   BP: (!) 158/74   Pulse: 99   Resp: 18    Body mass index is 24.81 kg/m².  Weight: 65.5 kg (144 lb 8.2 oz)   Height: 5' 4" (162.6 cm)     Physical Exam  Vitals reviewed.   Constitutional:       Appearance: He is well-developed.   HENT:      Head: Normocephalic.   Eyes:      Conjunctiva/sclera: Conjunctivae normal.      Pupils: Pupils are equal, round, and reactive to light.   Cardiovascular:      Rate and Rhythm: Normal rate and regular rhythm.      Heart sounds: Normal heart sounds.   Pulmonary:      Effort: Pulmonary effort is normal.      Breath sounds: Normal breath sounds.   Abdominal:      General: Bowel sounds are normal.      Palpations: Abdomen is soft.   Musculoskeletal:      Cervical back: Normal range of motion and neck supple.   Skin:     General: Skin is warm.   Neurological:      Mental Status: He is alert and oriented to person, place, and time.           DATA:     Laboratory:  CBC:  Recent Labs   Lab 10/31/24  1917 11/04/24  1058 12/02/24  1014   WBC 9.77 9.34 4.70   Hemoglobin 15.2 13.3 L 12.0 L   Hematocrit 43.2 39.2 L 37.5 L   Platelets 251 242 194       CHEMISTRIES:  Recent Labs   Lab 05/26/22  0950 04/26/23  0615 06/18/24  0251 06/19/24  0521 06/27/24  1102 08/16/24  1330 10/31/24  1917 11/04/24  1058 12/02/24  1014   Glucose 135 H   < > 129 H 130 H 201 H   < > 200 H 262 H 181 H   Sodium 141   < > 142 140 140   < > 141 135 L 142   Potassium 4.0   < > 4.1 3.7 4.8   < > 4.1 3.6 4.1   BUN 7   < > 11 11 9   < > 18 25 H 8   Creatinine 1.0   < > 0.9 1.0 1.1   < > 1.2 1.4 1.1   eGFR if  >60  --   --   --   --  "  --   --   --   --    eGFR if non  >60  --   --   --   --   --   --   --   --    Calcium 9.2   < > 8.8 8.6 L 9.8   < > 9.7 9.8 9.2   Magnesium  --    < > 1.5 L 1.4 L 1.8  --   --   --   --     < > = values in this interval not displayed.       CARDIAC BIOMARKERS:  Recent Labs   Lab 10/11/24  0411 10/11/24  0604 10/31/24  1917   Troponin I 0.049 H 0.037 H 0.026       COAGS:  Recent Labs   Lab 06/17/24  0758 10/10/24  1822 12/02/24  1014   INR 1.1 1.0  1.0 1.0       LIPIDS/LFTS:  Recent Labs   Lab 04/30/24  0835 06/16/24  1952 06/17/24  0407 06/27/24  1102 10/10/24  1822 10/31/24  1917 11/04/24  1058 12/02/24  1014   Cholesterol 267 H  --  124  --  264 H  --   --   --    Triglycerides 303 H  --  116  --  158 H  --   --   --    HDL 31 L  --  23 L  --  38 L  --   --   --    LDL Cholesterol 175.4 H  --  77.8  --  194.4 H  --   --   --    Non-HDL Cholesterol 236  --  101  --  226  --   --   --    AST 15   < >  --    < >  --  20 17 14   ALT 20   < >  --    < >  --  20 18 14    < > = values in this interval not displayed.       Hemoglobin A1C   Date Value Ref Range Status   10/10/2024 6.8 (H) 4.0 - 5.6 % Final     Comment:     ADA Screening Guidelines:  5.7-6.4%  Consistent with prediabetes  >or=6.5%  Consistent with diabetes    High levels of fetal hemoglobin interfere with the HbA1C  assay. Heterozygous hemoglobin variants (HbS, HgC, etc)do  not significantly interfere with this assay.   However, presence of multiple variants may affect accuracy.     06/17/2024 6.2 (H) 4.0 - 5.6 % Final     Comment:     ADA Screening Guidelines:  5.7-6.4%  Consistent with prediabetes  >or=6.5%  Consistent with diabetes    High levels of fetal hemoglobin interfere with the HbA1C  assay. Heterozygous hemoglobin variants (HbS, HgC, etc)do  not significantly interfere with this assay.   However, presence of multiple variants may affect accuracy.     04/30/2024 6.8 (H) 4.0 - 5.6 % Final     Comment:     ADA Screening  Guidelines:  5.7-6.4%  Consistent with prediabetes  >or=6.5%  Consistent with diabetes    High levels of fetal hemoglobin interfere with the HbA1C  assay. Heterozygous hemoglobin variants (HbS, HgC, etc)do  not significantly interfere with this assay.   However, presence of multiple variants may affect accuracy.         TSH  Recent Labs   Lab 08/10/23  0738 06/17/24  0407 10/11/24  0604   TSH 1.125 0.593 0.492       The ASCVD Risk score (Ana GARCIA, et al., 2019) failed to calculate for the following reasons:    Risk score cannot be calculated because patient has a medical history suggesting prior/existing ASCVD       BNP    Lab Results   Component Value Date/Time     (H) 10/31/2024 07:17 PM     (H) 10/10/2024 12:23 PM     (H) 08/09/2023 08:46 PM     (H) 02/01/2019 06:22 AM    BNP 70 10/21/2016 10:55 PM            ECHO    Results for orders placed during the hospital encounter of 08/09/23    Echo    Interpretation Summary    Left Ventricle: Normal wall motion. There is normal systolic function with a visually estimated ejection fraction of 55 - 60%.    Left Atrium: Left atrium is severely dilated.    Right Ventricle: Normal right ventricular cavity size. Systolic function is normal.    Mitral Valve: There is moderate regurgitation with a posterolateral eccentriccally directed jet.    IVC/SVC: Intermediate venous pressure at 8 mmHg.      STRESS TEST    Results for orders placed during the hospital encounter of 08/09/23    Nuclear Stress - Cardiology Interpreted    Interpretation Summary    Abnormal myocardial perfusion scan.    There is a moderate to severe intensity, moderate sized, fixed perfusion abnormality in the lateral wall(s). Only resting images obtained as patient refused to complete test. Incomplete study.    There are no other significant perfusion abnormalities.    The ECG portion of the study is negative for ischemia.    The patient reported no chest pain during the stress  test.    There were no arrhythmias during stress.        CATH    No results found for this or any previous visit.    STRESS ECHO 2023:        Left Ventricle: Normal wall motion. There is normal systolic function with a visually estimated ejection fraction of 55 - 60%.    ECG Conclusion: The ECG portion of the study is negative for ischemia.    Post-stress Echo: The left ventricle systolic function is hyperdynamic.    Post-stress Impression: The study is normal.    ASSESSMENT AND PLAN     Patient Active Problem List   Diagnosis    History of hepatitis C, s/p successful hcv treatment w/ SVR 8-2015     Chronic hepatitis C    Type 2 diabetes mellitus, controlled    Essential hypertension    Tobacco abuse    Cirrhosis of liver without ascites    Abdominal pain    Depression    History of CVA (cerebrovascular accident)    Esophagitis    Dyslipidemia    GERD (gastroesophageal reflux disease)    Cirrhosis    Cannabis abuse    Hemiplegia and hemiparesis following cerebral infarction affecting right dominant side    Alcohol use disorder, severe, in sustained remission    Hepatic fibrosis, advanced fibrosis    History of liver biopsy    Aortic atherosclerosis    CAD (coronary artery disease)    Hypomagnesemia    Hypophosphatemia    Avascular necrosis of bone of hip, unspecified laterality    Depression, major, recurrent, moderate    Osteoarthritis    Insomnia    NSTEMI (non-ST elevated myocardial infarction)    Tobacco dependency    Chest pain    Gastroenteritis     Assessment & Plan    IMPRESSION:  Reviewed cardiovascular s/p-angiogram in October  50-60% stenosis in LAD and right coronary artery noted, not requiring new stents  Extremely elevated cholesterol levels, fluctuating between 267 and 124 mg/dL, are concerning  Initiated ezetimibe to current lipid-lowering regimen due to inadequate control with atorvastatin alone    PLAN SUMMARY:  - Lipid panel ordered in 3 months  - Started ezetimibe  - Continued atorvastatin  (Lipitor), aspirin, and clopidogrel (Plavix)  - Return visit in 3 months with lipid panel results  - Randald to bring all current medications to next visit    CORONARY ARTERY DISEASE:  - Continued atorvastatin (Lipitor), aspirin, and clopidogrel (Plavix).  -     was deemed Not CT surgery candidate because of liver cirrhosis.  Subsequent PCI done    HYPERLIPIDEMIA:  - Discussed potential complications of untreated HLD, including increased risk of heart attacks, strokes, and kidney issues.  - Started ezetimibe.  - Lipid panel ordered in 3 months.  Lab Results   Component Value Date    LDLCALC 194.4 (H) 10/10/2024      Hypertension: Continue current therapy     Aortic atherosclerosis: On statin and add ezetimibe    FOLLOW-UP:  - Randald to bring all current medications to next visit for review.  - Return visit with lipid panel results in 3 months.     This note was generated with the assistance of ambient listening technology. Verbal consent was obtained by the patient and accompanying visitor(s) for the recording of patient appointment to facilitate this note. I attest to having reviewed and edited the generated note for accuracy, though some syntax or spelling errors may persist. Please contact the author of this note for any clarification.      Thank you very much for involving me in the care of your patient.  Please do not hesitate to contact me if there are any questions.      Gabriela Rider MD, FAC, [facility]  Interventional Cardiologist, [facility].     Visit today included increased complexity associated with the care of the episodic problem  hypertension, dyslipidemia, coronary artery disease addressed and managing the longitudinal care of the patient due to the serious and/or complex managed problem(s)   Patient Active Problem List   Diagnosis    History of hepatitis C, s/p successful hcv treatment w/ SVR 8-2015     Chronic hepatitis C    Type 2 diabetes mellitus, controlled    Essential hypertension     Tobacco abuse    Cirrhosis of liver without ascites    Abdominal pain    Depression    History of CVA (cerebrovascular accident)    Esophagitis    Dyslipidemia    GERD (gastroesophageal reflux disease)    Cirrhosis    Cannabis abuse    Hemiplegia and hemiparesis following cerebral infarction affecting right dominant side    Alcohol use disorder, severe, in sustained remission    Hepatic fibrosis, advanced fibrosis    History of liver biopsy    Aortic atherosclerosis    CAD (coronary artery disease)    Hypomagnesemia    Hypophosphatemia    Avascular necrosis of bone of hip, unspecified laterality    Depression, major, recurrent, moderate    Osteoarthritis    Insomnia    NSTEMI (non-ST elevated myocardial infarction)    Tobacco dependency    Chest pain    Gastroenteritis     .        This note was dictated with the help of speech recognition software.  There might be un-intended errors and/or substitutions.

## 2025-01-14 ENCOUNTER — TELEPHONE (OUTPATIENT)
Dept: FAMILY MEDICINE | Facility: CLINIC | Age: 60
End: 2025-01-14
Payer: MEDICARE

## 2025-01-14 DIAGNOSIS — E11.9 TYPE 2 DIABETES MELLITUS WITHOUT COMPLICATION, WITHOUT LONG-TERM CURRENT USE OF INSULIN: Primary | ICD-10-CM

## 2025-01-14 DIAGNOSIS — E11.59 TYPE 2 DIABETES MELLITUS WITH OTHER CIRCULATORY COMPLICATION, WITHOUT LONG-TERM CURRENT USE OF INSULIN: Primary | ICD-10-CM

## 2025-01-17 ENCOUNTER — CLINICAL SUPPORT (OUTPATIENT)
Dept: FAMILY MEDICINE | Facility: CLINIC | Age: 60
End: 2025-01-17
Payer: MEDICARE

## 2025-01-17 VITALS — HEART RATE: 92 BPM | DIASTOLIC BLOOD PRESSURE: 70 MMHG | SYSTOLIC BLOOD PRESSURE: 136 MMHG

## 2025-01-17 DIAGNOSIS — I10 BENIGN ESSENTIAL HTN: Primary | ICD-10-CM

## 2025-01-17 PROCEDURE — 99999 PR PBB SHADOW E&M-EST. PATIENT-LVL I: CPT | Mod: PBBFAC,HCNC,,

## 2025-01-17 NOTE — PROGRESS NOTES
Nino Price 60 y.o. male is here today for Blood Pressure check.   History of HTN yes.    Review of patient's allergies indicates:   Allergen Reactions    Codeine Nausea Only     Creatinine   Date Value Ref Range Status   12/02/2024 1.1 0.5 - 1.4 mg/dL Final     Sodium   Date Value Ref Range Status   12/02/2024 142 136 - 145 mmol/L Final     Potassium   Date Value Ref Range Status   12/02/2024 4.1 3.5 - 5.1 mmol/L Final   ]  Patient verifies taking blood pressure medications on a regular basis at the same time of the day.     Current Outpatient Medications:     aspirin (ECOTRIN) 81 MG EC tablet, Take 1 tablet (81 mg total) by mouth once daily., Disp: 30 tablet, Rfl: 3    atorvastatin (LIPITOR) 80 MG tablet, Take 1 tablet (80 mg total) by mouth once daily., Disp: 30 tablet, Rfl: 2    buPROPion (WELLBUTRIN XL) 300 MG 24 hr tablet, Take 300 mg by mouth., Disp: , Rfl:     busPIRone (BUSPAR) 10 MG tablet, TAKE 1 TABLET BY MOUTH EVERY DAY IN THE EVENING, Disp: 90 tablet, Rfl: 1    clopidogreL (PLAVIX) 75 mg tablet, Take 1 tablet (75 mg total) by mouth once daily., Disp: 30 tablet, Rfl: 11    dicyclomine (BENTYL) 20 mg tablet, Take 1 tablet (20 mg total) by mouth 2 (two) times daily as needed (stomach pain)., Disp: 30 tablet, Rfl: 0    DULoxetine (CYMBALTA) 60 MG capsule, Take 60 mg by mouth once daily. Takes in pm, Disp: , Rfl:     ezetimibe (ZETIA) 10 mg tablet, Take 1 tablet (10 mg total) by mouth once daily., Disp: 90 tablet, Rfl: 3    gabapentin (NEURONTIN) 300 MG capsule, Take 300 mg by mouth 3 (three) times daily., Disp: , Rfl:     icosapent ethyL (VASCEPA) 1 gram Cap, Take 2 g by mouth., Disp: , Rfl:     lancing device Misc, AS DIRECTED TO CHECK BLOOD GLUCOSE TWICE DAILY, Disp: , Rfl:     lancing device with lancets Kit, To check blood glucose twice daily., Disp: 100 each, Rfl: 3    metFORMIN (GLUCOPHAGE) 1000 MG tablet, Take 1 tablet (1,000 mg total) by mouth 2 (two) times daily with meals., Disp: 180 tablet,  Rfl: 2    metoclopramide HCl (REGLAN) 10 MG tablet, Take 1 tablet (10 mg total) by mouth every 6 (six) hours., Disp: 30 tablet, Rfl: 0    mirtazapine (REMERON) 15 MG tablet, Take 15 mg by mouth every evening., Disp: , Rfl:     mirtazapine (REMERON) 45 MG tablet, Take 45 mg by mouth., Disp: , Rfl:     nitroGLYCERIN (NITROSTAT) 0.4 MG SL tablet, Place 1 tablet (0.4 mg total) under the tongue as needed for Chest pain., Disp: 25 tablet, Rfl: 0    ondansetron (ZOFRAN-ODT) 4 MG TbDL, Take 1 tablet (4 mg total) by mouth every 8 (eight) hours as needed (Nausea)., Disp: 10 tablet, Rfl: 0    oxyCODONE-acetaminophen (PERCOCET)  mg per tablet, Take 1 tablet by mouth every 6 (six) hours as needed., Disp: , Rfl:     pantoprazole (PROTONIX) 40 MG tablet, Take 1 tablet (40 mg total) by mouth once daily., Disp: 30 tablet, Rfl: 11    sucralfate (CARAFATE) 1 gram tablet, TAKE 1 TABLET(1 GRAM) BY MOUTH TWICE DAILY, Disp: 60 tablet, Rfl: 0    tiZANidine (ZANAFLEX) 4 MG tablet, Take 4 mg by mouth every evening., Disp: , Rfl:     tobramycin sulfate 0.3% (TOBREX) 0.3 % ophthalmic solution, 1-2 drops topically twice daily to affected toe(s)., Disp: 5 mL, Rfl: 3    traZODone (DESYREL) 100 MG tablet, Take 150 mg by mouth nightly as needed for Insomnia. 1.5 tab nightly as needed, Disp: , Rfl:     TRUE METRIX GLUCOSE METER Misc, , Disp: , Rfl:     TRUE METRIX GLUCOSE TEST STRIP Strp, TEST BLOOD GLUCOSE TWICE A DAY AS DIRECTED, Disp: 200 each, Rfl: 3    TRUEPLUS LANCETS 33 gauge Misc, TEST BLOOD GLUCOSE TWICE DAILY AS DIRECTED, Disp: 200 each, Rfl: 3    valsartan-hydrochlorothiazide (DIOVAN-HCT) 160-25 mg per tablet, Take 0.5 tablets by mouth once daily., Disp: 45 tablet, Rfl: 3  Does patient have record of home blood pressure readings yes. Readings have been averaging 124//97.   Last dose of blood pressure medication was taken at 9 Am.  Patient is asymptomatic at this time but does have occasional headache. Pain range  1-5.          Dr. Pryor informed of nurse visit.

## 2025-01-19 DIAGNOSIS — K52.9 COLITIS: ICD-10-CM

## 2025-01-22 RX ORDER — SUCRALFATE 1 G/1
1 TABLET ORAL 2 TIMES DAILY
Qty: 60 TABLET | Refills: 0 | Status: SHIPPED | OUTPATIENT
Start: 2025-01-22

## 2025-01-22 NOTE — TELEPHONE ENCOUNTER
Refill Routing Note   Medication(s) are not appropriate for processing by [facility] for the following reason(s):        Outside of protocol    [facility] action(s):  Route             Appointments  past 12m or future 3m with PCP    Date Provider   Last Visit   12/2/2024 Stevo Pryor MD   Next Visit   6/4/2025 Stevo Pryor MD   ED visits in past 90 days: 1        Note composed:9:29 AM 01/22/2025

## 2025-01-24 ENCOUNTER — TELEPHONE (OUTPATIENT)
Dept: ADMINISTRATIVE | Facility: HOSPITAL | Age: 60
End: 2025-01-24
Payer: MEDICARE

## 2025-01-24 DIAGNOSIS — K20.90 ESOPHAGITIS: ICD-10-CM

## 2025-01-24 RX ORDER — PANTOPRAZOLE SODIUM 40 MG/1
40 TABLET, DELAYED RELEASE ORAL
Qty: 90 TABLET | Refills: 3 | Status: SHIPPED | OUTPATIENT
Start: 2025-01-24

## 2025-01-24 NOTE — TELEPHONE ENCOUNTER
Spoke w/ patient , he is requesting a refill on his Sucralfate 1 gram to be sent to [facility] [phone].  Please advise.    The patient picked his last refill up at [facility] due to availability. He will contact [facility] to see if they have it before requesting the medication be sent there.

## 2025-02-14 ENCOUNTER — HOSPITAL ENCOUNTER (EMERGENCY)
Facility: HOSPITAL | Age: 60
Discharge: LEFT BEFORE DISCHARGE | End: 2025-02-14
Attending: EMERGENCY MEDICINE
Payer: MEDICARE

## 2025-02-14 VITALS
DIASTOLIC BLOOD PRESSURE: 91 MMHG | SYSTOLIC BLOOD PRESSURE: 179 MMHG | OXYGEN SATURATION: 98 % | BODY MASS INDEX: 24.59 KG/M2 | HEART RATE: 56 BPM | RESPIRATION RATE: 20 BRPM | HEIGHT: 64 IN | TEMPERATURE: 98 F | WEIGHT: 144 LBS

## 2025-02-14 DIAGNOSIS — R10.9 ABDOMINAL PAIN: ICD-10-CM

## 2025-02-14 DIAGNOSIS — K59.00 CONSTIPATION, UNSPECIFIED CONSTIPATION TYPE: ICD-10-CM

## 2025-02-14 DIAGNOSIS — R11.0 NAUSEA: Primary | ICD-10-CM

## 2025-02-14 DIAGNOSIS — N17.9 AKI (ACUTE KIDNEY INJURY): ICD-10-CM

## 2025-02-14 LAB
ALBUMIN SERPL BCP-MCNC: 4.7 G/DL (ref 3.5–5.2)
ALP SERPL-CCNC: 65 U/L (ref 40–150)
ALT SERPL W/O P-5'-P-CCNC: 14 U/L (ref 10–44)
ANION GAP SERPL CALC-SCNC: 18 MMOL/L (ref 8–16)
AST SERPL-CCNC: 20 U/L (ref 10–40)
BASOPHILS # BLD AUTO: 0.03 K/UL (ref 0–0.2)
BASOPHILS NFR BLD: 0.3 % (ref 0–1.9)
BILIRUB SERPL-MCNC: 0.6 MG/DL (ref 0.1–1)
BUN SERPL-MCNC: 43 MG/DL (ref 8–23)
CALCIUM SERPL-MCNC: 10.3 MG/DL (ref 8.7–10.5)
CHLORIDE SERPL-SCNC: 98 MMOL/L (ref 95–110)
CO2 SERPL-SCNC: 21 MMOL/L (ref 23–29)
CREAT SERPL-MCNC: 1.9 MG/DL (ref 0.5–1.4)
DIFFERENTIAL METHOD BLD: ABNORMAL
EOSINOPHIL # BLD AUTO: 0 K/UL (ref 0–0.5)
EOSINOPHIL NFR BLD: 0 % (ref 0–8)
ERYTHROCYTE [DISTWIDTH] IN BLOOD BY AUTOMATED COUNT: 12.7 % (ref 11.5–14.5)
EST. GFR  (NO RACE VARIABLE): 40 ML/MIN/1.73 M^2
ETHANOL SERPL-MCNC: <10 MG/DL
GLUCOSE SERPL-MCNC: 198 MG/DL (ref 70–110)
HCT VFR BLD AUTO: 50.8 % (ref 40–54)
HGB BLD-MCNC: 17.7 G/DL (ref 14–18)
IMM GRANULOCYTES # BLD AUTO: 0.04 K/UL (ref 0–0.04)
IMM GRANULOCYTES NFR BLD AUTO: 0.4 % (ref 0–0.5)
INR PPP: 1.1 (ref 0.8–1.2)
LIPASE SERPL-CCNC: 13 U/L (ref 4–60)
LYMPHOCYTES # BLD AUTO: 2 K/UL (ref 1–4.8)
LYMPHOCYTES NFR BLD: 18 % (ref 18–48)
MCH RBC QN AUTO: 31.5 PG (ref 27–31)
MCHC RBC AUTO-ENTMCNC: 34.8 G/DL (ref 32–36)
MCV RBC AUTO: 90 FL (ref 82–98)
MONOCYTES # BLD AUTO: 1 K/UL (ref 0.3–1)
MONOCYTES NFR BLD: 9.4 % (ref 4–15)
NEUTROPHILS # BLD AUTO: 7.9 K/UL (ref 1.8–7.7)
NEUTROPHILS NFR BLD: 71.9 % (ref 38–73)
NRBC BLD-RTO: 0 /100 WBC
PLATELET # BLD AUTO: 264 K/UL (ref 150–450)
PMV BLD AUTO: 9.8 FL (ref 9.2–12.9)
POTASSIUM SERPL-SCNC: 4.6 MMOL/L (ref 3.5–5.1)
PROT SERPL-MCNC: 8.8 G/DL (ref 6–8.4)
PROTHROMBIN TIME: 12.4 SEC (ref 9–12.5)
RBC # BLD AUTO: 5.62 M/UL (ref 4.6–6.2)
SODIUM SERPL-SCNC: 137 MMOL/L (ref 136–145)
WBC # BLD AUTO: 10.96 K/UL (ref 3.9–12.7)

## 2025-02-14 PROCEDURE — 99285 EMERGENCY DEPT VISIT HI MDM: CPT | Mod: 25,HCNC

## 2025-02-14 PROCEDURE — 93005 ELECTROCARDIOGRAM TRACING: CPT | Mod: HCNC

## 2025-02-14 PROCEDURE — 96374 THER/PROPH/DIAG INJ IV PUSH: CPT | Mod: HCNC

## 2025-02-14 PROCEDURE — 83690 ASSAY OF LIPASE: CPT | Mod: HCNC | Performed by: STUDENT IN AN ORGANIZED HEALTH CARE EDUCATION/TRAINING PROGRAM

## 2025-02-14 PROCEDURE — 85025 COMPLETE CBC W/AUTO DIFF WBC: CPT | Mod: HCNC | Performed by: STUDENT IN AN ORGANIZED HEALTH CARE EDUCATION/TRAINING PROGRAM

## 2025-02-14 PROCEDURE — 85610 PROTHROMBIN TIME: CPT | Mod: HCNC | Performed by: PHYSICIAN ASSISTANT

## 2025-02-14 PROCEDURE — 25000003 PHARM REV CODE 250: Mod: HCNC | Performed by: PHYSICIAN ASSISTANT

## 2025-02-14 PROCEDURE — 96372 THER/PROPH/DIAG INJ SC/IM: CPT | Performed by: PHYSICIAN ASSISTANT

## 2025-02-14 PROCEDURE — 63600175 PHARM REV CODE 636 W HCPCS: Mod: HCNC | Performed by: PHYSICIAN ASSISTANT

## 2025-02-14 PROCEDURE — 82077 ASSAY SPEC XCP UR&BREATH IA: CPT | Mod: HCNC | Performed by: PHYSICIAN ASSISTANT

## 2025-02-14 PROCEDURE — 80053 COMPREHEN METABOLIC PANEL: CPT | Mod: HCNC | Performed by: STUDENT IN AN ORGANIZED HEALTH CARE EDUCATION/TRAINING PROGRAM

## 2025-02-14 RX ORDER — FAMOTIDINE 10 MG/ML
20 INJECTION INTRAVENOUS
Status: DISCONTINUED | OUTPATIENT
Start: 2025-02-14 | End: 2025-02-14

## 2025-02-14 RX ORDER — DIPHENHYDRAMINE HCL 50 MG
50 CAPSULE ORAL EVERY 6 HOURS PRN
Qty: 30 CAPSULE | Refills: 0 | Status: SHIPPED | OUTPATIENT
Start: 2025-02-14

## 2025-02-14 RX ORDER — METOCLOPRAMIDE 10 MG/1
10 TABLET ORAL
Status: COMPLETED | OUTPATIENT
Start: 2025-02-14 | End: 2025-02-14

## 2025-02-14 RX ORDER — DIPHENHYDRAMINE HYDROCHLORIDE 50 MG/ML
50 INJECTION INTRAMUSCULAR; INTRAVENOUS
Status: COMPLETED | OUTPATIENT
Start: 2025-02-14 | End: 2025-02-14

## 2025-02-14 RX ORDER — DIPHENHYDRAMINE HYDROCHLORIDE 50 MG/ML
50 INJECTION INTRAMUSCULAR; INTRAVENOUS
Status: DISCONTINUED | OUTPATIENT
Start: 2025-02-14 | End: 2025-02-14

## 2025-02-14 RX ORDER — ONDANSETRON HYDROCHLORIDE 2 MG/ML
4 INJECTION, SOLUTION INTRAVENOUS
Status: DISCONTINUED | OUTPATIENT
Start: 2025-02-14 | End: 2025-02-14

## 2025-02-14 RX ORDER — METOCLOPRAMIDE 10 MG/1
5 TABLET ORAL EVERY 6 HOURS PRN
Qty: 30 TABLET | Refills: 0 | Status: SHIPPED | OUTPATIENT
Start: 2025-02-14

## 2025-02-14 RX ORDER — DICYCLOMINE HYDROCHLORIDE 10 MG/ML
20 INJECTION INTRAMUSCULAR
Status: COMPLETED | OUTPATIENT
Start: 2025-02-14 | End: 2025-02-14

## 2025-02-14 RX ORDER — DICYCLOMINE HYDROCHLORIDE 20 MG/1
20 TABLET ORAL 4 TIMES DAILY PRN
Qty: 28 TABLET | Refills: 0 | Status: SHIPPED | OUTPATIENT
Start: 2025-02-14 | End: 2025-02-21

## 2025-02-14 RX ORDER — ALUMINUM HYDROXIDE, MAGNESIUM HYDROXIDE, AND SIMETHICONE 1200; 120; 1200 MG/30ML; MG/30ML; MG/30ML
30 SUSPENSION ORAL ONCE
Status: COMPLETED | OUTPATIENT
Start: 2025-02-14 | End: 2025-02-14

## 2025-02-14 RX ORDER — LIDOCAINE HYDROCHLORIDE 20 MG/ML
15 SOLUTION OROPHARYNGEAL ONCE
Status: COMPLETED | OUTPATIENT
Start: 2025-02-14 | End: 2025-02-14

## 2025-02-14 RX ADMIN — LIDOCAINE HYDROCHLORIDE 15 ML: 20 SOLUTION ORAL at 02:02

## 2025-02-14 RX ADMIN — DICYCLOMINE HYDROCHLORIDE 20 MG: 10 INJECTION INTRAMUSCULAR at 02:02

## 2025-02-14 RX ADMIN — DIPHENHYDRAMINE HYDROCHLORIDE 50 MG: 50 INJECTION INTRAMUSCULAR; INTRAVENOUS at 02:02

## 2025-02-14 RX ADMIN — ALUMINUM HYDROXIDE, MAGNESIUM HYDROXIDE, AND SIMETHICONE 30 ML: 1200; 120; 1200 SUSPENSION ORAL at 02:02

## 2025-02-14 RX ADMIN — METOCLOPRAMIDE 10 MG: 10 TABLET ORAL at 02:02

## 2025-02-14 NOTE — Clinical Note
"Nino Lópezkelvin Price was seen and treated in our emergency department on 2/14/2025.  He may return to work on 02/17/2025.       If you have any questions or concerns, please don't hesitate to call.      Ness Deshpande PA-C"

## 2025-02-14 NOTE — DISCHARGE INSTRUCTIONS
We discussed that we wanted to give you IV fluids because of dehydration that is affecting your kidneys. You declined IV fluids today and were able to drink water and agreed to drink more fluids and follow up with your primary care. Please return to ER immediately for persisting symptoms, worsening or inability to tolerate by mouth.     Thank you for coming to our Emergency Department today. It is important to remember that some problems are difficult to diagnose and may not be found during your Emergency Department visit. Be sure to follow up with your primary care doctor and review all labs/imaging/tests that were performed during this visit with them. Some labs/tests may be outside of the normal range and require non-emergent follow-up and further investigation to help diagnose/exclude/prevent complications or other medical conditions.    If you do not have a primary care doctor, you may contact the one listed on your discharge paperwork or you may also call the [facility] Appointment Desk at [phone] to schedule an appointment and establish care with one. It is important to your health that you have a primary care doctor.  Follow up with GI for further evaluation and management of your abdominal pain.     Please take all medications as directed. All medications may potentially have side-effects and it is impossible to predict which medications may give you side-effects or what side-effects (if any) they will give you.. If you feel that you are having a negative effect or side-effect of any medication you should immediately stop taking them and seek medical attention. If you feel that you are having a life-threatening reaction call 911.    Return to the ER with any questions/concerns, new/concerning symptoms, worsening or failure to improve.     Do not drive, swim, climb to height, take a bath or make any important decisions for 24 hours if you have received any pain medications, sedatives or mood  altering drugs during your ER visit.

## 2025-02-15 NOTE — ED PROVIDER NOTES
"Encounter Date: 2/14/2025       History     Chief Complaint   Patient presents with    Abdominal Pain     Pt arrived in ED, c/o abd pain x 3 days. Pt denies any urinary symptoms. Pt denies any CP, SOB, or n/v/d     CC: Abdominal Pain    HPI:   61 y/o M with history of HTN DM asthma anxiety colitis and esophagitis Hep C s/p treatment CVA presenting for evaluation of 3 day history of abdominal pain. History limited due to pt being angry and aggressive during history and yelling "I've been having this for 3 days." Pt states he has had this happen before but will not disclose when and what     Had nausea and vomiting x 1 at onset of abdominal pain. Reports he has been constipated. Denies melena, hematochezia  Denies fever, chills, dysuria, urinary urgency or frequency, CP SOB dizziness lightheadedness.         The history is provided by the patient.     Review of patient's allergies indicates:   Allergen Reactions    Codeine Nausea Only     Past Medical History:   Diagnosis Date    Anxiety     Asthma     Colitis     Depression     Diabetes mellitus     Head injury     History of hepatitis C, s/p successful hcv treatment w/ SVR 8-2015  10/8/2012    SVR(24) as of 11/2015.  SVR(12) as of 8/2015.  completed harvoni 24 week regimen for genotype 1a 5/18/15     Hypertension     Shoulder pain     left, s/p 4 surgeries.     Stroke      Past Surgical History:   Procedure Laterality Date    ANGIOGRAM, CORONARY, WITH LEFT HEART CATHETERIZATION Left 6/17/2024    Procedure: Angiogram, Coronary, with Left Heart Cath;  Surgeon: Gabriela Rider MD;  Location: [facility] CATH LAB;  Service: Cardiology;  Laterality: Left;  12pm start, R rad access    COLONOSCOPY N/A 10/20/2015    Procedure: COLONOSCOPY;  Surgeon: Jagdish Patricia MD;  Location: [facility] ENDO;  Service: Endoscopy;  Laterality: N/A;    COLONOSCOPY N/A 1/26/2024    Procedure: COLONOSCOPY;  Surgeon: Pricila Shanks MD;  Location: [facility] ENDO;  Service: Endoscopy;  Laterality: N/A;    " CORONARY STENT PLACEMENT N/A 6/18/2024    Procedure: INSERTION, STENT, CORONARY ARTERY;  Surgeon: Gabriela Rider MD;  Location: [facility] CATH LAB;  Service: Cardiology;  Laterality: N/A;  lcx pci. radial. lbu 3.5. 12 pm access    CORONARY STENT PLACEMENT N/A 8/28/2024    Procedure: INSERTION, STENT, CORONARY ARTERY;  Surgeon: Gabriela Rider MD;  Location: [facility] CATH LAB;  Service: Cardiology;  Laterality: N/A;  radial access. pci lad. have shockwave ready  RN PREOP 8/16/2024    DIAGNOSTIC LAPAROSCOPY N/A 4/1/2021    Procedure: LAPAROSCOPY, DIAGNOSTIC;  Surgeon: Umesh Vallecillo MD;  Location: [facility] OR;  Service: General;  Laterality: N/A;  RN PREOP 3/29/21, COVID NEGATIVE ON 3/29  CONSENT AND H/P INCOMPLETE    ESOPHAGOGASTRODUODENOSCOPY N/A 1/26/2024    Procedure: EGD (ESOPHAGOGASTRODUODENOSCOPY);  Surgeon: Pricila Shanks MD;  Location: [facility] ENDO;  Service: Endoscopy;  Laterality: N/A;  prep instructions mailed to pt / [facility] pt / Urgent/suprep  1/19- lvm for pc. DBM  1/23- left 2nd vm for pc. DBM  1/24 cirrohsis-labs prior, precall complete-st    GALLBLADDER SURGERY      pt not 100% if gall bladder removed    IVUS, CORONARY  6/18/2024    Procedure: IVUS, Coronary;  Surgeon: Gabriela Rider MD;  Location: [facility] CATH LAB;  Service: Cardiology;;    LEFT HEART CATHETERIZATION Left 10/11/2024    Procedure: Left heart cath;  Surgeon: Gabriela Rider MD;  Location: [facility] CATH LAB;  Service: Cardiology;  Laterality: Left;    PERCUTANEOUS CORONARY INTERVENTION, ARTERY N/A 6/18/2024    Procedure: Percutaneous coronary intervention;  Surgeon: Gabriela Rider MD;  Location: [facility] CATH LAB;  Service: Cardiology;  Laterality: N/A;    rotator cuff      left side     Family History   Problem Relation Name Age of Onset    Hypertension Mother      Heart disease Father      Cirrhosis Brother      Colon cancer Neg Hx      Esophageal cancer Neg Hx       Social History     Tobacco Use    Smoking status: Every Day     Current  packs/day: 1.00     Average packs/day: 1 pack/day for 35.0 years (35.0 ttl pk-yrs)     Types: Cigarettes     Passive exposure: Current    Smokeless tobacco: Never    Tobacco comments:     Quitting on his own, reports that he's down 4 to 5 cig a day      Patient will try the smoking program vs    Substance Use Topics    Alcohol use: No    Drug use: Yes     Frequency: 7.0 times per week     Types: Marijuana, Oxycodone     Comment: Majiuan once a week/ Oxy 3-4 tabs daily     Review of Systems   Constitutional:  Negative for chills and fever.   HENT:  Negative for congestion, ear pain, rhinorrhea and sore throat.    Eyes:  Negative for redness.   Respiratory:  Negative for shortness of breath and stridor.    Cardiovascular:  Negative for chest pain.   Gastrointestinal:  Positive for abdominal pain, constipation, nausea and vomiting. Negative for blood in stool and diarrhea.   Genitourinary:  Negative for dysuria, frequency, hematuria and urgency.   Musculoskeletal:  Negative for back pain and neck pain.   Skin:  Negative for rash.   Neurological:  Negative for dizziness, speech difficulty, weakness, light-headedness and numbness.   Hematological:  Does not bruise/bleed easily.   Psychiatric/Behavioral:  Negative for confusion.        Physical Exam     Initial Vitals [02/14/25 1251]   BP Pulse Resp Temp SpO2   (!) 183/98 (!) 53 20 98.2 °F (36.8 °C) 98 %      MAP       --         Physical Exam    Nursing note and vitals reviewed.  Constitutional: He appears well-developed and well-nourished.  Non-toxic appearance. He does not have a sickly appearance. No distress.   HENT:   Head: Normocephalic.   Right Ear: Hearing and external ear normal.   Left Ear: Hearing and external ear normal.   Nose: Nose normal. Mouth/Throat: Oropharynx is clear and moist. No trismus in the jaw. No oropharyngeal exudate, posterior oropharyngeal edema or posterior oropharyngeal erythema.   Eyes: Conjunctivae and EOM are normal.   Neck: Neck  supple. No tracheal deviation present.   Normal range of motion.  Cardiovascular:  Regular rhythm.   Bradycardia present.   Exam reveals no gallop and no friction rub.       No murmur heard.  Pulmonary/Chest: Breath sounds normal. No tachypnea and no bradypnea. No respiratory distress. He has no wheezes. He has no rhonchi. He has no rales.   Abdominal: Abdomen is soft. Bowel sounds are normal. He exhibits no distension. There is abdominal tenderness in the right upper quadrant and epigastric area.   Hunched over in fetal position on bed     No right CVA tenderness.  No left CVA tenderness. There is no rebound, no guarding, no tenderness at McBurney's point and negative Self's sign. negative Rovsing's sign  Musculoskeletal:         General: Normal range of motion.      Cervical back: Normal range of motion and neck supple.     Lymphadenopathy:     He has no cervical adenopathy.   Neurological: He is alert and oriented to person, place, and time. GCS eye subscore is 4. GCS verbal subscore is 5. GCS motor subscore is 6.   Skin: Skin is warm and dry. No rash noted.   Psychiatric: His speech is normal. Judgment and thought content normal. His affect is angry. He is aggressive. Cognition and memory are normal. He expresses no homicidal and no suicidal ideation. He expresses no suicidal plans and no homicidal plans.         ED Course   Procedures  Labs Reviewed   CBC W/ AUTO DIFFERENTIAL - Abnormal       Result Value    WBC 10.96      RBC 5.62      Hemoglobin 17.7      Hematocrit 50.8      MCV 90      MCH 31.5 (*)     MCHC 34.8      RDW 12.7      Platelets 264      MPV 9.8      Immature Granulocytes 0.4      Gran # (ANC) 7.9 (*)     Immature Grans (Abs) 0.04      Lymph # 2.0      Mono # 1.0      Eos # 0.0      Baso # 0.03      nRBC 0      Gran % 71.9      Lymph % 18.0      Mono % 9.4      Eosinophil % 0.0      Basophil % 0.3      Differential Method Automated     COMPREHENSIVE METABOLIC PANEL - Abnormal    Sodium 137       Potassium 4.6      Chloride 98      CO2 21 (*)     Glucose 198 (*)     BUN 43 (*)     Creatinine 1.9 (*)     Calcium 10.3      Total Protein 8.8 (*)     Albumin 4.7      Total Bilirubin 0.6      Alkaline Phosphatase 65      AST 20      ALT 14      eGFR 40 (*)     Anion Gap 18 (*)    LIPASE    Lipase 13     PROTIME-INR    Prothrombin Time 12.4      INR 1.1     ALCOHOL,MEDICAL (ETHANOL)    Alcohol, Serum <10            Imaging Results              CT Abdomen Pelvis  Without Contrast (Final result)  Result time 02/14/25 15:33:48      Final result by Aneudy Hamilton MD (02/14/25 15:33:48)                   Impression:      1. No findings to suggest obstructive uropathy.  2. There is colonic diverticulosis noting there is some thickening of the sigmoid colon.  No surrounding inflammation.  This may reflect sequela of prior infectious or inflammatory process however correlation with colonoscopy recommended if not previously performed.  3. Hepatomegaly, correlation with LFTs recommended.  4. Please see above for several additional findings.      Electronically signed by: Aneudy Hamilton MD  Date:    02/14/2025  Time:    15:33               Narrative:    EXAMINATION:  CT ABDOMEN PELVIS WITHOUT CONTRAST    CLINICAL HISTORY:  Abdominal abscess/infection suspected;Epigastric pain;    TECHNIQUE:  Low dose axial images, sagittal and coronal reformations were obtained from the lung bases to the pubic symphysis.  Oral contrast was not administered.    COMPARISON:  CT 10/31/2024    FINDINGS:  Images of the lower thorax are remarkable for bilateral dependent atelectasis.    The liver is prominent, correlation with LFTs recommended.  The spleen, pancreas, gallbladder and adrenal glands are grossly unremarkable.  There is no biliary dilation or ascites.  There is liquid content within the distal esophagus, suggesting sequela of gastroesophageal reflux.  The stomach is nondistended, no gastric wall thickening.  No significant  abdominal lymphadenopathy.    The kidneys have a grossly unremarkable noncontrast appearance without hydronephrosis or nephrolithiasis.  The bilateral ureters are unremarkable without calculi seen.  The urinary bladder is unremarkable.  The prostate is enlarged.    There are several scattered colonic diverticula without surrounding inflammation to suggest diverticulitis.  The terminal ileum is unremarkable.  There is surgical change of appendectomy.  The small bowel is grossly unremarkable.  There are a few scattered shotty periaortic, pericaval, and mesenteric lymph nodes.  There is atherosclerotic calcification of the aorta and its branches.    There are changes of bilateral femoral head AVN.  There are degenerative changes of the spine.  No significant inguinal lymphadenopathy.                                       Medications   aluminum-magnesium hydroxide-simethicone 200-200-20 mg/5 mL suspension 30 mL (30 mLs Oral Given 2/14/25 1443)     And   LIDOcaine viscous HCl 2% oral solution 15 mL (15 mLs Oral Given 2/14/25 1443)   dicyclomine injection 20 mg (20 mg Intramuscular Given 2/14/25 1443)   diphenhydrAMINE injection 50 mg (50 mg Intravenous Given 2/14/25 1443)   metoclopramide HCl tablet 10 mg (10 mg Oral Given 2/14/25 1443)     Medical Decision Making  61 y/o M with 3 day history of abdominal pain with nausea constipation.  Initially had vomiting.  Patient reports he has never had EGD or colonoscopy.  Patient states he had similar episode previously but will not tell me what he was diagnosed with that time.  Chart review shows that he was diagnosed with colitis and esophagitis previously.  Patient is afebrile, increase in aggressive on initial examination.  Has right upper quadrant epigastric tenderness.  No peritoneal signs.  Initially IV was ordered however patient made aggressive gesture at [facility] Diallo when she attempted to start the IV. Patient is given Bentyl IM, Reglan p.o., Benadryl IM and GI  cocktail.  He is tolerating p.o. and feeling better.CBC with no WBC or significant anemia. Lipase normal doubt pancreatitis. Etoh normal. Discussed labs show KEYA and I would like to start IV fluids for rehydration. Pt declined AMA and refused IV fluids but agrees to increase fluid intake.He is drinking water without difficulty. I asked him to speak with his daughter when she arrives to inform her of the lab findings however unfortunately he left AMA prior to me being able to do so. Urged him to follow up in 1-2 days with primary care for repeat labs. CT shows diverticulosis with thickening of sigmoid colon. May be sequelae of prior infection/inflammation. No wbc or fever. Doubt acute bacterial process at this time. Will need c-scope. GI referral placed. Discussed with Dr. Lieberman who also evaluated pt face to face and agrees with assessment and plan.      Amount and/or Complexity of Data Reviewed  Labs: ordered.  Radiology: ordered.    Risk  OTC drugs.  Prescription drug management.                                    Clinical Impression:  Final diagnoses:  [R10.9] Abdominal pain  [R11.0] Nausea (Primary)  [K59.00] Constipation, unspecified constipation type  [N17.9] KEYA (acute kidney injury)          ED Disposition Condition    AMA Stable                Ness Deshpande PA-C  02/15/25 0938

## 2025-02-18 LAB
OHS QRS DURATION: 82 MS
OHS QTC CALCULATION: 409 MS

## 2025-02-22 DIAGNOSIS — Z00.00 ENCOUNTER FOR MEDICARE ANNUAL WELLNESS EXAM: ICD-10-CM

## 2025-03-06 ENCOUNTER — PATIENT OUTREACH (OUTPATIENT)
Dept: ADMINISTRATIVE | Facility: HOSPITAL | Age: 60
End: 2025-03-06
Payer: MEDICARE

## 2025-04-04 ENCOUNTER — LAB VISIT (OUTPATIENT)
Dept: LAB | Facility: HOSPITAL | Age: 60
End: 2025-04-04
Attending: INTERNAL MEDICINE
Payer: MEDICARE

## 2025-04-04 ENCOUNTER — OFFICE VISIT (OUTPATIENT)
Dept: CARDIOLOGY | Facility: CLINIC | Age: 60
End: 2025-04-04
Payer: MEDICARE

## 2025-04-04 VITALS
DIASTOLIC BLOOD PRESSURE: 80 MMHG | OXYGEN SATURATION: 96 % | HEART RATE: 91 BPM | RESPIRATION RATE: 18 BRPM | BODY MASS INDEX: 23.68 KG/M2 | HEIGHT: 64 IN | SYSTOLIC BLOOD PRESSURE: 137 MMHG | WEIGHT: 138.69 LBS

## 2025-04-04 DIAGNOSIS — E78.5 DYSLIPIDEMIA: Primary | ICD-10-CM

## 2025-04-04 DIAGNOSIS — I70.0 AORTIC ATHEROSCLEROSIS: ICD-10-CM

## 2025-04-04 DIAGNOSIS — I21.4 NON-ST ELEVATION MYOCARDIAL INFARCTION (NSTEMI): ICD-10-CM

## 2025-04-04 DIAGNOSIS — E78.5 DYSLIPIDEMIA: ICD-10-CM

## 2025-04-04 DIAGNOSIS — I25.118 CORONARY ARTERY DISEASE OF NATIVE ARTERY OF NATIVE HEART WITH STABLE ANGINA PECTORIS: ICD-10-CM

## 2025-04-04 DIAGNOSIS — I10 HYPERTENSION, UNSPECIFIED TYPE: ICD-10-CM

## 2025-04-04 LAB
CHOLEST SERPL-MCNC: 212 MG/DL (ref 120–199)
CHOLEST/HDLC SERPL: 6.2 {RATIO} (ref 2–5)
HDLC SERPL-MCNC: 34 MG/DL (ref 40–75)
HDLC SERPL: 16 % (ref 20–50)
LDLC SERPL CALC-MCNC: 151.6 MG/DL (ref 63–159)
NONHDLC SERPL-MCNC: 178 MG/DL
TRIGL SERPL-MCNC: 132 MG/DL (ref 30–150)

## 2025-04-04 PROCEDURE — 36415 COLL VENOUS BLD VENIPUNCTURE: CPT | Mod: HCNC

## 2025-04-04 PROCEDURE — 99999 PR PBB SHADOW E&M-EST. PATIENT-LVL III: CPT | Mod: PBBFAC,HCNC,, | Performed by: INTERNAL MEDICINE

## 2025-04-04 PROCEDURE — 83718 ASSAY OF LIPOPROTEIN: CPT | Mod: HCNC

## 2025-04-04 NOTE — PROGRESS NOTES
CARDIOVASCULAR CONSULTATION    REASON FOR CONSULT:   Nino Price is a 60 y.o. male who presents for   Chief Complaint   Patient presents with    Follow-up          HISTORY OF PRESENT ILLNESS:     Patient here for follow-up after recent hospital stay for chest pain.  Underwent stress echocardiogram which did not show any significant ischemia.  Has been chest pain-free.  Denies orthopnea, PND, swelling of feet.    From August 24: Patient here for follow-up.  Occasional chest pains.  Better than before PCI of circumflex    Results for orders placed or performed during the hospital encounter of 02/14/25   EKG 12-lead    Collection Time: 02/14/25  3:26 PM   Result Value Ref Range    QRS Duration 82 ms    [facility]HS QTC Calculation 409 ms    Narrative    Test Reason : R10.9,    Vent. Rate : 106 BPM     Atrial Rate : 106 BPM     P-R Int : 120 ms          QRS Dur :  82 ms      QT Int : 308 ms       P-R-T Axes :  76  15  46 degrees    QTcB Int : 409 ms    Sinus tachycardia with frequent Premature ventricular complexes in a  pattern of bigeminy  Right atrial enlargement  Possible Inferior infarct ,age undetermined  Abnormal ECG  No previous ECGs available  Confirmed by Robin Toro ([id number]) on 2/18/2025 4:10:31 PM    Referred By: AAAREFERRAL SELF           Confirmed By: Robin Toro       Results for orders placed during the hospital encounter of 06/16/24    Echo    Interpretation Summary    Left Ventricle: The left ventricle is normal in size. Normal wall thickness. Regional wall motion abnormalities present (canot exclude mild posterolateral HK). There is low normal systolic function with a visually estimated ejection fraction of 50 - 55%. Grade I diastolic dysfunction.    Right Ventricle: Normal right ventricular cavity size. Systolic function is normal.      Results for orders placed during the hospital encounter of 08/09/23    Nuclear Stress - Cardiology Interpreted    Interpretation Summary    Abnormal myocardial perfusion  scan.    There is a moderate to severe intensity, moderate sized, fixed perfusion abnormality in the lateral wall(s). Only resting images obtained as patient refused to complete test. Incomplete study.    There are no other significant perfusion abnormalities.    The ECG portion of the study is negative for ischemia.    The patient reported no chest pain during the stress test.    There were no arrhythmias during stress.      Results for orders placed during the hospital encounter of 06/16/24    Cardiac catheterization    Conclusion    Successful IVUS guided PCI of circumflex into OM1 with excellent angiographic results.  IVUS post PCI revealed well-expanded and apposed stent.    Prox Cx lesion: A STENT SYNERGY XD 3.0X48MM  at 12 KENDRA for 12 sec.    The Prox Cx lesion was 95% stenosed with 0% stenosis post-intervention.    IVUS left main showed Kacie of 7.6 millimeter sq in the proximal and ostial left main    The estimated blood loss was <50 mL.    Procedures performed:    1. Coronary angiogram    2. PCI of proximal circ into OM1    3. IVUS of circumflex    4. IVUS of left main      Coronary angiogram:    Left main: Proximal 40 to 50% stenosis.  IVUS done.  MLA in the proximal segment is 7.6 millimeter sq    Lad:  Proximal to mid 80% stenosis.  Will stage PCI    Circumflex: Culprit vessel.  Severe 95% stenosis.  Successful PCI done    RCA not studed today    Access: Right radial artery access    Assessment and plan    Continue aspirin 81 mg daily indefinitely    Plavix 70 mg daily uninterrupted for at least 1 year and longer if no contraindications    Statins with goal LDL less than 70    Smoking cessation    Staged PCI of LAD    Follow-up in Cardiology Clinic with Dr. Rider      The procedure log was documented by Documenter: Deni Fletcher RN and verified by Gabriela Rider MD.    Date: 6/18/2024  Time: 12:54 PM    Notes from September 24: Patient here for follow-up.  No complications from angiogram.  Shortness of  breath and chest pain better.  Trying to cut down smoking.  Unfortunately still smokes about 8 cigarettes a day    Results for orders placed during the hospital encounter of 08/28/24    Cardiac catheterization    Conclusion    The Prox LAD lesion was 90% stenosed with 0% stenosis post-intervention.    Prox LAD lesion: A STENT SYNERGY XD 3.0X38MM stent was successfully placed at 12 KENDRA for 23 sec.    The estimated blood loss was <50 mL.    Successful IVUS guided PCI of heavily calcified prox to mid LAD with JUSTIN with excellent angiographic results.  Shock wave lithotripsy performed prior to stent implantation because of heavily calcified lesion    Procedures performed    1. Coronary angiography    2. Shock wave lithotripsy of prox to mid LAD (CPT +00451)    3. BALLOON ANGIOPLASTY AND PCI OF LAD WITH JUSTIN    ACCESS: RIGHT RADIAL ARTERY ACCESS    ASSESSMENT PLAN    CONTINUE ASPIRIN 81 MG DAILY INDEFINITELY AND PLAVIX 70 MG DAILY UNINTERRUPTED FOR AT LEAST 1 YEAR AND LONGER IF NO CONTRAINDICATIONS              The procedure log was documented by Documenter: Yamileth Alfonso RVT and verified by Gabriela Rider MD.    Date: 8/28/2024  Time: 12:59 PM    January 2025    History of Present Illness    CHIEF COMPLAINT:  Nino presents today for follow-up regarding back and shoulder pain.    MUSCULOSKELETAL:  He reports chronic back and shoulder pain with no new changes in symptoms.    GASTROINTESTINAL:  He reports constant stomach rumbling and pain.    HYPERLIPIDEMIA:  His cholesterol was 264 in October.    MEDICATIONS:  He takes aspirin, Plavix, and Lipitor daily. He reports uncertainty about taking atorvastatin regularly.         Results for orders placed or performed during the hospital encounter of 02/14/25   EKG 12-lead    Collection Time: 02/14/25  3:26 PM   Result Value Ref Range    QRS Duration 82 ms    OHS QTC Calculation 409 ms    Narrative    Test Reason : R10.9,    Vent. Rate : 106 BPM     Atrial Rate : 106  BPM     P-R Int : 120 ms          QRS Dur :  82 ms      QT Int : 308 ms       P-R-T Axes :  76  15  46 degrees    QTcB Int : 409 ms    Sinus tachycardia with frequent Premature ventricular complexes in a  pattern of bigeminy  Right atrial enlargement  Possible Inferior infarct ,age undetermined  Abnormal ECG  No previous ECGs available  Confirmed by Robin Toro ([id number]) on 2/18/2025 4:10:31 PM    Referred By: AAAREFERRAL SELF           Confirmed By: Robin Toro       Results for orders placed during the hospital encounter of 10/10/24    Echo    Interpretation Summary    Left Ventricle: The left ventricle is normal in size. There is normal systolic function with a visually estimated ejection fraction of 55 - 60%. There is normal diastolic function.    Right Ventricle: Normal right ventricular cavity size. Systolic function is normal.    Left Atrium: Left atrium is mildly dilated.    Pulmonary Artery: The estimated pulmonary artery systolic pressure is 22 mmHg.    IVC/SVC: Normal venous pressure at 3 mmHg.      Results for orders placed during the hospital encounter of 08/09/23    Nuclear Stress - Cardiology Interpreted    Interpretation Summary    Abnormal myocardial perfusion scan.    There is a moderate to severe intensity, moderate sized, fixed perfusion abnormality in the lateral wall(s). Only resting images obtained as patient refused to complete test. Incomplete study.    There are no other significant perfusion abnormalities.    The ECG portion of the study is negative for ischemia.    The patient reported no chest pain during the stress test.    There were no arrhythmias during stress.      Results for orders placed during the hospital encounter of 10/10/24    Cardiac catheterization    Conclusion    Patent LAD and LCX stents.    The pre-procedure left ventricular end diastolic pressure was 12.    The estimated blood loss was <50 mL.    Main left main: Ostial 20% stenosis    Lad:  Recently placed LAD stent  widely patent.  Mid LAD 50-60% stenosis.  Diagonal 2 ostial 70% stenosis    Circumflex:  Recently placed prox circumflex stent widely patent.  Luminal irregularities in rest of circumflex    RCA:  50% mid and distal lesions noted.  PLB 50% stenosis.  PDA ostial 80% stenosis medical management ideal for these stenosis.    Access: Right radial artery access    Assessment plan    Aspirin 81 mg daily    Plavix 75 mg daily uninterrupted for at least 1 year    Statins        The procedure log was documented by Documenter: Shannon Love RN and verified by Gabriela Rider MD.    Date: 10/11/2024  Time: 12:30 PM      April 25:  History of Present Illness    CHIEF COMPLAINT:  Nino presents today for follow up    CHEST PAIN:  He reports two episodes of sharp shooting chest pain, each lasting 3 seconds and occurring 2 weeks apart. He distinguishes these episodes from his previous anginal pain that led to stent placement, describing his previous cardiac pain as chest pressure 'as if someone was sitting on his chest,' whereas these recent episodes were brief and sharp in nature.    MEDICAL HISTORY:  He has a history of elevated cholesterol and previous cardiac stent placement.    CURRENT MEDICATIONS:  He takes aspirin, Lipitor, Plavix, Zetia, and Valsartan HCTZ (half tablet daily) as prescribed.           PAST MEDICAL HISTORY:     Past Medical History:   Diagnosis Date    Anxiety     Asthma     Colitis     Depression     Diabetes mellitus     Head injury     History of hepatitis C, s/p successful hcv treatment w/ SVR 8-2015  10/8/2012    SVR(24) as of 11/2015.  SVR(12) as of 8/2015.  completed harvoni 24 week regimen for genotype 1a 5/18/15     Hypertension     Shoulder pain     left, s/p 4 surgeries.     Stroke        PAST SURGICAL HISTORY:     Past Surgical History:   Procedure Laterality Date    ANGIOGRAM, CORONARY, WITH LEFT HEART CATHETERIZATION Left 6/17/2024    Procedure: Angiogram, Coronary, with Left Heart Cath;   Surgeon: Gabriela Rider MD;  Location: [facility] CATH LAB;  Service: Cardiology;  Laterality: Left;  12pm start, R rad access    COLONOSCOPY N/A 10/20/2015    Procedure: COLONOSCOPY;  Surgeon: Jagdish Patricia MD;  Location: [facility] ENDO;  Service: Endoscopy;  Laterality: N/A;    COLONOSCOPY N/A 1/26/2024    Procedure: COLONOSCOPY;  Surgeon: Pricila Shanks MD;  Location: [facility] ENDO;  Service: Endoscopy;  Laterality: N/A;    CORONARY STENT PLACEMENT N/A 6/18/2024    Procedure: INSERTION, STENT, CORONARY ARTERY;  Surgeon: Gabriela Rider MD;  Location: [facility] CATH LAB;  Service: Cardiology;  Laterality: N/A;  lcx pci. radial. lbu 3.5. 12 pm access    CORONARY STENT PLACEMENT N/A 8/28/2024    Procedure: INSERTION, STENT, CORONARY ARTERY;  Surgeon: Gabriela Rider MD;  Location: [facility] CATH LAB;  Service: Cardiology;  Laterality: N/A;  radial access. pci lad. have shockwave ready  RN PREOP 8/16/2024    DIAGNOSTIC LAPAROSCOPY N/A 4/1/2021    Procedure: LAPAROSCOPY, DIAGNOSTIC;  Surgeon: Umesh Vallecillo MD;  Location: [facility] OR;  Service: General;  Laterality: N/A;  RN PREOP 3/29/21, COVID NEGATIVE ON 3/29  CONSENT AND H/P INCOMPLETE    ESOPHAGOGASTRODUODENOSCOPY N/A 1/26/2024    Procedure: EGD (ESOPHAGOGASTRODUODENOSCOPY);  Surgeon: Pricila Shanks MD;  Location: [facility] ENDO;  Service: Endoscopy;  Laterality: N/A;  prep instructions mailed to pt / [facility] pt / Urgent/suprep  1/19- lvm for pc. DBM  1/23- left 2nd vm for pc. DBM  1/24 cirrohsis-labs prior, precall complete-st    GALLBLADDER SURGERY      pt not 100% if gall bladder removed    IVUS, CORONARY  6/18/2024    Procedure: IVUS, Coronary;  Surgeon: Gabriela Rider MD;  Location: [facility] CATH LAB;  Service: Cardiology;;    LEFT HEART CATHETERIZATION Left 10/11/2024    Procedure: Left heart cath;  Surgeon: Gabriela Rider MD;  Location: [facility] CATH LAB;  Service: Cardiology;  Laterality: Left;    PERCUTANEOUS CORONARY INTERVENTION, ARTERY N/A 6/18/2024    Procedure:  Percutaneous coronary intervention;  Surgeon: Gabriela Rider MD;  Location: [facility] CATH LAB;  Service: Cardiology;  Laterality: N/A;    rotator cuff      left side       ALLERGIES AND MEDICATION:     Review of patient's allergies indicates:   Allergen Reactions    Codeine Nausea Only        Medication List            Accurate as of April 4, 2025  9:49 AM. If you have any questions, ask your nurse or doctor.                CONTINUE taking these medications      aspirin 81 MG EC tablet  Commonly known as: ECOTRIN  Take 1 tablet (81 mg total) by mouth once daily.     atorvastatin 80 MG tablet  Commonly known as: LIPITOR  Take 1 tablet (80 mg total) by mouth once daily.     buPROPion 300 MG 24 hr tablet  Commonly known as: WELLBUTRIN XL     busPIRone 10 MG tablet  Commonly known as: BUSPAR  TAKE 1 TABLET BY MOUTH EVERY DAY IN THE EVENING     clopidogreL 75 mg tablet  Commonly known as: PLAVIX  Take 1 tablet (75 mg total) by mouth once daily.     diphenhydrAMINE 50 MG capsule  Commonly known as: BENADRYL  Take 1 capsule (50 mg total) by mouth every 6 (six) hours as needed (30 minutes prior to taking reglan).     DULoxetine 60 MG capsule  Commonly known as: CYMBALTA     ezetimibe 10 mg tablet  Commonly known as: ZETIA  Take 1 tablet (10 mg total) by mouth once daily.     gabapentin 300 MG capsule  Commonly known as: NEURONTIN     icosapent ethyL 1 gram Cap  Commonly known as: VASCEPA     lancing device Misc     lancing device with lancets Kit  To check blood glucose twice daily.     metFORMIN 1000 MG tablet  Commonly known as: GLUCOPHAGE  Take 1 tablet (1,000 mg total) by mouth 2 (two) times daily with meals.     metoclopramide HCl 10 MG tablet  Commonly known as: REGLAN  Take 0.5 tablets (5 mg total) by mouth every 6 (six) hours as needed (for nausea and abdominal pain).     * mirtazapine 15 MG tablet  Commonly known as: REMERON     * mirtazapine 45 MG tablet  Commonly known as: REMERON     nitroGLYCERIN 0.4 MG SL  tablet  Commonly known as: NITROSTAT  Place 1 tablet (0.4 mg total) under the tongue as needed for Chest pain.     ondansetron 4 MG Tbdl  Commonly known as: ZOFRAN-ODT  Take 1 tablet (4 mg total) by mouth every 8 (eight) hours as needed (Nausea).     oxyCODONE-acetaminophen  mg per tablet  Commonly known as: PERCOCET     pantoprazole 40 MG tablet  Commonly known as: PROTONIX  TAKE 1 TABLET BY MOUTH EVERY DAY     sucralfate 1 gram tablet  Commonly known as: CARAFATE  TAKE 1 TABLET(1 GRAM) BY MOUTH TWICE DAILY     tiZANidine 4 MG tablet  Commonly known as: ZANAFLEX     tobramycin sulfate 0.3% 0.3 % ophthalmic solution  Commonly known as: TOBREX  1-2 drops topically twice daily to affected toe(s).     traZODone 100 MG tablet  Commonly known as: DESYREL     TRUE METRIX GLUCOSE METER Misc  Generic drug: blood-glucose meter     TRUE METRIX GLUCOSE TEST STRIP Strp  Generic drug: blood sugar diagnostic  TEST BLOOD GLUCOSE TWICE A DAY AS DIRECTED     TRUEPLUS LANCETS 33 gauge Misc  Generic drug: lancets  TEST BLOOD GLUCOSE TWICE DAILY AS DIRECTED     valsartan-hydrochlorothiazide 160-25 mg per tablet  Commonly known as: DIOVAN-HCT  Take 0.5 tablets by mouth once daily.           * This list has 2 medication(s) that are the same as other medications prescribed for you. Read the directions carefully, and ask your doctor or other care provider to review them with you.                  SOCIAL HISTORY:     Social History     Socioeconomic History    Marital status:     Number of children: 3   Tobacco Use    Smoking status: Every Day     Current packs/day: 1.00     Average packs/day: 1 pack/day for 35.0 years (35.0 ttl pk-yrs)     Types: Cigarettes     Passive exposure: Current    Smokeless tobacco: Never    Tobacco comments:     Quitting on his own, reports that he's down 4 to 5 cig a day      Patient will try the smoking program vs    Substance and Sexual Activity    Alcohol use: No    Drug use: Yes     Frequency:  7.0 times per week     Types: Marijuana, Oxycodone     Comment: Majiuan once a week/ Oxy 3-4 tabs daily    Sexual activity: Yes     Partners: Female   Social History Narrative    Reside on .     Lives with wife and young daughter (22yrs)    Not working, on disability since accident    Prior job-[employer]    Hobbies: carving, fishing    Not driving.      Social Drivers of Health     Financial Resource Strain: Low Risk  (10/11/2024)    Overall Financial Resource Strain (CARDIA)     Difficulty of Paying Living Expenses: Not hard at all   Recent Concern: Financial Resource Strain - High Risk (10/11/2024)    Overall Financial Resource Strain (CARDIA)     Difficulty of Paying Living Expenses: Hard   Food Insecurity: No Food Insecurity (10/11/2024)    Hunger Vital Sign     Worried About Running Out of Food in the Last Year: Never true     Ran Out of Food in the Last Year: Never true   Recent Concern: Food Insecurity - Food Insecurity Present (10/11/2024)    Hunger Vital Sign     Worried About Running Out of Food in the Last Year: Sometimes true     Ran Out of Food in the Last Year: Often true   Transportation Needs: No Transportation Needs (10/11/2024)    TRANSPORTATION NEEDS     Transportation : No   Physical Activity: Inactive (10/11/2024)    Exercise Vital Sign     Days of Exercise per Week: 0 days     Minutes of Exercise per Session: 0 min   Stress: Stress Concern Present (10/11/2024)    [employer] - Occupational Stress Questionnaire     Feeling of Stress : To some extent   Housing Stability: Unknown (10/11/2024)    Housing Stability Vital Sign     Unable to Pay for Housing in the Last Year: No     Homeless in the Last Year: No   Recent Concern: Housing Stability - High Risk (10/11/2024)    Housing Stability Vital Sign     Unable to Pay for Housing in the Last Year: Yes     Homeless in the Last Year: No       FAMILY HISTORY:     Family History   Problem Relation Name Age of Onset     "Hypertension Mother      Heart disease Father      Cirrhosis Brother      Colon cancer Neg Hx      Esophageal cancer Neg Hx         REVIEW OF SYSTEMS:   Review of Systems   Constitutional: Negative.   HENT: Negative.     Eyes: Negative.    Respiratory: Negative.     Endocrine: Negative.    Hematologic/Lymphatic: Negative.    Skin: Negative.    Musculoskeletal: Negative.    Gastrointestinal: Negative.    Genitourinary: Negative.    Neurological: Negative.    Psychiatric/Behavioral: Negative.     Allergic/Immunologic: Negative.        A 10 point review of systems was performed and all the pertinent positives have been mentioned. Rest of review of systems was negative.        PHYSICAL EXAM:     Vitals:    04/04/25 0908   BP: 137/80   Pulse: 91   Resp: 18    Body mass index is 23.8 kg/m².  Weight: 62.9 kg (138 lb 10.7 oz)   Height: 5' 4" (162.6 cm)     Physical Exam  Vitals reviewed.   Constitutional:       Appearance: He is well-developed.   HENT:      Head: Normocephalic.   Eyes:      Conjunctiva/sclera: Conjunctivae normal.      Pupils: Pupils are equal, round, and reactive to light.   Cardiovascular:      Rate and Rhythm: Normal rate and regular rhythm.      Heart sounds: Normal heart sounds.   Pulmonary:      Effort: Pulmonary effort is normal.      Breath sounds: Normal breath sounds.   Abdominal:      General: Bowel sounds are normal.      Palpations: Abdomen is soft.   Musculoskeletal:      Cervical back: Normal range of motion and neck supple.   Skin:     General: Skin is warm.   Neurological:      Mental Status: He is alert and oriented to person, place, and time.           DATA:     Laboratory:  CBC:  Recent Labs   Lab 11/04/24  1058 12/02/24  1014 02/14/25  1430   WBC 9.34 4.70 10.96   Hemoglobin 13.3 L 12.0 L 17.7   Hematocrit 39.2 L 37.5 L 50.8   Platelets 242 194 264       CHEMISTRIES:  Recent Labs   Lab 05/26/22  0950 04/26/23  0615 06/18/24  0251 06/19/24  0521 06/27/24  1102 08/16/24  1330 " 11/04/24  1058 12/02/24  1014 02/14/25  1430   Glucose 135 H   < > 129 H 130 H 201 H   < > 262 H 181 H 198 H   Sodium 141   < > 142 140 140   < > 135 L 142 137   Potassium 4.0   < > 4.1 3.7 4.8   < > 3.6 4.1 4.6   BUN 7   < > 11 11 9   < > 25 H 8 43 H   Creatinine 1.0   < > 0.9 1.0 1.1   < > 1.4 1.1 1.9 H   eGFR if  >60  --   --   --   --   --   --   --   --    eGFR if non  >60  --   --   --   --   --   --   --   --    Calcium 9.2   < > 8.8 8.6 L 9.8   < > 9.8 9.2 10.3   Magnesium  --    < > 1.5 L 1.4 L 1.8  --   --   --   --     < > = values in this interval not displayed.       CARDIAC BIOMARKERS:  Recent Labs   Lab 10/11/24  0411 10/11/24  0604 10/31/24  1917   Troponin I 0.049 H 0.037 H 0.026       COAGS:  Recent Labs   Lab 10/10/24  1822 12/02/24  1014 02/14/25  1430   INR 1.0  1.0 1.0 1.1       LIPIDS/LFTS:  Recent Labs   Lab 04/30/24  0835 06/16/24 1952 06/17/24  0407 06/27/24  1102 10/10/24  1822 10/31/24  1917 11/04/24  1058 12/02/24  1014 02/14/25  1430   Cholesterol 267 H  --  124  --  264 H  --   --   --   --    Triglycerides 303 H  --  116  --  158 H  --   --   --   --    HDL 31 L  --  23 L  --  38 L  --   --   --   --    LDL Cholesterol 175.4 H  --  77.8  --  194.4 H  --   --   --   --    Non-HDL Cholesterol 236  --  101  --  226  --   --   --   --    AST 15   < >  --    < >  --    < > 17 14 20   ALT 20   < >  --    < >  --    < > 18 14 14    < > = values in this interval not displayed.       Hemoglobin A1C   Date Value Ref Range Status   10/10/2024 6.8 (H) 4.0 - 5.6 % Final     Comment:     ADA Screening Guidelines:  5.7-6.4%  Consistent with prediabetes  >or=6.5%  Consistent with diabetes    High levels of fetal hemoglobin interfere with the HbA1C  assay. Heterozygous hemoglobin variants (HbS, HgC, etc)do  not significantly interfere with this assay.   However, presence of multiple variants may affect accuracy.     06/17/2024 6.2 (H) 4.0 - 5.6 % Final     Comment:      ADA Screening Guidelines:  5.7-6.4%  Consistent with prediabetes  >or=6.5%  Consistent with diabetes    High levels of fetal hemoglobin interfere with the HbA1C  assay. Heterozygous hemoglobin variants (HbS, HgC, etc)do  not significantly interfere with this assay.   However, presence of multiple variants may affect accuracy.     04/30/2024 6.8 (H) 4.0 - 5.6 % Final     Comment:     ADA Screening Guidelines:  5.7-6.4%  Consistent with prediabetes  >or=6.5%  Consistent with diabetes    High levels of fetal hemoglobin interfere with the HbA1C  assay. Heterozygous hemoglobin variants (HbS, HgC, etc)do  not significantly interfere with this assay.   However, presence of multiple variants may affect accuracy.         TSH  Recent Labs   Lab 08/10/23  0738 06/17/24  0407 10/11/24  0604   TSH 1.125 0.593 0.492       The ASCVD Risk score (Ana GARCIA, et al., 2019) failed to calculate for the following reasons:    Risk score cannot be calculated because patient has a medical history suggesting prior/existing ASCVD       BNP    Lab Results   Component Value Date/Time     (H) 10/31/2024 07:17 PM     (H) 10/10/2024 12:23 PM     (H) 08/09/2023 08:46 PM     (H) 02/01/2019 06:22 AM    BNP 70 10/21/2016 10:55 PM            ECHO    Results for orders placed during the hospital encounter of 08/09/23    Echo    Interpretation Summary    Left Ventricle: Normal wall motion. There is normal systolic function with a visually estimated ejection fraction of 55 - 60%.    Left Atrium: Left atrium is severely dilated.    Right Ventricle: Normal right ventricular cavity size. Systolic function is normal.    Mitral Valve: There is moderate regurgitation with a posterolateral eccentriccally directed jet.    IVC/SVC: Intermediate venous pressure at 8 mmHg.      STRESS TEST    Results for orders placed during the hospital encounter of 08/09/23    Nuclear Stress - Cardiology Interpreted    Interpretation Summary     Abnormal myocardial perfusion scan.    There is a moderate to severe intensity, moderate sized, fixed perfusion abnormality in the lateral wall(s). Only resting images obtained as patient refused to complete test. Incomplete study.    There are no other significant perfusion abnormalities.    The ECG portion of the study is negative for ischemia.    The patient reported no chest pain during the stress test.    There were no arrhythmias during stress.        CATH    No results found for this or any previous visit.    STRESS ECHO 2023:        Left Ventricle: Normal wall motion. There is normal systolic function with a visually estimated ejection fraction of 55 - 60%.    ECG Conclusion: The ECG portion of the study is negative for ischemia.    Post-stress Echo: The left ventricle systolic function is hyperdynamic.    Post-stress Impression: The study is normal.    ASSESSMENT AND PLAN     Patient Active Problem List   Diagnosis    History of hepatitis C, s/p successful hcv treatment w/ SVR 8-2015     Chronic hepatitis C    Type 2 diabetes mellitus, controlled    Essential hypertension    Tobacco abuse    Cirrhosis of liver without ascites    Abdominal pain    Depression    History of CVA (cerebrovascular accident)    Esophagitis    Dyslipidemia    GERD (gastroesophageal reflux disease)    Cirrhosis    Cannabis abuse    Hemiplegia and hemiparesis following cerebral infarction affecting right dominant side    Alcohol use disorder, severe, in sustained remission    Hepatic fibrosis, advanced fibrosis    History of liver biopsy    Aortic atherosclerosis    CAD (coronary artery disease)    Hypomagnesemia    Hypophosphatemia    Avascular necrosis of bone of hip, unspecified laterality    Depression, major, recurrent, moderate    Osteoarthritis    Insomnia    NSTEMI (non-ST elevated myocardial infarction)    Tobacco dependency    Chest pain    Gastroenteritis     Assessment & Plan    IMPRESSION:  Reviewed cardiovascular  s/p-angiogram in October  Extremely elevated cholesterol levels, fluctuating between 267 and 124 mg/dL, are concerning  Initiated ezetimibe to current lipid-lowering regimen due to inadequate control with atorvastatin alone    PLAN SUMMARY:  - Lipid panel ordered   On Zetia and Lipitor  - Continued atorvastatin (Lipitor), aspirin, and clopidogrel (Plavix)    CORONARY ARTERY DISEASE:  - Continued atorvastatin (Lipitor), aspirin, and clopidogrel (Plavix).  -     was deemed Not CT surgery candidate because of liver cirrhosis.  Subsequent PCI done  Of LAD and circumflex    HYPERLIPIDEMIA:  - Discussed potential complications of untreated HLD, including increased risk of heart attacks, strokes, and kidney issues.  - Started ezetimibe.  - Lipid panel ordered  Lab Results   Component Value Date    LDLCALC 194.4 (H) 10/10/2024      Hypertension: Continue current therapy     Aortic atherosclerosis: On statin and add ezetimibe    FOLLOW-UP:  - Randald to bring all current medications to next visit for review.  - Return visit with lipid panel results in 3 months.     This note was generated with the assistance of ambient listening technology. Verbal consent was obtained by the patient and accompanying visitor(s) for the recording of patient appointment to facilitate this note. I attest to having reviewed and edited the generated note for accuracy, though some syntax or spelling errors may persist. Please contact the author of this note for any clarification.      Thank you very much for involving me in the care of your patient.  Please do not hesitate to contact me if there are any questions.      Gabriela Rider MD, FACC, [place] [facility]  Interventional Cardiologist, [facility].     Visit today included increased complexity associated with the care of the episodic problem  hypertension, dyslipidemia, coronary artery disease addressed and managing the longitudinal care of the patient due to the serious and/or complex managed  problem(s)   Patient Active Problem List   Diagnosis    History of hepatitis C, s/p successful hcv treatment w/ SVR 8-2015     Chronic hepatitis C    Type 2 diabetes mellitus, controlled    Essential hypertension    Tobacco abuse    Cirrhosis of liver without ascites    Abdominal pain    Depression    History of CVA (cerebrovascular accident)    Esophagitis    Dyslipidemia    GERD (gastroesophageal reflux disease)    Cirrhosis    Cannabis abuse    Hemiplegia and hemiparesis following cerebral infarction affecting right dominant side    Alcohol use disorder, severe, in sustained remission    Hepatic fibrosis, advanced fibrosis    History of liver biopsy    Aortic atherosclerosis    CAD (coronary artery disease)    Hypomagnesemia    Hypophosphatemia    Avascular necrosis of bone of hip, unspecified laterality    Depression, major, recurrent, moderate    Osteoarthritis    Insomnia    NSTEMI (non-ST elevated myocardial infarction)    Tobacco dependency    Chest pain    Gastroenteritis     .        This note was dictated with the help of speech recognition software.  There might be un-intended errors and/or substitutions.

## 2025-04-16 ENCOUNTER — HOSPITAL ENCOUNTER (EMERGENCY)
Facility: HOSPITAL | Age: 60
Discharge: HOME | End: 2025-04-16
Attending: EMERGENCY MEDICINE
Payer: MEDICARE

## 2025-04-16 ENCOUNTER — PATIENT OUTREACH (OUTPATIENT)
Dept: ADMINISTRATIVE | Facility: OTHER | Age: 60
End: 2025-04-16
Payer: MEDICARE

## 2025-04-16 VITALS
OXYGEN SATURATION: 95 % | WEIGHT: 140 LBS | TEMPERATURE: 98 F | DIASTOLIC BLOOD PRESSURE: 65 MMHG | SYSTOLIC BLOOD PRESSURE: 126 MMHG | HEART RATE: 78 BPM | BODY MASS INDEX: 24.03 KG/M2 | RESPIRATION RATE: 18 BRPM

## 2025-04-16 DIAGNOSIS — Z72.0 TOBACCO USE: Primary | ICD-10-CM

## 2025-04-16 DIAGNOSIS — R11.2 NAUSEA AND VOMITING, UNSPECIFIED VOMITING TYPE: ICD-10-CM

## 2025-04-16 DIAGNOSIS — R10.9 ABDOMINAL PAIN: ICD-10-CM

## 2025-04-16 DIAGNOSIS — R79.89 ELEVATED TROPONIN: ICD-10-CM

## 2025-04-16 LAB
ABSOLUTE EOSINOPHIL (OHS): 0.04 K/UL
ABSOLUTE MONOCYTE (OHS): 0.89 K/UL (ref 0.3–1)
ABSOLUTE NEUTROPHIL COUNT (OHS): 5.76 K/UL (ref 1.8–7.7)
ALBUMIN SERPL BCP-MCNC: 3.6 G/DL (ref 3.5–5.2)
ALP SERPL-CCNC: 45 UNIT/L (ref 40–150)
ALT SERPL W/O P-5'-P-CCNC: 13 UNIT/L (ref 10–44)
ANION GAP (OHS): 11 MMOL/L (ref 8–16)
AST SERPL-CCNC: 18 UNIT/L (ref 11–45)
BACTERIA #/AREA URNS AUTO: NORMAL /HPF
BASOPHILS # BLD AUTO: 0.06 K/UL
BASOPHILS NFR BLD AUTO: 0.7 %
BILIRUB SERPL-MCNC: 0.6 MG/DL (ref 0.1–1)
BILIRUB UR QL STRIP.AUTO: NEGATIVE
BUN SERPL-MCNC: 24 MG/DL (ref 8–23)
CALCIUM SERPL-MCNC: 8.4 MG/DL (ref 8.7–10.5)
CHLORIDE SERPL-SCNC: 107 MMOL/L (ref 95–110)
CLARITY UR: CLEAR
CO2 SERPL-SCNC: 20 MMOL/L (ref 23–29)
COLOR UR AUTO: YELLOW
CREAT SERPL-MCNC: 1 MG/DL (ref 0.5–1.4)
ERYTHROCYTE [DISTWIDTH] IN BLOOD BY AUTOMATED COUNT: 13.2 % (ref 11.5–14.5)
GFR SERPLBLD CREATININE-BSD FMLA CKD-EPI: >60 ML/MIN/1.73/M2
GLUCOSE SERPL-MCNC: 204 MG/DL (ref 70–110)
GLUCOSE UR QL STRIP: ABNORMAL
HCT VFR BLD AUTO: 47.4 % (ref 40–54)
HGB BLD-MCNC: 16.4 GM/DL (ref 14–18)
HGB UR QL STRIP: NEGATIVE
HOLD SPECIMEN: NORMAL
HYALINE CASTS UR QL AUTO: 1 /LPF (ref 0–1)
IMM GRANULOCYTES # BLD AUTO: 0.06 K/UL (ref 0–0.04)
IMM GRANULOCYTES NFR BLD AUTO: 0.7 % (ref 0–0.5)
KETONES UR QL STRIP: ABNORMAL
LEUKOCYTE ESTERASE UR QL STRIP: NEGATIVE
LIPASE SERPL-CCNC: 56 U/L (ref 4–60)
LYMPHOCYTES # BLD AUTO: 1.7 K/UL (ref 1–4.8)
MCH RBC QN AUTO: 31.4 PG (ref 27–31)
MCHC RBC AUTO-ENTMCNC: 34.6 G/DL (ref 32–36)
MCV RBC AUTO: 91 FL (ref 82–98)
MICROSCOPIC COMMENT: NORMAL
NITRITE UR QL STRIP: NEGATIVE
NUCLEATED RBC (/100WBC) (OHS): 0 /100 WBC
PH UR STRIP: 7 [PH]
PLATELET # BLD AUTO: 213 K/UL (ref 150–450)
PLATELET BLD QL SMEAR: NORMAL
PMV BLD AUTO: 10.7 FL (ref 9.2–12.9)
POTASSIUM SERPL-SCNC: 3.6 MMOL/L (ref 3.5–5.1)
PROT SERPL-MCNC: 6.9 GM/DL (ref 6–8.4)
PROT UR QL STRIP: ABNORMAL
RBC # BLD AUTO: 5.23 M/UL (ref 4.6–6.2)
RBC #/AREA URNS AUTO: 1 /HPF (ref 0–4)
RELATIVE EOSINOPHIL (OHS): 0.5 %
RELATIVE LYMPHOCYTE (OHS): 20 % (ref 18–48)
RELATIVE MONOCYTE (OHS): 10.5 % (ref 4–15)
RELATIVE NEUTROPHIL (OHS): 67.6 % (ref 38–73)
SODIUM SERPL-SCNC: 138 MMOL/L (ref 136–145)
SP GR UR STRIP: 1.03
TROPONIN I SERPL DL<=0.01 NG/ML-MCNC: 0.01 NG/ML
TROPONIN I SERPL DL<=0.01 NG/ML-MCNC: 0.04 NG/ML
UROBILINOGEN UR STRIP-ACNC: NEGATIVE EU/DL
WBC # BLD AUTO: 8.51 K/UL (ref 3.9–12.7)
WBC #/AREA URNS AUTO: 1 /HPF (ref 0–5)
YEAST UR QL AUTO: NORMAL /HPF

## 2025-04-16 PROCEDURE — 99285 EMERGENCY DEPT VISIT HI MDM: CPT | Mod: 25,HCNC

## 2025-04-16 PROCEDURE — 96361 HYDRATE IV INFUSION ADD-ON: CPT | Mod: HCNC

## 2025-04-16 PROCEDURE — 25500020 PHARM REV CODE 255: Mod: HCNC | Performed by: EMERGENCY MEDICINE

## 2025-04-16 PROCEDURE — 83690 ASSAY OF LIPASE: CPT | Mod: HCNC | Performed by: EMERGENCY MEDICINE

## 2025-04-16 PROCEDURE — 81003 URINALYSIS AUTO W/O SCOPE: CPT | Mod: HCNC | Performed by: EMERGENCY MEDICINE

## 2025-04-16 PROCEDURE — 63600175 PHARM REV CODE 636 W HCPCS: Mod: HCNC | Performed by: EMERGENCY MEDICINE

## 2025-04-16 PROCEDURE — 25000003 PHARM REV CODE 250: Mod: HCNC | Performed by: EMERGENCY MEDICINE

## 2025-04-16 PROCEDURE — 80053 COMPREHEN METABOLIC PANEL: CPT | Mod: HCNC | Performed by: EMERGENCY MEDICINE

## 2025-04-16 PROCEDURE — 85025 COMPLETE CBC W/AUTO DIFF WBC: CPT | Mod: HCNC | Performed by: EMERGENCY MEDICINE

## 2025-04-16 PROCEDURE — 96374 THER/PROPH/DIAG INJ IV PUSH: CPT | Mod: HCNC

## 2025-04-16 PROCEDURE — 96375 TX/PRO/DX INJ NEW DRUG ADDON: CPT | Mod: HCNC

## 2025-04-16 PROCEDURE — 84484 ASSAY OF TROPONIN QUANT: CPT | Mod: HCNC | Performed by: EMERGENCY MEDICINE

## 2025-04-16 RX ORDER — ONDANSETRON 4 MG/1
8 TABLET, ORALLY DISINTEGRATING ORAL EVERY 8 HOURS PRN
Qty: 20 TABLET | Refills: 0 | Status: SHIPPED | OUTPATIENT
Start: 2025-04-16 | End: 2025-04-16

## 2025-04-16 RX ORDER — FAMOTIDINE 10 MG/ML
20 INJECTION, SOLUTION INTRAVENOUS
Status: COMPLETED | OUTPATIENT
Start: 2025-04-16 | End: 2025-04-16

## 2025-04-16 RX ORDER — ONDANSETRON HYDROCHLORIDE 2 MG/ML
4 INJECTION, SOLUTION INTRAVENOUS
Status: COMPLETED | OUTPATIENT
Start: 2025-04-16 | End: 2025-04-16

## 2025-04-16 RX ORDER — ONDANSETRON 4 MG/1
8 TABLET, ORALLY DISINTEGRATING ORAL EVERY 8 HOURS PRN
Qty: 20 TABLET | Refills: 0 | Status: SHIPPED | OUTPATIENT
Start: 2025-04-16 | End: 2025-04-23

## 2025-04-16 RX ORDER — MORPHINE SULFATE 4 MG/ML
6 INJECTION, SOLUTION INTRAMUSCULAR; INTRAVENOUS
Status: COMPLETED | OUTPATIENT
Start: 2025-04-16 | End: 2025-04-16

## 2025-04-16 RX ORDER — HYDROCHLOROTHIAZIDE 25 MG/1
25 TABLET ORAL
Status: DISCONTINUED | OUTPATIENT
Start: 2025-04-16 | End: 2025-04-16 | Stop reason: HOSPADM

## 2025-04-16 RX ORDER — HYDROMORPHONE HYDROCHLORIDE 1 MG/ML
1 INJECTION, SOLUTION INTRAMUSCULAR; INTRAVENOUS; SUBCUTANEOUS
Refills: 0 | Status: COMPLETED | OUTPATIENT
Start: 2025-04-16 | End: 2025-04-16

## 2025-04-16 RX ORDER — VALSARTAN 80 MG/1
160 TABLET ORAL
Status: DISCONTINUED | OUTPATIENT
Start: 2025-04-16 | End: 2025-04-16 | Stop reason: HOSPADM

## 2025-04-16 RX ADMIN — ONDANSETRON 4 MG: 2 INJECTION INTRAMUSCULAR; INTRAVENOUS at 08:04

## 2025-04-16 RX ADMIN — FAMOTIDINE 20 MG: 10 INJECTION, SOLUTION INTRAVENOUS at 08:04

## 2025-04-16 RX ADMIN — IOHEXOL 75 ML: 350 INJECTION, SOLUTION INTRAVENOUS at 10:04

## 2025-04-16 RX ADMIN — MORPHINE SULFATE 6 MG: 4 INJECTION INTRAVENOUS at 08:04

## 2025-04-16 RX ADMIN — SODIUM CHLORIDE 1000 ML: 9 INJECTION, SOLUTION INTRAVENOUS at 08:04

## 2025-04-16 RX ADMIN — HYDROMORPHONE HYDROCHLORIDE 1 MG: 1 INJECTION, SOLUTION INTRAMUSCULAR; INTRAVENOUS; SUBCUTANEOUS at 08:04

## 2025-04-16 NOTE — ED PROVIDER NOTES
Encounter Date: 4/16/2025       History     Chief Complaint   Patient presents with    Abdominal Pain     Pt reports middle abd pain accompanied by nausea x Sunday. Pt reports one episode of emesis this morning. Pt denies diarrhea. Pt reports hx of colitis.      60-year-old male with history of anxiety, depression, diabetes, CAD, hypertension, chronic shoulder pain presents to the ED with abdominal pain.  Symptoms started on Sunday, reports associated nausea and vomiting.  Reports not able to tolerate p.o..  Denies associated diarrhea, cough, fever, chest pain, difficulty breathing.  Points to upper abdomen as location of his pain. Denies alcohol use.     Patient on chronic narcotics- Percocet 10, last filled qty 120 on 3/17.  Patient adamantly denies possibility he could be in withdrawal, states he does not take the pain medication every day.  He has never had withdrawal symptoms in the past.  However, he is out of his medication.  Patient was seen in the ED in February for abdominal pain and nausea.    The history is provided by the patient.     Review of patient's allergies indicates:   Allergen Reactions    Codeine Nausea Only     Past Medical History:   Diagnosis Date    Anxiety     Asthma     Colitis     Depression     Diabetes mellitus     Head injury     History of hepatitis C, s/p successful hcv treatment w/ SVR 8-2015  10/8/2012    SVR(24) as of 11/2015.  SVR(12) as of 8/2015.  completed harvoni 24 week regimen for genotype 1a 5/18/15     Hypertension     Shoulder pain     left, s/p 4 surgeries.     Stroke      Past Surgical History:   Procedure Laterality Date    ANGIOGRAM, CORONARY, WITH LEFT HEART CATHETERIZATION Left 6/17/2024    Procedure: Angiogram, Coronary, with Left Heart Cath;  Surgeon: Gabriela Rider MD;  Location: [facility] CATH LAB;  Service: Cardiology;  Laterality: Left;  12pm start, R rad access    COLONOSCOPY N/A 10/20/2015    Procedure: COLONOSCOPY;  Surgeon: Jagdish Patricia MD;  Location:  [facility] ENDO;  Service: Endoscopy;  Laterality: N/A;    COLONOSCOPY N/A 1/26/2024    Procedure: COLONOSCOPY;  Surgeon: Pricila Shanks MD;  Location: [facility] ENDO;  Service: Endoscopy;  Laterality: N/A;    CORONARY STENT PLACEMENT N/A 6/18/2024    Procedure: INSERTION, STENT, CORONARY ARTERY;  Surgeon: Gabriela Rider MD;  Location: [facility] CATH LAB;  Service: Cardiology;  Laterality: N/A;  lcx pci. radial. lbu 3.5. 12 pm access    CORONARY STENT PLACEMENT N/A 8/28/2024    Procedure: INSERTION, STENT, CORONARY ARTERY;  Surgeon: Gabriela Rider MD;  Location: [facility] CATH LAB;  Service: Cardiology;  Laterality: N/A;  radial access. pci lad. have shockwave ready  RN PREOP 8/16/2024    DIAGNOSTIC LAPAROSCOPY N/A 4/1/2021    Procedure: LAPAROSCOPY, DIAGNOSTIC;  Surgeon: Umesh Vallecillo MD;  Location: [facility] OR;  Service: General;  Laterality: N/A;  RN PREOP 3/29/21, COVID NEGATIVE ON 3/29  CONSENT AND H/P INCOMPLETE    ESOPHAGOGASTRODUODENOSCOPY N/A 1/26/2024    Procedure: EGD (ESOPHAGOGASTRODUODENOSCOPY);  Surgeon: Pricila Shanks MD;  Location: [facility];  Service: Endoscopy;  Laterality: N/A;  prep instructions mailed to pt / Spevenancio pt / Urgent/suprep  1/19- lvm for pc. DBM  1/23- left 2nd vm for pc. DBM  1/24 cirrohsis-labs prior, precall complete-st    GALLBLADDER SURGERY      pt not 100% if gall bladder removed    IVUS, CORONARY  6/18/2024    Procedure: IVUS, Coronary;  Surgeon: Gabriela Rider MD;  Location: [facility] CATH LAB;  Service: Cardiology;;    LEFT HEART CATHETERIZATION Left 10/11/2024    Procedure: Left heart cath;  Surgeon: Gabriela Rider MD;  Location: [facility] CATH LAB;  Service: Cardiology;  Laterality: Left;    PERCUTANEOUS CORONARY INTERVENTION, ARTERY N/A 6/18/2024    Procedure: Percutaneous coronary intervention;  Surgeon: Gabriela Rider MD;  Location: [facility] CATH LAB;  Service: Cardiology;  Laterality: N/A;    rotator cuff      left side     Family History   Problem Relation Name Age of Onset     Hypertension Mother      Heart disease Father      Cirrhosis Brother      Colon cancer Neg Hx      Esophageal cancer Neg Hx       Social History[1]  Review of Systems   Constitutional:  Negative for chills and fever.   HENT:  Negative for congestion and sore throat.    Eyes:  Negative for visual disturbance.   Respiratory:  Negative for cough and shortness of breath.    Cardiovascular:  Negative for chest pain.   Gastrointestinal:  Positive for abdominal pain, nausea and vomiting. Negative for constipation and diarrhea.   Genitourinary:  Negative for dysuria.   Skin:  Negative for rash.   Neurological:  Negative for headaches.   Psychiatric/Behavioral:  Negative for confusion.        Physical Exam     Initial Vitals [04/16/25 0747]   BP Pulse Resp Temp SpO2   (!) 175/104 93 18 97.9 °F (36.6 °C) 95 %      MAP       --         Physical Exam    Nursing note and vitals reviewed.  Constitutional: He appears well-developed and well-nourished. He is not diaphoretic. No distress.   HENT:   Head: Normocephalic and atraumatic.   Mucous membranes are dry   Eyes: Conjunctivae are normal. Pupils are equal, round, and reactive to light.   Neck: Neck supple.   Cardiovascular:  Normal rate and regular rhythm.           Pulmonary/Chest: Breath sounds normal. No respiratory distress.   Abdominal: Abdomen is soft. Bowel sounds are normal. He exhibits no distension. There is abdominal tenderness (diffusely tender to upper abdomen, no lower abd tenderness).   Musculoskeletal:         General: No edema.      Cervical back: Neck supple.     Neurological: He is alert. GCS score is 15. GCS eye subscore is 4. GCS verbal subscore is 5. GCS motor subscore is 6.   Skin: Skin is warm and dry.   Psychiatric: He has a normal mood and affect.         ED Course   Procedures  Labs Reviewed   COMPREHENSIVE METABOLIC PANEL - Abnormal       Result Value    Sodium 138      Potassium 3.6      Chloride 107      CO2 20 (*)     Glucose 204 (*)     BUN 24 (*)      Creatinine 1.0      Calcium 8.4 (*)     Protein Total 6.9      Albumin 3.6      Bilirubin Total 0.6      ALP 45      AST 18      ALT 13      Anion Gap 11      eGFR >60     URINALYSIS, REFLEX TO URINE CULTURE - Abnormal    Color, UA Yellow      Appearance, UA Clear      pH, UA 7.0      Spec Grav UA 1.030      Protein, UA 1+ (*)     Glucose, UA 1+ (*)     Ketones, UA 1+ (*)     Bilirubin, UA Negative      Blood, UA Negative      Nitrites, UA Negative      Urobilinogen, UA Negative      Leukocyte Esterase, UA Negative     TROPONIN I - Abnormal    Troponin-I 0.040 (*)    CBC WITH DIFFERENTIAL - Abnormal    WBC 8.51      RBC 5.23      HGB 16.4      HCT 47.4      MCV 91      MCH 31.4 (*)     MCHC 34.6      RDW 13.2      Platelet Count 213      MPV 10.7      Nucleated RBC 0      Neut % 67.6      Lymph % 20.0      Mono % 10.5      Eos % 0.5      Basophil % 0.7      Imm Grans % 0.7 (*)     Neut # 5.76      Lymph # 1.70      Mono # 0.89      Eos # 0.04      Baso # 0.06      Imm Grans # 0.06 (*)    LIPASE - Normal    Lipase Level 56     PLATELET REVIEW - Normal    Platelet Estimate Appears Normal     TROPONIN I - Normal    Troponin-I 0.009     CBC W/ AUTO DIFFERENTIAL    Narrative:     The following orders were created for panel order CBC W/ AUTO DIFFERENTIAL.  Procedure                               Abnormality         Status                     ---------                               -----------         ------                     CBC with Differential[[id number]]       Abnormal            Final result                 Please view results for these tests on the individual orders.   GREY TOP URINE HOLD    Extra Tube Hold for add-ons.     URINALYSIS MICROSCOPIC    RBC, UA 1      WBC, UA 1      Bacteria, UA Rare      Yeast, UA None      Hyaline Casts, UA 1      Microscopic Comment            ECG Results              EKG 12-lead (Preliminary result)  Result time 04/16/25 08:29:07      Wet Read by Debo Napoles MD  (04/16/25 08:29:07, [facility] Emergency Dept, Emergency Medicine)    Sinus rhythm with frequent PVCs.  Rate 87 beats per minute, normal WV interval,  milliseconds.  No STEMI.                                  Imaging Results              CT Abdomen Pelvis With IV Contrast NO Oral Contrast (Final result)  Result time 04/16/25 10:34:27      Final result by Liam Landry MD (04/16/25 10:34:27)                   Impression:      Diverticulosis coli.    Fatty liver.      Electronically signed by: Liam Landry MD  Date:    04/16/2025  Time:    10:34               Narrative:    EXAMINATION:  CT ABDOMEN PELVIS WITH IV CONTRAST    CLINICAL HISTORY:  Nausea/vomiting;Bowel obstruction suspected;    TECHNIQUE:  Low dose axial images, sagittal and coronal reformations were obtained from the lung bases to the pubic symphysis following the IV administration of 75 mL of Omnipaque 350 and the oral administration of 30 mL of Omnipaque 350.    FINDINGS:  The visualized portion of the base of the lungs is significant for bilateral dependent atelectatic versus fibrotic change.  The visualized portion of the heart is significant for calcification of the left circumflex and right coronary artery.  There is thickening of the distal esophageal wall just proximal to the gastroesophageal junction.  The stomach is unremarkable.  There is mild fatty atrophy of the pancreas.  There is fatty infiltration of the liver.  The gallbladder is unremarkable.  The adrenal glands are unremarkable.  There is a large amount of abdominal aortic plaque which extends into its branches.  The kidneys have a normal appearance.  There is no hydronephrosis or nephrolithiasis.  The bladder is unremarkable.  The bowel is significant for diverticulosis coli.  The wall of the sigmoid colon is thickened likely due to muscular hypertrophy.  There is no significant pericolonic inflammation.  The appendix is not visualized and likely absent.  The osseous  structures demonstrate degenerative change most pronounced at L5-S1.                                       Medications   valsartan tablet 160 mg (0 mg Oral Hold 4/16/25 0916)   hydroCHLOROthiazide tablet 25 mg (0 mg Oral Hold 4/16/25 0916)   sodium chloride 0.9% bolus 1,000 mL 1,000 mL (0 mLs Intravenous Stopped 4/16/25 0917)   ondansetron injection 4 mg (4 mg Intravenous Given 4/16/25 0817)   morphine injection 6 mg (6 mg Intravenous Given 4/16/25 0816)   famotidine (PF) injection 20 mg (20 mg Intravenous Given 4/16/25 0817)   HYDROmorphone injection 1 mg (1 mg Intravenous Given 4/16/25 0852)   iohexoL (OMNIPAQUE 350) injection 75 mL (75 mLs Intravenous Given 4/16/25 1013)     Medical Decision Making  60-year-old male presenting with abdominal pain, nausea, vomiting, symptoms ongoing for 2 days, states not able to tolerate p.o..  On exam, mucous membranes are dry, diffuse upper abdominal tenderness.  Differential includes but not limited to pancreatitis, gastritis, constipation, withdrawal.  Workup initiated with labs, will treat with IV fluids, Zofran and morphine and Pepcid.    Amount and/or Complexity of Data Reviewed  Labs: ordered.     Details: Urinalysis negative for nitrites or leukocytes.  CMP shows a glucose of 204, otherwise within acceptable limits.  CBC within acceptable limits.  Initial troponin 0.04, repeat negative.  Radiology: ordered.     Details: CT shows diverticulosis, fatty liver, aortic plaque.  ECG/medicine tests: ordered and independent interpretation performed.    Risk  Prescription drug management.               ED Course as of 04/16/25 1333   Wed Apr 16, 2025   1156 Patient reassessed, reviewed test results, including calcification of the aorta, patient continues to smoke cigarettes.  Troponin is elevated, will repeat.  He does not want to stay in the hospital.  Given water and crackers as p.o. challenge. [LH]   1314 Patient tolerating p.o..  Repeat troponin negative.  Encouraged smoking  cessation, will place referral.  Will prescribe Zofran. [LH]      ED Course User Index  [LH] Debo Napoles MD                           Clinical Impression:  Final diagnoses:  [R10.9] Abdominal pain  [Z72.0] Tobacco use (Primary)  [R11.2] Nausea and vomiting, unspecified vomiting type  [R79.89] Elevated troponin          ED Disposition Condition    Discharge Stable          ED Prescriptions       Medication Sig Dispense Start Date End Date Auth. Provider    ondansetron (ZOFRAN-ODT) 4 MG TbDL  (Status: Discontinued) Take 2 tablets (8 mg total) by mouth every 8 (eight) hours as needed (nasuea). 20 tablet 4/16/2025 4/16/2025 Debo Napoles MD    ondansetron (ZOFRAN-ODT) 4 MG TbDL Take 2 tablets (8 mg total) by mouth every 8 (eight) hours as needed (nasuea). 20 tablet 4/16/2025 -- Debo Napoles MD          Follow-up Information       Follow up With Specialties Details Why Contact Info    Stevo Pryor MD Family Medicine, Wound Care   [address] [facility] [id number]  [phone]      [facility] Emergency Dept Emergency Medicine  As needed, If symptoms worsen [address] [facility] [place] [address] [phone]               [1]   Social History  Tobacco Use    Smoking status: Every Day     Current packs/day: 1.00     Average packs/day: 1 pack/day for 35.0 years (35.0 ttl pk-yrs)     Types: Cigarettes     Passive exposure: Current    Smokeless tobacco: Never    Tobacco comments:     Quitting on his own, reports that he's down 4 to 5 cig a day      Patient will try the smoking program vs    Substance Use Topics    Alcohol use: No    Drug use: Yes     Frequency: 7.0 times per week     Types: Marijuana, Oxycodone     Comment: Majiuan once a week/ Oxy 3-4 tabs daily        Debo Napoles MD  04/16/25 1716

## 2025-04-16 NOTE — DISCHARGE INSTRUCTIONS
Thank you for coming to our Emergency Department today. It is important to remember that some problems are difficult to diagnose and may not be found during your Emergency Department visit. Be sure to follow up with your primary care doctor and review all labs/imaging/tests that were performed during this visit with them. Some labs/tests may be outside of the normal range and require non-emergent follow-up and further investigation to help diagnose/exclude/prevent complications or other medical conditions.    If you do not have a primary care doctor, you may contact the one listed on your discharge paperwork or you may also call the [facility] Appointment Desk at [phone] to schedule an appointment and establish care with one. It is important to your health that you have a primary care doctor.    Medicaid Escalation Line:   [phone] - Please contact this number if you are having difficulty getting follow up with a Primary Care Provider or Speciality Provider.     Please take all medications as directed. All medications may potentially have side-effects and it is impossible to predict which medications may give you side-effects or what side-effects (if any) they will give you.. If you feel that you are having a negative effect or side-effect of any medication you should immediately stop taking them and seek medical attention. If you feel that you are having a life-threatening reaction call [phone].    Return to the ER with any questions/concerns, new/concerning symptoms, worsening or failure to improve.     Do not drive, swim, climb to height, take a bath or make any important decisions for 24 hours if you have received any pain medications, sedatives or mood altering drugs during your ER visit.

## 2025-04-16 NOTE — PROGRESS NOTES
CHW - Initial Contact    This Community Health Worker completed the Social Determinant of Health questionnaire with patient  at bedside  today.    Pt identified barriers of most importance are: has food insecurities, issues with transportation and utility bill payments.   Referrals to community agencies completed with patient/caregiver consent include: yes: [employer]  Support and Services: has no support at this time.  Other information discussed the patient needs / wants help with: Completed SDOH with patient at bedside today, patient has food insecurities, issues with transportation and utility bill payments. Will follow up in one week to check status of referrals and pt's future needs.    Follow up required: yes.  Follow-up Outreach - Due: 4/22/2025

## 2025-04-16 NOTE — ED NOTES
Patient presents to ED due to abdominal pain and nausea since Sunday, states he vomited this morning and is unable to keep food down.

## 2025-04-16 NOTE — ED NOTES
Patient's blood pressure is 212/115, Dr. Napoles notified via secure chat, patient rates pain a 10 on 0-10 scale.

## 2025-04-23 ENCOUNTER — OFFICE VISIT (OUTPATIENT)
Dept: FAMILY MEDICINE | Facility: CLINIC | Age: 60
End: 2025-04-23
Payer: MEDICARE

## 2025-04-23 VITALS
BODY MASS INDEX: 24.37 KG/M2 | OXYGEN SATURATION: 97 % | HEIGHT: 64 IN | WEIGHT: 142.75 LBS | SYSTOLIC BLOOD PRESSURE: 108 MMHG | DIASTOLIC BLOOD PRESSURE: 82 MMHG | HEART RATE: 99 BPM | TEMPERATURE: 98 F

## 2025-04-23 DIAGNOSIS — I25.118 CORONARY ARTERY DISEASE OF NATIVE ARTERY OF NATIVE HEART WITH STABLE ANGINA PECTORIS: ICD-10-CM

## 2025-04-23 DIAGNOSIS — E78.2 MIXED HYPERLIPIDEMIA: Chronic | ICD-10-CM

## 2025-04-23 DIAGNOSIS — Z72.0 TOBACCO ABUSE: Chronic | ICD-10-CM

## 2025-04-23 DIAGNOSIS — K59.09 CHRONIC CONSTIPATION: ICD-10-CM

## 2025-04-23 DIAGNOSIS — F32.A DEPRESSION, UNSPECIFIED DEPRESSION TYPE: ICD-10-CM

## 2025-04-23 DIAGNOSIS — Z86.73 HISTORY OF CVA (CEREBROVASCULAR ACCIDENT): Chronic | ICD-10-CM

## 2025-04-23 DIAGNOSIS — M54.9 CHRONIC BACK PAIN, UNSPECIFIED BACK LOCATION, UNSPECIFIED BACK PAIN LATERALITY: ICD-10-CM

## 2025-04-23 DIAGNOSIS — R10.9 CENTRAL ABDOMINAL PAIN: Primary | ICD-10-CM

## 2025-04-23 DIAGNOSIS — Z79.899 POLYPHARMACY: ICD-10-CM

## 2025-04-23 DIAGNOSIS — F51.04 PSYCHOPHYSIOLOGICAL INSOMNIA: ICD-10-CM

## 2025-04-23 DIAGNOSIS — E11.65 CONTROLLED TYPE 2 DIABETES MELLITUS WITH HYPERGLYCEMIA, WITHOUT LONG-TERM CURRENT USE OF INSULIN: Chronic | ICD-10-CM

## 2025-04-23 DIAGNOSIS — G89.29 CHRONIC BACK PAIN, UNSPECIFIED BACK LOCATION, UNSPECIFIED BACK PAIN LATERALITY: ICD-10-CM

## 2025-04-23 DIAGNOSIS — I25.2 HISTORY OF NON-ST ELEVATION MYOCARDIAL INFARCTION (NSTEMI): ICD-10-CM

## 2025-04-23 PROCEDURE — 3079F DIAST BP 80-89 MM HG: CPT | Mod: HCNC,CPTII,S$GLB,

## 2025-04-23 PROCEDURE — 1159F MED LIST DOCD IN RCRD: CPT | Mod: HCNC,CPTII,S$GLB,

## 2025-04-23 PROCEDURE — 3074F SYST BP LT 130 MM HG: CPT | Mod: HCNC,CPTII,S$GLB,

## 2025-04-23 PROCEDURE — 3008F BODY MASS INDEX DOCD: CPT | Mod: HCNC,CPTII,S$GLB,

## 2025-04-23 PROCEDURE — 1160F RVW MEDS BY RX/DR IN RCRD: CPT | Mod: HCNC,CPTII,S$GLB,

## 2025-04-23 PROCEDURE — 99406 BEHAV CHNG SMOKING 3-10 MIN: CPT | Mod: HCNC,S$GLB,,

## 2025-04-23 PROCEDURE — 99999 PR PBB SHADOW E&M-EST. PATIENT-LVL V: CPT | Mod: PBBFAC,HCNC,,

## 2025-04-23 PROCEDURE — 99214 OFFICE O/P EST MOD 30 MIN: CPT | Mod: 25,HCNC,S$GLB,

## 2025-04-23 PROCEDURE — G2211 COMPLEX E/M VISIT ADD ON: HCPCS | Mod: HCNC,S$GLB,,

## 2025-04-23 RX ORDER — ATORVASTATIN CALCIUM 80 MG/1
80 TABLET, FILM COATED ORAL DAILY
Qty: 90 TABLET | Refills: 3 | Status: SHIPPED | OUTPATIENT
Start: 2025-04-23

## 2025-04-23 RX ORDER — ASPIRIN 81 MG/1
81 TABLET ORAL DAILY
Qty: 90 TABLET | Refills: 3 | Status: SHIPPED | OUTPATIENT
Start: 2025-04-23

## 2025-04-23 RX ORDER — BUPROPION HYDROCHLORIDE 150 MG/1
150 TABLET ORAL DAILY
Qty: 90 TABLET | Refills: 3 | Status: SHIPPED | OUTPATIENT
Start: 2025-04-23

## 2025-04-23 RX ORDER — CLOPIDOGREL BISULFATE 75 MG/1
75 TABLET ORAL DAILY
Qty: 90 TABLET | Refills: 3 | Status: SHIPPED | OUTPATIENT
Start: 2025-04-23 | End: 2026-04-23

## 2025-04-23 RX ORDER — TIZANIDINE 4 MG/1
4 TABLET ORAL NIGHTLY
Qty: 90 TABLET | Refills: 3 | Status: SHIPPED | OUTPATIENT
Start: 2025-04-23

## 2025-04-23 RX ORDER — HYDROXYZINE PAMOATE 50 MG/1
50 CAPSULE ORAL NIGHTLY
Qty: 90 CAPSULE | Refills: 3 | Status: SHIPPED | OUTPATIENT
Start: 2025-04-23

## 2025-04-23 RX ORDER — SYRING-NEEDL,DISP,INSUL,0.3 ML 29 G X1/2"
296 SYRINGE, EMPTY DISPOSABLE MISCELLANEOUS
Qty: 296 ML | Refills: 1 | Status: SHIPPED | OUTPATIENT
Start: 2025-04-23

## 2025-04-23 RX ORDER — METFORMIN HYDROCHLORIDE 500 MG/1
1000 TABLET, EXTENDED RELEASE ORAL
Qty: 180 TABLET | Refills: 1 | Status: SHIPPED | OUTPATIENT
Start: 2025-04-23

## 2025-04-23 RX ORDER — POLYETHYLENE GLYCOL 3350 17 G/17G
17 POWDER, FOR SOLUTION ORAL DAILY
Qty: 850 G | Refills: 7 | Status: SHIPPED | OUTPATIENT
Start: 2025-04-23

## 2025-04-23 NOTE — ASSESSMENT & PLAN NOTE
Chronic intermittent central abdominal pain in the setting of known diverticulosis  Improved since ED discharge (see HPI for details)  Does note Harrison type 2-4 stools - suspicious for constipation  Reviewed CT from ED visit - profound stool burden appreciated which extends from the rectum all the way to the cecum at least  -- Will start bowel regimen today and monitor for improvement

## 2025-04-23 NOTE — PATIENT INSTRUCTIONS
Thank you for seeing me today.    Fiber Supplementation for Constipation - for implementation AFTER clearing yourself out with Magnesium Citrate (prescribed)  --  Supplemental fiber intake can increase satiety (especially when taken with or right before meals), lower cholesterol, and improve glucose homeostasis.  It is also somewhat protective against colorectal cancer, and promotes regularity.  --   Vegetables, which are generally high in fiber and low in calories (root vegetables generally being the exception)  --   Fiber Supplements (some options):          -   Hydration:  aim for about 2.5-3 liters of water per day at least   -   Ground Flax Seed:  Flax is a gentle (modest) source of fiber which is Not likely to cause bloating.  Try 1-2 tbsp of ground flax seed with a shake/smoothie or added to oatmeal.  Nutty flavor, affordable, well-tolerated.    -   Citrucel is another gentle fiber - take at least once per day - can take with MiraLax   +  MiraLax is a very safe way to pull water into the colon and lubricate stool for easier BMs.  Use 1 capful of MiraLax (mixed into liquid) daily as needed.      Tobacco Cessation  We discussed rationing down use from 15 to 14, 13 ... Until you are ready to quit completely.  This should be far more effective than will power alone.  Extra points for putting your cigarettes out early.  -- Remember, so much of the trigger/urge to use is behavioral - you have to change something about any behavior you typically associate with smoking, otherwise the urge will hit you every time you sit for morning coffee, or whenever you sit on the porch, etc.       Pt Education: Sleep Hygiene     1. Avoid watching TV, eating, and discussing emotional issues in bed. The bed should be used for sleep and sex only. If not, we can associate the bed with other activities and it often becomes difficult to fall asleep.  2. Minimize noise, light, and temperature extremes during sleep with ear plugs, window  blinds, or an electric blanket or air conditioner. Even the slightest nighttime noises or luminescent lights can disrupt the quality of your sleep. Try to keep your bedroom at a comfortable temperature -- not too hot (above 75 degrees) or too cold (below 54 degrees).  3. Try not to drink fluids after 8 p.m. This may reduce awakenings due to urination.  4. Avoid naps, but if you do nap, make it no more than about 25 minutes about eight hours after you awake. But if you have problems falling asleep, then no naps for you.  5. Do not expose your self to bright light if you need to get up at night. Use a small night-light instead.  6. Nicotine is a stimulant and should be avoided particularly near bedtime and upon night awakenings. Having a smoke before bed, although it may feel relaxing, is actually putting a stimulant into your bloodstream.  7. Caffeine is also a stimulant and is present in coffee (100-200 mg), soda (50-75 mg), tea (50-75 mg), and various over-the-counter medications. Caffeine should be discontinued at least four to six hours before bedtime. If you consume large amounts of caffeine and you cut your self off too quickly, beware; you may get headaches that could keep you awake.  8. Although alcohol is a depressant and may help you fall asleep, the subsequent metabolism that clears it from your body when you are sleeping causes a withdrawal syndrome. This withdrawal causes awakenings and is often associated with nightmares and sweats.  9. A light snack may be sleep-inducing, but a heavy meal too close to bedtime interferes with sleep.  Stay away from protein and stick to carbohydrates or dairy products.  Milk contains the amino acid L-tryptophan, which has been shown in research to help people go to sleep.  However, if you have GERD this pre-bed snack will surely make it worse, so exercise caution here.    Magnesium:  You might also try supplementing with magnesium glycinate (about 2-300 mg) or magnesium  oxide (about 4-500 mg) about 1 hour before bed to promote relaxation and improve sleep quality   Vistaril:  Also prescribed Vistaril (hydroxyzine) today - please take about 1 hour before bed, and do not drive or perform any strenuous or dangerous activities while taking.    * IF this new medication regimen is not effective, OK to add Buspirone back at night

## 2025-04-23 NOTE — ASSESSMENT & PLAN NOTE
Medications for sleep in his chart:  Buspar 10, mirtazapine 45, trazodone 150, tizanidine 4.  Also taking Percocet multiple times per day for chronic back pain per pain management    Suspect trazodone is also per pain management though I cannot see this in his chart.  Will attempt to address polypharmacy by d/c buspar and mirtazapine and starting Vistaril instead.   Also discussed sleep hygiene - reiterated in AVS

## 2025-04-23 NOTE — PROGRESS NOTES
Assessment & Plan     Central abdominal pain  Chronic constipation  Chronic intermittent central abdominal pain in the setting of known diverticulosis  Improved since ED discharge (see HPI for details)  Does note Rabun type 2-4 stools - suspicious for constipation  Reviewed CT from ED visit - profound stool burden appreciated which extends from the rectum all the way to the cecum at least  -- Will start bowel regimen today and monitor for improvement  Orders:  -     polyethylene glycol (GLYCOLAX) 17 gram/dose powder; Take 17 g by mouth once daily.  Dispense: 850 g; Refill: 7  -     magnesium citrate solution; Take 296 mLs by mouth as needed (severe constipation).  Dispense: 296 mL; Refill: 1      Tobacco abuse  Tobacco Cessation  I spent 5 minutes counseling the patient on smoking cessation, including risks associated with smoking, having a quit date, nicotine replacement, medications that can aid in cessation, and having a plan for future cravings.    -- Patient is currently smoking about 3/4 PPD and wants to quit. Has tried gum, patches & counseling without success.  -- Discussed rationing down her daily allotment of cigarettes which pt had never tried before  Orders:  -     buPROPion (WELLBUTRIN XL) 150 MG TB24 tablet; Take 1 tablet (150 mg total) by mouth once daily.  Dispense: 90 tablet; Refill: 3      Depression, unspecified depression type  Polypharmacy  Notes mild chronic depression in the setting of tobacco abuse and multiple health issues.  Notably, patient is on multiple psychotropic medications, and is not clear which medications he is taking  Plan:  -- will cautiously refill Wellbutrin at lower dose than is indicated by his chart  -- carefully reconciled the patient's current medication list, instructed him to locate and arrange these medications at home using a morning and evening medication organizer and to set all other medications aside for the time being so that we can more effectively help him  manage his medications  Orders:  -     buPROPion (WELLBUTRIN XL) 150 MG TB24 tablet; Take 1 tablet (150 mg total) by mouth once daily.  Dispense: 90 tablet; Refill: 3      Psychophysiological insomnia  Medications for sleep in his chart:  Buspar 10, mirtazapine 45, trazodone 150, tizanidine 4.  Also taking Percocet multiple times per day for chronic back pain per pain management Dr. Middleton trazodone is also per pain management though I cannot see this in his chart.  Will attempt to address polypharmacy by d/c buspar and mirtazapine and starting Vistaril instead.   Also discussed sleep hygiene - reiterated in AVS  Orders:  -     hydrOXYzine pamoate (VISTARIL) 50 MG Cap; Take 1 capsule (50 mg total) by mouth nightly.  Dispense: 90 capsule; Refill: 3      Controlled type 2 diabetes mellitus with hyperglycemia, without long-term current use of insulin  Lab Results   Component Value Date    HGBA1C 6.8 (H) 10/10/2024   -- current regimen includes:  Metformin 1000 b.i.d., but patient states that he is only taking this once per day.  Refilled with extended release formulation, we will monitor on this dosage and adjust as indicated  Orders:  -     metFORMIN (GLUCOPHAGE-XR) 500 MG ER 24hr tablet; Take 2 tablets (1,000 mg total) by mouth daily with breakfast.  Dispense: 180 tablet; Refill: 1      Chronic back pain, unspecified back location, unspecified back pain laterality  Patient has chronic back pain, states that he sees pain management for this issue.  Tizanidine refilled today, patient advised not to drive or perform any strenuous activity while taking this medication.  Verbalizes understanding  Orders:  -     tiZANidine (ZANAFLEX) 4 MG tablet; Take 1 tablet (4 mg total) by mouth every evening.  Dispense: 90 tablet; Refill: 3      History of CVA (cerebrovascular accident)  History of non-ST elevation myocardial infarction (NSTEMI)  Coronary artery disease of native artery of native heart with stable angina  pectoris  Mixed hyperlipidemia  Chronic, asymptomatic, stable - we will continue to monitor on current medication regimen  Orders:  -     aspirin (ECOTRIN) 81 MG EC tablet; Take 1 tablet (81 mg total) by mouth once daily.  Dispense: 90 tablet; Refill: 3  -     atorvastatin (LIPITOR) 80 MG tablet; Take 1 tablet (80 mg total) by mouth once daily.  Dispense: 90 tablet; Refill: 3  -     clopidogreL (PLAVIX) 75 mg tablet; Take 1 tablet (75 mg total) by mouth once daily.  Dispense: 90 tablet; Refill: 3       HPI   Nino Price is a 60 y.o. male with multiple medical diagnoses as listed in the medical history and problem list who presents for follow up regarding central abdominal pain, and for various acute & chronic conditions as detailed above.    Patient presented to [facility] ED 04/16/2025 for abdominal pain with nausea and 1 episode of emesis.  See MDM below:  Medical Decision Making  60-year-old male presenting with abdominal pain, nausea, vomiting, symptoms ongoing for 2 days, states not able to tolerate p.o..  On exam, mucous membranes are dry, diffuse upper abdominal tenderness.  Differential includes but not limited to pancreatitis, gastritis, constipation, withdrawal.  Workup initiated with labs, will treat with IV fluids, Zofran and morphine and Pepcid.     Amount and/or Complexity of Data Reviewed  Labs: ordered.     Details: Urinalysis negative for nitrites or leukocytes.  CMP shows a glucose of 204, otherwise within acceptable limits.  CBC within acceptable limits.  Initial troponin 0.04, repeat negative.  Radiology: ordered.     Details: CT shows diverticulosis, fatty liver, aortic plaque.  ECG/medicine tests: ordered and independent interpretation performed.     Risk  Prescription drug management.          Plan         ED Course as of 04/16/25 1333   Wed Apr 16, 2025   1156 Patient reassessed, reviewed test results, including calcification of the aorta, patient continues to smoke cigarettes.  Troponin  is elevated, will repeat.  He does not want to stay in the hospital.  Given water and crackers as p.o. challenge. [LH]   1314 Patient tolerating p.o..  Repeat troponin negative.  Encouraged smoking cessation, will place referral.  Will prescribe Zofran. [LH]       ED Course User Index  [LH] Debo Napoles MD             Clinical Impression:  Final diagnoses:  [R10.9] Abdominal pain  [Z72.0] Tobacco use (Primary)  [R11.2] Nausea and vomiting, unspecified vomiting type  [R79.89] Elevated troponin     EKG showed sinus rhythm with frequent PVCs, CT of the abdomen and pelvis showed diverticulosis without any other acute process.    ROS:  Patient denies any CP, SOB, fever, chills, N/V, hematuria, hematochezia, melena    Follow Up with PCP as scheduled 06/2025                      Health Maintenance         Date Due Completion Date    Foot Exam Never done ---    LDCT Lung Screen Never done ---    Shingles Vaccine (1 of 2) Never done ---    Diabetic Eye Exam 10/13/2022 10/13/2021    COVID-19 Vaccine (4 - 2024-25 season) 09/01/2024 1/22/2022    Hemoglobin A1c 04/10/2025 10/10/2024    Diabetes Urine Screening 05/27/2025 5/27/2024    TETANUS VACCINE 03/16/2026 3/16/2016    Lipid Panel 04/04/2026 4/4/2025    Colorectal Cancer Screening 01/26/2029 1/26/2024                 Physical Exam   Vital signs reviewed.   Body mass index is 24.5 kg/m².  General:  Well-developed, well-nourished. NAD.   Skin:  Warm, dry.  No rashes or lesions noted.  No palmar erythema.  Cap refill <2s bilaterally  Head:  NC/AT   Eyes:  Conjunctivae w/o exudates or hemorrhage.  Non-icteric sclerae.  Ears:  External ears w/o swelling or erythema.  Nose:  Nares are patent bilaterally w/o rhinorrhea or epistaxis  Mouth:  Mucosa is pink and moist.  No nodules or lesions noted.  No tonsillar swelling or exudates.   Neck:  Trachea is midline.  No carotid bruit appreciated.  No cervical adenopathy appreciated.    Lungs:  CTAB without rales, rhonchi or  wheezing.   Breathing comfortably on RA.  Heart:  Normal S1 & S2.  No extra heart sounds.   No pulsus alternans.  Abdomen:  Symmetric, non-distended  + mild diffuse abdominal tenderness to palpation without rebound tenderness, masses or organomegaly appreciated  Extremities:  Radial pulses 2+ and symmetric.    UE & LE are atraumatic without swelling, erythema, tenderness or deformity.    Neuro:  A&O4.  No obvious focal deficits. Negative Jennings's sign.   strength 5/5 bilaterally.  Psychiatric:  Appropriate affect.          History     Past Medical History:  Past Medical History:   Diagnosis Date    Anxiety     Asthma     Colitis     Depression     Diabetes mellitus     Head injury     History of hepatitis C, s/p successful hcv treatment w/ SVR 8-2015  10/8/2012    SVR(24) as of 11/2015.  SVR(12) as of 8/2015.  completed harvoni 24 week regimen for genotype 1a 5/18/15     Hypertension     Shoulder pain     left, s/p 4 surgeries.     Stroke        Past Surgical History:  Past Surgical History:   Procedure Laterality Date    ANGIOGRAM, CORONARY, WITH LEFT HEART CATHETERIZATION Left 6/17/2024    Procedure: Angiogram, Coronary, with Left Heart Cath;  Surgeon: Gabriela Rider MD;  Location: [facility] CATH LAB;  Service: Cardiology;  Laterality: Left;  12pm start, R rad access    COLONOSCOPY N/A 10/20/2015    Procedure: COLONOSCOPY;  Surgeon: Jagdish Patricia MD;  Location: [facility] ENDO;  Service: Endoscopy;  Laterality: N/A;    COLONOSCOPY N/A 1/26/2024    Procedure: COLONOSCOPY;  Surgeon: Pricila Shanks MD;  Location: [facility] ENDO;  Service: Endoscopy;  Laterality: N/A;    CORONARY STENT PLACEMENT N/A 6/18/2024    Procedure: INSERTION, STENT, CORONARY ARTERY;  Surgeon: Gabriela Rider MD;  Location: [facility] CATH LAB;  Service: Cardiology;  Laterality: N/A;  lcx pci. radial. lbu 3.5. 12 pm access    CORONARY STENT PLACEMENT N/A 8/28/2024    Procedure: INSERTION, STENT, CORONARY ARTERY;  Surgeon: Gabriela Rider MD;   Location: [facility] CATH LAB;  Service: Cardiology;  Laterality: N/A;  radial access. pci lad. have shockwave ready  RN PREOP 8/16/2024    DIAGNOSTIC LAPAROSCOPY N/A 4/1/2021    Procedure: LAPAROSCOPY, DIAGNOSTIC;  Surgeon: Umesh Vallecillo MD;  Location: [facility] OR;  Service: General;  Laterality: N/A;  RN PREOP 3/29/21, COVID NEGATIVE ON 3/29  CONSENT AND H/P INCOMPLETE    ESOPHAGOGASTRODUODENOSCOPY N/A 1/26/2024    Procedure: EGD (ESOPHAGOGASTRODUODENOSCOPY);  Surgeon: Pricila Shanks MD;  Location: [facility] ENDO;  Service: Endoscopy;  Laterality: N/A;  prep instructions mailed to pt / [place] pt / Urgent/suprep  1/19- lvm for pc. DBM  1/23- left 2nd vm for pc. DBM  1/24 cirrohsis-labs prior, precall complete-st    GALLBLADDER SURGERY      pt not 100% if gall bladder removed    IVUS, CORONARY  6/18/2024    Procedure: IVUS, Coronary;  Surgeon: Gabriela Rider MD;  Location: [facility] CATH LAB;  Service: Cardiology;;    LEFT HEART CATHETERIZATION Left 10/11/2024    Procedure: Left heart cath;  Surgeon: Gabriela Rider MD;  Location: [facility] CATH LAB;  Service: Cardiology;  Laterality: Left;    PERCUTANEOUS CORONARY INTERVENTION, ARTERY N/A 6/18/2024    Procedure: Percutaneous coronary intervention;  Surgeon: Gabriela Rider MD;  Location: [facility] CATH LAB;  Service: Cardiology;  Laterality: N/A;    rotator cuff      left side       Social History:  Social History[1]    Family History:  Family History   Problem Relation Name Age of Onset    Hypertension Mother      Heart disease Father      Cirrhosis Brother      Colon cancer Neg Hx      Esophageal cancer Neg Hx         Allergies and Medications: (updated and reviewed)  Review of patient's allergies indicates:   Allergen Reactions    Codeine Nausea Only     Current Medications[2]    Patient Care Team:  Stevo Pryor MD as PCP - General (Family Medicine)  Kaila Leger LPN as Care Coordinator  Gabriela Rider MD as Consulting Physician (Cardiology)  Jose Yarbrough,  MD as Consulting Physician (Orthopedic Surgery)  Tara Guaman MD as Consulting Physician (Hepatology)  Mora Nichols as Community Health Worker        Harish Dye PA-C  Family Medicine  [facility]         - The patient is given an After Visit Summary that lists all medications with directions, allergies, education, orders placed during this encounter and follow-up instructions.      - I have reviewed the patient's medical information including past medical, family, and social history sections including the medications and allergies.      - We discussed the patient's current medications.     This note was created by combination of typed  and MModal dictation.  Transcription errors may be present.  If there are any questions, please contact me.                     [1]   Social History  Socioeconomic History    Marital status:     Number of children: 3   Tobacco Use    Smoking status: Every Day     Current packs/day: 1.00     Average packs/day: 1 pack/day for 35.0 years (35.0 ttl pk-yrs)     Types: Cigarettes     Passive exposure: Current    Smokeless tobacco: Never    Tobacco comments:     Quitting on his own, reports that he's down 4 to 5 cig a day      Patient will try the smoking program vs    Substance and Sexual Activity    Alcohol use: No    Drug use: Yes     Frequency: 7.0 times per week     Types: Marijuana, Oxycodone     Comment: Majiuan once a week/ Oxy 3-4 tabs daily    Sexual activity: Yes     Partners: Female   Social History Narrative    Reside on .     Lives with wife and young daughter (22yrs)    Not working, on disability since accident    Prior job-[employer]    Hobbies: carving, fishing    Not driving.      Social Drivers of Health     Financial Resource Strain: High Risk (4/16/2025)    Overall Financial Resource Strain (CARDIA)     Difficulty of Paying Living Expenses: Hard   Food Insecurity: Food Insecurity Present (4/16/2025)    Hunger Vital Sign      Worried About Running Out of Food in the Last Year: Often true     Ran Out of Food in the Last Year: Often true   Transportation Needs: Unmet Transportation Needs (4/16/2025)    PRAPARE - Transportation     Lack of Transportation (Medical): Yes     Lack of Transportation (Non-Medical): Yes   Physical Activity: Inactive (4/16/2025)    Exercise Vital Sign     Days of Exercise per Week: 0 days     Minutes of Exercise per Session: 0 min   Stress: Stress Concern Present (4/16/2025)    [employer] - Occupational Stress Questionnaire     Feeling of Stress : To some extent   Housing Stability: Low Risk  (4/16/2025)    Housing Stability Vital Sign     Unable to Pay for Housing in the Last Year: No     Homeless in the Last Year: No   [2]   Current Outpatient Medications   Medication Sig Dispense Refill    aspirin (ECOTRIN) 81 MG EC tablet Take 1 tablet (81 mg total) by mouth once daily. 90 tablet 3    atorvastatin (LIPITOR) 80 MG tablet Take 1 tablet (80 mg total) by mouth once daily. 90 tablet 3    buPROPion (WELLBUTRIN XL) 150 MG TB24 tablet Take 1 tablet (150 mg total) by mouth once daily. 90 tablet 3    clopidogreL (PLAVIX) 75 mg tablet Take 1 tablet (75 mg total) by mouth once daily. 90 tablet 3    ezetimibe (ZETIA) 10 mg tablet Take 1 tablet (10 mg total) by mouth once daily. 90 tablet 3    hydrOXYzine pamoate (VISTARIL) 50 MG Cap Take 1 capsule (50 mg total) by mouth nightly. 90 capsule 3    icosapent ethyL (VASCEPA) 1 gram Cap Take 2 g by mouth.      lancing device Misc AS DIRECTED TO CHECK BLOOD GLUCOSE TWICE DAILY      lancing device with lancets Kit To check blood glucose twice daily. 100 each 3    magnesium citrate solution Take 296 mLs by mouth as needed (severe constipation). 296 mL 1    metFORMIN (GLUCOPHAGE-XR) 500 MG ER 24hr tablet Take 2 tablets (1,000 mg total) by mouth daily with breakfast. 180 tablet 1    nitroGLYCERIN (NITROSTAT) 0.4 MG SL tablet Place 1 tablet (0.4 mg total)  under the tongue as needed for Chest pain. 25 tablet 0    ondansetron (ZOFRAN-ODT) 4 MG TbDL Take 1 tablet (4 mg total) by mouth every 8 (eight) hours as needed (Nausea). 10 tablet 0    oxyCODONE-acetaminophen (PERCOCET)  mg per tablet Take 1 tablet by mouth every 6 (six) hours as needed.      pantoprazole (PROTONIX) 40 MG tablet TAKE 1 TABLET BY MOUTH EVERY DAY 90 tablet 3    polyethylene glycol (GLYCOLAX) 17 gram/dose powder Take 17 g by mouth once daily. 850 g 7    tiZANidine (ZANAFLEX) 4 MG tablet Take 1 tablet (4 mg total) by mouth every evening. 90 tablet 3    tobramycin sulfate 0.3% (TOBREX) 0.3 % ophthalmic solution 1-2 drops topically twice daily to affected toe(s). 5 mL 3    traZODone (DESYREL) 100 MG tablet Take 150 mg by mouth nightly as needed for Insomnia. 1.5 tab nightly as needed      TRUE METRIX GLUCOSE METER Misc       TRUE METRIX GLUCOSE TEST STRIP Strp TEST BLOOD GLUCOSE TWICE A DAY AS DIRECTED 200 each 3    TRUEPLUS LANCETS 33 gauge Misc TEST BLOOD GLUCOSE TWICE DAILY AS DIRECTED 200 each 3    valsartan-hydrochlorothiazide (DIOVAN-HCT) 160-25 mg per tablet Take 0.5 tablets by mouth once daily. 45 tablet 3     No current facility-administered medications for this visit.

## 2025-06-04 ENCOUNTER — OFFICE VISIT (OUTPATIENT)
Dept: FAMILY MEDICINE | Facility: CLINIC | Age: 60
End: 2025-06-04
Payer: MEDICARE

## 2025-06-04 VITALS
HEART RATE: 88 BPM | DIASTOLIC BLOOD PRESSURE: 80 MMHG | WEIGHT: 136.56 LBS | RESPIRATION RATE: 18 BRPM | HEIGHT: 64 IN | TEMPERATURE: 98 F | OXYGEN SATURATION: 98 % | BODY MASS INDEX: 23.31 KG/M2 | SYSTOLIC BLOOD PRESSURE: 118 MMHG

## 2025-06-04 DIAGNOSIS — I25.2 HISTORY OF NON-ST ELEVATION MYOCARDIAL INFARCTION (NSTEMI): ICD-10-CM

## 2025-06-04 DIAGNOSIS — I10 BENIGN ESSENTIAL HTN: ICD-10-CM

## 2025-06-04 DIAGNOSIS — Z86.19 HISTORY OF HEPATITIS C: ICD-10-CM

## 2025-06-04 DIAGNOSIS — Z12.5 PROSTATE CANCER SCREENING: ICD-10-CM

## 2025-06-04 DIAGNOSIS — E78.2 MIXED HYPERLIPIDEMIA: Chronic | ICD-10-CM

## 2025-06-04 DIAGNOSIS — E11.9 CONTROLLED TYPE 2 DIABETES MELLITUS WITHOUT COMPLICATION, UNSPECIFIED WHETHER LONG TERM INSULIN USE: Chronic | ICD-10-CM

## 2025-06-04 DIAGNOSIS — M87.059 AVASCULAR NECROSIS OF BONE OF HIP, UNSPECIFIED LATERALITY: ICD-10-CM

## 2025-06-04 DIAGNOSIS — I25.118 CORONARY ARTERY DISEASE OF NATIVE ARTERY OF NATIVE HEART WITH STABLE ANGINA PECTORIS: ICD-10-CM

## 2025-06-04 DIAGNOSIS — M54.9 CHRONIC BACK PAIN, UNSPECIFIED BACK LOCATION, UNSPECIFIED BACK PAIN LATERALITY: ICD-10-CM

## 2025-06-04 DIAGNOSIS — F51.04 PSYCHOPHYSIOLOGICAL INSOMNIA: ICD-10-CM

## 2025-06-04 DIAGNOSIS — F32.A DEPRESSION, UNSPECIFIED DEPRESSION TYPE: ICD-10-CM

## 2025-06-04 DIAGNOSIS — Z00.00 VISIT FOR WELL MAN HEALTH CHECK: Primary | ICD-10-CM

## 2025-06-04 DIAGNOSIS — I69.351 HEMIPLEGIA AND HEMIPARESIS FOLLOWING CEREBRAL INFARCTION AFFECTING RIGHT DOMINANT SIDE: ICD-10-CM

## 2025-06-04 DIAGNOSIS — K21.9 GASTROESOPHAGEAL REFLUX DISEASE, UNSPECIFIED WHETHER ESOPHAGITIS PRESENT: Chronic | ICD-10-CM

## 2025-06-04 DIAGNOSIS — G89.29 CHRONIC BACK PAIN, UNSPECIFIED BACK LOCATION, UNSPECIFIED BACK PAIN LATERALITY: ICD-10-CM

## 2025-06-04 DIAGNOSIS — F10.21 ALCOHOL USE DISORDER, SEVERE, IN SUSTAINED REMISSION: ICD-10-CM

## 2025-06-04 DIAGNOSIS — I70.0 AORTIC ATHEROSCLEROSIS: ICD-10-CM

## 2025-06-04 DIAGNOSIS — Z86.73 HISTORY OF CVA (CEREBROVASCULAR ACCIDENT): Chronic | ICD-10-CM

## 2025-06-04 DIAGNOSIS — E78.5 DYSLIPIDEMIA: ICD-10-CM

## 2025-06-04 DIAGNOSIS — Z72.0 TOBACCO ABUSE: Chronic | ICD-10-CM

## 2025-06-04 DIAGNOSIS — F12.10 CANNABIS ABUSE: Chronic | ICD-10-CM

## 2025-06-04 DIAGNOSIS — K74.60 CIRRHOSIS OF LIVER WITHOUT ASCITES, UNSPECIFIED HEPATIC CIRRHOSIS TYPE: Chronic | ICD-10-CM

## 2025-06-04 DIAGNOSIS — K20.90 ESOPHAGITIS: ICD-10-CM

## 2025-06-04 DIAGNOSIS — F17.200 SMOKING ADDICTION: ICD-10-CM

## 2025-06-04 DIAGNOSIS — E11.65 CONTROLLED TYPE 2 DIABETES MELLITUS WITH HYPERGLYCEMIA, WITHOUT LONG-TERM CURRENT USE OF INSULIN: Chronic | ICD-10-CM

## 2025-06-04 PROBLEM — I21.4 NSTEMI (NON-ST ELEVATED MYOCARDIAL INFARCTION): Status: RESOLVED | Noted: 2024-10-10 | Resolved: 2025-06-04

## 2025-06-04 PROBLEM — E83.42 HYPOMAGNESEMIA: Status: RESOLVED | Noted: 2024-06-18 | Resolved: 2025-06-04

## 2025-06-04 PROBLEM — K52.9 GASTROENTERITIS: Status: RESOLVED | Noted: 2024-10-31 | Resolved: 2025-06-04

## 2025-06-04 PROBLEM — E83.39 HYPOPHOSPHATEMIA: Status: RESOLVED | Noted: 2024-06-18 | Resolved: 2025-06-04

## 2025-06-04 PROBLEM — R07.9 CHEST PAIN: Status: RESOLVED | Noted: 2024-10-31 | Resolved: 2025-06-04

## 2025-06-04 PROCEDURE — 99215 OFFICE O/P EST HI 40 MIN: CPT | Mod: PBBFAC,PO | Performed by: FAMILY MEDICINE

## 2025-06-04 PROCEDURE — 99999 PR PBB SHADOW E&M-EST. PATIENT-LVL V: CPT | Mod: PBBFAC,,, | Performed by: FAMILY MEDICINE

## 2025-06-04 RX ORDER — NITROGLYCERIN 0.4 MG/1
0.4 TABLET SUBLINGUAL
Qty: 25 TABLET | Refills: 0 | Status: CANCELLED | OUTPATIENT
Start: 2025-06-04

## 2025-06-04 RX ORDER — PANTOPRAZOLE SODIUM 40 MG/1
40 TABLET, DELAYED RELEASE ORAL
Qty: 90 TABLET | Refills: 3 | Status: CANCELLED | OUTPATIENT
Start: 2025-06-04

## 2025-06-04 RX ORDER — HYDROXYZINE PAMOATE 50 MG/1
50 CAPSULE ORAL NIGHTLY
Qty: 90 CAPSULE | Refills: 3 | Status: CANCELLED | OUTPATIENT
Start: 2025-06-04

## 2025-06-04 RX ORDER — ATORVASTATIN CALCIUM 80 MG/1
80 TABLET, FILM COATED ORAL DAILY
Qty: 90 TABLET | Refills: 3 | Status: CANCELLED | OUTPATIENT
Start: 2025-06-04

## 2025-06-04 RX ORDER — CLOPIDOGREL BISULFATE 75 MG/1
75 TABLET ORAL DAILY
Qty: 90 TABLET | Refills: 3 | Status: CANCELLED | OUTPATIENT
Start: 2025-06-04 | End: 2026-06-04

## 2025-06-04 RX ORDER — BUPROPION HYDROCHLORIDE 150 MG/1
150 TABLET ORAL DAILY
Qty: 90 TABLET | Refills: 3 | Status: CANCELLED | OUTPATIENT
Start: 2025-06-04

## 2025-06-04 RX ORDER — METFORMIN HYDROCHLORIDE 500 MG/1
1000 TABLET, EXTENDED RELEASE ORAL
Qty: 180 TABLET | Refills: 1 | Status: CANCELLED | OUTPATIENT
Start: 2025-06-04

## 2025-06-04 RX ORDER — ICOSAPENT ETHYL 1 G/1
2 CAPSULE ORAL
Status: CANCELLED | OUTPATIENT
Start: 2025-06-04

## 2025-06-04 RX ORDER — ONDANSETRON 4 MG/1
4 TABLET, ORALLY DISINTEGRATING ORAL EVERY 8 HOURS PRN
Qty: 10 TABLET | Refills: 0 | Status: CANCELLED | OUTPATIENT
Start: 2025-06-04

## 2025-06-04 RX ORDER — VALSARTAN AND HYDROCHLOROTHIAZIDE 160; 25 MG/1; MG/1
1 TABLET ORAL
Qty: 90 TABLET | Refills: 1 | Status: CANCELLED | OUTPATIENT
Start: 2025-06-04

## 2025-06-04 RX ORDER — TIZANIDINE 4 MG/1
4 TABLET ORAL NIGHTLY
Qty: 90 TABLET | Refills: 3 | Status: CANCELLED | OUTPATIENT
Start: 2025-06-04

## 2025-06-04 RX ORDER — TRAZODONE HYDROCHLORIDE 100 MG/1
150 TABLET ORAL NIGHTLY PRN
Status: CANCELLED | OUTPATIENT
Start: 2025-06-04

## 2025-06-11 DIAGNOSIS — E11.9 TYPE 2 DIABETES MELLITUS WITHOUT COMPLICATION: ICD-10-CM

## 2025-06-16 ENCOUNTER — PATIENT OUTREACH (OUTPATIENT)
Dept: ADMINISTRATIVE | Facility: OTHER | Age: 60
End: 2025-06-16
Payer: MEDICARE

## 2025-06-17 ENCOUNTER — PATIENT MESSAGE (OUTPATIENT)
Dept: ADMINISTRATIVE | Facility: HOSPITAL | Age: 60
End: 2025-06-17
Payer: MEDICARE

## 2025-06-20 ENCOUNTER — TELEPHONE (OUTPATIENT)
Dept: FAMILY MEDICINE | Facility: CLINIC | Age: 60
End: 2025-06-20
Payer: MEDICARE

## 2025-06-20 NOTE — TELEPHONE ENCOUNTER
Copied from CRM #[id number]. Topic: Appointments - Appointment Access  >> Jun 20, 2025  2:18 PM Esther wrote:  Patient is calling to reschedule appointment for enhanced wellness visit. Please contact pt

## 2025-06-25 ENCOUNTER — HOSPITAL ENCOUNTER (OUTPATIENT)
Dept: RADIOLOGY | Facility: HOSPITAL | Age: 60
Discharge: HOME | End: 2025-06-25
Attending: FAMILY MEDICINE
Payer: MEDICARE

## 2025-06-25 DIAGNOSIS — F17.210 TOBACCO DEPENDENCE DUE TO CIGARETTES: ICD-10-CM

## 2025-06-25 PROCEDURE — 71271 CT THORAX LUNG CANCER SCR C-: CPT | Mod: TC,HCNC

## 2025-06-25 PROCEDURE — 71271 CT THORAX LUNG CANCER SCR C-: CPT | Mod: 26,HCNC,, | Performed by: RADIOLOGY

## 2025-06-25 NOTE — Clinical Note
85 ml of contrast were injected throughout the case. 15 mL of contrast was the total wasted during the case. 100 mL was the total amount used during the case. Pt alert oriented. Dressing to right groin dry and intact, free of redness, drainage and hematoma.   Pressure dressing to left chest site dry and intact. Further wound instructions per nurse practitioner.    Instructed not to drive or tub baths  for 1 week.     Instructed to watch for signs of infection which included but not limited to redness, swelling, warmth, or drainage. Notify the doctor's office right away.    No strenuous activity with your upper body for about a month.     Temporary card, device information booklet and post op paperwork given which also contains instructions, MD office location and number to call for appointment upon discharge.    Instructed to keep electronic devices at least 6 inches away from mid chest.     Made aware that the pacemaker is MRI compatible. Basic appliances at home like microwave is ok.    Pt allowed to ask questions, verbalized understanding of instructions.    Pt will call Dr Asencio's office for follow up post discharge.    Patient to be entered into [employer] AJ clinical trial:  Z00.6 NCT [id number] by [employer]

## 2025-06-27 ENCOUNTER — RESULTS FOLLOW-UP (OUTPATIENT)
Dept: FAMILY MEDICINE | Facility: CLINIC | Age: 60
End: 2025-06-27

## 2025-07-09 ENCOUNTER — PATIENT MESSAGE (OUTPATIENT)
Dept: ADMINISTRATIVE | Facility: HOSPITAL | Age: 60
End: 2025-07-09
Payer: MEDICARE

## 2025-08-25 ENCOUNTER — HOSPITAL ENCOUNTER (EMERGENCY)
Facility: HOSPITAL | Age: 60
Discharge: HOME | End: 2025-08-25
Attending: EMERGENCY MEDICINE
Payer: MEDICARE

## 2025-08-25 VITALS
SYSTOLIC BLOOD PRESSURE: 141 MMHG | TEMPERATURE: 98 F | HEART RATE: 93 BPM | BODY MASS INDEX: 23.9 KG/M2 | WEIGHT: 140 LBS | OXYGEN SATURATION: 95 % | RESPIRATION RATE: 20 BRPM | DIASTOLIC BLOOD PRESSURE: 100 MMHG | HEIGHT: 64 IN

## 2025-08-25 DIAGNOSIS — K52.9 COLITIS: Primary | ICD-10-CM

## 2025-08-25 DIAGNOSIS — K22.89 ESOPHAGEAL THICKENING: ICD-10-CM

## 2025-08-25 DIAGNOSIS — I70.0 ATHEROSCLEROSIS OF AORTA: ICD-10-CM

## 2025-08-25 DIAGNOSIS — R10.32 LLQ ABDOMINAL PAIN: ICD-10-CM

## 2025-08-25 LAB
ABSOLUTE EOSINOPHIL (OHS): 0.02 K/UL
ABSOLUTE MONOCYTE (OHS): 0.43 K/UL (ref 0.3–1)
ABSOLUTE NEUTROPHIL COUNT (OHS): 6.17 K/UL (ref 1.8–7.7)
ALBUMIN SERPL BCP-MCNC: 4.3 G/DL (ref 3.5–5.2)
ALP SERPL-CCNC: 66 UNIT/L (ref 40–150)
ALT SERPL W/O P-5'-P-CCNC: 12 UNIT/L (ref 10–44)
ANION GAP (OHS): 14 MMOL/L (ref 8–16)
AST SERPL-CCNC: 22 UNIT/L (ref 11–45)
BACTERIA #/AREA URNS AUTO: NORMAL /HPF
BASOPHILS # BLD AUTO: 0.05 K/UL
BASOPHILS NFR BLD AUTO: 0.6 %
BILIRUB SERPL-MCNC: 0.6 MG/DL (ref 0.1–1)
BILIRUB UR QL STRIP.AUTO: NEGATIVE
BUN SERPL-MCNC: 27 MG/DL (ref 8–23)
CALCIUM SERPL-MCNC: 9.9 MG/DL (ref 8.7–10.5)
CHLORIDE SERPL-SCNC: 102 MMOL/L (ref 95–110)
CLARITY UR: CLEAR
CO2 SERPL-SCNC: 21 MMOL/L (ref 23–29)
COLOR UR AUTO: YELLOW
CREAT SERPL-MCNC: 1.2 MG/DL (ref 0.5–1.4)
ERYTHROCYTE [DISTWIDTH] IN BLOOD BY AUTOMATED COUNT: 13 % (ref 11.5–14.5)
GFR SERPLBLD CREATININE-BSD FMLA CKD-EPI: >60 ML/MIN/1.73/M2
GLUCOSE SERPL-MCNC: 233 MG/DL (ref 70–110)
GLUCOSE UR QL STRIP: ABNORMAL
HCT VFR BLD AUTO: 47.4 % (ref 40–54)
HGB BLD-MCNC: 15.6 GM/DL (ref 14–18)
HGB UR QL STRIP: NEGATIVE
HYALINE CASTS UR QL AUTO: 1 /LPF (ref 0–1)
IMM GRANULOCYTES # BLD AUTO: 0.02 K/UL (ref 0–0.04)
IMM GRANULOCYTES NFR BLD AUTO: 0.3 % (ref 0–0.5)
INR PPP: 1.1 (ref 0.8–1.2)
KETONES UR QL STRIP: ABNORMAL
LACTATE SERPL-SCNC: 1.1 MMOL/L (ref 0.5–2.2)
LEUKOCYTE ESTERASE UR QL STRIP: NEGATIVE
LIPASE SERPL-CCNC: 27 U/L (ref 4–60)
LYMPHOCYTES # BLD AUTO: 1.25 K/UL (ref 1–4.8)
MCH RBC QN AUTO: 30.6 PG (ref 27–31)
MCHC RBC AUTO-ENTMCNC: 32.9 G/DL (ref 32–36)
MCV RBC AUTO: 93 FL (ref 82–98)
MICROSCOPIC COMMENT: NORMAL
NITRITE UR QL STRIP: NEGATIVE
NUCLEATED RBC (/100WBC) (OHS): 0 /100 WBC
PH UR STRIP: 7 [PH]
PLATELET # BLD AUTO: ABNORMAL 10*3/UL
PMV BLD AUTO: ABNORMAL FL
POTASSIUM SERPL-SCNC: 4.2 MMOL/L (ref 3.5–5.1)
PROT SERPL-MCNC: 8.2 GM/DL (ref 6–8.4)
PROT UR QL STRIP: ABNORMAL
PROTHROMBIN TIME: 12 SECONDS (ref 9–12.5)
RBC # BLD AUTO: 5.1 M/UL (ref 4.6–6.2)
RBC #/AREA URNS AUTO: 2 /HPF (ref 0–4)
RELATIVE EOSINOPHIL (OHS): 0.3 %
RELATIVE LYMPHOCYTE (OHS): 15.7 % (ref 18–48)
RELATIVE MONOCYTE (OHS): 5.4 % (ref 4–15)
RELATIVE NEUTROPHIL (OHS): 77.7 % (ref 38–73)
SODIUM SERPL-SCNC: 137 MMOL/L (ref 136–145)
SP GR UR STRIP: >=1.03
TROPONIN I SERPL HS-MCNC: 25 NG/L
UROBILINOGEN UR STRIP-ACNC: NEGATIVE EU/DL
WBC # BLD AUTO: 7.94 K/UL (ref 3.9–12.7)
WBC #/AREA URNS AUTO: 0 /HPF (ref 0–5)

## 2025-08-25 PROCEDURE — 83605 ASSAY OF LACTIC ACID: CPT | Mod: HCNC | Performed by: EMERGENCY MEDICINE

## 2025-08-25 PROCEDURE — 93005 ELECTROCARDIOGRAM TRACING: CPT | Mod: HCNC

## 2025-08-25 PROCEDURE — 96361 HYDRATE IV INFUSION ADD-ON: CPT | Mod: HCNC

## 2025-08-25 PROCEDURE — 84484 ASSAY OF TROPONIN QUANT: CPT | Mod: HCNC | Performed by: EMERGENCY MEDICINE

## 2025-08-25 PROCEDURE — 25000003 PHARM REV CODE 250: Mod: HCNC | Performed by: EMERGENCY MEDICINE

## 2025-08-25 PROCEDURE — 83690 ASSAY OF LIPASE: CPT | Mod: HCNC | Performed by: EMERGENCY MEDICINE

## 2025-08-25 PROCEDURE — 99285 EMERGENCY DEPT VISIT HI MDM: CPT | Mod: 25,HCNC

## 2025-08-25 PROCEDURE — 25500020 PHARM REV CODE 255: Mod: HCNC | Performed by: EMERGENCY MEDICINE

## 2025-08-25 PROCEDURE — 93010 ELECTROCARDIOGRAM REPORT: CPT | Mod: HCNC,,, | Performed by: INTERNAL MEDICINE

## 2025-08-25 PROCEDURE — 81003 URINALYSIS AUTO W/O SCOPE: CPT | Mod: HCNC | Performed by: EMERGENCY MEDICINE

## 2025-08-25 PROCEDURE — 96375 TX/PRO/DX INJ NEW DRUG ADDON: CPT | Mod: HCNC

## 2025-08-25 PROCEDURE — 96374 THER/PROPH/DIAG INJ IV PUSH: CPT | Mod: HCNC

## 2025-08-25 PROCEDURE — 63600175 PHARM REV CODE 636 W HCPCS: Mod: HCNC | Performed by: EMERGENCY MEDICINE

## 2025-08-25 PROCEDURE — 82040 ASSAY OF SERUM ALBUMIN: CPT | Mod: HCNC | Performed by: EMERGENCY MEDICINE

## 2025-08-25 PROCEDURE — 85610 PROTHROMBIN TIME: CPT | Mod: HCNC | Performed by: EMERGENCY MEDICINE

## 2025-08-25 PROCEDURE — 85025 COMPLETE CBC W/AUTO DIFF WBC: CPT | Mod: HCNC | Performed by: EMERGENCY MEDICINE

## 2025-08-25 RX ORDER — PANTOPRAZOLE SODIUM 40 MG/10ML
80 INJECTION, POWDER, LYOPHILIZED, FOR SOLUTION INTRAVENOUS
Status: DISCONTINUED | OUTPATIENT
Start: 2025-08-25 | End: 2025-08-25

## 2025-08-25 RX ORDER — DICYCLOMINE HYDROCHLORIDE 10 MG/1
20 CAPSULE ORAL
Status: COMPLETED | OUTPATIENT
Start: 2025-08-25 | End: 2025-08-25

## 2025-08-25 RX ORDER — FENTANYL CITRATE 50 UG/ML
100 INJECTION, SOLUTION INTRAMUSCULAR; INTRAVENOUS
Refills: 0 | Status: COMPLETED | OUTPATIENT
Start: 2025-08-25 | End: 2025-08-25

## 2025-08-25 RX ORDER — DICYCLOMINE HYDROCHLORIDE 20 MG/1
20 TABLET ORAL 2 TIMES DAILY
Qty: 20 TABLET | Refills: 0 | Status: SHIPPED | OUTPATIENT
Start: 2025-08-25 | End: 2025-09-04

## 2025-08-25 RX ORDER — SODIUM CHLORIDE 0.9 % (FLUSH) 0.9 %
10 SYRINGE (ML) INJECTION
Status: DISCONTINUED | OUTPATIENT
Start: 2025-08-25 | End: 2025-08-25 | Stop reason: HOSPADM

## 2025-08-25 RX ORDER — KETOROLAC TROMETHAMINE 30 MG/ML
15 INJECTION, SOLUTION INTRAMUSCULAR; INTRAVENOUS
Status: COMPLETED | OUTPATIENT
Start: 2025-08-25 | End: 2025-08-25

## 2025-08-25 RX ORDER — SULFAMETHOXAZOLE AND TRIMETHOPRIM 800; 160 MG/1; MG/1
1 TABLET ORAL 2 TIMES DAILY
Qty: 10 TABLET | Refills: 0 | Status: SHIPPED | OUTPATIENT
Start: 2025-08-25 | End: 2025-08-30

## 2025-08-25 RX ORDER — ONDANSETRON 4 MG/1
4 TABLET, ORALLY DISINTEGRATING ORAL EVERY 6 HOURS PRN
Qty: 20 TABLET | Refills: 0 | Status: SHIPPED | OUTPATIENT
Start: 2025-08-25

## 2025-08-25 RX ADMIN — IOHEXOL 75 ML: 350 INJECTION, SOLUTION INTRAVENOUS at 11:08

## 2025-08-25 RX ADMIN — DICYCLOMINE HYDROCHLORIDE 20 MG: 10 CAPSULE ORAL at 12:08

## 2025-08-25 RX ADMIN — IOHEXOL 75 ML: 350 INJECTION, SOLUTION INTRAVENOUS at 02:08

## 2025-08-25 RX ADMIN — SODIUM CHLORIDE, POTASSIUM CHLORIDE, SODIUM LACTATE AND CALCIUM CHLORIDE 1000 ML: 600; 310; 30; 20 INJECTION, SOLUTION INTRAVENOUS at 01:08

## 2025-08-25 RX ADMIN — FENTANYL CITRATE 100 MCG: 50 INJECTION INTRAMUSCULAR; INTRAVENOUS at 10:08

## 2025-08-25 RX ADMIN — SODIUM CHLORIDE 1000 ML: 9 INJECTION, SOLUTION INTRAVENOUS at 10:08

## 2025-08-25 RX ADMIN — KETOROLAC TROMETHAMINE 15 MG: 30 INJECTION, SOLUTION INTRAMUSCULAR; INTRAVENOUS at 12:08

## 2025-08-26 ENCOUNTER — PATIENT OUTREACH (OUTPATIENT)
Dept: ADMINISTRATIVE | Facility: HOSPITAL | Age: 60
End: 2025-08-26
Payer: MEDICARE

## 2025-08-26 ENCOUNTER — TELEPHONE (OUTPATIENT)
Dept: FAMILY MEDICINE | Facility: CLINIC | Age: 60
End: 2025-08-26
Payer: MEDICARE

## 2025-08-26 DIAGNOSIS — E11.9 TYPE 2 DIABETES MELLITUS WITHOUT COMPLICATION, WITHOUT LONG-TERM CURRENT USE OF INSULIN: Primary | ICD-10-CM

## 2025-08-26 DIAGNOSIS — K20.90 ESOPHAGITIS: Primary | ICD-10-CM

## 2025-08-26 LAB
HOLD SPECIMEN: NORMAL
OHS QRS DURATION: 88 MS
OHS QTC CALCULATION: 472 MS

## 2025-08-28 ENCOUNTER — TELEPHONE (OUTPATIENT)
Dept: ADMINISTRATIVE | Facility: CLINIC | Age: 60
End: 2025-08-28
Payer: MEDICARE

## 2025-09-03 ENCOUNTER — OFFICE VISIT (OUTPATIENT)
Dept: GASTROENTEROLOGY | Facility: CLINIC | Age: 60
End: 2025-09-03
Payer: MEDICARE

## 2025-09-03 VITALS
BODY MASS INDEX: 23.53 KG/M2 | HEART RATE: 88 BPM | DIASTOLIC BLOOD PRESSURE: 74 MMHG | HEIGHT: 64 IN | WEIGHT: 137.81 LBS | SYSTOLIC BLOOD PRESSURE: 127 MMHG

## 2025-09-03 DIAGNOSIS — R10.30 LOWER ABDOMINAL PAIN: ICD-10-CM

## 2025-09-03 DIAGNOSIS — R19.7 DIARRHEA, UNSPECIFIED TYPE: Primary | ICD-10-CM

## 2025-09-03 DIAGNOSIS — K20.90 ESOPHAGITIS: ICD-10-CM

## 2025-09-03 DIAGNOSIS — K52.9 COLITIS: ICD-10-CM

## 2025-09-03 PROCEDURE — 99999 PR PBB SHADOW E&M-EST. PATIENT-LVL III: CPT | Mod: PBBFAC,HCNC,,

## 2025-09-03 RX ORDER — CIPROFLOXACIN 500 MG/1
500 TABLET, FILM COATED ORAL 2 TIMES DAILY
Qty: 20 TABLET | Refills: 0 | Status: SHIPPED | OUTPATIENT
Start: 2025-09-03 | End: 2025-09-13

## 2025-09-03 RX ORDER — HYOSCYAMINE SULFATE 0.125 MG
125 TABLET ORAL EVERY 6 HOURS PRN
Qty: 90 TABLET | Refills: 1 | Status: SHIPPED | OUTPATIENT
Start: 2025-09-03

## 2025-09-03 RX ORDER — PANTOPRAZOLE SODIUM 40 MG/1
40 TABLET, DELAYED RELEASE ORAL DAILY
Qty: 90 TABLET | Refills: 3 | Status: SHIPPED | OUTPATIENT
Start: 2025-09-03

## 2025-09-03 RX ORDER — METRONIDAZOLE 500 MG/1
500 TABLET ORAL 2 TIMES DAILY
Qty: 20 TABLET | Refills: 0 | Status: SHIPPED | OUTPATIENT
Start: 2025-09-03 | End: 2025-09-13

## 2025-09-03 RX ORDER — CYCLOBENZAPRINE HCL 5 MG
5 TABLET ORAL NIGHTLY
COMMUNITY
Start: 2025-08-14

## 2025-09-04 ENCOUNTER — TELEPHONE (OUTPATIENT)
Dept: ENDOSCOPY | Facility: HOSPITAL | Age: 60
End: 2025-09-04
Payer: MEDICARE

## 2025-09-05 ENCOUNTER — TELEPHONE (OUTPATIENT)
Dept: ENDOSCOPY | Facility: HOSPITAL | Age: 60
End: 2025-09-05
Payer: MEDICARE

## (undated) DEVICE — WIRE GUIDE SAFE-T-J .035 260CM

## (undated) DEVICE — GLOVE SURGICAL LATEX SZ 7

## (undated) DEVICE — TROCAR ENDOPATH XCEL 5X100MM

## (undated) DEVICE — BLADE SURG CARBON STEEL SZ11

## (undated) DEVICE — SEE MEDLINE ITEM 157110

## (undated) DEVICE — TROCAR ENDOPATH XCEL 11MM 10CM

## (undated) DEVICE — KIT MANIFOLD LOW PRESS TUBING

## (undated) DEVICE — ELECTRODE REM PLYHSV RETURN 9

## (undated) DEVICE — KIT SYR REUSABLE

## (undated) DEVICE — KIT ESSENTIALS W/ Y ADAPTER

## (undated) DEVICE — GLOVE SURG BIOGEL LATEX SZ 7.5

## (undated) DEVICE — SEE MEDLINE ITEM 152622

## (undated) DEVICE — SUT VICRYL+ 27 UR-6 VIOL

## (undated) DEVICE — HEMOSTAT VASC BAND REG 24CM

## (undated) DEVICE — PAD DEFIB CADENCE ADULT R2

## (undated) DEVICE — PAD RADI FEMORAL

## (undated) DEVICE — ADHESIVE DERMABOND MINI HV

## (undated) DEVICE — Device

## (undated) DEVICE — NDL 18GA X1 1/2 REG BEVEL

## (undated) DEVICE — GUIDEWIRE RUNTHROUGH EF 180CM

## (undated) DEVICE — VALVE CONTROL COPILOT

## (undated) DEVICE — CATH EAGLE EYE PLATINUM

## (undated) DEVICE — CATH NC EMERGE MR 3X12MM

## (undated) DEVICE — OMNIPAQUE CONTRAST 350MG/100ML

## (undated) DEVICE — PRESTO INFLATION DEVICE

## (undated) DEVICE — CATH NC EMERGE MR 3.25X15MM

## (undated) DEVICE — NDL INSUF ULTRA VERESS 120MM

## (undated) DEVICE — SUPPORT ULNA NERVE PROTECTOR

## (undated) DEVICE — CATH DXTERITY JR40 100CM 5FR

## (undated) DEVICE — BLANKET UPPER BODY 78.7X29.9IN

## (undated) DEVICE — CLIPPER BLADE MOD 4406 (CAREF)

## (undated) DEVICE — POSITIONER HEAD DONUT 9IN FOAM

## (undated) DEVICE — CATH EMPULSE ANGLED 5FR PIGTAI

## (undated) DEVICE — BAND TR COMP DEVICE REG 24CM

## (undated) DEVICE — KIT WATCHDOG HEMSTAS VALVE 8FR

## (undated) DEVICE — SOL NS 1000CC

## (undated) DEVICE — PACK CATH LAB

## (undated) DEVICE — KIT PROBE COVER WITH GEL

## (undated) DEVICE — CATH EMERGE MR 20 X 3.00

## (undated) DEVICE — ANGIOTOUCH KIT

## (undated) DEVICE — CATH LNCHR EB35 6F 110CM

## (undated) DEVICE — KIT ANTIFOG

## (undated) DEVICE — PACK ENDOSCOPY GENERAL

## (undated) DEVICE — CATH DXTERITY JL35 100CM 5FR

## (undated) DEVICE — GUIDE LAUNCHER 6FR EBU 3.5

## (undated) DEVICE — APPLICATOR CHLORAPREP ORN 26ML

## (undated) DEVICE — UNDERGLOVES BIOGEL PI SIZE 7.5

## (undated) DEVICE — KIT GLIDESHEATH SLEND 6FR 10CM

## (undated) DEVICE — SEE L#120831

## (undated) DEVICE — CATH SHOCKWAVE C2+ IVL 3.0X12M

## (undated) DEVICE — GUIDEWIRE STD .035X260CM ANG

## (undated) DEVICE — TUBING INSUFFLATION 10

## (undated) DEVICE — SUT MONOCRYL 4.0 PS2 CP496G

## (undated) DEVICE — SYR 10CC LUER LOCK

## (undated) DEVICE — CATH NC EMERGE MR 3X15MM

## (undated) DEVICE — CATH NC EMERGE MR 3X20MM